# Patient Record
Sex: FEMALE | Race: WHITE | NOT HISPANIC OR LATINO | Employment: OTHER | URBAN - METROPOLITAN AREA
[De-identification: names, ages, dates, MRNs, and addresses within clinical notes are randomized per-mention and may not be internally consistent; named-entity substitution may affect disease eponyms.]

---

## 2024-06-21 ENCOUNTER — HOSPITAL ENCOUNTER (EMERGENCY)
Facility: HOSPITAL | Age: 69
Discharge: ANOTHER HOSPITAL | End: 2024-06-22
Attending: INTERNAL MEDICINE
Payer: MEDICARE

## 2024-06-21 VITALS
DIASTOLIC BLOOD PRESSURE: 104 MMHG | OXYGEN SATURATION: 100 % | RESPIRATION RATE: 20 BRPM | TEMPERATURE: 98 F | SYSTOLIC BLOOD PRESSURE: 150 MMHG | HEART RATE: 90 BPM | WEIGHT: 250 LBS

## 2024-06-21 DIAGNOSIS — D63.1 ANEMIA IN CHRONIC KIDNEY DISEASE, UNSPECIFIED CKD STAGE: ICD-10-CM

## 2024-06-21 DIAGNOSIS — N32.0 BLADDER OUTLET OBSTRUCTION: ICD-10-CM

## 2024-06-21 DIAGNOSIS — E86.0 DEHYDRATION: ICD-10-CM

## 2024-06-21 DIAGNOSIS — G93.41 METABOLIC ENCEPHALOPATHY: ICD-10-CM

## 2024-06-21 DIAGNOSIS — N18.9 ANEMIA IN CHRONIC KIDNEY DISEASE, UNSPECIFIED CKD STAGE: ICD-10-CM

## 2024-06-21 DIAGNOSIS — R53.1 WEAKNESS: ICD-10-CM

## 2024-06-21 DIAGNOSIS — E87.8 ELECTROLYTE DISTURBANCE: ICD-10-CM

## 2024-06-21 DIAGNOSIS — N17.9 ACUTE RENAL FAILURE, UNSPECIFIED ACUTE RENAL FAILURE TYPE: Primary | ICD-10-CM

## 2024-06-21 DIAGNOSIS — N30.00 ACUTE CYSTITIS WITHOUT HEMATURIA: ICD-10-CM

## 2024-06-21 PROBLEM — N39.0 UTI (URINARY TRACT INFECTION): Status: ACTIVE | Noted: 2024-06-21

## 2024-06-21 PROBLEM — E86.9 VOLUME DEPLETION: Status: ACTIVE | Noted: 2024-06-21

## 2024-06-21 PROBLEM — E87.5 HYPERKALEMIA: Status: ACTIVE | Noted: 2024-06-21

## 2024-06-21 PROBLEM — E87.20 METABOLIC ACIDEMIA: Status: ACTIVE | Noted: 2024-06-21

## 2024-06-21 PROBLEM — D64.9 ANEMIA REQUIRING TRANSFUSIONS: Status: ACTIVE | Noted: 2024-06-21

## 2024-06-21 LAB
ABORH RETYPE: NORMAL
ALBUMIN SERPL-MCNC: 2.9 G/DL (ref 3.4–4.8)
ALBUMIN/GLOB SERPL: 0.5 RATIO (ref 1.1–2)
ALP SERPL-CCNC: 62 UNIT/L (ref 40–150)
ALT SERPL-CCNC: 10 UNIT/L (ref 0–55)
ANION GAP SERPL CALC-SCNC: 22 MEQ/L
ANION GAP SERPL CALC-SCNC: ABNORMAL MMOL/L
AST SERPL-CCNC: 13 UNIT/L (ref 5–34)
BACTERIA #/AREA URNS AUTO: ABNORMAL /HPF
BASOPHILS # BLD AUTO: 0.04 X10(3)/MCL
BASOPHILS NFR BLD AUTO: 0.3 %
BILIRUB SERPL-MCNC: 0.3 MG/DL
BILIRUB UR QL STRIP.AUTO: NEGATIVE
BNP BLD-MCNC: 152.2 PG/ML
BUN SERPL-MCNC: 205 MG/DL (ref 9.8–20.1)
BUN SERPL-MCNC: 209 MG/DL (ref 9.8–20.1)
CALCIUM SERPL-MCNC: 7.5 MG/DL (ref 8.4–10.2)
CALCIUM SERPL-MCNC: 8.2 MG/DL (ref 8.4–10.2)
CHLORIDE SERPL-SCNC: 107 MMOL/L (ref 98–107)
CHLORIDE SERPL-SCNC: 108 MMOL/L (ref 98–107)
CHOLEST SERPL-MCNC: 94 MG/DL
CHOLEST/HDLC SERPL: 4 {RATIO} (ref 0–5)
CLARITY UR: ABNORMAL
CO2 SERPL-SCNC: 5 MMOL/L (ref 23–31)
CO2 SERPL-SCNC: <5 MMOL/L (ref 23–31)
COLOR UR AUTO: YELLOW
CREAT SERPL-MCNC: 14.8 MG/DL (ref 0.55–1.02)
CREAT SERPL-MCNC: 16.94 MG/DL (ref 0.55–1.02)
CREAT/UREA NIT SERPL: 12
CREAT/UREA NIT SERPL: 14
EOSINOPHIL # BLD AUTO: 0.06 X10(3)/MCL (ref 0–0.9)
EOSINOPHIL NFR BLD AUTO: 0.4 %
ERYTHROCYTE [DISTWIDTH] IN BLOOD BY AUTOMATED COUNT: 13.4 % (ref 11.5–17)
FERRITIN SERPL-MCNC: 1441.2 NG/ML (ref 4.63–204)
GFR SERPLBLD CREATININE-BSD FMLA CKD-EPI: 2 ML/MIN/1.73/M2
GFR SERPLBLD CREATININE-BSD FMLA CKD-EPI: 2 ML/MIN/1.73/M2
GLOBULIN SER-MCNC: 5.4 GM/DL (ref 2.4–3.5)
GLUCOSE SERPL-MCNC: 111 MG/DL (ref 82–115)
GLUCOSE SERPL-MCNC: 273 MG/DL (ref 82–115)
GLUCOSE UR QL STRIP: NEGATIVE
GROUP & RH: NORMAL
HCT VFR BLD AUTO: 17.7 % (ref 37–47)
HDLC SERPL-MCNC: 22 MG/DL (ref 35–60)
HGB BLD-MCNC: 5.7 G/DL (ref 12–16)
HGB UR QL STRIP: ABNORMAL
IMM GRANULOCYTES # BLD AUTO: 0.45 X10(3)/MCL (ref 0–0.04)
IMM GRANULOCYTES NFR BLD AUTO: 2.8 %
INDIRECT COOMBS: NORMAL
IRON SATN MFR SERPL: 58 % (ref 20–50)
IRON SERPL-MCNC: 109 UG/DL (ref 50–170)
KETONES UR QL STRIP: NEGATIVE
LACTATE SERPL-SCNC: 0.6 MMOL/L (ref 0.5–2.2)
LDLC SERPL CALC-MCNC: 47 MG/DL (ref 50–140)
LEUKOCYTE ESTERASE UR QL STRIP: ABNORMAL
LYMPHOCYTES # BLD AUTO: 0.48 X10(3)/MCL (ref 0.6–4.6)
LYMPHOCYTES NFR BLD AUTO: 3 %
MAGNESIUM SERPL-MCNC: 2.3 MG/DL (ref 1.6–2.6)
MCH RBC QN AUTO: 30.6 PG (ref 27–31)
MCHC RBC AUTO-ENTMCNC: 32.2 G/DL (ref 33–36)
MCV RBC AUTO: 95.2 FL (ref 80–94)
MONOCYTES # BLD AUTO: 0.83 X10(3)/MCL (ref 0.1–1.3)
MONOCYTES NFR BLD AUTO: 5.2 %
NEUTROPHILS # BLD AUTO: 13.95 X10(3)/MCL (ref 2.1–9.2)
NEUTROPHILS NFR BLD AUTO: 88.3 %
NITRITE UR QL STRIP: NEGATIVE
PH UR STRIP: 7 [PH]
PHOSPHATE SERPL-MCNC: 7.9 MG/DL (ref 2.3–4.7)
PLATELET # BLD AUTO: 268 X10(3)/MCL (ref 130–400)
PMV BLD AUTO: 11.3 FL (ref 7.4–10.4)
POCT GLUCOSE: 106 MG/DL (ref 70–110)
POCT GLUCOSE: 118 MG/DL (ref 70–110)
POCT GLUCOSE: 221 MG/DL (ref 70–110)
POCT GLUCOSE: 289 MG/DL (ref 70–110)
POTASSIUM SERPL-SCNC: 6 MMOL/L (ref 3.5–5.1)
POTASSIUM SERPL-SCNC: 6.8 MMOL/L (ref 3.5–5.1)
PROT SERPL-MCNC: 8.3 GM/DL (ref 5.8–7.6)
PROT UR QL STRIP: ABNORMAL
RBC # BLD AUTO: 1.86 X10(6)/MCL (ref 4.2–5.4)
RBC #/AREA URNS AUTO: >100 /HPF
SODIUM SERPL-SCNC: 132 MMOL/L (ref 136–145)
SODIUM SERPL-SCNC: 135 MMOL/L (ref 136–145)
SP GR UR STRIP.AUTO: 1.02 (ref 1–1.03)
SPECIMEN OUTDATE: NORMAL
SQUAMOUS #/AREA URNS AUTO: ABNORMAL /HPF
TIBC SERPL-MCNC: 187 UG/DL (ref 250–450)
TIBC SERPL-MCNC: 78 UG/DL (ref 70–310)
TRIGL SERPL-MCNC: 125 MG/DL (ref 37–140)
TROPONIN I SERPL-MCNC: 0.04 NG/ML (ref 0–0.04)
TSH SERPL-ACNC: 4.44 UIU/ML (ref 0.35–4.94)
UROBILINOGEN UR STRIP-ACNC: 0.2
VLDLC SERPL CALC-MCNC: 25 MG/DL
WBC # BLD AUTO: 15.81 X10(3)/MCL (ref 4.5–11.5)
WBC #/AREA URNS AUTO: >100 /HPF

## 2024-06-21 PROCEDURE — 83550 IRON BINDING TEST: CPT | Performed by: INTERNAL MEDICINE

## 2024-06-21 PROCEDURE — 83735 ASSAY OF MAGNESIUM: CPT

## 2024-06-21 PROCEDURE — 80061 LIPID PANEL: CPT | Performed by: INTERNAL MEDICINE

## 2024-06-21 PROCEDURE — 80053 COMPREHEN METABOLIC PANEL: CPT

## 2024-06-21 PROCEDURE — 25000003 PHARM REV CODE 250: Performed by: INTERNAL MEDICINE

## 2024-06-21 PROCEDURE — 84100 ASSAY OF PHOSPHORUS: CPT | Performed by: INTERNAL MEDICINE

## 2024-06-21 PROCEDURE — 86900 BLOOD TYPING SEROLOGIC ABO: CPT | Performed by: INTERNAL MEDICINE

## 2024-06-21 PROCEDURE — 96361 HYDRATE IV INFUSION ADD-ON: CPT

## 2024-06-21 PROCEDURE — 84443 ASSAY THYROID STIM HORMONE: CPT | Performed by: INTERNAL MEDICINE

## 2024-06-21 PROCEDURE — 99291 CRITICAL CARE FIRST HOUR: CPT | Mod: 25

## 2024-06-21 PROCEDURE — 83540 ASSAY OF IRON: CPT | Performed by: INTERNAL MEDICINE

## 2024-06-21 PROCEDURE — 83605 ASSAY OF LACTIC ACID: CPT | Performed by: INTERNAL MEDICINE

## 2024-06-21 PROCEDURE — 96365 THER/PROPH/DIAG IV INF INIT: CPT

## 2024-06-21 PROCEDURE — 82962 GLUCOSE BLOOD TEST: CPT

## 2024-06-21 PROCEDURE — 81003 URINALYSIS AUTO W/O SCOPE: CPT

## 2024-06-21 PROCEDURE — 63600175 PHARM REV CODE 636 W HCPCS: Performed by: INTERNAL MEDICINE

## 2024-06-21 PROCEDURE — 83880 ASSAY OF NATRIURETIC PEPTIDE: CPT

## 2024-06-21 PROCEDURE — 84484 ASSAY OF TROPONIN QUANT: CPT

## 2024-06-21 PROCEDURE — 86850 RBC ANTIBODY SCREEN: CPT | Performed by: INTERNAL MEDICINE

## 2024-06-21 PROCEDURE — 85025 COMPLETE CBC W/AUTO DIFF WBC: CPT

## 2024-06-21 PROCEDURE — 87086 URINE CULTURE/COLONY COUNT: CPT

## 2024-06-21 PROCEDURE — 93005 ELECTROCARDIOGRAM TRACING: CPT

## 2024-06-21 PROCEDURE — 93010 ELECTROCARDIOGRAM REPORT: CPT | Mod: ,,, | Performed by: INTERNAL MEDICINE

## 2024-06-21 PROCEDURE — 87040 BLOOD CULTURE FOR BACTERIA: CPT | Performed by: INTERNAL MEDICINE

## 2024-06-21 PROCEDURE — 87077 CULTURE AEROBIC IDENTIFY: CPT

## 2024-06-21 PROCEDURE — 82728 ASSAY OF FERRITIN: CPT | Performed by: INTERNAL MEDICINE

## 2024-06-21 PROCEDURE — 84156 ASSAY OF PROTEIN URINE: CPT

## 2024-06-21 PROCEDURE — 96375 TX/PRO/DX INJ NEW DRUG ADDON: CPT

## 2024-06-21 RX ORDER — HYDROCODONE BITARTRATE AND ACETAMINOPHEN 5; 325 MG/1; MG/1
1 TABLET ORAL EVERY 6 HOURS PRN
Status: DISCONTINUED | OUTPATIENT
Start: 2024-06-22 | End: 2024-06-22 | Stop reason: HOSPADM

## 2024-06-21 RX ORDER — DEXTROSE MONOHYDRATE 50 MG/ML
1000 INJECTION, SOLUTION INTRAVENOUS CONTINUOUS
Status: DISCONTINUED | OUTPATIENT
Start: 2024-06-21 | End: 2024-06-21

## 2024-06-21 RX ORDER — SODIUM BICARBONATE 650 MG/1
1300 TABLET ORAL ONCE
Status: COMPLETED | OUTPATIENT
Start: 2024-06-21 | End: 2024-06-21

## 2024-06-21 RX ORDER — ONDANSETRON HYDROCHLORIDE 2 MG/ML
4 INJECTION, SOLUTION INTRAVENOUS EVERY 6 HOURS PRN
Status: DISCONTINUED | OUTPATIENT
Start: 2024-06-22 | End: 2024-06-22 | Stop reason: HOSPADM

## 2024-06-21 RX ORDER — SODIUM BICARBONATE 1 MEQ/ML
150 SYRINGE (ML) INTRAVENOUS CONTINUOUS
Status: DISCONTINUED | OUTPATIENT
Start: 2024-06-21 | End: 2024-06-21

## 2024-06-21 RX ORDER — CALCIUM GLUCONATE 20 MG/ML
1 INJECTION, SOLUTION INTRAVENOUS
Status: COMPLETED | OUTPATIENT
Start: 2024-06-21 | End: 2024-06-21

## 2024-06-21 RX ADMIN — SODIUM BICARBONATE 150 MEQ: 84 INJECTION, SOLUTION INTRAVENOUS at 10:06

## 2024-06-21 RX ADMIN — SODIUM BICARBONATE 1300 MG: 650 TABLET ORAL at 09:06

## 2024-06-21 RX ADMIN — DEXTROSE MONOHYDRATE 250 ML: 100 INJECTION, SOLUTION INTRAVENOUS at 09:06

## 2024-06-21 RX ADMIN — DEXTROSE MONOHYDRATE 1000 ML: 50 INJECTION, SOLUTION INTRAVENOUS at 10:06

## 2024-06-21 RX ADMIN — HUMAN INSULIN 5 UNITS: 100 INJECTION, SOLUTION SUBCUTANEOUS at 10:06

## 2024-06-21 RX ADMIN — CALCIUM GLUCONATE 1 G: 20 INJECTION, SOLUTION INTRAVENOUS at 09:06

## 2024-06-21 RX ADMIN — SODIUM CHLORIDE 1000 ML: 9 INJECTION, SOLUTION INTRAVENOUS at 09:06

## 2024-06-21 RX ADMIN — CEFTRIAXONE SODIUM 1 G: 1 INJECTION, POWDER, FOR SOLUTION INTRAMUSCULAR; INTRAVENOUS at 09:06

## 2024-06-22 ENCOUNTER — HOSPITAL ENCOUNTER (INPATIENT)
Facility: HOSPITAL | Age: 69
LOS: 18 days | Discharge: HOME HEALTH | DRG: 660 | End: 2024-07-10
Attending: EMERGENCY MEDICINE | Admitting: INTERNAL MEDICINE
Payer: MEDICARE

## 2024-06-22 DIAGNOSIS — R60.9 EDEMA: ICD-10-CM

## 2024-06-22 DIAGNOSIS — R00.0 TACHYCARDIA: ICD-10-CM

## 2024-06-22 DIAGNOSIS — N17.9 ACUTE RENAL FAILURE, UNSPECIFIED ACUTE RENAL FAILURE TYPE: ICD-10-CM

## 2024-06-22 DIAGNOSIS — E87.20 METABOLIC ACIDOSIS: ICD-10-CM

## 2024-06-22 DIAGNOSIS — M79.89 LEG SWELLING: ICD-10-CM

## 2024-06-22 DIAGNOSIS — E87.5 HYPERKALEMIA: ICD-10-CM

## 2024-06-22 DIAGNOSIS — D64.9 SYMPTOMATIC ANEMIA: ICD-10-CM

## 2024-06-22 DIAGNOSIS — D64.9 ANEMIA REQUIRING TRANSFUSIONS: Primary | ICD-10-CM

## 2024-06-22 DIAGNOSIS — N17.9 AKI (ACUTE KIDNEY INJURY): ICD-10-CM

## 2024-06-22 DIAGNOSIS — I82.409 DVT (DEEP VENOUS THROMBOSIS): ICD-10-CM

## 2024-06-22 DIAGNOSIS — E87.5 ACUTE HYPERKALEMIA: ICD-10-CM

## 2024-06-22 DIAGNOSIS — E86.9 VOLUME DEPLETION: ICD-10-CM

## 2024-06-22 DIAGNOSIS — Z45.2 ENCOUNTER FOR CENTRAL LINE PLACEMENT: ICD-10-CM

## 2024-06-22 LAB
ABS NEUT (OLG): 11.2 X10(3)/MCL (ref 2.1–9.2)
ALBUMIN SERPL-MCNC: 2.3 G/DL (ref 3.4–4.8)
ALBUMIN SERPL-MCNC: 2.7 G/DL (ref 3.4–4.8)
ALBUMIN/GLOB SERPL: 0.6 RATIO (ref 1.1–2)
ALBUMIN/GLOB SERPL: 0.7 RATIO (ref 1.1–2)
ALLENS TEST BLOOD GAS (OHS): YES
ALP SERPL-CCNC: 53 UNIT/L (ref 40–150)
ALP SERPL-CCNC: 59 UNIT/L (ref 40–150)
ALT SERPL-CCNC: 12 UNIT/L (ref 0–55)
ALT SERPL-CCNC: 12 UNIT/L (ref 0–55)
ANION GAP SERPL CALC-SCNC: 23 MEQ/L
ANION GAP SERPL CALC-SCNC: ABNORMAL MMOL/L
ANISOCYTOSIS BLD QL SMEAR: ABNORMAL
APTT PPP: 23.5 SECONDS (ref 23.2–33.7)
AST SERPL-CCNC: 15 UNIT/L (ref 5–34)
AST SERPL-CCNC: 17 UNIT/L (ref 5–34)
BASE EXCESS BLD CALC-SCNC: -19.4 MMOL/L
BASE EXCESS BLD CALC-SCNC: -3.6 MMOL/L
BILIRUB SERPL-MCNC: 0.3 MG/DL
BILIRUB SERPL-MCNC: 0.5 MG/DL
BLOOD GAS SAMPLE TYPE (OHS): ABNORMAL
BLOOD GAS SAMPLE TYPE (OHS): ABNORMAL
BUN SERPL-MCNC: 117.8 MG/DL (ref 9.8–20.1)
BUN SERPL-MCNC: 198.4 MG/DL (ref 9.8–20.1)
BURR CELLS (OLG): ABNORMAL
CA-I BLD-SCNC: 0.89 MMOL/L (ref 1.12–1.23)
CA-I BLD-SCNC: 0.95 MMOL/L (ref 1.12–1.23)
CALCIUM SERPL-MCNC: 6.7 MG/DL (ref 8.4–10.2)
CALCIUM SERPL-MCNC: 7.3 MG/DL (ref 8.4–10.2)
CHLORIDE SERPL-SCNC: 103 MMOL/L (ref 98–107)
CHLORIDE SERPL-SCNC: 108 MMOL/L (ref 98–107)
CHLORIDE UR-SCNC: 52 MMOL/L
CK SERPL-CCNC: 245 U/L (ref 29–168)
CO2 BLDA-SCNC: 17.2 MMOL/L
CO2 BLDA-SCNC: 8.4 MMOL/L
CO2 SERPL-SCNC: 14 MMOL/L (ref 23–31)
CO2 SERPL-SCNC: <5 MMOL/L (ref 23–31)
COHGB MFR BLDA: 2.4 %
CREAT SERPL-MCNC: 15.51 MG/DL (ref 0.55–1.02)
CREAT SERPL-MCNC: 9.84 MG/DL (ref 0.55–1.02)
CREAT UR-MCNC: 29.5 MG/DL (ref 45–106)
CREAT/UREA NIT SERPL: 12
CREAT/UREA NIT SERPL: 13
D DIMER PPP IA.FEU-MCNC: 1.57 UG/ML FEU (ref 0–0.5)
DRAWN BY BLOOD GAS (OHS): ABNORMAL
DRAWN BY BLOOD GAS (OHS): ABNORMAL
EOSINOPHIL NFR BLD MANUAL: 0.14 X10(3)/MCL (ref 0–0.9)
EOSINOPHIL NFR BLD MANUAL: 1 %
ERYTHROCYTE [DISTWIDTH] IN BLOOD BY AUTOMATED COUNT: 13.1 % (ref 11.5–17)
EST. AVERAGE GLUCOSE BLD GHB EST-MCNC: 88.2 MG/DL
FOLATE SERPL-MCNC: 6.1 NG/ML (ref 7–31.4)
GFR SERPLBLD CREATININE-BSD FMLA CKD-EPI: 2 ML/MIN/1.73/M2
GFR SERPLBLD CREATININE-BSD FMLA CKD-EPI: 4 ML/MIN/1.73/M2
GLOBULIN SER-MCNC: 3.4 GM/DL (ref 2.4–3.5)
GLOBULIN SER-MCNC: 4.6 GM/DL (ref 2.4–3.5)
GLUCOSE SERPL-MCNC: 100 MG/DL (ref 82–115)
GLUCOSE SERPL-MCNC: 84 MG/DL (ref 82–115)
GROUP & RH: NORMAL
HBA1C MFR BLD: 4.7 %
HBV SURFACE AG SERPL QL IA: NONREACTIVE
HCO3 BLDA-SCNC: 16.6 MMOL/L (ref 22–26)
HCO3 BLDA-SCNC: 7.7 MMOL/L
HCT VFR BLD AUTO: 15.6 % (ref 37–47)
HCT VFR BLD AUTO: 20 % (ref 37–47)
HGB BLD-MCNC: 5.2 G/DL (ref 12–16)
HGB BLD-MCNC: 7 G/DL (ref 12–16)
INDIRECT COOMBS: NORMAL
INHALED O2 CONCENTRATION: 21 %
INR PPP: 1.5
INSTRUMENT WBC (OLG): 14.36 X10(3)/MCL
LACTATE SERPL-SCNC: 1.2 MMOL/L (ref 0.5–2.2)
LYMPHOCYTES NFR BLD MANUAL: 1.44 X10(3)/MCL
LYMPHOCYTES NFR BLD MANUAL: 10 %
MACROCYTES BLD QL SMEAR: ABNORMAL
MAGNESIUM SERPL-MCNC: 1.6 MG/DL (ref 1.6–2.6)
MAGNESIUM SERPL-MCNC: 2 MG/DL (ref 1.6–2.6)
MCH RBC QN AUTO: 31 PG (ref 27–31)
MCHC RBC AUTO-ENTMCNC: 33.3 G/DL (ref 33–36)
MCV RBC AUTO: 92.9 FL (ref 80–94)
METHGB MFR BLDA: 10.3 %
MONOCYTES NFR BLD MANUAL: 1.58 X10(3)/MCL (ref 0.1–1.3)
MONOCYTES NFR BLD MANUAL: 11 %
NEUTROPHILS NFR BLD MANUAL: 78 %
NRBC BLD AUTO-RTO: 0 %
NRBC BLD MANUAL-RTO: 1 %
O2 HB BLOOD GAS (OHS): 13.8 %
OHS QRS DURATION: 62 MS
OHS QRS DURATION: 78 MS
OHS QTC CALCULATION: 452 MS
OHS QTC CALCULATION: 497 MS
OXYHGB MFR BLDA: <6.5 G/DL
PCO2 BLDA: 19 MMHG (ref 35–45)
PCO2 BLDA: 22 MMHG (ref 20–50)
PH BLDA: 7.15 [PH] (ref 7.3–7.6)
PH BLDA: 7.55 [PH] (ref 7.35–7.45)
PHOSPHATE SERPL-MCNC: 4.4 MG/DL (ref 2.3–4.7)
PLATELET # BLD AUTO: 221 X10(3)/MCL (ref 130–400)
PLATELET # BLD EST: NORMAL 10*3/UL
PLATELETS.RETICULATED NFR BLD AUTO: 1.9 % (ref 0.9–11.2)
PMV BLD AUTO: 11.1 FL (ref 7.4–10.4)
PO2 BLDA: 112 MMHG (ref 80–100)
PO2 BLDA: <38 MMHG
POCT GLUCOSE: 131 MG/DL (ref 70–110)
POCT GLUCOSE: 85 MG/DL (ref 70–110)
POIKILOCYTOSIS BLD QL SMEAR: ABNORMAL
POTASSIUM BLOOD GAS (OHS): 3.4 MMOL/L (ref 3.5–5)
POTASSIUM BLOOD GAS (OHS): 5.5 MMOL/L (ref 3.5–5)
POTASSIUM SERPL-SCNC: 3.6 MMOL/L (ref 3.5–5.1)
POTASSIUM SERPL-SCNC: 5.7 MMOL/L (ref 3.5–5.1)
POTASSIUM UR-SCNC: 27.4 MMOL/L
PROT SERPL-MCNC: 5.7 GM/DL (ref 5.8–7.6)
PROT SERPL-MCNC: 7.3 GM/DL (ref 5.8–7.6)
PROT UR STRIP-MCNC: 241.3 MG/DL
PROTHROMBIN TIME: 18.3 SECONDS (ref 12.5–14.5)
RBC # BLD AUTO: 1.68 X10(6)/MCL (ref 4.2–5.4)
RBC MORPH BLD: ABNORMAL
SAMPLE SITE BLOOD GAS (OHS): ABNORMAL
SAO2 % BLDA: 9.6 %
SAO2 % BLDA: 99 %
SODIUM BLOOD GAS (OHS): 131 MMOL/L (ref 137–145)
SODIUM BLOOD GAS (OHS): 135 MMOL/L (ref 137–145)
SODIUM SERPL-SCNC: 136 MMOL/L (ref 136–145)
SODIUM SERPL-SCNC: 140 MMOL/L (ref 136–145)
SODIUM UR-SCNC: 51 MMOL/L
SPECIMEN OUTDATE: NORMAL
URINE PROTEIN/CREATININE RATIO (OLG): 8.2
VIT B12 SERPL-MCNC: 681 PG/ML (ref 213–816)
WBC # BLD AUTO: 14.36 X10(3)/MCL (ref 4.5–11.5)

## 2024-06-22 PROCEDURE — 82746 ASSAY OF FOLIC ACID SERUM: CPT | Performed by: INTERNAL MEDICINE

## 2024-06-22 PROCEDURE — 82550 ASSAY OF CK (CPK): CPT | Performed by: INTERNAL MEDICINE

## 2024-06-22 PROCEDURE — 93010 ELECTROCARDIOGRAM REPORT: CPT | Mod: ,,, | Performed by: INTERNAL MEDICINE

## 2024-06-22 PROCEDURE — 85379 FIBRIN DEGRADATION QUANT: CPT | Performed by: EMERGENCY MEDICINE

## 2024-06-22 PROCEDURE — 30233N0 TRANSFUSION OF AUTOLOGOUS RED BLOOD CELLS INTO PERIPHERAL VEIN, PERCUTANEOUS APPROACH: ICD-10-PCS | Performed by: INTERNAL MEDICINE

## 2024-06-22 PROCEDURE — 85014 HEMATOCRIT: CPT | Performed by: INTERNAL MEDICINE

## 2024-06-22 PROCEDURE — 82803 BLOOD GASES ANY COMBINATION: CPT

## 2024-06-22 PROCEDURE — 5A1D70Z PERFORMANCE OF URINARY FILTRATION, INTERMITTENT, LESS THAN 6 HOURS PER DAY: ICD-10-PCS | Performed by: INTERNAL MEDICINE

## 2024-06-22 PROCEDURE — 83735 ASSAY OF MAGNESIUM: CPT | Performed by: EMERGENCY MEDICINE

## 2024-06-22 PROCEDURE — P9016 RBC LEUKOCYTES REDUCED: HCPCS | Performed by: EMERGENCY MEDICINE

## 2024-06-22 PROCEDURE — 85730 THROMBOPLASTIN TIME PARTIAL: CPT | Performed by: EMERGENCY MEDICINE

## 2024-06-22 PROCEDURE — 87340 HEPATITIS B SURFACE AG IA: CPT | Performed by: INTERNAL MEDICINE

## 2024-06-22 PROCEDURE — 02HV33Z INSERTION OF INFUSION DEVICE INTO SUPERIOR VENA CAVA, PERCUTANEOUS APPROACH: ICD-10-PCS | Performed by: STUDENT IN AN ORGANIZED HEALTH CARE EDUCATION/TRAINING PROGRAM

## 2024-06-22 PROCEDURE — 25000003 PHARM REV CODE 250: Performed by: NURSE PRACTITIONER

## 2024-06-22 PROCEDURE — 85027 COMPLETE CBC AUTOMATED: CPT | Performed by: EMERGENCY MEDICINE

## 2024-06-22 PROCEDURE — 86901 BLOOD TYPING SEROLOGIC RH(D): CPT | Performed by: EMERGENCY MEDICINE

## 2024-06-22 PROCEDURE — 96374 THER/PROPH/DIAG INJ IV PUSH: CPT

## 2024-06-22 PROCEDURE — 83735 ASSAY OF MAGNESIUM: CPT

## 2024-06-22 PROCEDURE — 99900035 HC TECH TIME PER 15 MIN (STAT)

## 2024-06-22 PROCEDURE — 82436 ASSAY OF URINE CHLORIDE: CPT

## 2024-06-22 PROCEDURE — 25000003 PHARM REV CODE 250: Performed by: INTERNAL MEDICINE

## 2024-06-22 PROCEDURE — 36415 COLL VENOUS BLD VENIPUNCTURE: CPT | Performed by: INTERNAL MEDICINE

## 2024-06-22 PROCEDURE — 63600175 PHARM REV CODE 636 W HCPCS

## 2024-06-22 PROCEDURE — 85610 PROTHROMBIN TIME: CPT | Performed by: EMERGENCY MEDICINE

## 2024-06-22 PROCEDURE — 36600 WITHDRAWAL OF ARTERIAL BLOOD: CPT

## 2024-06-22 PROCEDURE — 36430 TRANSFUSION BLD/BLD COMPNT: CPT

## 2024-06-22 PROCEDURE — 83605 ASSAY OF LACTIC ACID: CPT | Performed by: EMERGENCY MEDICINE

## 2024-06-22 PROCEDURE — 51702 INSERT TEMP BLADDER CATH: CPT

## 2024-06-22 PROCEDURE — 80053 COMPREHEN METABOLIC PANEL: CPT | Performed by: INTERNAL MEDICINE

## 2024-06-22 PROCEDURE — 86923 COMPATIBILITY TEST ELECTRIC: CPT | Mod: 91 | Performed by: EMERGENCY MEDICINE

## 2024-06-22 PROCEDURE — 25000003 PHARM REV CODE 250

## 2024-06-22 PROCEDURE — 80053 COMPREHEN METABOLIC PANEL: CPT | Performed by: EMERGENCY MEDICINE

## 2024-06-22 PROCEDURE — 80100014 HC HEMODIALYSIS 1:1

## 2024-06-22 PROCEDURE — 93005 ELECTROCARDIOGRAM TRACING: CPT

## 2024-06-22 PROCEDURE — 84300 ASSAY OF URINE SODIUM: CPT | Performed by: NURSE PRACTITIONER

## 2024-06-22 PROCEDURE — 83036 HEMOGLOBIN GLYCOSYLATED A1C: CPT

## 2024-06-22 PROCEDURE — 63600175 PHARM REV CODE 636 W HCPCS: Performed by: NURSE PRACTITIONER

## 2024-06-22 PROCEDURE — 63600175 PHARM REV CODE 636 W HCPCS: Performed by: INTERNAL MEDICINE

## 2024-06-22 PROCEDURE — 82607 VITAMIN B-12: CPT | Performed by: INTERNAL MEDICINE

## 2024-06-22 PROCEDURE — 82962 GLUCOSE BLOOD TEST: CPT

## 2024-06-22 PROCEDURE — 99285 EMERGENCY DEPT VISIT HI MDM: CPT | Mod: 25

## 2024-06-22 PROCEDURE — 25000003 PHARM REV CODE 250: Performed by: EMERGENCY MEDICINE

## 2024-06-22 PROCEDURE — 84133 ASSAY OF URINE POTASSIUM: CPT

## 2024-06-22 PROCEDURE — 25000003 PHARM REV CODE 250: Mod: JZ,JG

## 2024-06-22 PROCEDURE — 20000000 HC ICU ROOM

## 2024-06-22 PROCEDURE — 84100 ASSAY OF PHOSPHORUS: CPT

## 2024-06-22 PROCEDURE — 85018 HEMOGLOBIN: CPT | Performed by: INTERNAL MEDICINE

## 2024-06-22 RX ORDER — CALCIUM GLUCONATE 20 MG/ML
4 INJECTION, SOLUTION INTRAVENOUS ONCE
Status: DISCONTINUED | OUTPATIENT
Start: 2024-06-22 | End: 2024-06-22

## 2024-06-22 RX ORDER — MUPIROCIN 20 MG/G
OINTMENT TOPICAL 2 TIMES DAILY
Status: DISPENSED | OUTPATIENT
Start: 2024-06-22 | End: 2024-06-27

## 2024-06-22 RX ORDER — DEXAMETHASONE 4 MG/1
8 TABLET ORAL DAILY
Status: DISCONTINUED | OUTPATIENT
Start: 2024-06-22 | End: 2024-06-22

## 2024-06-22 RX ORDER — TALC
6 POWDER (GRAM) TOPICAL NIGHTLY PRN
Status: DISCONTINUED | OUTPATIENT
Start: 2024-06-22 | End: 2024-07-10 | Stop reason: HOSPADM

## 2024-06-22 RX ORDER — HEPARIN SODIUM 1000 [USP'U]/ML
4000 INJECTION, SOLUTION INTRAVENOUS; SUBCUTANEOUS
Status: DISCONTINUED | OUTPATIENT
Start: 2024-06-22 | End: 2024-07-10 | Stop reason: HOSPADM

## 2024-06-22 RX ORDER — PROCHLORPERAZINE EDISYLATE 5 MG/ML
5 INJECTION INTRAMUSCULAR; INTRAVENOUS EVERY 6 HOURS PRN
Status: DISCONTINUED | OUTPATIENT
Start: 2024-06-22 | End: 2024-07-10 | Stop reason: HOSPADM

## 2024-06-22 RX ORDER — SODIUM CHLORIDE 9 MG/ML
INJECTION, SOLUTION INTRAVENOUS
Status: CANCELLED | OUTPATIENT
Start: 2024-06-22

## 2024-06-22 RX ORDER — HYDROCODONE BITARTRATE AND ACETAMINOPHEN 500; 5 MG/1; MG/1
TABLET ORAL
Status: DISCONTINUED | OUTPATIENT
Start: 2024-06-22 | End: 2024-07-10 | Stop reason: HOSPADM

## 2024-06-22 RX ORDER — DEXAMETHASONE SODIUM PHOSPHATE 4 MG/ML
8 INJECTION, SOLUTION INTRA-ARTICULAR; INTRALESIONAL; INTRAMUSCULAR; INTRAVENOUS; SOFT TISSUE EVERY 24 HOURS
Status: DISCONTINUED | OUTPATIENT
Start: 2024-06-22 | End: 2024-06-24

## 2024-06-22 RX ORDER — SODIUM CHLORIDE 9 MG/ML
INJECTION, SOLUTION INTRAVENOUS ONCE
Status: CANCELLED | OUTPATIENT
Start: 2024-06-22 | End: 2024-06-22

## 2024-06-22 RX ORDER — ONDANSETRON HYDROCHLORIDE 2 MG/ML
4 INJECTION, SOLUTION INTRAVENOUS EVERY 8 HOURS PRN
Status: DISCONTINUED | OUTPATIENT
Start: 2024-06-22 | End: 2024-07-10 | Stop reason: HOSPADM

## 2024-06-22 RX ORDER — ACETAMINOPHEN 325 MG/1
650 TABLET ORAL EVERY 4 HOURS PRN
Status: DISCONTINUED | OUTPATIENT
Start: 2024-06-22 | End: 2024-07-10 | Stop reason: HOSPADM

## 2024-06-22 RX ORDER — CALCIUM GLUCONATE 20 MG/ML
1 INJECTION, SOLUTION INTRAVENOUS
Status: DISCONTINUED | OUTPATIENT
Start: 2024-06-22 | End: 2024-06-22

## 2024-06-22 RX ORDER — SODIUM CHLORIDE 9 MG/ML
INJECTION, SOLUTION INTRAVENOUS CONTINUOUS
Status: DISCONTINUED | OUTPATIENT
Start: 2024-06-22 | End: 2024-06-24

## 2024-06-22 RX ORDER — SODIUM CHLORIDE 0.9 % (FLUSH) 0.9 %
10 SYRINGE (ML) INJECTION
Status: DISCONTINUED | OUTPATIENT
Start: 2024-06-22 | End: 2024-07-10 | Stop reason: HOSPADM

## 2024-06-22 RX ORDER — CALCIUM GLUCONATE 20 MG/ML
1 INJECTION, SOLUTION INTRAVENOUS
Status: COMPLETED | OUTPATIENT
Start: 2024-06-22 | End: 2024-06-22

## 2024-06-22 RX ADMIN — CALCIUM GLUCONATE 1 G: 20 INJECTION, SOLUTION INTRAVENOUS at 01:06

## 2024-06-22 RX ADMIN — PIPERACILLIN AND TAZOBACTAM 4.5 G: 4; .5 INJECTION, POWDER, LYOPHILIZED, FOR SOLUTION INTRAVENOUS; PARENTERAL at 01:06

## 2024-06-22 RX ADMIN — SODIUM BICARBONATE: 84 INJECTION, SOLUTION INTRAVENOUS at 02:06

## 2024-06-22 RX ADMIN — MUPIROCIN: 20 OINTMENT TOPICAL at 01:06

## 2024-06-22 RX ADMIN — MUPIROCIN: 20 OINTMENT TOPICAL at 08:06

## 2024-06-22 RX ADMIN — CALCIUM GLUCONATE 1 G: 20 INJECTION, SOLUTION INTRAVENOUS at 02:06

## 2024-06-22 RX ADMIN — VANCOMYCIN HYDROCHLORIDE 2000 MG: 500 INJECTION, POWDER, LYOPHILIZED, FOR SOLUTION INTRAVENOUS at 04:06

## 2024-06-22 RX ADMIN — SODIUM CHLORIDE: 9 INJECTION, SOLUTION INTRAVENOUS at 06:06

## 2024-06-22 RX ADMIN — CEFTRIAXONE SODIUM 2 G: 2 INJECTION, POWDER, FOR SOLUTION INTRAMUSCULAR; INTRAVENOUS at 07:06

## 2024-06-22 RX ADMIN — SODIUM BICARBONATE: 84 INJECTION, SOLUTION INTRAVENOUS at 09:06

## 2024-06-22 RX ADMIN — HEPARIN SODIUM 4000 UNITS: 1000 INJECTION INTRAVENOUS; SUBCUTANEOUS at 09:06

## 2024-06-22 RX ADMIN — DEXAMETHASONE SODIUM PHOSPHATE 8 MG: 4 INJECTION INTRA-ARTICULAR; INTRALESIONAL; INTRAMUSCULAR; INTRAVENOUS; SOFT TISSUE at 01:06

## 2024-06-22 NOTE — ED PROVIDER NOTES
Encounter Date: 6/21/2024       History     Chief Complaint   Patient presents with    Transfer     [facility] transfer from [place]     69-year-old female transferred for acute renal failure hyperkalemia.  Patient and son reports she was had generalized weakness and been mostly bed-bound for 3 weeks due to a generalized weakness and dyspnea with exertion.  They report she used to have a doctor but has not seen them in about 10 years because she just did not need to.  Denies any known past medical history.  Though she has been mostly bed-bound she has been able to get up walk to and from the bathroom felt very short of breath with that.  She has been drinking a lot of fluids at home and been eating a lot of bananas.  She has had nausea vomiting generalized weakness myalgias.  Denies any blood in his stool.  Does feel short of breath no chest pain no coughing.    Patient had labs 5 hours ago that showed sodium 135 potassium 6.8 chloride 108 CO2 5  creatinine 16.9.  Hemoglobin 5.7 hematocrit 17.7 white blood cells 15.8  troponin 0.042 within normal limits.  Blood cultures were obtained.  Urinalysis with many bacteria over 100 white blood cells 3+ leuk esterase.  Patient was started on Rocephin given sodium bicarb dextrose insulin for hyperkalemia as well as calcium.  Lokelma give him.  Patient has a CT scan that showed bilateral hydronephrosis thinning of the renal cortex but no masses.  Patient did have a Mario catheter placed.  Will repeat labs here to evaluate response to treatment thus far.  Based on labs prerenal is a consideration imaging can brings concern of post renal obstruction.  Patient presented to the outside facility reporting feeling sick and short of breath for 3 weeks with nausea and vomiting decreased appetite.  According to further review of the records patient has never seen a doctor was brought to the hospital against her will and has been bed-bound for about 3 months due to knee pain  after she heard it several years ago.        Review of patient's allergies indicates:  No Known Allergies  No past medical history on file.  No past surgical history on file.  No family history on file.  Social History     Tobacco Use    Smoking status: Never    Smokeless tobacco: Never   Substance Use Topics    Alcohol use: Never    Drug use: Never     Review of Systems   Constitutional:  Negative for chills and fever.   Respiratory:  Positive for shortness of breath. Negative for cough.    Cardiovascular:  Negative for chest pain.   Gastrointestinal:  Positive for nausea and vomiting. Negative for abdominal pain.   Musculoskeletal:  Positive for myalgias.   Neurological:  Positive for weakness.   All other systems reviewed and are negative.      Physical Exam     Initial Vitals [06/22/24 0107]   BP Pulse Resp Temp SpO2   (!) 150/72 90 (!) 22 98.5 °F (36.9 °C) 96 %      MAP       --         Physical Exam    Nursing note and vitals reviewed.  Constitutional: She appears well-developed and well-nourished. No distress.   Obese, pale   HENT:   Head: Normocephalic and atraumatic.   Eyes: Conjunctivae are normal.   Cardiovascular:  Normal rate and intact distal pulses.           Pulmonary/Chest: No respiratory distress. She has no rhonchi.   Tachypneic no wheezing no rales   Abdominal: Abdomen is soft. Bowel sounds are normal. There is no abdominal tenderness. There is no rebound and no guarding.   Musculoskeletal:         General: Edema present.      Comments: Pitting edema bilateral lower extremities     Neurological: She is alert and oriented to person, place, and time. She has normal strength.   Skin: Skin is warm and dry.   Psychiatric: She has a normal mood and affect.         ED Course   Critical Care    Date/Time: 6/22/2024 4:14 AM    Performed by: Dariusz Vee MD  Authorized by: Dariusz Vee MD  Direct patient critical care time: 20 minutes  Additional history critical care time: 8 minutes  Ordering  / reviewing critical care time: 7 minutes  Documentation critical care time: 15 minutes  Consulting other physicians critical care time: 7 minutes  Consult with family critical care time: 3 minutes  Total critical care time (exclusive of procedural time) : 60 minutes  Critical care was necessary to treat or prevent imminent or life-threatening deterioration of the following conditions: circulatory failure, respiratory failure, renal failure and metabolic crisis.        Labs Reviewed   COMPREHENSIVE METABOLIC PANEL - Abnormal; Notable for the following components:       Result Value    Potassium 5.7 (*)     Chloride 108 (*)     CO2 <5 (*)     Blood Urea Nitrogen 198.4 (*)     Creatinine 15.51 (*)     Calcium 7.3 (*)     Albumin 2.7 (*)     Globulin 4.6 (*)     Albumin/Globulin Ratio 0.6 (*)     All other components within normal limits   CBC WITH DIFFERENTIAL - Abnormal; Notable for the following components:    WBC 14.36 (*)     RBC 1.68 (*)     Hgb 5.2 (*)     Hct 15.6 (*)     MPV 11.1 (*)     All other components within normal limits   D DIMER, QUANTITATIVE - Abnormal; Notable for the following components:    D-Dimer 1.57 (*)     All other components within normal limits   MANUAL DIFFERENTIAL - Abnormal; Notable for the following components:    Neutrophils Abs 11.2008 (*)     Monocytes Abs 1.5796 (*)     RBC Morph Abnormal (*)     Poikilocytosis 1+ (*)     Anisocytosis 1+ (*)     Macrocytosis 1+ (*)     Ramos Cells 2+ (*)     All other components within normal limits   LACTIC ACID, PLASMA - Normal   MAGNESIUM - Normal   CBC W/ AUTO DIFFERENTIAL    Narrative:     The following orders were created for panel order CBC auto differential.  Procedure                               Abnormality         Status                     ---------                               -----------         ------                     CBC with Differential[[id number]]       Abnormal            Final result               Manual  Differential[[id number]]         Abnormal            Final result                 Please view results for these tests on the individual orders.   BLOOD GAS   TYPE & SCREEN   PREPARE RBC SOFT   PREPARE RBC SOFT          Imaging Results    None          Medications   sodium bicarbonate 150 mEq in D5W 1,000 mL infusion ( Intravenous New Bag 6/22/24 0200)   0.9%  NaCl infusion (for blood administration) (has no administration in time range)     Medical Decision Making  Patient with a heat renal failure hyperkalemia.  Dehydration versus bladder outlet obstruction.  They report she has been having frequent urinations I discussed based on overall picture including CT scan she may have been having some retention with overflow incontinence CT of the previous facility showed bladder outlet obstruction with bilateral hydronephrosis.  Mario catheter was placed and returned dark urine with concern of infection.  She was started on Rocephin at the sending facility.  Continue sodium bicarbonate drip.  Repeating labs here patient reportedly received fluid boluses of saline same facility.  Will re-evaluate electrolytes here determine whether she will require urgent dialysis or not.  Hopefully was catheter and IV fluids and bicarb functional start to improve.  She states she already feels much better than she did at the arrival of the other hospital.  H&H were low symptomatic anemia is also a concern will perform rectal exam with occult testing.  Pulmonary embolus is a consideration given that she has been largely bed-bound will get ultrasound and D-dimer no CT angiogram due to acute renal failure.  Will not empirically heparinize for now due to critical anemia she was hemodynamically stable thus far    Amount and/or Complexity of Data Reviewed  Labs: ordered.    Risk  Prescription drug management.  Decision regarding hospitalization.               ED Course as of 06/22/24 0415   Sat Jun 22, 2024   0251 Brown stool occult positive  for blood [LF]   0334 Nephrology paged [BG]   0336 General surgery paged [BG]   0340 Discussed with Telma on-call for Nephrology.  Will have surgery placed temporary line and then they will plan to call out the team and have her dialyzed and give blood during dialysis [LF]   0342 Icu paged [BG]   0344 General surgery was called to place temporary dialysis line.  Discussed labs platelets normal .  Critically low H&H surgery has requested patient get 1 unit of blood at this time rather than waiting for dialysis to help with line placement [LF]   0347 Discussed case with ICU [LF]   0413 Patient was tachypneic with metabolic acidosis.  Satting well lungs are clear do not believe she needs to be intubated at this time [LF]      ED Course User Index  [BG] Christian Chand  [LF] Dariusz Vee MD                           Clinical Impression:  Final diagnoses:  [N17.9] Acute renal failure, unspecified acute renal failure type (Primary)  [E87.5] Acute hyperkalemia  [D64.9] Symptomatic anemia  [E87.20] Metabolic acidosis  [R60.9] Edema          ED Disposition Condition    Admit Critical                Dariusz Vee MD  06/22/24 2807

## 2024-06-22 NOTE — Clinical Note
Addended by: LYNDA TOMAS on: 10/26/2022 10:48 AM     Modules accepted: Orders     The site was marked. The chest was prepped. The site was prepped with ChloraPrep. The site was clipped. The patient was draped.

## 2024-06-22 NOTE — ED PROVIDER NOTES
Encounter Date: 6/21/2024       History     Chief Complaint   Patient presents with    Weakness     Weakness and SOB x3 weeks.     See MDM.     The history is provided by the patient and a relative. No  was used.     Review of patient's allergies indicates:  No Known Allergies  History reviewed. No pertinent past medical history.  History reviewed. No pertinent surgical history.  No family history on file.  Social History     Tobacco Use    Smoking status: Never    Smokeless tobacco: Never   Substance Use Topics    Alcohol use: Never    Drug use: Never     Review of Systems   Constitutional:  Negative for chills and fever.   Respiratory:  Positive for shortness of breath.    Neurological:  Positive for tremors and weakness.   All other systems reviewed and are negative.      Physical Exam     Initial Vitals [06/21/24 1919]   BP Pulse Resp Temp SpO2   (!) 176/70 98 (!) 22 97.7 °F (36.5 °C) 96 %      MAP       --         Physical Exam    Constitutional: She is not diaphoretic.   Morbidly obese, pale appearing, catching breath while interviewing   Cardiovascular:  Normal rate, regular rhythm and normal heart sounds.     Exam reveals no gallop and no friction rub.       No murmur heard.  Pulmonary/Chest: Breath sounds normal. No respiratory distress. She has no wheezes. She has no rhonchi. She has no rales.   Abdominal: Abdomen is soft. Bowel sounds are normal. She exhibits no distension and no mass. There is no abdominal tenderness. There is no rebound and no guarding.   Musculoskeletal:      Comments: 2+ pitting edema bilateral lower extremities up to the knee, atrophic changes of right lower leg. No wounds     Neurological: She is alert and oriented to person, place, and time.         ED Course   Critical Care    Date/Time: 6/22/2024 12:40 AM    Performed by: Joseph Prasad DO  Authorized by: Joseph Prasad DO  Other critical care time: 55 minutes  Total critical care time (exclusive  of procedural time) : 55 minutes  Critical care time was exclusive of separately billable procedures and treating other patients and teaching time.  Critical care was necessary to treat or prevent imminent or life-threatening deterioration of the following conditions: metabolic crisis and renal failure.  Critical care was time spent personally by me on the following activities: development of treatment plan with patient or surrogate, discussions with consultants, discussions with primary provider, evaluation of patient's response to treatment, examination of patient, obtaining history from patient or surrogate, ordering and performing treatments and interventions, ordering and review of laboratory studies, ordering and review of radiographic studies, pulse oximetry and re-evaluation of patient's condition.        Labs Reviewed   COMPREHENSIVE METABOLIC PANEL - Abnormal; Notable for the following components:       Result Value    Sodium 135 (*)     Potassium 6.8 (*)     Chloride 108 (*)     CO2 5 (*)     Blood Urea Nitrogen 209.0 (*)     Creatinine 16.94 (*)     Calcium 8.2 (*)     Protein Total 8.3 (*)     Albumin 2.9 (*)     Globulin 5.4 (*)     Albumin/Globulin Ratio 0.5 (*)     All other components within normal limits   URINALYSIS - Abnormal; Notable for the following components:    Appearance, UA Turbid (*)     Protein, UA 3+ (*)     Blood, UA 3+ (*)     Leukocyte Esterase, UA 3+ (*)     All other components within normal limits   B-TYPE NATRIURETIC PEPTIDE - Abnormal; Notable for the following components:    Natriuretic Peptide 152.2 (*)     All other components within normal limits   CBC WITH DIFFERENTIAL - Abnormal; Notable for the following components:    WBC 15.81 (*)     RBC 1.86 (*)     Hgb 5.7 (*)     Hct 17.7 (*)     MCV 95.2 (*)     MCHC 32.2 (*)     MPV 11.3 (*)     Lymph # 0.48 (*)     Neut # 13.95 (*)     IG# 0.45 (*)     All other components within normal limits   URINALYSIS, MICROSCOPIC -  Abnormal; Notable for the following components:    Bacteria, UA Many (*)     RBC, UA >100 (*)     WBC, UA >100 (*)     Squamous Epithelial Cells, UA Few (*)     All other components within normal limits   BASIC METABOLIC PANEL - Abnormal; Notable for the following components:    Sodium 132 (*)     Potassium 6.0 (*)     CO2 <5 (*)     Glucose 273 (*)     Blood Urea Nitrogen 205.0 (*)     Creatinine 14.80 (*)     Calcium 7.5 (*)     All other components within normal limits   FERRITIN - Abnormal; Notable for the following components:    Ferritin Level 1,441.20 (*)     All other components within normal limits   IRON AND TIBC - Abnormal; Notable for the following components:    Iron Binding Capacity Total 187 (*)     Iron Saturation 58 (*)     All other components within normal limits   LIPID PANEL - Abnormal; Notable for the following components:    HDL Cholesterol 22 (*)     LDL Cholesterol 47.00 (*)     All other components within normal limits   PHOSPHORUS - Abnormal; Notable for the following components:    Phosphorus Level 7.9 (*)     All other components within normal limits   POCT GLUCOSE - Abnormal; Notable for the following components:    POCT Glucose 221 (*)     All other components within normal limits   POCT GLUCOSE - Abnormal; Notable for the following components:    POCT Glucose 289 (*)     All other components within normal limits   POCT GLUCOSE - Abnormal; Notable for the following components:    POCT Glucose 118 (*)     All other components within normal limits   POCT GLUCOSE - Abnormal; Notable for the following components:    POCT Glucose 131 (*)     All other components within normal limits   MAGNESIUM - Normal   TROPONIN I - Normal   LACTIC ACID, PLASMA - Normal   TSH - Normal   BLOOD CULTURE [facility]   BLOOD CULTURE [facility]   CULTURE, URINE   CBC W/ AUTO DIFFERENTIAL    Narrative:     The following orders were created for panel order CBC auto differential.  Procedure                                Abnormality         Status                     ---------                               -----------         ------                     CBC with Differential[[id number]]       Abnormal            Final result                 Please view results for these tests on the individual orders.   HEMOGLOBIN A1C   OCCULT BLOOD, STOOL 1ST SPECIMEN   TYPE & SCREEN   ABORH RETYPE   POCT GLUCOSE   POCT GLUCOSE MONITORING CONTINUOUS     EKG Readings: (Independently Interpreted)   Initial Reading: No STEMI. Rhythm: Sinus Arrhythmia. Heart Rate: 92. Ectopy: No Ectopy. Conduction: Normal. ST Segments: Normal ST Segments.     ECG Results              EKG 12-lead (Preliminary result)  Result time 06/21/24 19:49:50      Wet Read by Joseph Prasad DO (06/21/24 19:49:50, [facility] - Emergency Dept, Emergency Medicine)    Independent ECG Interpretation:    Normal sinus rhythm at rate of 92. Normal intervals. Normal QRS. No acute ST or T wave abnormalities. Overall impression: No evidence of acute ischemia or arrhythmia.                                     Imaging Results              CT Abdomen Pelvis  Without Contrast (Preliminary result)  Result time 06/21/24 23:12:59      Preliminary result by Stefano Jackson MD (06/21/24 23:12:59)                   Narrative:    START OF REPORT:  Technique: CT of the abdomen and pelvis was performed with axial images as well as sagittal and coronal reconstruction images without intravenous contrast.    Comparison: None available.    Clinical History: Abdominal pain acute nonlocalized.    Dosage Information: Automated Exposure Control was utilized 911.94 mGy.cm.    Findings:  Lines and Tubes: None.  Thorax:  Lungs: There is mild nonspecific dependent change at the lung bases. There is bronchiectasis and traction atelectatic changes in the right basal lower lobe.  Pleura: No effusions or thickening. No pneumothorax is seen.  Heart: Mild cardiomegaly is seen. Mild to moderate coronary  artery calcification is seen.  Abdomen:  Abdominal Wall: No abdominal wall pathology is seen.  Liver: The liver appears unremarkable.  Biliary System: No intrahepatic or extrahepatic biliary duct dilatation is seen.  Gallbladder: The gallbladder appears unremarkable.  Pancreas: The pancreas appears unremarkable.  Spleen: The spleen appears unremarkable.  Adrenals: The adrenal glands appear unremarkable.  Kidneys: There is bilateral hydroureteronephrosis with the bladder walls appearing thickened given state of distention suggesting possible bladder outlet obstruction or possible prior obstructive physiology. There is associated cortical thinning of both kidneys but no cysts masses or calcifications are identified in either kidney.  Aorta: There is moderate calcification of the abdominal aorta and its branches.  IVC: Unremarkable.  Bowel:  Esophagus: There is a small hiatal hernia.  Stomach: The stomach appears unremarkable.  Duodenum: Unremarkable appearing duodenum.  Small Bowel: The small bowel appears unremarkable.  Colon: A few scattered diverticula are seen throughout the colon. No associated inflammatory stranding is seen to suggest diverticulitis.  Appendix: The appendix is not identified but no inflammatory changes are seen in the right lower quadrant to suggest appendicitis.  Peritoneum: No intraperitoneal free air or ascites is seen.    Pelvis:  Bladder: The bladder is nondistended but appears otherwise unremarkable.  Female:  Uterus: The uterus appears unremarkable.  Ovaries: No adnexal masses are seen.    Bony structures:  Dorsal Spine: There is mild spondylosis of the visualized dorsal spine.  Bony Pelvis: There is mild to moderate degenerative change of the bilateral hips. There is a chronic fracture of the right sacral wing (Sonny 1) which extends to the right sacroiliac joint (series 4 image 94).      Impression:  1. There is bilateral hydroureteronephrosis with the bladder walls appearing thickened  given state of distention suggesting possible bladder outlet obstruction or possible prior obstructive physiology. There is associated cortical thinning of both kidneys but no cysts masses or calcifications are identified in either kidney. Correlate with clinical and laboratory findings as regards additional evaluation and follow-up.  2. No acute intraabdominal or pelvic solid organ or bowel pathology identified. Details and other findings as discussed above.                                         X-Ray Chest 1 View (Final result)  Result time 06/21/24 20:15:13      Final result by Tony Mar MD (06/21/24 20:15:13)                   Impression:      No acute findings in the chest      Electronically signed by: Tony Mar MD  Date:    06/21/2024  Time:    20:15               Narrative:    EXAMINATION:  XR CHEST 1 VIEW    CLINICAL HISTORY:  sob;    COMPARISON:  None    FINDINGS:  Single view of the chest shows no focal consolidation, pneumothorax or pleural effusion.  Heart is upper normal in size.  Pulmonary vasculature is normal.  Aorta is partially calcified.                                       Medications   cefTRIAXone (Rocephin) 1 g in D5W 100 mL IVPB (MB+) (has no administration in time range)   ondansetron injection 4 mg (has no administration in time range)   sodium zirconium cyclosilicate packet 5 g (has no administration in time range)   HYDROcodone-acetaminophen 5-325 mg per tablet 1 tablet (has no administration in time range)   sodium bicarbonate 150 mEq in D5W 1,000 mL infusion (has no administration in time range)   dextrose 10% bolus 250 mL 250 mL (has no administration in time range)   sodium chloride 0.9% bolus 1,000 mL 1,000 mL (0 mLs Intravenous Stopped 6/21/24 2205)   sodium bicarbonate tablet 1,300 mg (1,300 mg Oral Given 6/21/24 2133)   cefTRIAXone (Rocephin) 1 g in D5W 100 mL IVPB (MB+) (0 g Intravenous Stopped 6/21/24 2203)   calcium gluconate 1 g in NS IVPB (premixed) (0 g  "Intravenous Stopped 6/21/24 2233)   dextrose 10% bolus 250 mL 250 mL (0 mLs Intravenous Stopped 6/21/24 2230)   insulin regular injection 5 Units 0.05 mL (5 Units Intravenous Given 6/21/24 2230)     Medical Decision Making  Pt is a 68 y/o female who presents for "sick" feeling, shaky and difficulty breathing for 3 weeks. States that she was forced to come by her daughter in law. States that about one week ago she had some nausea and vomiting, but has since resolved. States that she has some burning with urination, otherwise denies blood in urine, decreased urine. Has had decreased appetite, has been drinking fluids. Last meal was peanut butter for lunch this afternoon. LBM this morning and was normal for her. Denies any other problems. Denies chest pain, abdominal pain, nausea, vomiting, diarrhea, constipation, blood in stools, headache, light-headedness, syncope. Denies PMHx, does not follow with PCP, last saw doctor over 10 years ago. Denies hx of diabetes. Denies tobacco use, alcohol use, drug use.     Amount and/or Complexity of Data Reviewed  Labs: ordered. Decision-making details documented in ED Course.  Radiology: ordered. Decision-making details documented in ED Course.  Discussion of management or test interpretation with external provider(s): Discussed patient with Dr. Prasad who had face to face with patient.     Risk  OTC drugs.  Prescription drug management.  Decision regarding hospitalization.               ED Course as of 06/22/24 0040   Fri Jun 21, 2024 2046 Troponin I: 0.042 [MP]   2048 WBC(!): 15.81 [MP]   2048 Hemoglobin(!!): 5.7 [MP]   2048 Hematocrit(!!): 17.7 [MP]   2048 BNP(!): 152.2 [MP]   2102 BUN(!): 209.0 [MP]   2102 Creatinine(!): 16.94 [MP]   2102 eGFR: 2 [MP]   2103 Nephrology on call paged [MP]   2135 Lactic Acid Level: 0.6 [MP]   2138 Dr Moss, recommends electrolyte correction and emergent HD. Gen surg for access [MP]   2138 Spoke with Dr Palomo, will present as soon as able " for HD access [MP]   2139 Leukocyte Esterase, UA(!): 3+ [MP]   2139 Blood, UA(!): 3+ [MP]   2146 CT scan to access for obstructive reasons for renal failure [MP]   2241 Dr. Prasad will admit the patient to the hospital.  We discussed the care this far and patient's condition and results of testing with a provider of the admitting team.  Temporary bridge orders placed to expedite patient's transition of care.    [MP]   2300 Admit orders pending CT read and repeat BMP [MP]   2319 Potassium(!): 6.0 [MP]   2319 CO2(!!): <5 [MP]   2319 Glucose(!): 273 [MP]   2321 CT Abdomen Pelvis  Without Contrast  CT read resulted with possible obstructive physiology, Urology not on call, will likely have to transfer [MP]   2330 PO glucose [MP]   2330 PFT Transfer request ordered [MP]   2337 ER to ER transfer for Urology services. Accepted by attending ED provider at [facility] Dr Platt. [MP]      ED Course User Index  [MP] Joseph Prasad DO                           Clinical Impression:  Final diagnoses:  [R53.1] Weakness  [E86.0] Dehydration  [N17.9] Acute renal failure, unspecified acute renal failure type (Primary)  [N18.9, D63.1] Anemia in chronic kidney disease, unspecified CKD stage  [N30.00] Acute cystitis without hematuria  [E87.8] Electrolyte disturbance  [N32.0] Bladder outlet obstruction  [G93.41] Metabolic encephalopathy          ED Disposition Condition    Transfer to Another Facility Stable                Fatemeh Siegel PA  06/21/24 [id number]           Joseph Prasad DO  06/22/24 0040

## 2024-06-22 NOTE — H&P
"[facility] - 7th Floor ICU  Pulmonary Critical Care Note    Patient Name: Fior Joya  MRN: [id number]  Admission Date: 6/22/2024  Hospital Length of Stay: 0 days  Code Status: Full Code  Attending Provider: JOANA Disla MD  Primary Care Provider: Zaheer Johnson MD     Subjective:     HPI: Fior Joya is a 69 year old  American female with no known past medical history as patient does not follow with PCP as outpatient, who presented to [facility] ED (06/22/2024) via transfer from [facility] for higher level of care (nephrology services) due to acute renal failure. Patient accompanied by son who corroborated patient report. Patient states that symptoms began approximately one month when patient noticed dyspnea on exertion and scant bilateral lower extremity edema. She states she is normally able to ambulate from the front to the back of her home (approximately 100 feet) with minimal limitation 2/2 knee pain presumed to be osteoarthritis by patient and family. Over the course of a week symptoms persisted but remained stable. Approximately two weeks ago patient reports experiencing gastroenteritis-like symptoms including abdominal discomfort, nausea, and nonbloody vomitus consisting of "dark vomitus." States vomiting occurred with 2-3 times per day for about week which she associated PO intake. Gastroenteritis symptoms resolved after about a week but MCCALL and swelling began to worsen and patient's po intake never returned to baseline. MCCALL and swelling continued to worsen over the course of two weeks. Patient son yesterday noted that patient had began to breath faster and heavier even noting that she was using abdominal muscles to breath. He then convinced patient to present to nearest ED for evaluation. CBC showed leukocytosis with left shift (WBC 15.81) and normocytic anemia (HgB 5.7; MCV 95.2). CMP showed acute renal failure (, creatinine 16.94), hyperkalemic metabolic acidosis " (AG 23, CO2 < 5), and hyperphosphatemia (Phos 7.9). Urinalysis consistent with UTI. Urine and blood cultures obtained. Nephrology consulted for emergency dialysis. Patient admitted to ICU for close observation and monitoring in the setting of severe acidosis.    Hospital Course/Significant events:      24 Hour Interval History:  N/A    No past medical history on file.    No past surgical history on file.    Social History     Socioeconomic History    Marital status:    Tobacco Use    Smoking status: Never    Smokeless tobacco: Never   Substance and Sexual Activity    Alcohol use: Never    Drug use: Never    Sexual activity: Not Currently           No current outpatient medications    Current Inpatient Medications   cefTRIAXone (Rocephin) IV (PEDS and ADULTS)  2 g Intravenous Q24H       Current Intravenous Infusions   sodium bicarbonate 150 mEq in D5W 1,000 mL infusion   Intravenous Continuous 100 mL/hr at 06/22/24 0200 New Bag at 06/22/24 0200         Review of Systems   Constitutional:  Positive for diaphoresis and malaise/fatigue. Negative for chills and fever.   HENT:  Negative for sore throat and tinnitus.    Eyes:  Negative for pain, discharge and redness.   Respiratory:  Positive for shortness of breath. Negative for cough, hemoptysis, sputum production and wheezing.    Cardiovascular:  Positive for leg swelling. Negative for chest pain, palpitations and orthopnea.   Gastrointestinal:  Negative for abdominal pain, blood in stool, constipation, diarrhea, melena, nausea and vomiting.   Genitourinary:  Negative for dysuria, flank pain, frequency and hematuria.   Musculoskeletal:  Positive for joint pain. Negative for back pain and myalgias.          Objective:       Intake/Output Summary (Last 24 hours) at 6/22/2024 0722  Last data filed at 6/22/2024 0629  Gross per 24 hour   Intake 250 ml   Output 500 ml   Net -250 ml         Vital Signs (Most Recent):  Temp: 97.5 °F (36.4 °C) (06/22/24 0410)  Pulse: 100  (06/22/24 0545)  Resp: (!) 25 (06/22/24 0545)  BP: (!) 149/88 (06/22/24 0545)  SpO2: 100 % (06/22/24 0545)  Body mass index is 50.41 kg/m².  Weight: 133.2 kg (293 lb 10.4 oz) Vital Signs (24h Range):  Temp:  [97.5 °F (36.4 °C)-98.5 °F (36.9 °C)] 97.5 °F (36.4 °C)  Pulse:  [] 100  Resp:  [16-30] 25  SpO2:  [96 %-100 %] 100 %  BP: (127-176)/() 149/88     Physical Exam  Constitutional:       General: She is in acute distress.      Appearance: She is obese. She is ill-appearing.   HENT:      Head: Normocephalic and atraumatic.   Eyes:      General: No scleral icterus.        Right eye: No discharge.         Left eye: No discharge.      Extraocular Movements: Extraocular movements intact.      Pupils: Pupils are equal, round, and reactive to light.   Cardiovascular:      Rate and Rhythm: Regular rhythm. Tachycardia present.      Pulses: Normal pulses.      Heart sounds: Normal heart sounds. No murmur heard.     No friction rub. No gallop.   Pulmonary:      Breath sounds: Normal breath sounds. No stridor. No wheezing, rhonchi or rales.      Comments: Tachypnic  Abdominal:      General: Bowel sounds are normal. There is no distension.      Palpations: Abdomen is soft.      Tenderness: There is no abdominal tenderness. There is no guarding.   Musculoskeletal:         General: Swelling present. Normal range of motion.      Right lower leg: Edema present.      Left lower leg: Edema present.      Comments: 4+ BLE pitting edema   Neurological:      Comments: AAOx4; CN II-XII grossly intact           Lines/Drains/Airways       Central Venous Catheter Line  Duration                  Hemodialysis Catheter 06/22/24 0530 right internal jugular <1 day              Drain  Duration                  Urethral Catheter 06/22/24 0615 16 Fr. <1 day              Peripheral Intravenous Line  Duration                  Peripheral IV - Single Lumen 20 G Anterior;Left Hand -- days         Peripheral IV - Single Lumen 20 G Left  Antecubital -- days                    Significant Labs:    Lab Results   Component Value Date    WBC 14.36 (H) 06/22/2024    WBC 14.36 06/22/2024    HGB 5.2 (LL) 06/22/2024    HCT 15.6 (LL) 06/22/2024    MCV 92.9 06/22/2024     06/22/2024           BMP  Lab Results   Component Value Date     06/22/2024    K 5.7 (H) 06/22/2024    CO2 <5 (LL) 06/22/2024    .4 (H) 06/22/2024    CREATININE 15.51 (H) 06/22/2024    CALCIUM 7.3 (L) 06/22/2024    AGAP  06/22/2024      Comment:      Unable to calculate         ABG  Recent Labs   Lab 06/22/24  0426   PH 7.150*   PO2 <38.0   PCO2 22.0   HCO3 7.7   POCBASEDEF -19.40       Mechanical Ventilation Support:         Significant Imaging:  I have reviewed the pertinent imaging within the past 24 hours.    Assessment/Plan:     Assessment  Acute renal failure; Hyperkalemic metabolic acidosis with respiratory compensation (Kussmaul respirations)  Sepsis 2/2 UTI  Urinlaysis positive for 3+ protein, 3+ blood, RBC > 100, 3+ leukocyte, WBC > 100, and many bacteria  Urine culture pending  Blood cultures pending  Bilateral hydroureteronephrosis  Bilateral pleural effusions  Bilateral lower extremity edema  Anemia of chronic disease    Plan  Admit to ICU  Consulted nephrology for emergency dialysis  Consulted general surgery for temporary HD line placement  Plan for emergency dialysis today  Will urine electrolytes and protein/cr ratio  Holding on intubating patient at this time as patient is compensating adquately for profound acidosis; will continue to monitor respiratory status closely  Transfuse 2 units pRBCs  Will obtain folate and b12 to further characterize cause for profound anemia  Initiated IV ceftriaxone 2 g q24h for sepsis 2/2 UTI  Obtaining US BLE and TTE to further evaluate cause of bilateral lower extremity edema    DVT Prophylaxis: SCDs  GI Prophylaxis: None     32 minutes of critical care was time spent personally by me on the following activities:  development of treatment plan with patient or surrogate and bedside caregivers, discussions with consultants, evaluation of patient's response to treatment, examination of patient, ordering and performing treatments and interventions, ordering and review of laboratory studies, ordering and review of radiographic studies, pulse oximetry, re-evaluation of patient's condition.  This critical care time did not overlap with that of any other provider or involve time for any procedures.     James Townsend MD  Pulmonary Critical Care Medicine  [facility] - 7th Floor ICU  DOS: 06/22/2024

## 2024-06-22 NOTE — ED PROVIDER NOTES
Encounter Date: 6/21/2024       History     Chief Complaint   Patient presents with    Weakness     Weakness and SOB x3 weeks.     HPI  Review of patient's allergies indicates:  No Known Allergies  History reviewed. No pertinent past medical history.  History reviewed. No pertinent surgical history.  No family history on file.  Social History     Tobacco Use    Smoking status: Never    Smokeless tobacco: Never   Substance Use Topics    Alcohol use: Never    Drug use: Never     Review of Systems    Physical Exam     Initial Vitals [06/21/24 1919]   BP Pulse Resp Temp SpO2   (!) 176/70 98 (!) 22 97.7 °F (36.5 °C) 96 %      MAP       --         Physical Exam    ED Course   Critical Care    Date/Time: 6/22/2024 12:04 AM    Performed by: Joseph Prasad DO  Authorized by: Joseph Prasad DO  Other critical care time: 55 minutes  Total critical care time (exclusive of procedural time) : 55 minutes  Critical care time was exclusive of separately billable procedures and treating other patients and teaching time.  Critical care was necessary to treat or prevent imminent or life-threatening deterioration of the following conditions: metabolic crisis, renal failure and CNS failure or compromise.  Critical care was time spent personally by me on the following activities: development of treatment plan with patient or surrogate, discussions with consultants, evaluation of patient's response to treatment, examination of patient, obtaining history from patient or surrogate, ordering and performing treatments and interventions, ordering and review of laboratory studies, ordering and review of radiographic studies, pulse oximetry, re-evaluation of patient's condition and review of old charts.        Labs Reviewed   COMPREHENSIVE METABOLIC PANEL - Abnormal; Notable for the following components:       Result Value    Sodium 135 (*)     Potassium 6.8 (*)     Chloride 108 (*)     CO2 5 (*)     Blood Urea Nitrogen 209.0 (*)      Creatinine 16.94 (*)     Calcium 8.2 (*)     Protein Total 8.3 (*)     Albumin 2.9 (*)     Globulin 5.4 (*)     Albumin/Globulin Ratio 0.5 (*)     All other components within normal limits   URINALYSIS - Abnormal; Notable for the following components:    Appearance, UA Turbid (*)     Protein, UA 3+ (*)     Blood, UA 3+ (*)     Leukocyte Esterase, UA 3+ (*)     All other components within normal limits   B-TYPE NATRIURETIC PEPTIDE - Abnormal; Notable for the following components:    Natriuretic Peptide 152.2 (*)     All other components within normal limits   CBC WITH DIFFERENTIAL - Abnormal; Notable for the following components:    WBC 15.81 (*)     RBC 1.86 (*)     Hgb 5.7 (*)     Hct 17.7 (*)     MCV 95.2 (*)     MCHC 32.2 (*)     MPV 11.3 (*)     Lymph # 0.48 (*)     Neut # 13.95 (*)     IG# 0.45 (*)     All other components within normal limits   URINALYSIS, MICROSCOPIC - Abnormal; Notable for the following components:    Bacteria, UA Many (*)     RBC, UA >100 (*)     WBC, UA >100 (*)     Squamous Epithelial Cells, UA Few (*)     All other components within normal limits   BASIC METABOLIC PANEL - Abnormal; Notable for the following components:    Sodium 132 (*)     Potassium 6.0 (*)     CO2 <5 (*)     Glucose 273 (*)     Blood Urea Nitrogen 205.0 (*)     Creatinine 14.80 (*)     Calcium 7.5 (*)     All other components within normal limits   FERRITIN - Abnormal; Notable for the following components:    Ferritin Level 1,441.20 (*)     All other components within normal limits   IRON AND TIBC - Abnormal; Notable for the following components:    Iron Binding Capacity Total 187 (*)     Iron Saturation 58 (*)     All other components within normal limits   LIPID PANEL - Abnormal; Notable for the following components:    HDL Cholesterol 22 (*)     LDL Cholesterol 47.00 (*)     All other components within normal limits   PHOSPHORUS - Abnormal; Notable for the following components:    Phosphorus Level 7.9 (*)     All  other components within normal limits   POCT GLUCOSE - Abnormal; Notable for the following components:    POCT Glucose 221 (*)     All other components within normal limits   POCT GLUCOSE - Abnormal; Notable for the following components:    POCT Glucose 289 (*)     All other components within normal limits   POCT GLUCOSE - Abnormal; Notable for the following components:    POCT Glucose 118 (*)     All other components within normal limits   MAGNESIUM - Normal   TROPONIN I - Normal   LACTIC ACID, PLASMA - Normal   TSH - Normal   BLOOD CULTURE OLG   BLOOD CULTURE OLG   CULTURE, URINE   CBC W/ AUTO DIFFERENTIAL    Narrative:     The following orders were created for panel order CBC auto differential.  Procedure                               Abnormality         Status                     ---------                               -----------         ------                     CBC with Differential[[id number]]       Abnormal            Final result                 Please view results for these tests on the individual orders.   HEMOGLOBIN A1C   OCCULT BLOOD, STOOL 1ST SPECIMEN   TYPE & SCREEN   ABORH RETYPE   POCT GLUCOSE   POCT GLUCOSE MONITORING CONTINUOUS        ECG Results              EKG 12-lead (Preliminary result)  Result time 06/21/24 19:49:50      Wet Read by Joseph Prasad DO (06/21/24 19:49:50, [facility] - Emergency Dept, Emergency Medicine)    Independent ECG Interpretation:    Normal sinus rhythm at rate of 92. Normal intervals. Normal QRS. No acute ST or T wave abnormalities. Overall impression: No evidence of acute ischemia or arrhythmia.                                     Imaging Results              CT Abdomen Pelvis  Without Contrast (Preliminary result)  Result time 06/21/24 23:12:59      Preliminary result by Stefano Jackson MD (06/21/24 23:12:59)                   Narrative:    START OF REPORT:  Technique: CT of the abdomen and pelvis was performed with axial images as well as  sagittal and coronal reconstruction images without intravenous contrast.    Comparison: None available.    Clinical History: Abdominal pain acute nonlocalized.    Dosage Information: Automated Exposure Control was utilized 911.94 mGy.cm.    Findings:  Lines and Tubes: None.  Thorax:  Lungs: There is mild nonspecific dependent change at the lung bases. There is bronchiectasis and traction atelectatic changes in the right basal lower lobe.  Pleura: No effusions or thickening. No pneumothorax is seen.  Heart: Mild cardiomegaly is seen. Mild to moderate coronary artery calcification is seen.  Abdomen:  Abdominal Wall: No abdominal wall pathology is seen.  Liver: The liver appears unremarkable.  Biliary System: No intrahepatic or extrahepatic biliary duct dilatation is seen.  Gallbladder: The gallbladder appears unremarkable.  Pancreas: The pancreas appears unremarkable.  Spleen: The spleen appears unremarkable.  Adrenals: The adrenal glands appear unremarkable.  Kidneys: There is bilateral hydroureteronephrosis with the bladder walls appearing thickened given state of distention suggesting possible bladder outlet obstruction or possible prior obstructive physiology. There is associated cortical thinning of both kidneys but no cysts masses or calcifications are identified in either kidney.  Aorta: There is moderate calcification of the abdominal aorta and its branches.  IVC: Unremarkable.  Bowel:  Esophagus: There is a small hiatal hernia.  Stomach: The stomach appears unremarkable.  Duodenum: Unremarkable appearing duodenum.  Small Bowel: The small bowel appears unremarkable.  Colon: A few scattered diverticula are seen throughout the colon. No associated inflammatory stranding is seen to suggest diverticulitis.  Appendix: The appendix is not identified but no inflammatory changes are seen in the right lower quadrant to suggest appendicitis.  Peritoneum: No intraperitoneal free air or ascites is  seen.    Pelvis:  Bladder: The bladder is nondistended but appears otherwise unremarkable.  Female:  Uterus: The uterus appears unremarkable.  Ovaries: No adnexal masses are seen.    Bony structures:  Dorsal Spine: There is mild spondylosis of the visualized dorsal spine.  Bony Pelvis: There is mild to moderate degenerative change of the bilateral hips. There is a chronic fracture of the right sacral wing (Sonny 1) which extends to the right sacroiliac joint (series 4 image 94).      Impression:  1. There is bilateral hydroureteronephrosis with the bladder walls appearing thickened given state of distention suggesting possible bladder outlet obstruction or possible prior obstructive physiology. There is associated cortical thinning of both kidneys but no cysts masses or calcifications are identified in either kidney. Correlate with clinical and laboratory findings as regards additional evaluation and follow-up.  2. No acute intraabdominal or pelvic solid organ or bowel pathology identified. Details and other findings as discussed above.                                         X-Ray Chest 1 View (Final result)  Result time 06/21/24 20:15:13      Final result by Tony Mar MD (06/21/24 20:15:13)                   Impression:      No acute findings in the chest      Electronically signed by: Tony Mar MD  Date:    06/21/2024  Time:    20:15               Narrative:    EXAMINATION:  XR CHEST 1 VIEW    CLINICAL HISTORY:  sob;    COMPARISON:  None    FINDINGS:  Single view of the chest shows no focal consolidation, pneumothorax or pleural effusion.  Heart is upper normal in size.  Pulmonary vasculature is normal.  Aorta is partially calcified.                                       Medications   cefTRIAXone (Rocephin) 1 g in D5W 100 mL IVPB (MB+) (has no administration in time range)   ondansetron injection 4 mg (has no administration in time range)   sodium zirconium cyclosilicate packet 5 g (has no  administration in time range)   HYDROcodone-acetaminophen 5-325 mg per tablet 1 tablet (has no administration in time range)   sodium bicarbonate 150 mEq in D5W 1,000 mL infusion (has no administration in time range)   dextrose 10% bolus 250 mL 250 mL (has no administration in time range)   sodium chloride 0.9% bolus 1,000 mL 1,000 mL (0 mLs Intravenous Stopped 6/21/24 2205)   sodium bicarbonate tablet 1,300 mg (1,300 mg Oral Given 6/21/24 2133)   cefTRIAXone (Rocephin) 1 g in D5W 100 mL IVPB (MB+) (0 g Intravenous Stopped 6/21/24 2203)   calcium gluconate 1 g in NS IVPB (premixed) (0 g Intravenous Stopped 6/21/24 2233)   dextrose 10% bolus 250 mL 250 mL (0 mLs Intravenous Stopped 6/21/24 2230)   insulin regular injection 5 Units 0.05 mL (5 Units Intravenous Given 6/21/24 2230)     Medical Decision Making  Amount and/or Complexity of Data Reviewed  Labs: ordered. Decision-making details documented in ED Course.  Radiology: ordered. Decision-making details documented in ED Course.    Risk  OTC drugs.  Prescription drug management.  Decision regarding hospitalization.               ED Course as of 06/22/24 0005   Fri Jun 21, 2024 2046 Troponin I: 0.042 [MP]   2048 WBC(!): 15.81 [MP]   2048 Hemoglobin(!!): 5.7 [MP]   2048 Hematocrit(!!): 17.7 [MP]   2048 BNP(!): 152.2 [MP]   2102 BUN(!): 209.0 [MP]   2102 Creatinine(!): 16.94 [MP]   2102 eGFR: 2 [MP]   2103 Nephrology on call paged [MP]   2135 Lactic Acid Level: 0.6 [MP]   2138 Dr Moss, recommends electrolyte correction and emergent HD. Gen surg for access [MP]   2138 Spoke with Dr Palomo, will present as soon as able for HD access [MP]   2139 Leukocyte Esterase, UA(!): 3+ [MP]   2139 Blood, UA(!): 3+ [MP]   2146 CT scan to access for obstructive reasons for renal failure [MP]   2241 Dr. Prasad will admit the patient to the hospital.  We discussed the care this far and patient's condition and results of testing with a provider of the admitting team.  Temporary  bridge orders placed to expedite patient's transition of care.    [MP]   2300 Admit orders pending CT read and repeat BMP [MP]   2319 Potassium(!): 6.0 [MP]   2319 CO2(!!): <5 [MP]   2319 Glucose(!): 273 [MP]   2321 CT Abdomen Pelvis  Without Contrast  CT read resulted with possible obstructive physiology, Urology not on call, will likely have to transfer [MP]   2330 PO glucose [MP]   2330 PFT Transfer request ordered [MP]   2337 ER to ER transfer for Urology services. Accepted by attending ED provider at [facility] Dr Platt. [MP]      ED Course User Index  [MP] Joseph Prasad DO                           Clinical Impression:  Final diagnoses:  [R53.1] Weakness  [E86.0] Dehydration  [N17.9] Acute renal failure, unspecified acute renal failure type (Primary)  [N18.9, D63.1] Anemia in chronic kidney disease, unspecified CKD stage  [N30.00] Acute cystitis without hematuria  [E87.8] Electrolyte disturbance  [N32.0] Bladder outlet obstruction  [G93.41] Metabolic encephalopathy          ED Disposition Condition    Transfer to Another Facility Stable                Joseph Prasad DO  06/22/24 0005

## 2024-06-22 NOTE — PROGRESS NOTES
Pharmacokinetic Initial Assessment: IV Vancomycin    Assessment/Plan:    Initiate intravenous vancomycin with loading dose of 2000 mg once with subsequent doses when random concentrations are less than 20 mcg/mL  Desired empiric serum trough concentration is 15 to 20 mcg/mL  Draw vancomycin random level on 0430 at 06/23.  Pharmacy will continue to follow and monitor vancomycin.      Please contact pharmacy at extension [phone] with any questions regarding this assessment.     Thank you for the consult,   Marcella Arroyo       Patient brief summary:  Fior Joya is a 69 y.o. female initiated on antimicrobial therapy with IV Vancomycin for treatment of suspected sepsis    Drug Allergies:   Review of patient's allergies indicates:  No Known Allergies    Actual Body Weight:   133.2kg    Renal Function:   Estimated Creatinine Clearance: 4.7 mL/min (A) (based on SCr of 15.51 mg/dL (H)).,     Dialysis Method (if applicable):  intermittent HD    CBC (last 72 hours):  Recent Labs   Lab Result Units 06/21/24 2013 06/22/24  0202   WBC x10(3)/mcL 15.81* 14.36  14.36*   Hgb g/dL 5.7* 5.2*   Hct % 17.7* 15.6*   Platelet x10(3)/mcL 268 221   Mono % % 5.2  --    Monocytes % %  --  11   Eos % % 0.4  --    Eosinophils % %  --  1   Basophil % % 0.3  --        Metabolic Panel (last 72 hours):  Recent Labs   Lab Result Units 06/21/24 2013 06/21/24  2133 06/21/24  2227 06/22/24  0202 06/22/24  0923   Sodium mmol/L 135*  --  132* 136  --    Urine Sodium mmol/L  --   --   --   --  51.0   Potassium mmol/L 6.8*  --  6.0* 5.7*  --    Urine Potassium mmol/L  --   --   --   --  27.4   Chloride mmol/L 108*  --  107 108*  --    Urine Chloride mmol/L  --   --   --   --  52.0   CO2 mmol/L 5*  --  <5* <5*  --    Glucose mg/dL 111  --  273* 100  --    Glucose, UA   --  Negative  --   --   --    Blood Urea Nitrogen mg/dL 209.0*  --  205.0* 198.4*  --    Creatinine mg/dL 16.94*  --  14.80* 15.51*  --    Urine Creatinine mg/dL  --  29.5*  --   --   --   "  Albumin g/dL 2.9*  --   --  2.7*  --    Bilirubin Total mg/dL 0.3  --   --  0.3  --    ALP unit/L 62  --   --  59  --    AST unit/L 13  --   --  15  --    ALT unit/L 10  --   --  12  --    Magnesium Level mg/dL 2.30  --   --  2.00  --    Phosphorus Level mg/dL  --   --  7.9*  --   --        Drug levels (last 3 results):  No results for input(s): "VANCOMYCINRA", "VANCORANDOM", "VANCOMYCINPE", "VANCOPEAK", "VANCOMYCINTR", "VANCOTROUGH" in the last 72 hours.    Microbiologic Results:  Microbiology Results (last 7 days)       ** No results found for the last 168 hours. **            "

## 2024-06-22 NOTE — CONSULTS
Nephrology Initial Consult Note    Patient Name: Fior Joya  Age: 69 y.o.  : 1955  MRN: [id number]  Admission Date: 2024    Reason for Consult:      ESTRELLITA, hyperkalemia, metabolic acidosis    Chief Complaint   Patient presents with    Transfer     ARF transfer from [place]         Joseph Prasad DO    HPI:     Fior Joya is a 69 y.o. female who was transferred from hospital in [place] due to acute renal failure.  She initially presented to the hospital due to generalized weakness and shortness of breath with exertion.  Per their report she had been mostly bed-bound for the past 3 weeks.  He reported that she had been drinking getting up going to the restroom.  She did have some episodes of nausea vomiting and generalized weakness along with generalized muscle pain more recently.  She was endorsing shortness of breath but no chest pain.  Denied had been running a fever.  Per their his report, she had not seen a physician in 10 years, because she was not feeling ill and did not feel like she needed to go.  She had also been having some swelling in the legs.  She has no known medical issues per their report.  On admit, WBC elevated at 15, RBC 1.8, hemoglobin 5.7, D-dimer elevated at 1.57, serum sodium 132, potassium 6, serum CO2 less than 5, , creatinine 14.8, glucose 273, calcium 7.5, phosphorus 7.9, albumin 2.9.  TSH and lactic were okay.  UA results indicative of UTI sent for cultures.  Blood cultures obtained and pending.  Received phone call from ER physician in early a.m. hours, it was decided at that time we will proceed with initiation of RRT.  Right IJ temporary cath was placed per General surgery.   She is awake and alert this morning upon exam.  She does respond with simple answers to questions posed.  Son in room in majority of history obtained from him.  Oxygen saturation is 99% on nasal cannula.  We are consulted for management ESTRELLITA.        Current Facility-Administered Medications    Medication Dose Route Frequency Provider Last Rate Last Admin    0.9%  NaCl infusion (for blood administration)   Intravenous Q24H PRN Dariusz Vee MD        0.9%  NaCl infusion (for blood administration)   Intravenous Q24H PRN Casper Hayward Jr., MD, FCCP        acetaminophen tablet 650 mg  650 mg Oral Q4H PRN James Townsend MD        cefTRIAXone (ROCEPHIN) 2 g in D5W 100 mL IVPB (MB+)  2 g Intravenous Q24H James Townsend MD   Stopped at 06/22/24 0733    melatonin tablet 6 mg  6 mg Oral Nightly PRN James Townsend MD        ondansetron injection 4 mg  4 mg Intravenous Q8H PRN James Townsend MD        prochlorperazine injection Soln 5 mg  5 mg Intravenous Q6H PRN James Townsend MD        sodium bicarbonate 150 mEq in D5W 1,000 mL infusion   Intravenous Continuous Dariusz Vee  mL/hr at 06/22/24 0200 New Bag at 06/22/24 0200    sodium chloride 0.9% flush 10 mL  10 mL Intravenous PRN James Townsend MD Dawson, Mark H., MD    No past medical history on file.   No past surgical history on file.   No family history on file.  Social History     Tobacco Use    Smoking status: Never    Smokeless tobacco: Never   Substance Use Topics    Alcohol use: Never     No medications prior to admission.     Review of patient's allergies indicates:  No Known Allergies         Review of Systems:       Unable to obtain ROS due to mental status/intubation.       Objective:       VITAL SIGNS: 24 HR MIN & MAX LAST    Temp  Min: 97.4 °F (36.3 °C)  Max: 98.5 °F (36.9 °C)  97.4 °F (36.3 °C)        BP  Min: 127/81  Max: 199/87  (!) 159/109     Pulse  Min: 85  Max: 102  97     Resp  Min: 16  Max: 30  19    SpO2  Min: 96 %  Max: 100 %  99 %      GEN: Chronically ill appearing in NAD  HEENT: Conjunctiva anicteric, pupils equal, MMM, OP benign  CV: RRR +S1,S2 without murmur  PULM: CTAB, unlabored  ABD: Soft, NT/ND abdomen with NABS  EXT: 3+ edema BLEs, +venous stasis changes to skin, L leg with thickened reddened skin  to lower leg  SKIN: Warm and dry  PSYCH: awake and alert  Vascular access: RIJ CVC          Component Value Date/Time     06/22/2024 0202     (L) 06/21/2024 2227    K 5.7 (H) 06/22/2024 0202    K 6.0 (H) 06/21/2024 2227     (H) 06/22/2024 0202     06/21/2024 2227    CO2 <5 (LL) 06/22/2024 0202    CO2 <5 (LL) 06/21/2024 2227    .4 (H) 06/22/2024 0202    .0 (H) 06/21/2024 2227    CREATININE 15.51 (H) 06/22/2024 0202    CREATININE 14.80 (H) 06/21/2024 2227    CALCIUM 7.3 (L) 06/22/2024 0202    CALCIUM 7.5 (L) 06/21/2024 2227    PHOS 7.9 (H) 06/21/2024 2227            Component Value Date/Time    WBC 14.36 (H) 06/22/2024 0202    WBC 14.36 06/22/2024 0202    WBC 15.81 (H) 06/21/2024 2013    HGB 5.2 (LL) 06/22/2024 0202    HGB 5.7 (LL) 06/21/2024 2013    HCT 15.6 (LL) 06/22/2024 0202    HCT 17.7 (LL) 06/21/2024 2013     06/22/2024 0202     06/21/2024 2013         X-Ray Chest 1 View for Line/Tube Placement    (Results Pending)       Assessment / Plan:       Active Hospital Problems    Diagnosis  POA    *Metabolic acidosis [E87.20]  Yes    Anemia requiring transfusions [D64.9]  Yes    Hyperkalemia [E87.5]  Yes    UTI (urinary tract infection) [N39.0]  Yes    Volume depletion [E86.9]  Yes    ESTRELLITA (acute kidney injury) [N17.9]  Yes      Resolved Hospital Problems   No resolved problems to display.     ESTRELLITA, required initiation of RRT  UTI--started on Rocephin  Hyperkalemia  Acute metabolic acidosis  Anemia    Risks and benefits of initiation of hemodialysis discussed with patient and son.  They consented to proceed with dialysis treatment this morning.  We will dialyze her this a.m. and try for 1L UF.  Will recheck labs in am to determine if HD again indicated.       Nata Terrazas NP   [facility]    Attending note  Patient seen and examined independently  Patient with no exactly known history of medical problems was transferred from [place] with acute renal  failure and severe metabolic acidosis after presented with weakness, shortness of breath with exertion, mostly bed-bound for 3 weeks, vomitings.  She was found to be having bicarb of <5, creatinine of 14.8 on admission  She has shakiness, tachycardia since last couple of hours  Agree with above assessment and findings  Vitals signs and nursing note reviewed.   Temp:  [97.4 °F (36.3 °C)-98.5 °F (36.9 °C)]   Pulse:  []   Resp:  [14-30]   BP: (127-199)/()   SpO2:  [96 %-100 %]      General: sick looking  HEENT: NC/AT. MM moist  Neck: No JVD, no carotid bruit, right IJ dialysis catheter in dressing  Resp: DARIAN present. No w/r/c  Cardiac: S1S2 heard, tachycardia present, no m/r/g  Abd: Soft, NT, BS present, no organomegaly appreciated, nondistended  Ext: edema 3+ , venous stasis changes, redness over left leg  Neuro: tremor present. Says yes but does not follow much commands  Skin: no rash, lesions. Dry  Mario with minimal urine, foul-smelling    Reviewed available labs and imaging  Acute kidney injury with unknown baseline   Hyperkalemia   Acute metabolic acidosis   Anemia   Bilateral hydronephrosis on CT from 06/21/2024    Underwent dialysis as ordered with severe metabolic acidosis   Family was explained in detail about benefits and risks as above   Reassess for dialysis need tomorrow  Off bicarb with developing metabolic alkalosis   Replace calcium with low ionized calcium  Got 2 units PRBC with dialysis  Check CPK level   Consult Urology with bilateral hydronephrosis and severe ESTRELLITA  Ongoing evaluation for sepsis  Discussed with nursing      Components of this note were documented using voice recognition systems; and are subject to errors not corrected at proof reading.  Please contact the author for any clarifications.

## 2024-06-22 NOTE — Clinical Note
Patient is scheduled at [facility] in June   Due to high volume of requests and limited appointment no earlier appts  Message sent to ordering provider to call the provider to provider to request earlier appt.    dry, intact, no bleeding and no hematoma.

## 2024-06-22 NOTE — H&P
[facility]  Emergency Dept    History & Physical      Patient Name: Fior Joya  MRN: [id number]  Admission Date: 6/21/2024  Attending Physician: Joseph Prasad DO   Primary Care Provider: Zaheer Johnson MD         Patient information was obtained from patient, ER records, and primary team.     Subjective:     Principal Problem:<principal problem not specified>    Chief Complaint:   Chief Complaint   Patient presents with    Weakness     Weakness and SOB x3 weeks.        HPI: Miss Joya is a 69-year-old female who is brought to the hospital but her son against her will. She takes no medications and has never seen a doctor. She has obesity. She hurt her left knee several years ago. She has had worsening knee pain and has been bedbound for the last three months. The patient presents in renal failure with a BUN of 209 and a creatinine of 16. Potassium is 6.8, bicarbonate is five. Patient also has evidence of urinary tract infection. She did get calcium, bicarbonate, glucose in the emergency department. Nephrology was consulted and plan is for hemodialysis tonight.    History reviewed. No pertinent past medical history.    History reviewed. No pertinent surgical history.    Review of patient's allergies indicates:  No Known Allergies    No current facility-administered medications on file prior to encounter.     No current outpatient medications on file prior to encounter.     Family History    None       Tobacco Use    Smoking status: Never    Smokeless tobacco: Never   Substance and Sexual Activity    Alcohol use: Never    Drug use: Never    Sexual activity: Not on file     Review of Systems 10pt ROS is performed and is otherwise negative unless stated as above.  Objective:     Vital Signs (Most Recent):  Temp: 97.7 °F (36.5 °C) (06/21/24 1919)  Pulse: 90 (06/21/24 2300)  Resp: 20 (06/21/24 2300)  BP: (!) 150/104 (06/21/24 2300)  SpO2: 100 % (06/21/24 2300) Vital Signs (24h Range):  Temp:  [97.7 °F  (36.5 °C)] 97.7 °F (36.5 °C)  Pulse:  [] 90  Resp:  [16-30] 20  SpO2:  [96 %-100 %] 100 %  BP: (127-176)/() 150/104     Weight: 113.4 kg (250 lb)  There is no height or weight on file to calculate BMI.    Physical Exam   BP (!) 150/104   Pulse 90   Temp 97.7 °F (36.5 °C) (Oral)   Resp 20   Wt 113.4 kg (250 lb)   SpO2 100%     General Appearance:  Alert, cooperative, no distress, appears stated age   Head:  Normocephalic, without obvious abnormality, atraumatic   Eyes:  PERRL, conjunctiva/corneas clear, EOM's intact, fundi benign, both eyes   Ears:  Normal TM's and external ear canals, both ears   Nose: Nares normal, septum midline,mucosa normal, no drainage or sinus tenderness   Throat: Lips, mucosa, and tongue normal; teeth and gums normal   Neck: Supple, symmetrical, trachea midline, no adenopathy;  thyroid: not enlarged, symmetric, no tenderness/mass/nodules; no carotid bruit or JVD   Back:   Symmetric, no curvature, ROM normal, no CVA tenderness   Lungs:   Clear to auscultation bilaterally, respirations unlabored   Breasts:  No masses or tenderness   Heart:  Regular rate and rhythm, S1 and S2 normal, no murmur, rub, or gallop   Abdomen:   Soft, non-tender, bowel sounds active all four quadrants,  no masses, no organomegaly   Pelvic: Deferred   Extremities: Extremities normal, atraumatic, no cyanosis or edema   Pulses: 2+ and symmetric   Skin: Skin color, texture, turgor normal, no rashes or lesions   Lymph nodes: Cervical, supraclavicular, and axillary nodes normal   Neurologic: Normal         Significant Labs: All pertinent labs within the past 24 hours have been reviewed.  Recent Lab Results  (Last 5 results in the past 24 hours)        06/21/24  2243   06/21/24  2133   06/21/24  2109   06/21/24  2100   06/21/24 2013        Albumin/Globulin Ratio         0.5       ABO and RH     A POS           Albumin         2.9       ALP         62       ALT         10       Anion Gap         22.0        Appearance, UA   Turbid             AST         13       Bacteria, UA   Many             Baso #         0.04       Basophil %         0.3       Bilirubin (UA)   Negative             BILIRUBIN TOTAL         0.3       BNP         152.2       BUN         209.0       BUN/CREAT RATIO         12       Calcium         8.2       Chloride         108       CO2         5  Comment: Critical Result called and verified by verbal readback to: George YO on 06/21/2024 at 20:47. Reported by [id number].       Color, UA   Yellow             Creatinine         16.94       eGFR         2       Eos #         0.06       Eos %         0.4       Globulin, Total         5.4       Glucose         111       Glucose, UA   Negative             Group & Rh         A POS       Hematocrit         17.7       Hemoglobin         5.7       Immature Grans (Abs)         0.45       Immature Granulocytes         2.8       Indirect Yoni GEL         NEG       Ketones, UA   Negative             Lactic Acid Level     0.6           Leukocyte Esterase, UA   3+             Lymph #         0.48       LYMPH %         3.0       Magnesium          2.30       MCH         30.6       MCHC         32.2       MCV         95.2       Mono #         0.83       Mono %         5.2       MPV         11.3       Neut #         13.95       Neut %         88.3       NITRITE UA   Negative             Blood, UA   3+             pH, UA   7.0             Platelet Count         268       POCT Glucose 221       106         Potassium         6.8  Comment: Critical Result called and verified by verbal readback to: George YO on 06/21/2024 at 20:47. Reported by [id number].       PROTEIN TOTAL         8.3       Protein, UA   3+             RBC         1.86       RBC, UA   >100             RDW         13.4       Sodium         135       Specific Gravity,UA   1.020             Specimen Outdate         06/24/2024 23:59       Squam Epithel, UA   Few             Troponin I         0.042        Urobilinogen, UA   0.2             WBC, UA   >100             WBC         15.81                              Significant Imaging: I have reviewed all pertinent imaging results/findings within the past 24 hours.  I have reviewed and interpreted all pertinent imaging results/findings within the past 24 hours.    CXR- Negative  Assessment/Plan:     Active Diagnoses:    Diagnosis Date Noted POA    ESTRELLITA (acute kidney injury) [N17.9] 06/21/2024 Unknown    Volume depletion [E86.9] 06/21/2024 Unknown    Anemia requiring transfusions [D64.9] 06/21/2024 Unknown    Hyperkalemia [E87.5] 06/21/2024 Unknown    Metabolic acidemia [E87.20] 06/21/2024 Unknown    UTI (urinary tract infection) [N39.0] 06/21/2024 Unknown     - ESTRELLITA could be secondary to volume depletion. Will bolus 2-3 L and monitor UOP and repeat labs.  - Avoid nephrotoxins. Avoid NSAIDs. Renally dose meds  - Nephrology consultation  - [facility] q8h   - Plan for transfusion 3-4 units PRBCs  - Anemia unclear etiology. Could be related to renal disease if chronic or another entity like colon CA considering pt has had no medical care.   - HD per nephrology evaluation.  - Start Rocephin. Follow urine and blood cx.      Critical care time 45 mins    This encounter was completed via telemedicine (audio/video) w/ nursing at bedside ot assist w/ clinical exam.  SOC Audio/Visual Equipment is using HIPPA Compliant Web Platform. The patient is located in the hospital and the provider is located off site. This patient is placed in inpatient status under my care.       Participants on Call: Bedside RN, Patient, Physician on Call.   Problems Resolved During this Admission:     VTE Risk Mitigation (From admission, onward)      None        Addendem: CT shows bilateral hydronephrosis. Suggest transfer for urology evaluation.       Garrett Norman MD  Department of Hospital Medicine   [facility] - Emergency Dept

## 2024-06-22 NOTE — PROGRESS NOTES
Inpatient Nutrition Assessment    Admit Date: 6/22/2024   Total duration of encounter: 1 day   Patient Age: 69 y.o.    Nutrition Recommendation/Prescription     Tube feeding if oral intake remains not feasible:  Novasource Renal goal rate 50 ml/hr and 4 packets ProSource TF20 daily to provide  2320 kcal/d  150 g protein/d  720 ml free water/d  (calculations based on estimated 20 hr/d run time)     Communication of Recommendations: reviewed with nurse    Nutrition Assessment     Malnutrition Assessment/Nutrition-Focused Physical Exam    Malnutrition Context: acute illness or injury (06/22/24 1506)  Malnutrition Level:  (does not meet criteria at this time) (06/22/24 1506)  Energy Intake (Malnutrition):  (unable to obtain) (06/22/24 1506)  Weight Loss (Malnutrition):  (unable to obtain) (06/22/24 1506)  Subcutaneous Fat (Malnutrition):  (does not meet) (06/22/24 1506)           Muscle Mass (Malnutrition):  (does not meet) (06/22/24 1506)                          Fluid Accumulation (Malnutrition): severe (06/22/24 1506)        A minimum of two characteristics is recommended for diagnosis of either severe or non-severe malnutrition.    Chart Review    Reason Seen: malnutrition screening tool (MST)    Malnutrition Screening Tool Results   Have you recently lost weight without trying?: Unsure  Have you been eating poorly because of a decreased appetite?: Yes   MST Score: 3   Diagnosis:  Acute renal failure  Sepsis/UTI  Bilateral hydroureteronephrosis  Bilateral pleural effusions  Bilateral lower extremity edema  Anemia of chronic disease    Relevant Medical History: no known past medical history     Scheduled Medications:  calcium gluconate 1 g, 1 g, Q1H  dexAMETHasone, 8 mg, Daily  mupirocin, , BID  piperacillin-tazobactam (Zosyn) IV (PEDS and ADULTS) (extended infusion is not appropriate), 4.5 g, Q12H  vancomycin (VANCOCIN) IV (PEDS and ADULTS), 2,000 mg, Once    Continuous Infusions: none       PRN Medications:   0.9%   "NaCl infusion (for blood administration), , Q24H PRN  0.9%  NaCl infusion (for blood administration), , Q24H PRN  acetaminophen, 650 mg, Q4H PRN  heparin (porcine), 4,000 Units, PRN  melatonin, 6 mg, Nightly PRN  ondansetron, 4 mg, Q8H PRN  prochlorperazine, 5 mg, Q6H PRN  sodium chloride 0.9%, 10 mL, PRN  vancomycin - pharmacy to dose, , pharmacy to manage frequency    Calorie Containing IV Medications: no significant kcals from medications at this time    Recent Labs   Lab 06/21/24 2013 06/21/24  2227 06/22/24  0202 06/22/24  1220   * 132* 136 140   K 6.8* 6.0* 5.7* 3.6   CALCIUM 8.2* 7.5* 7.3* 6.7*   PHOS  --  7.9*  --  4.4   MG 2.30  --  2.00 1.60   CO2 5* <5* <5* 14*   .0* 205.0* 198.4* 117.8*   CREATININE 16.94* 14.80* 15.51* 9.84*   EGFRNORACEVR 2 2 2 4   GLUCOSE 111 273* 100 84   BILITOT 0.3  --  0.3 0.5   ALKPHOS 62  --  59 53   ALT 10  --  12 12   AST 13  --  15 17   ALBUMIN 2.9*  --  2.7* 2.3*   TRIG  --  125  --   --    HGBA1C  --   --   --  4.7   WBC 15.81*  --  14.36  14.36*  --    HGB 5.7*  --  5.2* 7.0*   HCT 17.7*  --  15.6* 20.0*     Nutrition Orders:  Diet NPO    Appetite/Oral Intake: NPO/not applicable  Factors Affecting Nutritional Intake: NPO, acuity of illness  Social Needs Impacting Access to Food:  not appropriate  Food/[employer]/Cultural Preferences: unable to obtain  Food Allergies: no known food allergies listed  Last Bowel Movement: 06/20/24  Wound(s): no pressure injuries documented at this time     Comments    6/22/24 Patient in ICU, severe acidosis, emergent dialysis today, NPO, no plans to feed, patient unable to answer questions.    Anthropometrics    Height: 5' 4" (162.6 cm),    Last Weight: 133.2 kg (293 lb 10.4 oz) (06/22/24 0545), Weight Method: Bed Scale  BMI (Calculated): 50.4  BMI Classification: obese grade III (BMI >/=40)     Ideal Body Weight (IBW), Female: 120 lb     % Ideal Body Weight, Female (lb): 244.72 %                             Usual Weight " Provided By: unable to obtain usual weight    Wt Readings from Last 5 Encounters:   06/22/24 133.2 kg (293 lb 10.4 oz)   06/21/24 113.4 kg (250 lb)     Weight Change(s) Since Admission: severe edema noted, new admit  Wt Readings from Last 1 Encounters:   06/22/24 0545 133.2 kg (293 lb 10.4 oz)   06/22/24 0107 106.6 kg (235 lb)   Admit Weight: 106.6 kg (235 lb) (06/22/24 0107), Weight Method: Bed Scale    Estimated Needs    Weight Used For Calorie Calculations: 106.6 kg (235 lb)  Energy Calorie Requirements (kcal): 2213, 1.4 stress factor  Energy Need Method: [place]-St Jeor  Weight Used For Protein Calculations: 106.6 kg (235 lb)  Protein Requirements: 160 g, 1.5 g/kg  Fluid Requirements (mL): 1000 plus urinary output or per physician        Enteral Nutrition Patient not receiving enteral nutrition at this time.    Parenteral Nutrition Patient not receiving parenteral nutrition support at this time.    Evaluation of Received Nutrient Intake    Calories: not meeting estimated needs  Protein: not meeting estimated needs    Patient Education Not applicable.    Nutrition Diagnosis     PES: Inadequate energy intake related to inability to consume sufficient nutrients as evidenced by less than 80% needs met. (new)    Nutrition Interventions     Intervention(s): modified composition of meals/snacks, modified composition of enteral nutrition, and collaboration with other providers  Goal: Meet greater than 80% of nutritional needs by follow-up. (new)    Nutrition Goals & Monitoring     Dietitian will monitor: food and beverage intake, enteral nutrition intake, weight, weight change, electrolyte/renal panel, beliefs/attitudes, glucose/endocrine profile, and gastrointestinal profile  Discharge planning: too early to determine; pending clinical course  Nutrition Risk/Follow-Up: high (follow-up in 1-4 days)   Please consult if re-assessment needed sooner.

## 2024-06-22 NOTE — ED PROVIDER NOTES
Source of History:  Patient, no limitations    Chief complaint:  Weakness (Weakness and SOB x3 weeks.)      HPI:  Fior Joya is a 69 y.o. female presenting with Weakness (Weakness and SOB x3 weeks.)       ***        Review of Systems   Constitutional symptoms:  Negative except as documented in HPI.   Skin symptoms:  Negative except as documented in HPI.   HEENT symptoms:  Negative except as documented in HPI.   Respiratory symptoms:  Negative except as documented in HPI.   Cardiovascular symptoms:  Negative except as documented in HPI.   Gastrointestinal symptoms:  Negative except as documented in HPI.    Genitourinary symptoms:  Negative except as documented in HPI.   Musculoskeletal symptoms:  Negative except as documented in HPI.   Neurologic symptoms:  Negative except as documented in HPI.   Psychiatric symptoms:  Negative except as documented in HPI.   Allergy/immunologic symptoms:  Negative except as documented in HPI.             Additional review of systems information: All other systems reviewed and otherwise negative.  ***    Review of patient's allergies indicates:  Not on File    PMH:  As per HPI and below:    History reviewed. No pertinent past medical history.    History reviewed. No pertinent family history.    History reviewed. No pertinent surgical history.    Social History     Tobacco Use    Smoking status: Never    Smokeless tobacco: Never   Substance Use Topics    Alcohol use: Never    Drug use: Never       There is no problem list on file for this patient.       Physical Exam:    BP (!) 176/70   Pulse 98   Temp 97.7 °F (36.5 °C) (Oral)   Resp (!) 22   Wt 113.4 kg (250 lb)   SpO2 96%     Nursing note and vital signs reviewed.    General:  Alert, no acute distress.   Skin: Normal for Ethnic Origin, No cyanosis  HEENT: Normocephalic and atraumatic, Vision unchanged, Pupils symmetric, No icterus , Nasal mucosa is pink and moist  Cardiovascular:  Regular rate and rhythm, No edema  Chest  Wall: No deformity, equal chest rise  Respiratory:  Lungs are clear to auscultation, respirations are non-labored.    Musculoskeletal:  No deformity, Normal perfusion to all extremities  Gastrointestinal:  Soft, Non distended  Neurological:  Alert and oriented, normal motor observed, normal speech observed.    Psychiatric:  Cooperative, appropriate mood & affect.    ***    Labs that have been ordered have been independently reviewed and interpreted by myself.     Old Chart Reviewed.      Initial Impression/ Differential Dx:  ***    MDM:      Reviewed Nurses Note.    Reviewed Pertinent old records.                      Labs Reviewed - No data to display       No orders to display        No results found for any previous visit.       Imaging Results    None                                              Diagnostic Impression:    No diagnosis found.

## 2024-06-22 NOTE — PLAN OF CARE
Problem: Infection  Goal: Absence of Infection Signs and Symptoms  Outcome: Not Progressing     Problem: Adult Inpatient Plan of Care  Goal: Plan of Care Review  Outcome: Not Progressing  Goal: Patient-Specific Goal (Individualized)  Outcome: Not Progressing  Goal: Absence of Hospital-Acquired Illness or Injury  Outcome: Not Progressing  Goal: Optimal Comfort and Wellbeing  Outcome: Not Progressing  Goal: Readiness for Transition of Care  Outcome: Not Progressing     Problem: Bariatric Environmental Safety  Goal: Safety Maintained with Care  Outcome: Not Progressing     Problem: Hemodialysis  Goal: Safe, Effective Therapy Delivery  Outcome: Not Progressing  Goal: Effective Tissue Perfusion  Outcome: Not Progressing  Goal: Absence of Infection Signs and Symptoms  Outcome: Not Progressing

## 2024-06-22 NOTE — PROCEDURES
"Fior Joya is a 69 y.o. female patient.    Temp: 97.5 °F (36.4 °C) (06/22/24 0419)  Pulse: 97 (06/22/24 0419)  Resp: 20 (06/22/24 0419)  BP: 127/80 (06/22/24 0419)  SpO2: 100 % (06/22/24 0419)  Weight: 106.6 kg (235 lb) (06/22/24 0107)  Height: 5' 4" (162.6 cm) (06/22/24 0107)       HD Line    Date/Time: 6/22/2024 5:35 AM    Performed by: Jeni Ramirez MD  Authorized by: Sylvester Ponce MD    Location procedure was performed:  [facility]  Pre-operative diagnosis:  Kidney Failure  Consent Done ?:  Yes  Time out complete?: Verified correct patient, procedure, equipment, staff, and site/side    Indications:  Hemodialysis  Anesthesia:  Local infiltration  Local anesthetic:  Lidocaine 1% with epinephrine  Anesthetic total (ml):  5  Preparation:  Skin prepped with ChloraPrep  Skin prep agent dried: Skin prep agent completely dried prior to procedure    Sterile barriers: All five maximal sterile barriers used - gloves, gown, cap, mask and large sterile sheet    Hand hygiene: Hand hygiene performed immediately prior to central venous catheter insertion    Location:  Right internal jugular  Catheter type:  Double lumen  Catheter size:  7.5 Fr  Ultrasound guidance: Yes    Vessel Caliber:  Medium   patent  Comprressibility:  Normal  Needle advanced into vessel with real time ultrasound guidance.    Guidewire confirmed in vessel.    Steril sheath on probe.    Sterile gel used.  Manometry: No    Number of attempts:  1  Securement:  Line sutured, blood return through all ports and sterile dressing applied  Estimated blood loss (mL):  5  XRay:  Placement verified by x-ray, no pneumothorax on x-ray and successful placement  Adverse Events:  NoneTermination Site: superior vena cava    Jeni Ramirez MD   [facility] Surgery PGY2      6/22/2024    "

## 2024-06-22 NOTE — NURSING
06/22/24 0906   Post-Hemodialysis Assessment   Rinseback Volume (mL) 250 mL   Blood Volume Processed (Liters) 35 L   Dialyzer Clearance Moderately streaked   Duration of Treatment 120 minutes   Additional Fluid Intake (mL) 250 mL   Total UF (mL) 1000 mL   Net Fluid Removal 350   Patient Response to Treatment Tolerated well

## 2024-06-22 NOTE — Clinical Note
A percutaneous stick to the right internal jugular and right inominate vein was performed. Ultrasound guidance was used to obtain access.

## 2024-06-23 LAB
25(OH)D3+25(OH)D2 SERPL-MCNC: 15 NG/ML (ref 30–80)
ALBUMIN SERPL-MCNC: 2.4 G/DL (ref 3.4–4.8)
ALBUMIN/GLOB SERPL: 0.6 RATIO (ref 1.1–2)
ALP SERPL-CCNC: 57 UNIT/L (ref 40–150)
ALT SERPL-CCNC: 12 UNIT/L (ref 0–55)
ANION GAP SERPL CALC-SCNC: 21 MEQ/L
APICAL FOUR CHAMBER EJECTION FRACTION: 55 %
APICAL TWO CHAMBER EJECTION FRACTION: 58 %
AST SERPL-CCNC: 19 UNIT/L (ref 5–34)
AV INDEX (PROSTH): 0.47
AV MEAN GRADIENT: 14 MMHG
AV PEAK GRADIENT: 25 MMHG
AV VALVE AREA BY VELOCITY RATIO: 1.54 CM²
AV VALVE AREA: 1.48 CM²
AV VELOCITY RATIO: 0.49
BASOPHILS # BLD AUTO: 0.02 X10(3)/MCL
BASOPHILS NFR BLD AUTO: 0.2 %
BILIRUB SERPL-MCNC: 0.3 MG/DL
BSA FOR ECHO PROCEDURE: 2.45 M2
BUN SERPL-MCNC: 117 MG/DL (ref 9.8–20.1)
CALCIUM SERPL-MCNC: 6.8 MG/DL (ref 8.4–10.2)
CHLORIDE SERPL-SCNC: 100 MMOL/L (ref 98–107)
CO2 SERPL-SCNC: 15 MMOL/L (ref 23–31)
CREAT SERPL-MCNC: 10.1 MG/DL (ref 0.55–1.02)
CREAT/UREA NIT SERPL: 12
CV ECHO LV RWT: 0.44 CM
DOP CALC AO PEAK VEL: 2.5 M/S
DOP CALC AO VTI: 45.5 CM
DOP CALC LVOT AREA: 3.1 CM2
DOP CALC LVOT DIAMETER: 2 CM
DOP CALC LVOT PEAK VEL: 1.23 M/S
DOP CALC LVOT STROKE VOLUME: 67.2 CM3
DOP CALC MV VTI: 36.2 CM
DOP CALCLVOT PEAK VEL VTI: 21.4 CM
E WAVE DECELERATION TIME: 219 MSEC
E/A RATIO: 0.7
E/E' RATIO: 9.43 M/S
ECHO LV POSTERIOR WALL: 1.1 CM (ref 0.6–1.1)
EOSINOPHIL # BLD AUTO: 0 X10(3)/MCL (ref 0–0.9)
EOSINOPHIL NFR BLD AUTO: 0 %
ERYTHROCYTE [DISTWIDTH] IN BLOOD BY AUTOMATED COUNT: 14 % (ref 11.5–17)
FERRITIN SERPL-MCNC: 1552.32 NG/ML (ref 4.63–204)
FRACTIONAL SHORTENING: 34 % (ref 28–44)
GFR SERPLBLD CREATININE-BSD FMLA CKD-EPI: 4 ML/MIN/1.73/M2
GLOBULIN SER-MCNC: 3.7 GM/DL (ref 2.4–3.5)
GLUCOSE SERPL-MCNC: 167 MG/DL (ref 82–115)
HCT VFR BLD AUTO: 20.9 % (ref 37–47)
HGB BLD-MCNC: 7.1 G/DL (ref 12–16)
IMM GRANULOCYTES # BLD AUTO: 0.21 X10(3)/MCL (ref 0–0.04)
IMM GRANULOCYTES NFR BLD AUTO: 1.9 %
INTERVENTRICULAR SEPTUM: 1.12 CM (ref 0.6–1.1)
IRON SATN MFR SERPL: 26 % (ref 20–50)
IRON SERPL-MCNC: 39 UG/DL (ref 50–170)
LEFT ATRIUM AREA SYSTOLIC (APICAL 2 CHAMBER): 19.4 CM2
LEFT ATRIUM SIZE: 4.5 CM
LEFT INTERNAL DIMENSION IN SYSTOLE: 3.27 CM (ref 2.1–4)
LEFT VENTRICLE DIASTOLIC VOLUME INDEX: 50.87 ML/M2
LEFT VENTRICLE DIASTOLIC VOLUME: 117 ML
LEFT VENTRICLE END DIASTOLIC VOLUME APICAL 2 CHAMBER: 89.4 ML
LEFT VENTRICLE END DIASTOLIC VOLUME APICAL 4 CHAMBER: 119 ML
LEFT VENTRICLE END SYSTOLIC VOLUME APICAL 2 CHAMBER: 55.5 ML
LEFT VENTRICLE MASS INDEX: 90 G/M2
LEFT VENTRICLE SYSTOLIC VOLUME INDEX: 18.8 ML/M2
LEFT VENTRICLE SYSTOLIC VOLUME: 43.2 ML
LEFT VENTRICULAR INTERNAL DIMENSION IN DIASTOLE: 4.97 CM (ref 3.5–6)
LEFT VENTRICULAR MASS: 207.71 G
LV LATERAL E/E' RATIO: 8.25 M/S
LV SEPTAL E/E' RATIO: 11 M/S
LVED V (TEICH): 117 ML
LVES V (TEICH): 43.2 ML
LVOT MG: 3 MMHG
LVOT MV: 0.82 CM/S
LYMPHOCYTES # BLD AUTO: 0.64 X10(3)/MCL (ref 0.6–4.6)
LYMPHOCYTES NFR BLD AUTO: 5.9 %
MAGNESIUM SERPL-MCNC: 1.7 MG/DL (ref 1.6–2.6)
MCH RBC QN AUTO: 30.2 PG (ref 27–31)
MCHC RBC AUTO-ENTMCNC: 34 G/DL (ref 33–36)
MCV RBC AUTO: 88.9 FL (ref 80–94)
MONOCYTES # BLD AUTO: 0.31 X10(3)/MCL (ref 0.1–1.3)
MONOCYTES NFR BLD AUTO: 2.8 %
MV MEAN GRADIENT: 7 MMHG
MV PEAK A VEL: 1.42 M/S
MV PEAK E VEL: 0.99 M/S
MV PEAK GRADIENT: 14 MMHG
MV STENOSIS PRESSURE HALF TIME: 67 MS
MV VALVE AREA BY CONTINUITY EQUATION: 1.86 CM2
MV VALVE AREA P 1/2 METHOD: 3.28 CM2
NEUTROPHILS # BLD AUTO: 9.76 X10(3)/MCL (ref 2.1–9.2)
NEUTROPHILS NFR BLD AUTO: 89.2 %
NRBC BLD AUTO-RTO: 0.2 %
OHS LV EJECTION FRACTION SIMPSONS BIPLANE MOD: 58 %
PHOSPHATE SERPL-MCNC: 6.5 MG/DL (ref 2.3–4.7)
PISA TR MAX VEL: 2.87 M/S
PLATELET # BLD AUTO: 191 X10(3)/MCL (ref 130–400)
PMV BLD AUTO: 10.9 FL (ref 7.4–10.4)
POTASSIUM SERPL-SCNC: 3.9 MMOL/L (ref 3.5–5.1)
PROT SERPL-MCNC: 6.1 GM/DL (ref 5.8–7.6)
PTH-INTACT SERPL-MCNC: 347.7 PG/ML (ref 8.7–77)
PV PEAK GRADIENT: 11 MMHG
PV PEAK VELOCITY: 1.68 M/S
RA PRESSURE ESTIMATED: 3 MMHG
RBC # BLD AUTO: 2.35 X10(6)/MCL (ref 4.2–5.4)
RV TB RVSP: 6 MMHG
SODIUM SERPL-SCNC: 136 MMOL/L (ref 136–145)
TDI LATERAL: 0.12 M/S
TDI SEPTAL: 0.09 M/S
TDI: 0.11 M/S
TIBC SERPL-MCNC: 112 UG/DL (ref 70–310)
TIBC SERPL-MCNC: 151 UG/DL (ref 250–450)
TR MAX PG: 33 MMHG
TRANSFERRIN SERPL-MCNC: 130 MG/DL (ref 173–360)
TRICUSPID ANNULAR PLANE SYSTOLIC EXCURSION: 3.29 CM
TV REST PULMONARY ARTERY PRESSURE: 36 MMHG
VANCOMYCIN SERPL-MCNC: 24 UG/ML (ref 15–20)
WBC # BLD AUTO: 10.94 X10(3)/MCL (ref 4.5–11.5)
Z-SCORE OF LEFT VENTRICULAR DIMENSION IN END DIASTOLE: -5.84
Z-SCORE OF LEFT VENTRICULAR DIMENSION IN END SYSTOLE: -3.93

## 2024-06-23 PROCEDURE — 84100 ASSAY OF PHOSPHORUS: CPT

## 2024-06-23 PROCEDURE — 25000003 PHARM REV CODE 250: Mod: JZ,JG

## 2024-06-23 PROCEDURE — 80053 COMPREHEN METABOLIC PANEL: CPT | Performed by: NURSE PRACTITIONER

## 2024-06-23 PROCEDURE — 82728 ASSAY OF FERRITIN: CPT | Performed by: NURSE PRACTITIONER

## 2024-06-23 PROCEDURE — 63600175 PHARM REV CODE 636 W HCPCS

## 2024-06-23 PROCEDURE — 36415 COLL VENOUS BLD VENIPUNCTURE: CPT | Performed by: NURSE PRACTITIONER

## 2024-06-23 PROCEDURE — 63600175 PHARM REV CODE 636 W HCPCS: Performed by: INTERNAL MEDICINE

## 2024-06-23 PROCEDURE — 25000003 PHARM REV CODE 250: Performed by: INTERNAL MEDICINE

## 2024-06-23 PROCEDURE — 63600175 PHARM REV CODE 636 W HCPCS: Performed by: NURSE PRACTITIONER

## 2024-06-23 PROCEDURE — 20000000 HC ICU ROOM

## 2024-06-23 PROCEDURE — 85025 COMPLETE CBC W/AUTO DIFF WBC: CPT | Performed by: NURSE PRACTITIONER

## 2024-06-23 PROCEDURE — 82306 VITAMIN D 25 HYDROXY: CPT | Performed by: NURSE PRACTITIONER

## 2024-06-23 PROCEDURE — 80100014 HC HEMODIALYSIS 1:1

## 2024-06-23 PROCEDURE — 83970 ASSAY OF PARATHORMONE: CPT | Performed by: NURSE PRACTITIONER

## 2024-06-23 PROCEDURE — 83540 ASSAY OF IRON: CPT | Performed by: NURSE PRACTITIONER

## 2024-06-23 PROCEDURE — 83735 ASSAY OF MAGNESIUM: CPT

## 2024-06-23 PROCEDURE — 25000003 PHARM REV CODE 250

## 2024-06-23 PROCEDURE — 80202 ASSAY OF VANCOMYCIN: CPT | Performed by: INTERNAL MEDICINE

## 2024-06-23 PROCEDURE — 83550 IRON BINDING TEST: CPT | Performed by: NURSE PRACTITIONER

## 2024-06-23 RX ORDER — LANOLIN ALCOHOL/MO/W.PET/CERES
1 CREAM (GRAM) TOPICAL 2 TIMES DAILY
Status: DISCONTINUED | OUTPATIENT
Start: 2024-06-23 | End: 2024-07-10 | Stop reason: HOSPADM

## 2024-06-23 RX ORDER — CALCIUM GLUCONATE 20 MG/ML
1 INJECTION, SOLUTION INTRAVENOUS
Status: COMPLETED | OUTPATIENT
Start: 2024-06-23 | End: 2024-06-23

## 2024-06-23 RX ORDER — HYDRALAZINE HYDROCHLORIDE 20 MG/ML
10 INJECTION INTRAMUSCULAR; INTRAVENOUS
Status: DISCONTINUED | OUTPATIENT
Start: 2024-06-23 | End: 2024-07-10 | Stop reason: HOSPADM

## 2024-06-23 RX ORDER — LABETALOL HYDROCHLORIDE 5 MG/ML
10 INJECTION, SOLUTION INTRAVENOUS EVERY 4 HOURS PRN
Status: DISCONTINUED | OUTPATIENT
Start: 2024-06-23 | End: 2024-07-10 | Stop reason: HOSPADM

## 2024-06-23 RX ADMIN — CALCIUM GLUCONATE 1 G: 20 INJECTION, SOLUTION INTRAVENOUS at 06:06

## 2024-06-23 RX ADMIN — PIPERACILLIN AND TAZOBACTAM 4.5 G: 4; .5 INJECTION, POWDER, LYOPHILIZED, FOR SOLUTION INTRAVENOUS; PARENTERAL at 01:06

## 2024-06-23 RX ADMIN — MUPIROCIN: 20 OINTMENT TOPICAL at 08:06

## 2024-06-23 RX ADMIN — MUPIROCIN: 20 OINTMENT TOPICAL at 09:06

## 2024-06-23 RX ADMIN — CALCIUM GLUCONATE 1 G: 20 INJECTION, SOLUTION INTRAVENOUS at 08:06

## 2024-06-23 RX ADMIN — LABETALOL HYDROCHLORIDE 10 MG: 5 INJECTION, SOLUTION INTRAVENOUS at 03:06

## 2024-06-23 RX ADMIN — DEXAMETHASONE SODIUM PHOSPHATE 8 MG: 4 INJECTION INTRA-ARTICULAR; INTRALESIONAL; INTRAMUSCULAR; INTRAVENOUS; SOFT TISSUE at 08:06

## 2024-06-23 RX ADMIN — VANCOMYCIN HYDROCHLORIDE 500 MG: 500 INJECTION, POWDER, LYOPHILIZED, FOR SOLUTION INTRAVENOUS at 05:06

## 2024-06-23 RX ADMIN — FERROUS SULFATE TAB 325 MG (65 MG ELEMENTAL FE) 1 EACH: 325 (65 FE) TAB at 09:06

## 2024-06-23 RX ADMIN — SODIUM CHLORIDE: 9 INJECTION, SOLUTION INTRAVENOUS at 01:06

## 2024-06-23 RX ADMIN — HEPARIN SODIUM 4000 UNITS: 1000 INJECTION INTRAVENOUS; SUBCUTANEOUS at 12:06

## 2024-06-23 RX ADMIN — ONDANSETRON 4 MG: 2 INJECTION INTRAMUSCULAR; INTRAVENOUS at 01:06

## 2024-06-23 NOTE — NURSING
Nurses Note -- 4 Eyes      6/23/2024   2:41 AM      Skin assessed during: Q Shift Change      [] No Altered Skin Integrity Present    [x]Prevention Measures Documented      [x] Yes- Altered Skin Integrity Present or Discovered   [] LDA Added if Not in Epic (Describe Wound)   [] New Altered Skin Integrity was Present on Admit and Documented in LDA   [] Wound Image Taken    Wound Care Consulted? No    Attending Nurse:  Denisse Hill RN/Staff Member:  SANAZ Busch

## 2024-06-23 NOTE — NURSING
Nurses Note -- 4 Eyes      6/23/2024   2:41 AM      Skin assessed during: Admit      [] No Altered Skin Integrity Present    [x]Prevention Measures Documented      [x] Yes- Altered Skin Integrity Present or Discovered   [x] LDA Added if Not in Epic (Describe Wound)   [x] New Altered Skin Integrity was Present on Admit and Documented in LDA   [x] Wound Image Taken    Wound Care Consulted? No    Attending Nurse:  Denisse Hill RN/Staff Member:  SANAZ Busch

## 2024-06-23 NOTE — PLAN OF CARE
Problem: Infection  Goal: Absence of Infection Signs and Symptoms  Outcome: Progressing  Intervention: Prevent or Manage Infection  Flowsheets (Taken 6/23/2024 0230)  Fever Reduction/Comfort Measures:   lightweight bedding   lightweight clothing     Problem: Adult Inpatient Plan of Care  Goal: Plan of Care Review  Outcome: Progressing  Flowsheets (Taken 6/23/2024 0230)  Plan of Care Reviewed With:   patient   family  Goal: Patient-Specific Goal (Individualized)  Outcome: Progressing  Goal: Absence of Hospital-Acquired Illness or Injury  Outcome: Progressing  Intervention: Identify and Manage Fall Risk  Flowsheets (Taken 6/23/2024 0230)  Safety Promotion/Fall Prevention:   assistive device/personal item within reach   Fall Risk reviewed with patient/family   Fall Risk signage in place   lighting adjusted   medications reviewed   pulse ox   side rails raised x 3   room near unit station  Intervention: Prevent Skin Injury  Flowsheets (Taken 6/23/2024 0230)  Body Position: turned  Skin Protection:   transparent dressing maintained   pulse oximeter probe site changed   silicone foam dressing in place   incontinence pads utilized   protective footwear used  Device Skin Pressure Protection:   absorbent pad utilized/changed   adhesive use limited   positioning supports utilized   pressure points protected   tubing/devices free from skin contact  Intervention: Prevent and Manage VTE (Venous Thromboembolism) Risk  Flowsheets (Taken 6/23/2024 0230)  VTE Prevention/Management:   remove, assess skin, and reapply sequential compression device   ROM (active) performed   dorsiflexion/plantar flexion performed  Intervention: Prevent Infection  Flowsheets (Taken 6/23/2024 0230)  Infection Prevention:   environmental surveillance performed   equipment surfaces disinfected   hand hygiene promoted   personal protective equipment utilized   rest/sleep promoted   single patient room provided  Goal: Optimal Comfort and  Wellbeing  Outcome: Progressing  Intervention: Monitor Pain and Promote Comfort  Flowsheets (Taken 6/23/2024 0230)  Pain Management Interventions:   quiet environment facilitated   relaxation techniques promoted   pillow support provided   position adjusted  Intervention: Provide Person-Centered Care  Flowsheets (Taken 6/23/2024 0230)  Trust Relationship/Rapport:   care explained   choices provided   emotional support provided   empathic listening provided   questions answered   questions encouraged   reassurance provided   thoughts/feelings acknowledged  Goal: Readiness for Transition of Care  Outcome: Progressing     Problem: Bariatric Environmental Safety  Goal: Safety Maintained with Care  Outcome: Progressing  Intervention: Promote Safety and Comfort  Flowsheets (Taken 6/23/2024 0230)  Bariatric Safety: specialty bed utilized     Problem: Acute Kidney Injury/Impairment  Goal: Fluid and Electrolyte Balance  Outcome: Progressing  Intervention: Monitor and Manage Fluid and Electrolyte Balance  Flowsheets (Taken 6/23/2024 0230)  Fluid/Electrolyte Management: fluids provided  Goal: Improved Oral Intake  Outcome: Progressing  Goal: Effective Renal Function  Outcome: Progressing  Intervention: Monitor and Support Renal Function  Flowsheets (Taken 6/23/2024 0230)  Medication Review/Management: medications reviewed     Problem: Hemodialysis  Goal: Safe, Effective Therapy Delivery  Outcome: Progressing  Intervention: Optimize Device Care and Function  Flowsheets (Taken 6/23/2024 0230)  Medication Review/Management: medications reviewed  Goal: Effective Tissue Perfusion  Outcome: Progressing  Goal: Absence of Infection Signs and Symptoms  Outcome: Progressing  Intervention: Prevent or Manage Infection  Flowsheets (Taken 6/23/2024 0230)  Infection Prevention:   environmental surveillance performed   equipment surfaces disinfected   hand hygiene promoted   personal protective equipment utilized   rest/sleep promoted    single patient room provided  Fever Reduction/Comfort Measures:   lightweight bedding   lightweight clothing     Problem: Fall Injury Risk  Goal: Absence of Fall and Fall-Related Injury  Outcome: Progressing  Intervention: Identify and Manage Contributors  Flowsheets (Taken 6/23/2024 0230)  Self-Care Promotion: independence encouraged  Medication Review/Management: medications reviewed  Intervention: Promote Injury-Free Environment  Flowsheets (Taken 6/23/2024 0230)  Safety Promotion/Fall Prevention:   assistive device/personal item within reach   Fall Risk reviewed with patient/family   Fall Risk signage in place   lighting adjusted   medications reviewed   pulse ox   side rails raised x 3   room near unit station     Problem: Skin Injury Risk Increased  Goal: Skin Health and Integrity  Outcome: Progressing  Intervention: Optimize Skin Protection  Flowsheets (Taken 6/23/2024 0230)  Pressure Reduction Techniques:   frequent weight shift encouraged   heels elevated off bed   positioned off wounds   pressure points protected   weight shift assistance provided  Pressure Reduction Devices:   foam padding utilized   heel offloading device utilized   positioning supports utilized   pressure-redistributing mattress utilized   specialty bed utilized  Skin Protection:   transparent dressing maintained   pulse oximeter probe site changed   silicone foam dressing in place   incontinence pads utilized   protective footwear used  Activity Management:   Ankle pumps - L1   Arm raise - L1   Rolling - L1  Head of Bed (HOB) Positioning: HOB at 30 degrees

## 2024-06-23 NOTE — NURSING
Nurses Note -- 4 Eyes      6/23/2024   5:00 PM      Skin assessed during: Daily Assessment      [] No Altered Skin Integrity Present    [x]Prevention Measures Documented      [x] Yes- Altered Skin Integrity Present or Discovered   [] LDA Added if Not in Epic (Describe Wound)   [] New Altered Skin Integrity was Present on Admit and Documented in LDA   [] Wound Image Taken    Wound Care Consulted? No    Attending Nurse:  SANAZ Rangel    Second RN/Staff Member:  SANAZ Meadows

## 2024-06-23 NOTE — PROGRESS NOTES
06/23/24 1304        Hemodialysis Catheter 06/22/24 0530 right internal jugular   Placement Date/Time: 06/22/24 0530   Present Prior to Hospital Arrival?: No  Location: right internal jugular   Dressing Type CHG impregnated dressing/sponge   Dressing Status Clean;Dry;Intact   Dressing Intervention Integrity maintained   Date on Dressing 06/22/24   Dressing Due to be Changed 06/26/24   Venous Patency/Care heparin locked   Arterial Patency/Care heparin locked   Post-Hemodialysis Assessment   Blood Volume Processed (Liters) 30 L   Dialyzer Clearance Lightly streaked   Duration of Treatment 120 minutes   Total UF (mL) 500 mL   Net Fluid Removal 500   Patient Response to Treatment TOLERATED WEL   Post-Hemodialysis Comments completed 2hr HD tx. Removed 500ml. HD cath performed well with lines reversed. Pt had tremmors before during and after HD. Renal rounded and saw pt while on dialysis

## 2024-06-23 NOTE — CONSULTS
Nephrology Initial Consult Note    Patient Name: Fior Joya  Age: 69 y.o.  : 1955  MRN: [id number]  Admission Date: 2024    Reason for Consult:      ESTRELLITA, hyperkalemia, metabolic acidosis    Chief Complaint   Patient presents with    Transfer     ARF transfer from [place]         Joseph Prasad DO    HPI:     Fior Joya is a 69 y.o. female who was transferred from hospital in [place] due to acute renal failure.  She initially presented to the hospital due to generalized weakness and shortness of breath with exertion.  Per their report she had been mostly bed-bound for the past 3 weeks.  He reported that she had been drinking getting up going to the restroom.  She did have some episodes of nausea vomiting and generalized weakness along with generalized muscle pain more recently.  She was endorsing shortness of breath but no chest pain.  Denied had been running a fever.  Per their his report, she had not seen a physician in 10 years, because she was not feeling ill and did not feel like she needed to go.  She had also been having some swelling in the legs.  She has no known medical issues per their report.  On admit, WBC elevated at 15, RBC 1.8, hemoglobin 5.7, D-dimer elevated at 1.57, serum sodium 132, potassium 6, serum CO2 less than 5, , creatinine 14.8, glucose 273, calcium 7.5, phosphorus 7.9, albumin 2.9.  TSH and lactic were okay.  UA results indicative of UTI sent for cultures.  Blood cultures obtained and pending.  Received phone call from ER physician in early a.m. hours, it was decided at that time we will proceed with initiation of RRT.  Right IJ temporary cath was placed per General surgery.   She is awake and alert this morning upon exam.  She does respond with simple answers to questions posed.  Son in room in majority of history obtained from him.  Oxygen saturation is 99% on nasal cannula.  We are consulted for management ESTRELLITA.  Bilateral hydronephrosis on CT from  06/21/2024 6/23/24- had urology eval., bilateral hydronephrosis, no acute surgical intervention rec. At this time. Recommended Mario with judicious hydration.  Per his report he feels outlet obstruction could be multifactorial but unclear underlying etiology at this time.    He had approximately 700 cc urine output last 24 hours.  She is awake alert oriented in bed on dialysis machine. Brother in . She has some nausea and vomiting this am, but no SOB or CP.     Current Facility-Administered Medications   Medication Dose Route Frequency Provider Last Rate Last Admin    0.9%  NaCl infusion (for blood administration)   Intravenous Q24H PRN Dariusz Vee MD        0.9%  NaCl infusion (for blood administration)   Intravenous Q24H PRN Casper Hayward Jr., MD, FCCP        0.9%  NaCl infusion   Intravenous Continuous Robby Saab MD   Stopped at 06/23/24 0616    acetaminophen tablet 650 mg  650 mg Oral Q4H PRN James Townsend MD        dexAMETHasone injection 8 mg  8 mg Intravenous Daily Tye Hayes MD   8 mg at 06/23/24 0850    ferrous sulfate tablet 1 each  1 tablet Oral BID Casper Hayward Jr., MD, FCCP        heparin (porcine) injection 4,000 Units  4,000 Units Intravenous PRN Nata Terrazas ANP   4,000 Units at 06/22/24 0905    hydrALAZINE injection 10 mg  10 mg Intravenous Q2H PRN James Townsend MD        labetaloL injection 10 mg  10 mg Intravenous Q4H PRN James Townsend MD   10 mg at 06/23/24 0308    melatonin tablet 6 mg  6 mg Oral Nightly PRN James Townsend MD        mupirocin 2 % ointment   Nasal BID Nata Terrazas ANP   Given at 06/23/24 0851    ondansetron injection 4 mg  4 mg Intravenous Q8H PRN James Townsend MD   4 mg at 06/23/24 0147    piperacillin-tazobactam (ZOSYN) 4.5 g in D5W 100 mL IVPB (MB+)  4.5 g Intravenous Q12H Tye Hayes MD   Stopped at 06/23/24 0543    prochlorperazine injection Soln 5 mg  5 mg Intravenous Q6H PRN James Townsend MD        sodium chloride 0.9%  flush 10 mL  10 mL Intravenous PRN James Townsend MD        vancomycin - pharmacy to dose   Intravenous pharmacy to manage frequency Tye Hayes MD Dawson, Mark H., MD    No past medical history on file.   No past surgical history on file.   No family history on file.  Social History     Tobacco Use    Smoking status: Never    Smokeless tobacco: Never   Substance Use Topics    Alcohol use: Never     No medications prior to admission.     Review of patient's allergies indicates:  No Known Allergies         Review of Systems:       Unable to obtain ROS due to mental status/intubation.       Objective:       VITAL SIGNS: 24 HR MIN & MAX LAST    Temp  Min: 98 °F (36.7 °C)  Max: 100 °F (37.8 °C)  98.3 °F (36.8 °C)        BP  Min: 110/54  Max: 178/79  112/71     Pulse  Min: 66  Max: 132  96     Resp  Min: 13  Max: 28  17    SpO2  Min: 93 %  Max: 99 %  95 %      GEN: Chronically ill appearing in NAD  HEENT: Conjunctiva anicteric, pupils equal, MMM, OP benign  CV: RRR +S1,S2 without murmur  PULM: CTAB, unlabored  ABD: Soft, NT/ND abdomen with NABS  EXT: 3+ edema BLEs, +venous stasis changes to skin, L leg with thickened reddened skin to lower leg  SKIN: Warm and dry  PSYCH: awake and alert  Vascular access: RIJ CVC          Component Value Date/Time     06/23/2024 0344     06/22/2024 1220    K 3.9 06/23/2024 0344    K 3.6 06/22/2024 1220     06/23/2024 0344     06/22/2024 1220    CO2 15 (L) 06/23/2024 0344    CO2 14 (L) 06/22/2024 1220    .0 (H) 06/23/2024 0344    .8 (H) 06/22/2024 1220    CREATININE 10.10 (H) 06/23/2024 0344    CREATININE 9.84 (H) 06/22/2024 1220    CALCIUM 6.8 (LL) 06/23/2024 0344    CALCIUM 6.7 (LL) 06/22/2024 1220    PHOS 6.5 (H) 06/23/2024 0344            Component Value Date/Time    WBC 10.94 06/23/2024 0343    WBC 14.36 (H) 06/22/2024 0202    WBC 14.36 06/22/2024 0202    HGB 7.1 (L) 06/23/2024 0343    HGB 7.0 (L) 06/22/2024 1220    HCT 20.9 (L)  06/23/2024 0343    HCT 20.0 (L) 06/22/2024 1220     06/23/2024 0343     06/22/2024 0202         X-Ray Chest 1 View   Final Result      Findings seen consistent with CHF/increased volume status         Electronically signed by: Garland Raya   Date:    06/22/2024   Time:    13:06      X-Ray Chest 1 View for Line/Tube Placement   Final Result      Satisfactory right IJ catheter position         Electronically signed by: Tony Mar MD   Date:    06/22/2024   Time:    09:15          Assessment / Plan:       Active Hospital Problems    Diagnosis  POA    *Metabolic acidosis [E87.20]  Yes    Anemia requiring transfusions [D64.9]  Yes    Hyperkalemia [E87.5]  Yes    UTI (urinary tract infection) [N39.0]  Yes    Volume depletion [E86.9]  Yes    ESTRELLITA (acute kidney injury) [N17.9]  Yes      Resolved Hospital Problems   No resolved problems to display.     ESTRELLITA, required initiation of RRT  UTI--started on Rocephin  Hyperkalemia  Acute metabolic acidosis  Anemia    Patient will dialyze again today for dialysis treatment 2.  We will recheck labs in a.m..  We will continue to monitor renal function closely.      Nata Terrazas NP   [facility]    Attending note  Patient seen and examined independently  Agree with above assessment and findings  Vitals signs and nursing note reviewed.   Temp:  [98.1 °F (36.7 °C)-100 °F (37.8 °C)]   Pulse:  []   Resp:  [13-31]   BP: (110-178)/(54-95)   SpO2:  [93 %-99 %]      General: sick looking  HEENT: NC/AT. MM moist  Neck: No JVD, no carotid bruit, right IJ dialysis catheter in dressing  Resp: DARIAN present. No w/r/c  Cardiac: S1S2 heard, tachycardia present, no m/r/g  Abd: Soft, NT, BS present, no organomegaly appreciated, nondistended  Ext: edema 2+ right, 3+ left leg, venous stasis changes, redness over left leg  Neuro: tremor present. Oriented to place  Skin: no rash, lesions. Dry  Mario with minimal urine     Reviewed available labs and imaging  Acute  kidney injury with unknown baseline   Hyperkalemia   Acute metabolic acidosis   Anemia   Bilateral hydronephrosis on CT from 06/21/2024     Started on dialysis on 06/22/2023   Underwent dialysis again today as ordered and tolerated  Reassess for dialysis need tomorrow  Monitor bicarb with dialysis   Corrected calcium is 8.4   Urology evaluated and felt no intervention needed with likely chronic outlet obstruction, recommended continuing Mario  Ongoing evaluation for sepsis  Discussed with nursing      Components of this note were documented using voice recognition systems; and are subject to errors not corrected at proof reading.  Please contact the author for any clarifications.

## 2024-06-23 NOTE — PLAN OF CARE
Problem: Infection  Goal: Absence of Infection Signs and Symptoms  Outcome: Progressing     Problem: Adult Inpatient Plan of Care  Goal: Plan of Care Review  Outcome: Progressing  Goal: Patient-Specific Goal (Individualized)  Outcome: Progressing  Goal: Absence of Hospital-Acquired Illness or Injury  Outcome: Progressing  Goal: Optimal Comfort and Wellbeing  Outcome: Progressing  Goal: Readiness for Transition of Care  Outcome: Progressing     Problem: Acute Kidney Injury/Impairment  Goal: Fluid and Electrolyte Balance  Outcome: Progressing  Goal: Improved Oral Intake  Outcome: Progressing  Goal: Effective Renal Function  Outcome: Progressing     Problem: Hemodialysis  Goal: Safe, Effective Therapy Delivery  Outcome: Progressing  Goal: Effective Tissue Perfusion  Outcome: Progressing  Goal: Absence of Infection Signs and Symptoms  Outcome: Progressing     Problem: Skin Injury Risk Increased  Goal: Skin Health and Integrity  Outcome: Progressing     Problem: Fall Injury Risk  Goal: Absence of Fall and Fall-Related Injury  Outcome: Progressing     Problem: Wound  Goal: Optimal Coping  Outcome: Progressing  Goal: Optimal Functional Ability  Outcome: Progressing  Goal: Absence of Infection Signs and Symptoms  Outcome: Progressing  Goal: Improved Oral Intake  Outcome: Progressing  Goal: Optimal Pain Control and Function  Outcome: Progressing  Goal: Skin Health and Integrity  Outcome: Progressing  Goal: Optimal Wound Healing  Outcome: Progressing

## 2024-06-23 NOTE — CONSULTS
[facility]                       [address] [place] [id number]    PATIENT NAME:       GM HALE   YOB: 1955  CSN:                [id number]   MRN:                [id number]  ADMIT DATE:         06/22/2024 01:11:00  PHYSICIAN:          Yovani Diaz MD                            CONSULTATION    DATE OF CONSULT:      HISTORY OF PRESENT ILLNESS:  The patient is a 69-year-old female, transferred to   [facility] with acute renal failure.  Apparently, patient has been   bed-bound for weeks.  She does not receive consistent health care, has not seen   a doctor in more than 10 years.  She has no prior medical history.  She drinks   fluids when eating bananas.  She has had some nausea and vomiting with   generalized weakness.  She is in the ICU.  We have been consulted for bilateral   hydronephrosis.  A CT scan was performed.  She has cortical thinning bilaterally   with bilateral hydronephrosis, mild-to-moderate hydroureter.  She has no   obstructing stones or other abnormality distally.    PAST MEDICAL HISTORY:  As above.    PAST SURGICAL HISTORY:  Negative.    ALLERGIES:  NO KNOWN DRUG ALLERGIES.     PHYSICAL EXAMINATION:  VITAL SIGNS:  Patient is afebrile.  Vital signs are stable, though she is in the   ICU for markedly elevated BUN and creatinine.    DIAGNOSTIC DATA:  CT scan was performed, which I have reviewed.  The patient has   prominent periaortic retroperitoneal lymphadenopathy, possible neoplastic   process.  She has bilateral hydroureteronephrosis with thickened bladder wall,   possible bladder outlet obstruction.  She has cortical thinning of both kidneys.    There are no cysts, calcifications or other abnormalities.  She has no acute   intraabdominal or pelvic solid organ pathology.    ASSESSMENT AND PLAN:  Bilateral hydronephrosis.  No acute surgical intervention   at this time.  The patient  needs a Mario catheter.  With Mario catheter drainage   and judicious hydration and electrolyte monitoring, she should improve with   time.  It is unclear why she has outlet obstruction, could be multifactorial.    This is clearly a chronic problem.  She will need prolonged Nephrology care.  No   surgical intervention at this time.        ______________________________  MD MARLYS Mccarthy/AQS  DD:  06/23/2024  Time:  08:18AM  DT:  06/23/2024  Time:  08:38AM  Job #:  [id number]      CONSULTATION

## 2024-06-23 NOTE — PROGRESS NOTES
Pharmacokinetic Assessment Follow Up: IV Vancomycin    Vancomycin serum concentration assessment(s):    The random level was drawn correctly and can be used to guide therapy at this time. The measurement is above the desired definitive target range of 15 to 20 mcg/mL.    Vancomycin Regimen Plan:    Re-dose with vancomycin 500mg x1 post HD.  Re-dose when the random level is less than 20 mcg/mL, next level to be drawn at 0430 on 06/24.     Drug levels (last 3 results):  Recent Labs   Lab Result Units 06/23/24  0344   Vancomycin Random ug/ml 24.0*       Pharmacy will continue to follow and monitor vancomycin.    Please contact pharmacy at extension [phone] for questions regarding this assessment.    Thank you for the consult,   Marcella Arroyo       Patient brief summary:  Fior Joya is a 69 y.o. female initiated on antimicrobial therapy with IV Vancomycin for treatment of sepsis    The patient's current regimen is pulse dose.    Drug Allergies:   Review of patient's allergies indicates:  No Known Allergies    Actual Body Weight:   133.2kg    Renal Function:   Estimated Creatinine Clearance: 7.2 mL/min (A) (based on SCr of 10.1 mg/dL (H)).,     Dialysis Method (if applicable):  intermittent HD    CBC (last 72 hours):  Recent Labs   Lab Result Units 06/21/24 2013 06/22/24 0202 06/22/24  1220 06/23/24  0343   WBC x10(3)/mcL 15.81* 14.36  14.36*  --  10.94   Hgb g/dL 5.7* 5.2* 7.0* 7.1*   Hemoglobin A1c %  --   --  4.7  --    Hct % 17.7* 15.6* 20.0* 20.9*   Platelet x10(3)/mcL 268 221  --  191   Mono % % 5.2  --   --  2.8   Monocytes % %  --  11  --   --    Eos % % 0.4  --   --  0.0   Eosinophils % %  --  1  --   --    Basophil % % 0.3  --   --  0.2       Metabolic Panel (last 72 hours):  Recent Labs   Lab Result Units 06/21/24 2013 06/21/24  2133 06/21/24  2227 06/22/24  0202 06/22/24  0923 06/22/24  1220 06/23/24  0344   Sodium mmol/L 135*  --  132* 136  --  140 136   Urine Sodium mmol/L  --   --   --   --  51.0  --   --     Potassium mmol/L 6.8*  --  6.0* 5.7*  --  3.6 3.9   Urine Potassium mmol/L  --   --   --   --  27.4  --   --    Chloride mmol/L 108*  --  107 108*  --  103 100   Urine Chloride mmol/L  --   --   --   --  52.0  --   --    CO2 mmol/L 5*  --  <5* <5*  --  14* 15*   Glucose mg/dL 111  --  273* 100  --  84 167*   Glucose, UA   --  Negative  --   --   --   --   --    Blood Urea Nitrogen mg/dL 209.0*  --  205.0* 198.4*  --  117.8* 117.0*   Creatinine mg/dL 16.94*  --  14.80* 15.51*  --  9.84* 10.10*   Urine Creatinine mg/dL  --  29.5*  --   --   --   --   --    Albumin g/dL 2.9*  --   --  2.7*  --  2.3* 2.4*   Bilirubin Total mg/dL 0.3  --   --  0.3  --  0.5 0.3   ALP unit/L 62  --   --  59  --  53 57   AST unit/L 13  --   --  15  --  17 19   ALT unit/L 10  --   --  12  --  12 12   Magnesium Level mg/dL 2.30  --   --  2.00  --  1.60 1.70   Phosphorus Level mg/dL  --   --  7.9*  --   --  4.4 6.5*       Vancomycin Administrations:  vancomycin given in the last 96 hours                     vancomycin 2 g in dextrose 5 % 500 mL IVPB (mg) 2,000 mg New Bag 06/22/24 2595                    Microbiologic Results:  Microbiology Results (last 7 days)       ** No results found for the last 168 hours. **

## 2024-06-23 NOTE — PROGRESS NOTES
[facility] - 7th Floor ICU  Pulmonary/Critical Care  Progress Note  6/23/2024    Patient Name: Fior Joya  MRN: [id number]  Admission Date: 6/22/2024  Code Status: Full Code      Subjective:     HPI:  The patient was a 69-year-old female who has essentially negative past medical history.  She initially presented to [facility] ER on 06/21/2024 with complaints of approximate 1 month of dyspnea on exertion and generalized weakness.  She was noted anemic with H/H 5.7/17.7, to have an ESTRELLITA with BUN/creatinine 205/14.8, and potassium of 6.8.  She received a dose of Lokelma and IV calcium.  She was transferred to ER at [facility], and was admitted to ICU where she underwent temporary hemodialysis access placement and initiation of hemodialysis.    Hospital Course:  06/22/2024:  Initiation of hemodialysis    24hr Interval History:  She is afebrile.  Intake 3050 cc, output 1735 cc (735 cc urine output) over the previous 24 hours.  She received 2 units packed red blood cell transfusion yesterday on hemodialysis.  Renal ultrasound revealed bilateral hydronephrosis with no obvious obstructive stones or compressive abnormalities.  She was evaluated by Urology who feels that findings are consistent with a bladder outlet obstruction and recommended continued Mario catheter drainage.    Scheduled Medications:   calcium gluconate IVPB  1 g Intravenous Q1H    dexAMETHasone  8 mg Intravenous Daily    mupirocin   Nasal BID    piperacillin-tazobactam (Zosyn) IV (PEDS and ADULTS) (extended infusion is not appropriate)  4.5 g Intravenous Q12H     PRN Medications:    Current Facility-Administered Medications:     0.9%  NaCl infusion (for blood administration), , Intravenous, Q24H PRN    0.9%  NaCl infusion (for blood administration), , Intravenous, Q24H PRN    acetaminophen, 650 mg, Oral, Q4H PRN    heparin (porcine), 4,000 Units, Intravenous, PRN    hydrALAZINE, 10 mg, Intravenous, Q2H PRN    labetaloL, 10 mg,  Intravenous, Q4H PRN    melatonin, 6 mg, Oral, Nightly PRN    ondansetron, 4 mg, Intravenous, Q8H PRN    prochlorperazine, 5 mg, Intravenous, Q6H PRN    sodium chloride 0.9%, 10 mL, Intravenous, PRN    Pharmacy to dose Vancomycin consult, , , Once **AND** vancomycin - pharmacy to dose, , Intravenous, pharmacy to manage frequency  Continuous Infusions:   0.9% NaCl   Intravenous Continuous   Stopped at 06/23/24 0616       No past medical history on file.    No past surgical history on file.    Objective:     Input/output:    Intake/Output Summary (Last 24 hours) at 6/23/2024 0929  Last data filed at 6/23/2024 0800  Gross per 24 hour   Intake 2549.49 ml   Output 715 ml   Net 1834.49 ml       Vital Signs (Most Recent):  Temp: 98.3 °F (36.8 °C) (06/23/24 0800)  Pulse: 96 (06/23/24 0921)  Resp: 17 (06/23/24 0921)  BP: 112/71 (06/23/24 0922)  SpO2: 95 % (06/23/24 0921)  Body mass index is 50.41 kg/m².  Weight: 133.2 kg (293 lb 10.4 oz) Vital Signs (24h Range):  Temp:  [98 °F (36.7 °C)-100 °F (37.8 °C)] 98.3 °F (36.8 °C)  Pulse:  [] 96  Resp:  [13-28] 17  SpO2:  [93 %-99 %] 95 %  BP: (110-178)/(54-95) 112/71     Physical Exam  Constitutional:       Appearance: Normal appearance.   Eyes:      Pupils: Pupils are equal, round, and reactive to light.   Cardiovascular:      Rate and Rhythm: Normal rate and regular rhythm.      Heart sounds: No murmur heard.  Pulmonary:      Breath sounds: No wheezing, rhonchi or rales.   Abdominal:      General: Bowel sounds are normal.      Palpations: Abdomen is soft.   Musculoskeletal:      Right lower leg: Edema present.      Left lower leg: Edema present.   Skin:     General: Skin is warm and dry.   Neurological:      Mental Status: She is alert and oriented to person, place, and time.         Lines/Drains/Airways       Central Venous Catheter Line  Duration                  Hemodialysis Catheter 06/22/24 0520 right internal jugular 1 day              Drain  Duration                   Urethral Catheter 06/22/24 0615 16 Fr. 1 day              Peripheral Intravenous Line  Duration                  Peripheral IV - Single Lumen 20 G Left Antecubital -- days         Peripheral IV - Single Lumen 20 G Left;Posterior Hand -- days         Peripheral IV - Single Lumen 06/22/24 0400 20 G Right;Posterior Hand 1 day                    Significant Labs:    Lab Results   Component Value Date    WBC 10.94 06/23/2024    HGB 7.1 (L) 06/23/2024    HCT 20.9 (L) 06/23/2024    MCV 88.9 06/23/2024     06/23/2024         Recent Labs   Lab 06/23/24  0344      K 3.9      CO2 15*   .0*   CREATININE 10.10*   CALCIUM 6.8*   MG 1.70   PHOS 6.5*   AST 19   ALT 12   ALKPHOS 57   ALBUMIN 2.4*     Imaging:   None this a.m.    Assessment:     Acute kidney injury, possible component of chronic kidney disease and evidence of obstructive uropathy, post initiation of hemodialysis 06/22/2024.  She is currently oliguric.  Severe anemia, parameters appear most consistent with anemia of chronic disease with possible borderline iron-deficiency component.  Morbid obesity, BMI 50.4    Plan:     As per nephrology concerning hemodialysis/ultrafiltration, planning daily initial  Continue Mario drainage in face of probable bladder outlet obstruction as etiology of bilateral hydronephrosis  Continue ICU observation for time being       Hanna Hayward MD, [facility]  Pulmonary/Critical Care

## 2024-06-24 LAB
ALBUMIN SERPL-MCNC: 2.5 G/DL (ref 3.4–4.8)
ALBUMIN/GLOB SERPL: 0.6 RATIO (ref 1.1–2)
ALP SERPL-CCNC: 54 UNIT/L (ref 40–150)
ALT SERPL-CCNC: 12 UNIT/L (ref 0–55)
ANION GAP SERPL CALC-SCNC: 21 MEQ/L
AST SERPL-CCNC: 17 UNIT/L (ref 5–34)
BACTERIA UR CULT: ABNORMAL
BASOPHILS # BLD AUTO: 0.02 X10(3)/MCL
BASOPHILS NFR BLD AUTO: 0.1 %
BILIRUB SERPL-MCNC: 0.4 MG/DL
BUN SERPL-MCNC: 83.8 MG/DL (ref 9.8–20.1)
C3 SERPL-MCNC: 96 MG/DL (ref 80–173)
C4 SERPL-MCNC: 32 MG/DL (ref 13–46)
CALCIUM SERPL-MCNC: 7.4 MG/DL (ref 8.4–10.2)
CHLORIDE SERPL-SCNC: 98 MMOL/L (ref 98–107)
CO2 SERPL-SCNC: 19 MMOL/L (ref 23–31)
CREAT SERPL-MCNC: 7.43 MG/DL (ref 0.55–1.02)
CREAT/UREA NIT SERPL: 11
EOSINOPHIL # BLD AUTO: 0 X10(3)/MCL (ref 0–0.9)
EOSINOPHIL NFR BLD AUTO: 0 %
ERYTHROCYTE [DISTWIDTH] IN BLOOD BY AUTOMATED COUNT: 14.1 % (ref 11.5–17)
GFR SERPLBLD CREATININE-BSD FMLA CKD-EPI: 6 ML/MIN/1.73/M2
GLOBULIN SER-MCNC: 4.5 GM/DL (ref 2.4–3.5)
GLUCOSE SERPL-MCNC: 136 MG/DL (ref 82–115)
HCT VFR BLD AUTO: 22.6 % (ref 37–47)
HGB BLD-MCNC: 7.7 G/DL (ref 12–16)
IMM GRANULOCYTES # BLD AUTO: 0.4 X10(3)/MCL (ref 0–0.04)
IMM GRANULOCYTES NFR BLD AUTO: 2.3 %
LYMPHOCYTES # BLD AUTO: 1.1 X10(3)/MCL (ref 0.6–4.6)
LYMPHOCYTES NFR BLD AUTO: 6.2 %
MAGNESIUM SERPL-MCNC: 1.7 MG/DL (ref 1.6–2.6)
MCH RBC QN AUTO: 29.8 PG (ref 27–31)
MCHC RBC AUTO-ENTMCNC: 34.1 G/DL (ref 33–36)
MCV RBC AUTO: 87.6 FL (ref 80–94)
MONOCYTES # BLD AUTO: 1.67 X10(3)/MCL (ref 0.1–1.3)
MONOCYTES NFR BLD AUTO: 9.5 %
MRSA PCR SCRN (OHS): DETECTED
NEUTROPHILS # BLD AUTO: 14.42 X10(3)/MCL (ref 2.1–9.2)
NEUTROPHILS NFR BLD AUTO: 81.9 %
NRBC BLD AUTO-RTO: 0.3 %
PHOSPHATE SERPL-MCNC: 4.9 MG/DL (ref 2.3–4.7)
PLATELET # BLD AUTO: 257 X10(3)/MCL (ref 130–400)
PMV BLD AUTO: 11.3 FL (ref 7.4–10.4)
POTASSIUM SERPL-SCNC: 3.7 MMOL/L (ref 3.5–5.1)
PROT SERPL-MCNC: 7 GM/DL (ref 5.8–7.6)
RBC # BLD AUTO: 2.58 X10(6)/MCL (ref 4.2–5.4)
SODIUM SERPL-SCNC: 138 MMOL/L (ref 136–145)
VANCOMYCIN SERPL-MCNC: 28 UG/ML (ref 15–20)
WBC # BLD AUTO: 17.61 X10(3)/MCL (ref 4.5–11.5)

## 2024-06-24 PROCEDURE — 80202 ASSAY OF VANCOMYCIN: CPT | Performed by: INTERNAL MEDICINE

## 2024-06-24 PROCEDURE — 86162 COMPLEMENT TOTAL (CH50): CPT | Performed by: INTERNAL MEDICINE

## 2024-06-24 PROCEDURE — 80053 COMPREHEN METABOLIC PANEL: CPT | Performed by: INTERNAL MEDICINE

## 2024-06-24 PROCEDURE — 25000003 PHARM REV CODE 250: Performed by: INTERNAL MEDICINE

## 2024-06-24 PROCEDURE — 86039 ANTINUCLEAR ANTIBODIES (ANA): CPT | Performed by: INTERNAL MEDICINE

## 2024-06-24 PROCEDURE — 25000003 PHARM REV CODE 250: Performed by: NURSE PRACTITIONER

## 2024-06-24 PROCEDURE — 63600175 PHARM REV CODE 636 W HCPCS

## 2024-06-24 PROCEDURE — 86036 ANCA SCREEN EACH ANTIBODY: CPT | Performed by: INTERNAL MEDICINE

## 2024-06-24 PROCEDURE — 25000003 PHARM REV CODE 250

## 2024-06-24 PROCEDURE — 20000000 HC ICU ROOM

## 2024-06-24 PROCEDURE — 86160 COMPLEMENT ANTIGEN: CPT | Performed by: INTERNAL MEDICINE

## 2024-06-24 PROCEDURE — 87641 MR-STAPH DNA AMP PROBE: CPT | Performed by: INTERNAL MEDICINE

## 2024-06-24 PROCEDURE — 85025 COMPLETE CBC W/AUTO DIFF WBC: CPT | Performed by: INTERNAL MEDICINE

## 2024-06-24 PROCEDURE — 36415 COLL VENOUS BLD VENIPUNCTURE: CPT | Performed by: INTERNAL MEDICINE

## 2024-06-24 PROCEDURE — 83735 ASSAY OF MAGNESIUM: CPT | Performed by: INTERNAL MEDICINE

## 2024-06-24 PROCEDURE — 80100014 HC HEMODIALYSIS 1:1

## 2024-06-24 PROCEDURE — 84100 ASSAY OF PHOSPHORUS: CPT | Performed by: INTERNAL MEDICINE

## 2024-06-24 RX ORDER — FOLIC ACID 1 MG/1
1 TABLET ORAL DAILY
Status: DISCONTINUED | OUTPATIENT
Start: 2024-06-24 | End: 2024-07-10 | Stop reason: HOSPADM

## 2024-06-24 RX ORDER — LABETALOL 100 MG/1
200 TABLET, FILM COATED ORAL EVERY 12 HOURS
Status: DISCONTINUED | OUTPATIENT
Start: 2024-06-24 | End: 2024-07-10 | Stop reason: HOSPADM

## 2024-06-24 RX ADMIN — MUPIROCIN: 20 OINTMENT TOPICAL at 09:06

## 2024-06-24 RX ADMIN — FERROUS SULFATE TAB 325 MG (65 MG ELEMENTAL FE) 1 EACH: 325 (65 FE) TAB at 09:06

## 2024-06-24 RX ADMIN — LABETALOL HYDROCHLORIDE 200 MG: 200 TABLET, FILM COATED ORAL at 09:06

## 2024-06-24 RX ADMIN — ONDANSETRON 4 MG: 2 INJECTION INTRAMUSCULAR; INTRAVENOUS at 08:06

## 2024-06-24 RX ADMIN — PIPERACILLIN AND TAZOBACTAM 4.5 G: 4; .5 INJECTION, POWDER, LYOPHILIZED, FOR SOLUTION INTRAVENOUS; PARENTERAL at 01:06

## 2024-06-24 RX ADMIN — FOLIC ACID 1 MG: 1 TABLET ORAL at 11:06

## 2024-06-24 RX ADMIN — PIPERACILLIN AND TAZOBACTAM 4.5 G: 4; .5 INJECTION, POWDER, LYOPHILIZED, FOR SOLUTION INTRAVENOUS; PARENTERAL at 05:06

## 2024-06-24 NOTE — PROGRESS NOTES
Nephrology Initial Consult Note    Patient Name: Fior Joya  Age: 69 y.o.  : 1955  MRN: [id number]  Admission Date: 2024    Reason for Consult:      ESTRELLITA, hyperkalemia, metabolic acidosis    Chief Complaint   Patient presents with    Transfer     ARF transfer from [place]         Joseph Prasad DO    HPI:     Fior Joya is a 69 y.o. female who was transferred from hospital in [place] due to acute renal failure.  She initially presented to the hospital due to generalized weakness and shortness of breath with exertion.  Per their report she had been mostly bed-bound for the past 3 weeks.  He reported that she had been drinking getting up going to the restroom.  She did have some episodes of nausea vomiting and generalized weakness along with generalized muscle pain more recently.  She was endorsing shortness of breath but no chest pain.  Denied had been running a fever.  Per their his report, she had not seen a physician in 10 years, because she was not feeling ill and did not feel like she needed to go.  She had also been having some swelling in the legs.  She has no known medical issues per their report.  On admit, WBC elevated at 15, RBC 1.8, hemoglobin 5.7, D-dimer elevated at 1.57, serum sodium 132, potassium 6, serum CO2 less than 5, , creatinine 14.8, glucose 273, calcium 7.5, phosphorus 7.9, albumin 2.9.  TSH and lactic were okay.  UA results indicative of UTI sent for cultures.  Blood cultures obtained and pending.  Received phone call from ER physician in early a.m. hours, it was decided at that time we will proceed with initiation of RRT.  Right IJ temporary cath was placed per General surgery.   She is awake and alert this morning upon exam.  She does respond with simple answers to questions posed.  Son in room in majority of history obtained from him.  Oxygen saturation is 99% on nasal cannula.  We are consulted for management ESTRELLITA.  Bilateral hydronephrosis on CT from  06/21/2024 6/23/24- had urology eval., bilateral hydronephrosis, no acute surgical intervention rec. At this time. Recommended Mario with judicious hydration.  Per his report he feels outlet obstruction could be multifactorial but unclear underlying etiology at this time.    He had approximately 700 cc urine output last 24 hours.  She is awake alert oriented in bed on dialysis machine. Brother in . She has some nausea and vomiting this am, but no SOB or CP.     06/24/2024   Very much awake alert and oriented this morning.  Her son sitting in the chair on her bedside.  Still having shaky and jerky movements of all extremities.  Able to answer questions appropriately.  Appetite is poor.  Still good urine output noted.  No family history of anybody with kidney failure.  She knows that she is feeling better.  Her son also thinks that she is much better than before.    Current Facility-Administered Medications   Medication Dose Route Frequency Provider Last Rate Last Admin    0.9%  NaCl infusion (for blood administration)   Intravenous Q24H PRN Dariusz Vee MD        0.9%  NaCl infusion (for blood administration)   Intravenous Q24H PRN Casper Hayward Jr., MD, FCCP        0.9%  NaCl infusion   Intravenous Continuous Robby Saab MD 50 mL/hr at 06/24/24 0701 Rate Verify at 06/24/24 0701    acetaminophen tablet 650 mg  650 mg Oral Q4H PRN James Townsend MD        ferrous sulfate tablet 1 each  1 tablet Oral BID Casper Hayward Jr., MD, FCCP   1 each at 06/24/24 0943    folic acid tablet 1 mg  1 mg Oral Daily Shin Light MD        heparin (porcine) injection 4,000 Units  4,000 Units Intravenous PRN Nata Terrazas ANP   4,000 Units at 06/23/24 1250    hydrALAZINE injection 10 mg  10 mg Intravenous Q2H PRN James Townsend MD        labetaloL injection 10 mg  10 mg Intravenous Q4H PRN James Townsend MD   10 mg at 06/23/24 0308    labetaloL tablet 200 mg  200 mg Oral Q12H Casper Hayward Jr., MD,  FCCP   200 mg at 06/24/24 0944    melatonin tablet 6 mg  6 mg Oral Nightly PRN James Townsend MD        mupirocin 2 % ointment   Nasal BID Nata Terrazas ANP   Given at 06/24/24 0944    ondansetron injection 4 mg  4 mg Intravenous Q8H PRN James Townsend MD   4 mg at 06/23/24 0147    piperacillin-tazobactam (ZOSYN) 4.5 g in D5W 100 mL IVPB (MB+)  4.5 g Intravenous Q12H Tye Hayes MD   Stopped at 06/24/24 0538    prochlorperazine injection Soln 5 mg  5 mg Intravenous Q6H PRN James Townsend MD        sodium chloride 0.9% flush 10 mL  10 mL Intravenous PRN James Townsend MD        vancomycin - pharmacy to dose   Intravenous pharmacy to manage frequency Tye Hayes MD Dawson, Mark H., MD    No past medical history on file.   No past surgical history on file.   No family history on file.  Social History     Tobacco Use    Smoking status: Never    Smokeless tobacco: Never   Substance Use Topics    Alcohol use: Never     No medications prior to admission.     Review of patient's allergies indicates:  No Known Allergies         Review of Systems:       All 12 point of review of system was done and is negative except 1 in history of present illness.      Objective:       VITAL SIGNS: 24 HR MIN & MAX LAST    Temp  Min: 98.1 °F (36.7 °C)  Max: 99.5 °F (37.5 °C)  98.5 °F (36.9 °C)        BP  Min: 122/97  Max: 200/110  (!) 200/110     Pulse  Min: 98  Max: 119  102     Resp  Min: 16  Max: 31  20    SpO2  Min: 90 %  Max: 96 %  (!) 94 %      GEN:  Well developed and nourished.  Awake alert oriented to time person place.  Having jerky movements all over the body.  It has improved significantly.  HEENT:  Atraumatic normocephalic.  No oral lesion.  Neck supple.  Thick short neck.  No visible JVD noted.  CV: RRR without gallop.  PULM: CTAB, unlabored  ABD: Soft, NT/ND abdomen with NABS  EXT: 3+ edema BLEs, +venous stasis changes to skin, L leg with thickened reddened skin to lower leg  SKIN: Warm and dry  PSYCH:  awake and alert  Neurologically follows command moves all her extremities.  Vascular access: RIJ CVC          Component Value Date/Time     06/24/2024 0325     06/23/2024 0344    K 3.7 06/24/2024 0325    K 3.9 06/23/2024 0344    CL 98 06/24/2024 0325     06/23/2024 0344    CO2 19 (L) 06/24/2024 0325    CO2 15 (L) 06/23/2024 0344    BUN 83.8 (H) 06/24/2024 0325    .0 (H) 06/23/2024 0344    CREATININE 7.43 (H) 06/24/2024 0325    CREATININE 10.10 (H) 06/23/2024 0344    CALCIUM 7.4 (L) 06/24/2024 0325    CALCIUM 6.8 (LL) 06/23/2024 0344    PHOS 4.9 (H) 06/24/2024 0325            Component Value Date/Time    WBC 17.61 (H) 06/24/2024 0325    WBC 10.94 06/23/2024 0343    WBC 14.36 06/22/2024 0202    HGB 7.7 (L) 06/24/2024 0325    HGB 7.1 (L) 06/23/2024 0343    HCT 22.6 (L) 06/24/2024 0325    HCT 20.9 (L) 06/23/2024 0343     06/24/2024 0325     06/23/2024 0343         X-Ray Chest 1 View   Final Result      Findings seen consistent with CHF/increased volume status         Electronically signed by: Garland Raya   Date:    06/22/2024   Time:    13:06      X-Ray Chest 1 View for Line/Tube Placement   Final Result      Satisfactory right IJ catheter position         Electronically signed by: Tony Mar MD   Date:    06/22/2024   Time:    09:15          Assessment / Plan:       Active Hospital Problems    Diagnosis  POA    *Metabolic acidosis [E87.20]  Yes    Anemia requiring transfusions [D64.9]  Yes    Hyperkalemia [E87.5]  Yes    UTI (urinary tract infection) [N39.0]  Yes    Volume depletion [E86.9]  Yes    ESTRELLITA (acute kidney injury) [N17.9]  Yes      Resolved Hospital Problems   No resolved problems to display.     ESTRELLITA secondary to ATN secondary to obstructive uropathy.  Improving urine output.  Patient's probably has some underlying chronic kidney disease as she does have significant proteinuria more than 8 g per 24 hours.  Ate most likely suggestive of some focal segmental  glomerulosclerosis type of problem.  Shaking and jerking movements of the extremities probably secondary to uremia.  UTI--started on Rocephin  Hyperkalemia  Acute metabolic acidosis  Anemia    Recommendations  Hemodialysis today and again tomorrow.  Will not remove any fluid as she is making urine.  Blood pressure is being managed per intensivist.  Once stable she will need some aggressive physical therapy  Will order some workup just to rule out any immune complex problem which may have contributed to patient's renal failure.

## 2024-06-24 NOTE — PLAN OF CARE
Problem: Infection  Goal: Absence of Infection Signs and Symptoms  Outcome: Progressing     Problem: Adult Inpatient Plan of Care  Goal: Plan of Care Review  Outcome: Progressing  Goal: Patient-Specific Goal (Individualized)  Outcome: Progressing  Goal: Absence of Hospital-Acquired Illness or Injury  Outcome: Progressing  Goal: Optimal Comfort and Wellbeing  Outcome: Progressing  Goal: Readiness for Transition of Care  Outcome: Progressing     Problem: Bariatric Environmental Safety  Goal: Safety Maintained with Care  Outcome: Progressing     Problem: Acute Kidney Injury/Impairment  Goal: Fluid and Electrolyte Balance  Outcome: Progressing  Goal: Improved Oral Intake  Outcome: Progressing  Goal: Effective Renal Function  Outcome: Progressing     Problem: Hemodialysis  Goal: Safe, Effective Therapy Delivery  Outcome: Progressing  Goal: Effective Tissue Perfusion  Outcome: Progressing  Goal: Absence of Infection Signs and Symptoms  Outcome: Progressing     Problem: Fall Injury Risk  Goal: Absence of Fall and Fall-Related Injury  Outcome: Progressing     Problem: Skin Injury Risk Increased  Goal: Skin Health and Integrity  Outcome: Progressing     Problem: Wound  Goal: Optimal Coping  Outcome: Progressing  Goal: Optimal Functional Ability  Outcome: Progressing  Goal: Absence of Infection Signs and Symptoms  Outcome: Progressing  Goal: Improved Oral Intake  Outcome: Progressing  Goal: Optimal Pain Control and Function  Outcome: Progressing  Goal: Skin Health and Integrity  Outcome: Progressing  Goal: Optimal Wound Healing  Outcome: Progressing

## 2024-06-24 NOTE — PROGRESS NOTES
UROLOGY  PROGRESS  NOTE    Fior Joya 1955  [id number]  6/24/2024      Patient resting in bed   Family at bedside   No discomfort from Mario   She reports she was having dysuria and felt like she was not emptying her bladder completely prior to admission.    Afebrile   410 mL urine output overnight  WBC 17.61   H&H 7 7/22.6   BUN and creatinine 83.8/7.43    UC with group B strep    Intake/Output:  I/O this shift:  In: 53.3 [I.V.:53.3]  Out: -   I/O last 3 completed shifts:  In: 2263.5 [I.V.:1469.7; IV Piggyback:793.9]  Out: 1715 [Urine:1215; Other:500]     Exam:    NAD  Resp: unlabored  Abd:  Soft, NT ND  :  Cloudy urine with some sediment draining to  bag    Recent Results (from the past 24 hour(s))   Comprehensive Metabolic Panel    Collection Time: 06/24/24  3:25 AM   Result Value Ref Range    Sodium 138 136 - 145 mmol/L    Potassium 3.7 3.5 - 5.1 mmol/L    Chloride 98 98 - 107 mmol/L    CO2 19 (L) 23 - 31 mmol/L    Glucose 136 (H) 82 - 115 mg/dL    Blood Urea Nitrogen 83.8 (H) 9.8 - 20.1 mg/dL    Creatinine 7.43 (H) 0.55 - 1.02 mg/dL    Calcium 7.4 (L) 8.4 - 10.2 mg/dL    Protein Total 7.0 5.8 - 7.6 gm/dL    Albumin 2.5 (L) 3.4 - 4.8 g/dL    Globulin 4.5 (H) 2.4 - 3.5 gm/dL    Albumin/Globulin Ratio 0.6 (L) 1.1 - 2.0 ratio    Bilirubin Total 0.4 <=1.5 mg/dL    ALP 54 40 - 150 unit/L    ALT 12 0 - 55 unit/L    AST 17 5 - 34 unit/L    eGFR 6 mL/min/1.73/m2    Anion Gap 21.0 mEq/L    BUN/Creatinine Ratio 11    Magnesium    Collection Time: 06/24/24  3:25 AM   Result Value Ref Range    Magnesium Level 1.70 1.60 - 2.60 mg/dL   Phosphorus    Collection Time: 06/24/24  3:25 AM   Result Value Ref Range    Phosphorus Level 4.9 (H) 2.3 - 4.7 mg/dL   Vancomycin, Random    Collection Time: 06/24/24  3:25 AM   Result Value Ref Range    Vancomycin Random 28.0 (H) 15.0 - 20.0 ug/ml   CBC with Differential    Collection Time: 06/24/24  3:25 AM   Result Value Ref Range    WBC 17.61 (H) 4.50 - 11.50 x10(3)/mcL    RBC 2.58  (L) 4.20 - 5.40 x10(6)/mcL    Hgb 7.7 (L) 12.0 - 16.0 g/dL    Hct 22.6 (L) 37.0 - 47.0 %    MCV 87.6 80.0 - 94.0 fL    MCH 29.8 27.0 - 31.0 pg    MCHC 34.1 33.0 - 36.0 g/dL    RDW 14.1 11.5 - 17.0 %    Platelet 257 130 - 400 x10(3)/mcL    MPV 11.3 (H) 7.4 - 10.4 fL    Neut % 81.9 %    Lymph % 6.2 %    Mono % 9.5 %    Eos % 0.0 %    Basophil % 0.1 %    Lymph # 1.10 0.6 - 4.6 x10(3)/mcL    Neut # 14.42 (H) 2.1 - 9.2 x10(3)/mcL    Mono # 1.67 (H) 0.1 - 1.3 x10(3)/mcL    Eos # 0.00 0 - 0.9 x10(3)/mcL    Baso # 0.02 <=0.2 x10(3)/mcL    IG# 0.40 (H) 0 - 0.04 x10(3)/mcL    IG% 2.3 %    NRBC% 0.3 %       Assessment:  -urinary retention with bilateral hydroureteronephrosis status post Mario insertion  -acute renal failure initiated on hemodialysis  -anemia    Plan:  -continue Mario catheter for now   -no indication for Urologic intervention at this time   -can likely offer void trial prior to discharge once she is medically improved and mobilizing   -following loosely.  Please call as needed with any issues      Donan Varghese NP

## 2024-06-24 NOTE — PLAN OF CARE
06/24/24 1109   Discharge Assessment   Assessment Type Discharge Planning Assessment   Confirmed/corrected address, phone number and insurance Yes   Confirmed Demographics Correct on Facesheet   Source of Information family;patient   Communicated [place] with patient/caregiver Date not available/Unable to determine   Reason For Admission Metabolic Acidosis   People in Home child(santana), adult   Facility Arrived From: home   Do you expect to return to your current living situation? Yes   Do you have help at home or someone to help you manage your care at home? Yes   Who are your caregiver(s) and their phone number(s)? Srinivas castillo   Prior to hospitilization cognitive status: Alert/Oriented   Current cognitive status: Alert/Oriented   Walking or Climbing Stairs Difficulty yes   Mobility Management may need walker   Dressing/Bathing Difficulty yes   Dressing/Bathing Management may need chair   Equipment Currently Used at Home none   Patient currently being followed by outpatient case management? No   Do you currently have service(s) that help you manage your care at home? No   Do you take prescription medications? No   Do you have prescription coverage? Yes   Coverage [employer]   Who is going to help you get home at discharge? sons   How do you get to doctors appointments? family or friend will provide   Are you on dialysis? No   Do you take coumadin? No   Discharge Plan A Other  (to be determined)   Discharge Plan B Other  (to be determined)   DME Needed Upon Discharge  walker, rolling;shower chair   Discharge Plan discussed with: Patient;Adult children   Transition of Care Barriers None   OTHER   Name(s) of People in Home Srinivas castillo     Patient lives with adult sonSrinivas. She does not see a doctor and does not take prescription medications. She has limited mobility but has no assistive devices. She will need PCP upon d/c and DME set up. Plan for her to return home with her son.

## 2024-06-24 NOTE — PT/OT/SLP PROGRESS
Physical Therapy      Patient Name:  Fior Joya   MRN:  [id number]    Patient not seen today secondary to undergoing dialysis, PT to follow up as schedule permits.

## 2024-06-24 NOTE — PROGRESS NOTES
[facility] - 7th Floor ICU  Pulmonary/Critical Care  Progress Note  6/24/2024    Patient Name: Fior Joya  MRN: [id number]  Admission Date: 6/22/2024  Code Status: Full Code      Subjective:     HPI:  The patient was a 69-year-old female who has essentially negative past medical history.  She initially presented to [facility] ER on 06/21/2024 with complaints of approximate 1 month of dyspnea on exertion and generalized weakness.  She was noted anemic with H/H 5.7/17.7, to have an ESTRELLITA with BUN/creatinine 205/14.8, and potassium of 6.8.  She received a dose of Lokelma and IV calcium.  She was transferred to ER at [facility], and was admitted to ICU where she underwent temporary hemodialysis access placement and initiation of hemodialysis.    Hospital Course:  06/22/2024:  Initiation of hemodialysis    24hr Interval History:  She is afebrile.  Intake 1250 cc, output 1245 cc (745 cc urine output) over the previous 24 hours.  Blood cultures 06/21/2024 no growth, urine 06/21/2024 with 25-50 K strep agalactiae.  She has been undergoing daily hemodialysis, tolerating well.  She continues with generalized tremors.     Scheduled Medications:   dexAMETHasone  8 mg Intravenous Daily    ferrous sulfate  1 tablet Oral BID    mupirocin   Nasal BID    piperacillin-tazobactam (Zosyn) IV (PEDS and ADULTS) (extended infusion is not appropriate)  4.5 g Intravenous Q12H     PRN Medications:    Current Facility-Administered Medications:     0.9%  NaCl infusion (for blood administration), , Intravenous, Q24H PRN    0.9%  NaCl infusion (for blood administration), , Intravenous, Q24H PRN    acetaminophen, 650 mg, Oral, Q4H PRN    heparin (porcine), 4,000 Units, Intravenous, PRN    hydrALAZINE, 10 mg, Intravenous, Q2H PRN    labetaloL, 10 mg, Intravenous, Q4H PRN    melatonin, 6 mg, Oral, Nightly PRN    ondansetron, 4 mg, Intravenous, Q8H PRN    prochlorperazine, 5 mg, Intravenous, Q6H PRN    sodium chloride 0.9%, 10  mL, Intravenous, PRN    Pharmacy to dose Vancomycin consult, , , Once **AND** vancomycin - pharmacy to dose, , Intravenous, pharmacy to manage frequency  Continuous Infusions:   0.9% NaCl   Intravenous Continuous 50 mL/hr at 06/24/24 0701 Rate Verify at 06/24/24 0701       No past medical history on file.    No past surgical history on file.    Objective:     Input/output:    Intake/Output Summary (Last 24 hours) at 6/24/2024 0824  Last data filed at 6/24/2024 0701  Gross per 24 hour   Intake 1303.2 ml   Output 1185 ml   Net 118.2 ml       Vital Signs (Most Recent):  Temp: 98.8 °F (37.1 °C) (06/24/24 0400)  Pulse: 108 (06/24/24 0500)  Resp: 20 (06/24/24 0500)  BP: (!) 151/77 (06/24/24 0500)  SpO2: (!) 93 % (06/24/24 0500)  Body mass index is 50.41 kg/m².  Weight: 133.2 kg (293 lb 10.4 oz) Vital Signs (24h Range):  Temp:  [98.1 °F (36.7 °C)-99.5 °F (37.5 °C)] 98.8 °F (37.1 °C)  Pulse:  [] 108  Resp:  [16-31] 20  SpO2:  [90 %-96 %] 93 %  BP: (112-165)/(70-98) 151/77     Physical Exam  Constitutional:       Appearance: She is obese.      Comments: Generalized tremors upper and lower extremities bilateral   Eyes:      Pupils: Pupils are equal, round, and reactive to light.   Cardiovascular:      Rate and Rhythm: Normal rate and regular rhythm.      Heart sounds: No murmur heard.  Pulmonary:      Breath sounds: No wheezing, rhonchi or rales.   Abdominal:      General: Bowel sounds are normal.      Palpations: Abdomen is soft.   Musculoskeletal:      Right lower leg: Edema present.      Left lower leg: Edema present.   Skin:     General: Skin is warm and dry.      Comments: Left pretibial region erythema   Neurological:      Mental Status: She is alert and oriented to person, place, and time.         Lines/Drains/Airways       Central Venous Catheter Line  Duration                  Hemodialysis Catheter 06/22/24 0530 right internal jugular 2 days              Drain  Duration                  Urethral Catheter  06/22/24 0615 16 Fr. 2 days              Peripheral Intravenous Line  Duration                  Peripheral IV - Single Lumen 20 G Left Antecubital -- days         Peripheral IV - Single Lumen 20 G Left;Posterior Hand -- days         Peripheral IV - Single Lumen 06/22/24 0400 20 G Right;Posterior Hand 2 days                    Significant Labs:    Lab Results   Component Value Date    WBC 17.61 (H) 06/24/2024    HGB 7.7 (L) 06/24/2024    HCT 22.6 (L) 06/24/2024    MCV 87.6 06/24/2024     06/24/2024         Recent Labs   Lab 06/24/24  0325      K 3.7   CL 98   CO2 19*   BUN 83.8*   CREATININE 7.43*   CALCIUM 7.4*   MG 1.70   PHOS 4.9*   AST 17   ALT 12   ALKPHOS 54   ALBUMIN 2.5*     Imaging:   None this a.m.    Assessment:     Acute kidney injury, possible component of chronic kidney disease and evidence of obstructive uropathy, post initiation of hemodialysis 06/22/2024.  She is currently nonoliguric.  Severe anemia, parameters appear most consistent with anemia of chronic disease with possible borderline iron-deficiency component.  Left lower extremity cellulitis versus venous stasis  Hypertension  Morbid obesity, BMI 50.4    Plan:     As per nephrology concerning hemodialysis/ultrafiltration, currently receiving daily.  Continue Mario drainage in face of probable bladder outlet obstruction as etiology of bilateral hydronephrosis  Continue IV Zosyn (day 3) and IV vancomycin (day 3) in face of probable left lower extremity cellulitis.  Continue to closely follow cultures.  Begin p.o. labetalol  PT/OT assistance with mobilization.       Hanna Hayward MD, FCCP  Pulmonary/Critical Care

## 2024-06-24 NOTE — PT/OT/SLP PROGRESS
Occupational Therapy      Patient Name:  Fior Joya   MRN:  [id number]    Patient not seen today secondary to undergoing dialysis. Will follow-up as schedule permits.    6/24/2024

## 2024-06-24 NOTE — NURSING
Nurses Note -- 4 Eyes      6/24/2024   4:25 AM      Skin assessed during: Q Shift Change      [] No Altered Skin Integrity Present    [x]Prevention Measures Documented      [x] Yes- Altered Skin Integrity Present or Discovered   [] LDA Added if Not in Epic (Describe Wound)   [] New Altered Skin Integrity was Present on Admit and Documented in LDA   [] Wound Image Taken    Wound Care Consulted? No    Attending Nurse:  Denisse Hill RN/Staff Member:  SANAZ Busch

## 2024-06-24 NOTE — PROGRESS NOTES
Pharmacokinetic Assessment Follow Up: IV Vancomycin    Vancomycin serum concentration assessment(s):    The random level was drawn correctly and can be used to guide therapy at this time. The measurement is above the desired definitive target range of 15 to 20 mcg/mL.    Vancomycin Regimen Plan:  Dosing with intermittent HD  Will hold doses today after level of 28.0 this morning  Follow Nephrology notes for further HD plans   Re-dose when the random level is less than 20 mcg/mL, next level to be drawn at 0430 on 6/25    Scheduled Administration Times        Drug levels (last 3 results):  Recent Labs   Lab Result Units 06/23/24  0344 06/24/24  0325   Vancomycin Random ug/ml 24.0* 28.0*       Vancomycin Administrations:  vancomycin given in the last 96 hours                     vancomycin (VANCOCIN) 500 mg in D5W 100 mL IVPB (MB+) (mg) 500 mg New Bag 06/23/24 1712    vancomycin 2 g in dextrose 5 % 500 mL IVPB (mg) 2,000 mg New Bag 06/22/24 1616                    Pharmacy will continue to follow and monitor vancomycin.    Please contact pharmacy at extension [phone] for questions regarding this assessment.    Thank you for the consult,   Yayo Sweet       Patient brief summary:  Fior Joya is a 69 y.o. female initiated on antimicrobial therapy with IV Vancomycin for treatment of sepsis    The patient's current regimen is dosing with intermittent HD    Drug Allergies:   Review of patient's allergies indicates:  No Known Allergies    Actual Body Weight:  Wt Readings from Last 1 Encounters:   06/22/24 133.2 kg (293 lb 10.4 oz)       Renal Function:   Estimated Creatinine Clearance: 9.8 mL/min (A) (based on SCr of 7.43 mg/dL (H)).,     Dialysis Method (if applicable):  intermittent HD    CBC (last 72 hours):  Recent Labs   Lab Result Units 06/21/24 2013 06/22/24  0202 06/22/24  1220 06/23/24  0343 06/24/24  0325   WBC x10(3)/mcL 15.81* 14.36  14.36*  --  10.94 17.61*   Hgb g/dL 5.7* 5.2* 7.0* 7.1* 7.7*   Hemoglobin A1c  %  --   --  4.7  --   --    Hct % 17.7* 15.6* 20.0* 20.9* 22.6*   Platelet x10(3)/mcL 268 221  --  191 257   Mono % % 5.2  --   --  2.8 9.5   Monocytes % %  --  11  --   --   --    Eos % % 0.4  --   --  0.0 0.0   Eosinophils % %  --  1  --   --   --    Basophil % % 0.3  --   --  0.2 0.1       Metabolic Panel (last 72 hours):  Recent Labs   Lab Result Units 06/21/24 2013 06/21/24  2133 06/21/24  2227 06/22/24  0202 06/22/24  0923 06/22/24  1220 06/23/24  0344 06/24/24  0325   Sodium mmol/L 135*  --  132* 136  --  140 136 138   Urine Sodium mmol/L  --   --   --   --  51.0  --   --   --    Potassium mmol/L 6.8*  --  6.0* 5.7*  --  3.6 3.9 3.7   Urine Potassium mmol/L  --   --   --   --  27.4  --   --   --    Chloride mmol/L 108*  --  107 108*  --  103 100 98   Urine Chloride mmol/L  --   --   --   --  52.0  --   --   --    CO2 mmol/L 5*  --  <5* <5*  --  14* 15* 19*   Glucose mg/dL 111  --  273* 100  --  84 167* 136*   Glucose, UA   --  Negative  --   --   --   --   --   --    Blood Urea Nitrogen mg/dL 209.0*  --  205.0* 198.4*  --  117.8* 117.0* 83.8*   Creatinine mg/dL 16.94*  --  14.80* 15.51*  --  9.84* 10.10* 7.43*   Urine Creatinine mg/dL  --  29.5*  --   --   --   --   --   --    Albumin g/dL 2.9*  --   --  2.7*  --  2.3* 2.4* 2.5*   Bilirubin Total mg/dL 0.3  --   --  0.3  --  0.5 0.3 0.4   ALP unit/L 62  --   --  59  --  53 57 54   AST unit/L 13  --   --  15  --  17 19 17   ALT unit/L 10  --   --  12  --  12 12 12   Magnesium Level mg/dL 2.30  --   --  2.00  --  1.60 1.70 1.70   Phosphorus Level mg/dL  --   --  7.9*  --   --  4.4 6.5* 4.9*       Microbiologic Results:  Microbiology Results (last 7 days)       ** No results found for the last 168 hours. **

## 2024-06-24 NOTE — PROGRESS NOTES
06/24/24 1718        Hemodialysis Catheter 06/22/24 0530 right internal jugular   Placement Date/Time: 06/22/24 0530   Present Prior to Hospital Arrival?: No  Location: right internal jugular   Site Assessment No drainage;No redness;No swelling;No warmth   Line Securement Device Secured with sutures   Dressing Type CHG impregnated dressing/sponge;Transparent (Tegaderm)   Dressing Status Clean;Dry;Intact   Dressing Intervention Sterile dressing change   Date on Dressing 06/24/24   Dressing Due to be Changed 06/26/24   Post-Hemodialysis Assessment   Blood Volume Processed (Liters) 59 L   Dialyzer Clearance Lightly streaked   Duration of Treatment 210 minutes   Additional Fluid Intake (mL) 500 mL   Total UF (mL) 500 mL   Net Fluid Removal 0   Patient Response to Treatment tolerated well   Post-Hemodialysis Comments tx complete, pt reinfused, cvc deaccessd per p&p, new sterile caps applied

## 2024-06-25 LAB
ANION GAP SERPL CALC-SCNC: 13 MEQ/L
B-OH-BUTYR SERPL-MCNC: 0.6 MMOL/L
BASOPHILS # BLD AUTO: 0.04 X10(3)/MCL
BASOPHILS NFR BLD AUTO: 0.2 %
BUN SERPL-MCNC: 40.5 MG/DL (ref 9.8–20.1)
CALCIUM SERPL-MCNC: 7.1 MG/DL (ref 8.4–10.2)
CH50 SERPL-ACNC: 63 U/ML (ref 30–75)
CHLORIDE SERPL-SCNC: 100 MMOL/L (ref 98–107)
CO2 SERPL-SCNC: 21 MMOL/L (ref 23–31)
CREAT SERPL-MCNC: 4.53 MG/DL (ref 0.55–1.02)
CREAT/UREA NIT SERPL: 9
EOSINOPHIL # BLD AUTO: 0.18 X10(3)/MCL (ref 0–0.9)
EOSINOPHIL NFR BLD AUTO: 1 %
ERYTHROCYTE [DISTWIDTH] IN BLOOD BY AUTOMATED COUNT: 14.5 % (ref 11.5–17)
GFR SERPLBLD CREATININE-BSD FMLA CKD-EPI: 10 ML/MIN/1.73/M2
GLUCOSE SERPL-MCNC: 118 MG/DL (ref 82–115)
HCT VFR BLD AUTO: 22.6 % (ref 37–47)
HGB BLD-MCNC: 7.3 G/DL (ref 12–16)
IMM GRANULOCYTES # BLD AUTO: 0.55 X10(3)/MCL (ref 0–0.04)
IMM GRANULOCYTES NFR BLD AUTO: 3.2 %
LACTATE SERPL-SCNC: 1.3 MMOL/L (ref 0.5–2.2)
LYMPHOCYTES # BLD AUTO: 1.84 X10(3)/MCL (ref 0.6–4.6)
LYMPHOCYTES NFR BLD AUTO: 10.6 %
MAGNESIUM SERPL-MCNC: 1.8 MG/DL (ref 1.6–2.6)
MCH RBC QN AUTO: 30.2 PG (ref 27–31)
MCHC RBC AUTO-ENTMCNC: 32.3 G/DL (ref 33–36)
MCV RBC AUTO: 93.4 FL (ref 80–94)
MONOCYTES # BLD AUTO: 1.32 X10(3)/MCL (ref 0.1–1.3)
MONOCYTES NFR BLD AUTO: 7.6 %
NEUTROPHILS # BLD AUTO: 13.49 X10(3)/MCL (ref 2.1–9.2)
NEUTROPHILS NFR BLD AUTO: 77.4 %
NRBC BLD AUTO-RTO: 0.2 %
PHOSPHATE SERPL-MCNC: 4 MG/DL (ref 2.3–4.7)
PLATELET # BLD AUTO: 206 X10(3)/MCL (ref 130–400)
PMV BLD AUTO: 11 FL (ref 7.4–10.4)
POTASSIUM SERPL-SCNC: 3.5 MMOL/L (ref 3.5–5.1)
RBC # BLD AUTO: 2.42 X10(6)/MCL (ref 4.2–5.4)
SODIUM SERPL-SCNC: 134 MMOL/L (ref 136–145)
VANCOMYCIN SERPL-MCNC: 17.6 UG/ML (ref 15–20)
WBC # BLD AUTO: 17.42 X10(3)/MCL (ref 4.5–11.5)

## 2024-06-25 PROCEDURE — 84100 ASSAY OF PHOSPHORUS: CPT | Performed by: INTERNAL MEDICINE

## 2024-06-25 PROCEDURE — 80202 ASSAY OF VANCOMYCIN: CPT | Performed by: INTERNAL MEDICINE

## 2024-06-25 PROCEDURE — 36415 COLL VENOUS BLD VENIPUNCTURE: CPT | Performed by: STUDENT IN AN ORGANIZED HEALTH CARE EDUCATION/TRAINING PROGRAM

## 2024-06-25 PROCEDURE — C9113 INJ PANTOPRAZOLE SODIUM, VIA: HCPCS | Performed by: STUDENT IN AN ORGANIZED HEALTH CARE EDUCATION/TRAINING PROGRAM

## 2024-06-25 PROCEDURE — 83735 ASSAY OF MAGNESIUM: CPT | Performed by: INTERNAL MEDICINE

## 2024-06-25 PROCEDURE — 11000001 HC ACUTE MED/SURG PRIVATE ROOM

## 2024-06-25 PROCEDURE — 25500020 PHARM REV CODE 255: Performed by: STUDENT IN AN ORGANIZED HEALTH CARE EDUCATION/TRAINING PROGRAM

## 2024-06-25 PROCEDURE — 97166 OT EVAL MOD COMPLEX 45 MIN: CPT

## 2024-06-25 PROCEDURE — 25000003 PHARM REV CODE 250

## 2024-06-25 PROCEDURE — 25000003 PHARM REV CODE 250: Performed by: NURSE PRACTITIONER

## 2024-06-25 PROCEDURE — 97162 PT EVAL MOD COMPLEX 30 MIN: CPT

## 2024-06-25 PROCEDURE — 36415 COLL VENOUS BLD VENIPUNCTURE: CPT | Performed by: INTERNAL MEDICINE

## 2024-06-25 PROCEDURE — 63600175 PHARM REV CODE 636 W HCPCS: Performed by: STUDENT IN AN ORGANIZED HEALTH CARE EDUCATION/TRAINING PROGRAM

## 2024-06-25 PROCEDURE — 63600175 PHARM REV CODE 636 W HCPCS

## 2024-06-25 PROCEDURE — 25000003 PHARM REV CODE 250: Performed by: INTERNAL MEDICINE

## 2024-06-25 PROCEDURE — 82010 KETONE BODYS QUAN: CPT | Performed by: STUDENT IN AN ORGANIZED HEALTH CARE EDUCATION/TRAINING PROGRAM

## 2024-06-25 PROCEDURE — 85025 COMPLETE CBC W/AUTO DIFF WBC: CPT | Performed by: INTERNAL MEDICINE

## 2024-06-25 PROCEDURE — 83605 ASSAY OF LACTIC ACID: CPT | Performed by: STUDENT IN AN ORGANIZED HEALTH CARE EDUCATION/TRAINING PROGRAM

## 2024-06-25 PROCEDURE — 94760 N-INVAS EAR/PLS OXIMETRY 1: CPT

## 2024-06-25 PROCEDURE — 80100014 HC HEMODIALYSIS 1:1

## 2024-06-25 PROCEDURE — 80048 BASIC METABOLIC PNL TOTAL CA: CPT | Performed by: INTERNAL MEDICINE

## 2024-06-25 PROCEDURE — 25000003 PHARM REV CODE 250: Performed by: STUDENT IN AN ORGANIZED HEALTH CARE EDUCATION/TRAINING PROGRAM

## 2024-06-25 PROCEDURE — 97535 SELF CARE MNGMENT TRAINING: CPT

## 2024-06-25 RX ORDER — ALUMINUM HYDROXIDE, MAGNESIUM HYDROXIDE, AND SIMETHICONE 1200; 120; 1200 MG/30ML; MG/30ML; MG/30ML
30 SUSPENSION ORAL EVERY 6 HOURS PRN
Status: DISCONTINUED | OUTPATIENT
Start: 2024-06-25 | End: 2024-07-10 | Stop reason: HOSPADM

## 2024-06-25 RX ORDER — CALCIUM CARBONATE 200(500)MG
1000 TABLET,CHEWABLE ORAL EVERY 6 HOURS PRN
Status: DISCONTINUED | OUTPATIENT
Start: 2024-06-25 | End: 2024-07-10 | Stop reason: HOSPADM

## 2024-06-25 RX ORDER — PANTOPRAZOLE SODIUM 40 MG/10ML
40 INJECTION, POWDER, LYOPHILIZED, FOR SOLUTION INTRAVENOUS 2 TIMES DAILY
Status: DISCONTINUED | OUTPATIENT
Start: 2024-06-25 | End: 2024-06-29

## 2024-06-25 RX ADMIN — ALUMINUM HYDROXIDE, MAGNESIUM HYDROXIDE, AND SIMETHICONE 30 ML: 200; 200; 20 SUSPENSION ORAL at 03:06

## 2024-06-25 RX ADMIN — ACETAMINOPHEN 650 MG: 325 TABLET, FILM COATED ORAL at 09:06

## 2024-06-25 RX ADMIN — PIPERACILLIN AND TAZOBACTAM 4.5 G: 4; .5 INJECTION, POWDER, LYOPHILIZED, FOR SOLUTION INTRAVENOUS; PARENTERAL at 03:06

## 2024-06-25 RX ADMIN — MUPIROCIN: 20 OINTMENT TOPICAL at 09:06

## 2024-06-25 RX ADMIN — FERROUS SULFATE TAB 325 MG (65 MG ELEMENTAL FE) 1 EACH: 325 (65 FE) TAB at 09:06

## 2024-06-25 RX ADMIN — DIATRIZOATE MEGLUMINE 300 ML: 300 INJECTION, SOLUTION INTRAVENOUS at 03:06

## 2024-06-25 RX ADMIN — PIPERACILLIN AND TAZOBACTAM 4.5 G: 4; .5 INJECTION, POWDER, LYOPHILIZED, FOR SOLUTION INTRAVENOUS; PARENTERAL at 12:06

## 2024-06-25 RX ADMIN — LABETALOL HYDROCHLORIDE 200 MG: 200 TABLET, FILM COATED ORAL at 08:06

## 2024-06-25 RX ADMIN — PANTOPRAZOLE SODIUM 40 MG: 40 INJECTION, POWDER, FOR SOLUTION INTRAVENOUS at 08:06

## 2024-06-25 RX ADMIN — FERROUS SULFATE TAB 325 MG (65 MG ELEMENTAL FE) 1 EACH: 325 (65 FE) TAB at 08:06

## 2024-06-25 RX ADMIN — FOLIC ACID 1 MG: 1 TABLET ORAL at 09:06

## 2024-06-25 RX ADMIN — MUPIROCIN: 20 OINTMENT TOPICAL at 08:06

## 2024-06-25 RX ADMIN — LABETALOL HYDROCHLORIDE 200 MG: 200 TABLET, FILM COATED ORAL at 09:06

## 2024-06-25 NOTE — PLAN OF CARE
Problem: Occupational Therapy  Goal: Occupational Therapy Goal  Description: LTG: Pt will perform basic ADLs and ADL transfers with Modified independence using LRAD by discharge.    STG: to be met by 7/25/24    Pt will complete grooming standing at sink with LRAD with SBA.  Pt will complete UB dressing with SBA.  Pt will complete LB dressing with SBA using LRAD and AE prn.  Pt will complete toileting with SBA using LRAD.  Pt will complete functional mobility to/from toilet and toilet transfer with SBA using LRAD.   Outcome: Progressing

## 2024-06-25 NOTE — PT/OT/SLP EVAL
Physical Therapy Evaluation    Patient Name:  Fior Joya   MRN:  [id number]    Recommendations:     Discharge therapy intensity: Moderate Intensity Therapy   Discharge Equipment Recommendations: to be determined by next level of care   Barriers to discharge: Impaired mobility and Ongoing medical needs    Assessment:     Fior Joya is a 69 y.o. female admitted with a medical diagnosis of ESTRELLITA, anemia, hyperkalemia, UTI, metabolic acidemia, started on HD this admission, SOB, swelling, B hydroureteronephrosis, B pleural effusions, chronic knee pain.  She presents with the following impairments/functional limitations: weakness, impaired endurance, impaired self care skills, impaired functional mobility, gait instability, impaired balance, impaired coordination.     Pt reports she has had increasing knee pain at home and has been using a cane and amb only short distances. Normally, the pt is independent with all mobility and ADLs. Currently, the pt demonstrates constant tremors that affect her limbs and trunk. Pt noted to have loose BM upon entering, and pt was assisted in clean up. Pt then sat up EOB and required CGA-Min A for sitting balance. Pt was not safe enough to attempt transfer due to tremors today. Pt will most likely require placement at d/c.     Rehab Prognosis: Good; patient would benefit from acute skilled PT services to address these deficits and reach maximum level of function.    Recent Surgery: * No surgery found *      Plan:     During this hospitalization, patient would benefit from acute PT services 5 x/week to address the identified rehab impairments via gait training, therapeutic exercises, therapeutic activities and progress toward the following goals:    Plan of Care Expires:  07/25/24    Subjective     Chief Complaint: tremors  Patient/Family Comments/goals: to get stronger  Pain/Comfort:  Location - Side 1: Left  Location 1: leg  Pain Addressed 1: Distraction    Patients cultural, spiritual,  Buddhism conflicts given the current situation: no    Living Environment:  Son lives with patient and son does not work. 4 steps to enter with no handrail. House is too small for RW to fit in it.   Prior to admission, patients level of function was independent, used cane as needed.  Equipment used at home: cane, straight.   Upon discharge, patient will have assistance from son.    Objective:     Communicated with nurse prior to session.  Patient found supine with berkowitz catheter, peripheral IV, pressure relief boots, telemetry, pulse ox (continuous), blood pressure cuff  upon PT entry to room.    General Precautions: Standard, fall  Orthopedic Precautions:    Braces: N/A  Respiratory Status: Room air  Blood Pressure: 130/64, 91 HR      Exams:  Cognitive Exam:  Patient is oriented to Person, Place, Time, and Situation  Sensation:    -       Intact  RLE ROM: WNL  RLE Strength: Deficits: grossly 3/5  LLE ROM: WNL  LLE Strength: Deficits: grossly 3/5  Skin integrity:  redness to LLE, very tender to touch lower leg.       Functional Mobility:  Bed Mobility:     Rolling Left:  maximal assistance  Rolling Right: maximal assistance  Scooting: moderate assistance and of 2 persons  Supine to Sit: moderate assistance and of 2 persons  Sit to Supine: moderate assistance and of 2 persons  Balance: Min-CGA for sitting balance at EOB. CGA needed due to tremors/shaking.       AM-PAC 6 CLICK MOBILITY  Total Score:8       Treatment & Education:    Patient provided with verbal education education regarding PT role/goals/POC.  Understanding was verbalized.     Patient left supine with all lines intact, call button in reach, nurse notified, and will order wedge for offloading.  .    GOALS:   Multidisciplinary Problems       Physical Therapy Goals          Problem: Physical Therapy    Goal Priority Disciplines Outcome Goal Variances Interventions   Physical Therapy Goal     PT, PT/OT Progressing     Description: Goals to be met by:  2024     Patient will increase functional independence with mobility by performin. Supine to sit with Stand-by Assistance  2. Sit to stand transfer with Contact Guard Assistance  3. Gait  x 150 feet with Contact Guard Assistance using Rolling Walker.   4. Ascend/descend 4 stair with right Handrails Contact Guard Assistance using No Assistive Device.                          History:     No past medical history on file.    No past surgical history on file.    Time Tracking:     PT Received On: 24  PT Start Time: 1338     PT Stop Time: 1413  PT Total Time (min): 35 min     Billable Minutes: Evaluation 35      2024

## 2024-06-25 NOTE — NURSING
Nurses Note -- 4 Eyes      6/24/2024   8:44 PM      Skin assessed during: Q Shift Change      [] No Altered Skin Integrity Present    [x]Prevention Measures Documented      [x] Yes- Altered Skin Integrity Present or Discovered   [] LDA Added if Not in Epic (Describe Wound)   [] New Altered Skin Integrity was Present on Admit and Documented in LDA   [] Wound Image Taken    Wound Care Consulted? No    Attending Nurse:  SANAZ Varner    Second RN/Staff Member:  SANAZ Brito

## 2024-06-25 NOTE — PLAN OF CARE
Problem: Physical Therapy  Goal: Physical Therapy Goal  Description: Goals to be met by: 2024     Patient will increase functional independence with mobility by performin. Supine to sit with Stand-by Assistance  2. Sit to stand transfer with Contact Guard Assistance  3. Gait  x 150 feet with Contact Guard Assistance using Rolling Walker.   4. Ascend/descend 4 stair with right Handrails Contact Guard Assistance using No Assistive Device.     Outcome: Progressing

## 2024-06-25 NOTE — PT/OT/SLP EVAL
Occupational Therapy  Evaluation    Name: Fior Joya  MRN: [id number]  Admitting Diagnosis: weakness   Recent Surgery: * No surgery found *      Recommendations:     Discharge therapy intensity: Moderate Intensity Therapy   Discharge Equipment Recommendations:  to be determined by next level of care  Barriers to discharge:  Other (Comment) (ongoing medical needs)    Assessment:     Fior Joya is a 69 y.o. female with a medical diagnosis of ESTRELLITA, anemia, hyperkalemia, UTI, metabolic acidemia, started on HD this admission, SOB, swelling, B hydroureteronephrosis, B pleural effusions, chronic knee pain. She presents with the following performance deficits affecting function: weakness, impaired endurance, impaired self care skills, impaired functional mobility, gait instability, impaired balance, impaired coordination, decreased upper extremity function, decreased lower extremity function. She is A&Ox3 and tolerated OT evaluation fairly well. She presents with BUE/BLE and trunk tremors; pt reports difficulty with feeding recently due to tremors. Pt soiled upon OT entry and required total A for pericare. She required mod A x2 for bed mobility and tolerated sitting EOB with CGA-min A for sitting balance. Will progress mobility as able; recommend moderate intensity therapy upon d/c.     Rehab Prognosis: Good; patient would benefit from acute skilled OT services to address these deficits and reach maximum level of function.       Plan:     Patient to be seen 4 x/week to address the above listed problems via self-care/home management, therapeutic exercises, therapeutic activities  Plan of Care Expires: 07/25/24  Plan of Care Reviewed with: patient    Subjective     Chief Complaint: tremors   Patient/Family Comments/goals: to get stronger     Occupational Profile:  Living Environment: Pt reports living with her son in a single level home with x4 steps to enter and no hand rail. Pt has a tub/shower combo with no shower chair.    Previous level of function: Pt reports being independent with ADLs prior, but has experienced a decline over the past x2 weeks.   Roles and Routines: mother   Equipment Used at Home: cane, straight  Assistance upon Discharge: pt will have assist from her son upon d/c.     Pain/Comfort:  Pain Rating 1:  (verbalized pain but did not rate)  Location - Side 1: Left  Location 1: leg  Pain Addressed 1: Distraction, Nurse notified    Patients cultural, spiritual, Taoist conflicts given the current situation: no    Objective:     OT communicated with RN prior to session.      Patient was found HOB elevated with berkowitz catheter, peripheral IV, pressure relief boots, telemetry, pulse ox (continuous), blood pressure cuff upon OT entry to room.    General Precautions: Standard, fall  Orthopedic Precautions: N/A  Braces: N/A    Vital Signs: Blood Pressure: 130/64  HR: 92    Bed Mobility:    Patient completed Rolling/Turning to Left with  maximal assistance  Patient completed Rolling/Turning to Right with maximal assistance  Patient completed Scooting/Bridging with moderate assistance and x2 persons  Patient completed Supine to Sit with moderate assistance and x2 persons  Patient completed Sit to Supine with moderate assistance and x2 persons    Functional Mobility/Transfers:  Functional Mobility: will progress mobility as able.     Activities of Daily Living:  Upper Body Dressing: maximal assistance to doff soiled gown and to don clean gown while supine in bed.   Toileting: total assistance for perineal hygiene following BM.     AMPAC 6 Click ADL:  AMPAC Total Score: 12    Functional Cognition:  Orientation: oriented to Person, Place, Time, and Situation  Safety Awareness: Impaired.      Visual Perceptual Skills:  Intact    Upper Extremity Function:  Range of Motion/Manual Muscle Test    RUE  ROM Limitations  5 4+ 4 4- 3+ 3 3- 2+ 2 2- 1 0   Shoulder Flexion  []   []   []   []   [x]  []  []  []  []  []  []  []    Shoulder  Abduction  []   []   []   []   [x]  []  []  []  []  []  []  []    Shoulder Extension  []   []   []   []   [x]  []  []  []  []  []  []  []    Elbow Flexion  []   []   []   [x]   []  []  []  []  []  []  []  []    Elbow Extension  []   []   []   [x]   []  []  []  []  []  []  []  []    Wrist Flexion  []   []   []   []   [x]  []  []  []  []  []  []  []    Wrist Extension  []   []   []   []   [x]  []  []  []  []  []  []  []    Finger Flexion  []   []   []   []   [x]  []  []  []  []  []  []  []    Finger Extension  [] [] [] [] [x] [] [] [] [] [] [] []   Thumb Opposition  [] [] [] [] [x] [] [] [] [] [] [] []     LUE  ROM Limitations 5 4+ 4 4- 3+ 3 3- 2+ 2 2- 1 0   Shoulder Flexion  []   []   []   []   [x]  []  []  []  []  []  []  []    Shoulder Abduction  []   []   []   []   [x]  []  []  []  []  []  []  []    Shoulder Extension  []   []   []   []   [x]  []  []  []  []  []  []  []    Elbow Flexion  []   []   []   [x]   []  []  []  []  []  []  []  []    Elbow Extension  []   []   []   [x]   []  []  []  []  []  []  []  []    Wrist Flexion  []   []   []   []   [x]  []  []  []  []  []  []  []    Wrist Extension  []   []   []   []   [x]  []  []  []  []  []  []  []    Finger Flexion  []   []   []   []   [x]  []  []  []  []  []  []  []    Finger Extension  [] [] [] [] [x] [] [] [] [] [] [] []   Thumb Opposition  [] [] [] [] [x] [] [] [] [] [] [] []     Coordination  RUE:  Tremors noted  LUE:  Tremors noted     Balance:   Static sitting balance: CGA-min A    Therapeutic Positioning  Risk for acquired pressure injuries is increased due to relative decrease in mobility d/t hospitalization , impaired mobility, and severity of deficits resulting in prolonged immobility .    OT interventions performed during the course of today's session:   Positioning recommendations were communicated to care team     Skin assessment: all bony prominences were assessed    Findings:  redness to LLE, tender to touch lower leg.     OT recommendations for  therapeutic positioning throughout hospitalization:   Follow [facility] Pressure Injury Prevention Protocol  Specialty [employer]    Additional Treatment:  ADL Training: total A for perineal hygiene following BM.     Patient Education:  Patient provided with verbal education education regarding OT role/goals/POC, fall prevention, safety awareness, Discharge/DME recommendations, and pressure ulcer prevention.  Understanding was verbalized.     Patient left HOB elevated with all lines intact, call button in reach, and RN notified.    GOALS:   Multidisciplinary Problems       Occupational Therapy Goals          Problem: Occupational Therapy    Goal Priority Disciplines Outcome Interventions   Occupational Therapy Goal     OT, PT/OT Progressing    Description: LTG: Pt will perform basic ADLs and ADL transfers with Modified independence using LRAD by discharge.    STG: to be met by 7/25/24    Pt will complete grooming standing at sink with LRAD with SBA.  Pt will complete UB dressing with SBA.  Pt will complete LB dressing with SBA using LRAD and AE prn.  Pt will complete toileting with SBA using LRAD.  Pt will complete functional mobility to/from toilet and toilet transfer with SBA using LRAD.                        History:     No past medical history on file.    No past surgical history on file.    Time Tracking:     OT Date of Treatment: 06/25/24  OT Start Time: 1337  OT Stop Time: 1411  OT Total Time (min): 34 min    Billable Minutes:Evaluation Moderate Complexity.     6/25/2024

## 2024-06-25 NOTE — PROGRESS NOTES
UROLOGY  PROGRESS  NOTE    Fior Joya 1955  [id number]  6/25/2024    Patient being dialyzed during rounds  Family at bedside  No complaints  Mario functioning well    Afebrile   250ml UOP  WBC 17.42   H&H 7.3/22.6   BUN and creatinine 40.5/4.53    UC with group B strep    Intake/Output:  I/O this shift:  In: -   Out: 35 [Urine:35]  I/O last 3 completed shifts:  In: 1643.5 [I.V.:831.4; Other:500; IV Piggyback:312.1]  Out: 1160 [Urine:660; Other:500]     Exam:    NAD  Resp: unlabored  Abd:  Soft, NT ND  :  Cloudy urine with some sediment draining to  bag    Recent Results (from the past 24 hour(s))   C3 Complement    Collection Time: 06/24/24 10:56 AM   Result Value Ref Range    C3 Complement 96 80 - 173 mg/dL   C4 Complement    Collection Time: 06/24/24 10:56 AM   Result Value Ref Range    C4 Complement 32.0 13.0 - 46.0 mg/dL   Vancomycin, Random    Collection Time: 06/25/24  3:58 AM   Result Value Ref Range    Vancomycin Random 17.6 15.0 - 20.0 ug/ml   Basic Metabolic Panel    Collection Time: 06/25/24  3:58 AM   Result Value Ref Range    Sodium 134 (L) 136 - 145 mmol/L    Potassium 3.5 3.5 - 5.1 mmol/L    Chloride 100 98 - 107 mmol/L    CO2 21 (L) 23 - 31 mmol/L    Glucose 118 (H) 82 - 115 mg/dL    Blood Urea Nitrogen 40.5 (H) 9.8 - 20.1 mg/dL    Creatinine 4.53 (H) 0.55 - 1.02 mg/dL    BUN/Creatinine Ratio 9     Calcium 7.1 (L) 8.4 - 10.2 mg/dL    Anion Gap 13.0 mEq/L    eGFR 10 mL/min/1.73/m2   Magnesium    Collection Time: 06/25/24  3:58 AM   Result Value Ref Range    Magnesium Level 1.80 1.60 - 2.60 mg/dL   Phosphorus    Collection Time: 06/25/24  3:58 AM   Result Value Ref Range    Phosphorus Level 4.0 2.3 - 4.7 mg/dL   CBC with Differential    Collection Time: 06/25/24  3:58 AM   Result Value Ref Range    WBC 17.42 (H) 4.50 - 11.50 x10(3)/mcL    RBC 2.42 (L) 4.20 - 5.40 x10(6)/mcL    Hgb 7.3 (L) 12.0 - 16.0 g/dL    Hct 22.6 (L) 37.0 - 47.0 %    MCV 93.4 80.0 - 94.0 fL    MCH 30.2 27.0 - 31.0 pg    MCHC  32.3 (L) 33.0 - 36.0 g/dL    RDW 14.5 11.5 - 17.0 %    Platelet 206 130 - 400 x10(3)/mcL    MPV 11.0 (H) 7.4 - 10.4 fL    Neut % 77.4 %    Lymph % 10.6 %    Mono % 7.6 %    Eos % 1.0 %    Basophil % 0.2 %    Lymph # 1.84 0.6 - 4.6 x10(3)/mcL    Neut # 13.49 (H) 2.1 - 9.2 x10(3)/mcL    Mono # 1.32 (H) 0.1 - 1.3 x10(3)/mcL    Eos # 0.18 0 - 0.9 x10(3)/mcL    Baso # 0.04 <=0.2 x10(3)/mcL    IG# 0.55 (H) 0 - 0.04 x10(3)/mcL    IG% 3.2 %    NRBC% 0.2 %       Assessment:  -urinary retention with bilateral hydroureteronephrosis status post Mario insertion  -acute renal failure initiated on hemodialysis  -anemia    Plan:  -continue Mario catheter  -cystogram ordered for further evaluation  -Further recs to follow once imaging completed.      Donna Varghese NP

## 2024-06-25 NOTE — PROGRESS NOTES
[facility] - 7th Floor ICU  Pulmonary/Critical Care  Progress Note  6/25/2024    Patient Name: Fior Joya  MRN: [id number]  Admission Date: 6/22/2024  Code Status: Full Code      Subjective:     HPI:  The patient was a 69-year-old female who has essentially negative past medical history.  She initially presented to [facility] ER on 06/21/2024 with complaints of approximate 1 month of dyspnea on exertion and generalized weakness.  She was noted anemic with H/H 5.7/17.7, to have an ESTRELLITA with BUN/creatinine 205/14.8, and potassium of 6.8.  She received a dose of Lokelma and IV calcium.  She was transferred to ER at [facility], and was admitted to ICU where she underwent temporary hemodialysis access placement and initiation of hemodialysis.    Hospital Course:  06/22/2024:  Initiation of hemodialysis    24hr Interval History:  She is afebrile.  No new culture growth.  MRSA PCR positive.  She remains on IV Zosyn and IV vancomycin.  She was undergoing daily hemodialysis with no net negative ultrafiltration.  Her extremity tremors are near completely resolved this a.m..  She remains sinus rhythm with no hypotension, no vasopressor requirements.  She remains on room air.  She is tolerating p.o. intake, poor appetite.    Scheduled Medications:   ferrous sulfate  1 tablet Oral BID    folic acid  1 mg Oral Daily    labetaloL  200 mg Oral Q12H    mupirocin   Nasal BID    piperacillin-tazobactam (Zosyn) IV (PEDS and ADULTS) (extended infusion is not appropriate)  4.5 g Intravenous Q12H     PRN Medications:    Current Facility-Administered Medications:     0.9%  NaCl infusion (for blood administration), , Intravenous, Q24H PRN    0.9%  NaCl infusion (for blood administration), , Intravenous, Q24H PRN    acetaminophen, 650 mg, Oral, Q4H PRN    heparin (porcine), 4,000 Units, Intravenous, PRN    hydrALAZINE, 10 mg, Intravenous, Q2H PRN    labetaloL, 10 mg, Intravenous, Q4H PRN    melatonin, 6 mg, Oral,  Nightly PRN    ondansetron, 4 mg, Intravenous, Q8H PRN    prochlorperazine, 5 mg, Intravenous, Q6H PRN    sodium chloride 0.9%, 10 mL, Intravenous, PRN    Pharmacy to dose Vancomycin consult, , , Once **AND** vancomycin - pharmacy to dose, , Intravenous, pharmacy to manage frequency  Continuous Infusions:        No past medical history on file.    No past surgical history on file.    Objective:     Input/output:    Intake/Output Summary (Last 24 hours) at 6/25/2024 0756  Last data filed at 6/25/2024 0523  Gross per 24 hour   Intake 789.09 ml   Output 750 ml   Net 39.09 ml       Vital Signs (Most Recent):  Temp: 98.3 °F (36.8 °C) (06/25/24 0400)  Pulse: 91 (06/25/24 0515)  Resp: 20 (06/25/24 0515)  BP: (!) 153/80 (06/25/24 0515)  SpO2: 97 % (06/25/24 0515)  Body mass index is 50.41 kg/m².  Weight: 133.2 kg (293 lb 10.4 oz) Vital Signs (24h Range):  Temp:  [97.5 °F (36.4 °C)-98.5 °F (36.9 °C)] 98.3 °F (36.8 °C)  Pulse:  [] 91  Resp:  [13-26] 20  SpO2:  [89 %-97 %] 97 %  BP: (111-200)/() 153/80     Physical Exam  Constitutional:       Appearance: She is obese.      Comments: She appears comfortable, no tremors.   Eyes:      Pupils: Pupils are equal, round, and reactive to light.   Cardiovascular:      Rate and Rhythm: Normal rate and regular rhythm.      Heart sounds: No murmur heard.  Pulmonary:      Breath sounds: No wheezing, rhonchi or rales.   Abdominal:      General: Bowel sounds are normal.      Palpations: Abdomen is soft.   Musculoskeletal:      Right lower leg: Edema present.      Left lower leg: Edema present.   Skin:     General: Skin is warm and dry.      Comments: Left pretibial region erythema   Neurological:      Mental Status: She is alert and oriented to person, place, and time.         Lines/Drains/Airways       Central Venous Catheter Line  Duration                  Hemodialysis Catheter 06/22/24 0530 right internal jugular 3 days              Drain  Duration                  Urethral  Catheter 06/22/24 0615 16 Fr. 3 days              Peripheral Intravenous Line  Duration                  Peripheral IV - Single Lumen 20 G Left Antecubital -- days         Peripheral IV - Single Lumen 20 G Left;Posterior Hand -- days         Peripheral IV - Single Lumen 06/22/24 0400 20 G Right;Posterior Hand 3 days                    Significant Labs:    Lab Results   Component Value Date    WBC 17.42 (H) 06/25/2024    HGB 7.3 (L) 06/25/2024    HCT 22.6 (L) 06/25/2024    MCV 93.4 06/25/2024     06/25/2024         Recent Labs   Lab 06/25/24  0358   *   K 3.5      CO2 21*   BUN 40.5*   CREATININE 4.53*   CALCIUM 7.1*   MG 1.80   PHOS 4.0     Imaging:   None this a.m.    Assessment:     Acute kidney injury, possible component of chronic kidney disease and evidence of obstructive uropathy, post initiation of hemodialysis 06/22/2024.  She is currently nonoliguric.  Severe anemia, parameters appear most consistent with anemia of chronic disease with possible borderline iron-deficiency component.  Left lower extremity cellulitis versus venous stasis with leukocytosis.  Hypertension  Morbid obesity, BMI 50.4    Plan:     As per nephrology concerning hemodialysis/ultrafiltration, currently receiving daily.  Continue Mario drainage in face of probable bladder outlet obstruction as etiology of bilateral hydronephrosis  Continue IV Zosyn (day 4) and IV vancomycin (day 4) in face of probable left lower extremity cellulitis.  Continue to closely follow cultures.  PT/OT assistance with mobilization.  Transfer to a floor hospital bed post hemodialysis this a.m..       Hanna Hayward MD, FCCP  Pulmonary/Critical Care

## 2024-06-25 NOTE — NURSING
Nurses Note -- 4 Eyes      6/25/2024   4:53 PM      Skin assessed during: Daily Assessment      [] No Altered Skin Integrity Present    []Prevention Measures Documented      [x] Yes- Altered Skin Integrity Present or Discovered   [] LDA Added if Not in Epic (Describe Wound)   [] New Altered Skin Integrity was Present on Admit and Documented in LDA   [] Wound Image Taken    Wound Care Consulted? No    Attending Nurse:  Komal Chase RN    Second RN/Staff Member:  SANAZ Matias

## 2024-06-25 NOTE — NURSING
06/25/24 1136        Hemodialysis Catheter 06/22/24 0530 right internal jugular   Placement Date/Time: 06/22/24 0530   Present Prior to Hospital Arrival?: No  Location: right internal jugular   Line Necessity Review CRRT/HD   Site Assessment No drainage;No redness;No swelling;No warmth   Line Securement Device Secured with sutures   Dressing Type CHG impregnated dressing/sponge   Dressing Status Clean;Dry;Intact   Dressing Intervention First dressing   Date on Dressing 06/24/24   Dressing Due to be Changed 06/26/24   Venous Patency/Care heparin locked   Arterial Patency/Care heparin locked   Post-Hemodialysis Assessment   Blood Volume Processed (Liters) 53 L   Dialyzer Clearance Heavily streaked   Duration of Treatment 210 minutes   Additional Fluid Intake (mL) 460 mL   Net Fluid Removal 0   Patient Response to Treatment Pt finished tx positive 460 ml due to hypotensive episode.  Tolerated tx well after bolus and turning uf off.  Dr Light aware.  No other issues or complaints.  CVC with good flows.   Post-Hemodialysis Comments Deaccessed per p and p.  Tolerated well.

## 2024-06-25 NOTE — PROGRESS NOTES
Nephrology Initial Consult Note    Patient Name: Fior Joya  Age: 69 y.o.  : 1955  MRN: [id number]  Admission Date: 2024    Reason for Consult:      ESTRELLITA, hyperkalemia, metabolic acidosis    Chief Complaint   Patient presents with    Transfer     ARF transfer from [place]         Joseph Prasad DO    HPI:     Fior Joya is a 69 y.o. female who was transferred from hospital in [place] due to acute renal failure.  She initially presented to the hospital due to generalized weakness and shortness of breath with exertion.  Per their report she had been mostly bed-bound for the past 3 weeks.  He reported that she had been drinking getting up going to the restroom.  She did have some episodes of nausea vomiting and generalized weakness along with generalized muscle pain more recently.  She was endorsing shortness of breath but no chest pain.  Denied had been running a fever.  Per their his report, she had not seen a physician in 10 years, because she was not feeling ill and did not feel like she needed to go.  She had also been having some swelling in the legs.  She has no known medical issues per their report.  On admit, WBC elevated at 15, RBC 1.8, hemoglobin 5.7, D-dimer elevated at 1.57, serum sodium 132, potassium 6, serum CO2 less than 5, , creatinine 14.8, glucose 273, calcium 7.5, phosphorus 7.9, albumin 2.9.  TSH and lactic were okay.  UA results indicative of UTI sent for cultures.  Blood cultures obtained and pending.  Received phone call from ER physician in early a.m. hours, it was decided at that time we will proceed with initiation of RRT.  Right IJ temporary cath was placed per General surgery.   She is awake and alert this morning upon exam.  She does respond with simple answers to questions posed.  Son in room in majority of history obtained from him.  Oxygen saturation is 99% on nasal cannula.  We are consulted for management ESTRELLITA.  Bilateral hydronephrosis on CT from  06/21/2024 6/23/24- had urology eval., bilateral hydronephrosis, no acute surgical intervention rec. At this time. Recommended Mario with judicious hydration.  Per his report he feels outlet obstruction could be multifactorial but unclear underlying etiology at this time.    He had approximately 700 cc urine output last 24 hours.  She is awake alert oriented in bed on dialysis machine. Brother in . She has some nausea and vomiting this am, but no SOB or CP.     06/24/2024   Very much awake alert and oriented this morning.  Her son sitting in the chair on her bedside.  Still having shaky and jerky movements of all extremities.  Able to answer questions appropriately.  Appetite is poor.  Still good urine output noted.  No family history of anybody with kidney failure.  She knows that she is feeling better.  Her son also thinks that she is much better than before.    06/25/2024   Currently tolerating dialysis.  Most of the jerking movement has resolved.  She is still having some shaking movements but overall she knows she is much improved and her appetite seems to be improving also.  No shortness of breath.  Adequate urine output noted.  Follows command and moves all her extremities.    Current Facility-Administered Medications   Medication Dose Route Frequency Provider Last Rate Last Admin    0.9%  NaCl infusion (for blood administration)   Intravenous Q24H PRN Dariusz Vee MD        0.9%  NaCl infusion (for blood administration)   Intravenous Q24H PRN Casper Hayward Jr., MD, FCCP        acetaminophen tablet 650 mg  650 mg Oral Q4H PRN James Townsend MD   650 mg at 06/25/24 0906    ferrous sulfate tablet 1 each  1 tablet Oral BID Casper Hayward Jr., MD, FCCP   1 each at 06/25/24 0906    folic acid tablet 1 mg  1 mg Oral Daily Shin Light MD   1 mg at 06/25/24 0906    heparin (porcine) injection 4,000 Units  4,000 Units Intravenous PRN Nata Terrazas ANP   4,000 Units at 06/23/24 1250     hydrALAZINE injection 10 mg  10 mg Intravenous Q2H PRN James Townsend MD        labetaloL injection 10 mg  10 mg Intravenous Q4H PRN James Townsend MD   10 mg at 06/23/24 0308    labetaloL tablet 200 mg  200 mg Oral Q12H Casper Hayward Jr., MD, FCCP   200 mg at 06/25/24 0906    melatonin tablet 6 mg  6 mg Oral Nightly PRN James Townsend MD        mupirocin 2 % ointment   Nasal BID Nata Terrazas, ANP   Given at 06/25/24 0906    ondansetron injection 4 mg  4 mg Intravenous Q8H PRN James Townsend MD   4 mg at 06/24/24 2002    piperacillin-tazobactam (ZOSYN) 4.5 g in D5W 100 mL IVPB (MB+)  4.5 g Intravenous Q12H Tye Hayes MD   Stopped at 06/25/24 0453    prochlorperazine injection Soln 5 mg  5 mg Intravenous Q6H PRN James Townsend MD        sodium chloride 0.9% flush 10 mL  10 mL Intravenous PRN James Townsend MD        vancomycin - pharmacy to dose   Intravenous pharmacy to manage frequency Tye Hayes MD           No, Primary Doctor    No past medical history on file.   No past surgical history on file.   No family history on file.  Social History     Tobacco Use    Smoking status: Never    Smokeless tobacco: Never   Substance Use Topics    Alcohol use: Never     No medications prior to admission.     Review of patient's allergies indicates:  No Known Allergies         Review of Systems:       All 12 point of review of system was done and is negative except 1 in history of present illness.      Objective:       VITAL SIGNS: 24 HR MIN & MAX LAST    Temp  Min: 97.5 °F (36.4 °C)  Max: 98.4 °F (36.9 °C)  98.4 °F (36.9 °C)        BP  Min: 111/62  Max: 171/88  131/69     Pulse  Min: 61  Max: 104  61     Resp  Min: 13  Max: 27  19    SpO2  Min: 89 %  Max: 99 %  98 %      GEN:  Well developed and nourished.  Awake alert oriented to time person place.  Few shaking movements noted.  HEENT:  Atraumatic normocephalic.  No oral lesion.  Neck supple.  Thick short neck.  No visible JVD noted.  CV: RRR without  gallop.  PULM: CTAB, unlabored  ABD: Soft, NT/ND abdomen with NABS  EXT: 2+ edema BLEs, +venous stasis changes to skin, L leg with thickened reddened skin to lower leg  SKIN: Warm and dry  PSYCH: awake and alert  Neurologically follows command moves all her extremities.  Vascular access: RIJ CVC          Component Value Date/Time     (L) 06/25/2024 0358     06/24/2024 0325    K 3.5 06/25/2024 0358    K 3.7 06/24/2024 0325     06/25/2024 0358    CL 98 06/24/2024 0325    CO2 21 (L) 06/25/2024 0358    CO2 19 (L) 06/24/2024 0325    BUN 40.5 (H) 06/25/2024 0358    BUN 83.8 (H) 06/24/2024 0325    CREATININE 4.53 (H) 06/25/2024 0358    CREATININE 7.43 (H) 06/24/2024 0325    CALCIUM 7.1 (L) 06/25/2024 0358    CALCIUM 7.4 (L) 06/24/2024 0325    PHOS 4.0 06/25/2024 0358            Component Value Date/Time    WBC 17.42 (H) 06/25/2024 0358    WBC 17.61 (H) 06/24/2024 0325    WBC 14.36 06/22/2024 0202    HGB 7.3 (L) 06/25/2024 0358    HGB 7.7 (L) 06/24/2024 0325    HCT 22.6 (L) 06/25/2024 0358    HCT 22.6 (L) 06/24/2024 0325     06/25/2024 0358     06/24/2024 0325         X-Ray Chest 1 View   Final Result      Findings seen consistent with CHF/increased volume status         Electronically signed by: Garland Raya   Date:    06/22/2024   Time:    13:06      X-Ray Chest 1 View for Line/Tube Placement   Final Result      Satisfactory right IJ catheter position         Electronically signed by: Tony Mar MD   Date:    06/22/2024   Time:    09:15      FL Cystogram Retrograde (xpd)    (Results Pending)       Assessment / Plan:       Active Hospital Problems    Diagnosis  POA    *Metabolic acidosis [E87.20]  Yes    Anemia requiring transfusions [D64.9]  Yes    Hyperkalemia [E87.5]  Yes    UTI (urinary tract infection) [N39.0]  Yes    Volume depletion [E86.9]  Yes    ESTRELLITA (acute kidney injury) [N17.9]  Yes      Resolved Hospital Problems   No resolved problems to display.     ESTRELLITA secondary to ATN  secondary to obstructive uropathy.  Improving urine output.  Patient's probably has some underlying chronic kidney disease as she does have significant proteinuria more than 8 g per 24 hours.  Ate most likely suggestive of some focal segmental glomerulosclerosis type of problem.  Shaking and jerking movements of the extremities probably secondary to uremia.  UTI--started on Rocephin  Hyperkalemia  Acute metabolic acidosis  Anemia    Recommendations  It is time to remove Mario catheter  Physical therapy occupational therapy and getting her out of the bed  Will monitor renal functions and electrolytes on a daily basis and then decide about the need for dialysis.  Continue nutritional support and blood pressure control.

## 2024-06-25 NOTE — PROGRESS NOTES
Pharmacokinetic Assessment Follow Up: IV Vancomycin    Vancomycin serum concentration assessment(s):    The random level was drawn correctly and can be used to guide therapy at this time. The measurement is within the desired definitive target range of 15 to 20 mcg/mL.    Vancomycin Regimen Plan:    Will plan to give 500 mg after dialysis and schedule a random level with morning labs on 6/26.      Drug levels (last 3 results):  Recent Labs   Lab Result Units 06/23/24  0344 06/24/24  0325 06/25/24  0358   Vancomycin Random ug/ml 24.0* 28.0* 17.6       Vancomycin Administrations:  vancomycin given in the last 96 hours                     vancomycin (VANCOCIN) 500 mg in D5W 100 mL IVPB (MB+) (mg) 500 mg New Bag 06/23/24 1712    vancomycin 2 g in dextrose 5 % 500 mL IVPB (mg) 2,000 mg New Bag 06/22/24 1616                    Pharmacy will continue to follow and monitor vancomycin.    Please contact pharmacy at extension [phone] for questions regarding this assessment.    Thank you for the consult,   Uche Swift       Patient brief summary:  Fior Joya is a 69 y.o. female initiated on antimicrobial therapy with IV Vancomycin for treatment of sepsis    Drug Allergies:   Review of patient's allergies indicates:  No Known Allergies    Actual Body Weight:  Wt Readings from Last 1 Encounters:   06/22/24 133.2 kg (293 lb 10.4 oz)       Renal Function:   Estimated Creatinine Clearance: 16.2 mL/min (A) (based on SCr of 4.53 mg/dL (H)).,     Dialysis Method (if applicable):  intermittent HD    CBC (last 72 hours):  Recent Labs   Lab Result Units 06/23/24  0343 06/24/24 0325 06/25/24  0358   WBC x10(3)/mcL 10.94 17.61* 17.42*   Hgb g/dL 7.1* 7.7* 7.3*   Hct % 20.9* 22.6* 22.6*   Platelet x10(3)/mcL 191 257 206   Mono % % 2.8 9.5 7.6   Eos % % 0.0 0.0 1.0   Basophil % % 0.2 0.1 0.2       Metabolic Panel (last 72 hours):  Recent Labs   Lab Result Units 06/23/24  0344 06/24/24  0325 06/25/24  0358   Sodium mmol/L 136 138 134*    Potassium mmol/L 3.9 3.7 3.5   Chloride mmol/L 100 98 100   CO2 mmol/L 15* 19* 21*   Glucose mg/dL 167* 136* 118*   Blood Urea Nitrogen mg/dL 117.0* 83.8* 40.5*   Creatinine mg/dL 10.10* 7.43* 4.53*   Albumin g/dL 2.4* 2.5*  --    Bilirubin Total mg/dL 0.3 0.4  --    ALP unit/L 57 54  --    AST unit/L 19 17  --    ALT unit/L 12 12  --    Magnesium Level mg/dL 1.70 1.70 1.80   Phosphorus Level mg/dL 6.5* 4.9* 4.0       Microbiologic Results:  Microbiology Results (last 7 days)       ** No results found for the last 168 hours. **

## 2024-06-25 NOTE — H&P
[facility]  Hospital Medicine History & Physical Examination       Patient Name: Fior Joya  MRN: [id number]  Patient Class: IP- Inpatient   Admission Date: 06/25/2024   Admitting Service: Hospital Medicine   Length of Stay: 3  Attending Physician: Tyrone Klein MD   Primary Care Provider: Linda Primary Doctor  Face-to-Face encounter date: 06/25/2024  Code Status: Full code   Chief Complaint: Transfer (ARF transfer from [place])      Patient information was obtained from patient, patient's family, past medical records and ER records.      HISTORY OF PRESENT ILLNESS:   Fior Joya is a 69 y.o. female without known past medical history who presented to [facility] on 6/22/2024 transferred from [place] for higher level of care.  Patient presented to outside facility for dyspnea on exertion and lower extremity edema.  Workup revealed WBC 15.81, RBC 1.86, hemoglobin 5.7, hematocrit 17.7, sodium 15, potassium 6.8, chloride 108, CO2 5, , and creatinine 16.94.  UA revealed 3+ protein, 3+ blood, 3+ leukocytes, greater than 100 red blood cells, greater than 100 white blood cells, and many bacteria.  Blood and urine cultures were obtained. Patient was transferred to [facility] ED for higher level of care.  Nephrology was consulted.  Patient was admitted to ICU for close monitoring.  Patient was transfused 2 units packed red blood cells.  Patient was started on emergent HD.  Patient was transfused 2 units packed red blood cells with dialysis.  Patient was started on IV Rocephin secondary to UTI.  Urology was consulted secondary to bilateral hydronephrosis.  Mario catheter was placed.  MRSA PCR was positive.  Patient was started on IV Zosyn and vancomycin for concern secondary to left lower extremity cellulitis. Patient was cleared for downgrade out of ICU on 06/25/2024 and admitted to hospital medicine service.     PAST MEDICAL HISTORY:   Obesity    PAST SURGICAL HISTORY:   No past surgical history on  file.    FAMILY HISTORY:   Reviewed and negative    SOCIAL HISTORY:   Denied alcohol, tobacco or illicit drug use.     Screening for Social Drivers for health:  Patient screened for food insecurity, housing instability, transportation needs, utility difficulties, and interpersonal safety (select all that apply as identified as concern)  []Housing or Food  []Transportation Needs  []Utility Difficulties  []Interpersonal safety  [x]None    ALLERGIES:   Patient has no known allergies.    HOME MEDICATIONS:     Prior to Admission medications    Not on File     ________________________________________________________________________  INPATIENT LIST OF MEDICATIONS     Current Facility-Administered Medications:     0.9%  NaCl infusion (for blood administration), , Intravenous, Q24H PRN, Dariusz Vee MD    0.9%  NaCl infusion (for blood administration), , Intravenous, Q24H PRN, Casper Hayward Jr., MD, FCCP    acetaminophen tablet 650 mg, 650 mg, Oral, Q4H PRN, James Townsend MD, 650 mg at 06/25/24 0906    ferrous sulfate tablet 1 each, 1 tablet, Oral, BID, Casper Hayward Jr., MD, FCCP, 1 each at 06/25/24 0906    folic acid tablet 1 mg, 1 mg, Oral, Daily, Shin Light MD, 1 mg at 06/25/24 0906    heparin (porcine) injection 4,000 Units, 4,000 Units, Intravenous, PRN, Nata Terrazas ANP, 4,000 Units at 06/23/24 1250    hydrALAZINE injection 10 mg, 10 mg, Intravenous, Q2H PRN, James Townsend MD    labetaloL injection 10 mg, 10 mg, Intravenous, Q4H PRN, James Townsend MD, 10 mg at 06/23/24 0308    labetaloL tablet 200 mg, 200 mg, Oral, Q12H, Casper Hayward Jr., MD, FCCP, 200 mg at 06/25/24 0906    melatonin tablet 6 mg, 6 mg, Oral, Nightly PRN, James Townsend MD    mupirocin 2 % ointment, , Nasal, BID, Nata Terrazas, ANP, Given at 06/25/24 0906    ondansetron injection 4 mg, 4 mg, Intravenous, Q8H PRN, James Townsend MD, 4 mg at 06/24/24 2002    piperacillin-tazobactam (ZOSYN) 4.5 g in D5W 100 mL IVPB  (MB+), 4.5 g, Intravenous, Q12H, Tye Hayes MD, Stopped at 06/25/24 0453    prochlorperazine injection Soln 5 mg, 5 mg, Intravenous, Q6H PRN, James Townsend MD    sodium chloride 0.9% flush 10 mL, 10 mL, Intravenous, PRN, James Townsend MD    Scheduled Meds:   ferrous sulfate  1 tablet Oral BID    folic acid  1 mg Oral Daily    labetaloL  200 mg Oral Q12H    mupirocin   Nasal BID    piperacillin-tazobactam (Zosyn) IV (PEDS and ADULTS) (extended infusion is not appropriate)  4.5 g Intravenous Q12H     Continuous Infusions:  PRN Meds:.  Current Facility-Administered Medications:     0.9%  NaCl infusion (for blood administration), , Intravenous, Q24H PRN    0.9%  NaCl infusion (for blood administration), , Intravenous, Q24H PRN    acetaminophen, 650 mg, Oral, Q4H PRN    heparin (porcine), 4,000 Units, Intravenous, PRN    hydrALAZINE, 10 mg, Intravenous, Q2H PRN    labetaloL, 10 mg, Intravenous, Q4H PRN    melatonin, 6 mg, Oral, Nightly PRN    ondansetron, 4 mg, Intravenous, Q8H PRN    prochlorperazine, 5 mg, Intravenous, Q6H PRN    sodium chloride 0.9%, 10 mL, Intravenous, PRN      REVIEW OF SYSTEMS:   Except as documented, all other systems reviewed and negative.    PHYSICAL EXAM:     VITAL SIGNS: 24 HRS MIN & MAX LAST   Temp  Min: 97.1 °F (36.2 °C)  Max: 98.4 °F (36.9 °C) 97.1 °F (36.2 °C)   BP  Min: 107/59  Max: 171/88 127/66   Pulse  Min: 61  Max: 102  96   Resp  Min: 13  Max: 27 16   SpO2  Min: 78 %  Max: 99 % (!) 94 %       Please see MD addendum for physical exam    LABS AND IMAGING:     Recent Labs   Lab 06/23/24  0343 06/24/24  0325 06/25/24  0358   WBC 10.94 17.61* 17.42*   RBC 2.35* 2.58* 2.42*   HGB 7.1* 7.7* 7.3*   HCT 20.9* 22.6* 22.6*   MCV 88.9 87.6 93.4   MCH 30.2 29.8 30.2   MCHC 34.0 34.1 32.3*   RDW 14.0 14.1 14.5    257 206   MPV 10.9* 11.3* 11.0*       Recent Labs   Lab 06/22/24  0426 06/22/24  1220 06/22/24  1230 06/23/24  0344 06/24/24  0325 06/25/24  0358   NA  --  140  --  136 138  134*   K  --  3.6  --  3.9 3.7 3.5   CL  --  103  --  100 98 100   CO2  --  14*  --  15* 19* 21*   BUN  --  117.8*  --  117.0* 83.8* 40.5*   CREATININE  --  9.84*  --  10.10* 7.43* 4.53*   CALCIUM  --  6.7*  --  6.8* 7.4* 7.1*   PH 7.150*  --  7.550*  --   --   --    MG  --  1.60  --  1.70 1.70 1.80   ALBUMIN  --  2.3*  --  2.4* 2.5*  --    ALKPHOS  --  53  --  57 54  --    ALT  --  12  --  12 12  --    AST  --  17  --  19 17  --    BILITOT  --  0.5  --  0.3 0.4  --        Microbiology Results (last 7 days)       ** No results found for the last 168 hours. **             Echo    Left Ventricle: The left ventricle is normal in size. Normal wall   thickness. There is normal systolic function with a visually estimated   ejection fraction of 55 - 60%. Grade I diastolic dysfunction.    Right Ventricle: Normal right ventricular cavity size. Systolic   function is normal.    Left Atrium: Left atrium is dilated.    Mitral Valve: There is no stenosis. The mean pressure gradient across   the mitral valve is 7 mmHg at a heart rate of  bpm. There is mild   regurgitation.    Tricuspid Valve: There is mild regurgitation.    IVC/SVC: Normal venous pressure at 3 mmHg.  CV Ultrasound doppler venous legs bilat    The right superficial femoral vein is normal.    The left superficial femoral vein is normal.    There was no evidence of deep or superficial vein thrombosis in bilateral   lower extremities.         ASSESSMENT & PLAN:   Assessment:   Acute kidney injury, possible component of chronic kidney disease and evidence of obstructive uropathy, post initiation of hemodialysis 06/22/2024.  She is currently nonoliguric.  Normocytic anemia  Left lower extremity cellulitis versus venous stasis with leukocytosis.  Hypertension  Morbid obesity, BMI 50.4      Plan:  Nephrology following for HD, appreciate recommendations   Continue Mario   Continue IV Zosyn and Vancomycin   Follow-up blood cultures   PT/OT   Resume home medications as  deemed appropriate once medication reconciliation is updated  Labs in AM    VTE Prophylaxis:  SCDs    Discharge Planning and Disposition: TBD    Vega BLOUNT PA-C have reviewed and discussed the case with Tyrone Klein MD   Please see the attending MD's addendum for further assessment and plan.    Vega Washington PA-C  Department of Hospital Medicine   [facility]   06/25/2024    This note was created with the assistance of NICO voice recognition software. There may be transcription errors as a result of using this technology, however minimal. Effort has been made to assure accuracy of transcription, but any obvious errors or omissions should be clarified with the author of the document.    _______________________________________________________________________________  MD Addendum:  Dr. JOSE ALEJANDRO , assumed care of this patient today at --am/pm  For the patient encounter, I performed the substantive portion of the visit, I reviewed the NP/PA documentation, treatment plan, and medical decision making.  I had face to face time with this patient     A. History:    B. Physical exam:    C. Medical decision making:      All diagnosis and differential diagnosis have been reviewed; assessment and plan has been documented; I have personally reviewed the labs and test results that are presently available; I have reviewed the patients medication list; I have reviewed the consulting providers response and recommendations. I have reviewed or attempted to review medical records based upon their availability.    All of the patient and family questions have been addressed and answered. Patient's is agreeable to the above stated plan. I will continue to monitor closely and make adjustments to medical management as needed.      06/25/2024

## 2024-06-26 LAB
ABO + RH BLD: NORMAL
ALBUMIN SERPL-MCNC: 2.2 G/DL (ref 3.4–4.8)
ALBUMIN/GLOB SERPL: 0.6 RATIO (ref 1.1–2)
ALP SERPL-CCNC: 42 UNIT/L (ref 40–150)
ALT SERPL-CCNC: 18 UNIT/L (ref 0–55)
ANA SER QL HEP2 SUBST: NORMAL
ANION GAP SERPL CALC-SCNC: 10 MEQ/L
AST SERPL-CCNC: 23 UNIT/L (ref 5–34)
BASOPHILS # BLD AUTO: 0.03 X10(3)/MCL
BASOPHILS NFR BLD AUTO: 0.2 %
BILIRUB SERPL-MCNC: 0.4 MG/DL
BLD PROD TYP BPU: NORMAL
BLOOD UNIT EXPIRATION DATE: NORMAL
BLOOD UNIT TYPE CODE: 6200
BUN SERPL-MCNC: 21.2 MG/DL (ref 9.8–20.1)
C-ANCA TITR SER IF: NEGATIVE {TITER}
CALCIUM SERPL-MCNC: 7.4 MG/DL (ref 8.4–10.2)
CHLORIDE SERPL-SCNC: 105 MMOL/L (ref 98–107)
CO2 SERPL-SCNC: 18 MMOL/L (ref 23–31)
CREAT SERPL-MCNC: 3.43 MG/DL (ref 0.55–1.02)
CREAT/UREA NIT SERPL: 6
CROSSMATCH INTERPRETATION: NORMAL
DISPENSE STATUS: NORMAL
EOSINOPHIL # BLD AUTO: 0.24 X10(3)/MCL (ref 0–0.9)
EOSINOPHIL NFR BLD AUTO: 1.5 %
ERYTHROCYTE [DISTWIDTH] IN BLOOD BY AUTOMATED COUNT: 14.4 % (ref 11.5–17)
GFR SERPLBLD CREATININE-BSD FMLA CKD-EPI: 14 ML/MIN/1.73/M2
GLOBULIN SER-MCNC: 3.8 GM/DL (ref 2.4–3.5)
GLUCOSE SERPL-MCNC: 128 MG/DL (ref 82–115)
HCT VFR BLD AUTO: 22.3 % (ref 37–47)
HGB BLD-MCNC: 7.3 G/DL (ref 12–16)
IMM GRANULOCYTES # BLD AUTO: 0.62 X10(3)/MCL (ref 0–0.04)
IMM GRANULOCYTES NFR BLD AUTO: 3.8 %
LYMPHOCYTES # BLD AUTO: 1.76 X10(3)/MCL (ref 0.6–4.6)
LYMPHOCYTES NFR BLD AUTO: 10.9 %
MAGNESIUM SERPL-MCNC: 1.8 MG/DL (ref 1.6–2.6)
MCH RBC QN AUTO: 30.3 PG (ref 27–31)
MCHC RBC AUTO-ENTMCNC: 32.7 G/DL (ref 33–36)
MCV RBC AUTO: 92.5 FL (ref 80–94)
MONOCYTES # BLD AUTO: 1.54 X10(3)/MCL (ref 0.1–1.3)
MONOCYTES NFR BLD AUTO: 9.6 %
NEUTROPHILS # BLD AUTO: 11.92 X10(3)/MCL (ref 2.1–9.2)
NEUTROPHILS NFR BLD AUTO: 74 %
NRBC BLD AUTO-RTO: 0.1 %
P-ANCA SER QL IF: NEGATIVE
PHOSPHATE SERPL-MCNC: 2.5 MG/DL (ref 2.3–4.7)
PLATELET # BLD AUTO: 208 X10(3)/MCL (ref 130–400)
PMV BLD AUTO: 10.4 FL (ref 7.4–10.4)
POTASSIUM SERPL-SCNC: 3.8 MMOL/L (ref 3.5–5.1)
PROT SERPL-MCNC: 6 GM/DL (ref 5.8–7.6)
RBC # BLD AUTO: 2.41 X10(6)/MCL (ref 4.2–5.4)
RBCS: NORMAL
RBCS: NORMAL
SODIUM SERPL-SCNC: 133 MMOL/L (ref 136–145)
UNIT NUMBER: NORMAL
VANCOMYCIN SERPL-MCNC: 14.9 UG/ML (ref 15–20)
WBC # BLD AUTO: 16.11 X10(3)/MCL (ref 4.5–11.5)

## 2024-06-26 PROCEDURE — 63600175 PHARM REV CODE 636 W HCPCS: Performed by: STUDENT IN AN ORGANIZED HEALTH CARE EDUCATION/TRAINING PROGRAM

## 2024-06-26 PROCEDURE — 83735 ASSAY OF MAGNESIUM: CPT | Performed by: INTERNAL MEDICINE

## 2024-06-26 PROCEDURE — 36415 COLL VENOUS BLD VENIPUNCTURE: CPT | Performed by: INTERNAL MEDICINE

## 2024-06-26 PROCEDURE — 84100 ASSAY OF PHOSPHORUS: CPT | Performed by: INTERNAL MEDICINE

## 2024-06-26 PROCEDURE — 11000001 HC ACUTE MED/SURG PRIVATE ROOM

## 2024-06-26 PROCEDURE — 25000003 PHARM REV CODE 250

## 2024-06-26 PROCEDURE — 80053 COMPREHEN METABOLIC PANEL: CPT | Performed by: INTERNAL MEDICINE

## 2024-06-26 PROCEDURE — 25000003 PHARM REV CODE 250: Performed by: INTERNAL MEDICINE

## 2024-06-26 PROCEDURE — 80202 ASSAY OF VANCOMYCIN: CPT | Performed by: STUDENT IN AN ORGANIZED HEALTH CARE EDUCATION/TRAINING PROGRAM

## 2024-06-26 PROCEDURE — 25000003 PHARM REV CODE 250: Performed by: STUDENT IN AN ORGANIZED HEALTH CARE EDUCATION/TRAINING PROGRAM

## 2024-06-26 PROCEDURE — C9113 INJ PANTOPRAZOLE SODIUM, VIA: HCPCS | Performed by: STUDENT IN AN ORGANIZED HEALTH CARE EDUCATION/TRAINING PROGRAM

## 2024-06-26 PROCEDURE — 97530 THERAPEUTIC ACTIVITIES: CPT

## 2024-06-26 PROCEDURE — 63600175 PHARM REV CODE 636 W HCPCS

## 2024-06-26 PROCEDURE — 85025 COMPLETE CBC W/AUTO DIFF WBC: CPT | Performed by: INTERNAL MEDICINE

## 2024-06-26 PROCEDURE — 21400001 HC TELEMETRY ROOM

## 2024-06-26 RX ORDER — HEPARIN SODIUM 5000 [USP'U]/ML
5000 INJECTION, SOLUTION INTRAVENOUS; SUBCUTANEOUS EVERY 12 HOURS
Status: DISCONTINUED | OUTPATIENT
Start: 2024-06-26 | End: 2024-07-02

## 2024-06-26 RX ORDER — NYSTATIN AND TRIAMCINOLONE ACETONIDE 100000; 1 [USP'U]/G; MG/G
CREAM TOPICAL 3 TIMES DAILY
Status: DISCONTINUED | OUTPATIENT
Start: 2024-06-26 | End: 2024-07-10 | Stop reason: HOSPADM

## 2024-06-26 RX ORDER — AMMONIUM LACTATE 12 G/100G
LOTION TOPICAL 2 TIMES DAILY PRN
Status: DISCONTINUED | OUTPATIENT
Start: 2024-06-26 | End: 2024-07-10 | Stop reason: HOSPADM

## 2024-06-26 RX ADMIN — HEPARIN SODIUM 5000 UNITS: 5000 INJECTION INTRAVENOUS; SUBCUTANEOUS at 09:06

## 2024-06-26 RX ADMIN — HEPARIN SODIUM 5000 UNITS: 5000 INJECTION INTRAVENOUS; SUBCUTANEOUS at 08:06

## 2024-06-26 RX ADMIN — ACETAMINOPHEN 650 MG: 325 TABLET, FILM COATED ORAL at 02:06

## 2024-06-26 RX ADMIN — FOLIC ACID 1 MG: 1 TABLET ORAL at 09:06

## 2024-06-26 RX ADMIN — PIPERACILLIN AND TAZOBACTAM 4.5 G: 4; .5 INJECTION, POWDER, LYOPHILIZED, FOR SOLUTION INTRAVENOUS; PARENTERAL at 01:06

## 2024-06-26 RX ADMIN — PANTOPRAZOLE SODIUM 40 MG: 40 INJECTION, POWDER, FOR SOLUTION INTRAVENOUS at 09:06

## 2024-06-26 RX ADMIN — FERROUS SULFATE TAB 325 MG (65 MG ELEMENTAL FE) 1 EACH: 325 (65 FE) TAB at 09:06

## 2024-06-26 RX ADMIN — NYSTATIN AND TRIAMCINOLONE ACETONIDE: 100000; 1 CREAM TOPICAL at 05:06

## 2024-06-26 RX ADMIN — NYSTATIN AND TRIAMCINOLONE ACETONIDE: 100000; 1 CREAM TOPICAL at 09:06

## 2024-06-26 RX ADMIN — PANTOPRAZOLE SODIUM 40 MG: 40 INJECTION, POWDER, FOR SOLUTION INTRAVENOUS at 08:06

## 2024-06-26 RX ADMIN — LABETALOL HYDROCHLORIDE 200 MG: 200 TABLET, FILM COATED ORAL at 09:06

## 2024-06-26 RX ADMIN — FERROUS SULFATE TAB 325 MG (65 MG ELEMENTAL FE) 1 EACH: 325 (65 FE) TAB at 08:06

## 2024-06-26 RX ADMIN — ALUMINUM HYDROXIDE, MAGNESIUM HYDROXIDE, AND SIMETHICONE 30 ML: 200; 200; 20 SUSPENSION ORAL at 06:06

## 2024-06-26 RX ADMIN — MUPIROCIN: 20 OINTMENT TOPICAL at 08:06

## 2024-06-26 RX ADMIN — PIPERACILLIN AND TAZOBACTAM 4.5 G: 4; .5 INJECTION, POWDER, LYOPHILIZED, FOR SOLUTION INTRAVENOUS; PARENTERAL at 02:06

## 2024-06-26 NOTE — PLAN OF CARE
Problem: Infection  Goal: Absence of Infection Signs and Symptoms  Outcome: Progressing     Problem: Adult Inpatient Plan of Care  Goal: Plan of Care Review  Outcome: Progressing  Goal: Patient-Specific Goal (Individualized)  Outcome: Progressing  Goal: Absence of Hospital-Acquired Illness or Injury  Outcome: Progressing  Goal: Optimal Comfort and Wellbeing  Outcome: Progressing  Goal: Readiness for Transition of Care  Outcome: Progressing     Problem: Bariatric Environmental Safety  Goal: Safety Maintained with Care  Outcome: Progressing     Problem: Acute Kidney Injury/Impairment  Goal: Fluid and Electrolyte Balance  Outcome: Progressing  Goal: Improved Oral Intake  Outcome: Progressing  Goal: Effective Renal Function  Outcome: Progressing     Problem: Hemodialysis  Goal: Safe, Effective Therapy Delivery  Outcome: Progressing  Goal: Effective Tissue Perfusion  Outcome: Progressing  Goal: Absence of Infection Signs and Symptoms  Outcome: Progressing     Problem: Wound  Goal: Optimal Coping  Outcome: Progressing  Goal: Optimal Functional Ability  Outcome: Progressing  Goal: Absence of Infection Signs and Symptoms  Outcome: Progressing  Goal: Improved Oral Intake  Outcome: Progressing  Goal: Optimal Pain Control and Function  Outcome: Progressing  Goal: Skin Health and Integrity  Outcome: Progressing  Goal: Optimal Wound Healing  Outcome: Progressing     Problem: Skin Injury Risk Increased  Goal: Skin Health and Integrity  Outcome: Progressing

## 2024-06-26 NOTE — PROGRESS NOTES
[facility] Hospital Medicine Progress Note        Chief Complaint: Inpatient Follow-up     HPI:   Mrs Joya is a 69-year-old lady with no PMH who was transferred to [facility] from [place] with complaints of dyspnea on exertion, B/L lower extremity edema, shortness for breath. She was noted to have severe anemia with HGB/HCT of 5.7/17.7, WBCs of 15.8, hyperkalemia with potassium 6.8,, BUN/creatinine of 209/16.94. UA showed leukocytes and many bacteria as well as 3+ protein, 3+ blood. Blood and urine cultures were sent off. Nephrology was consulted on admission and patient was admitted to ICU for placement of dialysis catheter and initiation of hemodialysis. She also received transfusion with 2 units PRBCs with improvement in symptoms. Urology was consulted due to B/L hydronephrosis on ultrasound with urine retention requiring Mario catheter. Placed on IV vancomycin, IV Zosyn. Downgraded from ICU to Hospital Medicine on 06/25. At bedside, patient stated she was doing well and had no new complaints. Continued to have some tremors and some shortness of breath but overall felt improved. On IV Vancomycin, IV Zosyn. Urine cultures grew GBS, Blood cultures negative x 72 hours. Nephrology on board; follow recommendations. Urology on board; follow recommendations. Continued on hemodialysis. PT/OT consulted; recommending moderate intensity therapy. Continued on FeSO4 b.i.d., folic acid 1 mg daily, labetalol 200 mg b.i.d., Protonix 40 mg b.i.d..      Interval Hx:   Today, Mrs. Joya stated she was doing well and had no new complaints.  Will continue IV antimicrobials.  Continues to have Mario; will follow urology recommendations.    Case was discussed with patient's nurse on the floor.    Objective/physical exam:  General: alert lady lying comfortably in bed, in no acute distress.  HENT: oral and oropharyngeal mucosa moist, pink, with no erythema or exudates, no ear pain or discharge  Neck: normal neck  movement, no lymph nodes or swellings, no JVD or Carotid bruit  Respiratory: clear breathing sounds bilaterally, no crackles, rales, ronchi or wheezes  Cardiovascular: clear S1 and S2, no murmurs, rubs or gallops  Peripheral Vascular: no lesions, ulcers or erosions, normal peripheral pulses and no pedal edema  Gastrointestinal: soft, non-tender, non-distended abdomen, no guarding, rigidity or rebound tenderness, normal bowel sounds  Integumentary: B/L LE edema with erythema and tenderness  Neuro: AAO x 3; motor strength 5/5 in B/L UEs & LEs; sensation intact to gross and fine touch B/L; CN II-XII grossly intact     VITAL SIGNS: 24 HRS MIN & MAX LAST   Temp  Min: 97.1 °F (36.2 °C)  Max: 99.2 °F (37.3 °C) 99 °F (37.2 °C)   BP  Min: 80/69  Max: 152/89 122/73   Pulse  Min: 82  Max: 102  86   Resp  Min: 13  Max: 32 18   SpO2  Min: 79 %  Max: 99 % 95 %     I have reviewed the following labs:  Recent Labs   Lab 06/24/24  0325 06/25/24  0358 06/26/24  0051   WBC 17.61* 17.42* 16.11*   RBC 2.58* 2.42* 2.41*   HGB 7.7* 7.3* 7.3*   HCT 22.6* 22.6* 22.3*   MCV 87.6 93.4 92.5   MCH 29.8 30.2 30.3   MCHC 34.1 32.3* 32.7*   RDW 14.1 14.5 14.4    206 208   MPV 11.3* 11.0* 10.4     Recent Labs   Lab 06/22/24  0426 06/22/24  1220 06/22/24  1230 06/23/24  0344 06/24/24  0325 06/25/24  0358 06/26/24  0051   NA  --    < >  --  136 138 134* 133*   K  --    < >  --  3.9 3.7 3.5 3.8   CL  --    < >  --  100 98 100 105   CO2  --    < >  --  15* 19* 21* 18*   BUN  --    < >  --  117.0* 83.8* 40.5* 21.2*   CREATININE  --    < >  --  10.10* 7.43* 4.53* 3.43*   CALCIUM  --    < >  --  6.8* 7.4* 7.1* 7.4*   PH 7.150*  --  7.550*  --   --   --   --    MG  --    < >  --  1.70 1.70 1.80 1.80   ALBUMIN  --    < >  --  2.4* 2.5*  --  2.2*   ALKPHOS  --    < >  --  57 54  --  42   ALT  --    < >  --  12 12  --  18   AST  --    < >  --  19 17  --  23   BILITOT  --    < >  --  0.3 0.4  --  0.4    < > = values in this interval not displayed.      Assessment/Plan:  Acute kidney injury, possible component of chronic kidney disease and evidence of obstructive uropathy, post initiation of hemodialysis 06/22/2024  Urine retention requiring Mario  UTI  Normocytic anemia  Left lower extremity cellulitis  Hypertension  Morbid obesity, BMI 50.4     Continues to be admitted   Reporting no new complaints   Nephrology on board; follow recommendations   Urology on board; follow recommendations  On IV Vancomycin, IV Zosyn for UTI as well as lower extremity cellulitis   Blood cultures remain negative; urine culture growing GBS   PT/OT on board; recommending moderate intensity therapy   Continued on FeSO4 b.i.d., folic acid 1 mg daily, labetalol 200 mg b.i.d., Protonix 40 mg b.i.d.  Continue monitoring patient's symptoms    VTE prophylaxis:  Heparin    Patient condition:  Stable    Anticipated discharge and Disposition:     Pending    All diagnosis and differential diagnosis have been reviewed; assessment and plan has been documented; I have personally reviewed the labs and test results that are presently available; I have reviewed the patients medication list; I have reviewed the consulting providers response and recommendations. I have reviewed or attempted to review medical records based upon their availability    All of the patient's questions have been  addressed and answered. Patient's is agreeable to the above stated plan. I will continue to monitor closely and make adjustments to medical management as needed.  _____________________________________________________________________    Radiology:  I have personally reviewed the following imaging and agree with the radiologist.     FL Cystogram Retrograde (xpd)  Narrative: EXAMINATION:  XR CYSTOGRAM IN SURGERY    CLINICAL HISTORY:  bilateral hydronephrosis; check for reflux into kidneys;    TECHNIQUE:  A  radiograph was obtained. Contrast was then instilled into the bladder through the patient's catheter with  multiple fluoroscopic images obtained in various positions.  Absorbed dose is 91.87 mGy.    COMPARISON:  CT 06/21/2024    FINDINGS:  Bladder fills readily with no significant contour abnormality identified.  Due to patient discomfort, only about 300 mL of contrast instilled during the exam, but no vesicoureteral reflux identified.  Impression: Mildly limited assessment due to patient pain, but no significant bladder abnormality identified.    Electronically signed by: Pastor Gordon  Date:    06/25/2024  Time:    15:53      Tyrone Klein MD  Department of Hospital Medicine   [facility]   06/26/2024

## 2024-06-26 NOTE — PLAN OF CARE
Problem: Infection  Goal: Absence of Infection Signs and Symptoms  Outcome: Progressing     Problem: Adult Inpatient Plan of Care  Goal: Plan of Care Review  Outcome: Progressing  Goal: Optimal Comfort and Wellbeing  Outcome: Progressing  Goal: Readiness for Transition of Care  Outcome: Progressing     Problem: Acute Kidney Injury/Impairment  Goal: Fluid and Electrolyte Balance  Outcome: Progressing  Goal: Improved Oral Intake  Outcome: Progressing  Goal: Effective Renal Function  Outcome: Progressing     Problem: Hemodialysis  Goal: Safe, Effective Therapy Delivery  Outcome: Progressing  Goal: Effective Tissue Perfusion  Outcome: Progressing  Goal: Absence of Infection Signs and Symptoms  Outcome: Progressing     Problem: Wound  Goal: Optimal Coping  Outcome: Progressing  Goal: Absence of Infection Signs and Symptoms  Outcome: Progressing  Goal: Skin Health and Integrity  Outcome: Progressing  Goal: Optimal Wound Healing  Outcome: Progressing

## 2024-06-26 NOTE — PROGRESS NOTES
Pharmacokinetic Assessment Follow Up: IV Vancomycin    Vancomycin serum concentration assessment(s):  The random level was drawn correctly and can be used to guide therapy at this time. The measurement is below the desired definitive target range of 15 to 20 mcg/mL.    Vancomycin Regimen Plan:  Continue to pulse-dose  No HD planned for today per nephro  Hold vancomycin for today  Re-dose when the random level is less than 20 mcg/mL, next level to be drawn at 0430 on 06/27      Drug levels (last 3 results):  Recent Labs   Lab Result Units 06/24/24  0325 06/25/24  0358 06/26/24  0051   Vancomycin Random ug/ml 28.0* 17.6 14.9*       Vancomycin Administrations:  vancomycin given in the last 96 hours                     vancomycin (VANCOCIN) 500 mg in D5W 100 mL IVPB (MB+) (mg) 500 mg New Bag 06/23/24 1712    vancomycin 2 g in dextrose 5 % 500 mL IVPB (mg) 2,000 mg New Bag 06/22/24 1616                    Pharmacy will continue to follow and monitor vancomycin.    Please contact pharmacy at extension [phone] for questions regarding this assessment.    Thank you for the consult,   Mary Kate Whitley       Patient brief summary:  Fior Joya is a 69 y.o. female initiated on antimicrobial therapy with IV Vancomycin for treatment of skin & soft tissue infection    The patient's current regimen is pulse-dosed    Drug Allergies:   Review of patient's allergies indicates:  No Known Allergies    Actual Body Weight:  Wt Readings from Last 1 Encounters:   06/22/24 133.2 kg (293 lb 10.4 oz)       Renal Function:   Estimated Creatinine Clearance: 21.3 mL/min (A) (based on SCr of 3.43 mg/dL (H)).,     Dialysis Method (if applicable):  intermittent HD daily PRN    CBC (last 72 hours):  Recent Labs   Lab Result Units 06/24/24  0325 06/25/24  0358 06/26/24  0051   WBC x10(3)/mcL 17.61* 17.42* 16.11*   Hgb g/dL 7.7* 7.3* 7.3*   Hct % 22.6* 22.6* 22.3*   Platelet x10(3)/mcL 257 206 208   Mono % % 9.5 7.6 9.6   Eos % % 0.0 1.0 1.5   Basophil % %  0.1 0.2 0.2       Metabolic Panel (last 72 hours):  Recent Labs   Lab Result Units 06/24/24  0325 06/25/24  0358 06/26/24  0051   Sodium mmol/L 138 134* 133*   Potassium mmol/L 3.7 3.5 3.8   Chloride mmol/L 98 100 105   CO2 mmol/L 19* 21* 18*   Glucose mg/dL 136* 118* 128*   Blood Urea Nitrogen mg/dL 83.8* 40.5* 21.2*   Creatinine mg/dL 7.43* 4.53* 3.43*   Albumin g/dL 2.5*  --  2.2*   Bilirubin Total mg/dL 0.4  --  0.4   ALP unit/L 54  --  42   AST unit/L 17  --  23   ALT unit/L 12  --  18   Magnesium Level mg/dL 1.70 1.80 1.80   Phosphorus Level mg/dL 4.9* 4.0 2.5       Microbiologic Results:  Microbiology Results (last 7 days)       ** No results found for the last 168 hours. **

## 2024-06-26 NOTE — PROGRESS NOTES
[facility] - 7th Floor ICU  Wound Care    Patient Name:  Fior Joya   MRN:  [id number]  Date: 6/26/2024  Diagnosis: Metabolic acidosis    History:     No past medical history on file.    Social History     Socioeconomic History    Marital status:    Tobacco Use    Smoking status: Never    Smokeless tobacco: Never   Substance and Sexual Activity    Alcohol use: Never    Drug use: Never    Sexual activity: Not Currently     Social Determinants of Health     Financial Resource Strain: Patient Unable To Answer (6/23/2024)    Overall Financial Resource Strain (CARDIA)     Difficulty of Paying Living Expenses: Patient unable to answer   Food Insecurity: Patient Unable To Answer (6/23/2024)    Hunger Vital Sign     Worried About Running Out of Food in the Last Year: Patient unable to answer     Ran Out of Food in the Last Year: Patient unable to answer   Transportation Needs: Patient Unable To Answer (6/23/2024)    TRANSPORTATION NEEDS     Transportation : Patient unable to answer   Stress: Patient Unable To Answer (6/23/2024)    [employer] - Occupational Stress Questionnaire     Feeling of Stress : Patient unable to answer   Housing Stability: Patient Unable To Answer (6/23/2024)    Housing Stability Vital Sign     Unable to Pay for Housing in the Last Year: Patient unable to answer     Homeless in the Last Year: Patient unable to answer       Precautions:     Allergies as of 06/21/2024    (No Known Allergies)       [facility] Assessment Details/Treatment     Initial visit regarding affected areas at sacrum and groin areas, suggestive of candidiasis, and LLE suggestive of cellulitus. Patient is resting on a low air loss bed and demonstrated ability to mobilize self well in bed to off load her bony prominences. Areas cleansed and assessed and left open to air.        06/26/24 0930        Wound 06/22/24 0600 Rash Right anterior Groin   Date First Assessed/Time First Assessed:  06/22/24 0600   Present on Original Admission: Yes  Primary Wound Type: Rash  Side: Right  Orientation: anterior  Location: Groin   Distribution localized   Configuration/Shape   (satelite lesions)   Groupings satellite   Borders advancing   Color red   Rash Care cleansed with;soap and water        Wound 06/22/24 0630 Other (comment) Left anterior;lower Leg   Date First Assessed/Time First Assessed: 06/22/24 0630   Present on Original Admission: Yes  Primary Wound Type: Other (comment)  Side: Left  Orientation: anterior;lower  Location: Leg  Is this injury device related?: No   Dressing Appearance Open to air   Appearance Red   Tissue loss description Not applicable        Wound 06/22/24 0630 Pressure Injury midline Sacral spine   Date First Assessed/Time First Assessed: 06/22/24 0630   Present on Original Admission: Yes  Primary Wound Type: Pressure Injury  Orientation: midline  Location: Sacral spine   Wound Image    Dressing Appearance Open to air   Drainage Amount None   Appearance Red  (suggestive of candidiasis)   Tissue loss description Not applicable   Periwound Area Intact;Dry   Wound Edges Jagged;Undefined   Care Soap and water         Recommendations made to primary team for local wound care measures and pressure injury prevention measures . Orders placed.     06/26/2024

## 2024-06-26 NOTE — PROGRESS NOTES
Nephrology Initial Consult Note    Patient Name: Fior Joya  Age: 69 y.o.  : 1955  MRN: [id number]  Admission Date: 2024    Reason for Consult:      ESTRELLITA, hyperkalemia, metabolic acidosis    Chief Complaint   Patient presents with    Transfer     ARF transfer from [place]         Joseph Prasad DO    HPI:     Fior Joya is a 69 y.o. female who was transferred from hospital in [place] due to acute renal failure.  She initially presented to the hospital due to generalized weakness and shortness of breath with exertion.  Per their report she had been mostly bed-bound for the past 3 weeks.  He reported that she had been drinking getting up going to the restroom.  She did have some episodes of nausea vomiting and generalized weakness along with generalized muscle pain more recently.  She was endorsing shortness of breath but no chest pain.  Denied had been running a fever.  Per their his report, she had not seen a physician in 10 years, because she was not feeling ill and did not feel like she needed to go.  She had also been having some swelling in the legs.  She has no known medical issues per their report.  On admit, WBC elevated at 15, RBC 1.8, hemoglobin 5.7, D-dimer elevated at 1.57, serum sodium 132, potassium 6, serum CO2 less than 5, , creatinine 14.8, glucose 273, calcium 7.5, phosphorus 7.9, albumin 2.9.  TSH and lactic were okay.  UA results indicative of UTI sent for cultures.  Blood cultures obtained and pending.  Received phone call from ER physician in early a.m. hours, it was decided at that time we will proceed with initiation of RRT.  Right IJ temporary cath was placed per General surgery.   She is awake and alert this morning upon exam.  She does respond with simple answers to questions posed.  Son in room in majority of history obtained from him.  Oxygen saturation is 99% on nasal cannula.  We are consulted for management ESTRELLITA.  Bilateral hydronephrosis on CT from  06/21/2024 6/23/24- had urology eval., bilateral hydronephrosis, no acute surgical intervention rec. At this time. Recommended Mario with judicious hydration.  Per his report he feels outlet obstruction could be multifactorial but unclear underlying etiology at this time.    He had approximately 700 cc urine output last 24 hours.  She is awake alert oriented in bed on dialysis machine. Brother in . She has some nausea and vomiting this am, but no SOB or CP.     06/24/2024   Very much awake alert and oriented this morning.  Her son sitting in the chair on her bedside.  Still having shaky and jerky movements of all extremities.  Able to answer questions appropriately.  Appetite is poor.  Still good urine output noted.  No family history of anybody with kidney failure.  She knows that she is feeling better.  Her son also thinks that she is much better than before.    06/25/2024   Currently tolerating dialysis.  Most of the jerking movement has resolved.  She is still having some shaking movements but overall she knows she is much improved and her appetite seems to be improving also.  No shortness of breath.  Adequate urine output noted.  Follows command and moves all her extremities.      06/26/2024   Patient is sitting up in her recliner.  Very much awake alert and oriented to time person place.  Her son present in the room.  And she is feeding herself her lunch at present time without any problem and says that she is very hungry.  No shortness of breath voiced.  She still has indwelling Mario catheter which according to the nurses, the urologist would like to keep it.  Shakes has gone away.    Current Facility-Administered Medications   Medication Dose Route Frequency Provider Last Rate Last Admin    0.9%  NaCl infusion (for blood administration)   Intravenous Q24H PRN Dariusz Vee MD        0.9%  NaCl infusion (for blood administration)   Intravenous Q24H PRN Casper Hayward Jr., MD, [facility]         acetaminophen tablet 650 mg  650 mg Oral Q4H PRN James Townsend MD   650 mg at 06/26/24 0219    aluminum-magnesium hydroxide-simethicone 200-200-20 mg/5 mL suspension 30 mL  30 mL Oral Q6H PRN Tyrone Klein MD   30 mL at 06/25/24 1523    ammonium lactate 12 % lotion   Topical (Top) BID PRN Tyrone Klein MD        calcium carbonate 200 mg calcium (500 mg) chewable tablet 1,000 mg  1,000 mg Oral Q6H PRN Tyrone Klein MD        ferrous sulfate tablet 1 each  1 tablet Oral BID Casper Hayward Jr., MD, FCCP   1 each at 06/26/24 0817    folic acid tablet 1 mg  1 mg Oral Daily Shin Light MD   1 mg at 06/26/24 0900    heparin (porcine) injection 4,000 Units  4,000 Units Intravenous PRN Nata Terrazas, ANP   4,000 Units at 06/23/24 1250    heparin (porcine) injection 5,000 Units  5,000 Units Subcutaneous Q12H Tyrone Klein MD   5,000 Units at 06/26/24 0825    hydrALAZINE injection 10 mg  10 mg Intravenous Q2H PRN James Townsend MD        labetaloL injection 10 mg  10 mg Intravenous Q4H PRN James Townsend MD   10 mg at 06/23/24 0308    labetaloL tablet 200 mg  200 mg Oral Q12H Casper Hayward Jr., MD, FCCP   200 mg at 06/25/24 2037    melatonin tablet 6 mg  6 mg Oral Nightly PRN James Townsend MD        mupirocin 2 % ointment   Nasal BID Nata Terrazas, ANP   Given at 06/26/24 0818    nystatin-triamcinolone cream   Topical (Top) TID Tyrone Klein MD        ondansetron injection 4 mg  4 mg Intravenous Q8H PRN James Townsend MD   4 mg at 06/24/24 2002    pantoprazole injection 40 mg  40 mg Intravenous BID Tyrone Klein MD   40 mg at 06/26/24 0817    piperacillin-tazobactam (ZOSYN) 4.5 g in D5W 100 mL IVPB (MB+)  4.5 g Intravenous Q12H Alqara, Tye, MD   Stopped at 06/26/24 0557    prochlorperazine injection Soln 5 mg  5 mg Intravenous Q6H PRN James oTwnsend MD        sodium chloride 0.9% flush 10 mL  10 mL Intravenous PRN James Townsend MD        vancomycin - pharmacy to dose   Intravenous pharmacy  to manage frequency Tyrone Klein MD           No, Primary Doctor    No past medical history on file.   No past surgical history on file.   No family history on file.  Social History     Tobacco Use    Smoking status: Never    Smokeless tobacco: Never   Substance Use Topics    Alcohol use: Never     No medications prior to admission.     Review of patient's allergies indicates:  No Known Allergies         Review of Systems:       All 12 point of review of system was done and is negative except 1 in history of present illness.      Objective:       VITAL SIGNS: 24 HR MIN & MAX LAST    Temp  Min: 97.1 °F (36.2 °C)  Max: 99.2 °F (37.3 °C)  99.1 °F (37.3 °C)        BP  Min: 80/69  Max: 152/89  95/61     Pulse  Min: 82  Max: 102  88     Resp  Min: 13  Max: 32  20    SpO2  Min: 79 %  Max: 99 %  97 %      GEN:  Well developed and nourished.  Awake alert oriented to time person place.    Sitting up in the recliner.  Eating her lunch.  HEENT:  Atraumatic normocephalic.  No oral lesion.  Neck supple.  Thick short neck.  No visible JVD noted.  CV: RRR without gallop.  PULM: CTAB, unlabored  ABD: Soft, NT/ND abdomen with NABS  EXT:   One to 2+ edema of the lower extremities.  No cyanosis.  SKIN: Warm and dry  PSYCH: awake and alert  Neurologically follows command moves all her extremities.  Vascular access: RIJ CVC          Component Value Date/Time     (L) 06/26/2024 0051     (L) 06/25/2024 0358    K 3.8 06/26/2024 0051    K 3.5 06/25/2024 0358     06/26/2024 0051     06/25/2024 0358    CO2 18 (L) 06/26/2024 0051    CO2 21 (L) 06/25/2024 0358    BUN 21.2 (H) 06/26/2024 0051    BUN 40.5 (H) 06/25/2024 0358    CREATININE 3.43 (H) 06/26/2024 0051    CREATININE 4.53 (H) 06/25/2024 0358    CALCIUM 7.4 (L) 06/26/2024 0051    CALCIUM 7.1 (L) 06/25/2024 0358    PHOS 2.5 06/26/2024 0051            Component Value Date/Time    WBC 16.11 (H) 06/26/2024 0051    WBC 17.42 (H) 06/25/2024 0358    WBC 14.36  06/22/2024 0202    HGB 7.3 (L) 06/26/2024 0051    HGB 7.3 (L) 06/25/2024 0358    HCT 22.3 (L) 06/26/2024 0051    HCT 22.6 (L) 06/25/2024 0358     06/26/2024 0051     06/25/2024 0358         FL Cystogram Retrograde (xpd)   Final Result      Mildly limited assessment due to patient pain, but no significant bladder abnormality identified.         Electronically signed by: Pastor Gordon   Date:    06/25/2024   Time:    15:53      X-Ray Chest 1 View   Final Result      Findings seen consistent with CHF/increased volume status         Electronically signed by: Garland Raya   Date:    06/22/2024   Time:    13:06      X-Ray Chest 1 View for Line/Tube Placement   Final Result      Satisfactory right IJ catheter position         Electronically signed by: Tony Mar MD   Date:    06/22/2024   Time:    09:15          Assessment / Plan:       Active Hospital Problems    Diagnosis  POA    *Metabolic acidosis [E87.20]  Yes    Anemia requiring transfusions [D64.9]  Yes    Hyperkalemia [E87.5]  Yes    UTI (urinary tract infection) [N39.0]  Yes    Volume depletion [E86.9]  Yes    ESTRELLITA (acute kidney injury) [N17.9]  Yes      Resolved Hospital Problems   No resolved problems to display.     ESTRELLITA secondary to ATN secondary to obstructive uropathy.  Improving urine output   Still very low urine output.  Patient's probably has some underlying chronic kidney disease as she does have significant proteinuria more than 8 g per 24 hours.   Shaking and jerking movements of the extremities probably secondary to uremia,   Now resolved completely.  UTI--started on Rocephin  Hyperkalemia  Improved.  Acute metabolic acidosis  Improved.  Anemia    Recommendations    Urology to decide about the indwelling Mario catheter need.    Patient encouraged to continue with increased oral fluid intake.  No need for dialysis today.  Will  decide on dialysis needs on a daily basis evaluation.    Check labs tomorrow.  Patient does need aggressive  physical therapy.

## 2024-06-26 NOTE — NURSING
Nurses Note -- 4 Eyes      6/26/2024   9:31 AM      Skin assessed during: Daily Assessment      [] No Altered Skin Integrity Present    [x]Prevention Measures Documented      [x] Yes- Altered Skin Integrity Present or Discovered   [] LDA Added if Not in Epic (Describe Wound)   [] New Altered Skin Integrity was Present on Admit and Documented in LDA   [] Wound Image Taken    Wound Care Consulted? Yes    Attending Nurse:  Cassius Gilbert RN    Second RN/Staff Member:  Nurse keon Jarvis

## 2024-06-26 NOTE — NURSING
Nurses Note -- 4 Eyes      6/26/2024   3:02 AM      Skin assessed during: Q Shift Change      [] No Altered Skin Integrity Present    []Prevention Measures Documented      [x] Yes- Altered Skin Integrity Present or Discovered   [] LDA Added if Not in Epic (Describe Wound)   [] New Altered Skin Integrity was Present on Admit and Documented in LDA   [] Wound Image Taken    Wound Care Consulted? No    Attending Nurse:  SANAZ Varner    Second RN/Staff Member:  SANAZ Berg

## 2024-06-26 NOTE — PROGRESS NOTES
Inpatient Nutrition Assessment    Admit Date: 6/22/2024   Total duration of encounter: 4 days   Patient Age: 69 y.o.    Nutrition Recommendation/Prescription     Continue current diet (Diet Adult Regular) as tolerated; add Boost Plus (provides 360 kcal, 14 g protein per serving) BID.    Communication of Recommendations: reviewed with patient    Nutrition Assessment     Malnutrition Assessment/Nutrition-Focused Physical Exam    Malnutrition Context: acute illness or injury (06/22/24 1506)  Malnutrition Level:  (does not meet criteria at this time) (06/22/24 1506)  Energy Intake (Malnutrition):  (unable to obtain) (06/22/24 1506)  Weight Loss (Malnutrition):  (unable to obtain) (06/22/24 1506)  Subcutaneous Fat (Malnutrition):  (does not meet) (06/22/24 1506)           Muscle Mass (Malnutrition):  (does not meet) (06/22/24 1506)                          Fluid Accumulation (Malnutrition): severe (06/22/24 1506)        A minimum of two characteristics is recommended for diagnosis of either severe or non-severe malnutrition.    Chart Review    Reason Seen: follow-up    Malnutrition Screening Tool Results   Have you recently lost weight without trying?: Unsure  Have you been eating poorly because of a decreased appetite?: Yes   MST Score: 3   Diagnosis:  Acute renal failure  Sepsis/UTI  Bilateral hydroureteronephrosis  Bilateral pleural effusions  Bilateral lower extremity edema  Anemia of chronic disease    Relevant Medical History: no known past medical history     Scheduled Medications:  ferrous sulfate, 1 tablet, BID  folic acid, 1 mg, Daily  heparin (porcine), 5,000 Units, Q12H  labetaloL, 200 mg, Q12H  mupirocin, , BID  pantoprazole, 40 mg, BID  piperacillin-tazobactam (Zosyn) IV (PEDS and ADULTS) (extended infusion is not appropriate), 4.5 g, Q12H    Continuous Infusions: none       PRN Medications:   0.9%  NaCl infusion (for blood administration), , Q24H PRN  0.9%  NaCl infusion (for blood administration), , Q24H  PRN  acetaminophen, 650 mg, Q4H PRN  aluminum-magnesium hydroxide-simethicone, 30 mL, Q6H PRN  calcium carbonate, 1,000 mg, Q6H PRN  heparin (porcine), 4,000 Units, PRN  hydrALAZINE, 10 mg, Q2H PRN  labetaloL, 10 mg, Q4H PRN  melatonin, 6 mg, Nightly PRN  ondansetron, 4 mg, Q8H PRN  prochlorperazine, 5 mg, Q6H PRN  sodium chloride 0.9%, 10 mL, PRN  vancomycin - pharmacy to dose, , pharmacy to manage frequency    Calorie Containing IV Medications: no significant kcals from medications at this time    Recent Labs   Lab 06/21/24  2013 06/21/24  2227 06/22/24  0202 06/22/24  1220 06/23/24  0343 06/23/24  0344 06/24/24  0325 06/25/24  0358 06/26/24  0051   * 132* 136 140  --  136 138 134* 133*   K 6.8* 6.0* 5.7* 3.6  --  3.9 3.7 3.5 3.8   CALCIUM 8.2* 7.5* 7.3* 6.7*  --  6.8* 7.4* 7.1* 7.4*   PHOS  --  7.9*  --  4.4  --  6.5* 4.9* 4.0 2.5   MG 2.30  --  2.00 1.60  --  1.70 1.70 1.80 1.80   CO2 5* <5* <5* 14*  --  15* 19* 21* 18*   .0* 205.0* 198.4* 117.8*  --  117.0* 83.8* 40.5* 21.2*   CREATININE 16.94* 14.80* 15.51* 9.84*  --  10.10* 7.43* 4.53* 3.43*   EGFRNORACEVR 2 2 2 4  --  4 6 10 14   GLUCOSE 111 273* 100 84  --  167* 136* 118* 128*   BILITOT 0.3  --  0.3 0.5  --  0.3 0.4  --  0.4   ALKPHOS 62  --  59 53  --  57 54  --  42   ALT 10  --  12 12  --  12 12  --  18   AST 13  --  15 17  --  19 17  --  23   ALBUMIN 2.9*  --  2.7* 2.3*  --  2.4* 2.5*  --  2.2*   TRIG  --  125  --   --   --   --   --   --   --    HGBA1C  --   --   --  4.7  --   --   --   --   --    WBC 15.81*  --  14.36  14.36*  --  10.94  --  17.61* 17.42* 16.11*   HGB 5.7*  --  5.2* 7.0* 7.1*  --  7.7* 7.3* 7.3*   HCT 17.7*  --  15.6* 20.0* 20.9*  --  22.6* 22.6* 22.3*     Nutrition Orders:  Diet Adult Regular    Appetite/Oral Intake: NPO/not applicable  Factors Affecting Nutritional Intake: none identified  Social Needs Impacting Access to Food: none identified  Food/Congregation/Cultural Preferences: unable to obtain  Food Allergies: no  "known food allergies listed  Last Bowel Movement: 06/26/24  Wound(s): no pressure injuries documented at this time     Comments    6/22/24 Patient in ICU, severe acidosis, emergent dialysis today, NPO, no plans to feed, patient unable to answer questions.    6/26/24 Patient up in chair eating lunch during rounds, reports decreased appetite, agreeable to chocolate Boost. She reports good appetite prior to admission and denies weight loss, she is unsure of usual/dry weight.    Anthropometrics    Height: 5' 4" (162.6 cm),    Last Weight: 133.2 kg (293 lb 10.4 oz) (06/22/24 0545), Weight Method: Bed Scale  BMI (Calculated): 50.4  BMI Classification: obese grade III (BMI >/=40)     Ideal Body Weight (IBW), Female: 120 lb     % Ideal Body Weight, Female (lb): 244.72 %                             Usual Weight Provided By: patient denies unintentional weight loss    Wt Readings from Last 5 Encounters:   06/22/24 133.2 kg (293 lb 10.4 oz)   06/21/24 113.4 kg (250 lb)     Weight Change(s) Since Admission: severe edema noted, new admit  Wt Readings from Last 1 Encounters:   06/22/24 0545 133.2 kg (293 lb 10.4 oz)   06/22/24 0107 106.6 kg (235 lb)   Admit Weight: 106.6 kg (235 lb) (06/22/24 0107), Weight Method: Bed Scale    Estimated Needs    Weight Used For Calorie Calculations: 106.6 kg (235 lb)  Energy Calorie Requirements (kcal): 2213, 1.4 stress factor  Energy Need Method: [place]- Jeor  Weight Used For Protein Calculations: 106.6 kg (235 lb)  Protein Requirements: 160 g, 1.5 g/kg  Fluid Requirements (mL): 1000 plus urinary output or per physician        Enteral Nutrition Patient not receiving enteral nutrition at this time.    Parenteral Nutrition Patient not receiving parenteral nutrition support at this time.    Evaluation of Received Nutrient Intake    Calories: not meeting estimated needs  Protein: not meeting estimated needs    Patient Education Not applicable.    Nutrition Diagnosis     PES: Inadequate energy " intake related to inability to consume sufficient nutrients as evidenced by less than 80% needs met. (active)    Nutrition Interventions     Intervention(s): general/healthful diet, commercial beverage, and collaboration with other providers  Goal: Meet greater than 80% of nutritional needs by follow-up. (goal not met)    Nutrition Goals & Monitoring     Dietitian will monitor: food and beverage intake, weight, weight change, electrolyte/renal panel, beliefs/attitudes, glucose/endocrine profile, and gastrointestinal profile  Discharge planning: resume home regimen  Nutrition Risk/Follow-Up: moderate (follow-up in 3-5 days)   Please consult if re-assessment needed sooner.

## 2024-06-26 NOTE — PT/OT/SLP PROGRESS
Physical Therapy Treatment    Patient Name:  Fior Joya   MRN:  [id number]    Recommendations:     Discharge therapy intensity: Moderate Intensity Therapy   Discharge Equipment Recommendations: to be determined by next level of care  Barriers to discharge: Impaired mobility and Ongoing medical needs    Assessment:     Fior Joya is a 69 y.o. female admitted with a medical diagnosis of  ESTRELLITA, anemia, hyperkalemia, UTI, metabolic acidemia, started on HD this admission, SOB, swelling, B hydroureteronephrosis, B pleural effusions, chronic knee pain.  She presents with the following impairments/functional limitations: weakness, impaired endurance, impaired self care skills, impaired functional mobility, gait instability, impaired balance, impaired coordination.     Pt motivated to attempt transfers today and was able to perform bed<>chair transfer successfully with RW and Minx2 assist for safety. B knee pain persists, but pt able to perform transfer without buckling. Aleksander continue to progress pt as tolerated.     Rehab Prognosis: Good; patient would benefit from acute skilled PT services to address these deficits and reach maximum level of function.    Recent Surgery: * No surgery found *      Plan:     During this hospitalization, patient would benefit from acute PT services 5 x/week to address the identified rehab impairments via gait training, therapeutic exercises, therapeutic activities and progress toward the following goals:    Plan of Care Expires:  07/25/24    Subjective     Chief Complaint: back and knee pain  Patient/Family Comments/goals: to get better  Pain/Comfort:  Location - Side 1: Bilateral  Location 1: knee  Pain Addressed 1: Distraction      Objective:     Communicated with nurse prior to session.  Patient found supine with berkowitz catheter, peripheral IV, pressure relief boots, telemetry, pulse ox (continuous), blood pressure cuff upon PT entry to room.     General Precautions: Standard, fall  Orthopedic  Precautions:    Braces: N/A  Respiratory Status: Room air  Blood Pressure: 136/71, 84 HR in chair  Skin Integrity: Visible skin intact      Functional Mobility:  Bed Mobility:     Scooting: contact guard assistance  Supine to Sit: minimum assistance  Transfers:     Sit to Stand:  minimum assistance and of 2 persons with rolling walker  Bed to Chair: minimum assistance and of 2 persons with  rolling walker  using  Step Transfer  Balance: 3ft from bed to chair w RW and Minx 2 assist for safety. Slow pace.         Education:  Patient and son/s were provided with verbal education education regarding fall prevention and safety awareness.  Understanding was verbalized.     Patient left up in chair with all lines intact, call button in reach, and nurse notified    GOALS:   Multidisciplinary Problems       Physical Therapy Goals          Problem: Physical Therapy    Goal Priority Disciplines Outcome Goal Variances Interventions   Physical Therapy Goal     PT, PT/OT Progressing     Description: Goals to be met by: 2024     Patient will increase functional independence with mobility by performin. Supine to sit with Stand-by Assistance  2. Sit to stand transfer with Contact Guard Assistance  3. Gait  x 150 feet with Contact Guard Assistance using Rolling Walker.   4. Ascend/descend 4 stair with right Handrails Contact Guard Assistance using No Assistive Device.                          Time Tracking:     PT Received On: 24  PT Start Time: 909     PT Stop Time: 923  PT Total Time (min): 14 min     Billable Minutes: Therapeutic Activity 14    Treatment Type: Treatment  PT/PTA: PT     Number of PTA visits since last PT visit: 2024

## 2024-06-26 NOTE — PROGRESS NOTES
UROLOGY  PROGRESS  NOTE    Fior Joya 1955  [id number]  6/26/2024    Cystogram yesterday with no ureteral reflux bilaterally  Patient resting in bed  No complaints at time of rounds    Afebrile   195ml UOP  WBC 16.11   7.3/22.3  BUN and creatinine 21.2/3.43    UC with group B strep    Intake/Output:  I/O this shift:  In: 240 [P.O.:240]  Out: 101 [Urine:100; Stool:1]  I/O last 3 completed shifts:  In: 567 [Other:460; IV Piggyback:107]  Out: 195 [Urine:195]     Exam:    NAD  Resp: unlabored  Abd:  Soft, NT ND  :  Cloudy urine with some sediment draining to  bag    Recent Results (from the past 24 hour(s))   Lactic Acid, Plasma    Collection Time: 06/25/24  5:16 PM   Result Value Ref Range    Lactic Acid Level 1.3 0.5 - 2.2 mmol/L   Beta-Hydroxybutyrate, Serum    Collection Time: 06/25/24  5:16 PM   Result Value Ref Range    Beta Hydroxybutyrate 0.60 (H) <=0.30 mmol/L   Comprehensive Metabolic Panel    Collection Time: 06/26/24 12:51 AM   Result Value Ref Range    Sodium 133 (L) 136 - 145 mmol/L    Potassium 3.8 3.5 - 5.1 mmol/L    Chloride 105 98 - 107 mmol/L    CO2 18 (L) 23 - 31 mmol/L    Glucose 128 (H) 82 - 115 mg/dL    Blood Urea Nitrogen 21.2 (H) 9.8 - 20.1 mg/dL    Creatinine 3.43 (H) 0.55 - 1.02 mg/dL    Calcium 7.4 (L) 8.4 - 10.2 mg/dL    Protein Total 6.0 5.8 - 7.6 gm/dL    Albumin 2.2 (L) 3.4 - 4.8 g/dL    Globulin 3.8 (H) 2.4 - 3.5 gm/dL    Albumin/Globulin Ratio 0.6 (L) 1.1 - 2.0 ratio    Bilirubin Total 0.4 <=1.5 mg/dL    ALP 42 40 - 150 unit/L    ALT 18 0 - 55 unit/L    AST 23 5 - 34 unit/L    eGFR 14 mL/min/1.73/m2    Anion Gap 10.0 mEq/L    BUN/Creatinine Ratio 6    Phosphorus    Collection Time: 06/26/24 12:51 AM   Result Value Ref Range    Phosphorus Level 2.5 2.3 - 4.7 mg/dL   Magnesium    Collection Time: 06/26/24 12:51 AM   Result Value Ref Range    Magnesium Level 1.80 1.60 - 2.60 mg/dL   CBC with Differential    Collection Time: 06/26/24 12:51 AM   Result Value Ref Range    WBC 16.11 (H)  4.50 - 11.50 x10(3)/mcL    RBC 2.41 (L) 4.20 - 5.40 x10(6)/mcL    Hgb 7.3 (L) 12.0 - 16.0 g/dL    Hct 22.3 (L) 37.0 - 47.0 %    MCV 92.5 80.0 - 94.0 fL    MCH 30.3 27.0 - 31.0 pg    MCHC 32.7 (L) 33.0 - 36.0 g/dL    RDW 14.4 11.5 - 17.0 %    Platelet 208 130 - 400 x10(3)/mcL    MPV 10.4 7.4 - 10.4 fL    Neut % 74.0 %    Lymph % 10.9 %    Mono % 9.6 %    Eos % 1.5 %    Basophil % 0.2 %    Lymph # 1.76 0.6 - 4.6 x10(3)/mcL    Neut # 11.92 (H) 2.1 - 9.2 x10(3)/mcL    Mono # 1.54 (H) 0.1 - 1.3 x10(3)/mcL    Eos # 0.24 0 - 0.9 x10(3)/mcL    Baso # 0.03 <=0.2 x10(3)/mcL    IG# 0.62 (H) 0 - 0.04 x10(3)/mcL    IG% 3.8 %    NRBC% 0.1 %   Vancomycin, Random    Collection Time: 06/26/24 12:51 AM   Result Value Ref Range    Vancomycin Random 14.9 (L) 15.0 - 20.0 ug/ml     FL Cystogram Retrograde (xpd) [[id number]] Resulted: 06/25/24 1553   Order Status: Completed Updated: 06/25/24 1555   Narrative:     EXAMINATION:  XR CYSTOGRAM IN SURGERY    CLINICAL HISTORY:  bilateral hydronephrosis; check for reflux into kidneys;    TECHNIQUE:  A  radiograph was obtained. Contrast was then instilled into the bladder through the patient's catheter with multiple fluoroscopic images obtained in various positions.  Absorbed dose is 91.87 mGy.    COMPARISON:  CT 06/21/2024    FINDINGS:  Bladder fills readily with no significant contour abnormality identified.  Due to patient discomfort, only about 300 mL of contrast instilled during the exam, but no vesicoureteral reflux identified.   Impression:       Mildly limited assessment due to patient pain, but no significant bladder abnormality identified.      Electronically signed by: Pastor Gordon  Date: 06/25/2024  Time: 15:53       Assessment:  -urinary retention with bilateral hydroureteronephrosis status post Mario insertion  -acute renal failure initiated on hemodialysis  -anemia    Plan:  -No ureteral reflux on cystogram. Will need cystoscopy with retrograde pyelograms and possible stents.  NPO after midnight.       Donna Varghese NP

## 2024-06-26 NOTE — NURSING
Nurses Note -- 4 Eyes      6/26/2024   4:36 PM      Skin assessed during: Transfer      [] No Altered Skin Integrity Present    []Prevention Measures Documented      [x] Yes- Altered Skin Integrity Present or Discovered   [] LDA Added if Not in Epic (Describe Wound)   [x] New Altered Skin Integrity was Present on Admit and Documented in LDA   [] Wound Image Taken    Wound Care Consulted? Yes      Attending Nurse:  Jose M Hill RN/Staff Member:  SANAZ Winter

## 2024-06-26 NOTE — PT/OT/SLP PROGRESS
Attempted OT session x2 today. 1st attempt RN requesting ALFARO to return at a later time 2/2 just getting pt cleaned up from loose BM and pt SOB. Re-attempted this PM however pt  downgrading to floor. Will f/u tomorrow.

## 2024-06-27 ENCOUNTER — ANESTHESIA (OUTPATIENT)
Dept: SURGERY | Facility: HOSPITAL | Age: 69
End: 2024-06-27
Payer: MEDICARE

## 2024-06-27 ENCOUNTER — ANESTHESIA EVENT (OUTPATIENT)
Dept: SURGERY | Facility: HOSPITAL | Age: 69
End: 2024-06-27
Payer: MEDICARE

## 2024-06-27 LAB
ABO + RH BLD: NORMAL
ABO + RH BLD: NORMAL
ABS NEUT (OLG): 10.62 X10(3)/MCL (ref 2.1–9.2)
ANION GAP SERPL CALC-SCNC: 12 MEQ/L
ANISOCYTOSIS BLD QL SMEAR: ABNORMAL
BACTERIA BLD CULT: NORMAL
BACTERIA BLD CULT: NORMAL
BASOPHILS NFR BLD MANUAL: 0.14 X10(3)/MCL (ref 0–0.2)
BASOPHILS NFR BLD MANUAL: 1 %
BLD PROD TYP BPU: NORMAL
BLD PROD TYP BPU: NORMAL
BLOOD UNIT EXPIRATION DATE: NORMAL
BLOOD UNIT EXPIRATION DATE: NORMAL
BLOOD UNIT TYPE CODE: 6200
BLOOD UNIT TYPE CODE: 6200
BUN SERPL-MCNC: 39.2 MG/DL (ref 9.8–20.1)
CALCIUM SERPL-MCNC: 6.6 MG/DL (ref 8.4–10.2)
CHLORIDE SERPL-SCNC: 102 MMOL/L (ref 98–107)
CO2 SERPL-SCNC: 19 MMOL/L (ref 23–31)
CREAT SERPL-MCNC: 5.41 MG/DL (ref 0.55–1.02)
CREAT/UREA NIT SERPL: 7
CROSSMATCH INTERPRETATION: NORMAL
CROSSMATCH INTERPRETATION: NORMAL
DISPENSE STATUS: NORMAL
DISPENSE STATUS: NORMAL
EOSINOPHIL NFR BLD MANUAL: 0.14 X10(3)/MCL (ref 0–0.9)
EOSINOPHIL NFR BLD MANUAL: 1 %
ERYTHROCYTE [DISTWIDTH] IN BLOOD BY AUTOMATED COUNT: 14.5 % (ref 11.5–17)
GFR SERPLBLD CREATININE-BSD FMLA CKD-EPI: 8 ML/MIN/1.73/M2
GLUCOSE SERPL-MCNC: 102 MG/DL (ref 82–115)
GROUP & RH: NORMAL
HCT VFR BLD AUTO: 22.7 % (ref 37–47)
HGB BLD-MCNC: 7.1 G/DL (ref 12–16)
INDIRECT COOMBS: NORMAL
INSTRUMENT WBC (OLG): 13.62 X10(3)/MCL
LYMPHOCYTES NFR BLD MANUAL: 1.36 X10(3)/MCL
LYMPHOCYTES NFR BLD MANUAL: 10 %
MACROCYTES BLD QL SMEAR: ABNORMAL
MCH RBC QN AUTO: 30.3 PG (ref 27–31)
MCHC RBC AUTO-ENTMCNC: 31.3 G/DL (ref 33–36)
MCV RBC AUTO: 97 FL (ref 80–94)
MONOCYTES NFR BLD MANUAL: 1.09 X10(3)/MCL (ref 0.1–1.3)
MONOCYTES NFR BLD MANUAL: 8 %
MYELOCYTES NFR BLD MANUAL: 3 %
NEUTROPHILS NFR BLD MANUAL: 78 %
NRBC BLD AUTO-RTO: 0 %
PLATELET # BLD AUTO: 201 X10(3)/MCL (ref 130–400)
PLATELET # BLD EST: NORMAL 10*3/UL
PLATELETS.RETICULATED NFR BLD AUTO: 3.4 % (ref 0.9–11.2)
PMV BLD AUTO: 10.9 FL (ref 7.4–10.4)
POTASSIUM SERPL-SCNC: 4 MMOL/L (ref 3.5–5.1)
RBC # BLD AUTO: 2.34 X10(6)/MCL (ref 4.2–5.4)
RBC MORPH BLD: ABNORMAL
SODIUM SERPL-SCNC: 133 MMOL/L (ref 136–145)
SPECIMEN OUTDATE: NORMAL
UNIT NUMBER: NORMAL
UNIT NUMBER: NORMAL
VANCOMYCIN SERPL-MCNC: 13.9 UG/ML (ref 15–20)
WBC # BLD AUTO: 13.62 X10(3)/MCL (ref 4.5–11.5)

## 2024-06-27 PROCEDURE — 85025 COMPLETE CBC W/AUTO DIFF WBC: CPT | Performed by: INTERNAL MEDICINE

## 2024-06-27 PROCEDURE — 25000003 PHARM REV CODE 250

## 2024-06-27 PROCEDURE — 27000221 HC OXYGEN, UP TO 24 HOURS

## 2024-06-27 PROCEDURE — 25500020 PHARM REV CODE 255: Performed by: UROLOGY

## 2024-06-27 PROCEDURE — 36000706: Performed by: UROLOGY

## 2024-06-27 PROCEDURE — 36415 COLL VENOUS BLD VENIPUNCTURE: CPT | Performed by: NURSE PRACTITIONER

## 2024-06-27 PROCEDURE — 80048 BASIC METABOLIC PNL TOTAL CA: CPT | Performed by: NURSE PRACTITIONER

## 2024-06-27 PROCEDURE — 37000009 HC ANESTHESIA EA ADD 15 MINS: Performed by: UROLOGY

## 2024-06-27 PROCEDURE — 90935 HEMODIALYSIS ONE EVALUATION: CPT

## 2024-06-27 PROCEDURE — 63600175 PHARM REV CODE 636 W HCPCS: Performed by: STUDENT IN AN ORGANIZED HEALTH CARE EDUCATION/TRAINING PROGRAM

## 2024-06-27 PROCEDURE — 25000003 PHARM REV CODE 250: Performed by: NURSE PRACTITIONER

## 2024-06-27 PROCEDURE — 25000003 PHARM REV CODE 250: Performed by: INTERNAL MEDICINE

## 2024-06-27 PROCEDURE — C1758 CATHETER, URETERAL: HCPCS | Performed by: UROLOGY

## 2024-06-27 PROCEDURE — 63600175 PHARM REV CODE 636 W HCPCS: Performed by: NURSE PRACTITIONER

## 2024-06-27 PROCEDURE — 86900 BLOOD TYPING SEROLOGIC ABO: CPT | Performed by: INTERNAL MEDICINE

## 2024-06-27 PROCEDURE — 0T768DZ DILATION OF RIGHT URETER WITH INTRALUMINAL DEVICE, VIA NATURAL OR ARTIFICIAL OPENING ENDOSCOPIC: ICD-10-PCS | Performed by: UROLOGY

## 2024-06-27 PROCEDURE — C1769 GUIDE WIRE: HCPCS | Performed by: UROLOGY

## 2024-06-27 PROCEDURE — 86850 RBC ANTIBODY SCREEN: CPT | Performed by: INTERNAL MEDICINE

## 2024-06-27 PROCEDURE — 36000707: Performed by: UROLOGY

## 2024-06-27 PROCEDURE — 21400001 HC TELEMETRY ROOM

## 2024-06-27 PROCEDURE — 11000001 HC ACUTE MED/SURG PRIVATE ROOM

## 2024-06-27 PROCEDURE — C2617 STENT, NON-COR, TEM W/O DEL: HCPCS | Performed by: UROLOGY

## 2024-06-27 PROCEDURE — 63600175 PHARM REV CODE 636 W HCPCS

## 2024-06-27 PROCEDURE — 97530 THERAPEUTIC ACTIVITIES: CPT | Mod: CQ

## 2024-06-27 PROCEDURE — P9016 RBC LEUKOCYTES REDUCED: HCPCS | Performed by: INTERNAL MEDICINE

## 2024-06-27 PROCEDURE — C9113 INJ PANTOPRAZOLE SODIUM, VIA: HCPCS | Performed by: STUDENT IN AN ORGANIZED HEALTH CARE EDUCATION/TRAINING PROGRAM

## 2024-06-27 PROCEDURE — 71000033 HC RECOVERY, INTIAL HOUR: Performed by: UROLOGY

## 2024-06-27 PROCEDURE — 36415 COLL VENOUS BLD VENIPUNCTURE: CPT | Performed by: STUDENT IN AN ORGANIZED HEALTH CARE EDUCATION/TRAINING PROGRAM

## 2024-06-27 PROCEDURE — 80202 ASSAY OF VANCOMYCIN: CPT | Performed by: STUDENT IN AN ORGANIZED HEALTH CARE EDUCATION/TRAINING PROGRAM

## 2024-06-27 PROCEDURE — 36415 COLL VENOUS BLD VENIPUNCTURE: CPT | Performed by: INTERNAL MEDICINE

## 2024-06-27 PROCEDURE — BT1D1ZZ FLUOROSCOPY OF RIGHT KIDNEY, URETER AND BLADDER USING LOW OSMOLAR CONTRAST: ICD-10-PCS | Performed by: UROLOGY

## 2024-06-27 PROCEDURE — 86923 COMPATIBILITY TEST ELECTRIC: CPT | Performed by: INTERNAL MEDICINE

## 2024-06-27 PROCEDURE — 63600175 PHARM REV CODE 636 W HCPCS: Performed by: INTERNAL MEDICINE

## 2024-06-27 PROCEDURE — 86901 BLOOD TYPING SEROLOGIC RH(D): CPT | Performed by: INTERNAL MEDICINE

## 2024-06-27 PROCEDURE — 37000008 HC ANESTHESIA 1ST 15 MINUTES: Performed by: UROLOGY

## 2024-06-27 DEVICE — STENT SET URETERAL 6X28CM: Type: IMPLANTABLE DEVICE | Site: URETER | Status: FUNCTIONAL

## 2024-06-27 RX ORDER — MEPERIDINE HYDROCHLORIDE 25 MG/ML
12.5 INJECTION INTRAMUSCULAR; INTRAVENOUS; SUBCUTANEOUS ONCE
Status: CANCELLED | OUTPATIENT
Start: 2024-06-27 | End: 2024-06-27

## 2024-06-27 RX ORDER — CALCIUM CARBONATE 200(500)MG
1000 TABLET,CHEWABLE ORAL 2 TIMES DAILY
Status: DISCONTINUED | OUTPATIENT
Start: 2024-06-27 | End: 2024-07-10 | Stop reason: HOSPADM

## 2024-06-27 RX ORDER — ACETAMINOPHEN 10 MG/ML
1000 INJECTION, SOLUTION INTRAVENOUS ONCE
Status: COMPLETED | OUTPATIENT
Start: 2024-06-27 | End: 2024-06-27

## 2024-06-27 RX ORDER — HYDROMORPHONE HYDROCHLORIDE 2 MG/ML
0.5 INJECTION, SOLUTION INTRAMUSCULAR; INTRAVENOUS; SUBCUTANEOUS EVERY 5 MIN PRN
Status: CANCELLED | OUTPATIENT
Start: 2024-06-27

## 2024-06-27 RX ORDER — ONDANSETRON HYDROCHLORIDE 2 MG/ML
4 INJECTION, SOLUTION INTRAVENOUS ONCE
Status: CANCELLED | OUTPATIENT
Start: 2024-06-27 | End: 2024-06-27

## 2024-06-27 RX ORDER — ERGOCALCIFEROL 1.25 MG/1
50000 CAPSULE ORAL
Status: DISCONTINUED | OUTPATIENT
Start: 2024-06-27 | End: 2024-06-29

## 2024-06-27 RX ORDER — ROCURONIUM BROMIDE 10 MG/ML
INJECTION, SOLUTION INTRAVENOUS
Status: DISCONTINUED | OUTPATIENT
Start: 2024-06-27 | End: 2024-06-27

## 2024-06-27 RX ORDER — SODIUM CHLORIDE 9 MG/ML
INJECTION, SOLUTION INTRAVENOUS ONCE
OUTPATIENT
Start: 2024-06-27 | End: 2024-06-27

## 2024-06-27 RX ORDER — ONDANSETRON HYDROCHLORIDE 2 MG/ML
INJECTION, SOLUTION INTRAVENOUS
Status: DISCONTINUED | OUTPATIENT
Start: 2024-06-27 | End: 2024-06-27

## 2024-06-27 RX ORDER — SODIUM CHLORIDE 9 MG/ML
INJECTION, SOLUTION INTRAVENOUS
OUTPATIENT
Start: 2024-06-27

## 2024-06-27 RX ORDER — HYDROCODONE BITARTRATE AND ACETAMINOPHEN 500; 5 MG/1; MG/1
TABLET ORAL
Status: DISCONTINUED | OUTPATIENT
Start: 2024-06-27 | End: 2024-07-10 | Stop reason: HOSPADM

## 2024-06-27 RX ORDER — DIPHENHYDRAMINE HYDROCHLORIDE 50 MG/ML
25 INJECTION INTRAMUSCULAR; INTRAVENOUS EVERY 6 HOURS PRN
Status: CANCELLED | OUTPATIENT
Start: 2024-06-27

## 2024-06-27 RX ORDER — FENTANYL CITRATE 50 UG/ML
INJECTION, SOLUTION INTRAMUSCULAR; INTRAVENOUS
Status: DISCONTINUED | OUTPATIENT
Start: 2024-06-27 | End: 2024-06-27

## 2024-06-27 RX ORDER — EPHEDRINE SULFATE 50 MG/ML
INJECTION, SOLUTION INTRAVENOUS
Status: DISCONTINUED | OUTPATIENT
Start: 2024-06-27 | End: 2024-06-27

## 2024-06-27 RX ORDER — SUCCINYLCHOLINE CHLORIDE 20 MG/ML
INJECTION INTRAMUSCULAR; INTRAVENOUS
Status: DISCONTINUED | OUTPATIENT
Start: 2024-06-27 | End: 2024-06-27

## 2024-06-27 RX ORDER — PHENYLEPHRINE HYDROCHLORIDE 10 MG/ML
INJECTION INTRAVENOUS
Status: DISCONTINUED | OUTPATIENT
Start: 2024-06-27 | End: 2024-06-27

## 2024-06-27 RX ORDER — PROPOFOL 10 MG/ML
VIAL (ML) INTRAVENOUS
Status: DISCONTINUED | OUTPATIENT
Start: 2024-06-27 | End: 2024-06-27

## 2024-06-27 RX ORDER — GLYCOPYRROLATE 0.2 MG/ML
INJECTION INTRAMUSCULAR; INTRAVENOUS
Status: DISCONTINUED | OUTPATIENT
Start: 2024-06-27 | End: 2024-06-27

## 2024-06-27 RX ORDER — LIDOCAINE HYDROCHLORIDE 20 MG/ML
INJECTION, SOLUTION EPIDURAL; INFILTRATION; INTRACAUDAL; PERINEURAL
Status: DISCONTINUED | OUTPATIENT
Start: 2024-06-27 | End: 2024-06-27

## 2024-06-27 RX ORDER — HYDROMORPHONE HYDROCHLORIDE 2 MG/ML
0.2 INJECTION, SOLUTION INTRAMUSCULAR; INTRAVENOUS; SUBCUTANEOUS EVERY 5 MIN PRN
Status: CANCELLED | OUTPATIENT
Start: 2024-06-27

## 2024-06-27 RX ADMIN — PROPOFOL 100 MG: 10 INJECTION, EMULSION INTRAVENOUS at 11:06

## 2024-06-27 RX ADMIN — PHENYLEPHRINE HYDROCHLORIDE 100 MCG: 10 INJECTION INTRAVENOUS at 12:06

## 2024-06-27 RX ADMIN — PIPERACILLIN AND TAZOBACTAM 4.5 G: 4; .5 INJECTION, POWDER, LYOPHILIZED, FOR SOLUTION INTRAVENOUS; PARENTERAL at 01:06

## 2024-06-27 RX ADMIN — SODIUM CHLORIDE, SODIUM GLUCONATE, SODIUM ACETATE, POTASSIUM CHLORIDE AND MAGNESIUM CHLORIDE: 526; 502; 368; 37; 30 INJECTION, SOLUTION INTRAVENOUS at 11:06

## 2024-06-27 RX ADMIN — CALCIUM CARBONATE (ANTACID) CHEW TAB 500 MG 1000 MG: 500 CHEW TAB at 10:06

## 2024-06-27 RX ADMIN — EPHEDRINE SULFATE 10 MG: 50 INJECTION INTRAVENOUS at 11:06

## 2024-06-27 RX ADMIN — SUCCINYLCHOLINE CHLORIDE 140 MG: 20 INJECTION, SOLUTION INTRAMUSCULAR; INTRAVENOUS at 11:06

## 2024-06-27 RX ADMIN — FENTANYL CITRATE 50 MCG: 50 INJECTION, SOLUTION INTRAMUSCULAR; INTRAVENOUS at 11:06

## 2024-06-27 RX ADMIN — LEUCINE, PHENYLALANINE, LYSINE, METHIONINE, ISOLEUCINE, VALINE, HISTIDINE, THREONINE, TRYPTOPHAN, ALANINE, GLYCINE, ARGININE, PROLINE, SERINE, TYROSINE, SODIUM ACETATE, DIBASIC POTASSIUM PHOSPHATE, MAGNESIUM CHLORIDE, SODIUM CHLORIDE, CALCIUM CHLORIDE, DEXTROSE
311; 238; 247; 170; 255; 247; 204; 179; 77; 880; 438; 489; 289; 213; 17; 297; 261; 51; 77; 33; 5 INJECTION INTRAVENOUS at 01:06

## 2024-06-27 RX ADMIN — HEPARIN SODIUM 4000 UNITS: 1000 INJECTION INTRAVENOUS; SUBCUTANEOUS at 05:06

## 2024-06-27 RX ADMIN — LABETALOL HYDROCHLORIDE 200 MG: 200 TABLET, FILM COATED ORAL at 10:06

## 2024-06-27 RX ADMIN — LIDOCAINE HYDROCHLORIDE 80 MG: 20 INJECTION, SOLUTION INTRAVENOUS at 11:06

## 2024-06-27 RX ADMIN — FENTANYL CITRATE 50 MCG: 50 INJECTION, SOLUTION INTRAMUSCULAR; INTRAVENOUS at 12:06

## 2024-06-27 RX ADMIN — ONDANSETRON 4 MG: 2 INJECTION INTRAMUSCULAR; INTRAVENOUS at 12:06

## 2024-06-27 RX ADMIN — FOLIC ACID 1 MG: 1 TABLET ORAL at 08:06

## 2024-06-27 RX ADMIN — PROPOFOL 30 MG: 10 INJECTION, EMULSION INTRAVENOUS at 11:06

## 2024-06-27 RX ADMIN — PANTOPRAZOLE SODIUM 40 MG: 40 INJECTION, POWDER, FOR SOLUTION INTRAVENOUS at 08:06

## 2024-06-27 RX ADMIN — HEPARIN SODIUM 5000 UNITS: 5000 INJECTION INTRAVENOUS; SUBCUTANEOUS at 08:06

## 2024-06-27 RX ADMIN — FERROUS SULFATE TAB 325 MG (65 MG ELEMENTAL FE) 1 EACH: 325 (65 FE) TAB at 08:06

## 2024-06-27 RX ADMIN — LABETALOL HYDROCHLORIDE 200 MG: 200 TABLET, FILM COATED ORAL at 08:06

## 2024-06-27 RX ADMIN — GLYCOPYRROLATE 0.2 MG: 0.2 INJECTION INTRAMUSCULAR; INTRAVENOUS at 11:06

## 2024-06-27 RX ADMIN — ERGOCALCIFEROL 50000 UNITS: 1.25 CAPSULE ORAL at 09:06

## 2024-06-27 RX ADMIN — NYSTATIN AND TRIAMCINOLONE ACETONIDE: 100000; 1 CREAM TOPICAL at 08:06

## 2024-06-27 RX ADMIN — CALCIUM CARBONATE (ANTACID) CHEW TAB 500 MG 1000 MG: 500 CHEW TAB at 09:06

## 2024-06-27 RX ADMIN — PANTOPRAZOLE SODIUM 40 MG: 40 INJECTION, POWDER, FOR SOLUTION INTRAVENOUS at 01:06

## 2024-06-27 RX ADMIN — PROPOFOL 50 MG: 10 INJECTION, EMULSION INTRAVENOUS at 11:06

## 2024-06-27 RX ADMIN — EPHEDRINE SULFATE 15 MG: 50 INJECTION INTRAVENOUS at 12:06

## 2024-06-27 RX ADMIN — ACETAMINOPHEN 1000 MG: 10 INJECTION, SOLUTION INTRAVENOUS at 07:06

## 2024-06-27 RX ADMIN — PHENYLEPHRINE HYDROCHLORIDE 200 MCG: 10 INJECTION INTRAVENOUS at 12:06

## 2024-06-27 RX ADMIN — EPHEDRINE SULFATE 5 MG: 50 INJECTION INTRAVENOUS at 11:06

## 2024-06-27 RX ADMIN — NYSTATIN AND TRIAMCINOLONE ACETONIDE: 100000; 1 CREAM TOPICAL at 10:06

## 2024-06-27 RX ADMIN — FERROUS SULFATE TAB 325 MG (65 MG ELEMENTAL FE) 1 EACH: 325 (65 FE) TAB at 10:06

## 2024-06-27 RX ADMIN — ROCURONIUM BROMIDE 5 MG: 10 SOLUTION INTRAVENOUS at 11:06

## 2024-06-27 RX ADMIN — PANTOPRAZOLE SODIUM 40 MG: 40 INJECTION, POWDER, FOR SOLUTION INTRAVENOUS at 10:06

## 2024-06-27 RX ADMIN — CEFTRIAXONE SODIUM 2 G: 2 INJECTION, POWDER, FOR SOLUTION INTRAMUSCULAR; INTRAVENOUS at 06:06

## 2024-06-27 RX ADMIN — HEPARIN SODIUM 5000 UNITS: 5000 INJECTION INTRAVENOUS; SUBCUTANEOUS at 10:06

## 2024-06-27 NOTE — PROGRESS NOTES
[facility] Hospital Medicine Progress Note        Chief Complaint: Inpatient Follow-up      HPI:   Mrs Joya is a 69-year-old lady with no PMH who was transferred to [facility] from [place] with complaints of dyspnea on exertion, B/L lower extremity edema, shortness for breath. She was noted to have severe anemia with HGB/HCT of 5.7/17.7, WBCs of 15.8, hyperkalemia with potassium 6.8,, BUN/creatinine of 209/16.94. UA showed leukocytes and many bacteria as well as 3+ protein, 3+ blood. Blood and urine cultures were sent off. Nephrology was consulted on admission and patient was admitted to ICU for placement of dialysis catheter and initiation of hemodialysis. She also received transfusion with 2 units PRBCs with improvement in symptoms. Urology was consulted due to B/L hydronephrosis on ultrasound with urine retention requiring Mario catheter. Placed on IV vancomycin, IV Zosyn. Downgraded from ICU to Hospital Medicine on 06/25. At bedside, patient stated she was doing well and had no new complaints. Continued to have some tremors and some shortness of breath but overall felt improved. On IV Vancomycin, IV Zosyn. Urine cultures grew GBS, Blood cultures negative x 72 hours. Nephrology on board; follow recommendations. Urology on board; follow recommendations. Continued on hemodialysis. PT/OT consulted; recommending moderate intensity therapy. Continued on FeSO4 b.i.d., folic acid 1 mg daily, labetalol 200 mg b.i.d., Protonix 40 mg b.i.d..       Interval Hx:   Today, Mrs. Joya stated she was doing well and had no new complaints.  Will continue IV antimicrobials.  Continues to have Mario; will follow urology recommendations.   Patient has been taken to the OR for systolic he, retrograde pyelogram and possible JJ stents with Urology   Vitals reviewed and stable   Urine cultures grew 25-50 K GBS, blood cultures negative at 96 hours   Will deescalate antibiotics to IV ceftriaxone   Discontinue  vancomycin and Zosyn   Hemoglobin levels are low will transfuse with 2 units of packed red cells  Will trend hemoglobin levels daily   Send for anemia w/up      Case was discussed with patient's nurse on the floor.     Objective/physical exam:  At OR     Assessment/Plan:  Bilateral hydroureteronephrosis status post indwelling Mario    Acute kidney injury, possible component of chronic kidney disease and evidence of obstructive uropathy, post initiation of hemodialysis 06/22/2024  Urine retention requiring Mario  UTI  Acute on chronic Normocytic anemia  Left lower extremity cellulitis  Hypertension  Morbid obesity, BMI 50.4     Continues to be admitted    Patient has been taken to the OR for cystoscopy, retrograde pyelogram and possible JJ stents with Urology   Urine cultures grew 25-50 K GBS, blood cultures negative at 96 hours   Will deescalate antibiotics to IV ceftriaxone   Discontinue vancomycin and Zosyn   Hemoglobin levels are low will transfuse with 2 units of packed red cells  Will trend hemoglobin levels daily   Nephrology on board; follow recommendations   Blood cultures remain negative; urine culture growing GBS   PT/OT on board; recommending moderate intensity therapy   Continued on FeSO4 b.i.d., folic acid 1 mg daily, labetalol 200 mg b.i.d., Protonix 40 mg b.i.d.  Continue monitoring patient's symptoms     VTE prophylaxis:  Heparin     Patient condition:  Stable     Anticipated discharge and Disposition:     Pending     All diagnosis and differential diagnosis have been reviewed; assessment and plan has been documented; I have personally reviewed the labs and test results that are presently available; I have reviewed the patients medication list; I have reviewed the consulting providers response and recommendations. I have reviewed or attempted to review medical records based upon their availability     All of the patient's questions have been  addressed and answered. Patient's is agreeable to the  above stated plan. I will continue to monitor closely and make adjustments to medical management as needed.      Critical care time 39 minutes   Critical Care diagnoses anemia requiring transfusion     VITAL SIGNS: 24 HRS MIN & MAX LAST   Temp  Min: 97.3 °F (36.3 °C)  Max: 98.6 °F (37 °C) 97.5 °F (36.4 °C)   BP  Min: 100/56  Max: 147/55 121/67   Pulse  Min: 71  Max: 99  94   Resp  Min: 16  Max: 21 16   SpO2  Min: 90 %  Max: 98 % 96 %     I have reviewed the following labs:  Recent Labs   Lab 06/25/24  0358 06/26/24  0051 06/27/24  0935   WBC 17.42* 16.11* 13.62  13.62*   RBC 2.42* 2.41* 2.34*   HGB 7.3* 7.3* 7.1*   HCT 22.6* 22.3* 22.7*   MCV 93.4 92.5 97.0*   MCH 30.2 30.3 30.3   MCHC 32.3* 32.7* 31.3*   RDW 14.5 14.4 14.5    208 201   MPV 11.0* 10.4 10.9*     Recent Labs   Lab 06/22/24  0426 06/22/24  1220 06/22/24  1230 06/23/24  0344 06/24/24  0325 06/25/24  0358 06/26/24  0051 06/27/24  0852   NA  --    < >  --  136 138 134* 133* 133*   K  --    < >  --  3.9 3.7 3.5 3.8 4.0   CL  --    < >  --  100 98 100 105 102   CO2  --    < >  --  15* 19* 21* 18* 19*   BUN  --    < >  --  117.0* 83.8* 40.5* 21.2* 39.2*   CREATININE  --    < >  --  10.10* 7.43* 4.53* 3.43* 5.41*   CALCIUM  --    < >  --  6.8* 7.4* 7.1* 7.4* 6.6*   PH 7.150*  --  7.550*  --   --   --   --   --    MG  --    < >  --  1.70 1.70 1.80 1.80  --    ALBUMIN  --    < >  --  2.4* 2.5*  --  2.2*  --    ALKPHOS  --    < >  --  57 54  --  42  --    ALT  --    < >  --  12 12  --  18  --    AST  --    < >  --  19 17  --  23  --    BILITOT  --    < >  --  0.3 0.4  --  0.4  --     < > = values in this interval not displayed.     Microbiology Results (last 7 days)       ** No results found for the last 168 hours. **             See below for Radiology    Scheduled Med:   calcium carbonate  1,000 mg Oral BID    cefTRIAXone (Rocephin) IV (PEDS and ADULTS)  2 g Intravenous Q24H    ergocalciferol  50,000 Units Oral Q7 Days    ferrous sulfate  1 tablet Oral  BID    folic acid  1 mg Oral Daily    heparin (porcine)  5,000 Units Subcutaneous Q12H    labetaloL  200 mg Oral Q12H    nystatin-triamcinolone   Topical (Top) TID    pantoprazole  40 mg Intravenous BID      Continuous Infusions:   Amino acid 4.25% - dextrose 5% (CLINIMIX-E) solution (1L provides 42.5 gm AA, 50 gm CHO (170 kcal/L dextrose), Na 35, K 30, Mg 5, Ca 4.5, Acetate 70, Cl 39, Phos 15)   Intravenous Continuous          PRN Meds:    Current Facility-Administered Medications:     0.9%  NaCl infusion (for blood administration), , Intravenous, Q24H PRN    0.9%  NaCl infusion (for blood administration), , Intravenous, Q24H PRN    0.9%  NaCl infusion (for blood administration), , Intravenous, Q24H PRN    acetaminophen, 650 mg, Oral, Q4H PRN    aluminum-magnesium hydroxide-simethicone, 30 mL, Oral, Q6H PRN    ammonium lactate, , Topical (Top), BID PRN    calcium carbonate, 1,000 mg, Oral, Q6H PRN    heparin (porcine), 4,000 Units, Intravenous, PRN    hydrALAZINE, 10 mg, Intravenous, Q2H PRN    labetaloL, 10 mg, Intravenous, Q4H PRN    melatonin, 6 mg, Oral, Nightly PRN    ondansetron, 4 mg, Intravenous, Q8H PRN    prochlorperazine, 5 mg, Intravenous, Q6H PRN    sodium chloride 0.9%, 10 mL, Intravenous, PRN     Assessment/Plan:      VTE prophylaxis:     Patient condition:  Stable/Fair/Guarded/ Serious/ Critical    Anticipated discharge and Disposition:         All diagnosis and differential diagnosis have been reviewed; assessment and plan has been documented; I have personally reviewed the labs and test results that are presently available; I have reviewed the patients medication list; I have reviewed the consulting providers response and recommendations. I have reviewed or attempted to review medical records based upon their availability    All of the patient's questions have been  addressed and answered. Patient's is agreeable to the above stated plan. I will continue to monitor closely and make adjustments to  medical management as needed.  _____________________________________________________________________    Nutrition Status:    Radiology:  I have personally reviewed the following imaging and agree with the radiologist.     SURG FL Surgery Fluoro Usage  See OP Notes for results.     IMPRESSION: See OP Notes for results.     This procedure was auto-finalized by: Virtual Radiologist      Susan Sibley MD  Department of Hospital Medicine   [facility]   06/27/2024

## 2024-06-27 NOTE — PLAN OF CARE
Problem: Infection  Goal: Absence of Infection Signs and Symptoms  Outcome: Progressing     Problem: Bariatric Environmental Safety  Goal: Safety Maintained with Care  Outcome: Progressing     Problem: Fall Injury Risk  Goal: Absence of Fall and Fall-Related Injury  Outcome: Progressing     Problem: Skin Injury Risk Increased  Goal: Skin Health and Integrity  Outcome: Progressing

## 2024-06-27 NOTE — PT/OT/SLP PROGRESS
Occupational Therapy      Patient Name:  Fior Joya   MRN:  [id number]    Patient not seen this AM secondary to off of floor for cystoscopy. Will follow-up this PM if schedule allows.    1510: Followed up this PM, patient is now in dialysis.     6/27/2024

## 2024-06-27 NOTE — PROGRESS NOTES
[facility] - 8th Floor Med Surg  Nephrology  Progress Note    Patient Name: Fior Joya  MRN: [id number]  Admission Date: 6/22/2024  Hospital Length of Stay: 5 days  Attending Provider: Tyrone Klein MD   Primary Care Physician: Linda, Primary Doctor  Principal Problem:Metabolic acidosis      Subjective:     Fior Joya is a 69 y.o. female who was transferred from hospital in [place] due to acute renal failure.  She initially presented to the hospital due to generalized weakness and shortness of breath with exertion.  Per their report she had been mostly bed-bound for the past 3 weeks.  He reported that she had been drinking getting up going to the restroom.  She did have some episodes of nausea vomiting and generalized weakness along with generalized muscle pain more recently.  She was endorsing shortness of breath but no chest pain.  Denied had been running a fever.  Per their his report, she had not seen a physician in 10 years, because she was not feeling ill and did not feel like she needed to go.  She had also been having some swelling in the legs.  She has no known medical issues per their report.  On admit, WBC elevated at 15, RBC 1.8, hemoglobin 5.7, D-dimer elevated at 1.57, serum sodium 132, potassium 6, serum CO2 less than 5, , creatinine 14.8, glucose 273, calcium 7.5, phosphorus 7.9, albumin 2.9.  TSH and lactic were okay.  UA results indicative of UTI sent for cultures.  Blood cultures obtained and pending.  Received phone call from ER physician in early a.m. hours, it was decided at that time we will proceed with initiation of RRT.  Right IJ temporary cath was placed per General surgery.   She is awake and alert this morning upon exam.  She does respond with simple answers to questions posed.  Son in room in majority of history obtained from him.  Oxygen saturation is 99% on nasal cannula.  We are consulted for management ESTRELLITA.  Bilateral hydronephrosis on CT from 06/21/2024      6/23/24- had urology eval., bilateral hydronephrosis, no acute surgical intervention rec. At this time. Recommended Berkowitz with judicious hydration.  Per his report he feels outlet obstruction could be multifactorial but unclear underlying etiology at this time.     He had approximately 700 cc urine output last 24 hours.  She is awake alert oriented in bed on dialysis machine. Brother in . She has some nausea and vomiting this am, but no SOB or CP.      06/24/2024   Very much awake alert and oriented this morning.  Her son sitting in the chair on her bedside.  Still having shaky and jerky movements of all extremities.  Able to answer questions appropriately.  Appetite is poor.  Still good urine output noted.  No family history of anybody with kidney failure.  She knows that she is feeling better.  Her son also thinks that she is much better than before.     06/25/2024   Currently tolerating dialysis.  Most of the jerking movement has resolved.  She is still having some shaking movements but overall she knows she is much improved and her appetite seems to be improving also.  No shortness of breath.  Adequate urine output noted.  Follows command and moves all her extremities.       06/26/2024   Patient is sitting up in her recliner.  Very much awake alert and oriented to time person place.  Her son present in the room.  And she is feeding herself her lunch at present time without any problem and says that she is very hungry.  No shortness of breath voiced.  She still has indwelling Berkowitz catheter which according to the nurses, the urologist would like to keep it.  Shakes has gone away.    6/27 - Hgb decreased pending PRBC transfusion. Creatine increased nearly 2 points in the past 24 hours with 350 mL urine output via berkowitz. Pending cystoscopy today     Review of patient's allergies indicates:  No Known Allergies  Current Facility-Administered Medications   Medication Frequency    0.9%  NaCl infusion (for blood  administration) Q24H PRN    0.9%  NaCl infusion (for blood administration) Q24H PRN    0.9%  NaCl infusion (for blood administration) Q24H PRN    acetaminophen tablet 650 mg Q4H PRN    aluminum-magnesium hydroxide-simethicone 200-200-20 mg/5 mL suspension 30 mL Q6H PRN    ammonium lactate 12 % lotion BID PRN    calcium carbonate 200 mg calcium (500 mg) chewable tablet 1,000 mg Q6H PRN    calcium carbonate 200 mg calcium (500 mg) chewable tablet 1,000 mg BID    ergocalciferol capsule 50,000 Units Q7 Days    ferrous sulfate tablet 1 each BID    folic acid tablet 1 mg Daily    heparin (porcine) injection 4,000 Units PRN    heparin (porcine) injection 5,000 Units Q12H    hydrALAZINE injection 10 mg Q2H PRN    labetaloL injection 10 mg Q4H PRN    labetaloL tablet 200 mg Q12H    melatonin tablet 6 mg Nightly PRN    nystatin-triamcinolone cream TID    ondansetron injection 4 mg Q8H PRN    pantoprazole injection 40 mg BID    piperacillin-tazobactam (ZOSYN) 4.5 g in D5W 100 mL IVPB (MB+) Q12H    prochlorperazine injection Soln 5 mg Q6H PRN    sodium chloride 0.9% flush 10 mL PRN    vancomycin - pharmacy to dose pharmacy to manage frequency    vancomycin 750 mg in dextrose 5 % 250 mL IVPB (ready to mix) Once       Objective:     Vital Signs (Most Recent):  Temp: 98.4 °F (36.9 °C) (06/27/24 0732)  Pulse: 75 (06/27/24 0732)  Resp: 20 (06/27/24 0732)  BP: (!) 147/55 (06/27/24 0847)  SpO2: 97 % (06/27/24 0732) Vital Signs (24h Range):  Temp:  [97.3 °F (36.3 °C)-99 °F (37.2 °C)] 98.4 °F (36.9 °C)  Pulse:  [75-99] 75  Resp:  [18-20] 20  SpO2:  [95 %-98 %] 97 %  BP: (100-147)/(55-73) 147/55     Weight: 133.2 kg (293 lb 10.4 oz) (06/22/24 3545)  Body mass index is 50.41 kg/m².  Body surface area is 2.45 meters squared.    I/O last 3 completed shifts:  In: 480 [P.O.:480]  Out: 401 [Urine:400; Stool:1]    Physical Exam  Constitutional:       General: She is not in acute distress.     Appearance: She is obese. She is ill-appearing.    HENT:      Head: Atraumatic.      Nose: Nose normal.      Mouth/Throat:      Mouth: Mucous membranes are moist.   Neck:      Comments: R IJ temp HD catheter   Cardiovascular:      Rate and Rhythm: Normal rate and regular rhythm.      Pulses: Normal pulses.      Heart sounds: Normal heart sounds.   Pulmonary:      Effort: Pulmonary effort is normal.      Breath sounds: Normal breath sounds.   Abdominal:      General: There is no distension.      Palpations: Abdomen is soft.   Genitourinary:     Comments: Urinary catheter  Musculoskeletal:      Cervical back: Neck supple.   Skin:     General: Skin is warm.   Neurological:      Mental Status: She is alert and oriented to person, place, and time.         Significant Labs:sureBMP:   Recent Labs   Lab 06/26/24  0051 06/27/24  0852   * 133*   K 3.8 4.0    102   CO2 18* 19*   BUN 21.2* 39.2*   CREATININE 3.43* 5.41*   CALCIUM 7.4* 6.6*   MG 1.80  --      CBC:   Recent Labs   Lab 06/27/24  0935   WBC 13.62*   RBC 2.34*   HGB 7.1*   HCT 22.7*      MCV 97.0*   MCH 30.3   MCHC 31.3*       Significant Imaging:    FL Cystogram Retrograde (xpd) [[id number]] Resulted: 06/25/24 1553   Order Status: Completed Updated: 06/25/24 1555   Narrative:     EXAMINATION:  XR CYSTOGRAM IN SURGERY    CLINICAL HISTORY:  bilateral hydronephrosis; check for reflux into kidneys;    TECHNIQUE:  A  radiograph was obtained. Contrast was then instilled into the bladder through the patient's catheter with multiple fluoroscopic images obtained in various positions.  Absorbed dose is 91.87 mGy.    COMPARISON:  CT 06/21/2024    FINDINGS:  Bladder fills readily with no significant contour abnormality identified.  Due to patient discomfort, only about 300 mL of contrast instilled during the exam, but no vesicoureteral reflux identified.   Impression:       Mildly limited assessment due to patient pain, but no significant bladder abnormality identified.      Electronically signed by:  Pastor Gordon  Date: 06/25/2024  Time: 15:53       Assessment/Plan:   ESTRELLITA secondary to ATN secondary to obstructive uropathy  -Dialysis initiated 6/22 via right IJ temporary dialysis catheter   Patient's probably has some underlying chronic kidney disease as she does have significant proteinuria more than 8 g per 24 hours.   Shaking and jerking movements of the extremities probably secondary to uremia,  now resolved completely with dialysis   UTI - Now on zosyn and vanc  Hyperkalemia  Improved.  Acute metabolic acidosis  Improved.  Anemia     Recommendations  Patient will need further dialysis today   She has been ordered PRBC which will be given with HD today   Start Clinimix. She is eating very minimal food endorsed by patient and son.   Hypocalcemia worsened today. Vit D recently noted 15. Start ergocalciferol weekly and BID TIFFANY Aj  Nephrology  [facility] - 8th Floor Med Surg

## 2024-06-27 NOTE — CARE UPDATE
[id number] Therapy is recommending moderate intensity for pt. Pt is in dialysis so I spoke with her son, Michael. I explained the difference b/w SNF vs swing bed vs TCU. Provided him a list of SNF, swing bed and TCU. He will discuss with his mother tonight.

## 2024-06-27 NOTE — PROGRESS NOTES
Pharmacokinetic Assessment Follow Up: IV Vancomycin    Vancomycin serum concentration assessment(s):    The trough level was drawn correctly and can be used to guide therapy at this time. The measurement is below the desired definitive target range of 15 to 20 mcg/mL.      Give 750mg x 1 dose today. Pt on HD PRN per nephro - Getting HD today. Trough with am labs 6/28     Scheduled Administration Times        Drug levels (last 3 results):  Recent Labs   Lab Result Units 06/25/24 0358 06/26/24 0051 06/27/24  0519   Vancomycin Random ug/ml 17.6 14.9* 13.9*       Vancomycin Administrations:  vancomycin given in the last 96 hours                     vancomycin (VANCOCIN) 500 mg in D5W 100 mL IVPB (MB+) (mg) 500 mg New Bag 06/23/24 1712                    Pharmacy will continue to follow and monitor vancomycin.    Please contact pharmacy at extension [phone] for questions regarding this assessment.    Thank you for the consult,   Rashmi Porras       Patient brief summary:  Fior Joya is a 69 y.o. female initiated on antimicrobial therapy with IV Vancomycin for treatment of skin & soft tissue infection    The patient's current regimen is pulse dosing    Drug Allergies:   Review of patient's allergies indicates:  No Known Allergies    Actual Body Weight:  Wt Readings from Last 1 Encounters:   06/22/24 133.2 kg (293 lb 10.4 oz)       Renal Function:   Estimated Creatinine Clearance: 13.5 mL/min (A) (based on SCr of 5.41 mg/dL (H)).,     Dialysis Method (if applicable):  intermittent HD    CBC (last 72 hours):  Recent Labs   Lab Result Units 06/25/24 0358 06/26/24  0051   WBC x10(3)/mcL 17.42* 16.11*   Hgb g/dL 7.3* 7.3*   Hct % 22.6* 22.3*   Platelet x10(3)/mcL 206 208   Mono % % 7.6 9.6   Eos % % 1.0 1.5   Basophil % % 0.2 0.2       Metabolic Panel (last 72 hours):  Recent Labs   Lab Result Units 06/25/24 0358 06/26/24 0051 06/27/24  0852   Sodium mmol/L 134* 133* 133*   Potassium mmol/L 3.5 3.8 4.0   Chloride mmol/L 100  105 102   CO2 mmol/L 21* 18* 19*   Glucose mg/dL 118* 128* 102   Blood Urea Nitrogen mg/dL 40.5* 21.2* 39.2*   Creatinine mg/dL 4.53* 3.43* 5.41*   Albumin g/dL  --  2.2*  --    Bilirubin Total mg/dL  --  0.4  --    ALP unit/L  --  42  --    AST unit/L  --  23  --    ALT unit/L  --  18  --    Magnesium Level mg/dL 1.80 1.80  --    Phosphorus Level mg/dL 4.0 2.5  --        Microbiologic Results:  Microbiology Results (last 7 days)       ** No results found for the last 168 hours. **

## 2024-06-27 NOTE — ANESTHESIA PREPROCEDURE EVALUATION
06/27/2024  Fior Joya is a 69 y.o., female.  -urinary retention with bilateral hydroureteronephrosis status post Mario insertion  -acute renal failure initiated on hemodialysis  -anemia    Pre-op Assessment    I have reviewed the Patient Summary Reports.     I have reviewed the Nursing Notes. I have reviewed the NPO Status.   I have reviewed the Medications.     Review of Systems  Renal/:  Chronic Renal Disease        Kidney Function/Disease                 Physical Exam  General: Well nourished, Cooperative, Alert and Oriented    Airway:  Mallampati: II   Mouth Opening: Normal  TM Distance: Normal  Tongue: Normal  Neck ROM: Normal ROM    Dental:  Upper edentulous      Anesthesia Plan  Type of Anesthesia, risks & benefits discussed:    Anesthesia Type: Gen ETT  Intra-op Monitoring Plan: Standard ASA Monitors  Post Op Pain Control Plan: multimodal analgesia  Induction:  IV  Airway Plan: Direct, Post-Induction  Informed Consent: Informed consent signed with the Patient representative and all parties understand the risks and agree with anesthesia plan.  All questions answered. Patient consented to blood products? Yes  ASA Score: 4  Day of Surgery Review of History & Physical: H&P Update referred to the surgeon/provider.    Ready For Surgery From Anesthesia Perspective.     .

## 2024-06-27 NOTE — OP NOTE
PRE-PROCEDURE DIAGNOSIS:  Bilateral ureteral obstruction  UTI    POST-PROCEDURE DIAGNOSIS:  same    PROCEDURE:  Cystourethroscopy with placement of Right double-J ureteral stent    SURGEON:  Otis Ybarragsia:  General    COMPLICATIONS:  None.    SPECIMENS:  None    EBL:  miminal    DRAINS:  6-Mauritanian x 28 cm Right double-J ureteral stent.    INDICATIONS:  69F with history who presents for cystourethroscopy and ureteral stent placement due to bialteral hydronephrosis and severe UTI    Findings:  The patient's bladder especially at the trigone was necrotic and the mucosa was sloughing. I was able to find the right UO but after 30minutes of trying I was not able to identify the left UO.    SUMMARY:  After adequate general anesthetic, he was carefully positioned in  lithotomy. His arms were  tucked at his sides. All pressure points were carefully padded to prevent neuromuscular injury.  The genitalia were prepped and draped sterile fashion. A time-out was performed with two patient identifiers.    Cystoscopy was then performed. The 21 Mauritanian sterile, well lubricated, rigid cystoscope was passed from the urethral meatus to within  the bladder and the lower urinary tract was evaluated in it's entirety using 30 and 70 degree  lenses. The 6-Mauritanian open-ended ureteral catheter and Glidewire were advanced into the Right ureter and to the renal pelvis under fluoroscopic guidance. We removed the wire and  performed a retrograde pyelogram. We measured the ureter from the UPJ to the UO and it  measured 28 cm. We then replaced the wire and deployed a double-J stent in a standard  fashion with the proximal curl in the renal pelvis and the distal curl in the bladder. The bladder  was drained with the cystoscope and the cystoscope was removed. The patient tolerated the  procedure well, was extubated, and transported to the recovery room in stable condition. The  family was informed of the outcome following the  procedure.      CONDITION:  stable    Plan:  Transfer back to floor  Consult IR for placement of left PCN with antegrade stent.    06/27/2024  1:00 PM

## 2024-06-27 NOTE — PT/OT/SLP PROGRESS
Physical Therapy Treatment    Patient Name:  Fior Joya   MRN:  [id number]    Recommendations:     Discharge therapy intensity: Moderate Intensity Therapy   Discharge Equipment Recommendations: to be determined by next level of care  Barriers to discharge: Impaired mobility    Assessment:     Fior Joya is a 69 y.o. female.  She presents with the following impairments/functional limitations: weakness, impaired endurance, impaired self care skills, impaired functional mobility, gait instability, impaired balance, impaired coordination.    Rehab Prognosis: Good; patient would benefit from acute skilled PT services to address these deficits and reach maximum level of function.    Recent Surgery: Procedure(s) (LRB):  CYSTOSCOPY, WITH URETERAL STENT INSERTION (Bilateral)      Plan:     During this hospitalization, patient would benefit from acute PT services 5 x/week to address the identified rehab impairments via gait training, therapeutic exercises, therapeutic activities and progress toward the following goals:    Plan of Care Expires:  07/25/24    Subjective     Chief Complaint: I need the bathroom    Objective:     Communicated with nurse prior to session.  Patient found supine with berkowitz catheter, peripheral IV, pressure relief boots, telemetry, pulse ox (continuous), blood pressure cuff upon PT entry to room.     General Precautions: Standard, fall  Orthopedic Precautions:    Braces: N/A  Respiratory Status: Room air  Blood Pressure:   Skin Integrity: Visible skin intact      Functional Mobility:  SBS to get EOB and min assist STS  Gait 3 steps to BSC and pt had loose stool.   Hygiene performed and assisted BTB due to loose BM   Put depends on prior to mobilizing patient.     Education Provided:  Role and goals of PT, transfer training, bed mobility, gait training, balance training, safety awareness, assistive device, strengthening exercises, and importance of participating in PT to return to PLOF.      Patient left  supine with call button in reach    GOALS:   Multidisciplinary Problems       Physical Therapy Goals          Problem: Physical Therapy    Goal Priority Disciplines Outcome Goal Variances Interventions   Physical Therapy Goal     PT, PT/OT Progressing     Description: Goals to be met by: 2024     Patient will increase functional independence with mobility by performin. Supine to sit with Stand-by Assistance  2. Sit to stand transfer with Contact Guard Assistance  3. Gait  x 150 feet with Contact Guard Assistance using Rolling Walker.   4. Ascend/descend 4 stair with right Handrails Contact Guard Assistance using No Assistive Device.                          Time Tracking:       Billable Minutes: Therapeutic Activity 27    Treatment Type: Treatment  PT/PTA: PTA     Number of PTA visits since last PT visit: 2     2024

## 2024-06-27 NOTE — NURSING
06/27/24 1735   Post-Hemodialysis Assessment   Duration of Treatment 180 minutes   Total UF (mL) 1500 mL   Post-Hemodialysis Comments 3hr.  1500ml net UF.  Prbcs x2.  VSS.  Hd cvc dressing changed earlier by floor rn.

## 2024-06-27 NOTE — ANESTHESIA PROCEDURE NOTES
Intubation    Date/Time: 6/27/2024 11:22 AM    Performed by: Chely Hernandez CRNA  Authorized by: Keshia Shipley MD    Intubation:     Induction:  Intravenous    Intubated:  Postinduction    Mask Ventilation:  Easy mask    Attempts:  1    Attempted By:  CRNA    Method of Intubation:  Direct    Blade:  Ruiz 2    Laryngeal View Grade: Grade I - full view of cords      Difficult Airway Encountered?: No      Complications:  None    Airway Device:  Oral endotracheal tube    Airway Device Size:  7.5    Style/Cuff Inflation:  Cuffed (inflated to minimal occlusive pressure)    Inflation Amount (mL):  5    Tube secured:  22    Secured at:  The lips    Placement Verified By:  Capnometry    Complicating Factors:  None    Findings Post-Intubation:  BS equal bilateral and atraumatic/condition of teeth unchanged

## 2024-06-27 NOTE — TRANSFER OF CARE
"Anesthesia Transfer of Care Note    Patient: Fior Joya    Procedure(s) Performed: Procedure(s) (LRB):  CYSTOSCOPY, WITH URETERAL STENT INSERTION (Right)    Patient location: PACU    Anesthesia Type: general    Transport from OR: Transported from OR on room air with adequate spontaneous ventilation    Post pain: adequate analgesia    Post assessment: no apparent anesthetic complications    Post vital signs: stable    Level of consciousness: responds to stimulation    Nausea/Vomiting: no nausea/vomiting    Complications: none    Transfer of care protocol was followed      Last vitals: Visit Vitals  BP (!) 101/53   Pulse 94   Temp 37 °C (98.6 °F)   Resp (!) 21   Ht 5' 4" (1.626 m)   Wt 133.2 kg (293 lb 10.4 oz)   SpO2 (!) 90%   Breastfeeding No   BMI 50.41 kg/m²     "

## 2024-06-28 LAB
ANION GAP SERPL CALC-SCNC: 9 MEQ/L
BASOPHILS # BLD AUTO: 0.06 X10(3)/MCL
BASOPHILS NFR BLD AUTO: 0.5 %
BUN SERPL-MCNC: 26.2 MG/DL (ref 9.8–20.1)
CALCIUM SERPL-MCNC: 7.4 MG/DL (ref 8.4–10.2)
CHLORIDE SERPL-SCNC: 100 MMOL/L (ref 98–107)
CO2 SERPL-SCNC: 21 MMOL/L (ref 23–31)
CREAT SERPL-MCNC: 3.7 MG/DL (ref 0.55–1.02)
CREAT/UREA NIT SERPL: 7
EOSINOPHIL # BLD AUTO: 0.25 X10(3)/MCL (ref 0–0.9)
EOSINOPHIL NFR BLD AUTO: 1.9 %
ERYTHROCYTE [DISTWIDTH] IN BLOOD BY AUTOMATED COUNT: 16.4 % (ref 11.5–17)
GFR SERPLBLD CREATININE-BSD FMLA CKD-EPI: 13 ML/MIN/1.73/M2
GLUCOSE SERPL-MCNC: 114 MG/DL (ref 82–115)
HCT VFR BLD AUTO: 26.8 % (ref 37–47)
HGB BLD-MCNC: 8.6 G/DL (ref 12–16)
IMM GRANULOCYTES # BLD AUTO: 0.56 X10(3)/MCL (ref 0–0.04)
IMM GRANULOCYTES NFR BLD AUTO: 4.3 %
LYMPHOCYTES # BLD AUTO: 0.99 X10(3)/MCL (ref 0.6–4.6)
LYMPHOCYTES NFR BLD AUTO: 7.6 %
MCH RBC QN AUTO: 29.5 PG (ref 27–31)
MCHC RBC AUTO-ENTMCNC: 32.1 G/DL (ref 33–36)
MCV RBC AUTO: 91.8 FL (ref 80–94)
MONOCYTES # BLD AUTO: 1.43 X10(3)/MCL (ref 0.1–1.3)
MONOCYTES NFR BLD AUTO: 11 %
NEUTROPHILS # BLD AUTO: 9.71 X10(3)/MCL (ref 2.1–9.2)
NEUTROPHILS NFR BLD AUTO: 74.7 %
NRBC BLD AUTO-RTO: 0 %
PLATELET # BLD AUTO: 174 X10(3)/MCL (ref 130–400)
PMV BLD AUTO: 11.5 FL (ref 7.4–10.4)
POTASSIUM SERPL-SCNC: 4.2 MMOL/L (ref 3.5–5.1)
RBC # BLD AUTO: 2.92 X10(6)/MCL (ref 4.2–5.4)
SODIUM SERPL-SCNC: 130 MMOL/L (ref 136–145)
WBC # BLD AUTO: 13 X10(3)/MCL (ref 4.5–11.5)

## 2024-06-28 PROCEDURE — 63600175 PHARM REV CODE 636 W HCPCS: Performed by: STUDENT IN AN ORGANIZED HEALTH CARE EDUCATION/TRAINING PROGRAM

## 2024-06-28 PROCEDURE — BT121ZZ FLUOROSCOPY OF LEFT KIDNEY USING LOW OSMOLAR CONTRAST: ICD-10-PCS | Performed by: RADIOLOGY

## 2024-06-28 PROCEDURE — 25000003 PHARM REV CODE 250: Performed by: RADIOLOGY

## 2024-06-28 PROCEDURE — 63600175 PHARM REV CODE 636 W HCPCS: Performed by: RADIOLOGY

## 2024-06-28 PROCEDURE — 25000003 PHARM REV CODE 250: Performed by: INTERNAL MEDICINE

## 2024-06-28 PROCEDURE — 11000001 HC ACUTE MED/SURG PRIVATE ROOM

## 2024-06-28 PROCEDURE — 0T9130Z DRAINAGE OF LEFT KIDNEY WITH DRAINAGE DEVICE, PERCUTANEOUS APPROACH: ICD-10-PCS | Performed by: RADIOLOGY

## 2024-06-28 PROCEDURE — 97110 THERAPEUTIC EXERCISES: CPT | Mod: CQ

## 2024-06-28 PROCEDURE — 80048 BASIC METABOLIC PNL TOTAL CA: CPT | Performed by: NURSE PRACTITIONER

## 2024-06-28 PROCEDURE — 27000221 HC OXYGEN, UP TO 24 HOURS

## 2024-06-28 PROCEDURE — 97116 GAIT TRAINING THERAPY: CPT | Mod: CQ

## 2024-06-28 PROCEDURE — 25000003 PHARM REV CODE 250

## 2024-06-28 PROCEDURE — C9113 INJ PANTOPRAZOLE SODIUM, VIA: HCPCS | Performed by: STUDENT IN AN ORGANIZED HEALTH CARE EDUCATION/TRAINING PROGRAM

## 2024-06-28 PROCEDURE — 25000003 PHARM REV CODE 250: Performed by: NURSE PRACTITIONER

## 2024-06-28 PROCEDURE — 63600175 PHARM REV CODE 636 W HCPCS: Performed by: INTERNAL MEDICINE

## 2024-06-28 PROCEDURE — 25000003 PHARM REV CODE 250: Performed by: STUDENT IN AN ORGANIZED HEALTH CARE EDUCATION/TRAINING PROGRAM

## 2024-06-28 PROCEDURE — 36415 COLL VENOUS BLD VENIPUNCTURE: CPT | Performed by: INTERNAL MEDICINE

## 2024-06-28 PROCEDURE — 94760 N-INVAS EAR/PLS OXIMETRY 1: CPT

## 2024-06-28 PROCEDURE — 85025 COMPLETE CBC W/AUTO DIFF WBC: CPT | Performed by: INTERNAL MEDICINE

## 2024-06-28 PROCEDURE — 21400001 HC TELEMETRY ROOM

## 2024-06-28 RX ORDER — LIDOCAINE HYDROCHLORIDE 20 MG/ML
INJECTION, SOLUTION INFILTRATION; PERINEURAL
Status: COMPLETED | OUTPATIENT
Start: 2024-06-28 | End: 2024-06-28

## 2024-06-28 RX ORDER — MIDAZOLAM HYDROCHLORIDE 1 MG/ML
INJECTION, SOLUTION INTRAMUSCULAR; INTRAVENOUS
Status: COMPLETED | OUTPATIENT
Start: 2024-06-28 | End: 2024-06-28

## 2024-06-28 RX ORDER — FENTANYL CITRATE 50 UG/ML
INJECTION, SOLUTION INTRAMUSCULAR; INTRAVENOUS
Status: COMPLETED | OUTPATIENT
Start: 2024-06-28 | End: 2024-06-28

## 2024-06-28 RX ADMIN — FOLIC ACID 1 MG: 1 TABLET ORAL at 09:06

## 2024-06-28 RX ADMIN — PANTOPRAZOLE SODIUM 40 MG: 40 INJECTION, POWDER, FOR SOLUTION INTRAVENOUS at 08:06

## 2024-06-28 RX ADMIN — CALCIUM CARBONATE (ANTACID) CHEW TAB 500 MG 1000 MG: 500 CHEW TAB at 08:06

## 2024-06-28 RX ADMIN — FERROUS SULFATE TAB 325 MG (65 MG ELEMENTAL FE) 1 EACH: 325 (65 FE) TAB at 09:06

## 2024-06-28 RX ADMIN — ACETAMINOPHEN 650 MG: 325 TABLET, FILM COATED ORAL at 01:06

## 2024-06-28 RX ADMIN — FENTANYL CITRATE 25 MCG: 50 INJECTION, SOLUTION INTRAMUSCULAR; INTRAVENOUS at 10:06

## 2024-06-28 RX ADMIN — NYSTATIN AND TRIAMCINOLONE ACETONIDE: 100000; 1 CREAM TOPICAL at 02:06

## 2024-06-28 RX ADMIN — NYSTATIN AND TRIAMCINOLONE ACETONIDE: 100000; 1 CREAM TOPICAL at 08:06

## 2024-06-28 RX ADMIN — CEFTRIAXONE SODIUM 2 G: 2 INJECTION, POWDER, FOR SOLUTION INTRAMUSCULAR; INTRAVENOUS at 02:06

## 2024-06-28 RX ADMIN — LABETALOL HYDROCHLORIDE 200 MG: 200 TABLET, FILM COATED ORAL at 09:06

## 2024-06-28 RX ADMIN — NYSTATIN AND TRIAMCINOLONE ACETONIDE: 100000; 1 CREAM TOPICAL at 09:06

## 2024-06-28 RX ADMIN — FERROUS SULFATE TAB 325 MG (65 MG ELEMENTAL FE) 1 EACH: 325 (65 FE) TAB at 08:06

## 2024-06-28 RX ADMIN — CALCIUM CARBONATE (ANTACID) CHEW TAB 500 MG 1000 MG: 500 CHEW TAB at 09:06

## 2024-06-28 RX ADMIN — LABETALOL HYDROCHLORIDE 200 MG: 200 TABLET, FILM COATED ORAL at 08:06

## 2024-06-28 RX ADMIN — LIDOCAINE HYDROCHLORIDE 5 ML: 20 INJECTION, SOLUTION INFILTRATION; PERINEURAL at 10:06

## 2024-06-28 RX ADMIN — MIDAZOLAM 1 MG: 1 INJECTION INTRAMUSCULAR; INTRAVENOUS at 10:06

## 2024-06-28 RX ADMIN — ACETAMINOPHEN 650 MG: 325 TABLET, FILM COATED ORAL at 02:06

## 2024-06-28 RX ADMIN — HEPARIN SODIUM 5000 UNITS: 5000 INJECTION INTRAVENOUS; SUBCUTANEOUS at 08:06

## 2024-06-28 RX ADMIN — PANTOPRAZOLE SODIUM 40 MG: 40 INJECTION, POWDER, FOR SOLUTION INTRAVENOUS at 11:06

## 2024-06-28 NOTE — OP NOTE
Radiology Post-Procedure Note    Pre Op Diagnosis: Left Renal obstruction     Post Op Diagnosis:  Left nephroureteral in place    Procedure:  neph u placement    Procedure performed by: Augustin Grajeda M.D.    Written Informed Consent Obtained: Yes    Specimen Removed: NO    Estimated Blood Loss: Minimal    Findings:   Standard Neph u placement    Additional specimen sent to lab: No    Patient tolerated procedure well.    Augustin Grajeda MD

## 2024-06-28 NOTE — PT/OT/SLP PROGRESS
Physical Therapy Treatment    Patient Name:  Fior Joya   MRN:  [id number]    Recommendations:     Discharge therapy intensity: Moderate Intensity Therapy   Discharge Equipment Recommendations: to be determined by next level of care  Barriers to discharge: Decreased caregiver support and Impaired mobility    Assessment:     Fior Joya is a 69 y.o. female.  She presents with the following impairments/functional limitations: weakness, impaired endurance, impaired self care skills, impaired functional mobility, gait instability, impaired balance, impaired coordination.    Rehab Prognosis: Good; patient would benefit from acute skilled PT services to address these deficits and reach maximum level of function.    Recent Surgery: Procedure(s) (LRB):  CYSTOSCOPY, WITH URETERAL STENT INSERTION (Right) 1 Day Post-Op    Plan:     During this hospitalization, patient would benefit from acute PT services 5 x/week to address the identified rehab impairments via gait training, therapeutic exercises, therapeutic activities and progress toward the following goals:    Plan of Care Expires:  07/25/24    Subjective     Chief Complaint: pain    Objective:     Communicated with nurse prior to session.  Patient found right sidelying with berkowitz catheter, peripheral IV, pressure relief boots, telemetry, pulse ox (continuous), blood pressure cuff upon PT entry to room.     General Precautions: Standard, fall  Orthopedic Precautions:    Braces: N/A  Respiratory Status: Room air  Blood Pressure:   Skin Integrity: Visible skin intact      Functional Mobility:  Min assist to get EOB and same for STS  Gait 7 ft RW min assist and we switched beds in the process.   Pt performed LE PRE's to increase strenth, ROM, and endurance to improve overall independence.  Drain intact.    Education Provided:  Role and goals of PT, transfer training, bed mobility, gait training, balance training, safety awareness, assistive device, strengthening exercises, and  importance of participating in PT to return to PLOF.    Patient left right sidelying with all lines intact and call button in reach    GOALS:   Multidisciplinary Problems       Physical Therapy Goals          Problem: Physical Therapy    Goal Priority Disciplines Outcome Goal Variances Interventions   Physical Therapy Goal     PT, PT/OT Progressing     Description: Goals to be met by: 2024     Patient will increase functional independence with mobility by performin. Supine to sit with Stand-by Assistance  2. Sit to stand transfer with Contact Guard Assistance  3. Gait  x 150 feet with Contact Guard Assistance using Rolling Walker.   4. Ascend/descend 4 stair with right Handrails Contact Guard Assistance using No Assistive Device.                          Time Tracking:     Billable Minutes: Gait Training 12 and Therapeutic Exercise 12    Treatment Type: Treatment  PT/PTA: PTA     Number of PTA visits since last PT visit: 3     2024

## 2024-06-28 NOTE — NURSING
Nurses Note -- 4 Eyes      6/28/2024   11:24 AM      Skin assessed during: Admit      [x] No Altered Skin Integrity Present    []Prevention Measures Documented      [] Yes- Altered Skin Integrity Present or Discovered   [] LDA Added if Not in Epic (Describe Wound)   [] New Altered Skin Integrity was Present on Admit and Documented in LDA   [] Wound Image Taken    Wound Care Consulted? No    Attending Nurse:  King Arguelles LPN    Second RN/Staff Member:  SANAZ Caruso

## 2024-06-28 NOTE — PROGRESS NOTES
UROLOGY  PROGRESS  NOTE    Fior Joya 1955  [id number]  6/28/2024    POD 1 s/p cystoscopy with right double-J stent placement, unable to place left retrograde stent due to necrotic and sloughing bladder tissue    Patient off of unit at time of rounds - and IR for left antegrade stent/nephrostomy  Son in room  Reports some blood-tinged urine    Afebrile   400 mL urine output overnight  WBC 13.0   H&H 8.6/26.8  BUN and creatinine 26.2/3.7    UC with group B strep    Intake/Output:  No intake/output data recorded.  I/O last 3 completed shifts:  In: 1340 [P.O.:240; Blood:500; IV Piggyback:600]  Out: 2250 [Urine:750; Other:1500]     Exam:    Patient off of unit    Recent Results (from the past 24 hour(s))   Basic Metabolic Panel    Collection Time: 06/28/24  4:30 AM   Result Value Ref Range    Sodium 130 (L) 136 - 145 mmol/L    Potassium 4.2 3.5 - 5.1 mmol/L    Chloride 100 98 - 107 mmol/L    CO2 21 (L) 23 - 31 mmol/L    Glucose 114 82 - 115 mg/dL    Blood Urea Nitrogen 26.2 (H) 9.8 - 20.1 mg/dL    Creatinine 3.70 (H) 0.55 - 1.02 mg/dL    BUN/Creatinine Ratio 7     Calcium 7.4 (L) 8.4 - 10.2 mg/dL    Anion Gap 9.0 mEq/L    eGFR 13 mL/min/1.73/m2   CBC with Differential    Collection Time: 06/28/24  4:30 AM   Result Value Ref Range    WBC 13.00 (H) 4.50 - 11.50 x10(3)/mcL    RBC 2.92 (L) 4.20 - 5.40 x10(6)/mcL    Hgb 8.6 (L) 12.0 - 16.0 g/dL    Hct 26.8 (L) 37.0 - 47.0 %    MCV 91.8 80.0 - 94.0 fL    MCH 29.5 27.0 - 31.0 pg    MCHC 32.1 (L) 33.0 - 36.0 g/dL    RDW 16.4 11.5 - 17.0 %    Platelet 174 130 - 400 x10(3)/mcL    MPV 11.5 (H) 7.4 - 10.4 fL    Neut % 74.7 %    Lymph % 7.6 %    Mono % 11.0 %    Eos % 1.9 %    Basophil % 0.5 %    Lymph # 0.99 0.6 - 4.6 x10(3)/mcL    Neut # 9.71 (H) 2.1 - 9.2 x10(3)/mcL    Mono # 1.43 (H) 0.1 - 1.3 x10(3)/mcL    Eos # 0.25 0 - 0.9 x10(3)/mcL    Baso # 0.06 <=0.2 x10(3)/mcL    IG# 0.56 (H) 0 - 0.04 x10(3)/mcL    IG% 4.3 %    NRBC% 0.0 %     FL Cystogram Retrograde (xpd)  [[id number]] Resulted: 06/25/24 1553   Order Status: Completed Updated: 06/25/24 1555   Narrative:     EXAMINATION:  XR CYSTOGRAM IN SURGERY    CLINICAL HISTORY:  bilateral hydronephrosis; check for reflux into kidneys;    TECHNIQUE:  A  radiograph was obtained. Contrast was then instilled into the bladder through the patient's catheter with multiple fluoroscopic images obtained in various positions.  Absorbed dose is 91.87 mGy.    COMPARISON:  CT 06/21/2024    FINDINGS:  Bladder fills readily with no significant contour abnormality identified.  Due to patient discomfort, only about 300 mL of contrast instilled during the exam, but no vesicoureteral reflux identified.   Impression:       Mildly limited assessment due to patient pain, but no significant bladder abnormality identified.      Electronically signed by: Pastor Gordon  Date: 06/25/2024  Time: 15:53       Assessment:  -urinary retention with bilateral hydroureteronephrosis status post Mario insertion; status post cystoscopy with right double-J stent placement 06/27/2024, unable to place retrograde stent on the left   -acute renal failure initiated on hemodialysis    Plan:  -some hematuria is expected as long as ureteral stent is in place.    -patient currently an Interventional Radiology for left PCN/antegrade stent.  -no additional recommendations from Urology standpoint at this time      Donna Varghese NP

## 2024-06-28 NOTE — PROGRESS NOTES
[facility] Hospital Medicine Progress Note      Chief Complaint: Inpatient Follow-up      HPI:   Mrs Joya is a 69-year-old lady with no PMH who was transferred to [facility] from [place] with complaints of dyspnea on exertion, B/L lower extremity edema, shortness for breath. She was noted to have severe anemia with HGB/HCT of 5.7/17.7, WBCs of 15.8, hyperkalemia with potassium 6.8,, BUN/creatinine of 209/16.94. UA showed leukocytes and many bacteria as well as 3+ protein, 3+ blood. Blood and urine cultures were sent off. Nephrology was consulted on admission and patient was admitted to ICU for placement of dialysis catheter and initiation of hemodialysis. She also received transfusion with 2 units PRBCs with improvement in symptoms. Urology was consulted due to B/L hydronephrosis on ultrasound with urine retention requiring Mario catheter. Placed on IV vancomycin, IV Zosyn. Downgraded from ICU to Hospital Medicine on 06/25. At bedside, patient stated she was doing well and had no new complaints. Continued to have some tremors and some shortness of breath but overall felt improved. On IV Vancomycin, IV Zosyn. Urine cultures grew GBS, Blood cultures negative x 72 hours. Nephrology on board; follow recommendations. Urology on board; follow recommendations. Continued on hemodialysis. PT/OT consulted; recommending moderate intensity therapy. Continued on FeSO4 b.i.d., folic acid 1 mg daily, labetalol 200 mg b.i.d., Protonix 40 mg b.i.d..      Patient has been taken to the OR for systolic he, retrograde pyelogram and possible JJ stents with Urology       Interval Hx:   Today, Mrs. Joya stated she was doing well and had no new complaints.  Patient is status post cystoscopy with placement of double-J stents in the right, left was very difficult and was not able to be done   IR was consulted for placement of a left PCN, this has been completed and nephrostomy bag draining urine leaking  bloody  Patient was no new complaints she just feels tired and trying to sleep   Vitals reviewed and stable   White cell count of 13, hemoglobin 8.6, sodium 130   Blood cultures completed and negative   Urine cultures grew 25-50 K GBS,   Will deescalate antibiotics to IV ceftriaxone , day 2     Case was discussed with patient's nurse on the floor.     Objective/physical exam:  General: alert lady lying comfortably in bed, in no acute distress.  HENT: oral and oropharyngeal mucosa moist, pink, with no erythema or exudates, no ear pain or discharge  Neck: normal neck movement, no lymph nodes or swellings, no JVD or Carotid bruit  Respiratory: clear breathing sounds bilaterally, no crackles, rales, ronchi or wheezes  Cardiovascular: clear S1 and S2, no murmurs, rubs or gallops  Peripheral Vascular: no lesions, ulcers or erosions, normal peripheral pulses and no pedal edema  Gastrointestinal: soft, non-tender, non-distended abdomen, no guarding, rigidity or rebound tenderness, normal bowel sounds  Integumentary: B/L LE edema with erythema and tenderness  Neuro: AAO x 3; motor strength 5/5 in B/L UEs & LEs; sensation intact to gross and fine touch B/L; CN II-XII grossly intact          Assessment/Plan:  Bilateral hydroureteronephrosis status post indwelling Mario   Acute kidney injury, possible component of chronic kidney disease and evidence of obstructive uropathy, post initiation of hemodialysis 06/22/2024  UTI-GBS  Acute on chronic Normocytic anemia  Left lower extremity cellulitis  Hypertension  Morbid obesity, BMI 50.4     Continues to be admitted   Patient is status post cystoscopy with placement of double-J stents in the right, left was very difficult and was not able to be done   IR was consulted for placement of a left PCN, this has been completed and nephrostomy bag draining urine leaking bloody  Blood cultures completed and negative   Urine cultures grew 25-50 K GBS,   Will deescalate antibiotics to IV  ceftriaxone , day 2  Will trend hemoglobin levels daily   Nephrology on board; follow recommendations   PT/OT on board; recommending moderate intensity therapy   Continued on FeSO4 b.i.d., folic acid 1 mg daily, labetalol 200 mg b.i.d., Protonix 40 mg b.i.d.  Continue monitoring patient's symptoms     VTE prophylaxis:  Heparin     Patient condition:  Stable     Anticipated discharge and Disposition:     Pending     All diagnosis and differential diagnosis have been reviewed; assessment and plan has been documented; I have personally reviewed the labs and test results that are presently available; I have reviewed the patients medication list; I have reviewed the consulting providers response and recommendations. I have reviewed or attempted to review medical records based upon their availability     All of the patient's questions have been  addressed and answered. Patient's is agreeable to the above stated plan. I will continue to monitor closely and make adjustments to medical management as needed.             VITAL SIGNS: 24 HRS MIN & MAX LAST   Temp  Min: 97.5 °F (36.4 °C)  Max: 98.6 °F (37 °C) 98.3 °F (36.8 °C)   BP  Min: 115/69  Max: 156/72 115/69   Pulse  Min: 61  Max: 108  61   Resp  Min: 16  Max: 19 18   SpO2  Min: 94 %  Max: 97 % 95 %     I have reviewed the following labs:  Recent Labs   Lab 06/26/24  0051 06/27/24  0935 06/28/24  0430   WBC 16.11* 13.62  13.62* 13.00*   RBC 2.41* 2.34* 2.92*   HGB 7.3* 7.1* 8.6*   HCT 22.3* 22.7* 26.8*   MCV 92.5 97.0* 91.8   MCH 30.3 30.3 29.5   MCHC 32.7* 31.3* 32.1*   RDW 14.4 14.5 16.4    201 174   MPV 10.4 10.9* 11.5*     Recent Labs   Lab 06/22/24  0426 06/22/24  1220 06/22/24  1230 06/23/24  0344 06/24/24  0325 06/25/24  0358 06/26/24  0051 06/27/24  0852 06/28/24  0430   NA  --    < >  --  136 138 134* 133* 133* 130*   K  --    < >  --  3.9 3.7 3.5 3.8 4.0 4.2   CL  --    < >  --  100 98 100 105 102 100   CO2  --    < >  --  15* 19* 21* 18* 19* 21*   BUN   --    < >  --  117.0* 83.8* 40.5* 21.2* 39.2* 26.2*   CREATININE  --    < >  --  10.10* 7.43* 4.53* 3.43* 5.41* 3.70*   CALCIUM  --    < >  --  6.8* 7.4* 7.1* 7.4* 6.6* 7.4*   PH 7.150*  --  7.550*  --   --   --   --   --   --    MG  --    < >  --  1.70 1.70 1.80 1.80  --   --    ALBUMIN  --    < >  --  2.4* 2.5*  --  2.2*  --   --    ALKPHOS  --    < >  --  57 54  --  42  --   --    ALT  --    < >  --  12 12  --  18  --   --    AST  --    < >  --  19 17  --  23  --   --    BILITOT  --    < >  --  0.3 0.4  --  0.4  --   --     < > = values in this interval not displayed.     Microbiology Results (last 7 days)       ** No results found for the last 168 hours. **             See below for Radiology    Scheduled Med:   calcium carbonate  1,000 mg Oral BID    cefTRIAXone (Rocephin) IV (PEDS and ADULTS)  2 g Intravenous Q24H    ergocalciferol  50,000 Units Oral Q7 Days    ferrous sulfate  1 tablet Oral BID    folic acid  1 mg Oral Daily    heparin (porcine)  5,000 Units Subcutaneous Q12H    labetaloL  200 mg Oral Q12H    nystatin-triamcinolone   Topical (Top) TID    pantoprazole  40 mg Intravenous BID      Continuous Infusions:   Amino acid 4.25% - dextrose 5% (CLINIMIX-E) solution (1L provides 42.5 gm AA, 50 gm CHO (170 kcal/L dextrose), Na 35, K 30, Mg 5, Ca 4.5, Acetate 70, Cl 39, Phos 15)   Intravenous Continuous 50 mL/hr at 06/27/24 1345 New Bag at 06/27/24 1345      PRN Meds:    Current Facility-Administered Medications:     0.9%  NaCl infusion (for blood administration), , Intravenous, Q24H PRN    0.9%  NaCl infusion (for blood administration), , Intravenous, Q24H PRN    0.9%  NaCl infusion (for blood administration), , Intravenous, Q24H PRN    acetaminophen, 650 mg, Oral, Q4H PRN    aluminum-magnesium hydroxide-simethicone, 30 mL, Oral, Q6H PRN    ammonium lactate, , Topical (Top), BID PRN    calcium carbonate, 1,000 mg, Oral, Q6H PRN    heparin (porcine), 4,000 Units, Intravenous, PRN    hydrALAZINE, 10 mg,  Intravenous, Q2H PRN    labetaloL, 10 mg, Intravenous, Q4H PRN    melatonin, 6 mg, Oral, Nightly PRN    ondansetron, 4 mg, Intravenous, Q8H PRN    prochlorperazine, 5 mg, Intravenous, Q6H PRN    sodium chloride 0.9%, 10 mL, Intravenous, PRN     Assessment/Plan:      VTE prophylaxis:     Patient condition:  Stable/Fair/Guarded/ Serious/ Critical    Anticipated discharge and Disposition:         All diagnosis and differential diagnosis have been reviewed; assessment and plan has been documented; I have personally reviewed the labs and test results that are presently available; I have reviewed the patients medication list; I have reviewed the consulting providers response and recommendations. I have reviewed or attempted to review medical records based upon their availability    All of the patient's questions have been  addressed and answered. Patient's is agreeable to the above stated plan. I will continue to monitor closely and make adjustments to medical management as needed.  _____________________________________________________________________    Nutrition Status:    Radiology:  I have personally reviewed the following imaging and agree with the radiologist.     US Retroperitoneal Limited  Narrative: EXAMINATION:  US RETROPERITONEAL LIMITED    CLINICAL HISTORY:  hydronephrosis;    COMPARISON:  CT 21 June 2024    FINDINGS:  Grayscale and color Doppler sonographic evaluation of the kidneys and urinary bladder.    The right kidney measures 12 cm. The left kidney measures 9 cm.   There is mild bilateral hydronephrosis improved compared to the prior CT.  Grossly normal renal parenchymal echogenicity.    No significant abnormality of the urinary bladder.  Impression: Mild bilateral hydronephrosis improved compared to prior CT.    Electronically signed by: Alexx Patricia  Date:    06/27/2024  Time:    20:17  SURG FL Surgery Fluoro Usage  See OP Notes for results.     IMPRESSION: See OP Notes for results.     This procedure  was auto-finalized by: Virtual Radiologist      Susan Sibley MD  Department of Hospital Medicine   [facility]   06/28/2024

## 2024-06-28 NOTE — PLAN OF CARE
06/28/24 1443   Medicare Message   Important Message from Medicare regarding Discharge Appeal Rights Given to patient/caregiver;Explained to patient/caregiver;Signed/date by patient/caregiver

## 2024-06-28 NOTE — PROGRESS NOTES
[facility] - 8th Floor Med Surg  Nephrology  Progress Note    Patient Name: Fior Joya  MRN: [id number]  Admission Date: 6/22/2024  Hospital Length of Stay: 6 days  Attending Provider: Tyrone Klein MD   Primary Care Physician: Linda, Primary Doctor  Principal Problem:Metabolic acidosis      Subjective:     Fior Joya is a 69 y.o. female who was transferred from hospital in [place] due to acute renal failure.  She initially presented to the hospital due to generalized weakness and shortness of breath with exertion.  Per their report she had been mostly bed-bound for the past 3 weeks.  He reported that she had been drinking getting up going to the restroom.  She did have some episodes of nausea vomiting and generalized weakness along with generalized muscle pain more recently.  She was endorsing shortness of breath but no chest pain.  Denied had been running a fever.  Per their his report, she had not seen a physician in 10 years, because she was not feeling ill and did not feel like she needed to go.  She had also been having some swelling in the legs.  She has no known medical issues per their report.  On admit, WBC elevated at 15, RBC 1.8, hemoglobin 5.7, D-dimer elevated at 1.57, serum sodium 132, potassium 6, serum CO2 less than 5, , creatinine 14.8, glucose 273, calcium 7.5, phosphorus 7.9, albumin 2.9.  TSH and lactic were okay.  UA results indicative of UTI sent for cultures.  Blood cultures obtained and pending.  Received phone call from ER physician in early a.m. hours, it was decided at that time we will proceed with initiation of RRT.  Right IJ temporary cath was placed per General surgery.   She is awake and alert this morning upon exam.  She does respond with simple answers to questions posed.  Son in room in majority of history obtained from him.  Oxygen saturation is 99% on nasal cannula.  We are consulted for management ESTRELLITA.  Bilateral hydronephrosis on CT from 06/21/2024      6/23/24- had urology eval., bilateral hydronephrosis, no acute surgical intervention rec. At this time. Recommended Berkowitz with judicious hydration.  Per his report he feels outlet obstruction could be multifactorial but unclear underlying etiology at this time.     He had approximately 700 cc urine output last 24 hours.  She is awake alert oriented in bed on dialysis machine. Brother in . She has some nausea and vomiting this am, but no SOB or CP.      06/24/2024   Very much awake alert and oriented this morning.  Her son sitting in the chair on her bedside.  Still having shaky and jerky movements of all extremities.  Able to answer questions appropriately.  Appetite is poor.  Still good urine output noted.  No family history of anybody with kidney failure.  She knows that she is feeling better.  Her son also thinks that she is much better than before.     06/25/2024   Currently tolerating dialysis.  Most of the jerking movement has resolved.  She is still having some shaking movements but overall she knows she is much improved and her appetite seems to be improving also.  No shortness of breath.  Adequate urine output noted.  Follows command and moves all her extremities.       06/26/2024   Patient is sitting up in her recliner.  Very much awake alert and oriented to time person place.  Her son present in the room.  And she is feeding herself her lunch at present time without any problem and says that she is very hungry.  No shortness of breath voiced.  She still has indwelling Berkowitz catheter which according to the nurses, the urologist would like to keep it.  Shakes has gone away.    6/27 - Hgb decreased pending PRBC transfusion. Creatine increased nearly 2 points in the past 24 hours with 350 mL urine output via berkowitz. Pending cystoscopy today     06/28/2024   She had just come back from the operating room.  She had right ureter stent placement yesterday and left ureter stent placed this morning.  She now has  some light bloody urine in the Mario catheter.  She is awake and alert.  Responds appropriately.  Son present at the bedside.  No complaints.  No more shakes.    Review of patient's allergies indicates:   Allergen Reactions    Asa [aspirin] Anaphylaxis    Penicillins Anaphylaxis     Current Facility-Administered Medications   Medication Frequency    0.9%  NaCl infusion (for blood administration) Q24H PRN    0.9%  NaCl infusion (for blood administration) Q24H PRN    0.9%  NaCl infusion (for blood administration) Q24H PRN    acetaminophen tablet 650 mg Q4H PRN    aluminum-magnesium hydroxide-simethicone 200-200-20 mg/5 mL suspension 30 mL Q6H PRN    Amino acid 4.25% - dextrose 5% (CLINIMIX-E) solution (1L provides 42.5 gm AA, 50 gm CHO (170 kcal/L dextrose), Na 35, K 30, Mg 5, Ca 4.5, Acetate 70, Cl 39, Phos 15) Continuous    ammonium lactate 12 % lotion BID PRN    calcium carbonate 200 mg calcium (500 mg) chewable tablet 1,000 mg Q6H PRN    calcium carbonate 200 mg calcium (500 mg) chewable tablet 1,000 mg BID    cefTRIAXone (ROCEPHIN) 2 g in D5W 100 mL IVPB (MB+) Q24H    ergocalciferol capsule 50,000 Units Q7 Days    ferrous sulfate tablet 1 each BID    folic acid tablet 1 mg Daily    heparin (porcine) injection 4,000 Units PRN    heparin (porcine) injection 5,000 Units Q12H    hydrALAZINE injection 10 mg Q2H PRN    labetaloL injection 10 mg Q4H PRN    labetaloL tablet 200 mg Q12H    melatonin tablet 6 mg Nightly PRN    nystatin-triamcinolone cream TID    ondansetron injection 4 mg Q8H PRN    pantoprazole injection 40 mg BID    prochlorperazine injection Soln 5 mg Q6H PRN    sodium chloride 0.9% flush 10 mL PRN       Objective:     Vital Signs (Most Recent):  Temp: 98.1 °F (36.7 °C) (06/28/24 0732)  Pulse: 90 (06/28/24 0732)  Resp: 18 (06/28/24 0732)  BP: (!) 151/79 (06/28/24 0910)  SpO2: 96 % (06/28/24 0732) Vital Signs (24h Range):  Temp:  [97.5 °F (36.4 °C)-98.6 °F (37 °C)] 98.1 °F (36.7 °C)  Pulse:  []  90  Resp:  [16-21] 18  SpO2:  [90 %-97 %] 96 %  BP: (101-156)/(53-89) 151/79     Weight: 135.4 kg (298 lb 8.1 oz) (06/28/24 0610)  Body mass index is 51.24 kg/m².  Body surface area is 2.47 meters squared.    I/O last 3 completed shifts:  In: 1340 [P.O.:240; Blood:500; IV Piggyback:600]  Out: 2250 [Urine:750; Other:1500]    Physical Exam  Constitutional:       General: She is not in acute distress.     Appearance: She is obese. She is ill-appearing.   HENT:      Head: Atraumatic.      Nose: Nose normal.      Mouth/Throat:      Mouth: Mucous membranes are moist.   Eyes:      Extraocular Movements: Extraocular movements intact.   Neck:      Comments: R IJ temp HD catheter   Cardiovascular:      Rate and Rhythm: Normal rate and regular rhythm.      Pulses: Normal pulses.      Heart sounds: Normal heart sounds.   Pulmonary:      Effort: Pulmonary effort is normal.      Breath sounds: Normal breath sounds.   Abdominal:      General: Bowel sounds are normal. There is no distension.      Palpations: Abdomen is soft.   Genitourinary:     Comments: Urinary catheter  Musculoskeletal:      Cervical back: Neck supple.   Skin:     General: Skin is warm.   Neurological:      General: No focal deficit present.      Mental Status: She is alert and oriented to person, place, and time.   Psychiatric:         Mood and Affect: Mood normal.         Significant Labs:sureBMP:   Recent Labs   Lab 06/26/24  0051 06/27/24  0852 06/28/24  0430   *   < > 130*   K 3.8   < > 4.2      < > 100   CO2 18*   < > 21*   BUN 21.2*   < > 26.2*   CREATININE 3.43*   < > 3.70*   CALCIUM 7.4*   < > 7.4*   MG 1.80  --   --     < > = values in this interval not displayed.     CBC:   Recent Labs   Lab 06/28/24  0430   WBC 13.00*   RBC 2.92*   HGB 8.6*   HCT 26.8*      MCV 91.8   MCH 29.5   MCHC 32.1*       Significant Imaging:    FL Cystogram Retrograde (xpd) [[id number]] Resulted: 06/25/24 2953   Order Status: Completed Updated: 06/25/24  1555   Narrative:     EXAMINATION:  XR CYSTOGRAM IN SURGERY    CLINICAL HISTORY:  bilateral hydronephrosis; check for reflux into kidneys;    TECHNIQUE:  A  radiograph was obtained. Contrast was then instilled into the bladder through the patient's catheter with multiple fluoroscopic images obtained in various positions.  Absorbed dose is 91.87 mGy.    COMPARISON:  CT 06/21/2024    FINDINGS:  Bladder fills readily with no significant contour abnormality identified.  Due to patient discomfort, only about 300 mL of contrast instilled during the exam, but no vesicoureteral reflux identified.   Impression:       Mildly limited assessment due to patient pain, but no significant bladder abnormality identified.      Electronically signed by: Pastor Gordon  Date: 06/25/2024  Time: 15:53       Assessment/Plan:   ESTRELLITA secondary to ATN secondary to obstructive uropathy  -Dialysis initiated 6/22 via right IJ temporary dialysis catheter.  Lost dialyzed on 06/27/2024.  Patient's probably has some underlying chronic kidney disease as she does have significant proteinuria more than 8 g per 24 hours.   Shaking and jerking movements of the extremities probably secondary to uremia,  now resolved completely with dialysis   UTI - Now on zosyn and vanc  Hyperkalemia  Improved.  Acute metabolic acidosis  Improved.  Anemia     Recommendations  Will hold off further dialysis and monitor patient's renal functions electrolytes and urine output.  Check labs tomorrow.           Shin Light MD  Nephrology  [facility] - 8th Floor Med Surg

## 2024-06-28 NOTE — PLAN OF CARE
Problem: Infection  Goal: Absence of Infection Signs and Symptoms  Outcome: Progressing  Intervention: Prevent or Manage Infection  Flowsheets (Taken 6/28/2024 0300)  Fever Reduction/Comfort Measures:   fluid intake increased   lightweight bedding  Infection Management:   aseptic technique maintained   cultures obtained and sent to lab  Isolation Precautions: precautions maintained     Problem: Adult Inpatient Plan of Care  Goal: Plan of Care Review  Outcome: Progressing  Flowsheets (Taken 6/28/2024 0300)  Plan of Care Reviewed With: patient  Goal: Patient-Specific Goal (Individualized)  Outcome: Progressing  Goal: Absence of Hospital-Acquired Illness or Injury  Outcome: Progressing  Intervention: Identify and Manage Fall Risk  Flowsheets (Taken 6/28/2024 0300)  Safety Promotion/Fall Prevention:   assistive device/personal item within reach   Fall Risk reviewed with patient/family   Fall Risk signage in place   high risk medications identified   lighting adjusted   medications reviewed   muscle strengthening facilitated   nonskid shoes/socks when out of bed   side rails raised x 3  Intervention: Prevent Skin Injury  Flowsheets (Taken 6/28/2024 0300)  Body Position:   turned   weight shifting  Skin Protection: incontinence pads utilized  Device Skin Pressure Protection:   absorbent pad utilized/changed   pressure points protected   tubing/devices free from skin contact  Intervention: Prevent and Manage VTE (Venous Thromboembolism) Risk  Flowsheets (Taken 6/28/2024 0300)  VTE Prevention/Management:   ambulation promoted   bleeding precations maintained   bleeding risk assessed  Intervention: Prevent Infection  Flowsheets (Taken 6/28/2024 0300)  Infection Prevention:   environmental surveillance performed   equipment surfaces disinfected   hand hygiene promoted   visitors restricted/screened   single patient room provided   rest/sleep promoted   personal protective equipment utilized  Goal: Optimal Comfort and  Wellbeing  Outcome: Progressing  Intervention: Monitor Pain and Promote Comfort  Flowsheets (Taken 6/28/2024 0300)  Pain Management Interventions:   care clustered   position adjusted   quiet environment facilitated   relaxation techniques promoted   pillow support provided  Intervention: Provide Person-Centered Care  Flowsheets (Taken 6/28/2024 0300)  Trust Relationship/Rapport:   care explained   choices provided   emotional support provided   empathic listening provided   questions answered   thoughts/feelings acknowledged   reassurance provided   questions encouraged  Goal: Readiness for Transition of Care  Outcome: Progressing     Problem: Bariatric Environmental Safety  Goal: Safety Maintained with Care  Outcome: Progressing     Problem: Acute Kidney Injury/Impairment  Goal: Fluid and Electrolyte Balance  Outcome: Progressing  Intervention: Monitor and Manage Fluid and Electrolyte Balance  Flowsheets (Taken 6/28/2024 0300)  Fluid/Electrolyte Management: fluids provided  Goal: Improved Oral Intake  Outcome: Progressing  Intervention: Promote and Optimize Oral Intake  Flowsheets (Taken 6/28/2024 0300)  Nutrition Interventions: (ON IV clinamix)   other (see comments)   referred to nutrition assistant/tech  Goal: Effective Renal Function  Outcome: Progressing

## 2024-06-28 NOTE — CARE UPDATE
[id number] spoke with pt and pt's son re placement. They did not have time to discuss it since pt had a procedure yesterday and one today (s/p stent/nephrostomy). They will discuss over the weekend and let me know.

## 2024-06-28 NOTE — ANESTHESIA POSTPROCEDURE EVALUATION
Anesthesia Post Evaluation    Patient: Fior Joya    Procedure(s) Performed: Procedure(s) (LRB):  CYSTOSCOPY, WITH URETERAL STENT INSERTION (Right)    Final Anesthesia Type: general      Patient location during evaluation: PACU  Patient participation: Yes- Able to Participate  Level of consciousness: awake and alert  Post-procedure vital signs: reviewed and stable  Pain management: adequate  Airway patency: patent      Anesthetic complications: no      Cardiovascular status: hemodynamically stable  Respiratory status: unassisted  Hydration status: euvolemic  Follow-up not needed.              Vitals Value Taken Time   /69 06/28/24 1107   Temp 36.8 °C (98.3 °F) 06/28/24 1107   Pulse 61 06/28/24 1107   Resp 18 06/28/24 1107   SpO2 95 % 06/28/24 1107         Event Time   Out of Recovery 06/27/2024 13:20:00         Pain/Tino Score: Pain Rating Prior to Med Admin: 10 (6/28/2024  1:21 PM)  Pain Rating Post Med Admin: 0 (6/28/2024  3:16 AM)  Tino Score: 10 (6/28/2024  8:00 AM)

## 2024-06-28 NOTE — PT/OT/SLP PROGRESS
Occupational Therapy      Patient Name:  Fior Joya   MRN:  [id number]    Patient not seen today secondary to declining due to having just worked with PT. Will follow-up as schedule permits.    6/28/2024

## 2024-06-29 LAB
ALBUMIN SERPL-MCNC: 2.1 G/DL (ref 3.4–4.8)
ALBUMIN/GLOB SERPL: 0.6 RATIO (ref 1.1–2)
ALP SERPL-CCNC: 60 UNIT/L (ref 40–150)
ALT SERPL-CCNC: 34 UNIT/L (ref 0–55)
ANION GAP SERPL CALC-SCNC: 11 MEQ/L
AST SERPL-CCNC: 26 UNIT/L (ref 5–34)
BASOPHILS # BLD AUTO: 0.03 X10(3)/MCL
BASOPHILS NFR BLD AUTO: 0.3 %
BILIRUB SERPL-MCNC: 0.2 MG/DL
BUN SERPL-MCNC: 37.1 MG/DL (ref 9.8–20.1)
CALCIUM SERPL-MCNC: 6.7 MG/DL (ref 8.4–10.2)
CHLORIDE SERPL-SCNC: 101 MMOL/L (ref 98–107)
CO2 SERPL-SCNC: 20 MMOL/L (ref 23–31)
CREAT SERPL-MCNC: 5.28 MG/DL (ref 0.55–1.02)
CREAT/UREA NIT SERPL: 7
EOSINOPHIL # BLD AUTO: 0.18 X10(3)/MCL (ref 0–0.9)
EOSINOPHIL NFR BLD AUTO: 1.6 %
ERYTHROCYTE [DISTWIDTH] IN BLOOD BY AUTOMATED COUNT: 16.1 % (ref 11.5–17)
GFR SERPLBLD CREATININE-BSD FMLA CKD-EPI: 8 ML/MIN/1.73/M2
GLOBULIN SER-MCNC: 3.3 GM/DL (ref 2.4–3.5)
GLUCOSE SERPL-MCNC: 127 MG/DL (ref 82–115)
HCT VFR BLD AUTO: 26.5 % (ref 37–47)
HGB BLD-MCNC: 8.5 G/DL (ref 12–16)
IMM GRANULOCYTES # BLD AUTO: 0.51 X10(3)/MCL (ref 0–0.04)
IMM GRANULOCYTES NFR BLD AUTO: 4.6 %
LYMPHOCYTES # BLD AUTO: 0.95 X10(3)/MCL (ref 0.6–4.6)
LYMPHOCYTES NFR BLD AUTO: 8.6 %
MCH RBC QN AUTO: 30 PG (ref 27–31)
MCHC RBC AUTO-ENTMCNC: 32.1 G/DL (ref 33–36)
MCV RBC AUTO: 93.6 FL (ref 80–94)
MONOCYTES # BLD AUTO: 1.56 X10(3)/MCL (ref 0.1–1.3)
MONOCYTES NFR BLD AUTO: 14.1 %
NEUTROPHILS # BLD AUTO: 7.87 X10(3)/MCL (ref 2.1–9.2)
NEUTROPHILS NFR BLD AUTO: 70.8 %
NRBC BLD AUTO-RTO: 0 %
PLATELET # BLD AUTO: 164 X10(3)/MCL (ref 130–400)
PMV BLD AUTO: 11.1 FL (ref 7.4–10.4)
POTASSIUM SERPL-SCNC: 4.2 MMOL/L (ref 3.5–5.1)
PROT SERPL-MCNC: 5.4 GM/DL (ref 5.8–7.6)
RBC # BLD AUTO: 2.83 X10(6)/MCL (ref 4.2–5.4)
SODIUM SERPL-SCNC: 132 MMOL/L (ref 136–145)
WBC # BLD AUTO: 11.1 X10(3)/MCL (ref 4.5–11.5)

## 2024-06-29 PROCEDURE — 90935 HEMODIALYSIS ONE EVALUATION: CPT

## 2024-06-29 PROCEDURE — 80053 COMPREHEN METABOLIC PANEL: CPT | Performed by: INTERNAL MEDICINE

## 2024-06-29 PROCEDURE — 85025 COMPLETE CBC W/AUTO DIFF WBC: CPT | Performed by: INTERNAL MEDICINE

## 2024-06-29 PROCEDURE — 25000003 PHARM REV CODE 250

## 2024-06-29 PROCEDURE — 63600175 PHARM REV CODE 636 W HCPCS: Performed by: INTERNAL MEDICINE

## 2024-06-29 PROCEDURE — 21400001 HC TELEMETRY ROOM

## 2024-06-29 PROCEDURE — 36415 COLL VENOUS BLD VENIPUNCTURE: CPT | Performed by: INTERNAL MEDICINE

## 2024-06-29 PROCEDURE — 25000003 PHARM REV CODE 250: Performed by: STUDENT IN AN ORGANIZED HEALTH CARE EDUCATION/TRAINING PROGRAM

## 2024-06-29 PROCEDURE — 25000003 PHARM REV CODE 250: Performed by: NURSE PRACTITIONER

## 2024-06-29 PROCEDURE — 63600175 PHARM REV CODE 636 W HCPCS: Performed by: STUDENT IN AN ORGANIZED HEALTH CARE EDUCATION/TRAINING PROGRAM

## 2024-06-29 PROCEDURE — 25000003 PHARM REV CODE 250: Performed by: INTERNAL MEDICINE

## 2024-06-29 RX ORDER — SYRING-NEEDL,DISP,INSUL,0.3 ML 29 G X1/2"
296 SYRINGE, EMPTY DISPOSABLE MISCELLANEOUS ONCE
Status: COMPLETED | OUTPATIENT
Start: 2024-06-29 | End: 2024-06-29

## 2024-06-29 RX ORDER — ERGOCALCIFEROL 1.25 MG/1
50000 CAPSULE ORAL
Status: DISCONTINUED | OUTPATIENT
Start: 2024-06-30 | End: 2024-07-10 | Stop reason: HOSPADM

## 2024-06-29 RX ORDER — POLYETHYLENE GLYCOL 3350 17 G/17G
17 POWDER, FOR SOLUTION ORAL DAILY
Status: DISCONTINUED | OUTPATIENT
Start: 2024-06-30 | End: 2024-07-10 | Stop reason: HOSPADM

## 2024-06-29 RX ORDER — SODIUM CHLORIDE 9 MG/ML
INJECTION, SOLUTION INTRAVENOUS
Status: CANCELLED | OUTPATIENT
Start: 2024-06-29

## 2024-06-29 RX ORDER — PANTOPRAZOLE SODIUM 40 MG/1
40 TABLET, DELAYED RELEASE ORAL DAILY
Status: DISCONTINUED | OUTPATIENT
Start: 2024-06-29 | End: 2024-07-10 | Stop reason: HOSPADM

## 2024-06-29 RX ORDER — SODIUM CHLORIDE 9 MG/ML
INJECTION, SOLUTION INTRAVENOUS ONCE
Status: CANCELLED | OUTPATIENT
Start: 2024-06-29 | End: 2024-06-29

## 2024-06-29 RX ADMIN — LABETALOL HYDROCHLORIDE 200 MG: 200 TABLET, FILM COATED ORAL at 09:06

## 2024-06-29 RX ADMIN — ACETAMINOPHEN 650 MG: 325 TABLET, FILM COATED ORAL at 06:06

## 2024-06-29 RX ADMIN — FERROUS SULFATE TAB 325 MG (65 MG ELEMENTAL FE) 1 EACH: 325 (65 FE) TAB at 10:06

## 2024-06-29 RX ADMIN — CEFTRIAXONE SODIUM 2 G: 2 INJECTION, POWDER, FOR SOLUTION INTRAMUSCULAR; INTRAVENOUS at 04:06

## 2024-06-29 RX ADMIN — HEPARIN SODIUM 5000 UNITS: 5000 INJECTION INTRAVENOUS; SUBCUTANEOUS at 10:06

## 2024-06-29 RX ADMIN — PANTOPRAZOLE SODIUM 40 MG: 40 TABLET, DELAYED RELEASE ORAL at 09:06

## 2024-06-29 RX ADMIN — CALCIUM CARBONATE (ANTACID) CHEW TAB 500 MG 1000 MG: 500 CHEW TAB at 10:06

## 2024-06-29 RX ADMIN — FOLIC ACID 1 MG: 1 TABLET ORAL at 09:06

## 2024-06-29 RX ADMIN — MAGNESIUM CITRATE 296 ML: 1.75 LIQUID ORAL at 04:06

## 2024-06-29 RX ADMIN — CALCIUM CARBONATE (ANTACID) CHEW TAB 500 MG 1000 MG: 500 CHEW TAB at 09:06

## 2024-06-29 RX ADMIN — HEPARIN SODIUM 5000 UNITS: 5000 INJECTION INTRAVENOUS; SUBCUTANEOUS at 09:06

## 2024-06-29 RX ADMIN — LABETALOL HYDROCHLORIDE 200 MG: 200 TABLET, FILM COATED ORAL at 10:06

## 2024-06-29 RX ADMIN — FERROUS SULFATE TAB 325 MG (65 MG ELEMENTAL FE) 1 EACH: 325 (65 FE) TAB at 09:06

## 2024-06-29 NOTE — PROGRESS NOTES
06/29/24 1552        Hemodialysis Catheter 06/22/24 0530 right internal jugular   Placement Date/Time: 06/22/24 0530   Present Prior to Hospital Arrival?: No  Location: right internal jugular   Line Necessity Review CRRT/HD   Site Assessment No drainage;No redness;No swelling;No warmth   Line Securement Device Secured with sutureless device   Dressing Type CHG impregnated dressing/sponge;Central line dressing;Transparent (Tegaderm)   Dressing Status Clean;Dry;Intact   Dressing Intervention Integrity maintained   Date on Dressing 06/27/24   Dressing Due to be Changed 07/04/24   Venous Patency/Care flushed w/o difficulty;blood return present;intermittent infusion cap changed;normal saline locked;deaccessed   Arterial Patency/Care flushed w/o difficulty;blood return present;intermittent infusion cap changed;normal saline locked;deaccessed   Waveform Not being transduced   Post-Hemodialysis Assessment   Blood Volume Processed (Liters) 76.1 L   Dialyzer Clearance Lightly streaked   Duration of Treatment 180 minutes   Additional Fluid Intake (mL) 500 mL   Total UF (mL) 500 mL   Net Fluid Removal 0   Patient Response to Treatment Tx completed, tolerated well, lines flushed and packed with saline to filling volume with new end caps   Post-Hemodialysis Comments no distress noted

## 2024-06-29 NOTE — PROGRESS NOTES
[facility] - 8th Floor Med Surg  Nephrology  Progress Note    Patient Name: Fior Joya  MRN: [id number]  Admission Date: 6/22/2024  Hospital Length of Stay: 7 days  Attending Provider: Susan Sibley MD   Primary Care Physician: Linda, Primary Doctor  Principal Problem:Metabolic acidosis      Subjective:     Fior Joya is a 69 y.o. female who was transferred from hospital in [place] due to acute renal failure.  She initially presented to the hospital due to generalized weakness and shortness of breath with exertion.  Per their report she had been mostly bed-bound for the past 3 weeks.  He reported that she had been drinking getting up going to the restroom.  She did have some episodes of nausea vomiting and generalized weakness along with generalized muscle pain more recently.  She was endorsing shortness of breath but no chest pain.  Denied had been running a fever.  Per their his report, she had not seen a physician in 10 years, because she was not feeling ill and did not feel like she needed to go.  She had also been having some swelling in the legs.  She has no known medical issues per their report.  On admit, WBC elevated at 15, RBC 1.8, hemoglobin 5.7, D-dimer elevated at 1.57, serum sodium 132, potassium 6, serum CO2 less than 5, , creatinine 14.8, glucose 273, calcium 7.5, phosphorus 7.9, albumin 2.9.  TSH and lactic were okay.  UA results indicative of UTI sent for cultures.  Blood cultures obtained and pending.  Received phone call from ER physician in early a.m. hours, it was decided at that time we will proceed with initiation of RRT.  Right IJ temporary cath was placed per General surgery.   She is awake and alert this morning upon exam.  She does respond with simple answers to questions posed.  Son in room in majority of history obtained from him.  Oxygen saturation is 99% on nasal cannula.  We are consulted for management ESTRELLITA.  Bilateral hydronephrosis on CT from 06/21/2024      6/23/24- had urology eval., bilateral hydronephrosis, no acute surgical intervention rec. At this time. Recommended Berkowitz with judicious hydration.  Per his report he feels outlet obstruction could be multifactorial but unclear underlying etiology at this time.     He had approximately 700 cc urine output last 24 hours.  She is awake alert oriented in bed on dialysis machine. Brother in . She has some nausea and vomiting this am, but no SOB or CP.      06/24/2024   Very much awake alert and oriented this morning.  Her son sitting in the chair on her bedside.  Still having shaky and jerky movements of all extremities.  Able to answer questions appropriately.  Appetite is poor.  Still good urine output noted.  No family history of anybody with kidney failure.  She knows that she is feeling better.  Her son also thinks that she is much better than before.     06/25/2024   Currently tolerating dialysis.  Most of the jerking movement has resolved.  She is still having some shaking movements but overall she knows she is much improved and her appetite seems to be improving also.  No shortness of breath.  Adequate urine output noted.  Follows command and moves all her extremities.       06/26/2024   Patient is sitting up in her recliner.  Very much awake alert and oriented to time person place.  Her son present in the room.  And she is feeding herself her lunch at present time without any problem and says that she is very hungry.  No shortness of breath voiced.  She still has indwelling Berkowitz catheter which according to the nurses, the urologist would like to keep it.  Shakes has gone away.    6/27 - Hgb decreased pending PRBC transfusion. Creatine increased nearly 2 points in the past 24 hours with 350 mL urine output via berkowitz. Pending cystoscopy today     06/28/2024   She had just come back from the operating room.  She had right ureter stent placement yesterday and left ureter stent placed this morning.  She now has  some light bloody urine in the Mario catheter.  She is awake and alert.  Responds appropriately.  Son present at the bedside.  No complaints.  No more shakes.    06/29/2024   Currently tolerating dialysis.  No complaints of shortness of breath.  No nausea vomiting or shakes.  Urine output noted.    Review of patient's allergies indicates:   Allergen Reactions    Asa [aspirin] Anaphylaxis    Penicillins Anaphylaxis     Current Facility-Administered Medications   Medication Frequency    0.9%  NaCl infusion (for blood administration) Q24H PRN    0.9%  NaCl infusion (for blood administration) Q24H PRN    0.9%  NaCl infusion (for blood administration) Q24H PRN    acetaminophen tablet 650 mg Q4H PRN    aluminum-magnesium hydroxide-simethicone 200-200-20 mg/5 mL suspension 30 mL Q6H PRN    ammonium lactate 12 % lotion BID PRN    calcium carbonate 200 mg calcium (500 mg) chewable tablet 1,000 mg Q6H PRN    calcium carbonate 200 mg calcium (500 mg) chewable tablet 1,000 mg BID    cefTRIAXone (ROCEPHIN) 2 g in D5W 100 mL IVPB (MB+) Q24H    [START ON 6/30/2024] ergocalciferol capsule 50,000 Units Q72H    ferrous sulfate tablet 1 each BID    folic acid tablet 1 mg Daily    heparin (porcine) injection 4,000 Units PRN    heparin (porcine) injection 5,000 Units Q12H    hydrALAZINE injection 10 mg Q2H PRN    labetaloL injection 10 mg Q4H PRN    labetaloL tablet 200 mg Q12H    magnesium citrate solution 296 mL Once    melatonin tablet 6 mg Nightly PRN    nystatin-triamcinolone cream TID    ondansetron injection 4 mg Q8H PRN    pantoprazole EC tablet 40 mg Daily    [START ON 6/30/2024] polyethylene glycol packet 17 g Daily    prochlorperazine injection Soln 5 mg Q6H PRN    sodium chloride 0.9% flush 10 mL PRN       Objective:     Vital Signs (Most Recent):  Temp: 98.1 °F (36.7 °C) (06/29/24 1104)  Pulse: 97 (06/29/24 1104)  Resp: 18 (06/29/24 0339)  BP: 123/75 (06/29/24 1104)  SpO2: 96 % (06/29/24 1104) Vital Signs (24h  Range):  Temp:  [98.1 °F (36.7 °C)-98.5 °F (36.9 °C)] 98.1 °F (36.7 °C)  Pulse:  [] 97  Resp:  [18-19] 18  SpO2:  [96 %-97 %] 96 %  BP: (112-144)/(65-80) 123/75     Weight: 135.4 kg (298 lb 8.1 oz) (06/28/24 0610)  Body mass index is 51.24 kg/m².  Body surface area is 2.47 meters squared.    I/O last 3 completed shifts:  In: 1079 [P.O.:1079]  Out: 1600 [Urine:1600]    Physical Exam  Constitutional:       General: She is not in acute distress.     Appearance: She is obese.   HENT:      Head: Atraumatic.      Nose: Nose normal.      Mouth/Throat:      Mouth: Mucous membranes are moist.   Eyes:      Extraocular Movements: Extraocular movements intact.   Neck:      Comments: R IJ temp HD catheter   Cardiovascular:      Rate and Rhythm: Normal rate and regular rhythm.      Pulses: Normal pulses.      Heart sounds: Normal heart sounds.   Pulmonary:      Effort: Pulmonary effort is normal.      Breath sounds: Normal breath sounds.   Abdominal:      General: Bowel sounds are normal. There is no distension.      Palpations: Abdomen is soft.   Genitourinary:     Comments: Urinary catheter  Musculoskeletal:      Cervical back: Neck supple.   Skin:     General: Skin is warm.   Neurological:      General: No focal deficit present.      Mental Status: She is alert and oriented to person, place, and time.   Psychiatric:         Mood and Affect: Mood normal.         Behavior: Behavior normal.         Thought Content: Thought content normal.         Significant Labs:sureBMP:   Recent Labs   Lab 06/26/24  0051 06/27/24  0852 06/29/24  0643   *   < > 132*   K 3.8   < > 4.2      < > 101   CO2 18*   < > 20*   BUN 21.2*   < > 37.1*   CREATININE 3.43*   < > 5.28*   CALCIUM 7.4*   < > 6.7*   MG 1.80  --   --     < > = values in this interval not displayed.     CBC:   Recent Labs   Lab 06/29/24  0643   WBC 11.10   RBC 2.83*   HGB 8.5*   HCT 26.5*      MCV 93.6   MCH 30.0   MCHC 32.1*       Significant Imaging:    FL  Cystogram Retrograde (xpd) [[id number]] Resulted: 06/25/24 1553   Order Status: Completed Updated: 06/25/24 8625   Narrative:     EXAMINATION:  XR CYSTOGRAM IN SURGERY    CLINICAL HISTORY:  bilateral hydronephrosis; check for reflux into kidneys;    TECHNIQUE:  A  radiograph was obtained. Contrast was then instilled into the bladder through the patient's catheter with multiple fluoroscopic images obtained in various positions.  Absorbed dose is 91.87 mGy.    COMPARISON:  CT 06/21/2024    FINDINGS:  Bladder fills readily with no significant contour abnormality identified.  Due to patient discomfort, only about 300 mL of contrast instilled during the exam, but no vesicoureteral reflux identified.   Impression:       Mildly limited assessment due to patient pain, but no significant bladder abnormality identified.      Electronically signed by: Pastor Gordon  Date: 06/25/2024  Time: 15:53       Assessment/Plan:   ESTRELLITA secondary to ATN secondary to obstructive uropathy  -Dialysis initiated 6/22 via right IJ temporary dialysis catheter.    Patient's probably has some underlying chronic kidney disease as she does have significant proteinuria more than 8 g per 24 hours.   Shaking and jerking movements of the extremities probably secondary to uremia,  now resolved completely with dialysis   UTI - Now on zosyn and vanc  Hyperkalemia  Improved.  Acute metabolic acidosis  Improved.  Anemia     Recommendations  Will dialyze this patient on as-needed basis  Monitor electrolytes renal function urine output and overall patient's condition.           Shin Light MD  Nephrology  [facility] - 8th Floor Med Surg

## 2024-06-29 NOTE — PROGRESS NOTES
[facility] Medicine Progress Note        Chief Complaint: Inpatient Follow-up      HPI:   Mrs Joya is a 69-year-old lady with no PMH who was transferred to [facility] from [place] with complaints of dyspnea on exertion, B/L lower extremity edema, shortness for breath. She was noted to have severe anemia with HGB/HCT of 5.7/17.7, WBCs of 15.8, hyperkalemia with potassium 6.8,, BUN/creatinine of 209/16.94. UA showed leukocytes and many bacteria as well as 3+ protein, 3+ blood. Blood and urine cultures were sent off. Nephrology was consulted on admission and patient was admitted to ICU for placement of dialysis catheter and initiation of hemodialysis. She also received transfusion with 2 units PRBCs with improvement in symptoms. Urology was consulted due to B/L hydronephrosis on ultrasound with urine retention requiring Mario catheter. Placed on IV vancomycin, IV Zosyn. Downgraded from ICU to Hospital Medicine on 06/25. At bedside, patient stated she was doing well and had no new complaints. Continued to have some tremors and some shortness of breath but overall felt improved. On IV Vancomycin, IV Zosyn. Urine cultures grew GBS, Blood cultures negative x 72 hours. Nephrology on board; follow recommendations. Urology on board; follow recommendations. Continued on hemodialysis. PT/OT consulted; recommending moderate intensity therapy. Continued on FeSO4 b.i.d., folic acid 1 mg daily, labetalol 200 mg b.i.d., Protonix 40 mg b.i.d..      Patient has been taken to the OR for systolic he, retrograde pyelogram and possible JJ stents with Urology    Patient is status post cystoscopy with placement of double-J stents in the right, left was very difficult and was not able to be done   IR was consulted for placement of a left PCN, this has been completed and nephrostomy bag draining urine leaking bloody     Interval Hx:   Today, Mrs. Joya stated she was doing well and had no new  complaints.  Patient complaining of no bowel movement in 4 days  Vitals reviewed and stable   Leukocytosis has resolved   Kidney function has not recovered planning for dialysis today   Corrected calcium is 8.3 can continue on Tums b.i.d.     Case was discussed with patient's nurse on the floor.     Objective/physical exam:  General: alert lady lying comfortably in bed, in no acute distress.  HENT: oral and oropharyngeal mucosa moist, pink, with no erythema or exudates, no ear pain or discharge  Neck: normal neck movement, no lymph nodes or swellings, no JVD or Carotid bruit  Respiratory: clear breathing sounds bilaterally, no crackles, rales, ronchi or wheezes  Cardiovascular: clear S1 and S2, no murmurs, rubs or gallops  Peripheral Vascular: no lesions, ulcers or erosions, normal peripheral pulses and no pedal edema  Gastrointestinal: soft, non-tender, non-distended abdomen, no guarding, rigidity or rebound tenderness, normal bowel sounds  Integumentary: B/L LE edema with erythema and tenderness  Neuro: AAO x 3; motor strength 5/5 in B/L UEs & LEs; sensation intact to gross and fine touch B/L; CN II-XII grossly intact            Assessment/Plan:  Bilateral hydroureteronephrosis status post indwelling Mario   Acute kidney injury, possible component of chronic kidney disease and evidence of obstructive uropathy, post initiation of hemodialysis 06/22/2024  UTI-GBS  Acute on chronic Normocytic anemia  Left lower extremity cellulitis  Hypertension  Morbid obesity, BMI 50.4  constipation     Continues to be admitted   Leukocytosis has resolved   Kidney function has not recovered planning for dialysis today   Corrected calcium is 8.3 can continue on Tums b.i.d.   Intensify laxatives   Patient is status post cystoscopy with placement of double-J stents in the right, left was very difficult and was not able to be done   IR was consulted for placement of a left PCN, this has been completed and nephrostomy bag draining urine  leaking bloody  Blood cultures completed and negative   Urine cultures grew 25-50 K GBS,   Will deescalate antibiotics to IV ceftriaxone , day 3  Will trend hemoglobin levels daily   Nephrology on board; follow recommendations   PT/OT on board; recommending moderate intensity therapy   Continued on FeSO4 b.i.d., folic acid 1 mg daily, labetalol 200 mg b.i.d., Protonix 40 mg b.i.d.  Continue monitoring patient's symptoms     VTE prophylaxis:  Heparin     Patient condition:  Stable     Anticipated discharge and Disposition:     Pending     All diagnosis and differential diagnosis have been reviewed; assessment and plan has been documented; I have personally reviewed the labs and test results that are presently available; I have reviewed the patients medication list; I have reviewed the consulting providers response and recommendations. I have reviewed or attempted to review medical records based upon their availability     All of the patient's questions have been  addressed and answered. Patient's is agreeable to the above stated plan. I will continue to monitor closely and make adjustments to medical management as needed.         VITAL SIGNS: 24 HRS MIN & MAX LAST   Temp  Min: 98.1 °F (36.7 °C)  Max: 98.5 °F (36.9 °C) 98.1 °F (36.7 °C)   BP  Min: 112/66  Max: 144/80 123/75   Pulse  Min: 77  Max: 102  97   Resp  Min: 18  Max: 19 18   SpO2  Min: 96 %  Max: 97 % 96 %     I have reviewed the following labs:  Recent Labs   Lab 06/27/24  0935 06/28/24  0430 06/29/24  0643   WBC 13.62  13.62* 13.00* 11.10   RBC 2.34* 2.92* 2.83*   HGB 7.1* 8.6* 8.5*   HCT 22.7* 26.8* 26.5*   MCV 97.0* 91.8 93.6   MCH 30.3 29.5 30.0   MCHC 31.3* 32.1* 32.1*   RDW 14.5 16.4 16.1    174 164   MPV 10.9* 11.5* 11.1*     Recent Labs   Lab 06/22/24  1230 06/23/24  0344 06/24/24  0325 06/25/24  0358 06/26/24  0051 06/27/24  0852 06/28/24  0430 06/29/24  0643   NA  --    < > 138 134* 133* 133* 130* 132*   K  --    < > 3.7 3.5 3.8 4.0 4.2 4.2    CL  --    < > 98 100 105 102 100 101   CO2  --    < > 19* 21* 18* 19* 21* 20*   BUN  --    < > 83.8* 40.5* 21.2* 39.2* 26.2* 37.1*   CREATININE  --    < > 7.43* 4.53* 3.43* 5.41* 3.70* 5.28*   CALCIUM  --    < > 7.4* 7.1* 7.4* 6.6* 7.4* 6.7*   PH 7.550*  --   --   --   --   --   --   --    MG  --    < > 1.70 1.80 1.80  --   --   --    ALBUMIN  --    < > 2.5*  --  2.2*  --   --  2.1*   ALKPHOS  --    < > 54  --  42  --   --  60   ALT  --    < > 12  --  18  --   --  34   AST  --    < > 17  --  23  --   --  26   BILITOT  --    < > 0.4  --  0.4  --   --  0.2    < > = values in this interval not displayed.     Microbiology Results (last 7 days)       ** No results found for the last 168 hours. **             See below for Radiology    Scheduled Med:   calcium carbonate  1,000 mg Oral BID    cefTRIAXone (Rocephin) IV (PEDS and ADULTS)  2 g Intravenous Q24H    [START ON 6/30/2024] ergocalciferol  50,000 Units Oral Q72H    ferrous sulfate  1 tablet Oral BID    folic acid  1 mg Oral Daily    heparin (porcine)  5,000 Units Subcutaneous Q12H    labetaloL  200 mg Oral Q12H    nystatin-triamcinolone   Topical (Top) TID    pantoprazole  40 mg Oral Daily      Continuous Infusions:     PRN Meds:    Current Facility-Administered Medications:     0.9%  NaCl infusion (for blood administration), , Intravenous, Q24H PRN    0.9%  NaCl infusion (for blood administration), , Intravenous, Q24H PRN    0.9%  NaCl infusion (for blood administration), , Intravenous, Q24H PRN    acetaminophen, 650 mg, Oral, Q4H PRN    aluminum-magnesium hydroxide-simethicone, 30 mL, Oral, Q6H PRN    ammonium lactate, , Topical (Top), BID PRN    calcium carbonate, 1,000 mg, Oral, Q6H PRN    heparin (porcine), 4,000 Units, Intravenous, PRN    hydrALAZINE, 10 mg, Intravenous, Q2H PRN    labetaloL, 10 mg, Intravenous, Q4H PRN    melatonin, 6 mg, Oral, Nightly PRN    ondansetron, 4 mg, Intravenous, Q8H PRN    prochlorperazine, 5 mg, Intravenous, Q6H PRN    sodium  chloride 0.9%, 10 mL, Intravenous, PRN     Assessment/Plan:      VTE prophylaxis:     Patient condition:  Stable/Fair/Guarded/ Serious/ Critical    Anticipated discharge and Disposition:         All diagnosis and differential diagnosis have been reviewed; assessment and plan has been documented; I have personally reviewed the labs and test results that are presently available; I have reviewed the patients medication list; I have reviewed the consulting providers response and recommendations. I have reviewed or attempted to review medical records based upon their availability    All of the patient's questions have been  addressed and answered. Patient's is agreeable to the above stated plan. I will continue to monitor closely and make adjustments to medical management as needed.  _____________________________________________________________________    Nutrition Status:    Radiology:  I have personally reviewed the following imaging and agree with the radiologist.     CV Ultrasound doppler venous legs bilat  Negative for deep and superficial vein thrombosis in  the right lower   extremity  Negative for deep and superficial vein thrombosis in  the left  lower   extremity      US Guided Needle Placement  Narrative: EXAMINATION:  Ultrasound guidance    Fluoroscopic guidance    Left-sided nephrostogram    Left-sided nephrostomy catheter placement    Left-sided post placement nephrostogram    CLINICAL HISTORY:  Requiring urinary diversion.    Attending: Augustin Grajeda    Anesthesia: Local and Moderate Sedation - Sedation was provided by physician: An independent trained observer, sedation nurse was present to assist in the monitoring of the patients level of consciousness and physiologic status.    Moderate Sedation was performed for 30 minutes.    TECHNIQUE:  After the risks, benefits and alternatives were discussed, consent was obtained. A time out was performed to verify the patient's identity and procedure.    The  "patient was placed prone on the angiographic table. The left dorsum was cleaned and prepped in normal sterile fashion. Subcutaneous tissues were anesthetized with a lidocaine solution. Dermatotomy was performed using an #11 blade scalpel. Under direct ultrasound guidance, a 21 gauge Chiba needle was advanced into a posterior lower pole calyx. A 0.018" Nitrex wire was inserted through this needle, into the calyces and collecting system. The needle was removed and exchanged for an AccuStick system.  Utilizing a hockey-stick catheter and Glidewire the bladder was accessed.  Catheter was utilized for insertion of Amplatz wire.  The tract was dilated and a percutaneous nephroureteral catheter was placed and sutured to the skin using 0 monofilament.    Fluoroscopic guidance was used for all catheter and wire manipulations. The patient tolerated the procedure well. There were no immediate complications.    EBL: < 20mL    Fluoro Time (min): 2.6    Fluoro Dose (mGy): 36.3  Impression: Successful placement of a/an 8 [place] Fr nephroureteral catheter.    I, Augustin Grajeda, was present for the entire procedure.    Electronically signed by: Augustin Grajeda  Date:    06/28/2024  Time:    13:27  IR Nephrostomy Tube Placement  Narrative: EXAMINATION:  Ultrasound guidance    Fluoroscopic guidance    Left-sided nephrostogram    Left-sided nephrostomy catheter placement    Left-sided post placement nephrostogram    CLINICAL HISTORY:  Requiring urinary diversion.    Attending: Augustin Grajeda    Anesthesia: Local and Moderate Sedation - Sedation was provided by physician: An independent trained observer, sedation nurse was present to assist in the monitoring of the patients level of consciousness and physiologic status.    Moderate Sedation was performed for 30 minutes.    TECHNIQUE:  After the risks, benefits and alternatives were discussed, consent was obtained. A time out was performed to verify the patient's identity and " "procedure.    The patient was placed prone on the angiographic table. The left dorsum was cleaned and prepped in normal sterile fashion. Subcutaneous tissues were anesthetized with a lidocaine solution. Dermatotomy was performed using an #11 blade scalpel. Under direct ultrasound guidance, a 21 gauge Chiba needle was advanced into a posterior lower pole calyx. A 0.018" Nitrex wire was inserted through this needle, into the calyces and collecting system. The needle was removed and exchanged for an AccuStick system.  Utilizing a hockey-stick catheter and Glidewire the bladder was accessed.  Catheter was utilized for insertion of Amplatz wire.  The tract was dilated and a percutaneous nephroureteral catheter was placed and sutured to the skin using 0 monofilament.    Fluoroscopic guidance was used for all catheter and wire manipulations. The patient tolerated the procedure well. There were no immediate complications.    EBL: < 20mL    Fluoro Time (min): 2.6    Fluoro Dose (mGy): 36.3  Impression: Successful placement of a/an 8 [place] Fr nephroureteral catheter.    I, Augustin Grajeda, was present for the entire procedure.    Electronically signed by: Augustin Grajeda  Date:    06/28/2024  Time:    13:27      Susan Sibley MD  Department of Hospital Medicine   [facility]   06/29/2024                 "

## 2024-06-30 LAB
ANION GAP SERPL CALC-SCNC: 11 MEQ/L
BUN SERPL-MCNC: 20.6 MG/DL (ref 9.8–20.1)
CALCIUM SERPL-MCNC: 7.1 MG/DL (ref 8.4–10.2)
CHLORIDE SERPL-SCNC: 101 MMOL/L (ref 98–107)
CO2 SERPL-SCNC: 24 MMOL/L (ref 23–31)
CREAT SERPL-MCNC: 3.68 MG/DL (ref 0.55–1.02)
CREAT/UREA NIT SERPL: 6
GFR SERPLBLD CREATININE-BSD FMLA CKD-EPI: 13 ML/MIN/1.73/M2
GLUCOSE SERPL-MCNC: 126 MG/DL (ref 82–115)
POTASSIUM SERPL-SCNC: 3.8 MMOL/L (ref 3.5–5.1)
SODIUM SERPL-SCNC: 136 MMOL/L (ref 136–145)

## 2024-06-30 PROCEDURE — 25000003 PHARM REV CODE 250: Performed by: INTERNAL MEDICINE

## 2024-06-30 PROCEDURE — 63600175 PHARM REV CODE 636 W HCPCS: Performed by: INTERNAL MEDICINE

## 2024-06-30 PROCEDURE — 21400001 HC TELEMETRY ROOM

## 2024-06-30 PROCEDURE — 63600175 PHARM REV CODE 636 W HCPCS: Performed by: STUDENT IN AN ORGANIZED HEALTH CARE EDUCATION/TRAINING PROGRAM

## 2024-06-30 PROCEDURE — 36415 COLL VENOUS BLD VENIPUNCTURE: CPT | Performed by: INTERNAL MEDICINE

## 2024-06-30 PROCEDURE — 80048 BASIC METABOLIC PNL TOTAL CA: CPT | Performed by: INTERNAL MEDICINE

## 2024-06-30 PROCEDURE — 25000003 PHARM REV CODE 250

## 2024-06-30 PROCEDURE — 25000003 PHARM REV CODE 250: Performed by: NURSE PRACTITIONER

## 2024-06-30 RX ADMIN — FERROUS SULFATE TAB 325 MG (65 MG ELEMENTAL FE) 1 EACH: 325 (65 FE) TAB at 10:06

## 2024-06-30 RX ADMIN — HEPARIN SODIUM 5000 UNITS: 5000 INJECTION INTRAVENOUS; SUBCUTANEOUS at 09:06

## 2024-06-30 RX ADMIN — FOLIC ACID 1 MG: 1 TABLET ORAL at 09:06

## 2024-06-30 RX ADMIN — ACETAMINOPHEN 650 MG: 325 TABLET, FILM COATED ORAL at 01:06

## 2024-06-30 RX ADMIN — CALCIUM CARBONATE (ANTACID) CHEW TAB 500 MG 1000 MG: 500 CHEW TAB at 09:06

## 2024-06-30 RX ADMIN — FERROUS SULFATE TAB 325 MG (65 MG ELEMENTAL FE) 1 EACH: 325 (65 FE) TAB at 09:06

## 2024-06-30 RX ADMIN — LABETALOL HYDROCHLORIDE 200 MG: 200 TABLET, FILM COATED ORAL at 09:06

## 2024-06-30 RX ADMIN — NYSTATIN AND TRIAMCINOLONE ACETONIDE: 100000; 1 CREAM TOPICAL at 09:06

## 2024-06-30 RX ADMIN — CEFTRIAXONE SODIUM 2 G: 2 INJECTION, POWDER, FOR SOLUTION INTRAMUSCULAR; INTRAVENOUS at 02:06

## 2024-06-30 RX ADMIN — PANTOPRAZOLE SODIUM 40 MG: 40 TABLET, DELAYED RELEASE ORAL at 09:06

## 2024-06-30 RX ADMIN — ACETAMINOPHEN 650 MG: 325 TABLET, FILM COATED ORAL at 05:06

## 2024-06-30 RX ADMIN — CALCIUM CARBONATE (ANTACID) CHEW TAB 500 MG 1000 MG: 500 CHEW TAB at 10:06

## 2024-06-30 RX ADMIN — NYSTATIN AND TRIAMCINOLONE ACETONIDE: 100000; 1 CREAM TOPICAL at 10:06

## 2024-06-30 RX ADMIN — ERGOCALCIFEROL 50000 UNITS: 1.25 CAPSULE ORAL at 09:06

## 2024-06-30 RX ADMIN — HEPARIN SODIUM 5000 UNITS: 5000 INJECTION INTRAVENOUS; SUBCUTANEOUS at 10:06

## 2024-06-30 RX ADMIN — ACETAMINOPHEN 650 MG: 325 TABLET, FILM COATED ORAL at 07:06

## 2024-06-30 RX ADMIN — LABETALOL HYDROCHLORIDE 200 MG: 200 TABLET, FILM COATED ORAL at 10:06

## 2024-06-30 NOTE — PROGRESS NOTES
[facility] Medicine Progress Note      Chief Complaint: Inpatient Follow-up      HPI:   Mrs Joya is a 69-year-old lady with no PMH who was transferred to [facility] from [place] with complaints of dyspnea on exertion, B/L lower extremity edema, shortness for breath. She was noted to have severe anemia with HGB/HCT of 5.7/17.7, WBCs of 15.8, hyperkalemia with potassium 6.8,, BUN/creatinine of 209/16.94. UA showed leukocytes and many bacteria as well as 3+ protein, 3+ blood. Blood and urine cultures were sent off. Nephrology was consulted on admission and patient was admitted to ICU for placement of dialysis catheter and initiation of hemodialysis. She also received transfusion with 2 units PRBCs with improvement in symptoms. Urology was consulted due to B/L hydronephrosis on ultrasound with urine retention requiring Mario catheter. Placed on IV vancomycin, IV Zosyn. Downgraded from ICU to Hospital Medicine on 06/25. At bedside, patient stated she was doing well and had no new complaints. Continued to have some tremors and some shortness of breath but overall felt improved. On IV Vancomycin, IV Zosyn. Urine cultures grew GBS, Blood cultures negative x 72 hours. Nephrology on board; follow recommendations. Urology on board; follow recommendations. Continued on hemodialysis. PT/OT consulted; recommending moderate intensity therapy. Continued on FeSO4 b.i.d., folic acid 1 mg daily, labetalol 200 mg b.i.d., Protonix 40 mg b.i.d..      Patient has been taken to the OR for systolic he, retrograde pyelogram and possible JJ stents with Urology    Patient is status post cystoscopy with placement of double-J stents in the right, left was very difficult and was not able to be done   IR was consulted for placement of a left PCN, this has been completed and nephrostomy bag draining urine leaking bloody     Interval Hx:   Today, Mrs. Joya stated she was doing well and had no new  complaints.  Vitals reviewed and stable   No overnight events reported   Labs reviewed and stable  Bmp- renal disease on HD      Case was discussed with patient's nurse on the floor.     Objective/physical exam:  General: alert lady lying comfortably in bed, in no acute distress.  HENT: oral and oropharyngeal mucosa moist, pink, with no erythema or exudates, no ear pain or discharge  Neck: normal neck movement, no lymph nodes or swellings, no JVD or Carotid bruit  Respiratory: clear breathing sounds bilaterally, no crackles, rales, ronchi or wheezes  Cardiovascular: clear S1 and S2, no murmurs, rubs or gallops  Peripheral Vascular: no lesions, ulcers or erosions, normal peripheral pulses and no pedal edema  Gastrointestinal: soft, non-tender, non-distended abdomen, no guarding, rigidity or rebound tenderness, normal bowel sounds  Integumentary: B/L LE edema with erythema and tenderness  Neuro: AAO x 3; motor strength 5/5 in B/L UEs & LEs; sensation intact to gross and fine touch B/L; CN II-XII grossly intact            Assessment/Plan:  Bilateral hydroureteronephrosis status post indwelling Mario   Acute kidney injury, possible component of chronic kidney disease and evidence of obstructive uropathy, post initiation of hemodialysis 06/22/2024  UTI-GBS  Acute on chronic Normocytic anemia  Left lower extremity cellulitis  Hypertension  Morbid obesity, BMI 50.4  constipation     Continues to be admitted   Leukocytosis has resolved   Kidney function has not recovered planning for dialysis today   Corrected calcium is 8.3 can continue on Tums b.i.d.   Intensify laxatives   Patient is status post cystoscopy with placement of double-J stents in the right, left was very difficult and was not able to be done   IR was consulted for placement of a left PCN, this has been completed and nephrostomy bag draining urine leaking bloody  Blood cultures completed and negative   Urine cultures grew 25-50 K GBS,   Will deescalate  antibiotics to IV ceftriaxone , day 4/7  Will trend hemoglobin levels daily   Nephrology on board; follow recommendations   PT/OT on board; recommending moderate intensity therapy   Continued on FeSO4 b.i.d., folic acid 1 mg daily, labetalol 200 mg b.i.d., Protonix 40 mg b.i.d.  Continue monitoring patient's symptoms     VTE prophylaxis:  Heparin     Patient condition:  Stable     Anticipated discharge and Disposition:     Pending     All diagnosis and differential diagnosis have been reviewed; assessment and plan has been documented; I have personally reviewed the labs and test results that are presently available; I have reviewed the patients medication list; I have reviewed the consulting providers response and recommendations. I have reviewed or attempted to review medical records based upon their availability     All of the patient's questions have been  addressed and answered. Patient's is agreeable to the above stated plan. I will continue to monitor closely and make adjustments to medical management as needed.          VITAL SIGNS: 24 HRS MIN & MAX LAST   Temp  Min: 97.9 °F (36.6 °C)  Max: 98.3 °F (36.8 °C) 98 °F (36.7 °C)   BP  Min: 106/62  Max: 145/64 106/62   Pulse  Min: 76  Max: 97  95   Resp  Min: 15  Max: 15 15   SpO2  Min: 94 %  Max: 96 % (!) 94 %     I have reviewed the following labs:  Recent Labs   Lab 06/27/24  0935 06/28/24  0430 06/29/24  0643   WBC 13.62  13.62* 13.00* 11.10   RBC 2.34* 2.92* 2.83*   HGB 7.1* 8.6* 8.5*   HCT 22.7* 26.8* 26.5*   MCV 97.0* 91.8 93.6   MCH 30.3 29.5 30.0   MCHC 31.3* 32.1* 32.1*   RDW 14.5 16.4 16.1    174 164   MPV 10.9* 11.5* 11.1*     Recent Labs   Lab 06/24/24  0325 06/25/24  0358 06/26/24  0051 06/27/24  0852 06/28/24  0430 06/29/24  0643 06/30/24  0620    134* 133*   < > 130* 132* 136   K 3.7 3.5 3.8   < > 4.2 4.2 3.8   CL 98 100 105   < > 100 101 101   CO2 19* 21* 18*   < > 21* 20* 24   BUN 83.8* 40.5* 21.2*   < > 26.2* 37.1* 20.6*    CREATININE 7.43* 4.53* 3.43*   < > 3.70* 5.28* 3.68*   CALCIUM 7.4* 7.1* 7.4*   < > 7.4* 6.7* 7.1*   MG 1.70 1.80 1.80  --   --   --   --    ALBUMIN 2.5*  --  2.2*  --   --  2.1*  --    ALKPHOS 54  --  42  --   --  60  --    ALT 12  --  18  --   --  34  --    AST 17  --  23  --   --  26  --    BILITOT 0.4  --  0.4  --   --  0.2  --     < > = values in this interval not displayed.     Microbiology Results (last 7 days)       ** No results found for the last 168 hours. **             See below for Radiology    Scheduled Med:   calcium carbonate  1,000 mg Oral BID    cefTRIAXone (Rocephin) IV (PEDS and ADULTS)  2 g Intravenous Q24H    ergocalciferol  50,000 Units Oral Q72H    ferrous sulfate  1 tablet Oral BID    folic acid  1 mg Oral Daily    heparin (porcine)  5,000 Units Subcutaneous Q12H    labetaloL  200 mg Oral Q12H    nystatin-triamcinolone   Topical (Top) TID    pantoprazole  40 mg Oral Daily    polyethylene glycol  17 g Oral Daily      Continuous Infusions:     PRN Meds:    Current Facility-Administered Medications:     0.9%  NaCl infusion (for blood administration), , Intravenous, Q24H PRN    0.9%  NaCl infusion (for blood administration), , Intravenous, Q24H PRN    0.9%  NaCl infusion (for blood administration), , Intravenous, Q24H PRN    acetaminophen, 650 mg, Oral, Q4H PRN    aluminum-magnesium hydroxide-simethicone, 30 mL, Oral, Q6H PRN    ammonium lactate, , Topical (Top), BID PRN    calcium carbonate, 1,000 mg, Oral, Q6H PRN    heparin (porcine), 4,000 Units, Intravenous, PRN    hydrALAZINE, 10 mg, Intravenous, Q2H PRN    labetaloL, 10 mg, Intravenous, Q4H PRN    melatonin, 6 mg, Oral, Nightly PRN    ondansetron, 4 mg, Intravenous, Q8H PRN    prochlorperazine, 5 mg, Intravenous, Q6H PRN    sodium chloride 0.9%, 10 mL, Intravenous, PRN     Assessment/Plan:      VTE prophylaxis:     Patient condition:  Stable/Fair/Guarded/ Serious/ Critical    Anticipated discharge and Disposition:         All diagnosis  and differential diagnosis have been reviewed; assessment and plan has been documented; I have personally reviewed the labs and test results that are presently available; I have reviewed the patients medication list; I have reviewed the consulting providers response and recommendations. I have reviewed or attempted to review medical records based upon their availability    All of the patient's questions have been  addressed and answered. Patient's is agreeable to the above stated plan. I will continue to monitor closely and make adjustments to medical management as needed.  _____________________________________________________________________    Nutrition Status:    Radiology:  I have personally reviewed the following imaging and agree with the radiologist.     CV Ultrasound doppler venous legs bilat  Negative for deep and superficial vein thrombosis in  the right lower   extremity  Negative for deep and superficial vein thrombosis in  the left  lower   extremity      US Guided Needle Placement  Narrative: EXAMINATION:  Ultrasound guidance    Fluoroscopic guidance    Left-sided nephrostogram    Left-sided nephrostomy catheter placement    Left-sided post placement nephrostogram    CLINICAL HISTORY:  Requiring urinary diversion.    Attending: Augustin Grajeda    Anesthesia: Local and Moderate Sedation - Sedation was provided by physician: An independent trained observer, sedation nurse was present to assist in the monitoring of the patients level of consciousness and physiologic status.    Moderate Sedation was performed for 30 minutes.    TECHNIQUE:  After the risks, benefits and alternatives were discussed, consent was obtained. A time out was performed to verify the patient's identity and procedure.    The patient was placed prone on the angiographic table. The left dorsum was cleaned and prepped in normal sterile fashion. Subcutaneous tissues were anesthetized with a lidocaine solution. Dermatotomy was performed  "using an #11 blade scalpel. Under direct ultrasound guidance, a 21 gauge Chiba needle was advanced into a posterior lower pole calyx. A 0.018" Nitrex wire was inserted through this needle, into the calyces and collecting system. The needle was removed and exchanged for an AccuStick system.  Utilizing a hockey-stick catheter and Glidewire the bladder was accessed.  Catheter was utilized for insertion of Amplatz wire.  The tract was dilated and a percutaneous nephroureteral catheter was placed and sutured to the skin using 0 monofilament.    Fluoroscopic guidance was used for all catheter and wire manipulations. The patient tolerated the procedure well. There were no immediate complications.    EBL: < 20mL    Fluoro Time (min): 2.6    Fluoro Dose (mGy): 36.3  Impression: Successful placement of a/an 8 Portuguese Fr nephroureteral catheter.    I, Augustin Grajeda, was present for the entire procedure.    Electronically signed by: Augustin Grajeda  Date:    06/28/2024  Time:    13:27  IR Nephrostomy Tube Placement  Narrative: EXAMINATION:  Ultrasound guidance    Fluoroscopic guidance    Left-sided nephrostogram    Left-sided nephrostomy catheter placement    Left-sided post placement nephrostogram    CLINICAL HISTORY:  Requiring urinary diversion.    Attending: Augustin Grajeda    Anesthesia: Local and Moderate Sedation - Sedation was provided by physician: An independent trained observer, sedation nurse was present to assist in the monitoring of the patients level of consciousness and physiologic status.    Moderate Sedation was performed for 30 minutes.    TECHNIQUE:  After the risks, benefits and alternatives were discussed, consent was obtained. A time out was performed to verify the patient's identity and procedure.    The patient was placed prone on the angiographic table. The left dorsum was cleaned and prepped in normal sterile fashion. Subcutaneous tissues were anesthetized with a lidocaine solution. Dermatotomy was " "performed using an #11 blade scalpel. Under direct ultrasound guidance, a 21 gauge Chiba needle was advanced into a posterior lower pole calyx. A 0.018" Nitrex wire was inserted through this needle, into the calyces and collecting system. The needle was removed and exchanged for an AccuStick system.  Utilizing a hockey-stick catheter and Glidewire the bladder was accessed.  Catheter was utilized for insertion of Amplatz wire.  The tract was dilated and a percutaneous nephroureteral catheter was placed and sutured to the skin using 0 monofilament.    Fluoroscopic guidance was used for all catheter and wire manipulations. The patient tolerated the procedure well. There were no immediate complications.    EBL: < 20mL    Fluoro Time (min): 2.6    Fluoro Dose (mGy): 36.3  Impression: Successful placement of a/an 8 Tanzanian Fr nephroureteral catheter.    I, Augustin Grajead, was present for the entire procedure.    Electronically signed by: Augustin Grajeda  Date:    06/28/2024  Time:    13:27      Susan Sibley MD  Department of Hospital Medicine   [facility]   06/30/2024                 "

## 2024-07-01 LAB
ANION GAP SERPL CALC-SCNC: 9 MEQ/L
BUN SERPL-MCNC: 27.4 MG/DL (ref 9.8–20.1)
CALCIUM SERPL-MCNC: 7.2 MG/DL (ref 8.4–10.2)
CHLORIDE SERPL-SCNC: 100 MMOL/L (ref 98–107)
CO2 SERPL-SCNC: 23 MMOL/L (ref 23–31)
CREAT SERPL-MCNC: 5.07 MG/DL (ref 0.55–1.02)
CREAT/UREA NIT SERPL: 5
GFR SERPLBLD CREATININE-BSD FMLA CKD-EPI: 9 ML/MIN/1.73/M2
GLUCOSE SERPL-MCNC: 110 MG/DL (ref 82–115)
POTASSIUM SERPL-SCNC: 4 MMOL/L (ref 3.5–5.1)
SODIUM SERPL-SCNC: 132 MMOL/L (ref 136–145)

## 2024-07-01 PROCEDURE — 36415 COLL VENOUS BLD VENIPUNCTURE: CPT | Performed by: NURSE PRACTITIONER

## 2024-07-01 PROCEDURE — 97116 GAIT TRAINING THERAPY: CPT | Mod: CQ

## 2024-07-01 PROCEDURE — 80048 BASIC METABOLIC PNL TOTAL CA: CPT | Performed by: NURSE PRACTITIONER

## 2024-07-01 PROCEDURE — 25000003 PHARM REV CODE 250: Performed by: INTERNAL MEDICINE

## 2024-07-01 PROCEDURE — 99222 1ST HOSP IP/OBS MODERATE 55: CPT | Mod: ,,, | Performed by: STUDENT IN AN ORGANIZED HEALTH CARE EDUCATION/TRAINING PROGRAM

## 2024-07-01 PROCEDURE — 63600175 PHARM REV CODE 636 W HCPCS: Performed by: INTERNAL MEDICINE

## 2024-07-01 PROCEDURE — 63600175 PHARM REV CODE 636 W HCPCS: Performed by: STUDENT IN AN ORGANIZED HEALTH CARE EDUCATION/TRAINING PROGRAM

## 2024-07-01 PROCEDURE — 25000003 PHARM REV CODE 250: Performed by: STUDENT IN AN ORGANIZED HEALTH CARE EDUCATION/TRAINING PROGRAM

## 2024-07-01 PROCEDURE — 25000003 PHARM REV CODE 250

## 2024-07-01 PROCEDURE — 97110 THERAPEUTIC EXERCISES: CPT | Mod: CQ

## 2024-07-01 PROCEDURE — 21400001 HC TELEMETRY ROOM

## 2024-07-01 PROCEDURE — 97535 SELF CARE MNGMENT TRAINING: CPT

## 2024-07-01 PROCEDURE — 51798 US URINE CAPACITY MEASURE: CPT

## 2024-07-01 PROCEDURE — 25000003 PHARM REV CODE 250: Performed by: NURSE PRACTITIONER

## 2024-07-01 RX ADMIN — POLYETHYLENE GLYCOL 3350 17 G: 17 POWDER, FOR SOLUTION ORAL at 09:07

## 2024-07-01 RX ADMIN — LABETALOL HYDROCHLORIDE 200 MG: 200 TABLET, FILM COATED ORAL at 09:07

## 2024-07-01 RX ADMIN — NYSTATIN AND TRIAMCINOLONE ACETONIDE: 100000; 1 CREAM TOPICAL at 09:07

## 2024-07-01 RX ADMIN — FERROUS SULFATE TAB 325 MG (65 MG ELEMENTAL FE) 1 EACH: 325 (65 FE) TAB at 08:07

## 2024-07-01 RX ADMIN — FOLIC ACID 1 MG: 1 TABLET ORAL at 09:07

## 2024-07-01 RX ADMIN — CEFTRIAXONE SODIUM 2 G: 2 INJECTION, POWDER, FOR SOLUTION INTRAMUSCULAR; INTRAVENOUS at 03:07

## 2024-07-01 RX ADMIN — NYSTATIN AND TRIAMCINOLONE ACETONIDE: 100000; 1 CREAM TOPICAL at 03:07

## 2024-07-01 RX ADMIN — FERROUS SULFATE TAB 325 MG (65 MG ELEMENTAL FE) 1 EACH: 325 (65 FE) TAB at 09:07

## 2024-07-01 RX ADMIN — PANTOPRAZOLE SODIUM 40 MG: 40 TABLET, DELAYED RELEASE ORAL at 09:07

## 2024-07-01 RX ADMIN — CALCIUM CARBONATE (ANTACID) CHEW TAB 500 MG 1000 MG: 500 CHEW TAB at 08:07

## 2024-07-01 RX ADMIN — CALCIUM CARBONATE (ANTACID) CHEW TAB 500 MG 1000 MG: 500 CHEW TAB at 09:07

## 2024-07-01 RX ADMIN — NYSTATIN AND TRIAMCINOLONE ACETONIDE: 100000; 1 CREAM TOPICAL at 08:07

## 2024-07-01 RX ADMIN — ACETAMINOPHEN 650 MG: 325 TABLET, FILM COATED ORAL at 02:07

## 2024-07-01 RX ADMIN — LABETALOL HYDROCHLORIDE 200 MG: 200 TABLET, FILM COATED ORAL at 08:07

## 2024-07-01 RX ADMIN — HEPARIN SODIUM 5000 UNITS: 5000 INJECTION INTRAVENOUS; SUBCUTANEOUS at 08:07

## 2024-07-01 RX ADMIN — HEPARIN SODIUM 5000 UNITS: 5000 INJECTION INTRAVENOUS; SUBCUTANEOUS at 09:07

## 2024-07-01 NOTE — H&P (VIEW-ONLY)
INTERVENTIONAL NEPHROLOGY INITIAL CONSULTATION NOTE       Patient Name: Fior Joya  HARINDER 1955    Patient Seen Date: 2024  Patient Seen Time: 3:18 PM     Consult requested by: Susan Sibley MD     Reason for consult: Need for tunneled dialysis catheter       HPI: 69-year-old female without known medical history had presented to an OSH and was transferred to [facility] on 24 with dyspnea on exertion and generalized weakness. Pt was found to be severely anemic and have severe ESTRELLITA (BUN was elevated to 205 and Scr was 14.8). The pt underwent urgent placement of a non-tunneled HD catheter and tolerated hemodialysis initiation. Hospital course found the pt to have obstructive uropathy. Despite resolution of obstruction, the pt has continued to require the support of hemodialysis. Nephrology service feel that the pt will require hemodialysis for > 2 weeks. Hence, interventional nephrology service was consulted for insertion of a tunneled dialysis catheter.    Pt seen and examined at bedside this PM. Family present. Pt denies complaints. Risks and benefits of tunneled dialysis catheter insertion and intravenous conscious sedation was reviewed with the patient. The patient agrees to proceed with the intended procedure. Consents for both intravenous conscious sedation and procedure were signed and placed within the chart.     Review of Systems:  General:  No fatigue  Skin: No rashes  HEENT: No vision changes  CVS: No CP  RS: No SOB  GIT: No abdominal pain  Extremities: No swelling  Neurological:  No focal weakness  Psych: No depression    No past medical history on file.   Past Surgical History:   Procedure Laterality Date    CYSTOSCOPY W/ URETERAL STENT PLACEMENT Right 2024    Procedure: CYSTOSCOPY, WITH URETERAL STENT INSERTION;  Surgeon: Otis Person MD;  Location: [facility] OR;  Service: Urology;  Laterality: Right;  CYSTOSCOPY, BILATERAL RETROGRADE PYELOGRAMS, POSSIBLE STENT PLACEMENT.       Review of patient's allergies indicates:   Allergen Reactions    Asa [aspirin] Anaphylaxis    Penicillins      Received ceftriaxone from 6/27, no reactions       Social History     Tobacco Use    Smoking status: Never    Smokeless tobacco: Never   Substance Use Topics    Alcohol use: Never    Drug use: Never      No family history on file.      Current Facility-Administered Medications:     0.9%  NaCl infusion (for blood administration), , Intravenous, Q24H PRN, Dariusz Vee MD    0.9%  NaCl infusion (for blood administration), , Intravenous, Q24H PRN, Casper Hayward Jr., MD, FCCP    0.9%  NaCl infusion (for blood administration), , Intravenous, Q24H PRN, Susan Sibley MD    acetaminophen tablet 650 mg, 650 mg, Oral, Q4H PRN, James Townsend MD, 650 mg at 07/01/24 0209    aluminum-magnesium hydroxide-simethicone 200-200-20 mg/5 mL suspension 30 mL, 30 mL, Oral, Q6H PRN, Tyrone Klein MD, 30 mL at 06/26/24 1834    ammonium lactate 12 % lotion, , Topical (Top), BID PRN, Tyrone Klein MD    calcium carbonate 200 mg calcium (500 mg) chewable tablet 1,000 mg, 1,000 mg, Oral, Q6H PRN, Tyrone Klein MD, 1,000 mg at 06/29/24 0902    calcium carbonate 200 mg calcium (500 mg) chewable tablet 1,000 mg, 1,000 mg, Oral, BID, Gardenia Hooper, AGNP, 1,000 mg at 07/01/24 0936    cefTRIAXone (ROCEPHIN) 2 g in D5W 100 mL IVPB (MB+), 2 g, Intravenous, Q24H, Susan Sibley MD, Stopped at 06/30/24 1445    ergocalciferol capsule 50,000 Units, 50,000 Units, Oral, Q72H, Gardenia Hooper, AGNP, 50,000 Units at 06/30/24 0941    ferrous sulfate tablet 1 each, 1 tablet, Oral, BID, Casper Hayward Jr., MD, FCCP, 1 each at 07/01/24 0936    folic acid tablet 1 mg, 1 mg, Oral, Daily, Shin Light MD, 1 mg at 07/01/24 0936    heparin (porcine) injection 4,000 Units, 4,000 Units, Intravenous, PRN, Nata Terrazas ANP, 4,000 Units at 06/27/24 1721    heparin (porcine) injection 5,000 Units, 5,000 Units, Subcutaneous,  Q12H, Tyrone Klein MD, 5,000 Units at 07/01/24 0936    hydrALAZINE injection 10 mg, 10 mg, Intravenous, Q2H PRN, James Townsend MD    labetaloL injection 10 mg, 10 mg, Intravenous, Q4H PRN, James Townsend MD, 10 mg at 06/23/24 0308    labetaloL tablet 200 mg, 200 mg, Oral, Q12H, Casper Hayward Jr., MD, FCCP, 200 mg at 07/01/24 0936    melatonin tablet 6 mg, 6 mg, Oral, Nightly PRN, James Townsend MD    nystatin-triamcinolone cream, , Topical (Top), TID, Tyrone Klein MD, Given at 07/01/24 1512    ondansetron injection 4 mg, 4 mg, Intravenous, Q8H PRN, James Townsend MD, 4 mg at 06/24/24 2002    pantoprazole EC tablet 40 mg, 40 mg, Oral, Daily, Susan Sibley MD, 40 mg at 07/01/24 0936    polyethylene glycol packet 17 g, 17 g, Oral, Daily, Susan Sibley MD, 17 g at 07/01/24 0936    prochlorperazine injection Soln 5 mg, 5 mg, Intravenous, Q6H PRN, James Townsend MD    sodium chloride 0.9% flush 10 mL, 10 mL, Intravenous, PRN, James Townsend MD    Vital Signs (24 h):  Temp:  [98.2 °F (36.8 °C)-98.7 °F (37.1 °C)] 98.6 °F (37 °C)  Pulse:  [77-90] 83  Resp:  [19-20] 20  SpO2:  [95 %-98 %] 98 %  BP: (101-130)/(52-74) 130/68   I/O last 3 completed shifts:  In: 900 [P.O.:900]  Out: 2122 [Urine:2120; Stool:2]  No intake/output data recorded.        Physical Exam:  General: NAD  HEENT: NC/AT, EOMI  CVS: S1/S2 present and normal. No murmur/rubs/gallop.      RS: Lung CTAB, No rales/rhonchi/wheeze.     Abdominal: Soft, NT/ND.  Extremities: No edema b/l LE  Skin: No rash, no lesions.  Neurological: No focal deficits.  Psych: Normal affect  Dialysis Access: right internal jugular (RIJ) temporary, non-tunneled HD catheter that appears to have a high cannulation in the neck.    Results:    Lab Results   Component Value Date     (L) 07/01/2024    K 4.0 07/01/2024     07/01/2024    CO2 23 07/01/2024    BUN 27.4 (H) 07/01/2024    CREATININE 5.07 (H) 07/01/2024     Lab Results   Component Value Date    WBC 11.10  06/29/2024    WBC 13.62 06/27/2024    HGB 8.5 (L) 06/29/2024     06/29/2024    MCV 93.6 06/29/2024       Assessment and Plan:      ESTRELLITA on ?CKD in need of HD.  Need for tunneled dialysis catheter.  Pt with ESTRELLITA on ?CKD in the setting of obstructive uropathy, who has required the support of hemodialysis since admission to the hospital. Pt is expected to require HD for > 2 weeks. Hence, interventional nephrology service was consulted for insertion of a tunneled dialysis catheter.  - Will plan to place tunneled dialysis catheter this coming Wednesday (7/3/24) with Dr. Barcenas in cath lab setting.  - Consent obtained and retained with dialysis access team.  - NPO after midnight between Tuesday and Wednesday.    Thank you for your consult. Please feel free to reach me with any questions.  Plan for follow-up on Wednesday for procedure.    Nikolai Manzo DO  Interventional Nephrology  Cell: [phone]

## 2024-07-01 NOTE — PROGRESS NOTES
[facility] Medicine Progress Note        hief Complaint: Inpatient Follow-up      HPI:   Mrs Joya is a 69-year-old lady with no PMH who was transferred to [facility] from [place] with complaints of dyspnea on exertion, B/L lower extremity edema, shortness for breath. She was noted to have severe anemia with HGB/HCT of 5.7/17.7, WBCs of 15.8, hyperkalemia with potassium 6.8,, BUN/creatinine of 209/16.94. UA showed leukocytes and many bacteria as well as 3+ protein, 3+ blood. Blood and urine cultures were sent off. Nephrology was consulted on admission and patient was admitted to ICU for placement of dialysis catheter and initiation of hemodialysis. She also received transfusion with 2 units PRBCs with improvement in symptoms. Urology was consulted due to B/L hydronephrosis on ultrasound with urine retention requiring Mario catheter. Placed on IV vancomycin, IV Zosyn. Downgraded from ICU to Hospital Medicine on 06/25. At bedside, patient stated she was doing well and had no new complaints. Continued to have some tremors and some shortness of breath but overall felt improved. On IV Vancomycin, IV Zosyn. Urine cultures grew GBS, Blood cultures negative x 72 hours. Nephrology on board; follow recommendations. Urology on board; follow recommendations. Continued on hemodialysis. PT/OT consulted; recommending moderate intensity therapy. Continued on FeSO4 b.i.d., folic acid 1 mg daily, labetalol 200 mg b.i.d., Protonix 40 mg b.i.d..      Patient has been taken to the OR for systolic he, retrograde pyelogram and possible JJ stents with Urology    Patient is status post cystoscopy with placement of double-J stents in the right, left was very difficult and was not able to be done   IR was consulted for placement of a left PCN, this has been completed and nephrostomy bag draining urine leaking bloody     Interval Hx:   Today, Mrs. Joya stated she was doing well and had no new  complaints.  Vitals reviewed and stable   No overnight events reported   Patient continues to require dialysis  Nephrology planning for tunneled line catheter placement on Tuesday/Wednesday     Case was discussed with patient's nurse on the floor.     Objective/physical exam:  General: alert lady lying comfortably in bed, in no acute distress.  HENT: oral and oropharyngeal mucosa moist, pink, with no erythema or exudates, no ear pain or discharge  Neck: normal neck movement, no lymph nodes or swellings, no JVD or Carotid bruit  Respiratory: clear breathing sounds bilaterally, no crackles, rales, ronchi or wheezes  Cardiovascular: clear S1 and S2, no murmurs, rubs or gallops  Peripheral Vascular: no lesions, ulcers or erosions, normal peripheral pulses and no pedal edema  Gastrointestinal: soft, non-tender, non-distended abdomen, no guarding, rigidity or rebound tenderness, normal bowel sounds  Integumentary: B/L LE edema with erythema and tenderness  Neuro: AAO x 3; motor strength 5/5 in B/L UEs & LEs; sensation intact to gross and fine touch B/L; CN II-XII grossly intact            Assessment/Plan:  Bilateral hydroureteronephrosis status post indwelling Mario   Acute kidney injury, possible component of chronic kidney disease and evidence of obstructive uropathy, post initiation of hemodialysis 06/22/2024  UTI-GBS  Acute on chronic Normocytic anemia  Left lower extremity cellulitis  Hypertension  Morbid obesity, BMI 50.4  constipation     Continues to be admitted   Leukocytosis has resolved   Nephrology planning for tunneled line catheter placement on Tuesday/Wednesday  Corrected calcium is 8.3 can continue on Tums b.i.d.   Intensify laxatives   Patient is status post cystoscopy with placement of double-J stents in the right, left was very difficult and was not able to be done   IR was consulted for placement of a left PCN, this has been completed and nephrostomy bag draining urine leaking bloody  Blood cultures  completed and negative   Urine cultures grew 25-50 K GBS,   Will deescalate antibiotics to IV ceftriaxone , day 5/7  Will trend hemoglobin levels daily   Nephrology on board; follow recommendations   PT/OT on board; recommending moderate intensity therapy   Continued on FeSO4 b.i.d., folic acid 1 mg daily, labetalol 200 mg b.i.d., Protonix 40 mg b.i.d.  Continue monitoring patient's symptoms     VTE prophylaxis:  Heparin     Patient condition:  Stable     Anticipated discharge and Disposition:     Pending HD line placement , OP HD seat      All diagnosis and differential diagnosis have been reviewed; assessment and plan has been documented; I have personally reviewed the labs and test results that are presently available; I have reviewed the patients medication list; I have reviewed the consulting providers response and recommendations. I have reviewed or attempted to review medical records based upon their availability     All of the patient's questions have been  addressed and answered. Patient's is agreeable to the above stated plan. I will continue to monitor closely and make adjustments to medical management as needed.         VITAL SIGNS: 24 HRS MIN & MAX LAST   Temp  Min: 98.2 °F (36.8 °C)  Max: 98.7 °F (37.1 °C) 98.6 °F (37 °C)   BP  Min: 101/52  Max: 130/68 130/68   Pulse  Min: 77  Max: 90  83   Resp  Min: 19  Max: 20 20   SpO2  Min: 95 %  Max: 98 % 98 %     I have reviewed the following labs:  Recent Labs   Lab 06/27/24  0935 06/28/24  0430 06/29/24  0643   WBC 13.62  13.62* 13.00* 11.10   RBC 2.34* 2.92* 2.83*   HGB 7.1* 8.6* 8.5*   HCT 22.7* 26.8* 26.5*   MCV 97.0* 91.8 93.6   MCH 30.3 29.5 30.0   MCHC 31.3* 32.1* 32.1*   RDW 14.5 16.4 16.1    174 164   MPV 10.9* 11.5* 11.1*     Recent Labs   Lab 06/25/24  0358 06/26/24  0051 06/27/24  0852 06/29/24  0643 06/30/24  0620 07/01/24  0447   * 133*   < > 132* 136 132*   K 3.5 3.8   < > 4.2 3.8 4.0    105   < > 101 101 100   CO2 21* 18*   <  > 20* 24 23   BUN 40.5* 21.2*   < > 37.1* 20.6* 27.4*   CREATININE 4.53* 3.43*   < > 5.28* 3.68* 5.07*   CALCIUM 7.1* 7.4*   < > 6.7* 7.1* 7.2*   MG 1.80 1.80  --   --   --   --    ALBUMIN  --  2.2*  --  2.1*  --   --    ALKPHOS  --  42  --  60  --   --    ALT  --  18  --  34  --   --    AST  --  23  --  26  --   --    BILITOT  --  0.4  --  0.2  --   --     < > = values in this interval not displayed.     Microbiology Results (last 7 days)       ** No results found for the last 168 hours. **             See below for Radiology    Scheduled Med:   calcium carbonate  1,000 mg Oral BID    cefTRIAXone (Rocephin) IV (PEDS and ADULTS)  2 g Intravenous Q24H    ergocalciferol  50,000 Units Oral Q72H    ferrous sulfate  1 tablet Oral BID    folic acid  1 mg Oral Daily    heparin (porcine)  5,000 Units Subcutaneous Q12H    labetaloL  200 mg Oral Q12H    nystatin-triamcinolone   Topical (Top) TID    pantoprazole  40 mg Oral Daily    polyethylene glycol  17 g Oral Daily      Continuous Infusions:     PRN Meds:    Current Facility-Administered Medications:     0.9%  NaCl infusion (for blood administration), , Intravenous, Q24H PRN    0.9%  NaCl infusion (for blood administration), , Intravenous, Q24H PRN    0.9%  NaCl infusion (for blood administration), , Intravenous, Q24H PRN    acetaminophen, 650 mg, Oral, Q4H PRN    aluminum-magnesium hydroxide-simethicone, 30 mL, Oral, Q6H PRN    ammonium lactate, , Topical (Top), BID PRN    calcium carbonate, 1,000 mg, Oral, Q6H PRN    heparin (porcine), 4,000 Units, Intravenous, PRN    hydrALAZINE, 10 mg, Intravenous, Q2H PRN    labetaloL, 10 mg, Intravenous, Q4H PRN    melatonin, 6 mg, Oral, Nightly PRN    ondansetron, 4 mg, Intravenous, Q8H PRN    prochlorperazine, 5 mg, Intravenous, Q6H PRN    sodium chloride 0.9%, 10 mL, Intravenous, PRN     Assessment/Plan:      VTE prophylaxis:     Patient condition:  Stable/Fair/Guarded/ Serious/ Critical    Anticipated discharge and Disposition:          All diagnosis and differential diagnosis have been reviewed; assessment and plan has been documented; I have personally reviewed the labs and test results that are presently available; I have reviewed the patients medication list; I have reviewed the consulting providers response and recommendations. I have reviewed or attempted to review medical records based upon their availability    All of the patient's questions have been  addressed and answered. Patient's is agreeable to the above stated plan. I will continue to monitor closely and make adjustments to medical management as needed.  _____________________________________________________________________    Nutrition Status:    Radiology:  I have personally reviewed the following imaging and agree with the radiologist.     CV Ultrasound doppler venous legs bilat  Negative for deep and superficial vein thrombosis in  the right lower   extremity  Negative for deep and superficial vein thrombosis in  the left  lower   extremity      US Guided Needle Placement  Narrative: EXAMINATION:  Ultrasound guidance    Fluoroscopic guidance    Left-sided nephrostogram    Left-sided nephrostomy catheter placement    Left-sided post placement nephrostogram    CLINICAL HISTORY:  Requiring urinary diversion.    Attending: Augustin Grajeda    Anesthesia: Local and Moderate Sedation - Sedation was provided by physician: An independent trained observer, sedation nurse was present to assist in the monitoring of the patients level of consciousness and physiologic status.    Moderate Sedation was performed for 30 minutes.    TECHNIQUE:  After the risks, benefits and alternatives were discussed, consent was obtained. A time out was performed to verify the patient's identity and procedure.    The patient was placed prone on the angiographic table. The left dorsum was cleaned and prepped in normal sterile fashion. Subcutaneous tissues were anesthetized with a lidocaine solution.  "Dermatotomy was performed using an #11 blade scalpel. Under direct ultrasound guidance, a 21 gauge Chiba needle was advanced into a posterior lower pole calyx. A 0.018" Nitrex wire was inserted through this needle, into the calyces and collecting system. The needle was removed and exchanged for an AccuStick system.  Utilizing a hockey-stick catheter and Glidewire the bladder was accessed.  Catheter was utilized for insertion of Amplatz wire.  The tract was dilated and a percutaneous nephroureteral catheter was placed and sutured to the skin using 0 monofilament.    Fluoroscopic guidance was used for all catheter and wire manipulations. The patient tolerated the procedure well. There were no immediate complications.    EBL: < 20mL    Fluoro Time (min): 2.6    Fluoro Dose (mGy): 36.3  Impression: Successful placement of a/an 8 Barbadian Fr nephroureteral catheter.    I, Augustin Grajeda, was present for the entire procedure.    Electronically signed by: Augustin Grajeda  Date:    06/28/2024  Time:    13:27  IR Nephrostomy Tube Placement  Narrative: EXAMINATION:  Ultrasound guidance    Fluoroscopic guidance    Left-sided nephrostogram    Left-sided nephrostomy catheter placement    Left-sided post placement nephrostogram    CLINICAL HISTORY:  Requiring urinary diversion.    Attending: Augustin Grajeda    Anesthesia: Local and Moderate Sedation - Sedation was provided by physician: An independent trained observer, sedation nurse was present to assist in the monitoring of the patients level of consciousness and physiologic status.    Moderate Sedation was performed for 30 minutes.    TECHNIQUE:  After the risks, benefits and alternatives were discussed, consent was obtained. A time out was performed to verify the patient's identity and procedure.    The patient was placed prone on the angiographic table. The left dorsum was cleaned and prepped in normal sterile fashion. Subcutaneous tissues were anesthetized with a lidocaine " "solution. Dermatotomy was performed using an #11 blade scalpel. Under direct ultrasound guidance, a 21 gauge Chiba needle was advanced into a posterior lower pole calyx. A 0.018" Nitrex wire was inserted through this needle, into the calyces and collecting system. The needle was removed and exchanged for an AccuStick system.  Utilizing a hockey-stick catheter and Glidewire the bladder was accessed.  Catheter was utilized for insertion of Amplatz wire.  The tract was dilated and a percutaneous nephroureteral catheter was placed and sutured to the skin using 0 monofilament.    Fluoroscopic guidance was used for all catheter and wire manipulations. The patient tolerated the procedure well. There were no immediate complications.    EBL: < 20mL    Fluoro Time (min): 2.6    Fluoro Dose (mGy): 36.3  Impression: Successful placement of a/an 8 Slovenian Fr nephroureteral catheter.    I, Augustin Grajeda, was present for the entire procedure.    Electronically signed by: Augustin Grajeda  Date:    06/28/2024  Time:    13:27      Susan Sibley MD  Department of Hospital Medicine   [facility]   07/01/2024                 "

## 2024-07-01 NOTE — PLAN OF CARE
Pt will require continued HD upon DC for ESTRELLITA. DC plan unknown at this time; awaiting decisions re placement from pt and family. Will follow.

## 2024-07-01 NOTE — PROGRESS NOTES
Inpatient Nutrition Assessment    Admit Date: 6/22/2024   Total duration of encounter: 9 days   Patient Age: 69 y.o.    Nutrition Recommendation/Prescription     Continue current diet (Diet Renal On Dialysis) as tolerated; Continue Boost Plus (provides 360 kcal, 14 g protein per serving) BID.    Communication of Recommendations: reviewed with patient    Nutrition Assessment     Malnutrition Assessment/Nutrition-Focused Physical Exam    Malnutrition Context: acute illness or injury (06/22/24 1506)  Malnutrition Level:  (does not meet criteria at this time) (06/22/24 1506)  Energy Intake (Malnutrition):  (unable to obtain) (06/22/24 1506)  Weight Loss (Malnutrition):  (unable to obtain) (06/22/24 1506)  Subcutaneous Fat (Malnutrition):  (does not meet) (06/22/24 1506)           Muscle Mass (Malnutrition):  (does not meet) (06/22/24 1506)                          Fluid Accumulation (Malnutrition): severe (06/22/24 1506)        A minimum of two characteristics is recommended for diagnosis of either severe or non-severe malnutrition.    Chart Review    Reason Seen: follow-up    Malnutrition Screening Tool Results   Have you recently lost weight without trying?: Unsure  Have you been eating poorly because of a decreased appetite?: Yes   MST Score: 3   Diagnosis:  Acute renal failure  Sepsis/UTI  Bilateral hydroureteronephrosis  Bilateral pleural effusions  Bilateral lower extremity edema  Anemia of chronic disease    Relevant Medical History: no known past medical history     Scheduled Medications:  calcium carbonate, 1,000 mg, BID  cefTRIAXone (Rocephin) IV (PEDS and ADULTS), 2 g, Q24H  ergocalciferol, 50,000 Units, Q72H  ferrous sulfate, 1 tablet, BID  folic acid, 1 mg, Daily  heparin (porcine), 5,000 Units, Q12H  labetaloL, 200 mg, Q12H  nystatin-triamcinolone, , TID  pantoprazole, 40 mg, Daily  polyethylene glycol, 17 g, Daily    Continuous Infusions: none       PRN Medications:   0.9%  NaCl infusion (for blood  administration), , Q24H PRN  0.9%  NaCl infusion (for blood administration), , Q24H PRN  0.9%  NaCl infusion (for blood administration), , Q24H PRN  acetaminophen, 650 mg, Q4H PRN  aluminum-magnesium hydroxide-simethicone, 30 mL, Q6H PRN  ammonium lactate, , BID PRN  calcium carbonate, 1,000 mg, Q6H PRN  heparin (porcine), 4,000 Units, PRN  hydrALAZINE, 10 mg, Q2H PRN  labetaloL, 10 mg, Q4H PRN  melatonin, 6 mg, Nightly PRN  ondansetron, 4 mg, Q8H PRN  prochlorperazine, 5 mg, Q6H PRN  sodium chloride 0.9%, 10 mL, PRN    Calorie Containing IV Medications: no significant kcals from medications at this time    Recent Labs   Lab 06/25/24  0358 06/26/24  0051 06/27/24  0852 06/27/24  0935 06/28/24  0430 06/29/24  0643 06/30/24  0620 07/01/24  0447   * 133* 133*  --  130* 132* 136 132*   K 3.5 3.8 4.0  --  4.2 4.2 3.8 4.0   CALCIUM 7.1* 7.4* 6.6*  --  7.4* 6.7* 7.1* 7.2*   PHOS 4.0 2.5  --   --   --   --   --   --    MG 1.80 1.80  --   --   --   --   --   --    CO2 21* 18* 19*  --  21* 20* 24 23   BUN 40.5* 21.2* 39.2*  --  26.2* 37.1* 20.6* 27.4*   CREATININE 4.53* 3.43* 5.41*  --  3.70* 5.28* 3.68* 5.07*   EGFRNORACEVR 10 14 8  --  13 8 13 9   GLUCOSE 118* 128* 102  --  114 127* 126* 110   BILITOT  --  0.4  --   --   --  0.2  --   --    ALKPHOS  --  42  --   --   --  60  --   --    ALT  --  18  --   --   --  34  --   --    AST  --  23  --   --   --  26  --   --    ALBUMIN  --  2.2*  --   --   --  2.1*  --   --    WBC 17.42* 16.11*  --  13.62  13.62* 13.00* 11.10  --   --    HGB 7.3* 7.3*  --  7.1* 8.6* 8.5*  --   --    HCT 22.6* 22.3*  --  22.7* 26.8* 26.5*  --   --      Nutrition Orders:  Diet Renal On Dialysis  Dietary nutrition supplements Boost Plus Nutritional Drink - Rich Chocolate; BID  Appetite/Oral Intake: good/% of meals  Factors Affecting Nutritional Intake: none identified  Social Needs Impacting Access to Food: none identified  Food/Taoist/Cultural Preferences: unable to obtain  Food  "Allergies: no known food allergies listed  Last Bowel Movement: 06/29/24  Wound(s):     Wound 06/22/24 0630 Other (comment) Left anterior;lower Leg-Tissue loss description: Not applicable       Wound 06/22/24 0630 Pressure Injury midline Sacral spine-Tissue loss description: Not applicableno pressure injuries documented at this time     Comments    6/22/24 Patient in ICU, severe acidosis, emergent dialysis today, NPO, no plans to feed, patient unable to answer questions.    6/26/24 Patient up in chair eating lunch during rounds, reports decreased appetite, agreeable to chocolate Boost. She reports good appetite prior to admission and denies weight loss, she is unsure of usual/dry weight.    7/1/24: Patient reports good oral intake, drinking Boost Plus supplements twice daily. Denies nausea, vomiting, diarrhea. Constipation noted.     Anthropometrics    Height: 5' 4" (162.6 cm),    Last Weight: (!) 139 kg (306 lb 7 oz) (07/01/24 0519), Weight Method: Bed Scale  BMI (Calculated): 52.6  BMI Classification: obese grade III (BMI >/=40)     Ideal Body Weight (IBW), Female: 120 lb     % Ideal Body Weight, Female (lb): 244.72 %                             Usual Weight Provided By: patient denies unintentional weight loss    Wt Readings from Last 5 Encounters:   07/01/24 (!) 139 kg (306 lb 7 oz)   06/21/24 113.4 kg (250 lb)     Weight Change(s) Since Admission: severe edema noted, new admit  Wt Readings from Last 1 Encounters:   07/01/24 0519 (!) 139 kg (306 lb 7 oz)   06/28/24 0610 135.4 kg (298 lb 8.1 oz)   06/22/24 0545 133.2 kg (293 lb 10.4 oz)   06/22/24 0107 106.6 kg (235 lb)   Admit Weight: 106.6 kg (235 lb) (06/22/24 0107), Weight Method: Bed Scale    Estimated Needs    Weight Used For Calorie Calculations: 106.6 kg (235 lb)  Energy Calorie Requirements (kcal): 2213, 1.4 stress factor  Energy Need Method: Ida- Jeor  Weight Used For Protein Calculations: 106.6 kg (235 lb)  Protein Requirements: 160 g, 1.5 " g/kg  Fluid Requirements (mL): 1000 plus urinary output or per physician        Enteral Nutrition Patient not receiving enteral nutrition at this time.    Parenteral Nutrition Patient not receiving parenteral nutrition support at this time.    Evaluation of Received Nutrient Intake    Calories: meeting estimated needs  Protein: meeting estimated needs    Patient Education Not applicable.    Nutrition Diagnosis     PES: Inadequate energy intake related to inability to consume sufficient nutrients as evidenced by less than 80% needs met. (active)    Nutrition Interventions     Intervention(s): general/healthful diet, commercial beverage, and collaboration with other providers  Goal: Meet greater than 80% of nutritional needs by follow-up. (goal progressing)    Nutrition Goals & Monitoring     Dietitian will monitor: food and beverage intake, weight, weight change, electrolyte/renal panel, beliefs/attitudes, glucose/endocrine profile, and gastrointestinal profile  Discharge planning: resume home regimen  Nutrition Risk/Follow-Up: moderate (follow-up in 3-5 days)   Please consult if re-assessment needed sooner.

## 2024-07-01 NOTE — PLAN OF CARE
Problem: Infection  Goal: Absence of Infection Signs and Symptoms  Outcome: Progressing     Problem: Adult Inpatient Plan of Care  Goal: Plan of Care Review  Outcome: Progressing  Goal: Patient-Specific Goal (Individualized)  Outcome: Progressing  Goal: Absence of Hospital-Acquired Illness or Injury  Outcome: Progressing  Goal: Optimal Comfort and Wellbeing  Outcome: Progressing  Goal: Readiness for Transition of Care  Outcome: Progressing     Problem: Hemodialysis  Goal: Safe, Effective Therapy Delivery  Outcome: Progressing  Goal: Effective Tissue Perfusion  Outcome: Progressing  Goal: Absence of Infection Signs and Symptoms  Outcome: Progressing     Problem: Fall Injury Risk  Goal: Absence of Fall and Fall-Related Injury  Outcome: Progressing     Problem: Wound  Goal: Optimal Coping  Outcome: Progressing  Goal: Optimal Functional Ability  Outcome: Progressing  Goal: Absence of Infection Signs and Symptoms  Outcome: Progressing  Goal: Improved Oral Intake  Outcome: Progressing  Goal: Optimal Pain Control and Function  Outcome: Progressing  Goal: Skin Health and Integrity  Outcome: Progressing  Goal: Optimal Wound Healing  Outcome: Progressing

## 2024-07-01 NOTE — PT/OT/SLP PROGRESS
Occupational Therapy   Treatment    Name: Fior Joya  MRN: [id number]  Admitting Diagnosis:  Metabolic acidosis  4 Days Post-Op    Recommendations:     Recommended therapy intensity at discharge: Moderate Intensity Therapy   Discharge Equipment Recommendations:  to be determined by next level of care  Barriers to discharge:   (ongoing medical needs)    Assessment:     Fior Joya is a 69 y.o. female with a medical diagnosis of ESTRELLITA, anemia, hyperkalemia, UTI, metabolic acidemia, started on HD this admission, SOB, swelling, B hydroureteronephrosis, B pleural effusions, chronic knee pain. S/p cystoscopy 6/27. She presents with fatigue. Performance deficits affecting function are weakness, impaired endurance, impaired self care skills, impaired functional mobility, gait instability, impaired balance, impaired coordination, decreased upper extremity function, decreased lower extremity function. Patient had just transferred back to bed at OT arrival. Only agreeable to participate in bed. Patient is not safe to return home at this time. Moderate intensity therapy recommended upon discharge.    Rehab Prognosis:  Good; patient would benefit from acute skilled OT services to address these deficits and reach maximum level of function.       Plan:     Patient to be seen 4 x/week to address the above listed problems via self-care/home management, therapeutic exercises, therapeutic activities  Plan of Care Expires: 07/25/24  Plan of Care Reviewed with: patient, son    Subjective     Pain/Comfort:  Pain Rating 1: 0/10    Objective:     Communicated with: nurse prior to session.  Patient found supine with telemetry, peripheral IV upon OT entry to room.    General Precautions: Standard, fall    Orthopedic Precautions:N/A  Braces: N/A  Respiratory Status: Room air     Occupational Performance:     Bed Mobility:    Patient completed Rolling/Turning to Left with  moderate assistance  Patient completed Rolling/Turning to Right with  moderate assistance   Additional time spent positioning patient with wedges and higher up in bed for decrease chances of breakdown    Activities of Daily Living:  Grooming: maximal assistance combing hair; patient with matted hair    Therapeutic Positioning    Patient was not wedged at OT arrival.   OT interventions performed during the course of today's session in an effort to prevent and/or reduce acquired pressure injuries:   Education was provided on benefits of and recommendations for therapeutic positioning  Therapeutic positioning was provided at the conclusion of session to offload all bony prominences for the prevention and/or reduction of pressure injuries  Positioning recommendations were communicated to care team     Skin assessment:  heels and elbows were assessed   Findings: no redness or breakdown noted  Patient refused floating heels; two-wedge system placed along with pillows under BUE    Patient Education:  Patient provided with verbal education education regarding OT role/goals/POC, fall prevention, safety awareness, Discharge/DME recommendations, and pressure ulcer prevention.  Understanding was verbalized.      Patient left left sidelying with all lines intact, call button in reach, wedge under R side, bed alarm on, and nurse notified.    GOALS:   Multidisciplinary Problems       Occupational Therapy Goals          Problem: Occupational Therapy    Goal Priority Disciplines Outcome Interventions   Occupational Therapy Goal     OT, PT/OT Progressing    Description: LTG: Pt will perform basic ADLs and ADL transfers with Modified independence using LRAD by discharge.    STG: to be met by 7/25/24    Pt will complete grooming standing at sink with LRAD with SBA.  Pt will complete UB dressing with SBA.  Pt will complete LB dressing with SBA using LRAD and AE prn.  Pt will complete toileting with SBA using LRAD.  Pt will complete functional mobility to/from toilet and toilet transfer with SBA using  OMAR.                        Time Tracking:     OT Date of Treatment: 07/01/24  OT Start Time: 1410  OT Stop Time: 1429  OT Total Time (min): 19 min    Billable Minutes:Self Care/Home Management 19    OT/LEANN: OT     Number of LEANN visits since last OT visit: 1 7/1/2024

## 2024-07-01 NOTE — PROGRESS NOTES
[facility] - 8th Floor Med Surg  Nephrology  Progress Note    Patient Name: Fior Joya  MRN: [id number]  Admission Date: 6/22/2024  Hospital Length of Stay: 9 days  Attending Provider: Susan Sibley MD   Primary Care Physician: Linda, Primary Doctor  Principal Problem:Metabolic acidosis      Subjective:     Fior Joya is a 69 y.o. female who was transferred from hospital in [place] due to acute renal failure.  She initially presented to the hospital due to generalized weakness and shortness of breath with exertion.  Per their report she had been mostly bed-bound for the past 3 weeks.  He reported that she had been drinking getting up going to the restroom.  She did have some episodes of nausea vomiting and generalized weakness along with generalized muscle pain more recently.  She was endorsing shortness of breath but no chest pain.  Denied had been running a fever.  Per their his report, she had not seen a physician in 10 years, because she was not feeling ill and did not feel like she needed to go.  She had also been having some swelling in the legs.  She has no known medical issues per their report.  On admit, WBC elevated at 15, RBC 1.8, hemoglobin 5.7, D-dimer elevated at 1.57, serum sodium 132, potassium 6, serum CO2 less than 5, , creatinine 14.8, glucose 273, calcium 7.5, phosphorus 7.9, albumin 2.9.  TSH and lactic were okay.  UA results indicative of UTI sent for cultures.  Blood cultures obtained and pending.  Received phone call from ER physician in early a.m. hours, it was decided at that time we will proceed with initiation of RRT.  Right IJ temporary cath was placed per General surgery.   She is awake and alert this morning upon exam.  She does respond with simple answers to questions posed.  Son in room in majority of history obtained from him.  Oxygen saturation is 99% on nasal cannula.  We are consulted for management ESTRELLITA.  Bilateral hydronephrosis on CT from 06/21/2024      On  6/27 she underwent cystoscopy and right double J stent placement. The following day she had left nephroureteral stent placement with IR. She has been maintained on TThSa schedule for HD via right IJ temporary dialysis catheter. She is sitting in chair with some lunch consumed. Endorses feeling dizzy.     Review of patient's allergies indicates:   Allergen Reactions    Asa [aspirin] Anaphylaxis    Penicillins      Received ceftriaxone from 6/27, no reactions      Current Facility-Administered Medications   Medication Frequency    0.9%  NaCl infusion (for blood administration) Q24H PRN    0.9%  NaCl infusion (for blood administration) Q24H PRN    0.9%  NaCl infusion (for blood administration) Q24H PRN    acetaminophen tablet 650 mg Q4H PRN    aluminum-magnesium hydroxide-simethicone 200-200-20 mg/5 mL suspension 30 mL Q6H PRN    ammonium lactate 12 % lotion BID PRN    calcium carbonate 200 mg calcium (500 mg) chewable tablet 1,000 mg Q6H PRN    calcium carbonate 200 mg calcium (500 mg) chewable tablet 1,000 mg BID    cefTRIAXone (ROCEPHIN) 2 g in D5W 100 mL IVPB (MB+) Q24H    ergocalciferol capsule 50,000 Units Q72H    ferrous sulfate tablet 1 each BID    folic acid tablet 1 mg Daily    heparin (porcine) injection 4,000 Units PRN    heparin (porcine) injection 5,000 Units Q12H    hydrALAZINE injection 10 mg Q2H PRN    labetaloL injection 10 mg Q4H PRN    labetaloL tablet 200 mg Q12H    melatonin tablet 6 mg Nightly PRN    nystatin-triamcinolone cream TID    ondansetron injection 4 mg Q8H PRN    pantoprazole EC tablet 40 mg Daily    polyethylene glycol packet 17 g Daily    prochlorperazine injection Soln 5 mg Q6H PRN    sodium chloride 0.9% flush 10 mL PRN       Objective:     Vital Signs (Most Recent):  Temp: 98.6 °F (37 °C) (07/01/24 1136)  Pulse: 83 (07/01/24 1136)  Resp: 20 (07/01/24 1136)  BP: 130/68 (07/01/24 1136)  SpO2: 98 % (07/01/24 1136) Vital Signs (24h Range):  Temp:  [98.2 °F (36.8 °C)-98.7 °F (37.1 °C)]  98.6 °F (37 °C)  Pulse:  [77-92] 83  Resp:  [19-20] 20  SpO2:  [95 %-98 %] 98 %  BP: (101-130)/(52-74) 130/68     Weight: (!) 139 kg (306 lb 7 oz) (07/01/24 0519)  Body mass index is 52.6 kg/m².  Body surface area is 2.51 meters squared.    I/O last 3 completed shifts:  In: 900 [P.O.:900]  Out: 2122 [Urine:2120; Stool:2]    Physical Exam  Constitutional:       General: She is not in acute distress.     Appearance: She is obese.   HENT:      Head: Atraumatic.      Nose: Nose normal.      Mouth/Throat:      Mouth: Mucous membranes are moist.   Eyes:      Extraocular Movements: Extraocular movements intact.   Neck:      Comments: R IJ temp HD catheter   Cardiovascular:      Rate and Rhythm: Normal rate and regular rhythm.      Pulses: Normal pulses.      Heart sounds: Normal heart sounds.   Pulmonary:      Effort: Pulmonary effort is normal.      Breath sounds: Normal breath sounds.   Abdominal:      General: Bowel sounds are normal. There is no distension.      Palpations: Abdomen is soft.   Genitourinary:     Comments: Urinary catheter  Musculoskeletal:      Cervical back: Neck supple.   Skin:     General: Skin is warm.   Neurological:      General: No focal deficit present.      Mental Status: She is alert and oriented to person, place, and time.   Psychiatric:         Mood and Affect: Mood normal.         Behavior: Behavior normal.         Thought Content: Thought content normal.         Significant Labs:sureBMP:   Recent Labs   Lab 06/26/24  0051 06/27/24  0852 07/01/24  0447   *   < > 132*   K 3.8   < > 4.0      < > 100   CO2 18*   < > 23   BUN 21.2*   < > 27.4*   CREATININE 3.43*   < > 5.07*   CALCIUM 7.4*   < > 7.2*   MG 1.80  --   --     < > = values in this interval not displayed.     CBC:   Recent Labs   Lab 06/29/24  0643   WBC 11.10   RBC 2.83*   HGB 8.5*   HCT 26.5*      MCV 93.6   MCH 30.0   MCHC 32.1*       Significant Imaging:    Imaging reviewed       Assessment/Plan:   ESTRELLITA  secondary to ATN secondary to obstructive uropathy  -Dialysis initiated 6/22 via right IJ temporary dialysis catheter.    Patient's probably has some underlying chronic kidney disease as she does have significant proteinuria more than 8 g per 24 hours.   Shaking and jerking movements of the extremities probably secondary to uremia,  now resolved completely with dialysis   UTI - Now on zosyn and vanc  Hyperkalemia  Improved.  Acute metabolic acidosis  Improved.  Anemia     Recommendations  Patient to continue on TThSa schedule while being monitored for renal recovery   Dr Manzo consulted for tunneled dialysis catheter placement   Dialysis coordinator consulted for outpatient placement as ESTRELLITA   CM working on rehab placement.            TIFFANY Langston  Nephrology  [facility] - 8th Floor Med Surg

## 2024-07-01 NOTE — CONSULTS
INTERVENTIONAL NEPHROLOGY INITIAL CONSULTATION NOTE       Patient Name: Fior Joya  HARINDER 1955    Patient Seen Date: 2024  Patient Seen Time: 3:18 PM     Consult requested by: Susan Sibley MD     Reason for consult: Need for tunneled dialysis catheter       HPI: 69-year-old female without known medical history had presented to an OSH and was transferred to [facility] on 24 with dyspnea on exertion and generalized weakness. Pt was found to be severely anemic and have severe ESTRELLITA (BUN was elevated to 205 and Scr was 14.8). The pt underwent urgent placement of a non-tunneled HD catheter and tolerated hemodialysis initiation. Hospital course found the pt to have obstructive uropathy. Despite resolution of obstruction, the pt has continued to require the support of hemodialysis. Nephrology service feel that the pt will require hemodialysis for > 2 weeks. Hence, interventional nephrology service was consulted for insertion of a tunneled dialysis catheter.    Pt seen and examined at bedside this PM. Family present. Pt denies complaints. Risks and benefits of tunneled dialysis catheter insertion and intravenous conscious sedation was reviewed with the patient. The patient agrees to proceed with the intended procedure. Consents for both intravenous conscious sedation and procedure were signed and placed within the chart.     Review of Systems:  General:  No fatigue  Skin: No rashes  HEENT: No vision changes  CVS: No CP  RS: No SOB  GIT: No abdominal pain  Extremities: No swelling  Neurological:  No focal weakness  Psych: No depression    No past medical history on file.   Past Surgical History:   Procedure Laterality Date    CYSTOSCOPY W/ URETERAL STENT PLACEMENT Right 2024    Procedure: CYSTOSCOPY, WITH URETERAL STENT INSERTION;  Surgeon: Otis Person MD;  Location: [facility] OR;  Service: Urology;  Laterality: Right;  CYSTOSCOPY, BILATERAL RETROGRADE PYELOGRAMS, POSSIBLE STENT PLACEMENT.       Review of patient's allergies indicates:   Allergen Reactions    Asa [aspirin] Anaphylaxis    Penicillins      Received ceftriaxone from 6/27, no reactions       Social History     Tobacco Use    Smoking status: Never    Smokeless tobacco: Never   Substance Use Topics    Alcohol use: Never    Drug use: Never      No family history on file.      Current Facility-Administered Medications:     0.9%  NaCl infusion (for blood administration), , Intravenous, Q24H PRN, Dariusz Vee MD    0.9%  NaCl infusion (for blood administration), , Intravenous, Q24H PRN, Casper Hayward Jr., MD, FCCP    0.9%  NaCl infusion (for blood administration), , Intravenous, Q24H PRN, Susan Sibley MD    acetaminophen tablet 650 mg, 650 mg, Oral, Q4H PRN, James Townsend MD, 650 mg at 07/01/24 0209    aluminum-magnesium hydroxide-simethicone 200-200-20 mg/5 mL suspension 30 mL, 30 mL, Oral, Q6H PRN, Tyrone Klein MD, 30 mL at 06/26/24 1834    ammonium lactate 12 % lotion, , Topical (Top), BID PRN, Tyrone Klein MD    calcium carbonate 200 mg calcium (500 mg) chewable tablet 1,000 mg, 1,000 mg, Oral, Q6H PRN, Tyrone Klein MD, 1,000 mg at 06/29/24 0902    calcium carbonate 200 mg calcium (500 mg) chewable tablet 1,000 mg, 1,000 mg, Oral, BID, Gardenia Hooper, AGNP, 1,000 mg at 07/01/24 0936    cefTRIAXone (ROCEPHIN) 2 g in D5W 100 mL IVPB (MB+), 2 g, Intravenous, Q24H, Susan Sibley MD, Stopped at 06/30/24 1445    ergocalciferol capsule 50,000 Units, 50,000 Units, Oral, Q72H, Gardenia Hooper, AGNP, 50,000 Units at 06/30/24 0941    ferrous sulfate tablet 1 each, 1 tablet, Oral, BID, Casper Hayward Jr., MD, FCCP, 1 each at 07/01/24 0936    folic acid tablet 1 mg, 1 mg, Oral, Daily, Shin Light MD, 1 mg at 07/01/24 0936    heparin (porcine) injection 4,000 Units, 4,000 Units, Intravenous, PRN, Nata Terrazas ANP, 4,000 Units at 06/27/24 1721    heparin (porcine) injection 5,000 Units, 5,000 Units, Subcutaneous,  Q12H, Tyrone Klein MD, 5,000 Units at 07/01/24 0936    hydrALAZINE injection 10 mg, 10 mg, Intravenous, Q2H PRN, James Townsend MD    labetaloL injection 10 mg, 10 mg, Intravenous, Q4H PRN, James Townsend MD, 10 mg at 06/23/24 0308    labetaloL tablet 200 mg, 200 mg, Oral, Q12H, Casper Hayward Jr., MD, FCCP, 200 mg at 07/01/24 0936    melatonin tablet 6 mg, 6 mg, Oral, Nightly PRN, James Townsend MD    nystatin-triamcinolone cream, , Topical (Top), TID, Tyrone Klein MD, Given at 07/01/24 1512    ondansetron injection 4 mg, 4 mg, Intravenous, Q8H PRN, James Townsend MD, 4 mg at 06/24/24 2002    pantoprazole EC tablet 40 mg, 40 mg, Oral, Daily, Susan Sibley MD, 40 mg at 07/01/24 0936    polyethylene glycol packet 17 g, 17 g, Oral, Daily, Susan Sibley MD, 17 g at 07/01/24 0936    prochlorperazine injection Soln 5 mg, 5 mg, Intravenous, Q6H PRN, James Townsend MD    sodium chloride 0.9% flush 10 mL, 10 mL, Intravenous, PRN, James Townsend MD    Vital Signs (24 h):  Temp:  [98.2 °F (36.8 °C)-98.7 °F (37.1 °C)] 98.6 °F (37 °C)  Pulse:  [77-90] 83  Resp:  [19-20] 20  SpO2:  [95 %-98 %] 98 %  BP: (101-130)/(52-74) 130/68   I/O last 3 completed shifts:  In: 900 [P.O.:900]  Out: 2122 [Urine:2120; Stool:2]  No intake/output data recorded.        Physical Exam:  General: NAD  HEENT: NC/AT, EOMI  CVS: S1/S2 present and normal. No murmur/rubs/gallop.      RS: Lung CTAB, No rales/rhonchi/wheeze.     Abdominal: Soft, NT/ND.  Extremities: No edema b/l LE  Skin: No rash, no lesions.  Neurological: No focal deficits.  Psych: Normal affect  Dialysis Access: right internal jugular (RIJ) temporary, non-tunneled HD catheter that appears to have a high cannulation in the neck.    Results:    Lab Results   Component Value Date     (L) 07/01/2024    K 4.0 07/01/2024     07/01/2024    CO2 23 07/01/2024    BUN 27.4 (H) 07/01/2024    CREATININE 5.07 (H) 07/01/2024     Lab Results   Component Value Date    WBC 11.10  06/29/2024    WBC 13.62 06/27/2024    HGB 8.5 (L) 06/29/2024     06/29/2024    MCV 93.6 06/29/2024       Assessment and Plan:      ESTRELLITA on ?CKD in need of HD.  Need for tunneled dialysis catheter.  Pt with ESTRELLITA on ?CKD in the setting of obstructive uropathy, who has required the support of hemodialysis since admission to the hospital. Pt is expected to require HD for > 2 weeks. Hence, interventional nephrology service was consulted for insertion of a tunneled dialysis catheter.  - Will plan to place tunneled dialysis catheter this coming Wednesday (7/3/24) with Dr. Barcenas in cath lab setting.  - Consent obtained and retained with dialysis access team.  - NPO after midnight between Tuesday and Wednesday.    Thank you for your consult. Please feel free to reach me with any questions.  Plan for follow-up on Wednesday for procedure.    Nikolai Manzo DO  Interventional Nephrology  Cell: [phone]

## 2024-07-01 NOTE — CARE UPDATE
[id number] Spoke with pt re placement. FOC obtained. Referral sent to [facility] thru care port. Notified April SSC.    Pt will need hemodialysis. Eli, dialysis coordinator aware

## 2024-07-01 NOTE — PHYSICIAN QUERY
Please further specify the stage of chronic kidney disease (CKD):    Other: Chronic kidney Disease, Unspecified

## 2024-07-01 NOTE — PROGRESS NOTES
UROLOGY  PROGRESS  NOTE    Fior Joya 1955  [id number]  7/1/2024    POD 4 s/p cystoscopy with right double-J stent placement, unable to place left retrograde stent due to necrotic and sloughing bladder tissue    S/p left nephroureteral stent placement with IR 6/28    Patient resting in bed  Son at bedside  No complaints during rounds  Denies any discomfort from PCN    Afebrile   300ml Mario  300ml left PCN  BUN/Cr 27.4/5.07    Intake/Output:  No intake/output data recorded.  I/O last 3 completed shifts:  In: 900 [P.O.:900]  Out: 2122 [Urine:2120; Stool:2]     Exam:    Constitutional:       General: No acute distress.      Appearance: well-developed, well-nourished  Eyes:      Extraocular Movements: Extraocular movements intact.      Pupils: Pupils are equal, round, and reactive to light bilaterally  Cardiovascular:      Rate and Rhythm: Normal rate and regular rhythm.      No edema  Pulmonary:      Effort: Respirations non-labored  :     Light pink urine with some sediment in left PCN; yellow urine draining to  bag  Musculoskeletal:         General: Normal range of motion. No swelling.  Skin:     General: Skin is warm and dry. Intact.  Neurological:      General: No focal deficit present.      Mental Status: Awake, alert and oriented x4. Follows commands briskly in all extremities     Psychiatric:         Mood and Affect: Mood normal.         Behavior: Behavior normal.      Recent Results (from the past 24 hour(s))   Basic Metabolic Panel    Collection Time: 07/01/24  4:47 AM   Result Value Ref Range    Sodium 132 (L) 136 - 145 mmol/L    Potassium 4.0 3.5 - 5.1 mmol/L    Chloride 100 98 - 107 mmol/L    CO2 23 23 - 31 mmol/L    Glucose 110 82 - 115 mg/dL    Blood Urea Nitrogen 27.4 (H) 9.8 - 20.1 mg/dL    Creatinine 5.07 (H) 0.55 - 1.02 mg/dL    BUN/Creatinine Ratio 5     Calcium 7.2 (L) 8.4 - 10.2 mg/dL    Anion Gap 9.0 mEq/L    eGFR 9 mL/min/1.73/m2     FL Cystogram Retrograde (xpd) [[id number]] Resulted:  06/25/24 1553   Order Status: Completed Updated: 06/25/24 1555   Narrative:     EXAMINATION:  XR CYSTOGRAM IN SURGERY    CLINICAL HISTORY:  bilateral hydronephrosis; check for reflux into kidneys;    TECHNIQUE:  A  radiograph was obtained. Contrast was then instilled into the bladder through the patient's catheter with multiple fluoroscopic images obtained in various positions.  Absorbed dose is 91.87 mGy.    COMPARISON:  CT 06/21/2024    FINDINGS:  Bladder fills readily with no significant contour abnormality identified.  Due to patient discomfort, only about 300 mL of contrast instilled during the exam, but no vesicoureteral reflux identified.   Impression:       Mildly limited assessment due to patient pain, but no significant bladder abnormality identified.      Electronically signed by: Pastor Gordon  Date: 06/25/2024  Time: 15:53       Assessment:  -urinary retention with bilateral hydroureteronephrosis status post Berkowitz insertion; status post cystoscopy with right double-J stent placement 06/27/2024, unable to place retrograde stent on the left   -acute renal failure initiated on hemodialysis    Plan:  -some hematuria is expected as long as ureteral stents in place.    -D/C urethral berkowitz for void trial  -no additional recommendations from Urology standpoint at this time. Please call as needed with any issues  -She will need to f/u with Dr. Otis Person for stent management      Donna Varghese NP

## 2024-07-01 NOTE — PT/OT/SLP PROGRESS
Physical Therapy Treatment    Patient Name:  Fior Joya   MRN:  [id number]    Recommendations:     Discharge therapy intensity: Moderate Intensity Therapy   Discharge Equipment Recommendations: to be determined by next level of care  Barriers to discharge: Decreased caregiver support and Impaired mobility    Assessment:     Fior Joya is a 69 y.o. female.  She presents with the following impairments/functional limitations: weakness, impaired endurance, impaired self care skills, impaired functional mobility, gait instability, impaired balance, impaired coordination.    Rehab Prognosis: Good; patient would benefit from acute skilled PT services to address these deficits and reach maximum level of function.    Recent Surgery: Procedure(s) (LRB):  CYSTOSCOPY, WITH URETERAL STENT INSERTION (Right) 4 Days Post-Op    Plan:     During this hospitalization, patient would benefit from acute PT services 5 x/week to address the identified rehab impairments via gait training, therapeutic exercises, therapeutic activities and progress toward the following goals:    Plan of Care Expires:  07/25/24    Subjective     Chief Complaint: decreased endurance    Objective:     Communicated with nurse prior to session.  Patient found supine with berkowitz catheter, peripheral IV, pressure relief boots, telemetry, pulse ox (continuous), blood pressure cuff upon PT entry to room.     General Precautions: Standard, fall  Orthopedic Precautions:    Braces: N/A  Respiratory Status: Room air  Blood Pressure:   Skin Integrity: Visible skin intact      Functional Mobility:  Mod assist rolling to yves diaper and min assist to get EOB as well as STS  Gait 20 ft x 3 RW CGA and Pt performed LE PRE's to increase strenth, ROM, and endurance to improve overall independence.    Education Provided:  Role and goals of PT, transfer training, bed mobility, gait training, balance training, safety awareness, assistive device, strengthening exercises, and importance  of participating in PT to return to PLOF.    Patient left up in chair with all lines intact and call button in reach    GOALS:   Multidisciplinary Problems       Physical Therapy Goals          Problem: Physical Therapy    Goal Priority Disciplines Outcome Goal Variances Interventions   Physical Therapy Goal     PT, PT/OT Progressing     Description: Goals to be met by: 2024     Patient will increase functional independence with mobility by performin. Supine to sit with Stand-by Assistance  2. Sit to stand transfer with Contact Guard Assistance  3. Gait  x 150 feet with Contact Guard Assistance using Rolling Walker.   4. Ascend/descend 4 stair with right Handrails Contact Guard Assistance using No Assistive Device.                          Time Tracking:       Billable Minutes: Gait Training 12 and Therapeutic Exercise 13    Treatment Type: Treatment  PT/PTA: PTA     Number of PTA visits since last PT visit: 4     2024

## 2024-07-02 LAB
ANION GAP SERPL CALC-SCNC: 12 MEQ/L
BASOPHILS # BLD AUTO: 0.03 X10(3)/MCL
BASOPHILS NFR BLD AUTO: 0.7 %
BUN SERPL-MCNC: 37.6 MG/DL (ref 9.8–20.1)
CALCIUM SERPL-MCNC: 7.2 MG/DL (ref 8.4–10.2)
CHLORIDE SERPL-SCNC: 100 MMOL/L (ref 98–107)
CO2 SERPL-SCNC: 21 MMOL/L (ref 23–31)
CREAT SERPL-MCNC: 6.04 MG/DL (ref 0.55–1.02)
CREAT/UREA NIT SERPL: 6
EOSINOPHIL # BLD AUTO: 0.14 X10(3)/MCL (ref 0–0.9)
EOSINOPHIL NFR BLD AUTO: 3.1 %
ERYTHROCYTE [DISTWIDTH] IN BLOOD BY AUTOMATED COUNT: 15.1 % (ref 11.5–17)
GFR SERPLBLD CREATININE-BSD FMLA CKD-EPI: 7 ML/MIN/1.73/M2
GLUCOSE SERPL-MCNC: 143 MG/DL (ref 82–115)
HCT VFR BLD AUTO: 26.3 % (ref 37–47)
HGB BLD-MCNC: 8.2 G/DL (ref 12–16)
IMM GRANULOCYTES # BLD AUTO: 0.09 X10(3)/MCL (ref 0–0.04)
IMM GRANULOCYTES NFR BLD AUTO: 2 %
LYMPHOCYTES # BLD AUTO: 0.71 X10(3)/MCL (ref 0.6–4.6)
LYMPHOCYTES NFR BLD AUTO: 15.6 %
MCH RBC QN AUTO: 29.9 PG (ref 27–31)
MCHC RBC AUTO-ENTMCNC: 31.2 G/DL (ref 33–36)
MCV RBC AUTO: 96 FL (ref 80–94)
MONOCYTES # BLD AUTO: 0.58 X10(3)/MCL (ref 0.1–1.3)
MONOCYTES NFR BLD AUTO: 12.8 %
NEUTROPHILS # BLD AUTO: 2.99 X10(3)/MCL (ref 2.1–9.2)
NEUTROPHILS NFR BLD AUTO: 65.8 %
NRBC BLD AUTO-RTO: 0 %
PLATELET # BLD AUTO: 180 X10(3)/MCL (ref 130–400)
PMV BLD AUTO: 10.8 FL (ref 7.4–10.4)
POTASSIUM SERPL-SCNC: 4 MMOL/L (ref 3.5–5.1)
RBC # BLD AUTO: 2.74 X10(6)/MCL (ref 4.2–5.4)
SODIUM SERPL-SCNC: 133 MMOL/L (ref 136–145)
WBC # BLD AUTO: 4.54 X10(3)/MCL (ref 4.5–11.5)

## 2024-07-02 PROCEDURE — 94760 N-INVAS EAR/PLS OXIMETRY 1: CPT

## 2024-07-02 PROCEDURE — 85025 COMPLETE CBC W/AUTO DIFF WBC: CPT | Performed by: NURSE PRACTITIONER

## 2024-07-02 PROCEDURE — 80048 BASIC METABOLIC PNL TOTAL CA: CPT | Performed by: NURSE PRACTITIONER

## 2024-07-02 PROCEDURE — 25000003 PHARM REV CODE 250: Performed by: INTERNAL MEDICINE

## 2024-07-02 PROCEDURE — 21400001 HC TELEMETRY ROOM

## 2024-07-02 PROCEDURE — 97535 SELF CARE MNGMENT TRAINING: CPT | Mod: CO

## 2024-07-02 PROCEDURE — 99900031 HC PATIENT EDUCATION (STAT)

## 2024-07-02 PROCEDURE — 63600175 PHARM REV CODE 636 W HCPCS: Performed by: INTERNAL MEDICINE

## 2024-07-02 PROCEDURE — 25000003 PHARM REV CODE 250

## 2024-07-02 PROCEDURE — 36415 COLL VENOUS BLD VENIPUNCTURE: CPT | Performed by: NURSE PRACTITIONER

## 2024-07-02 PROCEDURE — 99900035 HC TECH TIME PER 15 MIN (STAT)

## 2024-07-02 PROCEDURE — 25000003 PHARM REV CODE 250: Performed by: STUDENT IN AN ORGANIZED HEALTH CARE EDUCATION/TRAINING PROGRAM

## 2024-07-02 PROCEDURE — 63600175 PHARM REV CODE 636 W HCPCS: Performed by: STUDENT IN AN ORGANIZED HEALTH CARE EDUCATION/TRAINING PROGRAM

## 2024-07-02 PROCEDURE — 90935 HEMODIALYSIS ONE EVALUATION: CPT

## 2024-07-02 PROCEDURE — 25000003 PHARM REV CODE 250: Performed by: NURSE PRACTITIONER

## 2024-07-02 RX ADMIN — NYSTATIN AND TRIAMCINOLONE ACETONIDE: 100000; 1 CREAM TOPICAL at 11:07

## 2024-07-02 RX ADMIN — LABETALOL HYDROCHLORIDE 200 MG: 200 TABLET, FILM COATED ORAL at 09:07

## 2024-07-02 RX ADMIN — CALCIUM CARBONATE (ANTACID) CHEW TAB 500 MG 1000 MG: 500 CHEW TAB at 09:07

## 2024-07-02 RX ADMIN — FOLIC ACID 1 MG: 1 TABLET ORAL at 11:07

## 2024-07-02 RX ADMIN — CALCIUM CARBONATE (ANTACID) CHEW TAB 500 MG 1000 MG: 500 CHEW TAB at 11:07

## 2024-07-02 RX ADMIN — CEFTRIAXONE SODIUM 2 G: 2 INJECTION, POWDER, FOR SOLUTION INTRAMUSCULAR; INTRAVENOUS at 03:07

## 2024-07-02 RX ADMIN — HEPARIN SODIUM 5000 UNITS: 5000 INJECTION INTRAVENOUS; SUBCUTANEOUS at 11:07

## 2024-07-02 RX ADMIN — NYSTATIN AND TRIAMCINOLONE ACETONIDE: 100000; 1 CREAM TOPICAL at 09:07

## 2024-07-02 RX ADMIN — ACETAMINOPHEN 650 MG: 325 TABLET, FILM COATED ORAL at 04:07

## 2024-07-02 RX ADMIN — PANTOPRAZOLE SODIUM 40 MG: 40 TABLET, DELAYED RELEASE ORAL at 11:07

## 2024-07-02 RX ADMIN — NYSTATIN AND TRIAMCINOLONE ACETONIDE: 100000; 1 CREAM TOPICAL at 03:07

## 2024-07-02 RX ADMIN — FERROUS SULFATE TAB 325 MG (65 MG ELEMENTAL FE) 1 EACH: 325 (65 FE) TAB at 11:07

## 2024-07-02 RX ADMIN — FERROUS SULFATE TAB 325 MG (65 MG ELEMENTAL FE) 1 EACH: 325 (65 FE) TAB at 09:07

## 2024-07-02 NOTE — PT/OT/SLP PROGRESS
Occupational Therapy   Treatment    Name: Fior Joya  MRN: [id number]  Admitting Diagnosis:  Metabolic acidosis  5 Days Post-Op    Recommendations:     Recommended therapy intensity at discharge: Moderate Intensity Therapy   Discharge Equipment Recommendations:  to be determined by next level of care  Barriers to discharge:       Assessment:     Fior Joya is a 69 y.o. female with a medical diagnosis of ESTRELLITA, anemia, hyperkalemia, UTI, metabolic acidemia, started on HD this admission, SOB, swelling, B hydroureteronephrosis, B pleural effusions, chronic knee pain. S/p cystoscopy 6/27. Performance deficits affecting function are weakness, impaired endurance, impaired self care skills, impaired functional mobility, gait instability, impaired balance, impaired coordination, decreased upper extremity function, decreased lower extremity function. Pt agreeable to OT session after encouragement.    Rehab Prognosis:  Good; patient would benefit from acute skilled OT services to address these deficits and reach maximum level of function.       Plan:     Patient to be seen 4 x/week to address the above listed problems via self-care/home management, therapeutic exercises, therapeutic activities  Plan of Care Expires: 07/25/24  Plan of Care Reviewed with: patient, son    Subjective     Pain/Comfort:  Pain Rating 1: 0/10    Objective:     Communicated with: RN prior to session.  Patient found supine with peripheral IV (dialysis catheter, nephrostomy) upon OT entry to room.    General Precautions: Standard, fall    Orthopedic Precautions:N/A  Braces: N/A  Respiratory Status: Room air     Occupational Performance:     Bed Mobility:    Patient completed Scooting/Bridging with minimum assistance  Patient completed Supine to Sit with minimum assistance  Patient completed Sit to Supine with minimum assistance     Functional Mobility/Transfers:  Patient completed Sit <> Stand Transfer with minimum assistance  with  rolling walker  from  EOB  Functional Mobility: Pt able to take 2 steps along EOB with Min A and RW.     Activities of Daily Living:  Lower Body Dressing: total assistance to don/doff socks seated EOB    OT interventions performed during the course of today's session in an effort to prevent and/or reduce acquired pressure injuries:   Education was provided on benefits of and recommendations for therapeutic positioning    [facility] 6 Click ADL:      Patient Education:  Patient provided with verbal education education regarding OT role/goals/POC and importance of mobility .  Understanding was verbalized.      Patient left supine with all lines intact, call button in reach, pressure relief boots, and son present.    GOALS:   Multidisciplinary Problems       Occupational Therapy Goals          Problem: Occupational Therapy    Goal Priority Disciplines Outcome Interventions   Occupational Therapy Goal     OT, PT/OT Progressing    Description: LTG: Pt will perform basic ADLs and ADL transfers with Modified independence using LRAD by discharge.    STG: to be met by 7/25/24    Pt will complete grooming standing at sink with LRAD with SBA.  Pt will complete UB dressing with SBA.  Pt will complete LB dressing with SBA using LRAD and AE prn.  Pt will complete toileting with SBA using LRAD.  Pt will complete functional mobility to/from toilet and toilet transfer with SBA using LRAD.                        Time Tracking:     OT Date of Treatment: 07/02/24  OT Start Time: 1341  OT Stop Time: 1351  OT Total Time (min): 10 min    Billable Minutes:Self Care/Home Management 10    OT/LEANN: LEANN     Number of LEANN visits since last OT visit: 2    7/2/2024

## 2024-07-02 NOTE — PLAN OF CARE
Informed that [facility] does not have a bed available, this has been explained to patient and her son at the bedside. Asked for more options, patient misplaced SNF choice list. New list given. Choices: 1.[facility], 2. [facility], 3. [facility] notified.

## 2024-07-02 NOTE — PLAN OF CARE
Problem: Infection  Goal: Absence of Infection Signs and Symptoms  Outcome: Progressing     Problem: Adult Inpatient Plan of Care  Goal: Plan of Care Review  Outcome: Progressing  Goal: Patient-Specific Goal (Individualized)  Outcome: Progressing  Goal: Absence of Hospital-Acquired Illness or Injury  Outcome: Progressing  Goal: Optimal Comfort and Wellbeing  Outcome: Progressing  Goal: Readiness for Transition of Care  Outcome: Progressing     Problem: Bariatric Environmental Safety  Goal: Safety Maintained with Care  Outcome: Progressing     Problem: Acute Kidney Injury/Impairment  Goal: Fluid and Electrolyte Balance  Outcome: Progressing  Goal: Improved Oral Intake  Outcome: Progressing  Goal: Effective Renal Function  Outcome: Progressing     Problem: Fall Injury Risk  Goal: Absence of Fall and Fall-Related Injury  Outcome: Progressing     Problem: Skin Injury Risk Increased  Goal: Skin Health and Integrity  Outcome: Progressing     Problem: Wound  Goal: Optimal Coping  Outcome: Progressing  Goal: Optimal Functional Ability  Outcome: Progressing  Goal: Absence of Infection Signs and Symptoms  Outcome: Progressing  Goal: Improved Oral Intake  Outcome: Progressing  Goal: Optimal Pain Control and Function  Outcome: Progressing  Goal: Skin Health and Integrity  Outcome: Progressing  Goal: Optimal Wound Healing  Outcome: Progressing

## 2024-07-02 NOTE — PT/OT/SLP PROGRESS
Physical Therapy      Patient Name:  Fior Joya   MRN:  [id number]    Patient not seen today secondary to Dialysis. Will follow-up later if time permits .

## 2024-07-02 NOTE — PLAN OF CARE
[facility] noted [facility] not able to accept patient-no available beds via [facility].More choices needed. CM notified.

## 2024-07-02 NOTE — PROGRESS NOTES
07/02/24 1034        Hemodialysis Catheter 06/22/24 0530 right internal jugular   Placement Date/Time: 06/22/24 0530   Present Prior to Hospital Arrival?: No  Location: right internal jugular   Line Necessity Review CRRT/HD   Site Assessment No drainage;No redness;No swelling;No warmth   Line Securement Device Secured with sutureless device   Dressing Type CHG impregnated dressing/sponge;Central line dressing;Transparent (Tegaderm)   Dressing Status Clean;Dry;Intact   Dressing Intervention Integrity maintained   Date on Dressing 06/27/24   Dressing Due to be Changed 07/04/24   Venous Patency/Care flushed w/o difficulty;blood return present;intermittent infusion cap changed;normal saline locked;deaccessed   Arterial Patency/Care flushed w/o difficulty;blood return present;intermittent infusion cap changed;normal saline locked;deaccessed   Waveform Not being transduced   Post-Hemodialysis Assessment   Blood Volume Processed (Liters) 77.7 L   Dialyzer Clearance Lightly streaked   Duration of Treatment 180 minutes   Additional Fluid Intake (mL) 500 mL   Total UF (mL) 500 mL   Net Fluid Removal 0   Patient Response to Treatment Tx completed, tolerated well, lines flushed and then packed with saline to filling volume with new end caps   Post-Hemodialysis Comments no distress noted

## 2024-07-02 NOTE — PLAN OF CARE
Problem: Infection  Goal: Absence of Infection Signs and Symptoms  7/2/2024 0745 by Radha Hernandez RN  Outcome: Progressing  7/2/2024 0744 by Radha Hernandez RN  Outcome: Progressing     Problem: Adult Inpatient Plan of Care  Goal: Plan of Care Review  7/2/2024 0745 by Radha Hernandez RN  Outcome: Progressing  7/2/2024 0744 by Radha Hernandez RN  Outcome: Progressing  Goal: Patient-Specific Goal (Individualized)  7/2/2024 0745 by Radha Hernandez RN  Outcome: Progressing  7/2/2024 0744 by Radha Hernandez RN  Outcome: Progressing  Goal: Absence of Hospital-Acquired Illness or Injury  7/2/2024 0745 by Radha Hernandez RN  Outcome: Progressing  7/2/2024 0744 by Radha Hernandez RN  Outcome: Progressing  Goal: Optimal Comfort and Wellbeing  7/2/2024 0745 by Radha Hernandez RN  Outcome: Progressing  7/2/2024 0744 by Radha Hernandez RN  Outcome: Progressing  Goal: Readiness for Transition of Care  7/2/2024 0745 by Radha Hernandez RN  Outcome: Progressing  7/2/2024 0744 by Radha Hernandez RN  Outcome: Progressing     Problem: Hemodialysis  Goal: Safe, Effective Therapy Delivery  7/2/2024 0745 by Radha Hernandez RN  Outcome: Progressing  7/2/2024 0744 by Radha Hernandez RN  Outcome: Progressing  Goal: Effective Tissue Perfusion  7/2/2024 0745 by Radha Hernandez RN  Outcome: Progressing  7/2/2024 0744 by Radha Hernandez RN  Outcome: Progressing  Goal: Absence of Infection Signs and Symptoms  7/2/2024 0745 by Radha Hernandez RN  Outcome: Progressing  7/2/2024 0744 by Radha Hernandez RN  Outcome: Progressing

## 2024-07-02 NOTE — PROGRESS NOTES
[facility] - 8th Floor Med Surg  Nephrology  Progress Note    Patient Name: Fior Joya  MRN: [id number]  Admission Date: 6/22/2024  Hospital Length of Stay: 10 days  Attending Provider: Susan Sibley MD   Primary Care Physician: Linda, Primary Doctor  Principal Problem:Metabolic acidosis      Subjective:     Fior Joya is a 69 y.o. female who was transferred from hospital in [place] due to acute renal failure.  She initially presented to the hospital due to generalized weakness and shortness of breath with exertion.  Per their report she had been mostly bed-bound for the past 3 weeks.  He reported that she had been drinking getting up going to the restroom.  She did have some episodes of nausea vomiting and generalized weakness along with generalized muscle pain more recently.  She was endorsing shortness of breath but no chest pain.  Denied had been running a fever.  Per their his report, she had not seen a physician in 10 years, because she was not feeling ill and did not feel like she needed to go.  She had also been having some swelling in the legs.  She has no known medical issues per their report.  On admit, WBC elevated at 15, RBC 1.8, hemoglobin 5.7, D-dimer elevated at 1.57, serum sodium 132, potassium 6, serum CO2 less than 5, , creatinine 14.8, glucose 273, calcium 7.5, phosphorus 7.9, albumin 2.9.  TSH and lactic were okay.  UA results indicative of UTI sent for cultures.  Blood cultures obtained and pending.  Received phone call from ER physician in early a.m. hours, it was decided at that time we will proceed with initiation of RRT.  Right IJ temporary cath was placed per General surgery.   She is awake and alert this morning upon exam.  She does respond with simple answers to questions posed.  Son in room in majority of history obtained from him.  Oxygen saturation is 99% on nasal cannula.  We are consulted for management ESTRELLITA.  Bilateral hydronephrosis on CT from 06/21/2024      On  6/27 she underwent cystoscopy and right double J stent placement. The following day she had left nephroureteral stent placement with IR. She has been maintained on TThSa schedule for HD via right IJ temporary dialysis catheter. She is on dialysis now without complaints. Urine output increasing though Cr 6 this morning pre dialysis.     Review of patient's allergies indicates:   Allergen Reactions    Asa [aspirin] Anaphylaxis    Penicillins      Received ceftriaxone from 6/27, no reactions      Current Facility-Administered Medications   Medication Frequency    0.9%  NaCl infusion (for blood administration) Q24H PRN    0.9%  NaCl infusion (for blood administration) Q24H PRN    0.9%  NaCl infusion (for blood administration) Q24H PRN    acetaminophen tablet 650 mg Q4H PRN    aluminum-magnesium hydroxide-simethicone 200-200-20 mg/5 mL suspension 30 mL Q6H PRN    ammonium lactate 12 % lotion BID PRN    calcium carbonate 200 mg calcium (500 mg) chewable tablet 1,000 mg Q6H PRN    calcium carbonate 200 mg calcium (500 mg) chewable tablet 1,000 mg BID    cefTRIAXone (ROCEPHIN) 2 g in D5W 100 mL IVPB (MB+) Q24H    ergocalciferol capsule 50,000 Units Q72H    ferrous sulfate tablet 1 each BID    folic acid tablet 1 mg Daily    heparin (porcine) injection 4,000 Units PRN    heparin (porcine) injection 5,000 Units Q12H    hydrALAZINE injection 10 mg Q2H PRN    labetaloL injection 10 mg Q4H PRN    labetaloL tablet 200 mg Q12H    melatonin tablet 6 mg Nightly PRN    nystatin-triamcinolone cream TID    ondansetron injection 4 mg Q8H PRN    pantoprazole EC tablet 40 mg Daily    polyethylene glycol packet 17 g Daily    prochlorperazine injection Soln 5 mg Q6H PRN    sodium chloride 0.9% flush 10 mL PRN       Objective:     Vital Signs (Most Recent):  Temp: 98.4 °F (36.9 °C) (07/02/24 0306)  Pulse: 77 (07/02/24 0306)  Resp: 19 (07/02/24 0306)  BP: (!) 109/50 (07/02/24 0306)  SpO2: 96 % (07/02/24 0500) Vital Signs (24h Range):  Temp:   [98.1 °F (36.7 °C)-98.6 °F (37 °C)] 98.4 °F (36.9 °C)  Pulse:  [77-90] 77  Resp:  [19-20] 19  SpO2:  [96 %-98 %] 96 %  BP: (102-130)/(50-75) 109/50     Weight: (!) 139 kg (306 lb 7 oz) (07/01/24 0519)  Body mass index is 52.6 kg/m².  Body surface area is 2.51 meters squared.    I/O last 3 completed shifts:  In: 1116.3 [P.O.:1020; IV Piggyback:96.3]  Out: 2175 [Urine:2175]    Physical Exam  Constitutional:       General: She is not in acute distress.     Appearance: She is obese.   HENT:      Head: Atraumatic.      Nose: Nose normal.      Mouth/Throat:      Mouth: Mucous membranes are moist.   Eyes:      Extraocular Movements: Extraocular movements intact.   Neck:      Comments: R IJ temp HD catheter   Cardiovascular:      Rate and Rhythm: Normal rate and regular rhythm.      Pulses: Normal pulses.      Heart sounds: Normal heart sounds.   Pulmonary:      Effort: Pulmonary effort is normal.      Breath sounds: Normal breath sounds.   Abdominal:      General: Bowel sounds are normal. There is no distension.      Palpations: Abdomen is soft.   Genitourinary:     Comments: Left nephrostomy tube  Musculoskeletal:      Cervical back: Neck supple.   Skin:     General: Skin is warm.   Neurological:      General: No focal deficit present.      Mental Status: She is alert and oriented to person, place, and time.   Psychiatric:         Mood and Affect: Mood normal.         Behavior: Behavior normal.         Thought Content: Thought content normal.         Significant Labs:sureBMP:   Recent Labs   Lab 06/26/24  0051 06/27/24  0852 07/02/24  0441   *   < > 133*   K 3.8   < > 4.0      < > 100   CO2 18*   < > 21*   BUN 21.2*   < > 37.6*   CREATININE 3.43*   < > 6.04*   CALCIUM 7.4*   < > 7.2*   MG 1.80  --   --     < > = values in this interval not displayed.     CBC:   Recent Labs   Lab 06/29/24  0643   WBC 11.10   RBC 2.83*   HGB 8.5*   HCT 26.5*      MCV 93.6   MCH 30.0   MCHC 32.1*       Significant  Imaging:    Imaging reviewed       Assessment/Plan:   ESTRELLITA secondary to ATN secondary to obstructive uropathy  -Dialysis initiated 6/22 via right IJ temporary dialysis catheter.    Patient's probably has some underlying chronic kidney disease as she does have significant proteinuria more than 8 g per 24 hours.   Shaking and jerking movements of the extremities probably secondary to uremia,  now resolved completely with dialysis   UTI - Now on zosyn and vanc  Hyperkalemia  Improved.  Acute metabolic acidosis  Improved.  Anemia     Recommendations  Patient to continue on TThSa schedule while being monitored for renal recovery   Dr Mnazo to place tunneled dialysis catheter within the next 24 hours  Dialysis coordinator consulted for outpatient placement as ESTRELLITA   CM working on rehab placement at [facility]           TIFFANY Langston  Nephrology  [facility] - 8th Floor Med Surg

## 2024-07-02 NOTE — PROGRESS NOTES
[facility] Medicine Progress Note        Chief Complaint: Inpatient Follow-up      HPI:   Mrs Joya is a 69-year-old lady with no PMH who was transferred to [facility] from [place] with complaints of dyspnea on exertion, B/L lower extremity edema, shortness for breath. She was noted to have severe anemia with HGB/HCT of 5.7/17.7, WBCs of 15.8, hyperkalemia with potassium 6.8,, BUN/creatinine of 209/16.94. UA showed leukocytes and many bacteria as well as 3+ protein, 3+ blood. Blood and urine cultures were sent off. Nephrology was consulted on admission and patient was admitted to ICU for placement of dialysis catheter and initiation of hemodialysis. She also received transfusion with 2 units PRBCs with improvement in symptoms. Urology was consulted due to B/L hydronephrosis on ultrasound with urine retention requiring Mario catheter. Placed on IV vancomycin, IV Zosyn. Downgraded from ICU to Hospital Medicine on 06/25. At bedside, patient stated she was doing well and had no new complaints. Continued to have some tremors and some shortness of breath but overall felt improved. On IV Vancomycin, IV Zosyn. Urine cultures grew GBS, Blood cultures negative x 72 hours. Nephrology on board; follow recommendations. Urology on board; follow recommendations. Continued on hemodialysis. PT/OT consulted; recommending moderate intensity therapy. Continued on FeSO4 b.i.d., folic acid 1 mg daily, labetalol 200 mg b.i.d., Protonix 40 mg b.i.d..      Patient has been taken to the OR for systolic he, retrograde pyelogram and possible JJ stents with Urology    Patient is status post cystoscopy with placement of double-J stents in the right, left was very difficult and was not able to be done   IR was consulted for placement of a left PCN, this has been completed and nephrostomy bag draining urine leaking bloody     Interval Hx:   At hd   Vitals reviewed and stable on room air   Labs reviewed and in  keeping with end-stage renal disease   Patient continues with dialysis per Nephrology schedule   Plans for tunneled line for hemodialysis placement on Wednesday or Thursday    working on rehab     Objective/physical exam:  At HD         Assessment/Plan:  Bilateral hydroureteronephrosis status post indwelling Mario   Acute kidney injury, possible component of chronic kidney disease and evidence of obstructive uropathy, post initiation of hemodialysis 06/22/2024  UTI-GBS  Acute on chronic Normocytic anemia  Left lower extremity cellulitis  Hypertension  Morbid obesity, BMI 50.4  constipation    Plan  Labs reviewed and in keeping with end-stage renal disease   Patient continues with dialysis per Nephrology schedule   Plans for tunneled line for hemodialysis placement ongoing    working on rehab  Leukocytosis has resolved   Intensify laxatives   Patient is status post cystoscopy with placement of double-J stents in the right, left was very difficult and was not able to be done   IR was consulted for placement of a left PCN, this has been completed and nephrostomy bag draining urine leaking bloody  Blood cultures completed and negative   Urine cultures grew 25-50 K GBS,   Will deescalate antibiotics to IV ceftriaxone , day 6/7  PT/OT on board; recommending moderate intensity therapy   Continued on FeSO4 b.i.d., folic acid 1 mg daily, labetalol 200 mg b.i.d., Protonix 40 mg b.i.d.  Continue monitoring patient's symptoms     VTE prophylaxis:  Heparin     Patient condition:  Stable     Anticipated discharge and Disposition:     Pending HD line placement , OP HD seat      All diagnosis and differential diagnosis have been reviewed; assessment and plan has been documented; I have personally reviewed the labs and test results that are presently available; I have reviewed the patients medication list; I have reviewed the consulting providers response and recommendations. I have reviewed or attempted to  review medical records based upon their availability     All of the patient's questions have been  addressed and answered. Patient's is agreeable to the above stated plan. I will continue to monitor closely and make adjustments to medical management as needed.       VITAL SIGNS: 24 HRS MIN & MAX LAST   Temp  Min: 98.1 °F (36.7 °C)  Max: 98.4 °F (36.9 °C) 98.2 °F (36.8 °C)   BP  Min: 102/50  Max: 141/67 (!) 141/67   Pulse  Min: 77  Max: 99  99   Resp  Min: 19  Max: 20 20   SpO2  Min: 96 %  Max: 98 % 97 %     I have reviewed the following labs:  Recent Labs   Lab 06/28/24  0430 06/29/24  0643 07/02/24  0755   WBC 13.00* 11.10 4.54   RBC 2.92* 2.83* 2.74*   HGB 8.6* 8.5* 8.2*   HCT 26.8* 26.5* 26.3*   MCV 91.8 93.6 96.0*   MCH 29.5 30.0 29.9   MCHC 32.1* 32.1* 31.2*   RDW 16.4 16.1 15.1    164 180   MPV 11.5* 11.1* 10.8*     Recent Labs   Lab 06/26/24  0051 06/27/24  0852 06/29/24  0643 06/30/24  0620 07/01/24  0447 07/02/24  0441   *   < > 132* 136 132* 133*   K 3.8   < > 4.2 3.8 4.0 4.0      < > 101 101 100 100   CO2 18*   < > 20* 24 23 21*   BUN 21.2*   < > 37.1* 20.6* 27.4* 37.6*   CREATININE 3.43*   < > 5.28* 3.68* 5.07* 6.04*   CALCIUM 7.4*   < > 6.7* 7.1* 7.2* 7.2*   MG 1.80  --   --   --   --   --    ALBUMIN 2.2*  --  2.1*  --   --   --    ALKPHOS 42  --  60  --   --   --    ALT 18  --  34  --   --   --    AST 23  --  26  --   --   --    BILITOT 0.4  --  0.2  --   --   --     < > = values in this interval not displayed.     Microbiology Results (last 7 days)       ** No results found for the last 168 hours. **             See below for Radiology    Scheduled Med:   calcium carbonate  1,000 mg Oral BID    cefTRIAXone (Rocephin) IV (PEDS and ADULTS)  2 g Intravenous Q24H    ergocalciferol  50,000 Units Oral Q72H    ferrous sulfate  1 tablet Oral BID    folic acid  1 mg Oral Daily    heparin (porcine)  5,000 Units Subcutaneous Q12H    labetaloL  200 mg Oral Q12H    nystatin-triamcinolone    Topical (Top) TID    pantoprazole  40 mg Oral Daily    polyethylene glycol  17 g Oral Daily      Continuous Infusions:     PRN Meds:    Current Facility-Administered Medications:     0.9%  NaCl infusion (for blood administration), , Intravenous, Q24H PRN    0.9%  NaCl infusion (for blood administration), , Intravenous, Q24H PRN    0.9%  NaCl infusion (for blood administration), , Intravenous, Q24H PRN    acetaminophen, 650 mg, Oral, Q4H PRN    aluminum-magnesium hydroxide-simethicone, 30 mL, Oral, Q6H PRN    ammonium lactate, , Topical (Top), BID PRN    calcium carbonate, 1,000 mg, Oral, Q6H PRN    heparin (porcine), 4,000 Units, Intravenous, PRN    hydrALAZINE, 10 mg, Intravenous, Q2H PRN    labetaloL, 10 mg, Intravenous, Q4H PRN    melatonin, 6 mg, Oral, Nightly PRN    ondansetron, 4 mg, Intravenous, Q8H PRN    prochlorperazine, 5 mg, Intravenous, Q6H PRN    sodium chloride 0.9%, 10 mL, Intravenous, PRN     Assessment/Plan:      VTE prophylaxis:     Patient condition:  Stable/Fair/Guarded/ Serious/ Critical    Anticipated discharge and Disposition:         All diagnosis and differential diagnosis have been reviewed; assessment and plan has been documented; I have personally reviewed the labs and test results that are presently available; I have reviewed the patients medication list; I have reviewed the consulting providers response and recommendations. I have reviewed or attempted to review medical records based upon their availability    All of the patient's questions have been  addressed and answered. Patient's is agreeable to the above stated plan. I will continue to monitor closely and make adjustments to medical management as needed.  _____________________________________________________________________    Nutrition Status:    Radiology:  I have personally reviewed the following imaging and agree with the radiologist.     CV Ultrasound doppler venous legs bilat  Negative for deep and superficial vein  "thrombosis in  the right lower   extremity  Negative for deep and superficial vein thrombosis in  the left  lower   extremity      US Guided Needle Placement  Narrative: EXAMINATION:  Ultrasound guidance    Fluoroscopic guidance    Left-sided nephrostogram    Left-sided nephrostomy catheter placement    Left-sided post placement nephrostogram    CLINICAL HISTORY:  Requiring urinary diversion.    Attending: Augustin Grajeda    Anesthesia: Local and Moderate Sedation - Sedation was provided by physician: An independent trained observer, sedation nurse was present to assist in the monitoring of the patients level of consciousness and physiologic status.    Moderate Sedation was performed for 30 minutes.    TECHNIQUE:  After the risks, benefits and alternatives were discussed, consent was obtained. A time out was performed to verify the patient's identity and procedure.    The patient was placed prone on the angiographic table. The left dorsum was cleaned and prepped in normal sterile fashion. Subcutaneous tissues were anesthetized with a lidocaine solution. Dermatotomy was performed using an #11 blade scalpel. Under direct ultrasound guidance, a 21 gauge Chiba needle was advanced into a posterior lower pole calyx. A 0.018" Nitrex wire was inserted through this needle, into the calyces and collecting system. The needle was removed and exchanged for an AccuStick system.  Utilizing a hockey-stick catheter and Glidewire the bladder was accessed.  Catheter was utilized for insertion of Amplatz wire.  The tract was dilated and a percutaneous nephroureteral catheter was placed and sutured to the skin using 0 monofilament.    Fluoroscopic guidance was used for all catheter and wire manipulations. The patient tolerated the procedure well. There were no immediate complications.    EBL: < 20mL    Fluoro Time (min): 2.6    Fluoro Dose (mGy): 36.3  Impression: Successful placement of a/an 8 Cam[place]onian Fr nephroureteral catheter.    I, " "Augustin Grajeda, was present for the entire procedure.    Electronically signed by: Augustin Grajeda  Date:    06/28/2024  Time:    13:27  IR Nephrostomy Tube Placement  Narrative: EXAMINATION:  Ultrasound guidance    Fluoroscopic guidance    Left-sided nephrostogram    Left-sided nephrostomy catheter placement    Left-sided post placement nephrostogram    CLINICAL HISTORY:  Requiring urinary diversion.    Attending: Augustin Grajeda    Anesthesia: Local and Moderate Sedation - Sedation was provided by physician: An independent trained observer, sedation nurse was present to assist in the monitoring of the patients level of consciousness and physiologic status.    Moderate Sedation was performed for 30 minutes.    TECHNIQUE:  After the risks, benefits and alternatives were discussed, consent was obtained. A time out was performed to verify the patient's identity and procedure.    The patient was placed prone on the angiographic table. The left dorsum was cleaned and prepped in normal sterile fashion. Subcutaneous tissues were anesthetized with a lidocaine solution. Dermatotomy was performed using an #11 blade scalpel. Under direct ultrasound guidance, a 21 gauge Chiba needle was advanced into a posterior lower pole calyx. A 0.018" Nitrex wire was inserted through this needle, into the calyces and collecting system. The needle was removed and exchanged for an AccuStick system.  Utilizing a hockey-stick catheter and Glidewire the bladder was accessed.  Catheter was utilized for insertion of Amplatz wire.  The tract was dilated and a percutaneous nephroureteral catheter was placed and sutured to the skin using 0 monofilament.    Fluoroscopic guidance was used for all catheter and wire manipulations. The patient tolerated the procedure well. There were no immediate complications.    EBL: < 20mL    Fluoro Time (min): 2.6    Fluoro Dose (mGy): 36.3  Impression: Successful placement of a/an 8 Yoruba Fr nephroureteral " catheter.    I, Augustin Grajeda, was present for the entire procedure.    Electronically signed by: Augustin Grajeda  Date:    06/28/2024  Time:    13:27      Susan Sibley MD  Department of Hospital Medicine   [facility]   07/02/2024

## 2024-07-03 LAB
ANION GAP SERPL CALC-SCNC: 9 MEQ/L
BUN SERPL-MCNC: 22.5 MG/DL (ref 9.8–20.1)
CALCIUM SERPL-MCNC: 7.6 MG/DL (ref 8.4–10.2)
CHLORIDE SERPL-SCNC: 99 MMOL/L (ref 98–107)
CO2 SERPL-SCNC: 27 MMOL/L (ref 23–31)
CREAT SERPL-MCNC: 4.18 MG/DL (ref 0.55–1.02)
CREAT/UREA NIT SERPL: 5
GFR SERPLBLD CREATININE-BSD FMLA CKD-EPI: 11 ML/MIN/1.73/M2
GLUCOSE SERPL-MCNC: 99 MG/DL (ref 82–115)
POTASSIUM SERPL-SCNC: 4.2 MMOL/L (ref 3.5–5.1)
SODIUM SERPL-SCNC: 135 MMOL/L (ref 136–145)

## 2024-07-03 PROCEDURE — 99152 MOD SED SAME PHYS/QHP 5/>YRS: CPT | Performed by: SURGERY

## 2024-07-03 PROCEDURE — 36558 INSERT TUNNELED CV CATH: CPT | Mod: RT | Performed by: SURGERY

## 2024-07-03 PROCEDURE — 63600175 PHARM REV CODE 636 W HCPCS: Performed by: SURGERY

## 2024-07-03 PROCEDURE — 25000003 PHARM REV CODE 250

## 2024-07-03 PROCEDURE — 99152 MOD SED SAME PHYS/QHP 5/>YRS: CPT | Mod: ,,, | Performed by: SURGERY

## 2024-07-03 PROCEDURE — 77001 FLUOROGUIDE FOR VEIN DEVICE: CPT | Performed by: SURGERY

## 2024-07-03 PROCEDURE — 0JH63XZ INSERTION OF TUNNELED VASCULAR ACCESS DEVICE INTO CHEST SUBCUTANEOUS TISSUE AND FASCIA, PERCUTANEOUS APPROACH: ICD-10-PCS | Performed by: SURGERY

## 2024-07-03 PROCEDURE — 36558 INSERT TUNNELED CV CATH: CPT | Mod: RT,,, | Performed by: SURGERY

## 2024-07-03 PROCEDURE — 25000003 PHARM REV CODE 250: Performed by: SURGERY

## 2024-07-03 PROCEDURE — 05HM33Z INSERTION OF INFUSION DEVICE INTO RIGHT INTERNAL JUGULAR VEIN, PERCUTANEOUS APPROACH: ICD-10-PCS | Performed by: SURGERY

## 2024-07-03 PROCEDURE — 25000003 PHARM REV CODE 250: Performed by: INTERNAL MEDICINE

## 2024-07-03 PROCEDURE — 80048 BASIC METABOLIC PNL TOTAL CA: CPT | Performed by: NURSE PRACTITIONER

## 2024-07-03 PROCEDURE — 21400001 HC TELEMETRY ROOM

## 2024-07-03 PROCEDURE — C1750 CATH, HEMODIALYSIS,LONG-TERM: HCPCS | Performed by: SURGERY

## 2024-07-03 PROCEDURE — 25000003 PHARM REV CODE 250: Performed by: NURSE PRACTITIONER

## 2024-07-03 PROCEDURE — 77001 FLUOROGUIDE FOR VEIN DEVICE: CPT | Mod: 26,,, | Performed by: SURGERY

## 2024-07-03 PROCEDURE — 36415 COLL VENOUS BLD VENIPUNCTURE: CPT | Performed by: NURSE PRACTITIONER

## 2024-07-03 DEVICE — CATH GLIDEPATH 14.5F 19CM 24CM: Type: IMPLANTABLE DEVICE | Site: NECK | Status: FUNCTIONAL

## 2024-07-03 RX ORDER — MIDAZOLAM HYDROCHLORIDE 1 MG/ML
INJECTION INTRAMUSCULAR; INTRAVENOUS
Status: DISCONTINUED | OUTPATIENT
Start: 2024-07-03 | End: 2024-07-03 | Stop reason: HOSPADM

## 2024-07-03 RX ORDER — LIDOCAINE HYDROCHLORIDE 10 MG/ML
INJECTION INFILTRATION; PERINEURAL
Status: DISCONTINUED | OUTPATIENT
Start: 2024-07-03 | End: 2024-07-03 | Stop reason: HOSPADM

## 2024-07-03 RX ORDER — FENTANYL CITRATE 50 UG/ML
INJECTION, SOLUTION INTRAMUSCULAR; INTRAVENOUS
Status: DISCONTINUED | OUTPATIENT
Start: 2024-07-03 | End: 2024-07-03 | Stop reason: HOSPADM

## 2024-07-03 RX ORDER — HYDROCODONE BITARTRATE AND ACETAMINOPHEN 5; 325 MG/1; MG/1
1 TABLET ORAL EVERY 6 HOURS PRN
Status: DISCONTINUED | OUTPATIENT
Start: 2024-07-03 | End: 2024-07-10 | Stop reason: HOSPADM

## 2024-07-03 RX ADMIN — PANTOPRAZOLE SODIUM 40 MG: 40 TABLET, DELAYED RELEASE ORAL at 09:07

## 2024-07-03 RX ADMIN — ACETAMINOPHEN 650 MG: 325 TABLET, FILM COATED ORAL at 10:07

## 2024-07-03 RX ADMIN — NYSTATIN AND TRIAMCINOLONE ACETONIDE: 100000; 1 CREAM TOPICAL at 09:07

## 2024-07-03 RX ADMIN — ERGOCALCIFEROL 50000 UNITS: 1.25 CAPSULE ORAL at 09:07

## 2024-07-03 RX ADMIN — HYDROCODONE BITARTRATE AND ACETAMINOPHEN 1 TABLET: 5; 325 TABLET ORAL at 08:07

## 2024-07-03 RX ADMIN — LABETALOL HYDROCHLORIDE 200 MG: 200 TABLET, FILM COATED ORAL at 09:07

## 2024-07-03 RX ADMIN — NYSTATIN AND TRIAMCINOLONE ACETONIDE: 100000; 1 CREAM TOPICAL at 03:07

## 2024-07-03 RX ADMIN — FOLIC ACID 1 MG: 1 TABLET ORAL at 09:07

## 2024-07-03 RX ADMIN — CALCIUM CARBONATE (ANTACID) CHEW TAB 500 MG 1000 MG: 500 CHEW TAB at 09:07

## 2024-07-03 RX ADMIN — CALCIUM CARBONATE (ANTACID) CHEW TAB 500 MG 1000 MG: 500 CHEW TAB at 08:07

## 2024-07-03 RX ADMIN — POLYETHYLENE GLYCOL 3350 17 G: 17 POWDER, FOR SOLUTION ORAL at 09:07

## 2024-07-03 RX ADMIN — LABETALOL HYDROCHLORIDE 200 MG: 200 TABLET, FILM COATED ORAL at 08:07

## 2024-07-03 RX ADMIN — FERROUS SULFATE TAB 325 MG (65 MG ELEMENTAL FE) 1 EACH: 325 (65 FE) TAB at 08:07

## 2024-07-03 RX ADMIN — FERROUS SULFATE TAB 325 MG (65 MG ELEMENTAL FE) 1 EACH: 325 (65 FE) TAB at 09:07

## 2024-07-03 RX ADMIN — ACETAMINOPHEN 650 MG: 325 TABLET, FILM COATED ORAL at 03:07

## 2024-07-03 NOTE — CARE UPDATE
[id number] Provided pt with a list of SNF that accept pt's insurance. The choices she has chosen either no beds or out of network with her insurance.

## 2024-07-03 NOTE — PROGRESS NOTES
[facility] - 8th Floor Med Surg  Wound Care    Patient Name:  Fior Joya   MRN:  [id number]  Date: 7/3/2024  Diagnosis: Metabolic acidosis    History:     No past medical history on file.    Social History     Socioeconomic History    Marital status:    Tobacco Use    Smoking status: Never    Smokeless tobacco: Never   Substance and Sexual Activity    Alcohol use: Never    Drug use: Never    Sexual activity: Not Currently     Social Determinants of Health     Financial Resource Strain: Patient Unable To Answer (6/23/2024)    Overall Financial Resource Strain (CARDIA)     Difficulty of Paying Living Expenses: Patient unable to answer   Food Insecurity: Patient Unable To Answer (6/23/2024)    Hunger Vital Sign     Worried About Running Out of Food in the Last Year: Patient unable to answer     Ran Out of Food in the Last Year: Patient unable to answer   Transportation Needs: Patient Unable To Answer (6/23/2024)    TRANSPORTATION NEEDS     Transportation : Patient unable to answer   Stress: Patient Unable To Answer (6/23/2024)    [employer] - Occupational Stress Questionnaire     Feeling of Stress : Patient unable to answer   Housing Stability: Patient Unable To Answer (6/23/2024)    Housing Stability Vital Sign     Unable to Pay for Housing in the Last Year: Patient unable to answer     Homeless in the Last Year: Patient unable to answer       Precautions:     Allergies as of 06/21/2024    (No Known Allergies)       [facility] Assessment Details/Treatment     Follow up visit regarding affected areas at groins, legs and sacrum. Patient remains resting on a low air loss bed with pressure injury prevention measures in place. She reports groin and sacral buttocks areas has responded to care and is resolved and declined to have me reassess area. Her LLE remains with resolving redness. She reports ability to mobilize self well in bed to off load her bony prominences.       07/03/24  1100        Wound 06/22/24 0630 Other (comment) Left anterior;lower Leg   Date First Assessed/Time First Assessed: 06/22/24 0630   Present on Original Admission: Yes  Primary Wound Type: Other (comment)  Side: Left  Orientation: anterior;lower  Location: Leg  Is this injury device related?: No   Wound Image    Dressing Appearance Open to air   Drainage Amount None   Appearance Pink;Red  (resolving area of redness)   Tissue loss description Not applicable   Periwound Area Intact   Wound Edges Undefined   Safety Management   Patient Rounds bed in low position;bed wheels locked;call light in patient/parent reach;ID band on;clutter free environment maintained;placement of personal items at bedside;toileting offered;visualized patient   Positioning   Body Position 30 degrees         Recommendations made to primary team for continued local wound and skin care measures . Orders placed.     07/03/2024

## 2024-07-03 NOTE — PROGRESS NOTES
[facility] Hospital Medicine Progress Note        Chief Complaint: Inpatient Follow-up      HPI:   Mrs Joya is a 69-year-old lady with no PMH who was transferred to [facility] from [place] with complaints of dyspnea on exertion, B/L lower extremity edema, shortness for breath. She was noted to have severe anemia with HGB/HCT of 5.7/17.7, WBCs of 15.8, hyperkalemia with potassium 6.8,, BUN/creatinine of 209/16.94. UA showed leukocytes and many bacteria as well as 3+ protein, 3+ blood. Blood and urine cultures were sent off. Nephrology was consulted on admission and patient was admitted to ICU for placement of dialysis catheter and initiation of hemodialysis. She also received transfusion with 2 units PRBCs with improvement in symptoms. Urology was consulted due to B/L hydronephrosis on ultrasound with urine retention requiring Mario catheter. Placed on IV vancomycin, IV Zosyn. Downgraded from ICU to Hospital Medicine on 06/25. At bedside, patient stated she was doing well and had no new complaints. Continued to have some tremors and some shortness of breath but overall felt improved. On IV Vancomycin, IV Zosyn. Urine cultures grew GBS, Blood cultures negative x 72 hours. Nephrology on board; follow recommendations. Urology on board; follow recommendations. Continued on hemodialysis. PT/OT consulted; recommending moderate intensity therapy. Continued on FeSO4 b.i.d., folic acid 1 mg daily, labetalol 200 mg b.i.d., Protonix 40 mg b.i.d..      Patient has been taken to the OR for systolic he, retrograde pyelogram and possible JJ stents with Urology    Patient is status post cystoscopy with placement of double-J stents in the right, left was very difficult and was not able to be done   IR was consulted for placement of a left PCN, this has been completed and nephrostomy bag draining urine leaking bloody   right Tunneled catheter placed 7/3 per Dr Barcenas.        Interval Hx:   Right  Tunneled  catheter placed 7/3 per Dr Barcenas.   Vitals reviewed and stable on room air   Labs reviewed and in keeping with end-stage renal disease   Patient continues with dialysis per Nephrology schedule TthSat   working on snf     Objective/physical exam:  Objective/physical exam:  General: alert lady lying comfortably in bed, in no acute distress.  HENT: oral and oropharyngeal mucosa moist, pink, with no erythema or exudates, no ear pain or discharge  Neck: normal neck movement, no lymph nodes or swellings, no JVD or Carotid bruit  Respiratory: clear breathing sounds bilaterally, no crackles, rales, ronchi or wheezes  Cardiovascular: clear S1 and S2, no murmurs, rubs or gallops  Peripheral Vascular: no lesions, ulcers or erosions, normal peripheral pulses and no pedal edema  Gastrointestinal: soft, non-tender, non-distended abdomen, no guarding, rigidity or rebound tenderness, normal bowel sounds  Integumentary: B/L LE edema with erythema and tenderness  Neuro: AAO x 3; motor strength 5/5 in B/L UEs & LEs; sensation intact to gross and fine touch B/L; CN II-XII grossly intact         Assessment/Plan:  Bilateral hydroureteronephrosis status post indwelling Mario   Acute kidney injury, possible component of chronic kidney disease and evidence of obstructive uropathy, post initiation of hemodialysis 06/22/2024- Right  Tunneled catheter placed 7/3 per Dr Barcenas.   UTI-GBS  Acute on chronic Normocytic anemia  Left lower extremity cellulitis  Hypertension  Morbid obesity, BMI 50.4  constipation     Plan  Right  Tunneled catheter placed 7/3 per Dr Barcenas.   Labs reviewed and in keeping with end-stage renal disease   Patient continues with dialysis per Nephrology schedule    working on rehab  Leukocytosis has resolved   Intensify laxatives   Patient is status post cystoscopy with placement of double-J stents in the right, left was very difficult and was not able to be done   IR was consulted for placement  of a left PCN, this has been completed and nephrostomy bag draining urine leaking bloody  Blood cultures completed and negative   Urine cultures grew 25-50 K GBS,   Will deescalate antibiotics to IV ceftriaxone , day 7/7, COMPLETED   PT/OT on board; recommending moderate intensity therapy   Continued on FeSO4 b.i.d., folic acid 1 mg daily, labetalol 200 mg b.i.d., Protonix 40 mg b.i.d.  Continue monitoring patient's symptoms     VTE prophylaxis:  Heparin     Patient condition:  Stable     Anticipated discharge and Disposition:     Awaits [facility]      All diagnosis and differential diagnosis have been reviewed; assessment and plan has been documented; I have personally reviewed the labs and test results that are presently available; I have reviewed the patients medication list; I have reviewed the consulting providers response and recommendations. I have reviewed or attempted to review medical records based upon their availability     All of the patient's questions have been  addressed and answered. Patient's is agreeable to the above stated plan. I will continue to monitor closely and make adjustments to medical management as needed.       VITAL SIGNS: 24 HRS MIN & MAX LAST   Temp  Min: 97.2 °F (36.2 °C)  Max: 98.8 °F (37.1 °C) 97.2 °F (36.2 °C)   BP  Min: 90/50  Max: 135/76 123/69   Pulse  Min: 77  Max: 97  91   Resp  Min: 18  Max: 20 20   SpO2  Min: 91 %  Max: 99 % 98 %     I have reviewed the following labs:  Recent Labs   Lab 06/28/24  0430 06/29/24  0643 07/02/24  0755   WBC 13.00* 11.10 4.54   RBC 2.92* 2.83* 2.74*   HGB 8.6* 8.5* 8.2*   HCT 26.8* 26.5* 26.3*   MCV 91.8 93.6 96.0*   MCH 29.5 30.0 29.9   MCHC 32.1* 32.1* 31.2*   RDW 16.4 16.1 15.1    164 180   MPV 11.5* 11.1* 10.8*     Recent Labs   Lab 06/29/24  0643 06/30/24  0620 07/01/24  0447 07/02/24  0441 07/03/24  0451   *   < > 132* 133* 135*   K 4.2   < > 4.0 4.0 4.2      < > 100 100 99   CO2 20*   < > 23 21* 27   BUN 37.1*   < > 27.4*  37.6* 22.5*   CREATININE 5.28*   < > 5.07* 6.04* 4.18*   CALCIUM 6.7*   < > 7.2* 7.2* 7.6*   ALBUMIN 2.1*  --   --   --   --    ALKPHOS 60  --   --   --   --    ALT 34  --   --   --   --    AST 26  --   --   --   --    BILITOT 0.2  --   --   --   --     < > = values in this interval not displayed.     Microbiology Results (last 7 days)       ** No results found for the last 168 hours. **             See below for Radiology    Scheduled Med:   calcium carbonate  1,000 mg Oral BID    ergocalciferol  50,000 Units Oral Q72H    ferrous sulfate  1 tablet Oral BID    folic acid  1 mg Oral Daily    labetaloL  200 mg Oral Q12H    nystatin-triamcinolone   Topical (Top) TID    pantoprazole  40 mg Oral Daily    polyethylene glycol  17 g Oral Daily      Continuous Infusions:     PRN Meds:    Current Facility-Administered Medications:     0.9%  NaCl infusion (for blood administration), , Intravenous, Q24H PRN    0.9%  NaCl infusion (for blood administration), , Intravenous, Q24H PRN    0.9%  NaCl infusion (for blood administration), , Intravenous, Q24H PRN    acetaminophen, 650 mg, Oral, Q4H PRN    aluminum-magnesium hydroxide-simethicone, 30 mL, Oral, Q6H PRN    ammonium lactate, , Topical (Top), BID PRN    calcium carbonate, 1,000 mg, Oral, Q6H PRN    heparin (porcine), 4,000 Units, Intravenous, PRN    hydrALAZINE, 10 mg, Intravenous, Q2H PRN    labetaloL, 10 mg, Intravenous, Q4H PRN    melatonin, 6 mg, Oral, Nightly PRN    ondansetron, 4 mg, Intravenous, Q8H PRN    prochlorperazine, 5 mg, Intravenous, Q6H PRN    sodium chloride 0.9%, 10 mL, Intravenous, PRN     Assessment/Plan:      VTE prophylaxis:     Patient condition:  Stable/Fair/Guarded/ Serious/ Critical    Anticipated discharge and Disposition:         All diagnosis and differential diagnosis have been reviewed; assessment and plan has been documented; I have personally reviewed the labs and test results that are presently available; I have reviewed the patients  medication list; I have reviewed the consulting providers response and recommendations. I have reviewed or attempted to review medical records based upon their availability    All of the patient's questions have been  addressed and answered. Patient's is agreeable to the above stated plan. I will continue to monitor closely and make adjustments to medical management as needed.  _____________________________________________________________________    Nutrition Status:    Radiology:  I have personally reviewed the following imaging and agree with the radiologist.     Cardiac catheterization  Procedure performed in the Invasive Lab    - See Procedure Log link below for nursing documentation    - See OpNote on Surgeries Tab for physician findings    - See Imaging Tab for radiologist dictation      Susan Sibley MD  Department of Hospital Medicine   [facility]   07/03/2024

## 2024-07-03 NOTE — PROGRESS NOTES
[facility] - 8th Floor Med Surg  Nephrology  Progress Note    Patient Name: Fior Joya  MRN: [id number]  Admission Date: 6/22/2024  Hospital Length of Stay: 11 days  Attending Provider: Susan Sibley MD   Primary Care Physician: Linda, Primary Doctor  Principal Problem:Metabolic acidosis      Subjective:     Fior Joya is a 69 y.o. female who was transferred from hospital in [place] due to acute renal failure.  She initially presented to the hospital due to generalized weakness and shortness of breath with exertion.  Per their report she had been mostly bed-bound for the past 3 weeks.  He reported that she had been drinking getting up going to the restroom.  She did have some episodes of nausea vomiting and generalized weakness along with generalized muscle pain more recently.  She was endorsing shortness of breath but no chest pain.  Denied had been running a fever.  Per their his report, she had not seen a physician in 10 years, because she was not feeling ill and did not feel like she needed to go.  She had also been having some swelling in the legs.  She has no known medical issues per their report.  On admit, WBC elevated at 15, RBC 1.8, hemoglobin 5.7, D-dimer elevated at 1.57, serum sodium 132, potassium 6, serum CO2 less than 5, , creatinine 14.8, glucose 273, calcium 7.5, phosphorus 7.9, albumin 2.9.  TSH and lactic were okay.  UA results indicative of UTI sent for cultures.  Blood cultures obtained and pending.  Received phone call from ER physician in early a.m. hours, it was decided at that time we will proceed with initiation of RRT.  Right IJ temporary cath was placed per General surgery.   She is awake and alert this morning upon exam.  She does respond with simple answers to questions posed.  Son in room in majority of history obtained from him.  Oxygen saturation is 99% on nasal cannula.  We are consulted for management ESTRELLITA.  Bilateral hydronephrosis on CT from 06/21/2024      On  6/27 she underwent cystoscopy and right double J stent placement. The following day she had left nephroureteral stent placement with IR. She has been maintained on TThSa schedule for HD via right IJ temporary dialysis catheter. Tunneled catheter placed 7/3 per Dr Barcenas. She has returned to her room and has no complaints.     Review of patient's allergies indicates:   Allergen Reactions    Asa [aspirin] Anaphylaxis    Penicillins      Received ceftriaxone from 6/27, no reactions      Current Facility-Administered Medications   Medication Frequency    0.9%  NaCl infusion (for blood administration) Q24H PRN    0.9%  NaCl infusion (for blood administration) Q24H PRN    0.9%  NaCl infusion (for blood administration) Q24H PRN    acetaminophen tablet 650 mg Q4H PRN    aluminum-magnesium hydroxide-simethicone 200-200-20 mg/5 mL suspension 30 mL Q6H PRN    ammonium lactate 12 % lotion BID PRN    calcium carbonate 200 mg calcium (500 mg) chewable tablet 1,000 mg Q6H PRN    calcium carbonate 200 mg calcium (500 mg) chewable tablet 1,000 mg BID    ergocalciferol capsule 50,000 Units Q72H    ferrous sulfate tablet 1 each BID    folic acid tablet 1 mg Daily    heparin (porcine) injection 4,000 Units PRN    hydrALAZINE injection 10 mg Q2H PRN    labetaloL injection 10 mg Q4H PRN    labetaloL tablet 200 mg Q12H    melatonin tablet 6 mg Nightly PRN    nystatin-triamcinolone cream TID    ondansetron injection 4 mg Q8H PRN    pantoprazole EC tablet 40 mg Daily    polyethylene glycol packet 17 g Daily    prochlorperazine injection Soln 5 mg Q6H PRN    sodium chloride 0.9% flush 10 mL PRN       Objective:     Vital Signs (Most Recent):  Temp: 97.2 °F (36.2 °C) (07/03/24 1100)  Pulse: 91 (07/03/24 1100)  Resp: 20 (07/03/24 1100)  BP: 123/69 (07/03/24 1100)  SpO2: 99 % (07/03/24 1100) Vital Signs (24h Range):  Temp:  [97.2 °F (36.2 °C)-98.8 °F (37.1 °C)] 97.2 °F (36.2 °C)  Pulse:  [77-97] 91  Resp:  [18-20] 20  SpO2:  [91 %-99 %] 99  %  BP: ()/(50-76) 123/69     Weight: (!) 138.8 kg (306 lb) (07/03/24 0612)  Body mass index is 52.52 kg/m².  Body surface area is 2.5 meters squared.    I/O last 3 completed shifts:  In: 2042 [P.O.:1542; Other:500]  Out: 2900 [Urine:2400; Other:500]    Physical Exam  Constitutional:       General: She is not in acute distress.     Appearance: She is obese.   HENT:      Head: Atraumatic.      Nose: Nose normal.      Mouth/Throat:      Mouth: Mucous membranes are moist.   Eyes:      Extraocular Movements: Extraocular movements intact.   Neck:      Comments: R chest tunneled dialysis catheter   Cardiovascular:      Rate and Rhythm: Normal rate and regular rhythm.      Pulses: Normal pulses.      Heart sounds: Normal heart sounds.   Pulmonary:      Effort: Pulmonary effort is normal.      Breath sounds: Normal breath sounds.   Abdominal:      General: Bowel sounds are normal. There is no distension.      Palpations: Abdomen is soft.   Genitourinary:     Comments: Left nephrostomy tube  Musculoskeletal:      Cervical back: Neck supple.   Skin:     General: Skin is warm.   Neurological:      General: No focal deficit present.      Mental Status: She is alert and oriented to person, place, and time.   Psychiatric:         Mood and Affect: Mood normal.         Behavior: Behavior normal.         Thought Content: Thought content normal.         Significant Labs:sureBMP:   Recent Labs   Lab 07/03/24  0451   *   K 4.2   CL 99   CO2 27   BUN 22.5*   CREATININE 4.18*   CALCIUM 7.6*     CBC:   Recent Labs   Lab 07/02/24  0755   WBC 4.54   RBC 2.74*   HGB 8.2*   HCT 26.3*      MCV 96.0*   MCH 29.9   MCHC 31.2*       Significant Imaging:    Imaging reviewed       Assessment/Plan:   ESTRELLITA secondary to ATN secondary to obstructive uropathy  -Dialysis initiated 6/22 via right IJ temporary dialysis catheter.    Patient's probably has some underlying chronic kidney disease as she does have significant proteinuria more  than 8 g per 24 hours.   Shaking and jerking movements of the extremities probably secondary to uremia,  now resolved completely with dialysis   UTI - Now on zosyn and vanc  Hyperkalemia  Improved.  Acute metabolic acidosis  Improved.  Anemia     Recommendations  Patient to continue on TThSa schedule while being monitored for renal recovery   Dialysis coordinator consulted for outpatient placement as ESTRELLITA   CM working on rehab placement           TIFFANY Langston  Nephrology  [facility] - 8th Floor Med Surg

## 2024-07-03 NOTE — OP NOTE
[facility] Vascular Surgery Procedure Note    Patient: Fior Joya    Date of Procedure: 07/03/2024    Preop Diagnosis: Acute kidney injury    Postop Diagnosis: Acute kidney injury    Procedure:  1.   right IJ tunneled catheter placement using ultrasound guidance and fluoroscopy   2. Moderate sedation directly supervised by me for 18 minutes    3. Removal of non tunneled dialysis catheter    Surgeon: Uche Barcenas    Indication for Procedure: Fior Joya is a 69 y.o. female  who presented with ESTRELLITA and questionable CKD.  She had a temporary catheter placed, however he was long-term access placement..    Anesthesia:  Moderate sedation directly supervised by me and 1% lidocaine.  An independent observer was used to monitor cardiorespiratory function throughout the procedure.  Please see the procedure logs for timing and dosing of sedation medications as well as the name of the there was providing sedation.    Blood Loss: 5 mL    Findings: Ultrasound evaluation of the right IJ noted it to patent.  Fluoroscopy noted the catheter tip to be the atriocaval junction and no kinking of the catheter.  The initially placed non tunneled catheter was too high in the neck to be converted over to a tunneled catheter    Implants:  19 cm tunneled dialysis catheter, glide path    Procedure in Detail: After informed consent was obtained, and risks and benefits were discussed with the patient, she was brought to the cath lab and placed supine.  After adequate support lines and monitors were placed, moderate sedation was given directly supervised by me.  Ultrasound evaluation of the right IJ was performed.  Local anesthetic was infused in the skin overlying the right neck.  A micropuncture needle was used access the right IJ under ultrasound visualization.  The wire was advanced down and verified to be in the inferior vena cava with fluoroscopy.  At this point the skin was anesthetized for a tunnel to the catheter exit site on the chest wall.   An incision was made at this exit site as well as near the vein access site.  The catheter was tunneled between the 2 sites.  I then exchanged out for an 035 wire which I placed into the IVC.  I then serially dilated over the wires ending with the dilator and peel-away sheath.  Wire and dilator were then removed.  The catheter was advanced down the peel-away sheath.  The sheath was cracked and removed.  The catheter was then withdrawn under fluoroscopy to be at the atriocaval junction.  Both ports flushed and aspirated well.  The catheter was then sutured to the chest wall with a 2-0 nylon stitch.  and the vein access site closed with a subcuticular Monocryl stitch.  Heparin was placed into the catheter lumens and caps were placed.  Dressings were placed and the patient was transferred back to her room in stable condition.    Complications: None    Specimens: None    Flouro Time: 0.5 min

## 2024-07-03 NOTE — INTERVAL H&P NOTE
The patient has been examined and the H&P has been reviewed:    I concur with the findings and no changes have occurred since H&P was written.    Anesthesia/Surgery risks, benefits and alternative options discussed and understood by patient/family.          Active Hospital Problems    Diagnosis  POA    *Metabolic acidosis [E87.20]  Yes    Anemia requiring transfusions [D64.9]  Yes    Hyperkalemia [E87.5]  Yes    UTI (urinary tract infection) [N39.0]  Yes    Volume depletion [E86.9]  Yes    ESTRELLITA (acute kidney injury) [N17.9]  Yes      Resolved Hospital Problems   No resolved problems to display.

## 2024-07-03 NOTE — NURSING
Returned from surgery per pt's bed in stable condition. See post op vitals. Rt chest wall HD tunneled cath noted with drsg D/I. Rt IJ Drsg D/I with dermabond and tagaderm. No complaints voiced. Son at BS. Safety reinforced.

## 2024-07-03 NOTE — PLAN OF CARE
Problem: Infection  Goal: Absence of Infection Signs and Symptoms  Outcome: Progressing     Problem: Adult Inpatient Plan of Care  Goal: Plan of Care Review  Outcome: Progressing  Goal: Patient-Specific Goal (Individualized)  Outcome: Progressing  Goal: Absence of Hospital-Acquired Illness or Injury  Outcome: Progressing  Goal: Optimal Comfort and Wellbeing  Outcome: Progressing  Goal: Readiness for Transition of Care  Outcome: Progressing     Problem: Bariatric Environmental Safety  Goal: Safety Maintained with Care  Outcome: Progressing     Problem: Fall Injury Risk  Goal: Absence of Fall and Fall-Related Injury  Outcome: Progressing     Problem: Skin Injury Risk Increased  Goal: Skin Health and Integrity  Outcome: Progressing     Problem: Wound  Goal: Optimal Coping  Outcome: Progressing  Goal: Optimal Functional Ability  Outcome: Progressing  Goal: Absence of Infection Signs and Symptoms  Outcome: Progressing  Goal: Improved Oral Intake  Outcome: Progressing  Goal: Optimal Pain Control and Function  Outcome: Progressing  Goal: Skin Health and Integrity  Outcome: Progressing  Goal: Optimal Wound Healing  Outcome: Progressing

## 2024-07-03 NOTE — PT/OT/SLP PROGRESS
Physical Therapy Treatment    Patient Name:  Fior Joya   MRN:  [id number]    Pt stated that she needed to pay bills and would not be able to get up now. I attempted to persuade her by stating she can pay bills on line at any time throughout the day but she will only have certain times to work with therapy. She still refused.

## 2024-07-03 NOTE — PLAN OF CARE
Problem: Adult Inpatient Plan of Care  Goal: Plan of Care Review  7/3/2024 0755 by Radha Hernandez RN  Outcome: Progressing  7/3/2024 0752 by Radha Hernandez RN  Outcome: Progressing  Goal: Patient-Specific Goal (Individualized)  7/3/2024 0755 by Radha Hernandez RN  Outcome: Progressing  7/3/2024 0752 by Radha Hernandez RN  Outcome: Progressing  Goal: Absence of Hospital-Acquired Illness or Injury  7/3/2024 0755 by Radha Hernandez RN  Outcome: Progressing  7/3/2024 0752 by Radha Hernandez RN  Outcome: Progressing  Goal: Optimal Comfort and Wellbeing  7/3/2024 0755 by Radha Hernandez RN  Outcome: Progressing  7/3/2024 0752 by Radha Hernandez RN  Outcome: Progressing  Goal: Readiness for Transition of Care  7/3/2024 0755 by Radha Hernandez RN  Outcome: Progressing  7/3/2024 0752 by Radha Hernandez RN  Outcome: Progressing     Problem: Hemodialysis  Goal: Safe, Effective Therapy Delivery  7/3/2024 0755 by Radha Hernandez RN  Outcome: Progressing  7/3/2024 0752 by Radha Hernandez RN  Outcome: Progressing  Goal: Effective Tissue Perfusion  7/3/2024 0755 by Radha Hernandez RN  Outcome: Progressing  7/3/2024 0752 by Radha Hernandez RN  Outcome: Progressing  Goal: Absence of Infection Signs and Symptoms  7/3/2024 0755 by Radha Hernandez RN  Outcome: Progressing  7/3/2024 0752 by Radha Hernandez RN  Outcome: Progressing

## 2024-07-04 LAB
ALBUMIN SERPL-MCNC: 2.3 G/DL (ref 3.4–4.8)
BASOPHILS # BLD AUTO: 0.02 X10(3)/MCL
BASOPHILS NFR BLD AUTO: 0.3 %
BUN SERPL-MCNC: 30 MG/DL (ref 9.8–20.1)
CALCIUM SERPL-MCNC: 7.8 MG/DL (ref 8.4–10.2)
CHLORIDE SERPL-SCNC: 103 MMOL/L (ref 98–107)
CO2 SERPL-SCNC: 24 MMOL/L (ref 23–31)
CREAT SERPL-MCNC: 5.2 MG/DL (ref 0.55–1.02)
EOSINOPHIL # BLD AUTO: 0.21 X10(3)/MCL (ref 0–0.9)
EOSINOPHIL NFR BLD AUTO: 3.3 %
ERYTHROCYTE [DISTWIDTH] IN BLOOD BY AUTOMATED COUNT: 14.6 % (ref 11.5–17)
GFR SERPLBLD CREATININE-BSD FMLA CKD-EPI: 9 ML/MIN/1.73/M2
GLUCOSE SERPL-MCNC: 114 MG/DL (ref 82–115)
HCT VFR BLD AUTO: 29.6 % (ref 37–47)
HGB BLD-MCNC: 8.9 G/DL (ref 12–16)
IMM GRANULOCYTES # BLD AUTO: 0.06 X10(3)/MCL (ref 0–0.04)
IMM GRANULOCYTES NFR BLD AUTO: 1 %
LYMPHOCYTES # BLD AUTO: 0.94 X10(3)/MCL (ref 0.6–4.6)
LYMPHOCYTES NFR BLD AUTO: 15 %
MCH RBC QN AUTO: 29.4 PG (ref 27–31)
MCHC RBC AUTO-ENTMCNC: 30.1 G/DL (ref 33–36)
MCV RBC AUTO: 97.7 FL (ref 80–94)
MONOCYTES # BLD AUTO: 1.17 X10(3)/MCL (ref 0.1–1.3)
MONOCYTES NFR BLD AUTO: 18.7 %
NEUTROPHILS # BLD AUTO: 3.87 X10(3)/MCL (ref 2.1–9.2)
NEUTROPHILS NFR BLD AUTO: 61.7 %
NRBC BLD AUTO-RTO: 0 %
PHOSPHATE SERPL-MCNC: 2.2 MG/DL (ref 2.3–4.7)
PLATELET # BLD AUTO: 183 X10(3)/MCL (ref 130–400)
PMV BLD AUTO: 10.9 FL (ref 7.4–10.4)
POTASSIUM SERPL-SCNC: 4.6 MMOL/L (ref 3.5–5.1)
RBC # BLD AUTO: 3.03 X10(6)/MCL (ref 4.2–5.4)
SODIUM SERPL-SCNC: 137 MMOL/L (ref 136–145)
WBC # BLD AUTO: 6.27 X10(3)/MCL (ref 4.5–11.5)

## 2024-07-04 PROCEDURE — 25000003 PHARM REV CODE 250

## 2024-07-04 PROCEDURE — 36415 COLL VENOUS BLD VENIPUNCTURE: CPT | Performed by: STUDENT IN AN ORGANIZED HEALTH CARE EDUCATION/TRAINING PROGRAM

## 2024-07-04 PROCEDURE — 25000003 PHARM REV CODE 250: Performed by: INTERNAL MEDICINE

## 2024-07-04 PROCEDURE — 80100016 HC MAINTENANCE HEMODIALYSIS

## 2024-07-04 PROCEDURE — 25000003 PHARM REV CODE 250: Performed by: NURSE PRACTITIONER

## 2024-07-04 PROCEDURE — 80069 RENAL FUNCTION PANEL: CPT | Performed by: STUDENT IN AN ORGANIZED HEALTH CARE EDUCATION/TRAINING PROGRAM

## 2024-07-04 PROCEDURE — 25000003 PHARM REV CODE 250: Performed by: STUDENT IN AN ORGANIZED HEALTH CARE EDUCATION/TRAINING PROGRAM

## 2024-07-04 PROCEDURE — 85025 COMPLETE CBC W/AUTO DIFF WBC: CPT | Performed by: STUDENT IN AN ORGANIZED HEALTH CARE EDUCATION/TRAINING PROGRAM

## 2024-07-04 PROCEDURE — A4216 STERILE WATER/SALINE, 10 ML: HCPCS

## 2024-07-04 PROCEDURE — 21400001 HC TELEMETRY ROOM

## 2024-07-04 RX ADMIN — LABETALOL HYDROCHLORIDE 200 MG: 200 TABLET, FILM COATED ORAL at 12:07

## 2024-07-04 RX ADMIN — ACETAMINOPHEN 650 MG: 325 TABLET, FILM COATED ORAL at 09:07

## 2024-07-04 RX ADMIN — CALCIUM CARBONATE (ANTACID) CHEW TAB 500 MG 1000 MG: 500 CHEW TAB at 12:07

## 2024-07-04 RX ADMIN — FERROUS SULFATE TAB 325 MG (65 MG ELEMENTAL FE) 1 EACH: 325 (65 FE) TAB at 08:07

## 2024-07-04 RX ADMIN — HYDROCODONE BITARTRATE AND ACETAMINOPHEN 1 TABLET: 5; 325 TABLET ORAL at 11:07

## 2024-07-04 RX ADMIN — CALCIUM CARBONATE (ANTACID) CHEW TAB 500 MG 1000 MG: 500 CHEW TAB at 08:07

## 2024-07-04 RX ADMIN — FERROUS SULFATE TAB 325 MG (65 MG ELEMENTAL FE) 1 EACH: 325 (65 FE) TAB at 12:07

## 2024-07-04 RX ADMIN — HYDROCODONE BITARTRATE AND ACETAMINOPHEN 1 TABLET: 5; 325 TABLET ORAL at 02:07

## 2024-07-04 RX ADMIN — Medication 10 ML: at 12:07

## 2024-07-04 RX ADMIN — NYSTATIN AND TRIAMCINOLONE ACETONIDE: 100000; 1 CREAM TOPICAL at 12:07

## 2024-07-04 RX ADMIN — NYSTATIN AND TRIAMCINOLONE ACETONIDE: 100000; 1 CREAM TOPICAL at 08:07

## 2024-07-04 RX ADMIN — NYSTATIN AND TRIAMCINOLONE ACETONIDE: 100000; 1 CREAM TOPICAL at 02:07

## 2024-07-04 RX ADMIN — PANTOPRAZOLE SODIUM 40 MG: 40 TABLET, DELAYED RELEASE ORAL at 12:07

## 2024-07-04 RX ADMIN — FOLIC ACID 1 MG: 1 TABLET ORAL at 12:07

## 2024-07-04 RX ADMIN — NYSTATIN AND TRIAMCINOLONE ACETONIDE: 100000; 1 CREAM TOPICAL at 04:07

## 2024-07-04 RX ADMIN — LABETALOL HYDROCHLORIDE 200 MG: 200 TABLET, FILM COATED ORAL at 08:07

## 2024-07-04 RX ADMIN — HYDROCODONE BITARTRATE AND ACETAMINOPHEN 1 TABLET: 5; 325 TABLET ORAL at 04:07

## 2024-07-04 NOTE — PLAN OF CARE
Problem: Infection  Goal: Absence of Infection Signs and Symptoms  Outcome: Progressing  Intervention: Prevent or Manage Infection  Flowsheets (Taken 7/4/2024 0157)  Fever Reduction/Comfort Measures: lightweight bedding  Isolation Precautions: precautions maintained     Problem: Adult Inpatient Plan of Care  Goal: Plan of Care Review  Outcome: Progressing  Flowsheets (Taken 7/4/2024 0157)  Plan of Care Reviewed With: patient  Goal: Patient-Specific Goal (Individualized)  Outcome: Progressing  Goal: Absence of Hospital-Acquired Illness or Injury  Outcome: Progressing  Intervention: Identify and Manage Fall Risk  Flowsheets (Taken 7/4/2024 0157)  Safety Promotion/Fall Prevention:   assistive device/personal item within reach   side rails raised x 3   nonskid shoes/socks when out of bed   Fall Risk signage in place   Fall Risk reviewed with patient/family  Intervention: Prevent Skin Injury  Flowsheets (Taken 7/4/2024 0157)  Body Position: weight shifting  Device Skin Pressure Protection:   absorbent pad utilized/changed   tubing/devices free from skin contact  Intervention: Prevent and Manage VTE (Venous Thromboembolism) Risk  Flowsheets (Taken 7/4/2024 0157)  VTE Prevention/Management: remove, assess skin, and reapply sequential compression device  Intervention: Prevent Infection  Flowsheets (Taken 7/4/2024 0157)  Infection Prevention:   personal protective equipment utilized   environmental surveillance performed   rest/sleep promoted   equipment surfaces disinfected   single patient room provided   hand hygiene promoted   visitors restricted/screened  Goal: Optimal Comfort and Wellbeing  Outcome: Progressing  Intervention: Monitor Pain and Promote Comfort  Flowsheets (Taken 7/4/2024 0157)  Pain Management Interventions:   care clustered   pillow support provided   position adjusted   quiet environment facilitated   relaxation techniques promoted  Intervention: Provide Person-Centered Care  Flowsheets (Taken  7/4/2024 0157)  Trust Relationship/Rapport:   care explained   questions encouraged   choices provided   reassurance provided   emotional support provided   thoughts/feelings acknowledged   empathic listening provided   questions answered  Goal: Readiness for Transition of Care  Outcome: Progressing     Problem: Bariatric Environmental Safety  Goal: Safety Maintained with Care  Outcome: Progressing     Problem: Acute Kidney Injury/Impairment  Goal: Fluid and Electrolyte Balance  Outcome: Progressing  Intervention: Monitor and Manage Fluid and Electrolyte Balance  Flowsheets (Taken 7/4/2024 0157)  Fluid/Electrolyte Management: fluids provided

## 2024-07-04 NOTE — NURSING
07/04/24 1148   Post-Hemodialysis Assessment   Rinseback Volume (mL) 500 mL   Blood Volume Processed (Liters) 63 L   Dialyzer Clearance Clear   Duration of Treatment 180 minutes   Additional Fluid Intake (mL) 0 mL   Total UF (mL) 321 mL   Net Fluid Removal 0   Patient Response to Treatment pt tolerated ok. Had en episode of hypotension. Uf turned off per renal. Bp recovered. Pt ended up +179mL. New caps applied and dressing changed.

## 2024-07-04 NOTE — PROGRESS NOTES
Fior Joya is a 69 y.o. female who was transferred from hospital in [place] due to acute renal failure.  She initially presented to the hospital due to generalized weakness and shortness of breath with exertion.  Per their report she had been mostly bed-bound for the past 3 weeks.  He reported that she had been drinking getting up going to the restroom.  She did have some episodes of nausea vomiting and generalized weakness along with generalized muscle pain more recently.  She was endorsing shortness of breath but no chest pain.  Denied had been running a fever.  Per their his report, she had not seen a physician in 10 years, because she was not feeling ill and did not feel like she needed to go.  She had also been having some swelling in the legs.  She has no known medical issues per their report.  On admit, WBC elevated at 15, RBC 1.8, hemoglobin 5.7, D-dimer elevated at 1.57, serum sodium 132, potassium 6, serum CO2 less than 5, , creatinine 14.8, glucose 273, calcium 7.5, phosphorus 7.9, albumin 2.9.  TSH and lactic were okay.  UA results indicative of UTI sent for cultures.  Blood cultures obtained and pending.  Received phone call from ER physician in early a.m. hours, it was decided at that time we will proceed with initiation of RRT.  Right IJ temporary cath was placed per General surgery.   She is awake and alert this morning upon exam.  She does respond with simple answers to questions posed.  Son in room in majority of history obtained from him.  Oxygen saturation is 99% on nasal cannula.  We are consulted for management ESTRELLITA.  Bilateral hydronephrosis on CT from 06/21/2024        On 6/27 she underwent cystoscopy and right double J stent placement. The following day she had left nephroureteral stent placement with IR. She has been maintained on TTa schedule for HD via right IJ temporary dialysis catheter. Tunneled catheter placed 7/3 per Dr Barcenas.    Exam:  Gen: Well appearing WF  Ext: No LE  edema  HD access: RIJ permcat      Plan:  Making fair UOP, but no having metabolic clearance yet. Will need to continue HD for now. Seen on HD at 9:45am. Tolerating well. No UF with HD.     Neno Mercado DO  Nephrology  [facility]  Clinic number: [phone]

## 2024-07-04 NOTE — PROGRESS NOTES
[facility] Medicine Progress Note        Chief Complaint: Inpatient Follow-up     HPI:   69-year-old lady with no PMH who was transferred to [facility] from [place] with complaints of dyspnea on exertion, B/L lower extremity edema, shortness for breath. She was noted to have severe anemia with HGB/HCT of 5.7/17.7, WBCs of 15.8, hyperkalemia with potassium 6.8,, BUN/creatinine of 209/16.94. UA showed leukocytes and many bacteria as well as 3+ protein, 3+ blood. Blood and urine cultures were sent off. Nephrology was consulted on admission and patient was admitted to ICU for placement of dialysis catheter and initiation of hemodialysis. She also received transfusion with 2 units PRBCs with improvement in symptoms. Urology was consulted due to B/L hydronephrosis on ultrasound with urine retention requiring Mario catheter. Placed on IV vancomycin, IV Zosyn. Downgraded from ICU to Hospital Medicine on 06/25. At bedside, patient stated she was doing well and had no new complaints. Continued to have some tremors and some shortness of breath but overall felt improved. On IV Vancomycin, IV Zosyn. Urine cultures grew GBS, Blood cultures negative x 72 hours. Nephrology on board; follow recommendations. Urology on board; follow recommendations. Continued on hemodialysis. PT/OT consulted; recommending moderate intensity therapy. Continued on FeSO4 b.i.d., folic acid 1 mg daily, labetalol 200 mg b.i.d., Protonix 40 mg b.i.d..      Patient has been taken to the OR for systolic he, retrograde pyelogram and possible JJ stents with Urology    Patient is status post cystoscopy with placement of double-J stents in the right, left was very difficult and was not able to be done   IR was consulted for placement of a left PCN, this has been completed and nephrostomy bag draining urine leaking bloody   right Tunneled catheter placed 7/3 per Dr Barcenas.     Interval Hx:   Patient was doing well this morning.   No significant complaints.  Reports she did not run on dialysis yesterday.  She would be due today.  Will follow up Neurology recommendations.  We discussed that we are awaiting placement and suspect this will not happen until after the weekend.  She was states that she was comfortable but does have some mild pain at tunneled cath placement site.     Objective/physical exam:  General: alert lady lying comfortably in bed, in no acute distress.  HENT: oral and oropharyngeal mucosa moist, pink, with no erythema or exudates, no ear pain or discharge+tunnel cath R chest wall  Neck: normal neck movement, no lymph nodes or swellings, no JVD or Carotid bruit  Respiratory: clear breathing sounds bilaterally, no crackles, rales, ronchi or wheezes  Cardiovascular: clear S1 and S2, no murmurs, rubs or gallops  Peripheral Vascular: no lesions, ulcers or erosions, normal peripheral pulses and no pedal edema  Gastrointestinal: soft, non-tender, non-distended abdomen, no guarding, rigidity or rebound tenderness, normal bowel sounds  Integumentary: B/L LE edema with erythema and tenderness  Neuro: AAO x 3; motor strength 5/5 in B/L UEs & LEs; sensation intact to gross and fine touch B/L; CN II-XII grossly intact     VITAL SIGNS: 24 HRS MIN & MAX LAST   Temp  Min: 97.2 °F (36.2 °C)  Max: 99.2 °F (37.3 °C) 98.1 °F (36.7 °C)   BP  Min: 96/52  Max: 132/64 (!) 117/56   Pulse  Min: 79  Max: 101  95   Resp  Min: 17  Max: 24 (!) 24   SpO2  Min: 95 %  Max: 99 % 97 %       Recent Labs   Lab 06/29/24  0643 07/02/24  0755 07/04/24  0421   WBC 11.10 4.54 6.27   RBC 2.83* 2.74* 3.03*   HGB 8.5* 8.2* 8.9*   HCT 26.5* 26.3* 29.6*   MCV 93.6 96.0* 97.7*   MCH 30.0 29.9 29.4   MCHC 32.1* 31.2* 30.1*   RDW 16.1 15.1 14.6    180 183   MPV 11.1* 10.8* 10.9*       Recent Labs   Lab 06/29/24  0643 06/30/24  0620 07/02/24  0441 07/03/24  0451 07/04/24  0421   *   < > 133* 135* 137   K 4.2   < > 4.0 4.2 4.6      < > 100 99 103   CO2 20*    < > 21* 27 24   BUN 37.1*   < > 37.6* 22.5* 30.0*   CREATININE 5.28*   < > 6.04* 4.18* 5.20*   CALCIUM 6.7*   < > 7.2* 7.6* 7.8*   ALBUMIN 2.1*  --   --   --  2.3*   ALKPHOS 60  --   --   --   --    ALT 34  --   --   --   --    AST 26  --   --   --   --    BILITOT 0.2  --   --   --   --     < > = values in this interval not displayed.          Microbiology Results (last 7 days)       ** No results found for the last 168 hours. **             Radiology:  Cardiac catheterization  Procedure performed in the Invasive Lab    - See Procedure Log link below for nursing documentation    - See OpNote on Surgeries Tab for physician findings    - See Imaging Tab for radiologist dictation        Medications:  Scheduled Meds:   calcium carbonate  1,000 mg Oral BID    ergocalciferol  50,000 Units Oral Q72H    ferrous sulfate  1 tablet Oral BID    folic acid  1 mg Oral Daily    labetaloL  200 mg Oral Q12H    nystatin-triamcinolone   Topical (Top) TID    pantoprazole  40 mg Oral Daily    polyethylene glycol  17 g Oral Daily     Continuous Infusions:  PRN Meds:.  Current Facility-Administered Medications:     0.9%  NaCl infusion (for blood administration), , Intravenous, Q24H PRN    0.9%  NaCl infusion (for blood administration), , Intravenous, Q24H PRN    0.9%  NaCl infusion (for blood administration), , Intravenous, Q24H PRN    acetaminophen, 650 mg, Oral, Q4H PRN    aluminum-magnesium hydroxide-simethicone, 30 mL, Oral, Q6H PRN    ammonium lactate, , Topical (Top), BID PRN    calcium carbonate, 1,000 mg, Oral, Q6H PRN    heparin (porcine), 4,000 Units, Intravenous, PRN    hydrALAZINE, 10 mg, Intravenous, Q2H PRN    HYDROcodone-acetaminophen, 1 tablet, Oral, Q6H PRN    labetaloL, 10 mg, Intravenous, Q4H PRN    melatonin, 6 mg, Oral, Nightly PRN    ondansetron, 4 mg, Intravenous, Q8H PRN    prochlorperazine, 5 mg, Intravenous, Q6H PRN    sodium chloride 0.9%, 10 mL, Intravenous, PRN        Assessment/Plan:   Bilateral  hydroureteronephrosis status post indwelling Mario   Acute kidney injury, possible component of chronic kidney disease and evidence of obstructive uropathy, post initiation of hemodialysis 06/22/2024- Right  Tunneled catheter placed 7/3 per Dr Barcenas.   UTI-GBS  Acute on chronic Normocytic anemia  Left lower extremity cellulitis  Hypertension  Morbid obesity, BMI 50.4  Constipation    No significant changes overnight.    Status post tunneled cath placement yesterday with Dr. Barcenas.    She would be due for dialysis today.  Nephrology is following.  Lab stable this morning.    Status post double-J stent in the right ureter.  Can follow up with Urology as an outpatient for stent management.  Urology has signed off.  Otherwise vital signs and labs are stable.  Case management is working on SNF placement with new HD chair time      Kevin Sanchez MD   07/04/2024     All diagnosis and differential diagnosis have been reviewed; assessment and plan has been documented; I have personally reviewed the labs and test results that are presently available; I have reviewed the patients medication list; I have reviewed the consulting providers response and recommendations. I have reviewed or attempted to review medical records based upon their availability    All of the patient's questions have been  addressed and answered. Patient's is agreeable to the above stated plan. I will continue to monitor closely and make adjustments to medical management as needed.  _____________________________________________________________________

## 2024-07-05 LAB
ANION GAP SERPL CALC-SCNC: 10 MEQ/L
BASOPHILS # BLD AUTO: 0.03 X10(3)/MCL
BASOPHILS NFR BLD AUTO: 0.5 %
BUN SERPL-MCNC: 19.1 MG/DL (ref 9.8–20.1)
CALCIUM SERPL-MCNC: 8.2 MG/DL (ref 8.4–10.2)
CHLORIDE SERPL-SCNC: 101 MMOL/L (ref 98–107)
CO2 SERPL-SCNC: 26 MMOL/L (ref 23–31)
CREAT SERPL-MCNC: 3.94 MG/DL (ref 0.55–1.02)
CREAT/UREA NIT SERPL: 5
EOSINOPHIL # BLD AUTO: 0.22 X10(3)/MCL (ref 0–0.9)
EOSINOPHIL NFR BLD AUTO: 3.4 %
ERYTHROCYTE [DISTWIDTH] IN BLOOD BY AUTOMATED COUNT: 14.6 % (ref 11.5–17)
GFR SERPLBLD CREATININE-BSD FMLA CKD-EPI: 12 ML/MIN/1.73/M2
GLUCOSE SERPL-MCNC: 104 MG/DL (ref 82–115)
HCT VFR BLD AUTO: 27.8 % (ref 37–47)
HGB BLD-MCNC: 8.8 G/DL (ref 12–16)
IMM GRANULOCYTES # BLD AUTO: 0.08 X10(3)/MCL (ref 0–0.04)
IMM GRANULOCYTES NFR BLD AUTO: 1.2 %
LYMPHOCYTES # BLD AUTO: 1.21 X10(3)/MCL (ref 0.6–4.6)
LYMPHOCYTES NFR BLD AUTO: 18.6 %
MCH RBC QN AUTO: 30.6 PG (ref 27–31)
MCHC RBC AUTO-ENTMCNC: 31.7 G/DL (ref 33–36)
MCV RBC AUTO: 96.5 FL (ref 80–94)
MONOCYTES # BLD AUTO: 1.35 X10(3)/MCL (ref 0.1–1.3)
MONOCYTES NFR BLD AUTO: 20.7 %
NEUTROPHILS # BLD AUTO: 3.62 X10(3)/MCL (ref 2.1–9.2)
NEUTROPHILS NFR BLD AUTO: 55.6 %
NRBC BLD AUTO-RTO: 0 %
PLATELET # BLD AUTO: 200 X10(3)/MCL (ref 130–400)
PMV BLD AUTO: 11.2 FL (ref 7.4–10.4)
POTASSIUM SERPL-SCNC: 4.1 MMOL/L (ref 3.5–5.1)
RBC # BLD AUTO: 2.88 X10(6)/MCL (ref 4.2–5.4)
SODIUM SERPL-SCNC: 137 MMOL/L (ref 136–145)
WBC # BLD AUTO: 6.51 X10(3)/MCL (ref 4.5–11.5)

## 2024-07-05 PROCEDURE — 25000003 PHARM REV CODE 250: Performed by: INTERNAL MEDICINE

## 2024-07-05 PROCEDURE — 80048 BASIC METABOLIC PNL TOTAL CA: CPT | Performed by: HOSPITALIST

## 2024-07-05 PROCEDURE — 97535 SELF CARE MNGMENT TRAINING: CPT

## 2024-07-05 PROCEDURE — 97110 THERAPEUTIC EXERCISES: CPT

## 2024-07-05 PROCEDURE — 21400001 HC TELEMETRY ROOM

## 2024-07-05 PROCEDURE — 85025 COMPLETE CBC W/AUTO DIFF WBC: CPT | Performed by: HOSPITALIST

## 2024-07-05 PROCEDURE — 25000003 PHARM REV CODE 250: Performed by: NURSE PRACTITIONER

## 2024-07-05 PROCEDURE — 36415 COLL VENOUS BLD VENIPUNCTURE: CPT | Performed by: HOSPITALIST

## 2024-07-05 PROCEDURE — 97530 THERAPEUTIC ACTIVITIES: CPT

## 2024-07-05 RX ADMIN — FERROUS SULFATE TAB 325 MG (65 MG ELEMENTAL FE) 1 EACH: 325 (65 FE) TAB at 08:07

## 2024-07-05 RX ADMIN — LABETALOL HYDROCHLORIDE 200 MG: 200 TABLET, FILM COATED ORAL at 08:07

## 2024-07-05 RX ADMIN — NYSTATIN AND TRIAMCINOLONE ACETONIDE: 100000; 1 CREAM TOPICAL at 08:07

## 2024-07-05 RX ADMIN — HYDROCODONE BITARTRATE AND ACETAMINOPHEN 1 TABLET: 5; 325 TABLET ORAL at 06:07

## 2024-07-05 RX ADMIN — NYSTATIN AND TRIAMCINOLONE ACETONIDE: 100000; 1 CREAM TOPICAL at 03:07

## 2024-07-05 RX ADMIN — FOLIC ACID 1 MG: 1 TABLET ORAL at 09:07

## 2024-07-05 RX ADMIN — LABETALOL HYDROCHLORIDE 200 MG: 200 TABLET, FILM COATED ORAL at 09:07

## 2024-07-05 RX ADMIN — HYDROCODONE BITARTRATE AND ACETAMINOPHEN 1 TABLET: 5; 325 TABLET ORAL at 03:07

## 2024-07-05 RX ADMIN — PANTOPRAZOLE SODIUM 40 MG: 40 TABLET, DELAYED RELEASE ORAL at 09:07

## 2024-07-05 RX ADMIN — CALCIUM CARBONATE (ANTACID) CHEW TAB 500 MG 1000 MG: 500 CHEW TAB at 09:07

## 2024-07-05 RX ADMIN — NYSTATIN AND TRIAMCINOLONE ACETONIDE: 100000; 1 CREAM TOPICAL at 09:07

## 2024-07-05 RX ADMIN — HYDROCODONE BITARTRATE AND ACETAMINOPHEN 1 TABLET: 5; 325 TABLET ORAL at 09:07

## 2024-07-05 RX ADMIN — CALCIUM CARBONATE (ANTACID) CHEW TAB 500 MG 1000 MG: 500 CHEW TAB at 08:07

## 2024-07-05 RX ADMIN — FERROUS SULFATE TAB 325 MG (65 MG ELEMENTAL FE) 1 EACH: 325 (65 FE) TAB at 09:07

## 2024-07-05 NOTE — PLAN OF CARE
Problem: Infection  Goal: Absence of Infection Signs and Symptoms  Outcome: Progressing  Intervention: Prevent or Manage Infection  Flowsheets (Taken 7/5/2024 0501)  Fever Reduction/Comfort Measures: lightweight bedding  Isolation Precautions: precautions maintained     Problem: Adult Inpatient Plan of Care  Goal: Plan of Care Review  Outcome: Progressing  Flowsheets (Taken 7/5/2024 0501)  Plan of Care Reviewed With: patient  Goal: Patient-Specific Goal (Individualized)  Outcome: Progressing  Goal: Absence of Hospital-Acquired Illness or Injury  Outcome: Progressing  Intervention: Identify and Manage Fall Risk  Flowsheets (Taken 7/5/2024 0501)  Safety Promotion/Fall Prevention:   assistive device/personal item within reach   nonskid shoes/socks when out of bed   instructed to call staff for mobility  Intervention: Prevent Skin Injury  Flowsheets (Taken 7/5/2024 0501)  Body Position:   position changed independently   weight shifting  Skin Protection: incontinence pads utilized  Device Skin Pressure Protection:   absorbent pad utilized/changed   tubing/devices free from skin contact  Intervention: Prevent and Manage VTE (Venous Thromboembolism) Risk  Flowsheets (Taken 7/5/2024 0501)  VTE Prevention/Management: remove, assess skin, and reapply sequential compression device  Intervention: Prevent Infection  Flowsheets (Taken 7/5/2024 0501)  Infection Prevention:   equipment surfaces disinfected   hand hygiene promoted   visitors restricted/screened   single patient room provided   rest/sleep promoted   environmental surveillance performed   personal protective equipment utilized  Goal: Optimal Comfort and Wellbeing  Outcome: Progressing  Intervention: Monitor Pain and Promote Comfort  Flowsheets (Taken 7/5/2024 0501)  Pain Management Interventions:   care clustered   position adjusted   quiet environment facilitated   relaxation techniques promoted  Intervention: Provide Person-Centered Care  Flowsheets (Taken  7/5/2024 0501)  Trust Relationship/Rapport:   care explained   questions encouraged   choices provided   reassurance provided   emotional support provided   thoughts/feelings acknowledged   empathic listening provided   questions answered  Goal: Readiness for Transition of Care  Outcome: Progressing     Problem: Bariatric Environmental Safety  Goal: Safety Maintained with Care  Outcome: Progressing     Problem: Acute Kidney Injury/Impairment  Goal: Fluid and Electrolyte Balance  Outcome: Progressing  Intervention: Monitor and Manage Fluid and Electrolyte Balance  Flowsheets (Taken 7/5/2024 0501)  Fluid/Electrolyte Management: fluids provided  Goal: Improved Oral Intake  Outcome: Progressing  Goal: Effective Renal Function  Outcome: Progressing

## 2024-07-05 NOTE — PROGRESS NOTES
[facility] - 8th Floor Med Surg  Nephrology  Progress Note    Patient Name: Fior Joya  MRN: [id number]  Admission Date: 6/22/2024  Hospital Length of Stay: 13 days  Attending Provider: Kevin Sanchez MD   Primary Care Physician: Linda, Primary Doctor  Principal Problem:Metabolic acidosis      Subjective:     Fior Joya is a 69 y.o. female who was transferred from hospital in [place] due to acute renal failure.  She initially presented to the hospital due to generalized weakness and shortness of breath with exertion.  Per their report she had been mostly bed-bound for the past 3 weeks.  He reported that she had been drinking getting up going to the restroom.  She did have some episodes of nausea vomiting and generalized weakness along with generalized muscle pain more recently.  She was endorsing shortness of breath but no chest pain.  Denied had been running a fever.  Per their his report, she had not seen a physician in 10 years, because she was not feeling ill and did not feel like she needed to go.  She had also been having some swelling in the legs.  She has no known medical issues per their report.  On admit, WBC elevated at 15, RBC 1.8, hemoglobin 5.7, D-dimer elevated at 1.57, serum sodium 132, potassium 6, serum CO2 less than 5, , creatinine 14.8, glucose 273, calcium 7.5, phosphorus 7.9, albumin 2.9.  TSH and lactic were okay.  UA results indicative of UTI sent for cultures.  Blood cultures obtained and pending.  Received phone call from ER physician in early a.m. hours, it was decided at that time we will proceed with initiation of RRT.  Right IJ temporary cath was placed per General surgery.   She is awake and alert this morning upon exam.  She does respond with simple answers to questions posed.  Son in room in majority of history obtained from him.  Oxygen saturation is 99% on nasal cannula.  We are consulted for management ESTRELLITA.  Bilateral hydronephrosis on CT from 06/21/2024      On  6/27 she underwent cystoscopy and right double J stent placement. The following day she had left nephroureteral stent placement with IR. She has been maintained on TThSa schedule for HD via right IJ temporary dialysis catheter. Tunneled catheter placed 7/3 per Dr Barcenas. No acute issues overnight. Resting comfortably on room air.     Review of patient's allergies indicates:   Allergen Reactions    Asa [aspirin] Anaphylaxis    Penicillins      Received ceftriaxone from 6/27, no reactions      Current Facility-Administered Medications   Medication Frequency    0.9%  NaCl infusion (for blood administration) Q24H PRN    0.9%  NaCl infusion (for blood administration) Q24H PRN    0.9%  NaCl infusion (for blood administration) Q24H PRN    acetaminophen tablet 650 mg Q4H PRN    aluminum-magnesium hydroxide-simethicone 200-200-20 mg/5 mL suspension 30 mL Q6H PRN    ammonium lactate 12 % lotion BID PRN    calcium carbonate 200 mg calcium (500 mg) chewable tablet 1,000 mg Q6H PRN    calcium carbonate 200 mg calcium (500 mg) chewable tablet 1,000 mg BID    ergocalciferol capsule 50,000 Units Q72H    ferrous sulfate tablet 1 each BID    folic acid tablet 1 mg Daily    heparin (porcine) injection 4,000 Units PRN    hydrALAZINE injection 10 mg Q2H PRN    HYDROcodone-acetaminophen 5-325 mg per tablet 1 tablet Q6H PRN    labetaloL injection 10 mg Q4H PRN    labetaloL tablet 200 mg Q12H    melatonin tablet 6 mg Nightly PRN    nystatin-triamcinolone cream TID    ondansetron injection 4 mg Q8H PRN    pantoprazole EC tablet 40 mg Daily    polyethylene glycol packet 17 g Daily    prochlorperazine injection Soln 5 mg Q6H PRN    sodium chloride 0.9% flush 10 mL PRN       Objective:     Vital Signs (Most Recent):  Temp: 98.6 °F (37 °C) (07/05/24 0749)  Pulse: 96 (07/05/24 0749)  Resp: 18 (07/05/24 0749)  BP: 129/70 (07/05/24 0749)  SpO2: 97 % (07/05/24 0749) Vital Signs (24h Range):  Temp:  [98.2 °F (36.8 °C)-100.1 °F (37.8 °C)] 98.6 °F  (37 °C)  Pulse:  [70-98] 96  Resp:  [16-18] 18  SpO2:  [94 %-97 %] 97 %  BP: (104-164)/(59-83) 129/70     Weight: (!) 138.8 kg (306 lb) (07/03/24 0612)  Body mass index is 52.52 kg/m².  Body surface area is 2.5 meters squared.    I/O last 3 completed shifts:  In: 1540 [P.O.:1540]  Out: 2071 [Urine:1750; Other:321]    Physical Exam  Constitutional:       General: She is not in acute distress.     Appearance: She is obese.   HENT:      Head: Atraumatic.      Nose: Nose normal.      Mouth/Throat:      Mouth: Mucous membranes are moist.   Eyes:      Extraocular Movements: Extraocular movements intact.   Neck:      Comments: R chest tunneled dialysis catheter   Cardiovascular:      Rate and Rhythm: Normal rate and regular rhythm.      Pulses: Normal pulses.      Heart sounds: Normal heart sounds.   Pulmonary:      Effort: Pulmonary effort is normal.      Breath sounds: Normal breath sounds.   Abdominal:      General: Bowel sounds are normal. There is no distension.      Palpations: Abdomen is soft.   Genitourinary:     Comments: Left nephrostomy tube  Musculoskeletal:      Cervical back: Neck supple.   Skin:     General: Skin is warm.   Neurological:      General: No focal deficit present.      Mental Status: She is alert and oriented to person, place, and time.   Psychiatric:         Mood and Affect: Mood normal.         Behavior: Behavior normal.         Thought Content: Thought content normal.         Significant Labs:sureBMP:   Recent Labs   Lab 07/05/24  0343      K 4.1      CO2 26   BUN 19.1   CREATININE 3.94*   CALCIUM 8.2*     CBC:   Recent Labs   Lab 07/05/24  0343   WBC 6.51   RBC 2.88*   HGB 8.8*   HCT 27.8*      MCV 96.5*   MCH 30.6   MCHC 31.7*       Significant Imaging:    Imaging reviewed       Assessment/Plan:   ESTRELLITA secondary to ATN secondary to obstructive uropathy  -Dialysis initiated 6/22 via right IJ temporary dialysis catheter.    Patient's probably has some underlying chronic  kidney disease as she does have significant proteinuria more than 8 g per 24 hours.   Shaking and jerking movements of the extremities probably secondary to uremia,  now resolved completely with dialysis   UTI - Now on zosyn and vanc  Hyperkalemia  Improved.  Acute metabolic acidosis  Improved.  Anemia     Recommendations  Patient to continue on TThSa schedule while being monitored for renal recovery   CM attempting to place at rehab facility but having difficulty. Dialysis coordinator following for placement as well            TIFFANY Langston  Nephrology  [facility] - 8th Floor Med Surg

## 2024-07-05 NOTE — PT/OT/SLP PROGRESS
Occupational Therapy   Treatment    Name: Fior Joya  MRN: [id number]  Admitting Diagnosis:  Metabolic acidosis  2 Days Post-Op    Recommendations:     Recommended therapy intensity at discharge: Moderate Intensity Therapy   Discharge Equipment Recommendations:  to be determined by next level of care  Barriers to discharge:       Assessment:     Fior Joya is a 69 y.o. female with a medical diagnosis of Metabolic acidosis.  She presents with good effort, still with some generalized decreased activity tolerance but able to ambulate and transfer with min/CGA. Performance deficits affecting function are weakness, impaired endurance, impaired self care skills, impaired functional mobility, gait instability, impaired balance, impaired coordination, decreased upper extremity function, decreased lower extremity function.     Rehab Prognosis:  good; patient would benefit from acute skilled OT services to address these deficits and reach maximum level of function.       Plan:     Patient to be seen 4 x/week to address the above listed problems via self-care/home management, therapeutic exercises, therapeutic activities  Plan of Care Expires: 07/25/24  Plan of Care Reviewed with: patient, son    Subjective     Pain/Comfort:  Pain Rating 1:  (c/o some pain at R shoulder with PICC line)  Pain Addressed 1: Distraction    Objective:     Communicated with: nsg and PT prior to session.  Patient found supine with   upon OT entry to room.    General Precautions: Standard, fall    Orthopedic Precautions:N/A  Braces: N/A  Respiratory Status:   Vital Signs:      Occupational Performance:     Bed Mobility:   Rolling with mod I multiple times for toileting and brief management   Sup to sit with min assist     Functional Mobility/Transfers:  Sit to stand with min assist  Functional Mobility: ambulated in room and back to chair with RW with min/CGA    Activities of Daily Living:  Toilet hygiene total assist in supine   Socks with total  assist      Therapeutic Activities:  As above     Therapeutic Exercise:      Patient Education:  Patient provided with verbal education education regarding OT role/goals/POC, safety awareness, Discharge/DME recommendations, and home exercise program .  Understanding was verbalized.      Patient left up in chair with all lines intact and call button in reach.    GOALS:   Multidisciplinary Problems       Occupational Therapy Goals          Problem: Occupational Therapy    Goal Priority Disciplines Outcome Interventions   Occupational Therapy Goal     OT, PT/OT Progressing    Description: LTG: Pt will perform basic ADLs and ADL transfers with Modified independence using LRAD by discharge.    STG: to be met by 7/25/24    Pt will complete grooming standing at sink with LRAD with SBA.  Pt will complete UB dressing with SBA.  Pt will complete LB dressing with SBA using LRAD and AE prn.  Pt will complete toileting with SBA using LRAD.  Pt will complete functional mobility to/from toilet and toilet transfer with SBA using LRAD.                        Time Tracking:     OT Date of Treatment: 07/05/24  OT Start Time: 1055  OT Stop Time: 1118  OT Total Time (min): 23 min    Billable Minutes:Self Care/Home Management 23 min    OT/LEANN: OT     Number of LEANN visits since last OT visit: 3    7/5/2024

## 2024-07-05 NOTE — PROGRESS NOTES
Inpatient Nutrition Assessment    Admit Date: 6/22/2024   Total duration of encounter: 13 days   Patient Age: 69 y.o.    Nutrition Recommendation/Prescription     Continue current diet (Diet Renal On Dialysis) as tolerated;   Continue Boost Plus (provides 360 kcal, 14 g protein per serving) BID.    Communication of Recommendations: reviewed with nurse    Nutrition Assessment     Malnutrition Assessment/Nutrition-Focused Physical Exam    Malnutrition Context: acute illness or injury (06/22/24 1506)  Malnutrition Level:  (does not meet criteria at this time) (06/22/24 1506)  Energy Intake (Malnutrition):  (unable to obtain) (06/22/24 1506)  Weight Loss (Malnutrition):  (unable to obtain) (06/22/24 1506)  Subcutaneous Fat (Malnutrition):  (does not meet) (06/22/24 1506)           Muscle Mass (Malnutrition):  (does not meet) (06/22/24 1506)                          Fluid Accumulation (Malnutrition): severe (06/22/24 1506)        A minimum of two characteristics is recommended for diagnosis of either severe or non-severe malnutrition.    Chart Review    Reason Seen: follow-up    Malnutrition Screening Tool Results   Have you recently lost weight without trying?: Unsure  Have you been eating poorly because of a decreased appetite?: Yes   MST Score: 3   Diagnosis:  Acute renal failure  Sepsis/UTI  Bilateral hydroureteronephrosis  Bilateral pleural effusions  Bilateral lower extremity edema  Anemia of chronic disease    Relevant Medical History: no known past medical history     Scheduled Medications:  calcium carbonate, 1,000 mg, BID  ergocalciferol, 50,000 Units, Q72H  ferrous sulfate, 1 tablet, BID  folic acid, 1 mg, Daily  labetaloL, 200 mg, Q12H  nystatin-triamcinolone, , TID  pantoprazole, 40 mg, Daily  polyethylene glycol, 17 g, Daily    Continuous Infusions: none       PRN Medications:   0.9%  NaCl infusion (for blood administration), , Q24H PRN  0.9%  NaCl infusion (for blood administration), , Q24H PRN  0.9%  NaCl  infusion (for blood administration), , Q24H PRN  acetaminophen, 650 mg, Q4H PRN  aluminum-magnesium hydroxide-simethicone, 30 mL, Q6H PRN  ammonium lactate, , BID PRN  calcium carbonate, 1,000 mg, Q6H PRN  heparin (porcine), 4,000 Units, PRN  hydrALAZINE, 10 mg, Q2H PRN  HYDROcodone-acetaminophen, 1 tablet, Q6H PRN  labetaloL, 10 mg, Q4H PRN  melatonin, 6 mg, Nightly PRN  ondansetron, 4 mg, Q8H PRN  prochlorperazine, 5 mg, Q6H PRN  sodium chloride 0.9%, 10 mL, PRN    Calorie Containing IV Medications: no significant kcals from medications at this time    Recent Labs   Lab 06/29/24  0643 06/30/24  0620 07/01/24  0447 07/02/24  0441 07/02/24  0755 07/03/24  0451 07/04/24  0421 07/05/24  0343   * 136 132* 133*  --  135* 137 137   K 4.2 3.8 4.0 4.0  --  4.2 4.6 4.1   CALCIUM 6.7* 7.1* 7.2* 7.2*  --  7.6* 7.8* 8.2*   PHOS  --   --   --   --   --   --  2.2*  --    CO2 20* 24 23 21*  --  27 24 26   BUN 37.1* 20.6* 27.4* 37.6*  --  22.5* 30.0* 19.1   CREATININE 5.28* 3.68* 5.07* 6.04*  --  4.18* 5.20* 3.94*   EGFRNORACEVR 8 13 9 7  --  11 9 12   GLUCOSE 127* 126* 110 143*  --  99 114 104   BILITOT 0.2  --   --   --   --   --   --   --    ALKPHOS 60  --   --   --   --   --   --   --    ALT 34  --   --   --   --   --   --   --    AST 26  --   --   --   --   --   --   --    ALBUMIN 2.1*  --   --   --   --   --  2.3*  --    WBC 11.10  --   --   --  4.54  --  6.27 6.51   HGB 8.5*  --   --   --  8.2*  --  8.9* 8.8*   HCT 26.5*  --   --   --  26.3*  --  29.6* 27.8*     Nutrition Orders:  Diet Renal On Dialysis  Dietary nutrition supplements Boost Plus Nutritional Drink - Rich Chocolate; BID  Appetite/Oral Intake: fair/50-75% of meals  Factors Affecting Nutritional Intake: none identified  Social Needs Impacting Access to Food: none identified  Food/Orthodoxy/Cultural Preferences: unable to obtain  Food Allergies: no known food allergies listed  Last Bowel Movement: 07/04/24  Wound(s):     Wound 06/22/24 0630 Other (comment)  "Left anterior;lower Leg-Tissue loss description: Not applicable       Wound 06/22/24 0630 Pressure Injury midline Sacral spine-Tissue loss description: Not applicableno pressure injuries documented at this time     Comments    6/22/24 Patient in ICU, severe acidosis, emergent dialysis today, NPO, no plans to feed, patient unable to answer questions.    6/26/24 Patient up in chair eating lunch during rounds, reports decreased appetite, agreeable to chocolate Boost. She reports good appetite prior to admission and denies weight loss, she is unsure of usual/dry weight.    7/1/24: Patient reports good oral intake, drinking Boost Plus supplements twice daily. Denies nausea, vomiting, diarrhea. Constipation noted.     7/5/24: Pt asleep during rounds; noted ~ half of breakfast meal consumed.     Anthropometrics    Height: 5' 4" (162.6 cm),    Last Weight: (!) 138.8 kg (306 lb) (07/03/24 0612), Weight Method: Bed Scale  BMI (Calculated): 52.5  BMI Classification: obese grade III (BMI >/=40)     Ideal Body Weight (IBW), Female: 120 lb     % Ideal Body Weight, Female (lb): 244.72 %                             Usual Weight Provided By: patient denies unintentional weight loss    Wt Readings from Last 5 Encounters:   07/03/24 (!) 138.8 kg (306 lb)   06/21/24 113.4 kg (250 lb)     Weight Change(s) Since Admission: severe edema noted, new admit  Wt Readings from Last 1 Encounters:   07/03/24 0612 (!) 138.8 kg (306 lb)   07/01/24 0519 (!) 139 kg (306 lb 7 oz)   06/28/24 0610 135.4 kg (298 lb 8.1 oz)   06/22/24 0545 133.2 kg (293 lb 10.4 oz)   06/22/24 0107 106.6 kg (235 lb)   Admit Weight: 106.6 kg (235 lb) (06/22/24 0107), Weight Method: Bed Scale    Estimated Needs    Weight Used For Calorie Calculations: 106.6 kg (235 lb)  Energy Calorie Requirements (kcal): 2213, 1.4 stress factor  Energy Need Method: [place]-St Jeor  Weight Used For Protein Calculations: 106.6 kg (235 lb)  Protein Requirements: 160 g, 1.5 g/kg  Fluid " Requirements (mL): 1000 plus urinary output or per physician        Enteral Nutrition Patient not receiving enteral nutrition at this time.    Parenteral Nutrition Patient not receiving parenteral nutrition support at this time.    Evaluation of Received Nutrient Intake    Calories: meeting estimated needs  Protein: meeting estimated needs    Patient Education Not applicable.    Nutrition Diagnosis     PES: Inadequate energy intake related to inability to consume sufficient nutrients as evidenced by less than 80% needs met. (active)    Nutrition Interventions     Intervention(s): general/healthful diet, commercial beverage, and collaboration with other providers  Goal: Meet greater than 80% of nutritional needs by follow-up. (goal progressing)    Nutrition Goals & Monitoring     Dietitian will monitor: food and beverage intake, weight, weight change, electrolyte/renal panel, beliefs/attitudes, glucose/endocrine profile, and gastrointestinal profile  Discharge planning: resume home regimen  Nutrition Risk/Follow-Up: moderate (follow-up in 3-5 days)   Please consult if re-assessment needed sooner.

## 2024-07-05 NOTE — PROGRESS NOTES
[facility] Medicine Progress Note        Chief Complaint: Inpatient Follow-up     HPI:   69-year-old lady with no PMH who was transferred to [facility] from [place] with complaints of dyspnea on exertion, B/L lower extremity edema, shortness for breath. She was noted to have severe anemia with HGB/HCT of 5.7/17.7, WBCs of 15.8, hyperkalemia with potassium 6.8,, BUN/creatinine of 209/16.94. UA showed leukocytes and many bacteria as well as 3+ protein, 3+ blood. Blood and urine cultures were sent off. Nephrology was consulted on admission and patient was admitted to ICU for placement of dialysis catheter and initiation of hemodialysis. She also received transfusion with 2 units PRBCs with improvement in symptoms. Urology was consulted due to B/L hydronephrosis on ultrasound with urine retention requiring Mario catheter. Placed on IV vancomycin, IV Zosyn. Downgraded from ICU to Hospital Medicine on 06/25. At bedside, patient stated she was doing well and had no new complaints. Continued to have some tremors and some shortness of breath but overall felt improved. On IV Vancomycin, IV Zosyn. Urine cultures grew GBS, Blood cultures negative x 72 hours. Nephrology on board; follow recommendations. Urology on board; follow recommendations. Continued on hemodialysis. PT/OT consulted; recommending moderate intensity therapy. Continued on FeSO4 b.i.d., folic acid 1 mg daily, labetalol 200 mg b.i.d., Protonix 40 mg b.i.d..      Patient has been taken to the OR for systolic he, retrograde pyelogram and possible JJ stents with Urology    Patient is status post cystoscopy with placement of double-J stents in the right, left was very difficult and was not able to be done   IR was consulted for placement of a left PCN, this has been completed and nephrostomy bag draining urine leaking bloody   right Tunneled catheter placed 7/3 per Dr Barcenas.     Interval Hx:   No significant changes overnight.   Resting comfortably in bed.  Dialysis yesterday.  Awaiting placement     Objective/physical exam:  General: alert lady lying comfortably in bed, in no acute distress.  HENT: oral and oropharyngeal mucosa moist, pink, with no erythema or exudates, no ear pain or discharge+tunnel cath R chest wall  Neck: normal neck movement, no lymph nodes or swellings, no JVD or Carotid bruit  Respiratory: clear breathing sounds bilaterally, no crackles, rales, ronchi or wheezes  Cardiovascular: clear S1 and S2, no murmurs, rubs or gallops  Peripheral Vascular: no lesions, ulcers or erosions, normal peripheral pulses and no pedal edema  Gastrointestinal: soft, non-tender, non-distended abdomen, no guarding, rigidity or rebound tenderness, normal bowel sounds  Integumentary: B/L LE edema with erythema and tenderness  Neuro: AAO x 3; motor strength 5/5 in B/L UEs & LEs; sensation intact to gross and fine touch B/L; CN II-XII grossly intact     VITAL SIGNS: 24 HRS MIN & MAX LAST   Temp  Min: 98.1 °F (36.7 °C)  Max: 100.1 °F (37.8 °C) 98.2 °F (36.8 °C)   BP  Min: 104/59  Max: 164/83 125/71   Pulse  Min: 70  Max: 98  70   Resp  Min: 16  Max: 24 18   SpO2  Min: 94 %  Max: 97 % 97 %       Recent Labs   Lab 07/02/24  0755 07/04/24  0421 07/05/24  0343   WBC 4.54 6.27 6.51   RBC 2.74* 3.03* 2.88*   HGB 8.2* 8.9* 8.8*   HCT 26.3* 29.6* 27.8*   MCV 96.0* 97.7* 96.5*   MCH 29.9 29.4 30.6   MCHC 31.2* 30.1* 31.7*   RDW 15.1 14.6 14.6    183 200   MPV 10.8* 10.9* 11.2*       Recent Labs   Lab 06/29/24  0643 06/30/24  0620 07/03/24  0451 07/04/24  0421 07/05/24  0343   *   < > 135* 137 137   K 4.2   < > 4.2 4.6 4.1      < > 99 103 101   CO2 20*   < > 27 24 26   BUN 37.1*   < > 22.5* 30.0* 19.1   CREATININE 5.28*   < > 4.18* 5.20* 3.94*   CALCIUM 6.7*   < > 7.6* 7.8* 8.2*   ALBUMIN 2.1*  --   --  2.3*  --    ALKPHOS 60  --   --   --   --    ALT 34  --   --   --   --    AST 26  --   --   --   --    BILITOT 0.2  --   --   --   --      < > = values in this interval not displayed.          Microbiology Results (last 7 days)       ** No results found for the last 168 hours. **             Radiology:  Cardiac catheterization  Procedure performed in the Invasive Lab    - See Procedure Log link below for nursing documentation    - See OpNote on Surgeries Tab for physician findings    - See Imaging Tab for radiologist dictation        Medications:  Scheduled Meds:   calcium carbonate  1,000 mg Oral BID    ergocalciferol  50,000 Units Oral Q72H    ferrous sulfate  1 tablet Oral BID    folic acid  1 mg Oral Daily    labetaloL  200 mg Oral Q12H    nystatin-triamcinolone   Topical (Top) TID    pantoprazole  40 mg Oral Daily    polyethylene glycol  17 g Oral Daily     Continuous Infusions:  PRN Meds:.  Current Facility-Administered Medications:     0.9%  NaCl infusion (for blood administration), , Intravenous, Q24H PRN    0.9%  NaCl infusion (for blood administration), , Intravenous, Q24H PRN    0.9%  NaCl infusion (for blood administration), , Intravenous, Q24H PRN    acetaminophen, 650 mg, Oral, Q4H PRN    aluminum-magnesium hydroxide-simethicone, 30 mL, Oral, Q6H PRN    ammonium lactate, , Topical (Top), BID PRN    calcium carbonate, 1,000 mg, Oral, Q6H PRN    heparin (porcine), 4,000 Units, Intravenous, PRN    hydrALAZINE, 10 mg, Intravenous, Q2H PRN    HYDROcodone-acetaminophen, 1 tablet, Oral, Q6H PRN    labetaloL, 10 mg, Intravenous, Q4H PRN    melatonin, 6 mg, Oral, Nightly PRN    ondansetron, 4 mg, Intravenous, Q8H PRN    prochlorperazine, 5 mg, Intravenous, Q6H PRN    sodium chloride 0.9%, 10 mL, Intravenous, PRN        Assessment/Plan:   Bilateral hydroureteronephrosis status post indwelling Mario   Acute kidney injury, possible component of chronic kidney disease and evidence of obstructive uropathy, post initiation of hemodialysis 06/22/2024- Right  Tunneled catheter placed 7/3 per Dr Barcenas.   UTI-GBS  Acute on chronic Normocytic  anemia  Left lower extremity cellulitis  Hypertension  Morbid obesity, BMI 50.4  Constipation    HD T, Th, Sat per renal  S/p tunnel cath 7/3  Follow up with Urology as an outpatient for stent management in the right ureter  Completed all antibiotics   Awaiting SNF placement with new LEONIDAS Sanchez MD   07/05/2024     All diagnosis and differential diagnosis have been reviewed; assessment and plan has been documented; I have personally reviewed the labs and test results that are presently available; I have reviewed the patients medication list; I have reviewed the consulting providers response and recommendations. I have reviewed or attempted to review medical records based upon their availability    All of the patient's questions have been  addressed and answered. Patient's is agreeable to the above stated plan. I will continue to monitor closely and make adjustments to medical management as needed.  _____________________________________________________________________

## 2024-07-05 NOTE — PT/OT/SLP PROGRESS
"Physical Therapy Treatment    Patient Name:  Fior Joya   MRN:  [id number]    Recommendations:     Discharge therapy intensity: Moderate Intensity Therapy   Discharge Equipment Recommendations: to be determined by next level of care  Barriers to discharge: Ongoing medical needs    Assessment:     Fior Joya is a 69 y.o. female admitted with a medical diagnosis of ESTRELLITA, anemia, hyperkalemia, UTI, metabolic acidemia, started on HD this admission, SOB, swelling, B hydroureteronephrosis, B pleural effusions, chronic knee pain. S/p cystoscopy 6/27.  She presents with the following impairments/functional limitations: weakness, impaired endurance, impaired self care skills, impaired functional mobility, gait instability, impaired balance, impaired coordination. Pt agreeable to treatment session after encouragement. She required CGA to min A with functional mobility, ambulating 10 ft with RW today.     Rehab Prognosis: Good; patient would benefit from acute skilled PT services to address these deficits and reach maximum level of function.    Recent Surgery: Procedure(s) (LRB):  Insertion, Catheter, Central Venous, Hemodialysis (N/A) 2 Days Post-Op    Plan:     During this hospitalization, patient would benefit from acute PT services 5 x/week to address the identified rehab impairments via gait training, therapeutic exercises, therapeutic activities and progress toward the following goals:    Plan of Care Expires:  07/25/24    Subjective     Chief Complaint: L leg pain  Patient/Family Comments/goals: agreeable to treatment session  Pain/Comfort:  Pain Rating 1: other (see comments) (pt reports that her left leg is very sensitive and "10/10" when you touch it)  Location - Side 1: Left  Location 1: leg  Pain Addressed 1: Distraction      Objective:     Communicated with RN prior to session.  Patient found HOB elevated with PureWick, telemetry (dialysis catheter, nephrostomy) upon PT entry to room.     General Precautions: " Standard, fall  Orthopedic Precautions: N/A  Braces: N/A  Respiratory Status: Room air      Functional Mobility:  Bed Mobility:     Rolling Left:  contact guard assistance  Rolling Right: contact guard assistance  Supine to Sit: minimum assistance  Transfers:     Sit to Stand:  minimum assistance with rolling walker  Bed to Chair: contact guard assistance with  rolling walker  using  Step Transfer  Gait: 10 ft with RW and CGA x 2 for safety. Slowed pace, decreased step length. No LOB noted.     Therapeutic Activities/Exercises:  The pt performed x10 reps of SLR, ankle pumps, and LAQ on BLE's.     Education:  Patient provided with verbal education education regarding PT role/goals/POC, fall prevention, safety awareness, and home exercise program.  Understanding was verbalized.     Patient left up in chair with all lines intact, call button in reach, geomat cushion, and CNA present    GOALS:   Multidisciplinary Problems       Physical Therapy Goals          Problem: Physical Therapy    Goal Priority Disciplines Outcome Goal Variances Interventions   Physical Therapy Goal     PT, PT/OT Progressing     Description: Goals to be met by: 2024     Patient will increase functional independence with mobility by performin. Supine to sit with Stand-by Assistance  2. Sit to stand transfer with Contact Guard Assistance  3. Gait  x 150 feet with Contact Guard Assistance using Rolling Walker.   4. Ascend/descend 4 stair with right Handrails Contact Guard Assistance using No Assistive Device.                          Time Tracking:     PT Received On:    PT Start Time: 1055     PT Stop Time: 1125  PT Total Time (min): 30 min     Billable Minutes: Therapeutic Activity 20 and Therapeutic Exercise 10    Treatment Type: Treatment  PT/PTA: PT     Number of PTA visits since last PT visit: 5     2024

## 2024-07-05 NOTE — CARE UPDATE
[id number] [facility] denied pt since they are not in network with pt's insurance. Spoke with pt re placement and [facility] will send the referral to all SNF that are in network with [employer] medicare.

## 2024-07-06 PROCEDURE — 25000003 PHARM REV CODE 250: Performed by: INTERNAL MEDICINE

## 2024-07-06 PROCEDURE — 25000003 PHARM REV CODE 250: Performed by: NURSE PRACTITIONER

## 2024-07-06 PROCEDURE — 21400001 HC TELEMETRY ROOM

## 2024-07-06 PROCEDURE — 25000003 PHARM REV CODE 250

## 2024-07-06 PROCEDURE — 80100016 HC MAINTENANCE HEMODIALYSIS

## 2024-07-06 RX ADMIN — CALCIUM CARBONATE (ANTACID) CHEW TAB 500 MG 1000 MG: 500 CHEW TAB at 09:07

## 2024-07-06 RX ADMIN — HYDROCODONE BITARTRATE AND ACETAMINOPHEN 1 TABLET: 5; 325 TABLET ORAL at 02:07

## 2024-07-06 RX ADMIN — ERGOCALCIFEROL 50000 UNITS: 1.25 CAPSULE ORAL at 08:07

## 2024-07-06 RX ADMIN — CALCIUM CARBONATE (ANTACID) CHEW TAB 500 MG 1000 MG: 500 CHEW TAB at 08:07

## 2024-07-06 RX ADMIN — HYDROCODONE BITARTRATE AND ACETAMINOPHEN 1 TABLET: 5; 325 TABLET ORAL at 10:07

## 2024-07-06 RX ADMIN — ACETAMINOPHEN 650 MG: 325 TABLET, FILM COATED ORAL at 06:07

## 2024-07-06 RX ADMIN — HYDROCODONE BITARTRATE AND ACETAMINOPHEN 1 TABLET: 5; 325 TABLET ORAL at 09:07

## 2024-07-06 RX ADMIN — FOLIC ACID 1 MG: 1 TABLET ORAL at 08:07

## 2024-07-06 RX ADMIN — LABETALOL HYDROCHLORIDE 200 MG: 200 TABLET, FILM COATED ORAL at 09:07

## 2024-07-06 RX ADMIN — NYSTATIN AND TRIAMCINOLONE ACETONIDE: 100000; 1 CREAM TOPICAL at 08:07

## 2024-07-06 RX ADMIN — FERROUS SULFATE TAB 325 MG (65 MG ELEMENTAL FE) 1 EACH: 325 (65 FE) TAB at 09:07

## 2024-07-06 RX ADMIN — FERROUS SULFATE TAB 325 MG (65 MG ELEMENTAL FE) 1 EACH: 325 (65 FE) TAB at 08:07

## 2024-07-06 RX ADMIN — PANTOPRAZOLE SODIUM 40 MG: 40 TABLET, DELAYED RELEASE ORAL at 08:07

## 2024-07-06 NOTE — PROGRESS NOTES
[facility] Medicine Progress Note        Chief Complaint: Inpatient Follow-up     HPI:   69-year-old lady with no PMH who was transferred to [facility] from [place] with complaints of dyspnea on exertion, B/L lower extremity edema, shortness for breath. She was noted to have severe anemia with HGB/HCT of 5.7/17.7, WBCs of 15.8, hyperkalemia with potassium 6.8,, BUN/creatinine of 209/16.94. UA showed leukocytes and many bacteria as well as 3+ protein, 3+ blood. Blood and urine cultures were sent off. Nephrology was consulted on admission and patient was admitted to ICU for placement of dialysis catheter and initiation of hemodialysis. She also received transfusion with 2 units PRBCs with improvement in symptoms. Urology was consulted due to B/L hydronephrosis on ultrasound with urine retention requiring Mario catheter. Placed on IV vancomycin, IV Zosyn. Downgraded from ICU to Hospital Medicine on 06/25. At bedside, patient stated she was doing well and had no new complaints. Continued to have some tremors and some shortness of breath but overall felt improved. On IV Vancomycin, IV Zosyn. Urine cultures grew GBS, Blood cultures negative x 72 hours. Nephrology on board; follow recommendations. Urology on board; follow recommendations. Continued on hemodialysis. PT/OT consulted; recommending moderate intensity therapy. Continued on FeSO4 b.i.d., folic acid 1 mg daily, labetalol 200 mg b.i.d., Protonix 40 mg b.i.d..      Patient has been taken to the OR for systolic he, retrograde pyelogram and possible JJ stents with Urology    Patient is status post cystoscopy with placement of double-J stents in the right, left was very difficult and was not able to be done   IR was consulted for placement of a left PCN, this has been completed and nephrostomy bag draining urine leaking bloody   right Tunneled catheter placed 7/3 per Dr Barcenas.     Interval Hx:   No significant changes overnight.   Reports some tenderness at the catheter insertion site but otherwise doing well.  Also with some chronic left leg pain.  Due for HD today     Objective/physical exam:  General: alert lady lying comfortably in bed, in no acute distress.  HENT: oral and oropharyngeal mucosa moist, pink, with no erythema or exudates, no ear pain or discharge+tunnel cath R chest wall  Neck: normal neck movement, no lymph nodes or swellings, no JVD or Carotid bruit  Respiratory: clear breathing sounds bilaterally, no crackles, rales, ronchi or wheezes  Cardiovascular: clear S1 and S2, no murmurs, rubs or gallops  Peripheral Vascular: no lesions, ulcers or erosions, normal peripheral pulses and no pedal edema  Gastrointestinal: soft, non-tender, non-distended abdomen, no guarding, rigidity or rebound tenderness, normal bowel sounds  Integumentary: B/L LE edema with erythema and tenderness  Neuro: AAO x 3; motor strength 5/5 in B/L UEs & LEs; sensation intact to gross and fine touch B/L; CN II-XII grossly intact     VITAL SIGNS: 24 HRS MIN & MAX LAST   Temp  Min: 98.1 °F (36.7 °C)  Max: 98.8 °F (37.1 °C) 98.2 °F (36.8 °C)   BP  Min: 119/63  Max: 154/83 125/71   Pulse  Min: 77  Max: 99  94   Resp  Min: 16  Max: 20 20   SpO2  Min: 95 %  Max: 98 % 98 %       Recent Labs   Lab 07/02/24  0755 07/04/24  0421 07/05/24  0343   WBC 4.54 6.27 6.51   RBC 2.74* 3.03* 2.88*   HGB 8.2* 8.9* 8.8*   HCT 26.3* 29.6* 27.8*   MCV 96.0* 97.7* 96.5*   MCH 29.9 29.4 30.6   MCHC 31.2* 30.1* 31.7*   RDW 15.1 14.6 14.6    183 200   MPV 10.8* 10.9* 11.2*       Recent Labs   Lab 07/03/24  0451 07/04/24  0421 07/05/24  0343   * 137 137   K 4.2 4.6 4.1   CL 99 103 101   CO2 27 24 26   BUN 22.5* 30.0* 19.1   CREATININE 4.18* 5.20* 3.94*   CALCIUM 7.6* 7.8* 8.2*   ALBUMIN  --  2.3*  --           Microbiology Results (last 7 days)       ** No results found for the last 168 hours. **             Radiology:  Cardiac catheterization  Procedure performed in the  Invasive Lab    - See Procedure Log link below for nursing documentation    - See OpNote on Surgeries Tab for physician findings    - See Imaging Tab for radiologist dictation        Medications:  Scheduled Meds:   calcium carbonate  1,000 mg Oral BID    ergocalciferol  50,000 Units Oral Q72H    ferrous sulfate  1 tablet Oral BID    folic acid  1 mg Oral Daily    labetaloL  200 mg Oral Q12H    nystatin-triamcinolone   Topical (Top) TID    pantoprazole  40 mg Oral Daily    polyethylene glycol  17 g Oral Daily     Continuous Infusions:  PRN Meds:.  Current Facility-Administered Medications:     0.9%  NaCl infusion (for blood administration), , Intravenous, Q24H PRN    0.9%  NaCl infusion (for blood administration), , Intravenous, Q24H PRN    0.9%  NaCl infusion (for blood administration), , Intravenous, Q24H PRN    acetaminophen, 650 mg, Oral, Q4H PRN    aluminum-magnesium hydroxide-simethicone, 30 mL, Oral, Q6H PRN    ammonium lactate, , Topical (Top), BID PRN    calcium carbonate, 1,000 mg, Oral, Q6H PRN    heparin (porcine), 4,000 Units, Intravenous, PRN    hydrALAZINE, 10 mg, Intravenous, Q2H PRN    HYDROcodone-acetaminophen, 1 tablet, Oral, Q6H PRN    labetaloL, 10 mg, Intravenous, Q4H PRN    melatonin, 6 mg, Oral, Nightly PRN    ondansetron, 4 mg, Intravenous, Q8H PRN    prochlorperazine, 5 mg, Intravenous, Q6H PRN    sodium chloride 0.9%, 10 mL, Intravenous, PRN        Assessment/Plan:   Bilateral hydroureteronephrosis status post indwelling Mario   Acute kidney injury, possible component of chronic kidney disease and evidence of obstructive uropathy, post initiation of hemodialysis 06/22/2024- Right  Tunneled catheter placed 7/3 per Dr Barcenas.   UTI-GBS  Acute on chronic Normocytic anemia  Left lower extremity cellulitis  Hypertension  Morbid obesity, BMI 50.4  Constipation    HD today per Nephrology.    prn Analgesia  SNF placement pending   Follow up with Urology as an outpatient for definitive stent  management.  Medically stable      Kevin Sanchez MD   07/06/2024     All diagnosis and differential diagnosis have been reviewed; assessment and plan has been documented; I have personally reviewed the labs and test results that are presently available; I have reviewed the patients medication list; I have reviewed the consulting providers response and recommendations. I have reviewed or attempted to review medical records based upon their availability    All of the patient's questions have been  addressed and answered. Patient's is agreeable to the above stated plan. I will continue to monitor closely and make adjustments to medical management as needed.  _____________________________________________________________________

## 2024-07-06 NOTE — PROGRESS NOTES
"[facility]    Nephrology Progress Note    Patient Name: Fior Joya  Age: 69 y.o.  : 1955  MRN: [id number]  Admission Date: 2024    Chief complaint: Transfer (ARF transfer from [place])    Hospital course  Fior Joya is a 69 y.o. White female  who was transferred from a hospital in [place] for treatment of anemia, electrolyte abnormalities, and acute kidney injury.  CT of abdomen on 2024 revealed bilateral hydronephrosis, patient underwent cystoscopy and right double-J stent placement on 2024.  She is status post left nephroureteral stent placement on 2024.  She was initiated on hemodialysis for treatment of severe azotemia and hyperkalemia, currently dialyzes per Tuesday, Thursday, Saturday schedule by way of tunneled dialysis catheter (inserted on 2024).  Nephrology continues to follow for assistance with management of acute kidney injury.    Subjective  Patient is alert, denies complaints.    Review of Systems  Cardiovascular:  Negative for chest pain   Respiratory: Negative for shortness of breath     Objective  /71   Pulse 94   Temp 98.2 °F (36.8 °C)   Resp 20   Ht 5' 4" (1.626 m)   Wt (!) 140 kg (308 lb 10.3 oz)   SpO2 98%   Breastfeeding No   BMI 52.98 kg/m²     Intake/Output Summary (Last 24 hours) at 2024 0917  Last data filed at 2024 2300  Gross per 24 hour   Intake 730 ml   Output 401 ml   Net 329 ml       Physical Exam  General appearance:  Morbidly obese female in no acute distress  Skin:  Bilateral lower extremity erythema   HEENT: PERRLA, EOMI, no scleral icterus, no JVD. Neck is supple.  Left IJ tunneled dialysis catheter  Chest: Respirations are unlabored. Lungs sounds are clear.   Heart: S1, S2.   Abdomen: Benign.  Obese  : Deferred.  Extremities: No edema, peripheral pulses are palpable.   Neuro: No focal deficits.     Medications  Scheduled Meds:   calcium carbonate  1,000 mg Oral BID    ergocalciferol  50,000 Units " Oral Q72H    ferrous sulfate  1 tablet Oral BID    folic acid  1 mg Oral Daily    labetaloL  200 mg Oral Q12H    nystatin-triamcinolone   Topical (Top) TID    pantoprazole  40 mg Oral Daily    polyethylene glycol  17 g Oral Daily     Continuous Infusions:  PRN Meds:.  Current Facility-Administered Medications:     0.9%  NaCl infusion (for blood administration), , Intravenous, Q24H PRN    0.9%  NaCl infusion (for blood administration), , Intravenous, Q24H PRN    0.9%  NaCl infusion (for blood administration), , Intravenous, Q24H PRN    acetaminophen, 650 mg, Oral, Q4H PRN    aluminum-magnesium hydroxide-simethicone, 30 mL, Oral, Q6H PRN    ammonium lactate, , Topical (Top), BID PRN    calcium carbonate, 1,000 mg, Oral, Q6H PRN    heparin (porcine), 4,000 Units, Intravenous, PRN    hydrALAZINE, 10 mg, Intravenous, Q2H PRN    HYDROcodone-acetaminophen, 1 tablet, Oral, Q6H PRN    labetaloL, 10 mg, Intravenous, Q4H PRN    melatonin, 6 mg, Oral, Nightly PRN    ondansetron, 4 mg, Intravenous, Q8H PRN    prochlorperazine, 5 mg, Intravenous, Q6H PRN    sodium chloride 0.9%, 10 mL, Intravenous, PRN     Imaging:    Reviewed    Laboratory Data:    Chemistry  Recent Labs     07/04/24 0421 07/05/24 0343    137   K 4.6 4.1   CO2 24 26   BUN 30.0* 19.1   CREATININE 5.20* 3.94*   EGFRNORACEVR 9 12   GLUCOSE 114 104   CALCIUM 7.8* 8.2*   PHOS 2.2*  --       LFT  Lab Results   Component Value Date    ALKPHOS 60 06/29/2024    AST 26 06/29/2024    ALT 34 06/29/2024    BILITOT 0.2 06/29/2024    ALBUMIN 2.3 (L) 07/04/2024      CKD-MBD  Lab Results   Component Value Date    .7 (H) 06/23/2024    [id number] 15 (L) 06/23/2024      Hematology  Recent Labs     07/04/24 0421 07/05/24  0343   WBC 6.27 6.51   HGB 8.9* 8.8*   HCT 29.6* 27.8*    200      Lab Results   Component Value Date    IRON 39 (L) 06/23/2024    TIBC 151 (L) 06/23/2024    FERRITIN 1,552.32 (H) 06/23/2024    FOLATE 6.1 (L) 06/22/2024    [id number] 681  06/22/2024      Additional serologies  Lab Results   Component Value Date    ANAHS <1:80 (Negative) 06/24/2024    CANCA Negative 06/24/2024    PANCA Negative 06/24/2024    HGBA1C 4.7 06/22/2024       Urine studies  Lab Results   Component Value Date    APPEARANCEUA Turbid (A) 06/21/2024    SGUA 1.020 06/21/2024    PROTEINUA 3+ (A) 06/21/2024    KETONESUA Negative 06/21/2024    LEUKOCYTESUR 3+ (A) 06/21/2024    RBCUA >100 (A) 06/21/2024    WBCUA >100 (A) 06/21/2024    BACTERIA Many (A) 06/21/2024    CREATRANDUR 29.5 (L) 06/21/2024    PROTEINURINE 241.3 06/21/2024        Impression  Acute kidney injury, hemodialysis dependent  Possible underlying chronic kidney disease  Obstructive uropathy, status post right double-J stent placement and left nephroureteral stent placement  Urinary tract infection  Anemia of chronic disease   Morbid obesity     Plan  Patient is nonoliguric.  Will continue hemodialysis per Tuesday, Thursday, Saturday schedule with minimal ultrafiltration, continue to monitor for functional renal recovery.  Proteinuria evaluation revealed negative DENISE, negative ANCA, and normal complement levels.  Will complete evaluation by checking hepatitis C antibody, HIV, and SPEP.    Julisa Reese NP  [facility]  7/6/2024

## 2024-07-06 NOTE — NURSING
07/06/24 1644   Post-Hemodialysis Assessment   Rinseback Volume (mL) 500 mL   Blood Volume Processed (Liters) 63 L   Dialyzer Clearance Clear   Duration of Treatment 180 minutes   Additional Fluid Intake (mL) 0 mL   Total UF (mL) 500 mL   Net Fluid Removal 0   Patient Response to Treatment pt tolerated well. pt clotted off once. Re-setup and completed tx. New caps applied.

## 2024-07-07 LAB
ALBUMIN SERPL-MCNC: 2.5 G/DL (ref 3.4–4.8)
ALBUMIN/GLOB SERPL: 0.7 RATIO (ref 1.1–2)
ALP SERPL-CCNC: 74 UNIT/L (ref 40–150)
ALT SERPL-CCNC: 12 UNIT/L (ref 0–55)
ANION GAP SERPL CALC-SCNC: 9 MEQ/L
AST SERPL-CCNC: 14 UNIT/L (ref 5–34)
BASOPHILS # BLD AUTO: 0.03 X10(3)/MCL
BASOPHILS NFR BLD AUTO: 0.5 %
BILIRUB SERPL-MCNC: 0.3 MG/DL
BUN SERPL-MCNC: 17.1 MG/DL (ref 9.8–20.1)
CALCIUM SERPL-MCNC: 8.4 MG/DL (ref 8.4–10.2)
CHLORIDE SERPL-SCNC: 102 MMOL/L (ref 98–107)
CO2 SERPL-SCNC: 26 MMOL/L (ref 23–31)
CREAT SERPL-MCNC: 3.7 MG/DL (ref 0.55–1.02)
CREAT/UREA NIT SERPL: 5
EOSINOPHIL # BLD AUTO: 0.25 X10(3)/MCL (ref 0–0.9)
EOSINOPHIL NFR BLD AUTO: 4.1 %
ERYTHROCYTE [DISTWIDTH] IN BLOOD BY AUTOMATED COUNT: 14.3 % (ref 11.5–17)
GFR SERPLBLD CREATININE-BSD FMLA CKD-EPI: 13 ML/MIN/1.73/M2
GLOBULIN SER-MCNC: 3.6 GM/DL (ref 2.4–3.5)
GLUCOSE SERPL-MCNC: 101 MG/DL (ref 82–115)
HCT VFR BLD AUTO: 27.2 % (ref 37–47)
HGB BLD-MCNC: 8.3 G/DL (ref 12–16)
IGA SERPL-MCNC: 245 MG/DL (ref 69–517)
IGG SERPL-MCNC: 1217 MG/DL (ref 522–1631)
IGM SERPL-MCNC: 32 MG/DL (ref 33–293)
IMM GRANULOCYTES # BLD AUTO: 0.05 X10(3)/MCL (ref 0–0.04)
IMM GRANULOCYTES NFR BLD AUTO: 0.8 %
LYMPHOCYTES # BLD AUTO: 1.3 X10(3)/MCL (ref 0.6–4.6)
LYMPHOCYTES NFR BLD AUTO: 21.3 %
MCH RBC QN AUTO: 29.6 PG (ref 27–31)
MCHC RBC AUTO-ENTMCNC: 30.5 G/DL (ref 33–36)
MCV RBC AUTO: 97.1 FL (ref 80–94)
MONOCYTES # BLD AUTO: 0.95 X10(3)/MCL (ref 0.1–1.3)
MONOCYTES NFR BLD AUTO: 15.6 %
NEUTROPHILS # BLD AUTO: 3.52 X10(3)/MCL (ref 2.1–9.2)
NEUTROPHILS NFR BLD AUTO: 57.7 %
NRBC BLD AUTO-RTO: 0 %
PHOSPHATE SERPL-MCNC: 2.5 MG/DL (ref 2.3–4.7)
PLATELET # BLD AUTO: 184 X10(3)/MCL (ref 130–400)
PMV BLD AUTO: 11.8 FL (ref 7.4–10.4)
POTASSIUM SERPL-SCNC: 4.6 MMOL/L (ref 3.5–5.1)
PROT SERPL-MCNC: 6.1 GM/DL (ref 5.8–7.6)
RBC # BLD AUTO: 2.8 X10(6)/MCL (ref 4.2–5.4)
SODIUM SERPL-SCNC: 137 MMOL/L (ref 136–145)
WBC # BLD AUTO: 6.1 X10(3)/MCL (ref 4.5–11.5)

## 2024-07-07 PROCEDURE — 25000003 PHARM REV CODE 250: Performed by: INTERNAL MEDICINE

## 2024-07-07 PROCEDURE — 25000003 PHARM REV CODE 250: Performed by: NURSE PRACTITIONER

## 2024-07-07 PROCEDURE — 25000003 PHARM REV CODE 250: Performed by: STUDENT IN AN ORGANIZED HEALTH CARE EDUCATION/TRAINING PROGRAM

## 2024-07-07 PROCEDURE — 82784 ASSAY IGA/IGD/IGG/IGM EACH: CPT | Performed by: NURSE PRACTITIONER

## 2024-07-07 PROCEDURE — 86334 IMMUNOFIX E-PHORESIS SERUM: CPT | Performed by: NURSE PRACTITIONER

## 2024-07-07 PROCEDURE — 84100 ASSAY OF PHOSPHORUS: CPT | Performed by: NURSE PRACTITIONER

## 2024-07-07 PROCEDURE — 85025 COMPLETE CBC W/AUTO DIFF WBC: CPT | Performed by: NURSE PRACTITIONER

## 2024-07-07 PROCEDURE — 80053 COMPREHEN METABOLIC PANEL: CPT | Performed by: NURSE PRACTITIONER

## 2024-07-07 PROCEDURE — 21400001 HC TELEMETRY ROOM

## 2024-07-07 PROCEDURE — 84165 PROTEIN E-PHORESIS SERUM: CPT | Performed by: NURSE PRACTITIONER

## 2024-07-07 PROCEDURE — 86803 HEPATITIS C AB TEST: CPT | Performed by: NURSE PRACTITIONER

## 2024-07-07 PROCEDURE — 36415 COLL VENOUS BLD VENIPUNCTURE: CPT | Performed by: NURSE PRACTITIONER

## 2024-07-07 PROCEDURE — 83521 IG LIGHT CHAINS FREE EACH: CPT | Performed by: NURSE PRACTITIONER

## 2024-07-07 RX ADMIN — NYSTATIN AND TRIAMCINOLONE ACETONIDE: 100000; 1 CREAM TOPICAL at 08:07

## 2024-07-07 RX ADMIN — FERROUS SULFATE TAB 325 MG (65 MG ELEMENTAL FE) 1 EACH: 325 (65 FE) TAB at 08:07

## 2024-07-07 RX ADMIN — PANTOPRAZOLE SODIUM 40 MG: 40 TABLET, DELAYED RELEASE ORAL at 08:07

## 2024-07-07 RX ADMIN — FOLIC ACID 1 MG: 1 TABLET ORAL at 08:07

## 2024-07-07 RX ADMIN — LABETALOL HYDROCHLORIDE 200 MG: 200 TABLET, FILM COATED ORAL at 08:07

## 2024-07-07 RX ADMIN — HYDROCODONE BITARTRATE AND ACETAMINOPHEN 1 TABLET: 5; 325 TABLET ORAL at 04:07

## 2024-07-07 RX ADMIN — CALCIUM CARBONATE (ANTACID) CHEW TAB 500 MG 1000 MG: 500 CHEW TAB at 08:07

## 2024-07-07 RX ADMIN — HYDROCODONE BITARTRATE AND ACETAMINOPHEN 1 TABLET: 5; 325 TABLET ORAL at 07:07

## 2024-07-07 RX ADMIN — POLYETHYLENE GLYCOL 3350 17 G: 17 POWDER, FOR SOLUTION ORAL at 08:07

## 2024-07-07 NOTE — PROGRESS NOTES
[facility] Medicine Progress Note        Chief Complaint: Inpatient Follow-up     HPI:   69-year-old lady with no PMH who was transferred to [facility] from [place] with complaints of dyspnea on exertion, B/L lower extremity edema, shortness for breath. She was noted to have severe anemia with HGB/HCT of 5.7/17.7, WBCs of 15.8, hyperkalemia with potassium 6.8,, BUN/creatinine of 209/16.94. UA showed leukocytes and many bacteria as well as 3+ protein, 3+ blood. Blood and urine cultures were sent off. Nephrology was consulted on admission and patient was admitted to ICU for placement of dialysis catheter and initiation of hemodialysis. She also received transfusion with 2 units PRBCs with improvement in symptoms. Urology was consulted due to B/L hydronephrosis on ultrasound with urine retention requiring Mario catheter. Placed on IV vancomycin, IV Zosyn. Downgraded from ICU to Hospital Medicine on 06/25. At bedside, patient stated she was doing well and had no new complaints. Continued to have some tremors and some shortness of breath but overall felt improved. On IV Vancomycin, IV Zosyn. Urine cultures grew GBS, Blood cultures negative x 72 hours. Nephrology on board; follow recommendations. Urology on board; follow recommendations. Continued on hemodialysis. PT/OT consulted; recommending moderate intensity therapy. Continued on FeSO4 b.i.d., folic acid 1 mg daily, labetalol 200 mg b.i.d., Protonix 40 mg b.i.d..      Patient has been taken to the OR for systolic he, retrograde pyelogram and possible JJ stents with Urology    Patient is status post cystoscopy with placement of double-J stents in the right, left was very difficult and was not able to be done   IR was consulted for placement of a left PCN, this has been completed and nephrostomy bag draining urine leaking bloody   right Tunneled catheter placed 7/3 per Dr Barcenas.     Interval Hx:   No changes overnight.  Pain is  controlled.   is at the bedside.  No new complaints.  Pain is better controlled today     Objective/physical exam:  General: alert lady lying comfortably in bed, in no acute distress.  HENT: oral and oropharyngeal mucosa moist, pink, with no erythema or exudates, no ear pain or discharge+tunnel cath R chest wall  Neck: normal neck movement, no lymph nodes or swellings, no JVD or Carotid bruit  Respiratory: clear breathing sounds bilaterally, no crackles, rales, ronchi or wheezes  Cardiovascular: clear S1 and S2, no murmurs, rubs or gallops  Peripheral Vascular: no lesions, ulcers or erosions, normal peripheral pulses and no pedal edema  Gastrointestinal: soft, non-tender, non-distended abdomen, no guarding, rigidity or rebound tenderness, normal bowel sounds  Integumentary: B/L LE edema with erythema and tenderness  Neuro: AAO x 3; motor strength 5/5 in B/L UEs & LEs; sensation intact to gross and fine touch B/L; CN II-XII grossly intact        VITAL SIGNS: 24 HRS MIN & MAX LAST   Temp  Min: 98.1 °F (36.7 °C)  Max: 98.3 °F (36.8 °C) 98.2 °F (36.8 °C)   BP  Min: 125/71  Max: 151/73 (!) 151/73   Pulse  Min: 75  Max: 97  80   Resp  Min: 16  Max: 20 18   SpO2  Min: 94 %  Max: 98 % 95 %       Recent Labs   Lab 07/02/24  0755 07/04/24  0421 07/05/24  0343   WBC 4.54 6.27 6.51   RBC 2.74* 3.03* 2.88*   HGB 8.2* 8.9* 8.8*   HCT 26.3* 29.6* 27.8*   MCV 96.0* 97.7* 96.5*   MCH 29.9 29.4 30.6   MCHC 31.2* 30.1* 31.7*   RDW 15.1 14.6 14.6    183 200   MPV 10.8* 10.9* 11.2*       Recent Labs   Lab 07/03/24  0451 07/04/24  0421 07/05/24  0343   * 137 137   K 4.2 4.6 4.1   CL 99 103 101   CO2 27 24 26   BUN 22.5* 30.0* 19.1   CREATININE 4.18* 5.20* 3.94*   CALCIUM 7.6* 7.8* 8.2*   ALBUMIN  --  2.3*  --           Microbiology Results (last 7 days)       ** No results found for the last 168 hours. **             Radiology:  Cardiac catheterization  Procedure performed in the Invasive Lab    - See Procedure Log  link below for nursing documentation    - See OpNote on Surgeries Tab for physician findings    - See Imaging Tab for radiologist dictation        Medications:  Scheduled Meds:   calcium carbonate  1,000 mg Oral BID    ergocalciferol  50,000 Units Oral Q72H    ferrous sulfate  1 tablet Oral BID    folic acid  1 mg Oral Daily    labetaloL  200 mg Oral Q12H    nystatin-triamcinolone   Topical (Top) TID    pantoprazole  40 mg Oral Daily    polyethylene glycol  17 g Oral Daily     Continuous Infusions:  PRN Meds:.  Current Facility-Administered Medications:     0.9%  NaCl infusion (for blood administration), , Intravenous, Q24H PRN    0.9%  NaCl infusion (for blood administration), , Intravenous, Q24H PRN    0.9%  NaCl infusion (for blood administration), , Intravenous, Q24H PRN    acetaminophen, 650 mg, Oral, Q4H PRN    aluminum-magnesium hydroxide-simethicone, 30 mL, Oral, Q6H PRN    ammonium lactate, , Topical (Top), BID PRN    calcium carbonate, 1,000 mg, Oral, Q6H PRN    heparin (porcine), 4,000 Units, Intravenous, PRN    hydrALAZINE, 10 mg, Intravenous, Q2H PRN    HYDROcodone-acetaminophen, 1 tablet, Oral, Q6H PRN    labetaloL, 10 mg, Intravenous, Q4H PRN    melatonin, 6 mg, Oral, Nightly PRN    ondansetron, 4 mg, Intravenous, Q8H PRN    prochlorperazine, 5 mg, Intravenous, Q6H PRN    sodium chloride 0.9%, 10 mL, Intravenous, PRN        Assessment/Plan:   Bilateral hydroureteronephrosis status post indwelling Mario   Acute kidney injury, possible component of chronic kidney disease and evidence of obstructive uropathy, post initiation of hemodialysis 06/22/2024- Right  Tunneled catheter placed 7/3 per Dr Barcenas.   UTI-GBS  Acute on chronic Normocytic anemia  Left lower extremity cellulitis  Hypertension  Morbid obesity, BMI 50.4  Constipation    Blood pressure mildly elevated.  Had dialysis yesterday.  Will see where she was settles out today.    Will need follow up with Urology as an outpatient for definitive  stent management.    Continue p.r.n. analgesia.    Case Management back tomorrow to continue working on SNF placement.  HD management per Nephrology.  On Tuesday, Thursday, Saturday schedule      Kevin Sanchez MD   07/07/2024     All diagnosis and differential diagnosis have been reviewed; assessment and plan has been documented; I have personally reviewed the labs and test results that are presently available; I have reviewed the patients medication list; I have reviewed the consulting providers response and recommendations. I have reviewed or attempted to review medical records based upon their availability    All of the patient's questions have been  addressed and answered. Patient's is agreeable to the above stated plan. I will continue to monitor closely and make adjustments to medical management as needed.  _____________________________________________________________________

## 2024-07-07 NOTE — PLAN OF CARE
Problem: Infection  Goal: Absence of Infection Signs and Symptoms  Outcome: Progressing     Problem: Adult Inpatient Plan of Care  Goal: Plan of Care Review  Outcome: Progressing  Goal: Patient-Specific Goal (Individualized)  Outcome: Progressing  Goal: Absence of Hospital-Acquired Illness or Injury  Outcome: Progressing  Goal: Optimal Comfort and Wellbeing  Outcome: Progressing  Goal: Readiness for Transition of Care  Outcome: Progressing     Problem: Bariatric Environmental Safety  Goal: Safety Maintained with Care  Outcome: Progressing     Problem: Acute Kidney Injury/Impairment  Goal: Fluid and Electrolyte Balance  Outcome: Progressing  Goal: Improved Oral Intake  Outcome: Progressing  Goal: Effective Renal Function  Outcome: Progressing     Problem: Hemodialysis  Goal: Safe, Effective Therapy Delivery  Outcome: Progressing  Goal: Effective Tissue Perfusion  Outcome: Progressing  Goal: Absence of Infection Signs and Symptoms  Outcome: Progressing     Problem: Acute Kidney Injury/Impairment  Goal: Fluid and Electrolyte Balance  Outcome: Progressing  Goal: Improved Oral Intake  Outcome: Progressing  Goal: Effective Renal Function  Outcome: Progressing     Problem: Hemodialysis  Goal: Safe, Effective Therapy Delivery  Outcome: Progressing  Goal: Effective Tissue Perfusion  Outcome: Progressing  Goal: Absence of Infection Signs and Symptoms  Outcome: Progressing     Problem: Fall Injury Risk  Goal: Absence of Fall and Fall-Related Injury  Outcome: Progressing     Problem: Skin Injury Risk Increased  Goal: Skin Health and Integrity  Outcome: Progressing     Problem: Wound  Goal: Optimal Coping  Outcome: Progressing  Goal: Optimal Functional Ability  Outcome: Progressing  Goal: Absence of Infection Signs and Symptoms  Outcome: Progressing  Goal: Improved Oral Intake  Outcome: Progressing  Goal: Optimal Pain Control and Function  Outcome: Progressing  Goal: Skin Health and Integrity  Outcome: Progressing  Goal:  Optimal Wound Healing  Outcome: Progressing

## 2024-07-07 NOTE — PROGRESS NOTES
"[facility]    Nephrology Progress Note    Patient Name: Fior Joya  Age: 69 y.o.  : 1955  MRN: [id number]  Admission Date: 2024    Chief complaint: Transfer (ARF transfer from [place])    Hospital course  Fior Joya is a 69 y.o. White female  who was transferred from a hospital in [place] for treatment of anemia, electrolyte abnormalities, and acute kidney injury.  CT of abdomen on 2024 revealed bilateral hydronephrosis, patient underwent cystoscopy and right double-J stent placement on 2024.  She is status post left nephroureteral stent placement on 2024.  She was initiated on hemodialysis for treatment of severe azotemia and hyperkalemia, currently dialyzes per Tuesday, Thursday, Saturday schedule by way of tunneled dialysis catheter (inserted on 2024).  Nephrology continues to follow for assistance with management of acute kidney injury.    Subjective  Patient is alert, denies complaints.    Review of Systems  Cardiovascular:  Negative for chest pain   Respiratory: Negative for shortness of breath     Objective  BP (!) 145/81   Pulse 92   Temp 98.2 °F (36.8 °C) (Oral)   Resp 20   Ht 5' 4" (1.626 m)   Wt (!) 140 kg (308 lb 10.3 oz)   SpO2 96%   Breastfeeding No   BMI 52.98 kg/m²     Intake/Output Summary (Last 24 hours) at 2024 0823  Last data filed at 2024 0400  Gross per 24 hour   Intake 0 ml   Output 1500 ml   Net -1500 ml       Physical Exam  General appearance:  Morbidly obese female in no acute distress  Skin:  Bilateral lower extremity erythema   HEENT: PERRLA, EOMI, no scleral icterus, no JVD. Neck is supple.  Left IJ tunneled dialysis catheter  Chest: Respirations are unlabored. Lungs sounds are clear.   Heart: S1, S2.   Abdomen: Benign.  Obese  : Deferred.  Extremities: No edema, peripheral pulses are palpable.   Neuro: No focal deficits.     Medications  Scheduled Meds:   calcium carbonate  1,000 mg Oral BID    ergocalciferol  " 50,000 Units Oral Q72H    ferrous sulfate  1 tablet Oral BID    folic acid  1 mg Oral Daily    labetaloL  200 mg Oral Q12H    nystatin-triamcinolone   Topical (Top) TID    pantoprazole  40 mg Oral Daily    polyethylene glycol  17 g Oral Daily     Continuous Infusions:  PRN Meds:.  Current Facility-Administered Medications:     0.9%  NaCl infusion (for blood administration), , Intravenous, Q24H PRN    0.9%  NaCl infusion (for blood administration), , Intravenous, Q24H PRN    0.9%  NaCl infusion (for blood administration), , Intravenous, Q24H PRN    acetaminophen, 650 mg, Oral, Q4H PRN    aluminum-magnesium hydroxide-simethicone, 30 mL, Oral, Q6H PRN    ammonium lactate, , Topical (Top), BID PRN    calcium carbonate, 1,000 mg, Oral, Q6H PRN    heparin (porcine), 4,000 Units, Intravenous, PRN    hydrALAZINE, 10 mg, Intravenous, Q2H PRN    HYDROcodone-acetaminophen, 1 tablet, Oral, Q6H PRN    labetaloL, 10 mg, Intravenous, Q4H PRN    melatonin, 6 mg, Oral, Nightly PRN    ondansetron, 4 mg, Intravenous, Q8H PRN    prochlorperazine, 5 mg, Intravenous, Q6H PRN    sodium chloride 0.9%, 10 mL, Intravenous, PRN     Imaging:    Reviewed    Laboratory Data:    Chemistry  Recent Labs     07/05/24  0343 07/07/24  0556    137   K 4.1 4.6   CO2 26 26   BUN 19.1 17.1   CREATININE 3.94* 3.70*   EGFRNORACEVR 12 13   GLUCOSE 104 101   CALCIUM 8.2* 8.4   PHOS  --  2.5      LFT  Lab Results   Component Value Date    ALKPHOS 74 07/07/2024    AST 14 07/07/2024    ALT 12 07/07/2024    BILITOT 0.3 07/07/2024    ALBUMIN 2.5 (L) 07/07/2024      CKD-MBD  Lab Results   Component Value Date    .7 (H) 06/23/2024    [id number] 15 (L) 06/23/2024      Hematology  Recent Labs     07/05/24  0343 07/07/24  0556   WBC 6.51 6.10   HGB 8.8* 8.3*   HCT 27.8* 27.2*    184      Lab Results   Component Value Date    IRON 39 (L) 06/23/2024    TIBC 151 (L) 06/23/2024    FERRITIN 1,552.32 (H) 06/23/2024    FOLATE 6.1 (L) 06/22/2024     [id number] 681 06/22/2024      Additional serologies  Lab Results   Component Value Date    ANAHS <1:80 (Negative) 06/24/2024    CANCA Negative 06/24/2024    PANCA Negative 06/24/2024    HGBA1C 4.7 06/22/2024       Urine studies  Lab Results   Component Value Date    APPEARANCEUA Turbid (A) 06/21/2024    SGUA 1.020 06/21/2024    PROTEINUA 3+ (A) 06/21/2024    KETONESUA Negative 06/21/2024    LEUKOCYTESUR 3+ (A) 06/21/2024    RBCUA >100 (A) 06/21/2024    WBCUA >100 (A) 06/21/2024    BACTERIA Many (A) 06/21/2024    CREATRANDUR 29.5 (L) 06/21/2024    PROTEINURINE 241.3 06/21/2024        Impression  Acute kidney injury, hemodialysis dependent  Possible underlying chronic kidney disease  Obstructive uropathy, status post right double-J stent placement and left nephroureteral stent placement  Urinary tract infection  Anemia of chronic disease   Morbid obesity     Plan  Patient is nonoliguric.  Will continue hemodialysis per Tuesday, Thursday, Saturday schedule with minimal ultrafiltration, continue to monitor for functional renal recovery.  Proteinuria evaluation revealed negative DENISE, negative ANCA, and normal complement levels.  Will complete evaluation by checking hepatitis C antibody, HIV, and SPEP.    Julisa Reese NP  [facility] Nephrology  7/7/2024

## 2024-07-08 LAB
ALBUMIN % SPEP (OHS): 42.05 (ref 48.1–59.5)
ALBUMIN SERPL-MCNC: 2.4 G/DL (ref 3.4–4.8)
ALBUMIN SERPL-MCNC: 2.6 G/DL (ref 3.4–4.8)
ALBUMIN/GLOB SERPL: 0.7 RATIO (ref 1.1–2)
ALBUMIN/GLOB SERPL: 0.8 RATIO (ref 1.1–2)
ALP SERPL-CCNC: 79 UNIT/L (ref 40–150)
ALPHA 1 GLOB (OHS): 0.26 GM/DL (ref 0–0.4)
ALPHA 1 GLOB% (OHS): 4.53 (ref 2.3–4.9)
ALPHA 2 GLOB % (OHS): 13.55 (ref 6.9–13)
ALPHA 2 GLOB (OHS): 0.77 GM/DL (ref 0.4–1)
ALT SERPL-CCNC: 12 UNIT/L (ref 0–55)
ANION GAP SERPL CALC-SCNC: 12 MEQ/L
AST SERPL-CCNC: 13 UNIT/L (ref 5–34)
BETA GLOB (OHS): 1.09 GM/DL (ref 0.7–1.3)
BETA GLOB% (OHS): 19.19 (ref 13.8–19.7)
BILIRUB SERPL-MCNC: 0.3 MG/DL
BUN SERPL-MCNC: 26.1 MG/DL (ref 9.8–20.1)
CALCIUM SERPL-MCNC: 8.4 MG/DL (ref 8.4–10.2)
CHLORIDE SERPL-SCNC: 103 MMOL/L (ref 98–107)
CO2 SERPL-SCNC: 24 MMOL/L (ref 23–31)
CREAT SERPL-MCNC: 4.9 MG/DL (ref 0.55–1.02)
CREAT/UREA NIT SERPL: 5
GAMMA GLOBULIN % (OHS): 20.68 (ref 10.1–21.9)
GAMMA GLOBULIN (OHS): 1.18 GM/DL (ref 0.4–1.8)
GFR SERPLBLD CREATININE-BSD FMLA CKD-EPI: 9 ML/MIN/1.73/M2
GLOBULIN SER-MCNC: 3.3 GM/DL (ref 2.4–3.5)
GLOBULIN SER-MCNC: 3.3 GM/DL (ref 2.4–3.5)
GLUCOSE SERPL-MCNC: 108 MG/DL (ref 82–115)
HCV AB SERPL QL IA: NONREACTIVE
HIV 1+2 AB+HIV1 P24 AG SERPL QL IA: NONREACTIVE
M SPIKE % (OHS): ABNORMAL
M SPIKE (OHS): ABNORMAL
MAGNESIUM SERPL-MCNC: 1.6 MG/DL (ref 1.6–2.6)
PATH REV: NORMAL
PHOSPHATE SERPL-MCNC: 2.8 MG/DL (ref 2.3–4.7)
POTASSIUM SERPL-SCNC: 4.6 MMOL/L (ref 3.5–5.1)
PROT SERPL-MCNC: 5.7 GM/DL (ref 5.8–7.6)
PROT SERPL-MCNC: 5.9 GM/DL (ref 5.8–7.6)
SODIUM SERPL-SCNC: 139 MMOL/L (ref 136–145)

## 2024-07-08 PROCEDURE — 36415 COLL VENOUS BLD VENIPUNCTURE: CPT | Performed by: NURSE PRACTITIONER

## 2024-07-08 PROCEDURE — 83735 ASSAY OF MAGNESIUM: CPT | Performed by: NURSE PRACTITIONER

## 2024-07-08 PROCEDURE — 25000003 PHARM REV CODE 250: Performed by: NURSE PRACTITIONER

## 2024-07-08 PROCEDURE — 97164 PT RE-EVAL EST PLAN CARE: CPT

## 2024-07-08 PROCEDURE — 86706 HEP B SURFACE ANTIBODY: CPT | Performed by: NURSE PRACTITIONER

## 2024-07-08 PROCEDURE — 25000003 PHARM REV CODE 250: Performed by: INTERNAL MEDICINE

## 2024-07-08 PROCEDURE — 80053 COMPREHEN METABOLIC PANEL: CPT | Performed by: NURSE PRACTITIONER

## 2024-07-08 PROCEDURE — 21400001 HC TELEMETRY ROOM

## 2024-07-08 PROCEDURE — 97535 SELF CARE MNGMENT TRAINING: CPT | Mod: CO

## 2024-07-08 PROCEDURE — 87389 HIV-1 AG W/HIV-1&-2 AB AG IA: CPT | Performed by: NURSE PRACTITIONER

## 2024-07-08 PROCEDURE — 84100 ASSAY OF PHOSPHORUS: CPT | Performed by: NURSE PRACTITIONER

## 2024-07-08 RX ADMIN — NYSTATIN AND TRIAMCINOLONE ACETONIDE: 100000; 1 CREAM TOPICAL at 08:07

## 2024-07-08 RX ADMIN — FERROUS SULFATE TAB 325 MG (65 MG ELEMENTAL FE) 1 EACH: 325 (65 FE) TAB at 08:07

## 2024-07-08 RX ADMIN — CALCIUM CARBONATE (ANTACID) CHEW TAB 500 MG 1000 MG: 500 CHEW TAB at 08:07

## 2024-07-08 RX ADMIN — LABETALOL HYDROCHLORIDE 200 MG: 200 TABLET, FILM COATED ORAL at 09:07

## 2024-07-08 RX ADMIN — NYSTATIN AND TRIAMCINOLONE ACETONIDE: 100000; 1 CREAM TOPICAL at 03:07

## 2024-07-08 RX ADMIN — PANTOPRAZOLE SODIUM 40 MG: 40 TABLET, DELAYED RELEASE ORAL at 08:07

## 2024-07-08 RX ADMIN — HYDROCODONE BITARTRATE AND ACETAMINOPHEN 1 TABLET: 5; 325 TABLET ORAL at 09:07

## 2024-07-08 RX ADMIN — FOLIC ACID 1 MG: 1 TABLET ORAL at 08:07

## 2024-07-08 RX ADMIN — LABETALOL HYDROCHLORIDE 200 MG: 200 TABLET, FILM COATED ORAL at 08:07

## 2024-07-08 RX ADMIN — HYDROCODONE BITARTRATE AND ACETAMINOPHEN 1 TABLET: 5; 325 TABLET ORAL at 06:07

## 2024-07-08 RX ADMIN — FERROUS SULFATE TAB 325 MG (65 MG ELEMENTAL FE) 1 EACH: 325 (65 FE) TAB at 09:07

## 2024-07-08 RX ADMIN — CALCIUM CARBONATE (ANTACID) CHEW TAB 500 MG 1000 MG: 500 CHEW TAB at 09:07

## 2024-07-08 NOTE — PLAN OF CARE
[facility] sent updated clinicals to:    [facility]-Talked to Saida she stated they can accept patient with dialysis if we could get the time schedule MWF before 12. CM notified.    Rhiannon Estrella-talked to Tomy with [facility] they are still looking over patient's paperwork.    [facility] to Fab[facility], she stated patient would need a secondary insurance for them to be able to accept.    [facility] to Luisa, she stated they will not able to skill dialysis.

## 2024-07-08 NOTE — PROGRESS NOTES
[facility] Hospital Medicine Progress Note        Chief Complaint: Inpatient Follow-up for ARF/HD    HPI:   69-year-old lady with no PMH who was transferred to [facility] from [place] with complaints of dyspnea on exertion, B/L lower extremity edema, shortness for breath. She was noted to have severe anemia with HGB/HCT of 5.7/17.7, WBCs of 15.8, hyperkalemia with potassium 6.8,, BUN/creatinine of 209/16.94. UA showed leukocytes and many bacteria as well as 3+ protein, 3+ blood. Blood and urine cultures were sent off. Nephrology was consulted on admission and patient was admitted to ICU for placement of dialysis catheter and initiation of hemodialysis. She also received transfusion with 2 units PRBCs with improvement in symptoms. Urology was consulted due to B/L hydronephrosis on ultrasound with urine retention requiring Mario catheter. Placed on IV vancomycin, IV Zosyn. Downgraded from ICU to Hospital Medicine on 06/25. At bedside, patient stated she was doing well and had no new complaints. Continued to have some tremors and some shortness of breath but overall felt improved. On IV Vancomycin, IV Zosyn. Urine cultures grew GBS, Blood cultures negative x 72 hours. Nephrology on board; follow recommendations. Urology on board; follow recommendations. Continued on hemodialysis. PT/OT consulted; recommending moderate intensity therapy. Continued on FeSO4 b.i.d., folic acid 1 mg daily, labetalol 200 mg b.i.d., Protonix 40 mg b.i.d..      Patient has been taken to the OR for systolic he, retrograde pyelogram and possible JJ stents with Urology    Patient is status post cystoscopy with placement of double-J stents in the right, left was very difficult and was not able to be done   IR was consulted for placement of a left PCN, this has been completed and nephrostomy bag draining urine leaking bloody   right Tunneled catheter placed 7/3 per Dr Barcenas. She is now on HD TTF. CM working on SNF.      Interval Hx:   Patient today awake and comfortable. Denies any SOB, chest pain, fever or chills. She is ambulating to the rest room by herself. Informed her about the placement problem with her HD.     Family at bedside, explained in detail about the patients condition, diagnosis, vitals, labs and treatment plan. They understand and agree with the plan. All their questions were answered.      Case was discussed with patient's nurse and  on the floor.    Objective/physical exam:  General: In no acute distress, afebrile  Chest: Clear to auscultation bilaterally  Heart: RRR, +S1, S2, no appreciable murmur  Abdomen: Soft, nontender, BS +  MSK: Warm, no lower extremity edema, no clubbing or cyanosis  Neurologic: Alert and oriented x4, Cranial nerve II-XII intact, Strength 5/5 in all 4 extremities    VITAL SIGNS: 24 HRS MIN & MAX LAST   Temp  Min: 97.8 °F (36.6 °C)  Max: 99 °F (37.2 °C) 98.6 °F (37 °C)   BP  Min: 134/72  Max: 159/73 (!) 159/73   Pulse  Min: 78  Max: 91  80   Resp  Min: 18  Max: 19 18   SpO2  Min: 95 %  Max: 99 % 96 %     I have reviewed the following labs:  Recent Labs   Lab 07/04/24 0421 07/05/24 0343 07/07/24  0556   WBC 6.27 6.51 6.10   RBC 3.03* 2.88* 2.80*   HGB 8.9* 8.8* 8.3*   HCT 29.6* 27.8* 27.2*   MCV 97.7* 96.5* 97.1*   MCH 29.4 30.6 29.6   MCHC 30.1* 31.7* 30.5*   RDW 14.6 14.6 14.3    200 184   MPV 10.9* 11.2* 11.8*     Recent Labs   Lab 07/04/24 0421 07/05/24 0343 07/07/24  0556 07/08/24  0551    137 137 139   K 4.6 4.1 4.6 4.6    101 102 103   CO2 24 26 26 24   BUN 30.0* 19.1 17.1 26.1*   CREATININE 5.20* 3.94* 3.70* 4.90*   CALCIUM 7.8* 8.2* 8.4 8.4   MG  --   --   --  1.60   ALBUMIN 2.3*  --  2.4*  2.5* 2.6*   ALKPHOS  --   --  74 79   ALT  --   --  12 12   AST  --   --  14 13   BILITOT  --   --  0.3 0.3     Microbiology Results (last 7 days)       ** No results found for the last 168 hours. **             See below for  Radiology    Assessment/Plan:  Bilateral hydroureteronephrosis   Acute kidney injury, possible component of chronic kidney disease and evidence of obstructive uropathy, post initiation of hemodialysis 06/22/2024- Right  Tunneled catheter placed 7/3 per Dr Barcenas.   On HD   UTI-GBS  Acute on chronic Normocytic anemia  Left lower extremity cellulitis  Hypertension  Morbid obesity, BMI 50.4  Constipation    Plan:  Patient now looking comfortable  She is ambulating with walker   Tolerating HD  BP better   Labs stable     Continue supportive care     CM working on SNF but may end up going home with HH        VTE prophylaxis: SCD    Patient condition:  Fair    Anticipated discharge and Disposition:   SNF vs home with HH       All diagnosis and differential diagnosis have been reviewed; assessment and plan has been documented; I have personally reviewed the labs and test results that are presently available; I have reviewed the patients medication list; I have reviewed the consulting providers response and recommendations. I have reviewed or attempted to review medical records based upon their availability    All of the patient's questions have been  addressed and answered. Patient's is agreeable to the above stated plan. I will continue to monitor closely and make adjustments to medical management as needed.    Portions of this note dictated using EMR integrated voice recognition software, and may be subject to voice recognition errors not corrected at proofreading. Please contact writer for clarification if needed.   _____________________________________________________________________    Malnutrition Status:    Scheduled Med:   calcium carbonate  1,000 mg Oral BID    ergocalciferol  50,000 Units Oral Q72H    ferrous sulfate  1 tablet Oral BID    folic acid  1 mg Oral Daily    labetaloL  200 mg Oral Q12H    nystatin-triamcinolone   Topical (Top) TID    pantoprazole  40 mg Oral Daily    polyethylene glycol  17 g Oral  Daily      Continuous Infusions:     PRN Meds:    Current Facility-Administered Medications:     0.9%  NaCl infusion (for blood administration), , Intravenous, Q24H PRN    0.9%  NaCl infusion (for blood administration), , Intravenous, Q24H PRN    0.9%  NaCl infusion (for blood administration), , Intravenous, Q24H PRN    acetaminophen, 650 mg, Oral, Q4H PRN    aluminum-magnesium hydroxide-simethicone, 30 mL, Oral, Q6H PRN    ammonium lactate, , Topical (Top), BID PRN    calcium carbonate, 1,000 mg, Oral, Q6H PRN    heparin (porcine), 4,000 Units, Intravenous, PRN    hydrALAZINE, 10 mg, Intravenous, Q2H PRN    HYDROcodone-acetaminophen, 1 tablet, Oral, Q6H PRN    labetaloL, 10 mg, Intravenous, Q4H PRN    melatonin, 6 mg, Oral, Nightly PRN    ondansetron, 4 mg, Intravenous, Q8H PRN    prochlorperazine, 5 mg, Intravenous, Q6H PRN    sodium chloride 0.9%, 10 mL, Intravenous, PRN     Radiology:  I have personally reviewed the following imaging and agree with the radiologist.     Cardiac catheterization  Procedure performed in the Invasive Lab    - See Procedure Log link below for nursing documentation    - See OpNote on Surgeries Tab for physician findings    - See Imaging Tab for radiologist dictation      Moise Villafana MD  Department of Hospital Medicine   [facility]   07/08/2024

## 2024-07-08 NOTE — PROGRESS NOTES
[facility] - 8th Floor Med Surg  Nephrology  Progress Note    Patient Name: Fior Joya  MRN: [id number]  Admission Date: 6/22/2024  Hospital Length of Stay: 16 days  Attending Provider: Moise Villafana MD   Primary Care Physician: Linda, Primary Doctor  Principal Problem:Metabolic acidosis      Subjective:   Fior Joya is a 69 y.o. female who was transferred from hospital in [place] due to acute renal failure.  She initially presented to the hospital due to generalized weakness and shortness of breath with exertion.  Per their report she had been mostly bed-bound for the past 3 weeks.  He reported that she had been drinking getting up going to the restroom.  She did have some episodes of nausea vomiting and generalized weakness along with generalized muscle pain more recently.  She was endorsing shortness of breath but no chest pain.  Denied had been running a fever.  Per their his report, she had not seen a physician in 10 years, because she was not feeling ill and did not feel like she needed to go.  She had also been having some swelling in the legs.  She has no known medical issues per their report.  On admit, WBC elevated at 15, RBC 1.8, hemoglobin 5.7, D-dimer elevated at 1.57, serum sodium 132, potassium 6, serum CO2 less than 5, , creatinine 14.8, glucose 273, calcium 7.5, phosphorus 7.9, albumin 2.9.  TSH and lactic were okay.  UA results indicative of UTI sent for cultures.  Blood cultures obtained and pending.  Received phone call from ER physician in early a.m. hours, it was decided at that time we will proceed with initiation of RRT.  Right IJ temporary cath was placed per General surgery.   She is awake and alert this morning upon exam.  She does respond with simple answers to questions posed.  Son in room in majority of history obtained from him.  Oxygen saturation is 99% on nasal cannula.  We are consulted for management ESTRELLITA.  Bilateral hydronephrosis on CT from 06/21/2024         On 6/27 she underwent cystoscopy and right double J stent placement. The following day she had left nephroureteral stent placement with IR. She has been maintained on TThSa schedule for HD via right IJ temporary dialysis catheter. Tunneled catheter placed 7/3 per Dr Barcenas. No acute issues overnight. Resting comfortably on room air.     Interval History: No acute events overnight. She is making progress with physical therapy. Excellent UOP 1450 in the past 24 hours. Still dependent on dialysis with Cr 3.7 to 4.9 overnight.     Review of patient's allergies indicates:   Allergen Reactions    Asa [aspirin] Anaphylaxis    Penicillins      Received ceftriaxone from 6/27, no reactions      Current Facility-Administered Medications   Medication Frequency    0.9%  NaCl infusion (for blood administration) Q24H PRN    0.9%  NaCl infusion (for blood administration) Q24H PRN    0.9%  NaCl infusion (for blood administration) Q24H PRN    acetaminophen tablet 650 mg Q4H PRN    aluminum-magnesium hydroxide-simethicone 200-200-20 mg/5 mL suspension 30 mL Q6H PRN    ammonium lactate 12 % lotion BID PRN    calcium carbonate 200 mg calcium (500 mg) chewable tablet 1,000 mg Q6H PRN    calcium carbonate 200 mg calcium (500 mg) chewable tablet 1,000 mg BID    ergocalciferol capsule 50,000 Units Q72H    ferrous sulfate tablet 1 each BID    folic acid tablet 1 mg Daily    heparin (porcine) injection 4,000 Units PRN    hydrALAZINE injection 10 mg Q2H PRN    HYDROcodone-acetaminophen 5-325 mg per tablet 1 tablet Q6H PRN    labetaloL injection 10 mg Q4H PRN    labetaloL tablet 200 mg Q12H    melatonin tablet 6 mg Nightly PRN    nystatin-triamcinolone cream TID    ondansetron injection 4 mg Q8H PRN    pantoprazole EC tablet 40 mg Daily    polyethylene glycol packet 17 g Daily    prochlorperazine injection Soln 5 mg Q6H PRN    sodium chloride 0.9% flush 10 mL PRN       Objective:     Vital Signs (Most Recent):  Temp: 98.3 °F (36.8 °C)  "(07/08/24 1126)  Pulse: 79 (07/08/24 1126)  Resp: 18 (07/08/24 1126)  BP: (!) 158/75 (07/08/24 1126)  SpO2: 99 % (07/08/24 1126) Vital Signs (24h Range):  Temp:  [97.8 °F (36.6 °C)-99 °F (37.2 °C)] 98.3 °F (36.8 °C)  Pulse:  [78-91] 79  Resp:  [18-19] 18  SpO2:  [95 %-99 %] 99 %  BP: (134-169)/(70-89) 158/75     Weight: (!) 139.7 kg (307 lb 15.7 oz) (07/08/24 0210)  Body mass index is 52.87 kg/m².  Body surface area is 2.51 meters squared.    I/O last 3 completed shifts:  In: 540 [P.O.:540]  Out: 2000 [Urine:2000]    Physical Exam  Constitutional:       General: She is not in acute distress.  HENT:      Head: Atraumatic.      Nose: Nose normal.      Mouth/Throat:      Mouth: Mucous membranes are moist.   Eyes:      Extraocular Movements: Extraocular movements intact.      Conjunctiva/sclera: Conjunctivae normal.   Cardiovascular:      Rate and Rhythm: Normal rate and regular rhythm.      Pulses: Normal pulses.      Heart sounds: Normal heart sounds.   Pulmonary:      Effort: Pulmonary effort is normal.      Breath sounds: Normal breath sounds.   Abdominal:      Palpations: Abdomen is soft.   Genitourinary:     Comments: Left nephrostomy tube draining clear yellow urine   Musculoskeletal:         General: No swelling.      Cervical back: Neck supple.   Skin:     General: Skin is warm.   Neurological:      General: No focal deficit present.      Mental Status: She is alert and oriented to person, place, and time.         Significant Labs:sureBMP:   Recent Labs   Lab 07/08/24  0551      K 4.6      CO2 24   BUN 26.1*   CREATININE 4.90*   CALCIUM 8.4   MG 1.60     CBC:   Recent Labs   Lab 07/07/24  0556   WBC 6.10   RBC 2.80*   HGB 8.3*   HCT 27.2*      MCV 97.1*   MCH 29.6   MCHC 30.5*     Microbiology Results (last 7 days)       ** No results found for the last 168 hours. **          Specimen (24h ago, onward)      None          No results for input(s): "COLORU", "CLARITYU", "SPECGRAV", "PHUR", " ""PROTEINUA", "GLUCOSEU", "BILIRUBINCON", "BLOODU", "WBCU", "RBCU", "BACTERIA", "MUCUS", "NITRITE", "LEUKOCYTESUR", "UROBILINOGEN", "HYALINECASTS" in the last 168 hours.    Significant Imaging:    Imaging reviewed     Assessment/Plan:     ESTRELLITA secondary to ATN secondary to obstructive uropathy  -Dialysis initiated 6/22 via right IJ temporary dialysis catheter.    Patient's probably has some underlying chronic kidney disease as she does have significant proteinuria more than 8 g per 24 hours.   Shaking and jerking movements of the extremities probably secondary to uremia,  now resolved completely with dialysis   UTI - Now on zosyn and vanc  Hyperkalemia  Improved.  Acute metabolic acidosis  Improved.  Anemia    Patient is still dialysis dependent. Dialysis setup on hold until she is placed at rehab. CM and dialysis coordinator aware of continued dialysis needs.   She will continue on TTS schedule for dialysis, next treatment tomorrow.          TIFFANY Langston  Nephrology  [facility] - 8th Floor Med Surg  "

## 2024-07-08 NOTE — PT/OT/SLP RE-EVAL
Physical Therapy Re-Evaluation    Patient Name:  Fior Joya   MRN:  [id number]    Recommendations:     Discharge therapy intensity: Moderate Intensity Therapy   Discharge Equipment Recommendations: to be determined by next level of care   Barriers to discharge: Impaired mobility    Assessment:     Fior Joya is a 69 y.o. female admitted with a medical diagnosis of ESTRELLITA, anemia, hyperkalemia, UTI, metabolic acidemia, started on HD this admission, SOB, swelling, B hydroureteronephrosis, B pleural effusions, chronic knee pain. S/p cystoscopy 6/27. .  She presents with the following impairments/functional limitations: weakness, impaired endurance, impaired self care skills, impaired functional mobility, gait instability, impaired balance, impaired coordination. The tolerated session well. She is able to mobilize OOB and to bathroom with CGA/min A. The pt was able to ambulate to bathroom with RW and min A, continue to progress as able.     Rehab Prognosis: Good; patient would benefit from acute skilled PT services to address these deficits and reach maximum level of function.    Recent Surgery: Procedure(s) (LRB):  Insertion, Catheter, Central Venous, Hemodialysis (N/A) 5 Days Post-Op    Plan:     During this hospitalization, patient would benefit from acute PT services 5 x/week to address the identified rehab impairments via gait training, therapeutic exercises, therapeutic activities and progress toward the following goals:    Plan of Care Expires:  07/25/24    PT/PTA conference to discuss PT POC and patient's progression towards goals held with Derrell Barbosa PTA.     Subjective     Chief Complaint: none  Patient/Family Comments/goals: return to PLOF  Pain/Comfort:       Patients cultural, spiritual, Holiness conflicts given the current situation: no    Objective:     Communicated with NSG prior to session.  Patient found HOB elevated with Yesenia, telemetry (dialysis catheter, nephrostomy)  upon PT entry to  room.    General Precautions: Standard, fall  Orthopedic Precautions:N/A   Braces: N/A  Respiratory Status: Room air  Blood Pressure: NA      Exams:  RLE ROM: WFL  RLE Strength: WFL  LLE ROM: WFL  LLE Strength: WFL  Skin integrity: Visible skin intact      Functional Mobility:  Bed Mobility:     Supine to Sit: minimum assistance  Sit to Supine: minimum assistance  Transfers:     Sit to Stand:  minimum assistance with rolling walker  Gait: pt ambulates x 10 feet, 5 feet to bathroom then chair with RW and min A.      Patient provided with verbal education education regarding PT role/goals/POC, safety awareness, and discharge/DME recommendations.  Understanding was verbalized.     Patient left HOB elevated with all lines intact, call button in reach, and NSG notified.    GOALS:   Multidisciplinary Problems       Physical Therapy Goals          Problem: Physical Therapy    Goal Priority Disciplines Outcome Goal Variances Interventions   Physical Therapy Goal     PT, PT/OT Progressing     Description: Goals to be met by: 2024     Patient will increase functional independence with mobility by performin. Supine to sit with Stand-by Assistance  2. Sit to stand transfer with Contact Guard Assistance  3. Gait  x 150 feet with Contact Guard Assistance using Rolling Walker.   4. Ascend/descend 4 stair with right Handrails Contact Guard Assistance using No Assistive Device.                          History:     No past medical history on file.    Past Surgical History:   Procedure Laterality Date    CYSTOSCOPY W/ URETERAL STENT PLACEMENT Right 2024    Procedure: CYSTOSCOPY, WITH URETERAL STENT INSERTION;  Surgeon: Otis Person MD;  Location: [facility];  Service: Urology;  Laterality: Right;  CYSTOSCOPY, BILATERAL RETROGRADE PYELOGRAMS, POSSIBLE STENT PLACEMENT.       Time Tracking:     PT Received On: 24  PT Start Time: 1352     PT Stop Time: 1405  PT Total Time (min): 13 min     Billable Minutes:  Re-eval 13      07/08/2024

## 2024-07-08 NOTE — PT/OT/SLP PROGRESS
Occupational Therapy   Treatment    Name: Fior Joya  MRN: [id number]  Admitting Diagnosis:  Metabolic acidosis  5 Days Post-Op    Recommendations:     Recommended therapy intensity at discharge: Moderate Intensity Therapy   Discharge Equipment Recommendations:  to be determined by next level of care  Barriers to discharge:       Assessment:     Fior Joya is a 69 y.o. female with a medical diagnosis of ESTRELLITA, anemia, hyperkalemia, UTI, metabolic acidemia, started on HD this admission, SOB, swelling, B hydroureteronephrosis, B pleural effusions, chronic knee pain. S/p cystoscopy 6/27. Performance deficits affecting function are weakness, impaired endurance, impaired self care skills, impaired functional mobility, gait instability, impaired balance, impaired coordination, decreased upper extremity function, decreased lower extremity function.     Rehab Prognosis:  Good; patient would benefit from acute skilled OT services to address these deficits and reach maximum level of function.       Plan:     Patient to be seen 4 x/week to address the above listed problems via self-care/home management, therapeutic exercises, therapeutic activities  Plan of Care Expires: 07/25/24  Plan of Care Reviewed with: patient    Subjective     Pain/Comfort:  Pain Rating 1: 0/10    Objective:     Communicated with: RN prior to session.  Patient found supine with PureWick, telemetry upon OT entry to room.    General Precautions: Standard, fall    Orthopedic Precautions:N/A  Braces: N/A  Respiratory Status: Room air     Occupational Performance:     Bed Mobility:    Patient completed Supine to Sit with minimum assistance     Functional Mobility/Transfers:  Patient completed Sit <> Stand Transfer with minimum assistance  with  rolling walker   Patient completed Toilet Transfer Step Transfer technique with minimum assistance with  rolling walker  Functional Mobility: Pt ambulated to bathroom with CGA and RW. No LOB noted.    Activities of Daily  Living:  Lower Body Dressing: stand by assistance required to don socks from long sitting.  Toileting: total assistance to complete pericare+void.     Therapeutic Positioning    OT interventions performed during the course of today's session in an effort to prevent and/or reduce acquired pressure injuries:   Education was provided on benefits of and recommendations for therapeutic positioning    [facility] 6 Click ADL:      Patient Education:  Patient provided with verbal education education regarding OT role/goals/POC.  Understanding was verbalized.      Patient left up in chair with all lines intact and call button in reach.    GOALS:   Multidisciplinary Problems       Occupational Therapy Goals          Problem: Occupational Therapy    Goal Priority Disciplines Outcome Interventions   Occupational Therapy Goal     OT, PT/OT Progressing    Description: LTG: Pt will perform basic ADLs and ADL transfers with Modified independence using LRAD by discharge.    STG: to be met by 7/25/24    Pt will complete grooming standing at sink with LRAD with SBA.  Pt will complete UB dressing with SBA.  Pt will complete LB dressing with SBA using LRAD and AE prn.  Pt will complete toileting with SBA using LRAD.  Pt will complete functional mobility to/from toilet and toilet transfer with SBA using LRAD.                        Time Tracking:     OT Date of Treatment: 07/08/24  OT Start Time: 1352  OT Stop Time: 1405  OT Total Time (min): 13 min    Billable Minutes:Self Care/Home Management 13    OT/LEANN: LEANN     Number of LEANN visits since last OT visit: 4    7/8/2024

## 2024-07-09 LAB
ANION GAP SERPL CALC-SCNC: 12 MEQ/L
BASOPHILS # BLD AUTO: 0.07 X10(3)/MCL
BASOPHILS NFR BLD AUTO: 1.1 %
BUN SERPL-MCNC: 33.1 MG/DL (ref 9.8–20.1)
CALCIUM SERPL-MCNC: 8.6 MG/DL (ref 8.4–10.2)
CHLORIDE SERPL-SCNC: 104 MMOL/L (ref 98–107)
CO2 SERPL-SCNC: 22 MMOL/L (ref 23–31)
CREAT SERPL-MCNC: 5.75 MG/DL (ref 0.55–1.02)
CREAT/UREA NIT SERPL: 6
EOSINOPHIL # BLD AUTO: 0.32 X10(3)/MCL (ref 0–0.9)
EOSINOPHIL NFR BLD AUTO: 5 %
ERYTHROCYTE [DISTWIDTH] IN BLOOD BY AUTOMATED COUNT: 14.4 % (ref 11.5–17)
GFR SERPLBLD CREATININE-BSD FMLA CKD-EPI: 8 ML/MIN/1.73/M2
GLUCOSE SERPL-MCNC: 93 MG/DL (ref 82–115)
HCT VFR BLD AUTO: 26.6 % (ref 37–47)
HGB BLD-MCNC: 8.2 G/DL (ref 12–16)
IMM GRANULOCYTES # BLD AUTO: 0.09 X10(3)/MCL (ref 0–0.04)
IMM GRANULOCYTES NFR BLD AUTO: 1.4 %
KAPPA LC FREE SER NEPH-MCNC: 15.8 MG/DL (ref 0.33–1.94)
KAPPA LC FREE/LAMBDA FREE SER NEPH: 1.06 {RATIO} (ref 0.26–1.65)
LAMBDA LC FREE SER NEPH-MCNC: 14.9 MG/DL (ref 0.57–2.63)
LYMPHOCYTES # BLD AUTO: 1.82 X10(3)/MCL (ref 0.6–4.6)
LYMPHOCYTES NFR BLD AUTO: 28.6 %
MCH RBC QN AUTO: 30.3 PG (ref 27–31)
MCHC RBC AUTO-ENTMCNC: 30.8 G/DL (ref 33–36)
MCV RBC AUTO: 98.2 FL (ref 80–94)
MONOCYTES # BLD AUTO: 0.92 X10(3)/MCL (ref 0.1–1.3)
MONOCYTES NFR BLD AUTO: 14.4 %
NEUTROPHILS # BLD AUTO: 3.15 X10(3)/MCL (ref 2.1–9.2)
NEUTROPHILS NFR BLD AUTO: 49.5 %
NRBC BLD AUTO-RTO: 0 %
PLATELET # BLD AUTO: 236 X10(3)/MCL (ref 130–400)
PMV BLD AUTO: 10.8 FL (ref 7.4–10.4)
POTASSIUM SERPL-SCNC: 4.8 MMOL/L (ref 3.5–5.1)
RBC # BLD AUTO: 2.71 X10(6)/MCL (ref 4.2–5.4)
SODIUM SERPL-SCNC: 138 MMOL/L (ref 136–145)
WBC # BLD AUTO: 6.37 X10(3)/MCL (ref 4.5–11.5)

## 2024-07-09 PROCEDURE — 25000003 PHARM REV CODE 250: Performed by: INTERNAL MEDICINE

## 2024-07-09 PROCEDURE — 36415 COLL VENOUS BLD VENIPUNCTURE: CPT | Performed by: INTERNAL MEDICINE

## 2024-07-09 PROCEDURE — 80100016 HC MAINTENANCE HEMODIALYSIS

## 2024-07-09 PROCEDURE — 85025 COMPLETE CBC W/AUTO DIFF WBC: CPT | Performed by: INTERNAL MEDICINE

## 2024-07-09 PROCEDURE — 63600175 PHARM REV CODE 636 W HCPCS: Mod: JZ,EC,JG | Performed by: INTERNAL MEDICINE

## 2024-07-09 PROCEDURE — 80048 BASIC METABOLIC PNL TOTAL CA: CPT | Performed by: INTERNAL MEDICINE

## 2024-07-09 PROCEDURE — 25000003 PHARM REV CODE 250: Performed by: NURSE PRACTITIONER

## 2024-07-09 PROCEDURE — 21400001 HC TELEMETRY ROOM

## 2024-07-09 PROCEDURE — 25000003 PHARM REV CODE 250: Performed by: STUDENT IN AN ORGANIZED HEALTH CARE EDUCATION/TRAINING PROGRAM

## 2024-07-09 RX ADMIN — PANTOPRAZOLE SODIUM 40 MG: 40 TABLET, DELAYED RELEASE ORAL at 08:07

## 2024-07-09 RX ADMIN — LABETALOL HYDROCHLORIDE 200 MG: 200 TABLET, FILM COATED ORAL at 09:07

## 2024-07-09 RX ADMIN — CALCIUM CARBONATE (ANTACID) CHEW TAB 500 MG 1000 MG: 500 CHEW TAB at 08:07

## 2024-07-09 RX ADMIN — NYSTATIN AND TRIAMCINOLONE ACETONIDE: 100000; 1 CREAM TOPICAL at 08:07

## 2024-07-09 RX ADMIN — HYDROCODONE BITARTRATE AND ACETAMINOPHEN 1 TABLET: 5; 325 TABLET ORAL at 02:07

## 2024-07-09 RX ADMIN — NYSTATIN AND TRIAMCINOLONE ACETONIDE: 100000; 1 CREAM TOPICAL at 02:07

## 2024-07-09 RX ADMIN — HYDROCODONE BITARTRATE AND ACETAMINOPHEN 1 TABLET: 5; 325 TABLET ORAL at 08:07

## 2024-07-09 RX ADMIN — CALCIUM CARBONATE (ANTACID) CHEW TAB 500 MG 1000 MG: 500 CHEW TAB at 09:07

## 2024-07-09 RX ADMIN — LABETALOL HYDROCHLORIDE 200 MG: 200 TABLET, FILM COATED ORAL at 08:07

## 2024-07-09 RX ADMIN — HYDROCODONE BITARTRATE AND ACETAMINOPHEN 1 TABLET: 5; 325 TABLET ORAL at 09:07

## 2024-07-09 RX ADMIN — FERROUS SULFATE TAB 325 MG (65 MG ELEMENTAL FE) 1 EACH: 325 (65 FE) TAB at 09:07

## 2024-07-09 RX ADMIN — ERYTHROPOIETIN 10000 UNITS: 10000 INJECTION, SOLUTION INTRAVENOUS; SUBCUTANEOUS at 02:07

## 2024-07-09 RX ADMIN — FOLIC ACID 1 MG: 1 TABLET ORAL at 08:07

## 2024-07-09 RX ADMIN — NYSTATIN AND TRIAMCINOLONE ACETONIDE: 100000; 1 CREAM TOPICAL at 09:07

## 2024-07-09 RX ADMIN — ERGOCALCIFEROL 50000 UNITS: 1.25 CAPSULE ORAL at 01:07

## 2024-07-09 RX ADMIN — FERROUS SULFATE TAB 325 MG (65 MG ELEMENTAL FE) 1 EACH: 325 (65 FE) TAB at 08:07

## 2024-07-09 NOTE — PT/OT/SLP PROGRESS
Physical Therapy      Patient Name:  Fior Joya   MRN:  [id number]    Patient not seen this AM secondary to off of floor in Dialysis. Will follow-up as schedule permits.

## 2024-07-09 NOTE — CARE UPDATE
[id number] Spoke with Dr mehta who reports pt will go home with home health tomorrow. Pt would like a WC for home use. Referral faxed to [facility] at [phone]

## 2024-07-09 NOTE — PROGRESS NOTES
Inpatient Nutrition Assessment    Admit Date: 6/22/2024   Total duration of encounter: 17 days   Patient Age: 69 y.o.    Nutrition Recommendation/Prescription     Continue current diet (Diet Renal On Dialysis) as tolerated;   Continue Boost Plus (provides 360 kcal, 14 g protein per serving) BID.    Communication of Recommendations: reviewed with nurse    Nutrition Assessment     Malnutrition Assessment/Nutrition-Focused Physical Exam    Malnutrition Context: acute illness or injury (06/22/24 1506)  Malnutrition Level:  (does not meet criteria at this time) (06/22/24 1506)  Energy Intake (Malnutrition):  (unable to obtain) (06/22/24 1506)  Weight Loss (Malnutrition):  (unable to obtain) (06/22/24 1506)  Subcutaneous Fat (Malnutrition):  (does not meet) (06/22/24 1506)           Muscle Mass (Malnutrition):  (does not meet) (06/22/24 1506)                          Fluid Accumulation (Malnutrition): severe (06/22/24 1506)        A minimum of two characteristics is recommended for diagnosis of either severe or non-severe malnutrition.    Chart Review    Reason Seen: follow-up    Malnutrition Screening Tool Results   Have you recently lost weight without trying?: Unsure  Have you been eating poorly because of a decreased appetite?: Yes   MST Score: 3   Diagnosis:  Acute renal failure  Sepsis/UTI  Bilateral hydroureteronephrosis  Bilateral pleural effusions  Bilateral lower extremity edema  Anemia of chronic disease    Relevant Medical History: no known past medical history     Scheduled Medications:  calcium carbonate, 1,000 mg, BID  epoetin amaya, 10,000 Units, Every Tues, Thurs, Sat  ergocalciferol, 50,000 Units, Q72H  ferrous sulfate, 1 tablet, BID  folic acid, 1 mg, Daily  labetaloL, 200 mg, Q12H  nystatin-triamcinolone, , TID  pantoprazole, 40 mg, Daily  polyethylene glycol, 17 g, Daily    Continuous Infusions: none       PRN Medications:   0.9%  NaCl infusion (for blood administration), , Q24H PRN  0.9%  NaCl  infusion (for blood administration), , Q24H PRN  0.9%  NaCl infusion (for blood administration), , Q24H PRN  acetaminophen, 650 mg, Q4H PRN  aluminum-magnesium hydroxide-simethicone, 30 mL, Q6H PRN  ammonium lactate, , BID PRN  calcium carbonate, 1,000 mg, Q6H PRN  heparin (porcine), 4,000 Units, PRN  hydrALAZINE, 10 mg, Q2H PRN  HYDROcodone-acetaminophen, 1 tablet, Q6H PRN  labetaloL, 10 mg, Q4H PRN  melatonin, 6 mg, Nightly PRN  ondansetron, 4 mg, Q8H PRN  prochlorperazine, 5 mg, Q6H PRN  sodium chloride 0.9%, 10 mL, PRN    Calorie Containing IV Medications: no significant kcals from medications at this time    Recent Labs   Lab 07/03/24  0451 07/04/24  0421 07/05/24  0343 07/07/24  0556 07/08/24  0551 07/09/24  0532   * 137 137 137 139 138   K 4.2 4.6 4.1 4.6 4.6 4.8   CALCIUM 7.6* 7.8* 8.2* 8.4 8.4 8.6   PHOS  --  2.2*  --  2.5 2.8  --    MG  --   --   --   --  1.60  --    CO2 27 24 26 26 24 22*   BUN 22.5* 30.0* 19.1 17.1 26.1* 33.1*   CREATININE 4.18* 5.20* 3.94* 3.70* 4.90* 5.75*   EGFRNORACEVR 11 9 12 13 9 8   GLUCOSE 99 114 104 101 108 93   BILITOT  --   --   --  0.3 0.3  --    ALKPHOS  --   --   --  74 79  --    ALT  --   --   --  12 12  --    AST  --   --   --  14 13  --    ALBUMIN  --  2.3*  --  2.4*  2.5* 2.6*  --    WBC  --  6.27 6.51 6.10  --  6.37   HGB  --  8.9* 8.8* 8.3*  --  8.2*   HCT  --  29.6* 27.8* 27.2*  --  26.6*     Nutrition Orders:  Diet Renal On Dialysis  Dietary nutrition supplements Boost Plus Nutritional Drink - Rich Chocolate; BID  Appetite/Oral Intake: good/% of meals  Factors Affecting Nutritional Intake: none identified  Social Needs Impacting Access to Food: none identified  Food/Yazidi/Cultural Preferences: unable to obtain  Food Allergies: no known food allergies listed  Last Bowel Movement: 07/08/24  Wound(s):     Wound 06/22/24 0630 Other (comment) Left anterior;lower Leg-Tissue loss description: Not applicable  [REMOVED]      Wound 06/22/24 0630 Pressure  "Injury midline Sacral spine-Tissue loss description: Not applicableno pressure injuries documented at this time     Comments    6/22/24 Patient in ICU, severe acidosis, emergent dialysis today, NPO, no plans to feed, patient unable to answer questions.    6/26/24 Patient up in chair eating lunch during rounds, reports decreased appetite, agreeable to chocolate Boost. She reports good appetite prior to admission and denies weight loss, she is unsure of usual/dry weight.    7/1/24: Patient reports good oral intake, drinking Boost Plus supplements twice daily. Denies nausea, vomiting, diarrhea. Constipation noted.     7/5/24: Pt asleep during rounds; noted ~ half of breakfast meal consumed.     7/9/24: Per RN, pt is eating very well.     Anthropometrics    Height: 5' 4" (162.6 cm),    Last Weight: (!) 137.4 kg (303 lb) (07/09/24 0524), Weight Method: Bed Scale  BMI (Calculated): 52  BMI Classification: obese grade III (BMI >/=40)     Ideal Body Weight (IBW), Female: 120 lb     % Ideal Body Weight, Female (lb): 244.72 %                             Usual Weight Provided By: patient denies unintentional weight loss    Wt Readings from Last 5 Encounters:   07/09/24 (!) 137.4 kg (303 lb)   06/21/24 113.4 kg (250 lb)     Weight Change(s) Since Admission: severe edema noted, new admit  Wt Readings from Last 1 Encounters:   07/09/24 0524 (!) 137.4 kg (303 lb)   07/08/24 0210 (!) 139.7 kg (307 lb 15.7 oz)   07/06/24 0600 (!) 140 kg (308 lb 10.3 oz)   07/03/24 0612 (!) 138.8 kg (306 lb)   07/01/24 0519 (!) 139 kg (306 lb 7 oz)   06/28/24 0610 135.4 kg (298 lb 8.1 oz)   06/22/24 0545 133.2 kg (293 lb 10.4 oz)   06/22/24 0107 106.6 kg (235 lb)   Admit Weight: 106.6 kg (235 lb) (06/22/24 0107), Weight Method: Bed Scale    Estimated Needs    Weight Used For Calorie Calculations: 106.6 kg (235 lb)  Energy Calorie Requirements (kcal): 2213, 1.4 stress factor  Energy Need Method: [place]  Weight Used For Protein Calculations: " 106.6 kg (235 lb)  Protein Requirements: 160 g, 1.5 g/kg  Fluid Requirements (mL): 1000 plus urinary output or per physician        Enteral Nutrition Patient not receiving enteral nutrition at this time.    Parenteral Nutrition Patient not receiving parenteral nutrition support at this time.    Evaluation of Received Nutrient Intake    Calories: meeting estimated needs  Protein: meeting estimated needs    Patient Education Not applicable.    Nutrition Diagnosis     PES: Inadequate energy intake related to inability to consume sufficient nutrients as evidenced by less than 80% needs met. (resolved)    Nutrition Interventions     Intervention(s): general/healthful diet, commercial beverage, and collaboration with other providers  Goal: Meet greater than 80% of nutritional needs by follow-up. (goal progressing)    Nutrition Goals & Monitoring     Dietitian will monitor: food and beverage intake, weight, weight change, electrolyte/renal panel, beliefs/attitudes, glucose/endocrine profile, and gastrointestinal profile  Discharge planning: resume home regimen  Nutrition Risk/Follow-Up: low (follow-up in 5-7 days)   Please consult if re-assessment needed sooner.

## 2024-07-09 NOTE — CARE UPDATE
[id number] Rec call from Nini with [employer] who reports pt does not qualify for a WC however she does qualify for a RW. Will speak with pt and let her know.

## 2024-07-09 NOTE — PLAN OF CARE
Pt will DC home w/HH. Met with pt during HD. Pt would like to receive HD services @ [facility] where she can be followed for renal recovery by [facility]. She resides with her family and her son will drive her to/from HD. Referral sent to [facility]. Anticipate 24-48 hours for setup - I did inform clinic that DC is planned for tomorrow. Dr. Cardozo, DERICK Varner, and LELE Abreu aware. Will follow up.

## 2024-07-09 NOTE — PROGRESS NOTES
[facility]        Chief Complaint: Inpatient Follow-up for ARF/HD    HPI:   69-year-old lady with no PMH who was transferred to [facility] from [place] with complaints of dyspnea on exertion, B/L lower extremity edema, shortness for breath. She was noted to have severe anemia with HGB/HCT of 5.7/17.7, WBCs of 15.8, hyperkalemia with potassium 6.8,, BUN/creatinine of 209/16.94. UA showed leukocytes and many bacteria as well as 3+ protein, 3+ blood. Blood and urine cultures were sent off. Nephrology was consulted on admission and patient was admitted to ICU for placement of dialysis catheter and initiation of hemodialysis. She also received transfusion with 2 units PRBCs with improvement in symptoms. Urology was consulted due to B/L hydronephrosis on ultrasound with urine retention requiring Mario catheter. Placed on IV vancomycin, IV Zosyn. Downgraded from ICU to Hospital Medicine on 06/25. At bedside, patient stated she was doing well and had no new complaints. Continued to have some tremors and some shortness of breath but overall felt improved. On IV Vancomycin, IV Zosyn. Urine cultures grew GBS, Blood cultures negative x 72 hours. Nephrology on board; follow recommendations. Urology on board; follow recommendations. Continued on hemodialysis. PT/OT consulted; recommending moderate intensity therapy. Continued on FeSO4 b.i.d., folic acid 1 mg daily, labetalol 200 mg b.i.d., Protonix 40 mg b.i.d..      Patient has been taken to the OR for systolic he, retrograde pyelogram and possible JJ stents with Urology    Patient is status post cystoscopy with placement of double-J stents in the right, left was very difficult and was not able to be done   IR was consulted for placement of a left PCN, this has been completed and nephrostomy bag draining urine leaking bloody   right Tunneled catheter placed 7/3 per Dr Barcenas. She is now on HD TTF.      Patient will go  home with HH when we have a HD chair time available.     Interval Hx:   Patient today awake and comfortable. Tolerating HD. Denies any fever, chills or chest pain. Eating well. Ambulating with a walker.     Case was discussed with patient's nurse and  on the floor.    Objective/physical exam:  General: In no acute distress, Obese   Chest: Clear to auscultation bilaterally  Heart: RRR, +S1, S2, no appreciable murmur  Abdomen: Soft, nontender, BS +  MSK: Warm, no lower extremity edema, no clubbing or cyanosis  Neurologic: Alert and oriented x4, Cranial nerve II-XII intact, Strength 5/5 in all 4 extremities    VITAL SIGNS: 24 HRS MIN & MAX LAST   Temp  Min: 97.7 °F (36.5 °C)  Max: 99.7 °F (37.6 °C) 98.7 °F (37.1 °C)   BP  Min: 129/63  Max: 159/73 (!) 141/72   Pulse  Min: 79  Max: 98  98   Resp  Min: 18  Max: 20 20   SpO2  Min: 94 %  Max: 99 % 95 %     I have reviewed the following labs:  Recent Labs   Lab 07/05/24  0343 07/07/24  0556 07/09/24  0532   WBC 6.51 6.10 6.37   RBC 2.88* 2.80* 2.71*   HGB 8.8* 8.3* 8.2*   HCT 27.8* 27.2* 26.6*   MCV 96.5* 97.1* 98.2*   MCH 30.6 29.6 30.3   MCHC 31.7* 30.5* 30.8*   RDW 14.6 14.3 14.4    184 236   MPV 11.2* 11.8* 10.8*     Recent Labs   Lab 07/04/24  0421 07/05/24  0343 07/07/24  0556 07/08/24  0551 07/09/24  0532      < > 137 139 138   K 4.6   < > 4.6 4.6 4.8      < > 102 103 104   CO2 24   < > 26 24 22*   BUN 30.0*   < > 17.1 26.1* 33.1*   CREATININE 5.20*   < > 3.70* 4.90* 5.75*   CALCIUM 7.8*   < > 8.4 8.4 8.6   MG  --   --   --  1.60  --    ALBUMIN 2.3*  --  2.4*  2.5* 2.6*  --    ALKPHOS  --   --  74 79  --    ALT  --   --  12 12  --    AST  --   --  14 13  --    BILITOT  --   --  0.3 0.3  --     < > = values in this interval not displayed.     Microbiology Results (last 7 days)       ** No results found for the last 168 hours. **             See below for Radiology    Assessment/Plan:  Bilateral hydroureteronephrosis   Acute kidney  injury, possible component of chronic kidney disease and evidence of obstructive uropathy, post initiation of hemodialysis 06/22/2024- Right  Tunneled catheter placed 7/3 per Dr Barcenas.   On HD   UTI-GBS  Anemia of chronic disease secondary to CKD   Left lower extremity cellulitis: Resolved   Hypertension  Morbid obesity, BMI 50.4  Constipation    Plan:  Patient has no new issues. Tolerating HD   Requesting for a walker and wheelchair. CM notified   She is ambulating with walker     Hb today 8.2, added epogen TTS with HD   Continue supportive care          VTE prophylaxis: SCD    Patient condition:  Fair    Anticipated discharge and Disposition:   home with HH possible in am       All diagnosis and differential diagnosis have been reviewed; assessment and plan has been documented; I have personally reviewed the labs and test results that are presently available; I have reviewed the patients medication list; I have reviewed the consulting providers response and recommendations. I have reviewed or attempted to review medical records based upon their availability    All of the patient's questions have been  addressed and answered. Patient's is agreeable to the above stated plan. I will continue to monitor closely and make adjustments to medical management as needed.    Portions of this note dictated using EMR integrated voice recognition software, and may be subject to voice recognition errors not corrected at proofreading. Please contact writer for clarification if needed.   _____________________________________________________________________    Malnutrition Status:    Scheduled Med:   calcium carbonate  1,000 mg Oral BID    epoetin amaya  10,000 Units Intravenous Every Tues, Thurs, Sat    ergocalciferol  50,000 Units Oral Q72H    ferrous sulfate  1 tablet Oral BID    folic acid  1 mg Oral Daily    labetaloL  200 mg Oral Q12H    nystatin-triamcinolone   Topical (Top) TID    pantoprazole  40 mg Oral Daily     polyethylene glycol  17 g Oral Daily      Continuous Infusions:     PRN Meds:    Current Facility-Administered Medications:     0.9%  NaCl infusion (for blood administration), , Intravenous, Q24H PRN    0.9%  NaCl infusion (for blood administration), , Intravenous, Q24H PRN    0.9%  NaCl infusion (for blood administration), , Intravenous, Q24H PRN    acetaminophen, 650 mg, Oral, Q4H PRN    aluminum-magnesium hydroxide-simethicone, 30 mL, Oral, Q6H PRN    ammonium lactate, , Topical (Top), BID PRN    calcium carbonate, 1,000 mg, Oral, Q6H PRN    heparin (porcine), 4,000 Units, Intravenous, PRN    hydrALAZINE, 10 mg, Intravenous, Q2H PRN    HYDROcodone-acetaminophen, 1 tablet, Oral, Q6H PRN    labetaloL, 10 mg, Intravenous, Q4H PRN    melatonin, 6 mg, Oral, Nightly PRN    ondansetron, 4 mg, Intravenous, Q8H PRN    prochlorperazine, 5 mg, Intravenous, Q6H PRN    sodium chloride 0.9%, 10 mL, Intravenous, PRN     Radiology:  I have personally reviewed the following imaging and agree with the radiologist.     Cardiac catheterization  Procedure performed in the Invasive Lab    - See Procedure Log link below for nursing documentation    - See OpNote on Surgeries Tab for physician findings    - See Imaging Tab for radiologist dictation      Moise Villafana MD  Department of Hospital Medicine   [facility]   07/09/2024

## 2024-07-09 NOTE — CARE UPDATE
[id number] Spoke with pt and informed her she did not qualify for a WC however she qualifies for a RW. Faxed the order for a RW to [employer] at [phone]  Provided pt a list of  agencies that accept pt's insurance

## 2024-07-09 NOTE — NURSING
07/09/24 1146   Post-Hemodialysis Assessment   Rinseback Volume (mL) 500 mL   Blood Volume Processed (Liters) 63 L   Dialyzer Clearance Lightly streaked   Duration of Treatment 180 minutes   Additional Fluid Intake (mL) 0 mL   Total UF (mL) 1500 mL   Net Fluid Removal 1000   Patient Response to Treatment pt tolerated well. Pt did clot off. Re-setup and completed tx. New caps applied.

## 2024-07-09 NOTE — PROGRESS NOTES
[facility] - 8th Floor Med Surg  Nephrology  Progress Note    Patient Name: Fior Joya  MRN: [id number]  Admission Date: 6/22/2024  Hospital Length of Stay: 17 days  Attending Provider: Moise Villafana MD   Primary Care Physician: Linda, Primary Doctor  Principal Problem:Metabolic acidosis      Subjective:   Fior Joya is a 69 y.o. female who was transferred from hospital in [place] due to acute renal failure.  She initially presented to the hospital due to generalized weakness and shortness of breath with exertion.  Per their report she had been mostly bed-bound for the past 3 weeks.  He reported that she had been drinking getting up going to the restroom.  She did have some episodes of nausea vomiting and generalized weakness along with generalized muscle pain more recently.  She was endorsing shortness of breath but no chest pain.  Denied had been running a fever.  Per their his report, she had not seen a physician in 10 years, because she was not feeling ill and did not feel like she needed to go.  She had also been having some swelling in the legs.  She has no known medical issues per their report.  On admit, WBC elevated at 15, RBC 1.8, hemoglobin 5.7, D-dimer elevated at 1.57, serum sodium 132, potassium 6, serum CO2 less than 5, , creatinine 14.8, glucose 273, calcium 7.5, phosphorus 7.9, albumin 2.9.  TSH and lactic were okay.  UA results indicative of UTI sent for cultures.  Blood cultures obtained and pending.  Received phone call from ER physician in early a.m. hours, it was decided at that time we will proceed with initiation of RRT.  Right IJ temporary cath was placed per General surgery.   She is awake and alert this morning upon exam.  She does respond with simple answers to questions posed.  Son in room in majority of history obtained from him.  Oxygen saturation is 99% on nasal cannula.  We are consulted for management ESTRELLITA.  Bilateral hydronephrosis on CT from 06/21/2024         On 6/27 she underwent cystoscopy and right double J stent placement. The following day she had left nephroureteral stent placement with IR. She has been maintained on TThSa schedule for HD via right IJ temporary dialysis catheter. Tunneled catheter placed 7/3 per Dr Barcenas. No acute issues overnight. Resting comfortably on room air.     Interval History: Patient on dialysis now. Remains inpatient while CM attempting to place in rehab and dialysis setup can be finalized.     Review of patient's allergies indicates:   Allergen Reactions    Asa [aspirin] Anaphylaxis    Penicillins      Received ceftriaxone from 6/27, no reactions      Current Facility-Administered Medications   Medication Frequency    0.9%  NaCl infusion (for blood administration) Q24H PRN    0.9%  NaCl infusion (for blood administration) Q24H PRN    0.9%  NaCl infusion (for blood administration) Q24H PRN    acetaminophen tablet 650 mg Q4H PRN    aluminum-magnesium hydroxide-simethicone 200-200-20 mg/5 mL suspension 30 mL Q6H PRN    ammonium lactate 12 % lotion BID PRN    calcium carbonate 200 mg calcium (500 mg) chewable tablet 1,000 mg Q6H PRN    calcium carbonate 200 mg calcium (500 mg) chewable tablet 1,000 mg BID    ergocalciferol capsule 50,000 Units Q72H    ferrous sulfate tablet 1 each BID    folic acid tablet 1 mg Daily    heparin (porcine) injection 4,000 Units PRN    hydrALAZINE injection 10 mg Q2H PRN    HYDROcodone-acetaminophen 5-325 mg per tablet 1 tablet Q6H PRN    labetaloL injection 10 mg Q4H PRN    labetaloL tablet 200 mg Q12H    melatonin tablet 6 mg Nightly PRN    nystatin-triamcinolone cream TID    ondansetron injection 4 mg Q8H PRN    pantoprazole EC tablet 40 mg Daily    polyethylene glycol packet 17 g Daily    prochlorperazine injection Soln 5 mg Q6H PRN    sodium chloride 0.9% flush 10 mL PRN       Objective:     Vital Signs (Most Recent):  Temp: 98.7 °F (37.1 °C) (07/09/24 0726)  Pulse: 98 (07/09/24 0726)  Resp: 20  (07/09/24 0814)  BP: (!) 141/72 (07/09/24 0726)  SpO2: 95 % (07/09/24 0726) Vital Signs (24h Range):  Temp:  [97.7 °F (36.5 °C)-99.7 °F (37.6 °C)] 98.7 °F (37.1 °C)  Pulse:  [79-98] 98  Resp:  [18-20] 20  SpO2:  [94 %-99 %] 95 %  BP: (129-159)/(63-77) 141/72     Weight: (!) 137.4 kg (303 lb) (07/09/24 0524)  Body mass index is 52.01 kg/m².  Body surface area is 2.49 meters squared.    I/O last 3 completed shifts:  In: 1240 [P.O.:1240]  Out: 1650 [Urine:1650]    Physical Exam  Constitutional:       General: She is not in acute distress.  HENT:      Head: Atraumatic.      Nose: Nose normal.      Mouth/Throat:      Mouth: Mucous membranes are moist.   Eyes:      Extraocular Movements: Extraocular movements intact.      Conjunctiva/sclera: Conjunctivae normal.   Neck:      Comments: right chest wall tunneled   Cardiovascular:      Rate and Rhythm: Normal rate and regular rhythm.      Pulses: Normal pulses.      Heart sounds: Normal heart sounds.   Pulmonary:      Effort: Pulmonary effort is normal.      Breath sounds: Normal breath sounds.   Abdominal:      Palpations: Abdomen is soft.   Genitourinary:     Comments: Left nephrostomy tube draining clear yellow urine   Musculoskeletal:         General: No swelling.      Cervical back: Neck supple.   Skin:     General: Skin is warm.   Neurological:      General: No focal deficit present.      Mental Status: She is alert and oriented to person, place, and time.         Significant Labs:sureBMP:   Recent Labs   Lab 07/08/24  0551 07/09/24  0532    138   K 4.6 4.8    104   CO2 24 22*   BUN 26.1* 33.1*   CREATININE 4.90* 5.75*   CALCIUM 8.4 8.6   MG 1.60  --      CBC:   Recent Labs   Lab 07/09/24  0532   WBC 6.37   RBC 2.71*   HGB 8.2*   HCT 26.6*      MCV 98.2*   MCH 30.3   MCHC 30.8*     Microbiology Results (last 7 days)       ** No results found for the last 168 hours. **          Specimen (24h ago, onward)      None          No results for input(s):  ""COLORU", "CLARITYU", "SPECGRAV", "PHUR", "PROTEINUA", "GLUCOSEU", "BILIRUBINCON", "BLOODU", "WBCU", "RBCU", "BACTERIA", "MUCUS", "NITRITE", "LEUKOCYTESUR", "UROBILINOGEN", "HYALINECASTS" in the last 168 hours.    Significant Imaging:    Imaging reviewed     Assessment/Plan:     ESTRELLITA secondary to ATN secondary to obstructive uropathy  -Dialysis initiated 6/22 via right IJ temporary dialysis catheter.    -Tunneled catheter placed 7/3 per Dr Barcenas   Patient's probably has some underlying chronic kidney disease as she does have significant proteinuria more than 8 g per 24 hours.   Shaking and jerking movements of the extremities probably secondary to uremia,  now resolved completely with dialysis   UTI - Now on zosyn and vanc  Hyperkalemia  Improved.  Acute metabolic acidosis  Improved.  Anemia    -She will continue to dialyze on TTS schedule while inpatient.   -Plan now is to dc home with MARCELLO ROYAL working on set up at [facility]. This will likely take 24-48 hours before she can be approved for discharge from renal standpoint   -Start Epogen for anemia of CKD         TIFFANY Langston  Nephrology  [facility] - 8th Floor Med Surg  "

## 2024-07-10 VITALS
HEART RATE: 82 BPM | TEMPERATURE: 99 F | DIASTOLIC BLOOD PRESSURE: 72 MMHG | OXYGEN SATURATION: 97 % | RESPIRATION RATE: 20 BRPM | WEIGHT: 293 LBS | BODY MASS INDEX: 50.02 KG/M2 | SYSTOLIC BLOOD PRESSURE: 147 MMHG | HEIGHT: 64 IN

## 2024-07-10 LAB
HBV SURFACE AB SER QL IA: NEGATIVE
HBV SURFACE AB SERPL IA-ACNC: <3.5 MIU/ML

## 2024-07-10 PROCEDURE — 25000003 PHARM REV CODE 250: Performed by: INTERNAL MEDICINE

## 2024-07-10 PROCEDURE — 97116 GAIT TRAINING THERAPY: CPT | Mod: CQ

## 2024-07-10 PROCEDURE — 97535 SELF CARE MNGMENT TRAINING: CPT | Mod: CO

## 2024-07-10 PROCEDURE — 25000003 PHARM REV CODE 250: Performed by: NURSE PRACTITIONER

## 2024-07-10 RX ORDER — FOLIC ACID 1 MG/1
1 TABLET ORAL DAILY
Qty: 30 TABLET | Refills: 0 | Status: SHIPPED | OUTPATIENT
Start: 2024-07-11 | End: 2024-08-10

## 2024-07-10 RX ORDER — LABETALOL 200 MG/1
200 TABLET, FILM COATED ORAL EVERY 12 HOURS
Qty: 60 TABLET | Refills: 11 | Status: SHIPPED | OUTPATIENT
Start: 2024-07-10 | End: 2025-07-10

## 2024-07-10 RX ORDER — BENZONATATE 100 MG/1
100 CAPSULE ORAL 3 TIMES DAILY PRN
Status: DISCONTINUED | OUTPATIENT
Start: 2024-07-10 | End: 2024-07-10 | Stop reason: HOSPADM

## 2024-07-10 RX ORDER — FERROUS SULFATE 325(65) MG
325 TABLET, DELAYED RELEASE (ENTERIC COATED) ORAL DAILY
Qty: 30 TABLET | Refills: 0 | Status: SHIPPED | OUTPATIENT
Start: 2024-07-10 | End: 2024-08-09

## 2024-07-10 RX ORDER — ERGOCALCIFEROL 1.25 MG/1
50000 CAPSULE ORAL
Qty: 10 CAPSULE | Refills: 2 | Status: SHIPPED | OUTPATIENT
Start: 2024-07-12 | End: 2024-10-10

## 2024-07-10 RX ORDER — PANTOPRAZOLE SODIUM 40 MG/1
40 TABLET, DELAYED RELEASE ORAL DAILY
Qty: 90 TABLET | Refills: 3 | Status: SHIPPED | OUTPATIENT
Start: 2024-07-11 | End: 2025-07-11

## 2024-07-10 RX ORDER — CALCIUM CARBONATE 200(500)MG
1000 TABLET,CHEWABLE ORAL 2 TIMES DAILY
Qty: 120 TABLET | Refills: 11 | Status: SHIPPED | OUTPATIENT
Start: 2024-07-10 | End: 2025-07-10

## 2024-07-10 RX ORDER — POLYETHYLENE GLYCOL 3350 17 G/17G
17 POWDER, FOR SOLUTION ORAL DAILY
Start: 2024-07-11

## 2024-07-10 RX ADMIN — POLYETHYLENE GLYCOL 3350 17 G: 17 POWDER, FOR SOLUTION ORAL at 10:07

## 2024-07-10 RX ADMIN — BENZONATATE 100 MG: 100 CAPSULE ORAL at 12:07

## 2024-07-10 RX ADMIN — NYSTATIN AND TRIAMCINOLONE ACETONIDE: 100000; 1 CREAM TOPICAL at 10:07

## 2024-07-10 RX ADMIN — PANTOPRAZOLE SODIUM 40 MG: 40 TABLET, DELAYED RELEASE ORAL at 10:07

## 2024-07-10 RX ADMIN — LABETALOL HYDROCHLORIDE 200 MG: 200 TABLET, FILM COATED ORAL at 10:07

## 2024-07-10 RX ADMIN — FOLIC ACID 1 MG: 1 TABLET ORAL at 10:07

## 2024-07-10 RX ADMIN — CALCIUM CARBONATE (ANTACID) CHEW TAB 500 MG 1000 MG: 500 CHEW TAB at 10:07

## 2024-07-10 RX ADMIN — FERROUS SULFATE TAB 325 MG (65 MG ELEMENTAL FE) 1 EACH: 325 (65 FE) TAB at 10:07

## 2024-07-10 RX ADMIN — HYDROCODONE BITARTRATE AND ACETAMINOPHEN 1 TABLET: 5; 325 TABLET ORAL at 05:07

## 2024-07-10 NOTE — PLAN OF CARE
[facility] She can start Thursday, July 11th @ 11:00 AM. Dr. Cardozo, Dr. Mercado, and LELE day.

## 2024-07-10 NOTE — PROGRESS NOTES
[facility] Progress Note        Chief Complaint: Inpatient Follow-up for ARF/HD    HPI:   69-year-old lady with no PMH who was transferred to [facility] from [place] with complaints of dyspnea on exertion, B/L lower extremity edema, shortness for breath. She was noted to have severe anemia with HGB/HCT of 5.7/17.7, WBCs of 15.8, hyperkalemia with potassium 6.8,, BUN/creatinine of 209/16.94. UA showed leukocytes and many bacteria as well as 3+ protein, 3+ blood. Blood and urine cultures were sent off. Nephrology was consulted on admission and patient was admitted to ICU for placement of dialysis catheter and initiation of hemodialysis. She also received transfusion with 2 units PRBCs with improvement in symptoms. Urology was consulted due to B/L hydronephrosis on ultrasound with urine retention requiring Mario catheter. Placed on IV vancomycin, IV Zosyn. Downgraded from ICU to Hospital Medicine on 06/25. At bedside, patient stated she was doing well and had no new complaints. Continued to have some tremors and some shortness of breath but overall felt improved. On IV Vancomycin, IV Zosyn. Urine cultures grew GBS, Blood cultures negative x 72 hours. Nephrology on board; follow recommendations. Urology on board; follow recommendations. Continued on hemodialysis. PT/OT consulted; recommending moderate intensity therapy. Continued on FeSO4 b.i.d., folic acid 1 mg daily, labetalol 200 mg b.i.d., Protonix 40 mg b.i.d..      Patient has been taken to the OR for systolic he, retrograde pyelogram and possible JJ stents with Urology    Patient is status post cystoscopy with placement of double-J stents in the right, left was very difficult and was not able to be done   IR was consulted for placement of a left PCN, this has been completed and nephrostomy bag draining urine leaking bloody   right Tunneled catheter placed 7/3 per Dr Barcenas. She is now on HD TTF.      Patient will go  home with HH when we have a HD chair time available.     Interval Hx:   Patient today awake and comfortable. Doing well. Ambulating well with a walker. She has been afebrile.     Case was discussed with patient's nurse and  on the floor.    Objective/physical exam:  General: In no acute distress, Obese   Chest: Clear to auscultation bilaterally  Heart: RRR, +S1, S2, no appreciable murmur  Abdomen: Soft, nontender, BS +  MSK: Warm, no lower extremity edema, no clubbing or cyanosis  Neurologic: Alert and oriented x4, Cranial nerve II-XII intact, Strength 5/5 in all 4 extremities    VITAL SIGNS: 24 HRS MIN & MAX LAST   Temp  Min: 98.1 °F (36.7 °C)  Max: 98.9 °F (37.2 °C) 98.3 °F (36.8 °C)   BP  Min: 130/63  Max: 156/81 138/65   Pulse  Min: 73  Max: 100  93   Resp  Min: 18  Max: 20 20   SpO2  Min: 95 %  Max: 97 % 97 %     I have reviewed the following labs:  Recent Labs   Lab 07/05/24 0343 07/07/24  0556 07/09/24  0532   WBC 6.51 6.10 6.37   RBC 2.88* 2.80* 2.71*   HGB 8.8* 8.3* 8.2*   HCT 27.8* 27.2* 26.6*   MCV 96.5* 97.1* 98.2*   MCH 30.6 29.6 30.3   MCHC 31.7* 30.5* 30.8*   RDW 14.6 14.3 14.4    184 236   MPV 11.2* 11.8* 10.8*     Recent Labs   Lab 07/04/24  0421 07/05/24  0343 07/07/24  0556 07/08/24  0551 07/09/24  0532      < > 137 139 138   K 4.6   < > 4.6 4.6 4.8      < > 102 103 104   CO2 24   < > 26 24 22*   BUN 30.0*   < > 17.1 26.1* 33.1*   CREATININE 5.20*   < > 3.70* 4.90* 5.75*   CALCIUM 7.8*   < > 8.4 8.4 8.6   MG  --   --   --  1.60  --    ALBUMIN 2.3*  --  2.4*  2.5* 2.6*  --    ALKPHOS  --   --  74 79  --    ALT  --   --  12 12  --    AST  --   --  14 13  --    BILITOT  --   --  0.3 0.3  --     < > = values in this interval not displayed.     Microbiology Results (last 7 days)       ** No results found for the last 168 hours. **             See below for Radiology    Assessment/Plan:  Bilateral hydroureteronephrosis   Acute kidney injury, possible component of chronic  kidney disease and evidence of obstructive uropathy, post initiation of hemodialysis 06/22/2024- Right  Tunneled catheter placed 7/3 per Dr Barcenas.   On HD   UTI-GBS  Anemia of chronic disease secondary to CKD   Left lower extremity cellulitis: Resolved   Hypertension  Morbid obesity, BMI 50.4  Constipation    Plan:  Patient looks comfortable. No new issues.   She is ambulating with walker   HD per renal team     Continue  epogen TTS with HD   Continue supportive care          VTE prophylaxis: SCD    Patient condition:  Fair    Anticipated discharge and Disposition:   home with HH possible in am       All diagnosis and differential diagnosis have been reviewed; assessment and plan has been documented; I have personally reviewed the labs and test results that are presently available; I have reviewed the patients medication list; I have reviewed the consulting providers response and recommendations. I have reviewed or attempted to review medical records based upon their availability    All of the patient's questions have been  addressed and answered. Patient's is agreeable to the above stated plan. I will continue to monitor closely and make adjustments to medical management as needed.    Portions of this note dictated using EMR integrated voice recognition software, and may be subject to voice recognition errors not corrected at proofreading. Please contact writer for clarification if needed.   _____________________________________________________________________    Malnutrition Status:    Scheduled Med:   calcium carbonate  1,000 mg Oral BID    epoetin amaya  10,000 Units Subcutaneous Every Tues, Thurs, Sat    ergocalciferol  50,000 Units Oral Q72H    ferrous sulfate  1 tablet Oral BID    folic acid  1 mg Oral Daily    labetaloL  200 mg Oral Q12H    nystatin-triamcinolone   Topical (Top) TID    pantoprazole  40 mg Oral Daily    polyethylene glycol  17 g Oral Daily      Continuous Infusions:     PRN Meds:    Current  Facility-Administered Medications:     0.9%  NaCl infusion (for blood administration), , Intravenous, Q24H PRN    0.9%  NaCl infusion (for blood administration), , Intravenous, Q24H PRN    0.9%  NaCl infusion (for blood administration), , Intravenous, Q24H PRN    acetaminophen, 650 mg, Oral, Q4H PRN    aluminum-magnesium hydroxide-simethicone, 30 mL, Oral, Q6H PRN    ammonium lactate, , Topical (Top), BID PRN    benzonatate, 100 mg, Oral, TID PRN    calcium carbonate, 1,000 mg, Oral, Q6H PRN    heparin (porcine), 4,000 Units, Intravenous, PRN    hydrALAZINE, 10 mg, Intravenous, Q2H PRN    HYDROcodone-acetaminophen, 1 tablet, Oral, Q6H PRN    labetaloL, 10 mg, Intravenous, Q4H PRN    melatonin, 6 mg, Oral, Nightly PRN    ondansetron, 4 mg, Intravenous, Q8H PRN    prochlorperazine, 5 mg, Intravenous, Q6H PRN    sodium chloride 0.9%, 10 mL, Intravenous, PRN     Radiology:  I have personally reviewed the following imaging and agree with the radiologist.     Cardiac catheterization  Procedure performed in the Invasive Lab    - See Procedure Log link below for nursing documentation    - See OpNote on Surgeries Tab for physician findings    - See Imaging Tab for radiologist dictation      Moise Villafana MD  Department of Hospital Medicine   [facility]   07/10/2024

## 2024-07-10 NOTE — PT/OT/SLP PROGRESS
Physical Therapy Treatment    Patient Name:  Fior Joya   MRN:  [id number]    Recommendations:     Discharge therapy intensity: Moderate Intensity Therapy   Discharge Equipment Recommendations: to be determined by next level of care  Barriers to discharge: Impaired mobility    Assessment:     Fior Joya is a 69 y.o. female admitted with a medical diagnosis of ESTRELLITA, anemia, hyperkalemia, UTI, metabolic acidemia, started on HD this admission, SOB, swelling, B hydroureteronephrosis, B pleural effusions, chronic knee pain. S/p cystoscopy 6/27 .  She presents with the following impairments/functional limitations: weakness, impaired endurance, impaired self care skills, impaired functional mobility, gait instability, impaired balance, impaired coordination.    Patient stated she has ramp, rolling walker, and wheelchair at home. Patient able to ambulate and safe for d/c with ramp, rolling walker, and wheelchair    Rehab Prognosis: Good; patient would benefit from acute skilled PT services to address these deficits and reach maximum level of function.    Recent Surgery: Procedure(s) (LRB):  Insertion, Catheter, Central Venous, Hemodialysis (N/A) 7 Days Post-Op    Plan:     During this hospitalization, patient would benefit from acute PT services 5 x/week to address the identified rehab impairments via gait training, therapeutic exercises, therapeutic activities and progress toward the following goals:    Plan of Care Expires:  07/25/24    Subjective     Chief Complaint: none stated  Patient/Family Comments/goals: none stated  Pain/Comfort:  Pain Rating 1: 0/10      Objective:     Communicated with nurse prior to session.  Patient found up in chair with nephrostomy upon PT entry to room.     General Precautions: Standard, fall  Orthopedic Precautions: N/A  Braces: N/A  Respiratory Status: Room air  Blood Pressure: NT  Skin Integrity: Visible skin intact      Functional Mobility:  Bed Mobility:     Scooting: stand by  assistance  Supine to Sit: stand by assistance  Sit to Supine: stand by assistance  Transfers:     Sit to Stand:  stand by assistance with rolling walker  Gait: patient amb 30ft with rolling walker with SBA with step through gait pattern.    Education:  Patient provided with verbal education education regarding PT role/goals/POC, fall prevention, and safety awareness.  Understanding was verbalized.     Patient left up in chair with all lines intact, call button in reach, and nurse notified    GOALS:   Multidisciplinary Problems       Physical Therapy Goals          Problem: Physical Therapy    Goal Priority Disciplines Outcome Goal Variances Interventions   Physical Therapy Goal     PT, PT/OT Progressing     Description: Goals to be met by: 2024     Patient will increase functional independence with mobility by performin. Supine to sit with Stand-by Assistance  2. Sit to stand transfer with Contact Guard Assistance  3. Gait  x 150 feet with Contact Guard Assistance using Rolling Walker.   4. Ascend/descend 4 stair with right Handrails Contact Guard Assistance using No Assistive Device.                          Time Tracking:     PT Received On: 07/10/24  PT Start Time: 1325     PT Stop Time: 1337  PT Total Time (min): 12 min     Billable Minutes: Gait Training 12    Treatment Type: Treatment  PT/PTA: PTA     Number of PTA visits since last PT visit: 1     07/10/2024

## 2024-07-10 NOTE — PROGRESS NOTES
"[facility] - 8th Floor Med Surg  Wound Care    Patient Name:  Fior Joya   MRN:  [id number]  Date: 7/10/2024  Diagnosis: Metabolic acidosis    History:     No past medical history on file.    Social History     Socioeconomic History    Marital status:    Tobacco Use    Smoking status: Never    Smokeless tobacco: Never   Substance and Sexual Activity    Alcohol use: Never    Drug use: Never    Sexual activity: Not Currently     Social Determinants of Health     Financial Resource Strain: Patient Unable To Answer (6/23/2024)    Overall Financial Resource Strain (CARDIA)     Difficulty of Paying Living Expenses: Patient unable to answer   Food Insecurity: Patient Unable To Answer (6/23/2024)    Hunger Vital Sign     Worried About Running Out of Food in the Last Year: Patient unable to answer     Ran Out of Food in the Last Year: Patient unable to answer   Transportation Needs: Patient Unable To Answer (6/23/2024)    TRANSPORTATION NEEDS     Transportation : Patient unable to answer   Stress: Patient Unable To Answer (6/23/2024)    [employer] [place] [employer] - Occupational Stress Questionnaire     Feeling of Stress : Patient unable to answer   Housing Stability: Patient Unable To Answer (6/23/2024)    Housing Stability Vital Sign     Unable to Pay for Housing in the Last Year: Patient unable to answer     Homeless in the Last Year: Patient unable to answer       Precautions:     Allergies as of 06/21/2024    (No Known Allergies)       [employer] Assessment Details/Treatment     Follow up visit, patient remains resting and verbalizes has been able to be up out of bed and ambulating and has sat up in chair, she declines assessment of perineal and perianal areas and reports "rash " has improved well, noting LLE area of previous cellulitus is also resovling with intact skin and dull redness.     07/10/24 0900        Wound 06/22/24 0600 Rash Right anterior Groin   Date First Assessed/Time First " Assessed: 06/22/24 0600   Present on Original Admission: Yes  Primary Wound Type: Rash  Side: Right  Orientation: anterior  Location: Groin   Rash Care   (resolving declines assessment.)        Wound 06/22/24 0630 Other (comment) Left anterior;lower Leg   Date First Assessed/Time First Assessed: 06/22/24 0630   Present on Original Admission: Yes  Primary Wound Type: Other (comment)  Side: Left  Orientation: anterior;lower  Location: Leg  Is this injury device related?: No   Wound Image    Dressing Appearance Open to air   Drainage Amount None   Appearance Intact;Dry  (suggestive of dull resolving redness)   Tissue loss description Not applicable   Wound Edges Undefined   Safety Management   Patient Rounds bed in low position;bed wheels locked;call light in patient/parent reach;clutter free environment maintained;ID band on;visualized patient;toileting offered;placement of personal items at bedside   Positioning   Body Position supine   Head of Bed (HOB) Positioning HOB at 30-45 degrees       Recommendations made to primary team Roby YO, for continued skin care measures and pressure injury prevention measures.     07/10/2024

## 2024-07-10 NOTE — PLAN OF CARE
[facility] sent discharge orders/AVS and clinicals to [facility] via [facility].

## 2024-07-10 NOTE — PLAN OF CARE
07/10/24 1251   Final Note   Assessment Type Discharge Planning Assessment   Anticipated Discharge Disposition Home-Health   Hospital Resources/Appts/Education Provided Post-Acute resouces added to AVS   Post-Acute Status   Post-Acute Authorization Home Health   Home Health Status Set-up Complete/Auth obtained   Discharge Delays None known at this time     Pt will dc home with [facility] home health services today. Romario delivered the RW to pt's room. Eli set up outpt dialysis for pt.

## 2024-07-10 NOTE — DISCHARGE SUMMARY
HPI:   69-year-old lady with no PMH who was transferred to [facility] from [place] with complaints of dyspnea on exertion, B/L lower extremity edema, shortness for breath. She was noted to have severe anemia with HGB/HCT of 5.7/17.7, WBCs of 15.8, hyperkalemia with potassium 6.8,, BUN/creatinine of 209/16.94. UA showed leukocytes and many bacteria as well as 3+ protein, 3+ blood. Blood and urine cultures were sent off. Nephrology was consulted on admission and patient was admitted to ICU for placement of dialysis catheter and initiation of hemodialysis. She also received transfusion with 2 units PRBCs with improvement in symptoms. Urology was consulted due to B/L hydronephrosis on ultrasound with urine retention requiring Mario catheter. Placed on IV vancomycin, IV Zosyn. Downgraded from ICU to Hospital Medicine on 06/25. At bedside, patient stated she was doing well and had no new complaints. Continued to have some tremors and some shortness of breath but overall felt improved. On IV Vancomycin, IV Zosyn. Urine cultures grew GBS, Blood cultures negative x 72 hours. Nephrology on board; follow recommendations. Urology on board; follow recommendations. Continued on hemodialysis. PT/OT consulted; recommending moderate intensity therapy. Continued on FeSO4 b.i.d., folic acid 1 mg daily, labetalol 200 mg b.i.d., Protonix 40 mg b.i.d..      Patient has been taken to the OR for systolic he, retrograde pyelogram and possible JJ stents with Urology    Patient is status post cystoscopy with placement of double-J stents in the right, left was very difficult and was not able to be done   IR was consulted for placement of a left PCN, this has been completed and nephrostomy bag draining urine leaking bloody   right Tunneled catheter placed 7/3 per Dr Barcenas. She is now on HD TTF.       Patient will go home with HH when we have a HD chair time available.      Interval Hx:   Patient today awake and comfortable. Doing well. Ambulating  well with a walker. She has been afebrile.      Case was discussed with patient's nurse and  on the floor.     Objective/physical exam:  General: In no acute distress, Obese   Chest: Clear to auscultation bilaterally  Heart: RRR, +S1, S2, no appreciable murmur  Abdomen: Soft, nontender, BS +  MSK: Warm, no lower extremity edema, no clubbing or cyanosis  Neurologic: Alert and oriented x4, Cranial nerve II-XII intact, Strength 5/5 in all 4 extremities     VITAL SIGNS: 24 HRS MIN & MAX LAST   Temp  Min: 98.1 °F (36.7 °C)  Max: 98.9 °F (37.2 °C) 98.3 °F (36.8 °C)   BP  Min: 130/63  Max: 156/81 138/65   Pulse  Min: 73  Max: 100  93   Resp  Min: 18  Max: 20 20   SpO2  Min: 95 %  Max: 97 % 97 %      I have reviewed the following labs:        Recent Labs   Lab 07/05/24  0343 07/07/24  0556 07/09/24  0532   WBC 6.51 6.10 6.37   RBC 2.88* 2.80* 2.71*   HGB 8.8* 8.3* 8.2*   HCT 27.8* 27.2* 26.6*   MCV 96.5* 97.1* 98.2*   MCH 30.6 29.6 30.3   MCHC 31.7* 30.5* 30.8*   RDW 14.6 14.3 14.4    184 236   MPV 11.2* 11.8* 10.8*              Recent Labs   Lab 07/04/24  0421 07/05/24  0343 07/07/24  0556 07/08/24  0551 07/09/24  0532      < > 137 139 138   K 4.6   < > 4.6 4.6 4.8      < > 102 103 104   CO2 24   < > 26 24 22*   BUN 30.0*   < > 17.1 26.1* 33.1*   CREATININE 5.20*   < > 3.70* 4.90* 5.75*   CALCIUM 7.8*   < > 8.4 8.4 8.6   MG  --   --   --  1.60  --    ALBUMIN 2.3*  --  2.4*  2.5* 2.6*  --    ALKPHOS  --   --  74 79  --    ALT  --   --  12 12  --    AST  --   --  14 13  --    BILITOT  --   --  0.3 0.3  --     < > = values in this interval not displayed.      Microbiology Results (last 7 days)         ** No results found for the last 168 hours. **                See below for Radiology     Assessment/Plan:  Bilateral hydroureteronephrosis   Acute kidney injury, possible component of chronic kidney disease and evidence of obstructive uropathy, post initiation of hemodialysis 06/22/2024- Right   Tunneled catheter placed 7/3 per Dr Barcenas.   On HD   UTI-GBS  Anemia of chronic disease secondary to CKD   Left lower extremity cellulitis: Resolved   Hypertension  Morbid obesity, BMI 50.4  Constipation     Plan:  Patient looks comfortable. No new issues.   She is ambulating with walker   HD per renal team      Continue  epogen TTS with HD   Continue supportive care            VTE prophylaxis: SCD     Patient condition:  Fair     Anticipated discharge and Disposition:   home with HH possible in am         All diagnosis and differential diagnosis have been reviewed; assessment and plan has been documented; I have personally reviewed the labs and test results that are presently available; I have reviewed the patients medication list; I have reviewed the consulting providers response and recommendations. I have reviewed or attempted to review medical records based upon their availability     All of the patient's questions have been  addressed and answered. Patient's is agreeable to the above stated plan. I will continue to monitor closely and make adjustments to medical management as needed.     Portions of this note dictated using EMR integrated voice recognition software, and may be subject to voice recognition errors not corrected at proofreading. Please contact writer for clarification if needed.   _____________________________________________________________________     Malnutrition Status:     Scheduled Med:   calcium carbonate  1,000 mg Oral BID    epoetin amaya  10,000 Units Subcutaneous Every Tues, Thurs, Sat    ergocalciferol  50,000 Units Oral Q72H    ferrous sulfate  1 tablet Oral BID    folic acid  1 mg Oral Daily    labetaloL  200 mg Oral Q12H    nystatin-triamcinolone   Topical (Top) TID    pantoprazole  40 mg Oral Daily    polyethylene glycol  17 g Oral Daily      Continuous Infusions:     PRN Meds:     Current Facility-Administered Medications:     0.9%  NaCl infusion (for blood administration), ,  Intravenous, Q24H PRN    0.9%  NaCl infusion (for blood administration), , Intravenous, Q24H PRN    0.9%  NaCl infusion (for blood administration), , Intravenous, Q24H PRN    acetaminophen, 650 mg, Oral, Q4H PRN    aluminum-magnesium hydroxide-simethicone, 30 mL, Oral, Q6H PRN    ammonium lactate, , Topical (Top), BID PRN    benzonatate, 100 mg, Oral, TID PRN    calcium carbonate, 1,000 mg, Oral, Q6H PRN    heparin (porcine), 4,000 Units, Intravenous, PRN    hydrALAZINE, 10 mg, Intravenous, Q2H PRN    HYDROcodone-acetaminophen, 1 tablet, Oral, Q6H PRN    labetaloL, 10 mg, Intravenous, Q4H PRN    melatonin, 6 mg, Oral, Nightly PRN    ondansetron, 4 mg, Intravenous, Q8H PRN    prochlorperazine, 5 mg, Intravenous, Q6H PRN    sodium chloride 0.9%, 10 mL, Intravenous, PRN      Radiology:  I have personally reviewed the following imaging and agree with the radiologist.      Cardiac catheterization  Procedure performed in the Invasive Lab    - See Procedure Log link below for nursing documentation    - See OpNote on Surgeries Tab for physician findings    - See Imaging Tab for radiologist dictation        Moise Villafana MD  Department of Hospital Medicine   [facility]   07/10/2024              Dc to home today     Out patient HD chair time confirmed     Dc 31 minutes

## 2024-07-10 NOTE — PROGRESS NOTES
[facility] - 8th Floor Med Surg  Nephrology  Progress Note    Patient Name: Fior Joya  MRN: [id number]  Admission Date: 6/22/2024  Hospital Length of Stay: 18 days  Attending Provider: Moise Villafana MD   Primary Care Physician: Linda, Primary Doctor  Principal Problem:Metabolic acidosis      Subjective:   Fior Joya is a 69 y.o. female who was transferred from hospital in [place] due to acute renal failure.  She initially presented to the hospital due to generalized weakness and shortness of breath with exertion.  Per their report she had been mostly bed-bound for the past 3 weeks.  He reported that she had been drinking getting up going to the restroom.  She did have some episodes of nausea vomiting and generalized weakness along with generalized muscle pain more recently.  She was endorsing shortness of breath but no chest pain.  Denied had been running a fever.  Per their his report, she had not seen a physician in 10 years, because she was not feeling ill and did not feel like she needed to go.  She had also been having some swelling in the legs.  She has no known medical issues per their report.  On admit, WBC elevated at 15, RBC 1.8, hemoglobin 5.7, D-dimer elevated at 1.57, serum sodium 132, potassium 6, serum CO2 less than 5, , creatinine 14.8, glucose 273, calcium 7.5, phosphorus 7.9, albumin 2.9.  TSH and lactic were okay.  UA results indicative of UTI sent for cultures.  Blood cultures obtained and pending.  Received phone call from ER physician in early a.m. hours, it was decided at that time we will proceed with initiation of RRT.  Right IJ temporary cath was placed per General surgery.   She is awake and alert this morning upon exam.  She does respond with simple answers to questions posed.  Son in room in majority of history obtained from him.  Oxygen saturation is 99% on nasal cannula.  We are consulted for management ESTRELLITA.  Bilateral hydronephrosis on CT from 06/21/2024         On 6/27 she underwent cystoscopy and right double J stent placement. The following day she had left nephroureteral stent placement with IR. She has been maintained on TThSa schedule for HD via right IJ temporary dialysis catheter. Tunneled catheter placed 7/3 per Dr Barcenas. No acute issues overnight. Resting comfortably on room air.     Interval History: No acute events overnight. Pending discharge.     Review of patient's allergies indicates:   Allergen Reactions    Asa [aspirin] Anaphylaxis    Penicillins      Received ceftriaxone from 6/27, no reactions      Current Facility-Administered Medications   Medication Frequency    0.9%  NaCl infusion (for blood administration) Q24H PRN    0.9%  NaCl infusion (for blood administration) Q24H PRN    0.9%  NaCl infusion (for blood administration) Q24H PRN    acetaminophen tablet 650 mg Q4H PRN    aluminum-magnesium hydroxide-simethicone 200-200-20 mg/5 mL suspension 30 mL Q6H PRN    ammonium lactate 12 % lotion BID PRN    benzonatate capsule 100 mg TID PRN    calcium carbonate 200 mg calcium (500 mg) chewable tablet 1,000 mg Q6H PRN    calcium carbonate 200 mg calcium (500 mg) chewable tablet 1,000 mg BID    epoetin amaya injection 10,000 Units Every Tues, Thurs, Sat    ergocalciferol capsule 50,000 Units Q72H    ferrous sulfate tablet 1 each BID    folic acid tablet 1 mg Daily    heparin (porcine) injection 4,000 Units PRN    hydrALAZINE injection 10 mg Q2H PRN    HYDROcodone-acetaminophen 5-325 mg per tablet 1 tablet Q6H PRN    labetaloL injection 10 mg Q4H PRN    labetaloL tablet 200 mg Q12H    melatonin tablet 6 mg Nightly PRN    nystatin-triamcinolone cream TID    ondansetron injection 4 mg Q8H PRN    pantoprazole EC tablet 40 mg Daily    polyethylene glycol packet 17 g Daily    prochlorperazine injection Soln 5 mg Q6H PRN    sodium chloride 0.9% flush 10 mL PRN       Objective:     Vital Signs (Most Recent):  Temp: 98.6 °F (37 °C) (07/10/24 1159)  Pulse: 82  (07/10/24 1159)  Resp: 20 (07/10/24 1159)  BP: (!) 147/72 (07/10/24 1159)  SpO2: 97 % (07/10/24 1216) Vital Signs (24h Range):  Temp:  [98.1 °F (36.7 °C)-98.9 °F (37.2 °C)] 98.6 °F (37 °C)  Pulse:  [] 82  Resp:  [18-20] 20  SpO2:  [95 %-97 %] 97 %  BP: (130-156)/(63-81) 147/72     Weight: (!) 140.2 kg (309 lb) (07/10/24 0600)  Body mass index is 53.04 kg/m².  Body surface area is 2.52 meters squared.    I/O last 3 completed shifts:  In: 1700 [P.O.:1700]  Out: 3950 [Urine:2450; Other:1500]    Physical Exam  Constitutional:       General: She is not in acute distress.  HENT:      Head: Atraumatic.      Nose: Nose normal.      Mouth/Throat:      Mouth: Mucous membranes are moist.   Eyes:      Extraocular Movements: Extraocular movements intact.      Conjunctiva/sclera: Conjunctivae normal.   Neck:      Comments: right chest wall tunneled   Cardiovascular:      Rate and Rhythm: Normal rate and regular rhythm.      Pulses: Normal pulses.      Heart sounds: Normal heart sounds.   Pulmonary:      Effort: Pulmonary effort is normal.      Breath sounds: Normal breath sounds.   Abdominal:      Palpations: Abdomen is soft.   Genitourinary:     Comments: Left nephrostomy tube draining clear yellow urine   Musculoskeletal:         General: No swelling.      Cervical back: Neck supple.   Skin:     General: Skin is warm.   Neurological:      General: No focal deficit present.      Mental Status: She is alert and oriented to person, place, and time.         Significant Labs:sureBMP:   Recent Labs   Lab 07/08/24  0551 07/09/24  0532    138   K 4.6 4.8    104   CO2 24 22*   BUN 26.1* 33.1*   CREATININE 4.90* 5.75*   CALCIUM 8.4 8.6   MG 1.60  --      CBC:   Recent Labs   Lab 07/09/24  0532   WBC 6.37   RBC 2.71*   HGB 8.2*   HCT 26.6*      MCV 98.2*   MCH 30.3   MCHC 30.8*     Microbiology Results (last 7 days)       ** No results found for the last 168 hours. **          Specimen (24h ago, onward)      None  "         No results for input(s): "COLORU", "CLARITYU", "SPECGRAV", "PHUR", "PROTEINUA", "GLUCOSEU", "BILIRUBINCON", "BLOODU", "WBCU", "RBCU", "BACTERIA", "MUCUS", "NITRITE", "LEUKOCYTESUR", "UROBILINOGEN", "HYALINECASTS" in the last 168 hours.    Significant Imaging:    Imaging reviewed     Assessment/Plan:     ESTRELLITA secondary to ATN secondary to obstructive uropathy  -Dialysis initiated 6/22 via right IJ temporary dialysis catheter.    -Tunneled catheter placed 7/3 per Dr Barcenas   Patient's probably has some underlying chronic kidney disease as she does have significant proteinuria more than 8 g per 24 hours.   Shaking and jerking movements of the extremities probably secondary to uremia,  now resolved completely with dialysis   UTI - Now on zosyn and vanc  Hyperkalemia  Improved.  Acute metabolic acidosis  Improved.  Anemia    Patient has been accepted to [facility] on TTS schedule and has been cleared to start tomorrow   Ok to CA from renal standpoint          TIFFANY Langston  Nephrology  [facility] - 8th Floor Med Surg  "

## 2024-07-10 NOTE — PT/OT/SLP PROGRESS
Occupational Therapy   Treatment    Name: Fior Joya  MRN: [id number]  Admitting Diagnosis:  Metabolic acidosis  7 Days Post-Op    Recommendations:     Recommended therapy intensity at discharge: Moderate Intensity Therapy   Discharge Equipment Recommendations:  to be determined by next level of care  Barriers to discharge:       Assessment:     Fior Joya is a 69 y.o. female with a medical diagnosis of ESTRELLITA, anemia, hyperkalemia, UTI, metabolic acidemia, started on HD this admission, SOB, swelling, B hydroureteronephrosis, B pleural effusions, chronic knee pain. S/p cystoscopy 6/27.  Performance deficits affecting function are weakness, impaired endurance, impaired self care skills, impaired functional mobility, gait instability, impaired balance, impaired coordination, decreased upper extremity function, decreased lower extremity function. Pt motivated to return to PLOF. Pt states that she has a ramp, W/C, BSC and bath bench at home.    Rehab Prognosis:  Good; patient would benefit from acute skilled OT services to address these deficits and reach maximum level of function.       Plan:     Patient to be seen 4 x/week to address the above listed problems via self-care/home management, therapeutic exercises, therapeutic activities  Plan of Care Expires: 07/25/24  Plan of Care Reviewed with: patient    Subjective     Pain/Comfort:  Pain Rating 1: 0/10    Objective:     Communicated with: RN prior to session.  Patient found supine with nephrostomy upon OT entry to room.    General Precautions: Standard, fall    Orthopedic Precautions:N/A  Braces: N/A  Respiratory Status: Room air     Occupational Performance:     Bed Mobility:    Patient completed Supine to Sit with stand by assistance     Functional Mobility/Transfers:  Patient completed Sit <> Stand Transfer with stand by assistance  with  rolling walker   Patient completed Bed <> Chair Transfer using Step Transfer technique with stand by assistance with rolling  walker  Functional Mobility: Pt ambulated in room with CGA and RW. No LOB noted.    Activities of Daily Living:  Grooming: stand by assistance Pt washed face seated in chair.  Lower Body Dressing: stand by assistance pt donned socks in long sitting position in bed.       Therapeutic Positioning    OT interventions performed during the course of today's session in an effort to prevent and/or reduce acquired pressure injuries:   Education was provided on benefits of and recommendations for therapeutic positioning    [facility] 6 Click ADL:      Patient Education:  Patient provided with verbal education education regarding OT role/goals/POC and Discharge/DME recommendations.  Understanding was verbalized.      Patient left up in chair with call button in reach.    GOALS:   Multidisciplinary Problems       Occupational Therapy Goals          Problem: Occupational Therapy    Goal Priority Disciplines Outcome Interventions   Occupational Therapy Goal     OT, PT/OT Progressing    Description: LTG: Pt will perform basic ADLs and ADL transfers with Modified independence using LRAD by discharge.    STG: to be met by 7/25/24    Pt will complete grooming standing at sink with LRAD with SBA.  Pt will complete UB dressing with SBA.  Pt will complete LB dressing with SBA using LRAD and AE prn.  Pt will complete toileting with SBA using LRAD.  Pt will complete functional mobility to/from toilet and toilet transfer with SBA using LRAD.                        Time Tracking:     OT Date of Treatment: 07/10/24  OT Start Time: 1325  OT Stop Time: 1336  OT Total Time (min): 11 min    Billable Minutes:Self Care/Home Management 11    OT/LEANN: LEANN     Number of LEANN visits since last OT visit: 5    7/10/2024

## 2024-09-23 PROBLEM — N17.9 AKI (ACUTE KIDNEY INJURY): Status: RESOLVED | Noted: 2024-06-21 | Resolved: 2024-09-23

## 2024-09-23 PROBLEM — N39.0 UTI (URINARY TRACT INFECTION): Status: RESOLVED | Noted: 2024-06-21 | Resolved: 2024-09-23

## 2024-10-03 ENCOUNTER — HOSPITAL ENCOUNTER (INPATIENT)
Facility: HOSPITAL | Age: 69
LOS: 24 days | Discharge: HOME HEALTH | DRG: 853 | End: 2024-10-28
Attending: EMERGENCY MEDICINE | Admitting: INTERNAL MEDICINE
Payer: MEDICARE

## 2024-10-03 DIAGNOSIS — Z96.0 RETAINED URETERAL STENT: ICD-10-CM

## 2024-10-03 DIAGNOSIS — N10 ACUTE PYELONEPHRITIS: ICD-10-CM

## 2024-10-03 DIAGNOSIS — N18.6 ESRD ON HEMODIALYSIS: ICD-10-CM

## 2024-10-03 DIAGNOSIS — E87.20 METABOLIC ACIDOSIS: ICD-10-CM

## 2024-10-03 DIAGNOSIS — N18.6 ESRD (END STAGE RENAL DISEASE): ICD-10-CM

## 2024-10-03 DIAGNOSIS — B95.62 MRSA BACTEREMIA: ICD-10-CM

## 2024-10-03 DIAGNOSIS — A41.9 SEPSIS: Primary | ICD-10-CM

## 2024-10-03 DIAGNOSIS — N13.30 HYDRONEPHROSIS, UNSPECIFIED HYDRONEPHROSIS TYPE: ICD-10-CM

## 2024-10-03 DIAGNOSIS — N13.1 HYDRONEPHROSIS WITH URETERAL STRICTURE, NOT ELSEWHERE CLASSIFIED: ICD-10-CM

## 2024-10-03 DIAGNOSIS — Z99.2 ESRD ON HEMODIALYSIS: ICD-10-CM

## 2024-10-03 DIAGNOSIS — N13.39 OTHER HYDRONEPHROSIS: ICD-10-CM

## 2024-10-03 DIAGNOSIS — N39.0 URINARY TRACT INFECTION WITHOUT HEMATURIA, SITE UNSPECIFIED: ICD-10-CM

## 2024-10-03 DIAGNOSIS — R07.9 CHEST PAIN: ICD-10-CM

## 2024-10-03 DIAGNOSIS — R78.81 MRSA BACTEREMIA: ICD-10-CM

## 2024-10-03 LAB
ABS NEUT (OLG): 21.38 X10(3)/MCL (ref 2.1–9.2)
ALBUMIN SERPL-MCNC: 2.4 G/DL (ref 3.4–4.8)
ALBUMIN/GLOB SERPL: 0.5 RATIO (ref 1.1–2)
ALP SERPL-CCNC: 179 UNIT/L (ref 40–150)
ALT SERPL-CCNC: 117 UNIT/L (ref 0–55)
ANION GAP SERPL CALC-SCNC: 19 MEQ/L
AST SERPL-CCNC: 127 UNIT/L (ref 5–34)
BACTERIA #/AREA URNS AUTO: ABNORMAL /HPF
BASOPHILS NFR BLD MANUAL: 0.23 X10(3)/MCL (ref 0–0.2)
BASOPHILS NFR BLD MANUAL: 1 %
BILIRUB SERPL-MCNC: 0.5 MG/DL
BILIRUB UR QL STRIP.AUTO: NEGATIVE
BUN SERPL-MCNC: 65.3 MG/DL (ref 9.8–20.1)
BURR CELLS (OLG): ABNORMAL
CALCIUM SERPL-MCNC: 8.7 MG/DL (ref 8.4–10.2)
CHLORIDE SERPL-SCNC: 96 MMOL/L (ref 98–107)
CLARITY UR: ABNORMAL
CO2 SERPL-SCNC: 18 MMOL/L (ref 23–31)
COLOR UR AUTO: ABNORMAL
CREAT SERPL-MCNC: 6.7 MG/DL (ref 0.55–1.02)
CREAT/UREA NIT SERPL: 10
EOSINOPHIL NFR BLD MANUAL: 0.23 X10(3)/MCL (ref 0–0.9)
EOSINOPHIL NFR BLD MANUAL: 1 %
EPI CELLS #/AREA URNS HPF: ABNORMAL /HPF
ERYTHROCYTE [DISTWIDTH] IN BLOOD BY AUTOMATED COUNT: 15.2 % (ref 11.5–17)
GFR SERPLBLD CREATININE-BSD FMLA CKD-EPI: 6 ML/MIN/1.73/M2
GLOBULIN SER-MCNC: 4.8 GM/DL (ref 2.4–3.5)
GLUCOSE SERPL-MCNC: 124 MG/DL (ref 82–115)
GLUCOSE UR QL STRIP: NORMAL
HCT VFR BLD AUTO: 38.6 % (ref 37–47)
HGB BLD-MCNC: 12.6 G/DL (ref 12–16)
HGB UR QL STRIP: ABNORMAL
INSTRUMENT WBC (OLG): 22.51 X10(3)/MCL
KETONES UR QL STRIP: NEGATIVE
LACTATE SERPL-SCNC: 1.8 MMOL/L (ref 0.5–2.2)
LEUKOCYTE ESTERASE UR QL STRIP: 500
LYMPHOCYTES NFR BLD MANUAL: 0.68 X10(3)/MCL
LYMPHOCYTES NFR BLD MANUAL: 3 %
MCH RBC QN AUTO: 30.1 PG (ref 27–31)
MCHC RBC AUTO-ENTMCNC: 32.6 G/DL (ref 33–36)
MCV RBC AUTO: 92.3 FL (ref 80–94)
MONOCYTES NFR BLD MANUAL: 0.23 X10(3)/MCL (ref 0.1–1.3)
MONOCYTES NFR BLD MANUAL: 1 %
NEUTROPHILS NFR BLD MANUAL: 95 %
NITRITE UR QL STRIP: NEGATIVE
NRBC BLD AUTO-RTO: 0 %
PH UR STRIP: 6.5 [PH]
PLATELET # BLD AUTO: 263 X10(3)/MCL (ref 130–400)
PLATELET # BLD EST: NORMAL 10*3/UL
PMV BLD AUTO: 10.5 FL (ref 7.4–10.4)
POTASSIUM SERPL-SCNC: 3.5 MMOL/L (ref 3.5–5.1)
PROT SERPL-MCNC: 7.2 GM/DL (ref 5.8–7.6)
PROT UR QL STRIP: ABNORMAL
RBC # BLD AUTO: 4.18 X10(6)/MCL (ref 4.2–5.4)
RBC #/AREA URNS AUTO: ABNORMAL /HPF
RBC MORPH BLD: ABNORMAL
SODIUM SERPL-SCNC: 133 MMOL/L (ref 136–145)
SP GR UR STRIP.AUTO: 1.02 (ref 1–1.03)
SQUAMOUS #/AREA URNS LPF: ABNORMAL /HPF
UROBILINOGEN UR STRIP-ACNC: NORMAL
WBC # BLD AUTO: 22.51 X10(3)/MCL (ref 4.5–11.5)
WBC #/AREA URNS AUTO: >100 /HPF
WBC CLUMPS UR QL AUTO: ABNORMAL

## 2024-10-03 PROCEDURE — 81001 URINALYSIS AUTO W/SCOPE: CPT

## 2024-10-03 PROCEDURE — 80053 COMPREHEN METABOLIC PANEL: CPT

## 2024-10-03 PROCEDURE — 25000003 PHARM REV CODE 250: Performed by: EMERGENCY MEDICINE

## 2024-10-03 PROCEDURE — 87086 URINE CULTURE/COLONY COUNT: CPT

## 2024-10-03 PROCEDURE — 85027 COMPLETE CBC AUTOMATED: CPT

## 2024-10-03 PROCEDURE — 25500020 PHARM REV CODE 255: Performed by: EMERGENCY MEDICINE

## 2024-10-03 PROCEDURE — 87077 CULTURE AEROBIC IDENTIFY: CPT

## 2024-10-03 PROCEDURE — 96365 THER/PROPH/DIAG IV INF INIT: CPT

## 2024-10-03 PROCEDURE — 87154 CUL TYP ID BLD PTHGN 6+ TRGT: CPT

## 2024-10-03 PROCEDURE — 99285 EMERGENCY DEPT VISIT HI MDM: CPT | Mod: 25

## 2024-10-03 PROCEDURE — 83605 ASSAY OF LACTIC ACID: CPT

## 2024-10-03 PROCEDURE — 96367 TX/PROPH/DG ADDL SEQ IV INF: CPT

## 2024-10-03 PROCEDURE — 96375 TX/PRO/DX INJ NEW DRUG ADDON: CPT

## 2024-10-03 PROCEDURE — 63600175 PHARM REV CODE 636 W HCPCS: Performed by: EMERGENCY MEDICINE

## 2024-10-03 RX ORDER — ONDANSETRON HYDROCHLORIDE 2 MG/ML
4 INJECTION, SOLUTION INTRAVENOUS
Status: COMPLETED | OUTPATIENT
Start: 2024-10-03 | End: 2024-10-03

## 2024-10-03 RX ORDER — MORPHINE SULFATE 4 MG/ML
4 INJECTION, SOLUTION INTRAMUSCULAR; INTRAVENOUS
Status: COMPLETED | OUTPATIENT
Start: 2024-10-03 | End: 2024-10-03

## 2024-10-03 RX ORDER — METRONIDAZOLE 500 MG/100ML
500 INJECTION, SOLUTION INTRAVENOUS
Status: COMPLETED | OUTPATIENT
Start: 2024-10-03 | End: 2024-10-03

## 2024-10-03 RX ADMIN — IOHEXOL 100 ML: 350 INJECTION, SOLUTION INTRAVENOUS at 09:10

## 2024-10-03 RX ADMIN — METRONIDAZOLE 500 MG: 5 INJECTION, SOLUTION INTRAVENOUS at 10:10

## 2024-10-03 RX ADMIN — ONDANSETRON 4 MG: 2 INJECTION INTRAMUSCULAR; INTRAVENOUS at 11:10

## 2024-10-03 RX ADMIN — CEFEPIME 2 G: 2 INJECTION, POWDER, FOR SOLUTION INTRAVENOUS at 08:10

## 2024-10-03 RX ADMIN — MORPHINE SULFATE 4 MG: 4 INJECTION INTRAVENOUS at 11:10

## 2024-10-03 NOTE — Clinical Note
Diagnosis: Sepsis [225330]   Future Attending Provider: NOÉ WILLETT [[id number]]   Admit to which facility:: [facility] [[id number]]   Reason for IP Medical Treatment  (Clinical interventions that can only be accomplished in the IP setting? ) :: IV abx, urology evaluation   Plans for Post-Acute care--if anticipated (pick the single best option):: A. No post acute care anticipated at this time

## 2024-10-03 NOTE — ED NOTES
Pt sitting in wheelchair alert and oriented at this time. Pt denies any changes from initial assessment. Pt does not appear to be in any immediate distress at this time.

## 2024-10-03 NOTE — Clinical Note
The site was marked. The left chest and left neck was prepped. The site was prepped with Betadine. The site was clipped. The patient was draped.

## 2024-10-03 NOTE — FIRST PROVIDER EVALUATION
"Medical screening examination initiated.  I have conducted a focused provider triage encounter, findings are as follows:    Brief history of present illness:  69 year old female presents to ER with c/o lower back pain and right hip pain x 5 days ago. Patient also reports cloudy drainage in nephrostomy bag x 1 month. Dialysis TTS, last run Tuesday     Vitals:    10/03/24 1604   BP: 110/75   Pulse: 79   Resp: 20   Temp: 97.2 °F (36.2 °C)   TempSrc: Oral   SpO2: 97%   Weight: (!) 140.2 kg (309 lb)   Height: 5' 3" (1.6 m)       Pertinent physical exam:  Awake and alert, NAD    Brief workup plan:  Imaging     Preliminary workup initiated; this workup will be continued and followed by the physician or advanced practice provider that is assigned to the patient when roomed.  "

## 2024-10-04 ENCOUNTER — ANESTHESIA (OUTPATIENT)
Dept: SURGERY | Facility: HOSPITAL | Age: 69
End: 2024-10-04
Payer: MEDICARE

## 2024-10-04 ENCOUNTER — ANESTHESIA EVENT (OUTPATIENT)
Dept: SURGERY | Facility: HOSPITAL | Age: 69
End: 2024-10-04
Payer: MEDICARE

## 2024-10-04 LAB
ABS NEUT (OLG): 22.97 X10(3)/MCL (ref 2.1–9.2)
ACB COMPLEX DNA BLD POS QL NAA+NON-PROBE: NOT DETECTED
ALBUMIN SERPL-MCNC: 2.3 G/DL (ref 3.4–4.8)
ALBUMIN/GLOB SERPL: 0.5 RATIO (ref 1.1–2)
ALP SERPL-CCNC: 182 UNIT/L (ref 40–150)
ALT SERPL-CCNC: 104 UNIT/L (ref 0–55)
ANION GAP SERPL CALC-SCNC: 22 MEQ/L
AST SERPL-CCNC: 91 UNIT/L (ref 5–34)
B FRAGILIS DNA BLD POS QL NAA+PROBE: NOT DETECTED
BILIRUB SERPL-MCNC: 0.4 MG/DL
BUN SERPL-MCNC: 70 MG/DL (ref 9.8–20.1)
BURR CELLS (OLG): ABNORMAL
C ALBICANS DNA BLD POS QL NAA+PROBE: NOT DETECTED
C AURIS DNA BLD POS QL NAA+NON-PROBE: NOT DETECTED
C GATTII+NEOFOR DNA CSF QL NAA+NON-PROBE: NOT DETECTED
C GLABRATA DNA BLD POS QL NAA+PROBE: NOT DETECTED
C KRUSEI DNA BLD POS QL NAA+PROBE: NOT DETECTED
C PARAP DNA BLD POS QL NAA+PROBE: NOT DETECTED
C TROPICLS DNA BLD POS QL NAA+PROBE: NOT DETECTED
CALCIUM SERPL-MCNC: 8.7 MG/DL (ref 8.4–10.2)
CHLORIDE SERPL-SCNC: 96 MMOL/L (ref 98–107)
CO2 SERPL-SCNC: 13 MMOL/L (ref 23–31)
COLISTIN RES MCR-1 ISLT/SPM QL: ABNORMAL
CREAT SERPL-MCNC: 6.98 MG/DL (ref 0.55–1.02)
CREAT/UREA NIT SERPL: 10
E CLOAC COMP DNA BLD POS QL NAA+PROBE: NOT DETECTED
E COLI DNA BLD POS QL NAA+PROBE: NOT DETECTED
E FAECALIS+OTHR E SP RRNA BLD POS FISH: NOT DETECTED
E FAECIUM HSP60 BLD POS QL PROBE: NOT DETECTED
ENTEROBACTERALES DNA BLD POS NAA+N-PRB: NOT DETECTED
EOSINOPHIL NFR BLD MANUAL: 0.48 X10(3)/MCL (ref 0–0.9)
EOSINOPHIL NFR BLD MANUAL: 2 %
ERYTHROCYTE [DISTWIDTH] IN BLOOD BY AUTOMATED COUNT: 15.4 % (ref 11.5–17)
ESBL CFT TO CFT-CLAV IC RTO BD POS IMP: ABNORMAL
GFR SERPLBLD CREATININE-BSD FMLA CKD-EPI: 6 ML/MIN/1.73/M2
GLOBULIN SER-MCNC: 5 GM/DL (ref 2.4–3.5)
GLUCOSE SERPL-MCNC: 84 MG/DL (ref 82–115)
GP B STREP DNA CSF QL NAA+NON-PROBE: NOT DETECTED
HAEM INFLU DNA CSF QL NAA+NON-PROBE: NOT DETECTED
HCT VFR BLD AUTO: 39.5 % (ref 37–47)
HGB BLD-MCNC: 13.2 G/DL (ref 12–16)
IMP CARBAPENEMASE ISLT QL IA.RAPID: ABNORMAL
INR PPP: 1.6
INSTRUMENT WBC (OLG): 23.93 X10(3)/MCL
K OXYTOCA OMPA BLD POS QL PROBE: NOT DETECTED
KLEBSIELLA SP DNA BLD POS QL NAA+NON-PRB: NOT DETECTED
KLEBSIELLA SP DNA BLD POS QL NAA+NON-PRB: NOT DETECTED
KPC CARBAPENEMASE ISLT QL IA.RAPID: ABNORMAL
L MONOCYTOG DNA CSF QL NAA+NON-PROBE: NOT DETECTED
LYMPHOCYTES NFR BLD MANUAL: 0.48 X10(3)/MCL
LYMPHOCYTES NFR BLD MANUAL: 2 %
MAGNESIUM SERPL-MCNC: 2.2 MG/DL (ref 1.6–2.6)
MCH RBC QN AUTO: 30.2 PG (ref 27–31)
MCHC RBC AUTO-ENTMCNC: 33.4 G/DL (ref 33–36)
MCV RBC AUTO: 90.4 FL (ref 80–94)
MECA+MECC NOSE QL NAA+PROBE: ABNORMAL
MECA+MECC+MREJ ISLT/SPM QL: DETECTED
MONOCYTES NFR BLD MANUAL: 0.24 X10(3)/MCL (ref 0.1–1.3)
MONOCYTES NFR BLD MANUAL: 1 %
N MEN DNA CSF QL NAA+NON-PROBE: NOT DETECTED
NDM CARBAPENEMASE ISLT QL IA.RAPID: ABNORMAL
NEUTROPHILS NFR BLD MANUAL: 96 %
NEUTS HYPERSEG BLD QL SMEAR: ABNORMAL
NRBC BLD AUTO-RTO: 0 %
OXA-48-LIKE CRBPNASE ISLT QL IA.RAPID: ABNORMAL
P AERUGINOSA DNA BLD POS QL NAA+PROBE: NOT DETECTED
PHOSPHATE SERPL-MCNC: 4.2 MG/DL (ref 2.3–4.7)
PLATELET # BLD AUTO: 254 X10(3)/MCL (ref 130–400)
PLATELET # BLD EST: NORMAL 10*3/UL
PMV BLD AUTO: 10.8 FL (ref 7.4–10.4)
POIKILOCYTOSIS BLD QL SMEAR: ABNORMAL
POTASSIUM SERPL-SCNC: 4.4 MMOL/L (ref 3.5–5.1)
PROT SERPL-MCNC: 7.3 GM/DL (ref 5.8–7.6)
PROTEUS SP DNA BLD POS QL NAA+PROBE: NOT DETECTED
PROTHROMBIN TIME: 19.6 SECONDS (ref 12.5–14.5)
RBC # BLD AUTO: 4.37 X10(6)/MCL (ref 4.2–5.4)
RBC MORPH BLD: ABNORMAL
S AUREUS DNA BLD POS QL NAA+PROBE: DETECTED
S ENT+BONG DNA STL QL NAA+NON-PROBE: NOT DETECTED
S EPIDERMIDIS HSP60 BLD POS QL PROBE: NOT DETECTED
S LUGDUNENSIS SODA BLD POS QL PROBE: NOT DETECTED
S MALTOPH DNA BLD POS QL NAA+PROBE: NOT DETECTED
S MARCESCENS DNA BLD POS QL NAA+PROBE: NOT DETECTED
S PNEUM DNA CSF QL NAA+NON-PROBE: NOT DETECTED
S PYOGENES HSP60 BLD POS QL PROBE: NOT DETECTED
SODIUM SERPL-SCNC: 131 MMOL/L (ref 136–145)
STAPH SP TUF BLD POS QL PROBE: DETECTED
STREPTOCOCCUS SP TUF BLD POS QL PROBE: NOT DETECTED
VAN(A+B+C1+C2) GENES ISLT/SPM: ABNORMAL
VIM CARBAPENEMASE ISLT QL IA.RAPID: ABNORMAL
WBC # BLD AUTO: 23.93 X10(3)/MCL (ref 4.5–11.5)

## 2024-10-04 PROCEDURE — 36415 COLL VENOUS BLD VENIPUNCTURE: CPT | Performed by: RADIOLOGY

## 2024-10-04 PROCEDURE — 0TP98DZ REMOVAL OF INTRALUMINAL DEVICE FROM URETER, VIA NATURAL OR ARTIFICIAL OPENING ENDOSCOPIC: ICD-10-PCS | Performed by: UROLOGY

## 2024-10-04 PROCEDURE — 63600175 PHARM REV CODE 636 W HCPCS: Performed by: EMERGENCY MEDICINE

## 2024-10-04 PROCEDURE — 71000033 HC RECOVERY, INTIAL HOUR: Performed by: UROLOGY

## 2024-10-04 PROCEDURE — 36000706: Performed by: UROLOGY

## 2024-10-04 PROCEDURE — 84100 ASSAY OF PHOSPHORUS: CPT | Performed by: INTERNAL MEDICINE

## 2024-10-04 PROCEDURE — 63600175 PHARM REV CODE 636 W HCPCS

## 2024-10-04 PROCEDURE — 36000707: Performed by: UROLOGY

## 2024-10-04 PROCEDURE — C1758 CATHETER, URETERAL: HCPCS | Performed by: UROLOGY

## 2024-10-04 PROCEDURE — 37000009 HC ANESTHESIA EA ADD 15 MINS: Performed by: UROLOGY

## 2024-10-04 PROCEDURE — 25500020 PHARM REV CODE 255: Performed by: RADIOLOGY

## 2024-10-04 PROCEDURE — 25000003 PHARM REV CODE 250: Performed by: INTERNAL MEDICINE

## 2024-10-04 PROCEDURE — 25000003 PHARM REV CODE 250: Performed by: STUDENT IN AN ORGANIZED HEALTH CARE EDUCATION/TRAINING PROGRAM

## 2024-10-04 PROCEDURE — 25000003 PHARM REV CODE 250: Performed by: UROLOGY

## 2024-10-04 PROCEDURE — 83735 ASSAY OF MAGNESIUM: CPT | Performed by: INTERNAL MEDICINE

## 2024-10-04 PROCEDURE — 37000008 HC ANESTHESIA 1ST 15 MINUTES: Performed by: UROLOGY

## 2024-10-04 PROCEDURE — 25000003 PHARM REV CODE 250

## 2024-10-04 PROCEDURE — 88305 TISSUE EXAM BY PATHOLOGIST: CPT | Performed by: UROLOGY

## 2024-10-04 PROCEDURE — 63600175 PHARM REV CODE 636 W HCPCS: Performed by: RADIOLOGY

## 2024-10-04 PROCEDURE — 63600175 PHARM REV CODE 636 W HCPCS: Performed by: UROLOGY

## 2024-10-04 PROCEDURE — 0TBB8ZX EXCISION OF BLADDER, VIA NATURAL OR ARTIFICIAL OPENING ENDOSCOPIC, DIAGNOSTIC: ICD-10-PCS | Performed by: UROLOGY

## 2024-10-04 PROCEDURE — 25500020 PHARM REV CODE 255: Performed by: UROLOGY

## 2024-10-04 PROCEDURE — 63600175 PHARM REV CODE 636 W HCPCS: Performed by: INTERNAL MEDICINE

## 2024-10-04 PROCEDURE — C2617 STENT, NON-COR, TEM W/O DEL: HCPCS | Performed by: UROLOGY

## 2024-10-04 PROCEDURE — 21400001 HC TELEMETRY ROOM

## 2024-10-04 PROCEDURE — 85025 COMPLETE CBC W/AUTO DIFF WBC: CPT | Performed by: INTERNAL MEDICINE

## 2024-10-04 PROCEDURE — 50432 PLMT NEPHROSTOMY CATHETER: CPT | Mod: 59,RT,, | Performed by: RADIOLOGY

## 2024-10-04 PROCEDURE — 63600175 PHARM REV CODE 636 W HCPCS: Performed by: ANESTHESIOLOGY

## 2024-10-04 PROCEDURE — 80053 COMPREHEN METABOLIC PANEL: CPT | Performed by: INTERNAL MEDICINE

## 2024-10-04 PROCEDURE — 85610 PROTHROMBIN TIME: CPT | Performed by: RADIOLOGY

## 2024-10-04 PROCEDURE — 0T9030Z DRAINAGE OF RIGHT KIDNEY WITH DRAINAGE DEVICE, PERCUTANEOUS APPROACH: ICD-10-PCS | Performed by: RADIOLOGY

## 2024-10-04 PROCEDURE — 0T768DZ DILATION OF RIGHT URETER WITH INTRALUMINAL DEVICE, VIA NATURAL OR ARTIFICIAL OPENING ENDOSCOPIC: ICD-10-PCS | Performed by: UROLOGY

## 2024-10-04 PROCEDURE — 96376 TX/PRO/DX INJ SAME DRUG ADON: CPT

## 2024-10-04 PROCEDURE — C1769 GUIDE WIRE: HCPCS | Performed by: UROLOGY

## 2024-10-04 DEVICE — STENT SET URETERAL 6X28CM: Type: IMPLANTABLE DEVICE | Site: URETHRA | Status: FUNCTIONAL

## 2024-10-04 RX ORDER — MIDAZOLAM HYDROCHLORIDE 2 MG/2ML
2 INJECTION, SOLUTION INTRAMUSCULAR; INTRAVENOUS ONCE AS NEEDED
Status: CANCELLED | OUTPATIENT
Start: 2024-10-04 | End: 2036-03-02

## 2024-10-04 RX ORDER — HYDROCODONE BITARTRATE AND ACETAMINOPHEN 5; 325 MG/1; MG/1
1 TABLET ORAL
Status: DISCONTINUED | OUTPATIENT
Start: 2024-10-04 | End: 2024-10-04

## 2024-10-04 RX ORDER — AMOXICILLIN 250 MG
2 CAPSULE ORAL 2 TIMES DAILY PRN
Status: DISCONTINUED | OUTPATIENT
Start: 2024-10-04 | End: 2024-10-28 | Stop reason: HOSPADM

## 2024-10-04 RX ORDER — SEVELAMER CARBONATE 800 MG/1
800 TABLET, FILM COATED ORAL
COMMUNITY
Start: 2024-08-30

## 2024-10-04 RX ORDER — LIDOCAINE HYDROCHLORIDE 20 MG/ML
INJECTION INTRAVENOUS
Status: DISCONTINUED | OUTPATIENT
Start: 2024-10-04 | End: 2024-10-04

## 2024-10-04 RX ORDER — ACETAMINOPHEN 500 MG
1000 TABLET ORAL ONCE
Status: CANCELLED | OUTPATIENT
Start: 2024-10-04 | End: 2024-10-04

## 2024-10-04 RX ORDER — IOPAMIDOL 612 MG/ML
INJECTION, SOLUTION INTRAVASCULAR
Status: DISCONTINUED | OUTPATIENT
Start: 2024-10-04 | End: 2024-10-04 | Stop reason: HOSPADM

## 2024-10-04 RX ORDER — PROPOFOL 10 MG/ML
VIAL (ML) INTRAVENOUS
Status: DISCONTINUED | OUTPATIENT
Start: 2024-10-04 | End: 2024-10-04

## 2024-10-04 RX ORDER — ACETAMINOPHEN 325 MG/1
650 TABLET ORAL EVERY 4 HOURS PRN
Status: DISCONTINUED | OUTPATIENT
Start: 2024-10-04 | End: 2024-10-28 | Stop reason: HOSPADM

## 2024-10-04 RX ORDER — MEPERIDINE HYDROCHLORIDE 25 MG/ML
12.5 INJECTION INTRAMUSCULAR; INTRAVENOUS; SUBCUTANEOUS ONCE
Status: DISCONTINUED | OUTPATIENT
Start: 2024-10-04 | End: 2024-10-05 | Stop reason: HOSPADM

## 2024-10-04 RX ORDER — DEXAMETHASONE SODIUM PHOSPHATE 4 MG/ML
INJECTION, SOLUTION INTRA-ARTICULAR; INTRALESIONAL; INTRAMUSCULAR; INTRAVENOUS; SOFT TISSUE
Status: DISCONTINUED | OUTPATIENT
Start: 2024-10-04 | End: 2024-10-04

## 2024-10-04 RX ORDER — ATORVASTATIN CALCIUM 20 MG/1
20 TABLET, FILM COATED ORAL NIGHTLY
COMMUNITY
Start: 2024-08-13

## 2024-10-04 RX ORDER — ROCURONIUM BROMIDE 10 MG/ML
INJECTION, SOLUTION INTRAVENOUS
Status: DISCONTINUED | OUTPATIENT
Start: 2024-10-04 | End: 2024-10-04

## 2024-10-04 RX ORDER — ONDANSETRON HYDROCHLORIDE 2 MG/ML
4 INJECTION, SOLUTION INTRAVENOUS EVERY 4 HOURS PRN
Status: DISCONTINUED | OUTPATIENT
Start: 2024-10-04 | End: 2024-10-28 | Stop reason: HOSPADM

## 2024-10-04 RX ORDER — LABETALOL 200 MG/1
200 TABLET, FILM COATED ORAL EVERY 12 HOURS
Status: DISCONTINUED | OUTPATIENT
Start: 2024-10-04 | End: 2024-10-11

## 2024-10-04 RX ORDER — LIDOCAINE HYDROCHLORIDE 20 MG/ML
INJECTION, SOLUTION INFILTRATION; PERINEURAL
Status: COMPLETED | OUTPATIENT
Start: 2024-10-04 | End: 2024-10-04

## 2024-10-04 RX ORDER — CALCIUM CHLORIDE INJECTION 100 MG/ML
INJECTION, SOLUTION INTRAVENOUS
Status: DISCONTINUED | OUTPATIENT
Start: 2024-10-04 | End: 2024-10-04

## 2024-10-04 RX ORDER — MUPIROCIN 20 MG/G
OINTMENT TOPICAL 2 TIMES DAILY
Status: DISPENSED | OUTPATIENT
Start: 2024-10-04 | End: 2024-10-09

## 2024-10-04 RX ORDER — SODIUM CHLORIDE 0.9 % (FLUSH) 0.9 %
10 SYRINGE (ML) INJECTION
Status: DISCONTINUED | OUTPATIENT
Start: 2024-10-04 | End: 2024-10-28 | Stop reason: HOSPADM

## 2024-10-04 RX ORDER — HYDROCODONE BITARTRATE AND ACETAMINOPHEN 5; 325 MG/1; MG/1
1 TABLET ORAL EVERY 6 HOURS PRN
Status: DISCONTINUED | OUTPATIENT
Start: 2024-10-04 | End: 2024-10-16

## 2024-10-04 RX ORDER — ONDANSETRON HYDROCHLORIDE 2 MG/ML
INJECTION, SOLUTION INTRAVENOUS
Status: DISCONTINUED | OUTPATIENT
Start: 2024-10-04 | End: 2024-10-04

## 2024-10-04 RX ORDER — SUCCINYLCHOLINE CHLORIDE 20 MG/ML
INJECTION INTRAMUSCULAR; INTRAVENOUS
Status: DISCONTINUED | OUTPATIENT
Start: 2024-10-04 | End: 2024-10-04

## 2024-10-04 RX ORDER — LIDOCAINE HYDROCHLORIDE 10 MG/ML
1 INJECTION, SOLUTION EPIDURAL; INFILTRATION; INTRACAUDAL; PERINEURAL ONCE
Status: CANCELLED | OUTPATIENT
Start: 2024-10-04 | End: 2024-10-04

## 2024-10-04 RX ORDER — IPRATROPIUM BROMIDE AND ALBUTEROL SULFATE 2.5; .5 MG/3ML; MG/3ML
3 SOLUTION RESPIRATORY (INHALATION) ONCE AS NEEDED
Status: DISCONTINUED | OUTPATIENT
Start: 2024-10-04 | End: 2024-10-24 | Stop reason: HOSPADM

## 2024-10-04 RX ORDER — HYDROMORPHONE HYDROCHLORIDE 2 MG/ML
0.2 INJECTION, SOLUTION INTRAMUSCULAR; INTRAVENOUS; SUBCUTANEOUS EVERY 5 MIN PRN
Status: DISCONTINUED | OUTPATIENT
Start: 2024-10-04 | End: 2024-10-05

## 2024-10-04 RX ORDER — MORPHINE SULFATE 4 MG/ML
4 INJECTION, SOLUTION INTRAMUSCULAR; INTRAVENOUS
Status: COMPLETED | OUTPATIENT
Start: 2024-10-04 | End: 2024-10-04

## 2024-10-04 RX ORDER — PANTOPRAZOLE SODIUM 40 MG/1
40 TABLET, DELAYED RELEASE ORAL DAILY
Status: DISCONTINUED | OUTPATIENT
Start: 2024-10-04 | End: 2024-10-28 | Stop reason: HOSPADM

## 2024-10-04 RX ORDER — FENTANYL CITRATE 50 UG/ML
INJECTION, SOLUTION INTRAMUSCULAR; INTRAVENOUS
Status: DISCONTINUED | OUTPATIENT
Start: 2024-10-04 | End: 2024-10-04

## 2024-10-04 RX ORDER — IOPAMIDOL 755 MG/ML
5 INJECTION, SOLUTION INTRAVASCULAR
Status: COMPLETED | OUTPATIENT
Start: 2024-10-04 | End: 2024-10-04

## 2024-10-04 RX ORDER — FAMOTIDINE 10 MG/ML
20 INJECTION INTRAVENOUS ONCE
Status: CANCELLED | OUTPATIENT
Start: 2024-10-04 | End: 2024-10-04

## 2024-10-04 RX ORDER — METOCLOPRAMIDE HYDROCHLORIDE 5 MG/ML
10 INJECTION INTRAMUSCULAR; INTRAVENOUS ONCE
Status: CANCELLED | OUTPATIENT
Start: 2024-10-04 | End: 2024-10-04

## 2024-10-04 RX ORDER — SODIUM CHLORIDE 9 MG/ML
INJECTION, SOLUTION INTRAVENOUS CONTINUOUS
Status: CANCELLED | OUTPATIENT
Start: 2024-10-04

## 2024-10-04 RX ORDER — AMOXICILLIN 250 MG
1 CAPSULE ORAL DAILY PRN
COMMUNITY

## 2024-10-04 RX ORDER — FERROUS SULFATE TAB 325 MG (65 MG ELEMENTAL FE) 325 (65 FE) MG
325 TAB ORAL DAILY
COMMUNITY
Start: 2024-07-10

## 2024-10-04 RX ORDER — DIPHENHYDRAMINE HYDROCHLORIDE 50 MG/ML
25 INJECTION INTRAMUSCULAR; INTRAVENOUS EVERY 6 HOURS PRN
Status: DISCONTINUED | OUTPATIENT
Start: 2024-10-04 | End: 2024-10-24 | Stop reason: HOSPADM

## 2024-10-04 RX ORDER — HYDROMORPHONE HYDROCHLORIDE 2 MG/ML
0.5 INJECTION, SOLUTION INTRAMUSCULAR; INTRAVENOUS; SUBCUTANEOUS EVERY 5 MIN PRN
Status: DISCONTINUED | OUTPATIENT
Start: 2024-10-04 | End: 2024-10-05

## 2024-10-04 RX ORDER — HYDROMORPHONE HYDROCHLORIDE 2 MG/ML
0.5 INJECTION, SOLUTION INTRAMUSCULAR; INTRAVENOUS; SUBCUTANEOUS EVERY 4 HOURS PRN
Status: DISCONTINUED | OUTPATIENT
Start: 2024-10-04 | End: 2024-10-05

## 2024-10-04 RX ORDER — FOLIC ACID 1 MG/1
1 TABLET ORAL DAILY
Status: DISCONTINUED | OUTPATIENT
Start: 2024-10-04 | End: 2024-10-28 | Stop reason: HOSPADM

## 2024-10-04 RX ORDER — PROCHLORPERAZINE EDISYLATE 5 MG/ML
5 INJECTION INTRAMUSCULAR; INTRAVENOUS EVERY 6 HOURS PRN
Status: DISCONTINUED | OUTPATIENT
Start: 2024-10-04 | End: 2024-10-28 | Stop reason: HOSPADM

## 2024-10-04 RX ORDER — LORATADINE 10 MG/1
10 TABLET ORAL DAILY
Status: ON HOLD | COMMUNITY
End: 2024-10-28 | Stop reason: HOSPADM

## 2024-10-04 RX ORDER — ALUMINUM HYDROXIDE, MAGNESIUM HYDROXIDE, AND SIMETHICONE 1200; 120; 1200 MG/30ML; MG/30ML; MG/30ML
30 SUSPENSION ORAL 4 TIMES DAILY PRN
Status: DISCONTINUED | OUTPATIENT
Start: 2024-10-04 | End: 2024-10-28 | Stop reason: HOSPADM

## 2024-10-04 RX ORDER — GLYCOPYRROLATE 0.2 MG/ML
INJECTION INTRAMUSCULAR; INTRAVENOUS
Status: DISCONTINUED | OUTPATIENT
Start: 2024-10-04 | End: 2024-10-04

## 2024-10-04 RX ORDER — PHENYLEPHRINE HCL IN 0.9% NACL 1 MG/10 ML
SYRINGE (ML) INTRAVENOUS
Status: DISCONTINUED | OUTPATIENT
Start: 2024-10-04 | End: 2024-10-04

## 2024-10-04 RX ORDER — ONDANSETRON HYDROCHLORIDE 2 MG/ML
4 INJECTION, SOLUTION INTRAVENOUS ONCE
Status: DISCONTINUED | OUTPATIENT
Start: 2024-10-04 | End: 2024-10-20

## 2024-10-04 RX ORDER — TALC
6 POWDER (GRAM) TOPICAL NIGHTLY PRN
Status: DISCONTINUED | OUTPATIENT
Start: 2024-10-04 | End: 2024-10-28 | Stop reason: HOSPADM

## 2024-10-04 RX ORDER — SEVELAMER CARBONATE 800 MG/1
800 TABLET, FILM COATED ORAL
Status: DISCONTINUED | OUTPATIENT
Start: 2024-10-04 | End: 2024-10-28 | Stop reason: HOSPADM

## 2024-10-04 RX ORDER — METOCLOPRAMIDE HYDROCHLORIDE 5 MG/ML
10 INJECTION INTRAMUSCULAR; INTRAVENOUS EVERY 10 MIN PRN
Status: DISCONTINUED | OUTPATIENT
Start: 2024-10-04 | End: 2024-10-24 | Stop reason: HOSPADM

## 2024-10-04 RX ORDER — ATORVASTATIN CALCIUM 10 MG/1
20 TABLET, FILM COATED ORAL NIGHTLY
Status: DISCONTINUED | OUTPATIENT
Start: 2024-10-04 | End: 2024-10-28 | Stop reason: HOSPADM

## 2024-10-04 RX ORDER — POLYETHYLENE GLYCOL 3350 17 G/17G
17 POWDER, FOR SOLUTION ORAL 2 TIMES DAILY PRN
Status: DISCONTINUED | OUTPATIENT
Start: 2024-10-04 | End: 2024-10-28 | Stop reason: HOSPADM

## 2024-10-04 RX ADMIN — HYDROCODONE BITARTRATE AND ACETAMINOPHEN 1 TABLET: 5; 325 TABLET ORAL at 03:10

## 2024-10-04 RX ADMIN — GLYCOPYRROLATE 0.1 MG: 0.2 INJECTION INTRAMUSCULAR; INTRAVENOUS at 11:10

## 2024-10-04 RX ADMIN — LIDOCAINE HYDROCHLORIDE 80 MG: 20 INJECTION INTRAVENOUS at 11:10

## 2024-10-04 RX ADMIN — CALCIUM CHLORIDE INJECTION 0.5 G: 100 INJECTION, SOLUTION INTRAVENOUS at 11:10

## 2024-10-04 RX ADMIN — MORPHINE SULFATE 4 MG: 4 INJECTION INTRAVENOUS at 01:10

## 2024-10-04 RX ADMIN — HYDROMORPHONE HYDROCHLORIDE 0.5 MG: 2 INJECTION INTRAMUSCULAR; INTRAVENOUS; SUBCUTANEOUS at 07:10

## 2024-10-04 RX ADMIN — HYDROMORPHONE HYDROCHLORIDE 0.2 MG: 2 INJECTION, SOLUTION INTRAMUSCULAR; INTRAVENOUS; SUBCUTANEOUS at 12:10

## 2024-10-04 RX ADMIN — Medication 100 MCG: at 11:10

## 2024-10-04 RX ADMIN — SUCCINYLCHOLINE CHLORIDE 100 MG: 20 INJECTION, SOLUTION INTRAMUSCULAR; INTRAVENOUS at 11:10

## 2024-10-04 RX ADMIN — LIDOCAINE HYDROCHLORIDE 10 ML: 20 INJECTION, SOLUTION INFILTRATION; PERINEURAL at 03:10

## 2024-10-04 RX ADMIN — SODIUM CHLORIDE, SODIUM GLUCONATE, SODIUM ACETATE, POTASSIUM CHLORIDE AND MAGNESIUM CHLORIDE: 526; 502; 368; 37; 30 INJECTION, SOLUTION INTRAVENOUS at 11:10

## 2024-10-04 RX ADMIN — FENTANYL CITRATE 50 MCG: 50 INJECTION, SOLUTION INTRAMUSCULAR; INTRAVENOUS at 11:10

## 2024-10-04 RX ADMIN — SODIUM CHLORIDE 200 ML: 9 INJECTION, SOLUTION INTRAVENOUS at 08:10

## 2024-10-04 RX ADMIN — MUPIROCIN: 20 OINTMENT TOPICAL at 09:10

## 2024-10-04 RX ADMIN — MUPIROCIN: 20 OINTMENT TOPICAL at 08:10

## 2024-10-04 RX ADMIN — FENTANYL CITRATE 100 MCG: 50 INJECTION, SOLUTION INTRAMUSCULAR; INTRAVENOUS at 11:10

## 2024-10-04 RX ADMIN — DEXAMETHASONE SODIUM PHOSPHATE 4 MG: 4 INJECTION, SOLUTION INTRA-ARTICULAR; INTRALESIONAL; INTRAMUSCULAR; INTRAVENOUS; SOFT TISSUE at 11:10

## 2024-10-04 RX ADMIN — ONDANSETRON 4 MG: 2 INJECTION INTRAMUSCULAR; INTRAVENOUS at 11:10

## 2024-10-04 RX ADMIN — ATORVASTATIN CALCIUM 20 MG: 10 TABLET, FILM COATED ORAL at 03:10

## 2024-10-04 RX ADMIN — CEFEPIME 2 G: 2 INJECTION, POWDER, FOR SOLUTION INTRAVENOUS at 09:10

## 2024-10-04 RX ADMIN — PROPOFOL 120 MG: 10 INJECTION, EMULSION INTRAVENOUS at 11:10

## 2024-10-04 RX ADMIN — HYDROCODONE BITARTRATE AND ACETAMINOPHEN 1 TABLET: 5; 325 TABLET ORAL at 08:10

## 2024-10-04 RX ADMIN — SODIUM BICARBONATE: 84 INJECTION, SOLUTION INTRAVENOUS at 02:10

## 2024-10-04 RX ADMIN — IOPAMIDOL 5 ML: 755 INJECTION, SOLUTION INTRAVENOUS at 04:10

## 2024-10-04 RX ADMIN — ROCURONIUM BROMIDE 5 MG: 10 SOLUTION INTRAVENOUS at 11:10

## 2024-10-04 RX ADMIN — PANTOPRAZOLE SODIUM 40 MG: 40 TABLET, DELAYED RELEASE ORAL at 08:10

## 2024-10-04 RX ADMIN — SEVELAMER CARBONATE 800 MG: 800 TABLET, FILM COATED ORAL at 08:10

## 2024-10-04 RX ADMIN — FOLIC ACID 1 MG: 1 TABLET ORAL at 08:10

## 2024-10-04 NOTE — ED NOTES
Patient is saying that the heel boots make her feet too high off bed and doesn't want to wear them, educated on reasons MD ordered for wound prevention in bed.

## 2024-10-04 NOTE — INTERVAL H&P NOTE
The patient has been examined and the H&P has been reviewed:    I concur with the findings and no changes have occurred since H&P was written. 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        There are no hospital problems to display for this patient.

## 2024-10-04 NOTE — PROGRESS NOTES
Pharmacokinetic Initial Assessment: IV Vancomycin    Assessment/Plan:    Initiate intravenous vancomycin with loading dose of 2000 mg once with subsequent doses when random concentrations are less than 20 mcg/mL.  Desired empiric serum trough concentration is 15 to 20 mcg/mL.  Draw vancomycin random level on 10/5 at 0430 (with morning labs).  Pharmacy will continue to follow and monitor vancomycin.      Please contact pharmacy at extension [phone] with any questions regarding this assessment.     Thank you for the consult,   Uchedwayne Swift       Patient brief summary:  Fior Joya is a 69 y.o. female initiated on antimicrobial therapy with IV Vancomycin for treatment of suspected bacteremia    Drug Allergies:   Review of patient's allergies indicates:   Allergen Reactions    Asa [aspirin] Anaphylaxis    Penicillins      Received ceftriaxone from 6/27, no reactions        Actual Body Weight:   Wt Readings from Last 1 Encounters:   10/03/24 (!) 140.2 kg (309 lb)       Renal Function:   Estimated Creatinine Clearance: 10.7 mL/min (A) (based on SCr of 6.98 mg/dL (H)).     Dialysis Method (if applicable):  intermittent HD TTS    CBC (last 72 hours):  Recent Labs   Lab Result Units 10/03/24  1620 10/04/24  0311   WBC x10(3)/mcL 22.51  22.51* 23.93  23.93*   Hgb g/dL 12.6 13.2   Hct % 38.6 39.5   Platelet x10(3)/mcL 263 254   Monocytes % % 1 1   Eosinophils % % 1 2   Basophils % % 1  --        Metabolic Panel (last 72 hours):  Recent Labs   Lab Result Units 10/03/24  1620 10/03/24  2008 10/04/24  0311   Sodium mmol/L 133*  --  131*   Potassium mmol/L 3.5  --  4.4   Chloride mmol/L 96*  --  96*   CO2 mmol/L 18*  --  13*   Glucose mg/dL 124*  --  84   Glucose, UA   --  Normal  --    Blood Urea Nitrogen mg/dL 65.3*  --  70.0*   Creatinine mg/dL 6.70*  --  6.98*   Albumin g/dL 2.4*  --  2.3*   Bilirubin Total mg/dL 0.5  --  0.4   ALP unit/L 179*  --  182*   AST unit/L 127*  --  91*   ALT unit/L 117*  --  104*   Magnesium Level  mg/dL  --   --  2.20   Phosphorus Level mg/dL  --   --  4.2       Microbiologic Results:  Microbiology Results (last 7 days)       Procedure Component Value Units Date/Time    Blood culture #2 **CANNOT BE ORDERED STAT** [[id number]]  (Abnormal) Collected: 10/03/24 2020    Order Status: Completed Specimen: Blood Updated: 10/04/24 0957     GRAM STAIN Gram Positive Cocci, probable Staphylococcus      Seen in gram stain of broth only      1 of 1 Pediatric bottle positive    Blood culture #1 **CANNOT BE ORDERED STAT** [[id number]]  (Abnormal) Collected: 10/03/24 2020    Order Status: Completed Specimen: Blood Updated: 10/04/24 0956     GRAM STAIN Gram Positive Cocci, probable Staphylococcus      Seen in gram stain of broth only      2 of 2 bottles positive    BCID2 Panel [[id number]] Collected: 10/03/24 2020    Order Status: Sent Specimen: Blood Updated: 10/04/24 0953    Urine culture [[id number]] Collected: 10/03/24 2008    Order Status: Completed Specimen: Urine Updated: 10/04/24 0643     Urine Culture No Growth At 24 Hours

## 2024-10-04 NOTE — OP NOTE
[facility] - Emergency Dept  Surgery Department  Operative Note    SUMMARY     Date of Procedure: 10/4/2024     Procedure:  Cystoscopy with right stent exchange upper pole moiety.  Retrograde pyelogram.  Bladder biopsy.    Surgeon: Tawana Loaiza        Pre-Operative Diagnosis:   Hydronephrosis, UTI.  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  Anesthesia: General    Operative Findings:  Patient was established with Dr. Otis Person who has bilateral hydronephrosis.  She was on hemodialysis and is anuric.  She has previously undergone left percutaneous nephrostomy tube nephroureteral stent placement, right ureteral stent placement in the right upper pole moiety.  This was a few months ago.  Patient presents to the emergency department with persistent UTI symptoms flank pain abdominal pain and hypotension.  CT scan was obtained demonstrating persistent hydronephrosis in the right lower pole kidney.  She was consented for right ureteral stent exchange with the attempted placement of a 2 ureteral stents on the right for her upper and lower poles.    After signed written consent was obtained she was taken to the operating suite.  General anesthesia was administered.  She was placed in the dorsal lithotomy position the area of the genitalia was prepped and draped sterilely.  A 22 Bangladeshi cystourethroscope was inserted into the bladder.  There was significant amount of friable tissue located at the bladder base and a very small contracted bladder making distention of the bladder difficult.  The tip of the ureteral stent on the right was grasped and extracted from the urethra and a guidewire was placed in the upper pole of the kidney.  Extensive search for a 2nd ureteral orifice on the right side was unsuccessful.  I used a dual lumen catheter over the wire to the collecting system on the right to obtain a retrograde pyelogram.   Collecting system was filled with a right upper pole and as the catheter was withdrawn in the wire held in place persistent infusion of contrast was performed.  There was no evidence of a partial duplication and the lower pole collecting system did not fill during this maneuver.  Further attempts at inspection for 2nd ureteral orifice were unsuccessful.  The guidewire was then backloaded into the cystoscope and over this was inserted a 6 Spanish by 28 cm double-J ureteral stent with a good proximal and distal coil into the upper pole collecting system on the right.    Multiple biopsies were taken with the flexible biopsy forceps of the abnormal tissue located at the bladder base to further diagnose this process.  The bladder was then drained the endoscope was removed.  The patient was awakened and transferred to recovery stable.      The plan will be to obtain a Interventional Radiology consult for percutaneous nephrostomy tube placement of the lower pole of the right kidney.    Estimated Blood Loss (EBL): * No values recorded between 10/4/2024 11:21 AM and 10/4/2024 12:04 PM *           Implants:   Implant Name Type Inv. Item Serial No.  Lot No. LRB No. Used Action   STENT SET URETERAL [id number] - [id number]  STENT SET URETERAL [id number]  [employer] [id number]  1 Implanted       Specimens:   Specimen (24h ago, onward)       Start     Ordered    10/04/24 1153  Specimen to Pathology  RELEASE UPON ORDERING        References:    Click here for ordering Quick Tip   Question:  Release to patient  Answer:  Immediate    10/04/24 1665                            Condition: Stable    Disposition: PACU - hemodynamically stable.    Attestation: I was present and scrubbed for the entire procedure.

## 2024-10-04 NOTE — OP NOTE
Radiology Post-Procedure Note    Pre Op Diagnosis: Right Hydronephrosis    Post Op Diagnosis: Same    Secondary Diagnoses:   Problem List Items Addressed This Visit    None  Visit Diagnoses       Sepsis        Chest pain        Relevant Orders    EKG 12-lead    Acute pyelonephritis        Relevant Orders    Specimen to Pathology    Hydronephrosis with ureteral stricture, not elsewhere classified        Relevant Orders    Specimen to Pathology             Procedure: Fluoro & US Guided R Nephrostomy Placement    Procedure performed by: Andrew Buenrostro MD    Assistant: Michael    Written Informed Consent Obtained: Yes    Specimen Removed: None    Estimated Blood Loss: 1 cc    Condition: Stable    Outcome: The patient tolerated the procedure well and was without complications.    For further details please see the imaging report associated.    Disposition: Transfer Back to Inpatient Unit

## 2024-10-04 NOTE — ANESTHESIA POSTPROCEDURE EVALUATION
Anesthesia Post Evaluation    Patient: Fior Joya    Procedure(s) Performed: Procedure(s) (LRB):  CYSTOSCOPY, WITH URETERAL STENT INSERTION (Bilateral)    Final Anesthesia Type: general      Patient location during evaluation: PACU  Patient participation: Yes- Able to Participate  Level of consciousness: awake and alert  Post-procedure vital signs: reviewed and stable  Pain management: adequate  Airway patency: patent  HE mitigation strategies: Multimodal analgesia  PONV status at discharge: No PONV  Anesthetic complications: no      Cardiovascular status: hemodynamically stable  Respiratory status: unassisted  Hydration status: euvolemic  Follow-up not needed.              Vitals Value Taken Time   /58 10/04/24 1400   Temp 36.3 °C (97.3 °F) 10/04/24 1256   Pulse 91 10/04/24 1400   Resp 14 10/04/24 1249   SpO2 98 % 10/04/24 1400   Vitals shown include unfiled device data.      Event Time   Out of Recovery 10/04/2024 12:50:00         Pain/Tino Score: Pain Rating Prior to Med Admin: 6 (10/4/2024 12:44 PM)  Pain Rating Post Med Admin: 4 (10/4/2024  4:15 AM)  Tino Score: 10 (10/4/2024 12:50 PM)

## 2024-10-04 NOTE — ED NOTES
Patient blood pressure 96/54 with map of 66, notified Dr. Leggett. Patient is asymptomatic, RA 98%

## 2024-10-04 NOTE — CONSULTS
Nephrology Initial Consult Note    Patient Name: Fior Joya  Age: 69 y.o.  : 1955  MRN: [id number]  Admission Date: 10/3/2024    Reason for Consult:      ESRD  Self, Aaareferral    HPI:     Fior Joya is a 69 y.o. female who presents with right flank pain.  Past medical history significant for ESRD on hemodialysis TTS at [facility] via right IJ PermCath, chronic hydronephrosis with left nephrostomy tube in place, and stents to right ureter, hypertension, anemia, hyperphosphatemia, and hyperlipidemia.  Patient presents with worsening right flank pain.  She states the pain started in her right neck, in his now in her right flank/hip area.  Patient was started on hemodialysis in 2024 after being found to have significant acute kidney injury and hydronephrosis.  She has been maintained on hemodialysis at [facility] on a TTS schedule.  Prior to hospitalization her last dialysis was on 10/01/2024.  Nephrology consulted for ESRD management.  In the emergency department she was noted to have low blood pressure and has been given IV fluids.  Urology has also been consulted for concern hydronephrosis needing stent exchange.  She has also been started on antibiotics for possible UTI.  She has had nausea, and vomiting.  No chest pain, shortness of breath, or lower extremity edema.        Current Facility-Administered Medications   Medication Dose Route Frequency Provider Last Rate Last Admin    acetaminophen tablet 650 mg  650 mg Oral Q4H PRN Louie Zimmerman MD        aluminum-magnesium hydroxide-simethicone 200-200-20 mg/5 mL suspension 30 mL  30 mL Oral QID PRN Louie Zimmerman MD        atorvastatin tablet 20 mg  20 mg Oral QHS Louie Zimmerman MD   20 mg at 10/04/24 0315    ceFEPIme (MAXIPIME) 2 g in D5W 100 mL IVPB (MB+)  2 g Intravenous Q24H Louie Zimmerman MD        folic acid tablet 1 mg  1 mg Oral Daily Louie Zimmerman MD   1 mg at 10/04/24 0813    HYDROcodone-acetaminophen 5-325 mg per tablet 1 tablet  1 tablet Oral  Q6H PRN Louie Zimmerman MD   1 tablet at 10/04/24 0851    HYDROmorphone (PF) injection 0.5 mg  0.5 mg Intravenous Q4H PRN Louie Zimmerman MD   0.5 mg at 10/04/24 0704    labetaloL tablet 200 mg  200 mg Oral Q12H Louie Zimmerman MD        melatonin tablet 6 mg  6 mg Oral Nightly PRN Louie Zimmerman MD        mupirocin 2 % ointment   Nasal BID Louie Zimmerman MD   Given at 10/04/24 0813    ondansetron injection 4 mg  4 mg Intravenous Q4H PRN Louie Zimmerman MD        pantoprazole EC tablet 40 mg  40 mg Oral Daily Louie Zimmerman MD   40 mg at 10/04/24 0813    polyethylene glycol packet 17 g  17 g Oral BID PRN Louie Zimmerman MD        prochlorperazine injection Soln 5 mg  5 mg Intravenous Q6H PRN Louie Zimmerman MD        senna-docusate 8.6-50 mg per tablet 2 tablet  2 tablet Oral BID PRN Louie Zimmerman MD        sevelamer carbonate tablet 800 mg  800 mg Oral TID WM Louie Zimmerman MD   800 mg at 10/04/24 0813    sodium bicarbonate 150 mEq in D5W 1,000 mL infusion   Intravenous Continuous Neno Mercado DO        sodium chloride 0.9% flush 10 mL  10 mL Intravenous PRN Louie Zimmerman MD         Current Outpatient Medications   Medication Sig Dispense Refill    atorvastatin (LIPITOR) 20 MG tablet Take 20 mg by mouth every evening.      calcium carbonate (TUMS) 200 mg calcium (500 mg) chewable tablet Take 2 tablets (1,000 mg total) by mouth 2 (two) times daily. 120 tablet 11    ergocalciferol (ERGOCALCIFEROL) 50,000 unit Cap Take 1 capsule (50,000 Units total) by mouth every 72 hours. 10 capsule 2    FEROSUL 325 mg (65 mg iron) Tab tablet Take 325 mg by mouth once daily.      folic acid (FOLVITE) 1 MG tablet Take 1 tablet (1 mg total) by mouth once daily. 30 tablet 0    labetaloL (NORMODYNE) 200 MG tablet Take 1 tablet (200 mg total) by mouth every 12 (twelve) hours. 60 tablet 11    loratadine (CLARITIN) 10 mg tablet Take 10 mg by mouth once daily.      pantoprazole (PROTONIX) 40 MG tablet Take 1 tablet (40 mg total) by mouth once daily.  90 tablet 3    senna-docusate 8.6-50 mg (SENNA WITH DOCUSATE SODIUM) 8.6-50 mg per tablet Take 1 tablet by mouth daily as needed for Constipation.      sevelamer carbonate (RENVELA) 800 mg Tab Take 800 mg by mouth 3 (three) times daily with meals.      polyethylene glycol (GLYCOLAX) 17 gram PwPk Take 17 g by mouth once daily. (Patient not taking: Reported on 10/4/2024)         No, Primary Doctor    No past medical history on file.   Past Surgical History:   Procedure Laterality Date    CYSTOSCOPY W/ URETERAL STENT PLACEMENT Right 6/27/2024    Procedure: CYSTOSCOPY, WITH URETERAL STENT INSERTION;  Surgeon: Otis Person MD;  Location: [facility] OR;  Service: Urology;  Laterality: Right;  CYSTOSCOPY, BILATERAL RETROGRADE PYELOGRAMS, POSSIBLE STENT PLACEMENT.    INSERTION OF TUNNELED CENTRAL VENOUS HEMODIALYSIS CATHETER N/A 7/3/2024    Procedure: Insertion, Catheter, Central Venous, Hemodialysis;  Surgeon: Uche Barcenas MD;  Location: [facility] CATH LAB;  Service: Peripheral Vascular;  Laterality: N/A;      No family history on file.  Social History     Tobacco Use    Smoking status: Never    Smokeless tobacco: Never   Substance Use Topics    Alcohol use: Never     (Not in a hospital admission)    Review of patient's allergies indicates:   Allergen Reactions    Asa [aspirin] Anaphylaxis    Penicillins      Received ceftriaxone from 6/27, no reactions             Review of Systems:     Comprehensive 10pt ROS negative except as noted per history.      Objective:       VITAL SIGNS: 24 HR MIN & MAX LAST    Temp  Min: 97.2 °F (36.2 °C)  Max: 97.6 °F (36.4 °C)  97.6 °F (36.4 °C)        BP  Min: 93/73  Max: 143/89  103/62     Pulse  Min: 61  Max: 90  68     Resp  Min: 14  Max: 23  (!) 23    SpO2  Min: 91 %  Max: 100 %  100 %      GEN: Chronically ill appearing WF in NAD  HEENT: Conjunctiva anicteric, pupils equal   CV: RRR +S1,S2 without murmur  PULM: CTAB, unlabored  ABD: Soft, NT/ND abdomen with NABS  :  Nephrostomy tube  in left ureter.  EXT: No cyanosis or edema  SKIN: Warm and dry  PSYCH: Awake, alert and appropriately conversant.   Dialysis access:  Right IJ PermCath            Component Value Date/Time     (L) 10/04/2024 0311     (L) 10/03/2024 1620    K 4.4 10/04/2024 0311    K 3.5 10/03/2024 1620    CO2 13 (L) 10/04/2024 0311    CO2 18 (L) 10/03/2024 1620    BUN 70.0 (H) 10/04/2024 0311    BUN 65.3 (H) 10/03/2024 1620    CREATININE 6.98 (H) 10/04/2024 0311    CREATININE 6.70 (H) 10/03/2024 1620    CALCIUM 8.7 10/04/2024 0311    CALCIUM 8.7 10/03/2024 1620    PHOS 4.2 10/04/2024 0311            Component Value Date/Time    WBC 23.93 (H) 10/04/2024 0311    WBC 23.93 10/04/2024 0311    WBC 22.51 (H) 10/03/2024 1620    WBC 22.51 10/03/2024 1620    HGB 13.2 10/04/2024 0311    HGB 12.6 10/03/2024 1620    HCT 39.5 10/04/2024 0311    HCT 38.6 10/03/2024 1620     10/04/2024 0311     10/03/2024 1620             CT Abdomen Pelvis With IV Contrast NO Oral Contrast   Final Result      Right kidney demonstrates a duplicated collecting system with duplicated ureters possibly with separate UVJ insertions.  The upper pole moiety is decompressed with a ureteral stent with the lower pole more demonstrating moderate severe hydronephrosis which is persistent from the prior exam.      Left nephrostomy ureteral stent drain with resolution of hydronephrosis.      Enlarged right pelvic lymph node increased in size from the prior, malignancy suspected.      Mildly enlarged upper normal sized lymph nodes in the periaortic region and left pelvis, nonspecific, similar to the prior exam.         Electronically signed by: Tete Sun MD   Date:    10/04/2024   Time:    07:37      X-Ray Lumbar Spine Ap And Lateral   Final Result      Limited examination due to patient's body habitus but degenerative changes seen throughout the lower lumbar spine         Electronically signed by: Garland Raya   Date:    10/03/2024    Time:    18:11          Assessment / Plan:       There are no hospital problems to display for this patient.      ESRD - normally TTS at [facility]  Metabolic acidosis  Sepsis/UTI - on cefepime  Bilateral hydronephrosis - urology consulted.  Has left nephrostomy tube, and right ureteral stent    Plan:  No sign of volume overload.  Patient does have significant hypotension at time of my examination.  Only significant electrolyte abnormality is metabolic acidosis.  Repeat lactate.  We will start bicarb infusion at 75 mL/hr.  Hold off on hemodialysis today due to hypotension, and plan for urologic procedure.  Plan to dialyze tomorrow on regular TTS schedule.      Nneo Mercado DO  Nephrology  [facility]  Clinic number: [phone]      Note was created with the assistance of electronic Dictation Services.  Note was reviewed to decrease errors, however, these may still be present.  Please contact me about questions or concerns with the dictation.

## 2024-10-04 NOTE — ANESTHESIA PREPROCEDURE EVALUATION
10/04/2024  Fior Joya is a 69 y.o., female presents to ER with c/o lower back pain and right hip pain x 5 days ago. Patient also reports cloudy drainage in nephrostomy bag x 1 month. Dialysis TTS, last run Tuesday.     She presents for cystoscopy, ureteral stenting.    WBC:23K  H/H: 13/32  PLT: 254K  K+ 4.4    EKG (6/22/24): Sinus tachycardia with occasional Premature ventricular complexes   ST and T wave abnormality, consider lateral ischemia   Abnormal ECG   When compared with ECG of 21-JUN-2024 19:36,   Premature ventricular complexes are now Present   ST now depressed in Anterior leads   T wave inversion now evident in Lateral leads     ECHO(6/22/24):    Left Ventricle: The left ventricle is normal in size. Normal wall thickness. There is normal systolic function with a visually estimated ejection fraction of 55 - 60%. Grade I diastolic dysfunction.    Right Ventricle: Normal right ventricular cavity size. Systolic function is normal.    Left Atrium: Left atrium is dilated.    Mitral Valve: There is no stenosis. The mean pressure gradient across the mitral valve is 7 mmHg at a heart rate of  bpm. There is mild regurgitation.    Tricuspid Valve: There is mild regurgitation.    IVC/SVC: Normal venous pressure at 3 mmHg.    ABD CT: Right kidney demonstrates a duplicated collecting system with duplicated ureters possibly with separate UVJ insertions.  The upper pole moiety is decompressed with a ureteral stent with the lower pole more demonstrating moderate severe hydronephrosis which is persistent from the prior exam.     Left nephrostomy ureteral stent drain with resolution of hydronephrosis.     Surgical History    CYSTOSCOPY W/ URETERAL STENT PLACEMENT INSERTION OF TUNNELED CENTRAL VENOUS HEMODIALYSIS CATHETER     Problem List  Current as of 10/04/24 0934  Anemia requiring transfusions Hyperkalemia    Metabolic acidosis Volume depletion     Pre-op Assessment    I have reviewed the Patient Summary Reports.     I have reviewed the Nursing Notes. I have reviewed the NPO Status.   I have reviewed the Medications.     Review of Systems  Anesthesia Hx:  No problems with previous Anesthesia                Social:  Non-Smoker       Renal/:  Chronic Renal Disease, ESRD, Dialysis                Endocrine:        Morbid Obesity / BMI > 40      Physical Exam  General: Well nourished, Cooperative, Alert and Oriented    Airway:  Mallampati: III   Mouth Opening: Normal  TM Distance: Normal  Neck ROM: Normal ROM    Dental:  Intact, Dentures    Chest/Lungs:  Clear to auscultation, Normal Respiratory Rate    Heart:  Rate: Normal  Rhythm: Regular Rhythm  Sounds: Normal    Abdomen:  Normal, Soft, Nontender        Anesthesia Plan  Type of Anesthesia, risks & benefits discussed:    Anesthesia Type: Gen ETT  Intra-op Monitoring Plan: Standard ASA Monitors  Post Op Pain Control Plan: multimodal analgesia  Induction:  IV  Airway Plan: Direct  Informed Consent: Informed consent signed with the Patient and all parties understand the risks and agree with anesthesia plan.  All questions answered.   ASA Score: 4  Day of Surgery Review of History & Physical: H&P Update referred to the surgeon/provider.    Ready For Surgery From Anesthesia Perspective.     .

## 2024-10-04 NOTE — CONSULTS
Fior Joya 1955  [id number]  10/4/2024    CONSULTING PHYSICIAN: Josefina    Reason for consult: UTI, residual hydronephrosis     HPI: Ms. Joya an established with [facility] for recurrent UTIs and bilateral hydronephrosis.  She was previously undergone left percutaneous nephrostomy tube placement in addition to right ureteral stent placement and a partially or completely duplicated system.  This was performed approximately 2 months ago.  Patient represents with persistent pain and leukocytosis and concerns for UTI.  Imaging demonstrates residual hydronephrosis of the upper pole moiety.  There was good drainage of her left side with no hydronephrosis.  She was complaining of global pain but particularly pain on the right.    Past medical history is extensive please see the H&P.    Past Surgical History:   Procedure Laterality Date    CYSTOSCOPY W/ URETERAL STENT PLACEMENT Right 6/27/2024    Procedure: CYSTOSCOPY, WITH URETERAL STENT INSERTION;  Surgeon: Otis Person MD;  Location: [facility] OR;  Service: Urology;  Laterality: Right;  CYSTOSCOPY, BILATERAL RETROGRADE PYELOGRAMS, POSSIBLE STENT PLACEMENT.    INSERTION OF TUNNELED CENTRAL VENOUS HEMODIALYSIS CATHETER N/A 7/3/2024    Procedure: Insertion, Catheter, Central Venous, Hemodialysis;  Surgeon: Uche Barcenas MD;  Location: [facility] CATH LAB;  Service: Peripheral Vascular;  Laterality: N/A;       No family history on file.    Social History     Tobacco Use    Smoking status: Never    Smokeless tobacco: Never   Substance Use Topics    Alcohol use: Never    Drug use: Never       Current Facility-Administered Medications   Medication Dose Route Frequency Provider Last Rate Last Admin    acetaminophen tablet 650 mg  650 mg Oral Q4H PRN Louie Zimmerman MD        aluminum-magnesium hydroxide-simethicone 200-200-20 mg/5 mL suspension 30 mL  30 mL Oral QID PRN Louie Zimmerman MD        atorvastatin tablet 20 mg  20 mg Oral QHS Louie Zimmerman MD   20 mg at  10/04/24 0315    ceFEPIme (MAXIPIME) 2 g in D5W 100 mL IVPB (MB+)  2 g Intravenous Q24H Louie Zimmerman MD        folic acid tablet 1 mg  1 mg Oral Daily Louie Zimmerman MD   1 mg at 10/04/24 0813    HYDROcodone-acetaminophen 5-325 mg per tablet 1 tablet  1 tablet Oral Q6H PRN Louie Zimmerman MD   1 tablet at 10/04/24 0315    HYDROmorphone (PF) injection 0.5 mg  0.5 mg Intravenous Q4H PRN Louie Zimmerman MD   0.5 mg at 10/04/24 0704    labetaloL tablet 200 mg  200 mg Oral Q12H Louie Zimmerman MD        melatonin tablet 6 mg  6 mg Oral Nightly PRN Louie Zimmerman MD        mupirocin 2 % ointment   Nasal BID Louie Zimmerman MD   Given at 10/04/24 0813    ondansetron injection 4 mg  4 mg Intravenous Q4H PRN Louie Zimmerman MD        pantoprazole EC tablet 40 mg  40 mg Oral Daily Louie Zimmerman MD   40 mg at 10/04/24 0813    polyethylene glycol packet 17 g  17 g Oral BID PRN Louie Zimmerman MD        prochlorperazine injection Soln 5 mg  5 mg Intravenous Q6H PRN Louie Zimmerman MD        senna-docusate 8.6-50 mg per tablet 2 tablet  2 tablet Oral BID PRN Louie Zimmerman MD        sevelamer carbonate tablet 800 mg  800 mg Oral TID WM Louie Zimmerman MD   800 mg at 10/04/24 0813    sodium chloride 0.9% flush 10 mL  10 mL Intravenous PRN Louie Zimmerman MD         Current Outpatient Medications   Medication Sig Dispense Refill    atorvastatin (LIPITOR) 20 MG tablet Take 20 mg by mouth every evening.      calcium carbonate (TUMS) 200 mg calcium (500 mg) chewable tablet Take 2 tablets (1,000 mg total) by mouth 2 (two) times daily. 120 tablet 11    ergocalciferol (ERGOCALCIFEROL) 50,000 unit Cap Take 1 capsule (50,000 Units total) by mouth every 72 hours. 10 capsule 2    FEROSUL 325 mg (65 mg iron) Tab tablet Take 325 mg by mouth once daily.      folic acid (FOLVITE) 1 MG tablet Take 1 tablet (1 mg total) by mouth once daily. 30 tablet 0    labetaloL (NORMODYNE) 200 MG tablet Take 1 tablet (200 mg total) by mouth every 12 (twelve) hours. 60 tablet 11     loratadine (CLARITIN) 10 mg tablet Take 10 mg by mouth once daily.      pantoprazole (PROTONIX) 40 MG tablet Take 1 tablet (40 mg total) by mouth once daily. 90 tablet 3    senna-docusate 8.6-50 mg (SENNA WITH DOCUSATE SODIUM) 8.6-50 mg per tablet Take 1 tablet by mouth daily as needed for Constipation.      sevelamer carbonate (RENVELA) 800 mg Tab Take 800 mg by mouth 3 (three) times daily with meals.      polyethylene glycol (GLYCOLAX) 17 gram PwPk Take 17 g by mouth once daily. (Patient not taking: Reported on 10/4/2024)         Review of patient's allergies indicates:   Allergen Reactions    Asa [aspirin] Anaphylaxis    Penicillins      Received ceftriaxone from 6/27, no reactions        ROS: 12 point review of systems negative other than the HPI      PHYSICAL EXAM:  Vitals:    10/04/24 0702 10/04/24 0704 10/04/24 0707 10/04/24 0814   BP:   113/60 (!) 96/54   Pulse: 74  74 67   Resp:  20 14 (!) 22   Temp:       TempSrc:       SpO2:   96% 98%   Weight:       Height:               Intake/Output Summary (Last 24 hours) at 10/4/2024 0831  Last data filed at 10/3/2024 2130  Gross per 24 hour   Intake 100 ml   Output --   Net 100 ml       GEN: WN/WD NAD  HEENT: NCAT, PERRLA, EOMI, OP clear, nares patent  CV: RRR  RESP: Even and unlabored  ABD:  Obese.  Mild right-sided CVA tenderness.  :  Left nephrostomy tube in place draining a clear valdez urine.  Pelvic exam not done.  EXT: no C/C/E  NEURO: no focal deficits, MAEW, AAOx4      LABS:        Recent Results (from the past 24 hours)   Comprehensive metabolic panel    Collection Time: 10/03/24  4:20 PM   Result Value Ref Range    Sodium 133 (L) 136 - 145 mmol/L    Potassium 3.5 3.5 - 5.1 mmol/L    Chloride 96 (L) 98 - 107 mmol/L    CO2 18 (L) 23 - 31 mmol/L    Glucose 124 (H) 82 - 115 mg/dL    Blood Urea Nitrogen 65.3 (H) 9.8 - 20.1 mg/dL    Creatinine 6.70 (H) 0.55 - 1.02 mg/dL    Calcium 8.7 8.4 - 10.2 mg/dL    Protein Total 7.2 5.8 - 7.6 gm/dL    Albumin 2.4 (L)  3.4 - 4.8 g/dL    Globulin 4.8 (H) 2.4 - 3.5 gm/dL    Albumin/Globulin Ratio 0.5 (L) 1.1 - 2.0 ratio    Bilirubin Total 0.5 <=1.5 mg/dL     (H) 40 - 150 unit/L     (H) 0 - 55 unit/L     (H) 5 - 34 unit/L    eGFR 6 mL/min/1.73/m2    Anion Gap 19.0 mEq/L    BUN/Creatinine Ratio 10    CBC with Differential    Collection Time: 10/03/24  4:20 PM   Result Value Ref Range    WBC 22.51 (H) 4.50 - 11.50 x10(3)/mcL    RBC 4.18 (L) 4.20 - 5.40 x10(6)/mcL    Hgb 12.6 12.0 - 16.0 g/dL    Hct 38.6 37.0 - 47.0 %    MCV 92.3 80.0 - 94.0 fL    MCH 30.1 27.0 - 31.0 pg    MCHC 32.6 (L) 33.0 - 36.0 g/dL    RDW 15.2 11.5 - 17.0 %    Platelet 263 130 - 400 x10(3)/mcL    MPV 10.5 (H) 7.4 - 10.4 fL    NRBC% 0.0 %   Manual Differential    Collection Time: 10/03/24  4:20 PM   Result Value Ref Range    WBC 22.51 x10(3)/mcL    Neutrophils % 95 %    Lymphs % 3 %    Monocytes % 1 %    Eosinophils % 1 %    Basophils % 1 %    Neutrophils Abs 21.3845 (H) 2.1 - 9.2 x10(3)/mcL    Lymphs Abs 0.6753 0.6 - 4.6 x10(3)/mcL    Monocytes Abs 0.2251 0.1 - 1.3 x10(3)/mcL    Eosinophils Abs 0.2251 0 - 0.9 x10(3)/mcL    Basophils Abs 0.2251 (H) 0 - 0.2 x10(3)/mcL    Platelets Normal Normal, Adequate    RBC Morph Abnormal (A) Normal    [place] Cells 1+ (A) (none)   Urinalysis, Reflex to Urine Culture    Collection Time: 10/03/24  8:08 PM    Specimen: Urine   Result Value Ref Range    Color, UA Light-Orange (A) Yellow, Light-Yellow, Colorless, Straw, Dark-Yellow    Appearance, UA Turbid (A) Clear    Specific Gravity, UA 1.017 1.005 - 1.030    pH, UA 6.5 5.0 - 8.5    Protein, UA 2+ (A) Negative    Glucose, UA Normal Negative, Normal    Ketones, UA Negative Negative    Blood, UA 3+ (A) Negative    Bilirubin, UA Negative Negative    Urobilinogen, UA Normal 0.2, 1.0, Normal    Nitrites, UA Negative Negative    Leukocyte Esterase,  (A) Negative    RBC, UA 50-99 (A) None Seen, 0-2, 3-5, 0-5 /HPF    WBC, UA >100 (A) None Seen, 0-2, 3-5, 0-5 /HPF     WBC Clumps, UA Many (A) None Seen, 0-5    Bacteria, UA Many (A) None Seen, Trace /HPF    Squamous Epithelial Cells, UA None Seen None Seen, Trace, Rare /HPF    Transitional Epithelial Cells, UA Trace (A) None Seen /HPF   Urine culture    Collection Time: 10/03/24  8:08 PM    Specimen: Urine   Result Value Ref Range    Urine Culture No Growth At 24 Hours    Lactic acid, plasma    Collection Time: 10/03/24  8:20 PM   Result Value Ref Range    Lactic Acid Level 1.8 0.5 - 2.2 mmol/L   Comprehensive Metabolic Panel    Collection Time: 10/04/24  3:11 AM   Result Value Ref Range    Sodium 131 (L) 136 - 145 mmol/L    Potassium 4.4 3.5 - 5.1 mmol/L    Chloride 96 (L) 98 - 107 mmol/L    CO2 13 (L) 23 - 31 mmol/L    Glucose 84 82 - 115 mg/dL    Blood Urea Nitrogen 70.0 (H) 9.8 - 20.1 mg/dL    Creatinine 6.98 (H) 0.55 - 1.02 mg/dL    Calcium 8.7 8.4 - 10.2 mg/dL    Protein Total 7.3 5.8 - 7.6 gm/dL    Albumin 2.3 (L) 3.4 - 4.8 g/dL    Globulin 5.0 (H) 2.4 - 3.5 gm/dL    Albumin/Globulin Ratio 0.5 (L) 1.1 - 2.0 ratio    Bilirubin Total 0.4 <=1.5 mg/dL     (H) 40 - 150 unit/L     (H) 0 - 55 unit/L    AST 91 (H) 5 - 34 unit/L    eGFR 6 mL/min/1.73/m2    Anion Gap 22.0 mEq/L    BUN/Creatinine Ratio 10    Phosphorus    Collection Time: 10/04/24  3:11 AM   Result Value Ref Range    Phosphorus Level 4.2 2.3 - 4.7 mg/dL   CBC with Differential    Collection Time: 10/04/24  3:11 AM   Result Value Ref Range    WBC 23.93 (H) 4.50 - 11.50 x10(3)/mcL    RBC 4.37 4.20 - 5.40 x10(6)/mcL    Hgb 13.2 12.0 - 16.0 g/dL    Hct 39.5 37.0 - 47.0 %    MCV 90.4 80.0 - 94.0 fL    MCH 30.2 27.0 - 31.0 pg    MCHC 33.4 33.0 - 36.0 g/dL    RDW 15.4 11.5 - 17.0 %    Platelet 254 130 - 400 x10(3)/mcL    MPV 10.8 (H) 7.4 - 10.4 fL    NRBC% 0.0 %   Magnesium    Collection Time: 10/04/24  3:11 AM   Result Value Ref Range    Magnesium Level 2.20 1.60 - 2.60 mg/dL   Manual Differential    Collection Time: 10/04/24  3:11 AM   Result Value Ref  Range    WBC 23.93 x10(3)/mcL    Neutrophils % 96 %    Lymphs % 2 %    Monocytes % 1 %    Eosinophils % 2 %    Neutrophils Abs 22.9728 (H) 2.1 - 9.2 x10(3)/mcL    Lymphs Abs 0.4786 (L) 0.6 - 4.6 x10(3)/mcL    Monocytes Abs 0.2393 0.1 - 1.3 x10(3)/mcL    Eosinophils Abs 0.4786 0 - 0.9 x10(3)/mcL    Platelets Normal Normal, Adequate    RBC Morph Abnormal (A) Normal    Hyperseg Neutrophils 1+ (A) (none)    Poikilocytosis 2+ (A) (none)    [place] Cells 2+ (A) (none)         IMAGING:  EXAMINATION:  CT ABDOMEN PELVIS WITH IV CONTRAST    CLINICAL HISTORY:  Abdominal abscess/infection suspected;Abdominal pain, acute, nonlocalized;abd pain/flank pain, leukocytosis (WBC 22K), R hip pain;    TECHNIQUE:  Low dose axial images, sagittal and coronal reformations were obtained from the lung bases to the pubic symphysis following the IV administration of 100 mL of Omnipaque 350 .  Oral contrast not given.  DLP 1193.  Automated exposure control used.    COMPARISON:  06/21/2024    FINDINGS:  Liver normal enhancement with no focal lesion.    Gallbladder and biliary ductal system are normal.    Pancreas normal.  Stomach decompressed.  Spleen and adrenal glands normal.    Moderate renal cortical thinning, scarring bilaterally.  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  There is a ureteral stent within the upper pole moiety with resolution or reduction and upper pole moiety hydronephrosis and hydroureter, with persistent moderate severe hydronephrosis hydroureter involving the lower pole moiety.  On the left there is a percutaneous nephrostomy/ureteral stent drainage tube with no hydronephrosis.    Aorta tapers normally.  Severe atherosclerotic calcification.    Bowel loops normal with no thickening or dilation.    Mild lymph node proliferation in the periaortic region some lymph nodes upper normal size,  stable.  Right pelvis there is a suspected enlarged lymph node measuring 2.6 cm short axis on image 112 series 2 compared to 2.1 cm on the prior there is a borderline enlarged lymph node on the left, also larger compared to the prior.    Uterus, adnexa, bladder, rectum normal    Bilateral sacroiliitis.  Bones otherwise intact.    Lung bases clear.   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  The upper pole moiety is decompressed with a ureteral stent with the lower pole more demonstrating moderate severe hydronephrosis which is persistent from the prior exam.    Left nephrostomy ureteral stent drain with resolution of hydronephrosis.    Enlarged right pelvic lymph node increased in size from the prior, malignancy suspected.    Mildly enlarged upper normal sized lymph nodes in the periaortic region and left pelvis, nonspecific, similar to the prior exam.      Electronically signed by: Tete Sun MD  Date: 10/04/2024  Time: 07:37         ASSESSMENT:  6/27 underwent cystoscopy with right ureteral stent placement, the left ureteral orifice was not able to be identified therefore had left percutaneous nephrostomy  Patient has a worsening clinical picture with leukocytosis and residual hydronephrosis in the lower pole moiety of the right kidney.  Left side appears adequately drained.  PLAN:  Plan to perform cystoscopy with exchange of a right ureteral stent and attempt at placement of a 2nd ureteral stent on the right in the lower pole moiety to optimize drainage of this collecting system.  If this is not successful, right-sided percutaneous nephrostomy tube will be necessary.    REAL Traore

## 2024-10-04 NOTE — H&P (VIEW-ONLY)
Fior Joya 1955  [id number]  10/4/2024    CONSULTING PHYSICIAN: Josefina    Reason for consult: UTI, residual hydronephrosis     HPI: Ms. Joya an established with [facility] for recurrent UTIs and bilateral hydronephrosis.  She was previously undergone left percutaneous nephrostomy tube placement in addition to right ureteral stent placement and a partially or completely duplicated system.  This was performed approximately 2 months ago.  Patient represents with persistent pain and leukocytosis and concerns for UTI.  Imaging demonstrates residual hydronephrosis of the upper pole moiety.  There was good drainage of her left side with no hydronephrosis.  She was complaining of global pain but particularly pain on the right.    Past medical history is extensive please see the H&P.    Past Surgical History:   Procedure Laterality Date    CYSTOSCOPY W/ URETERAL STENT PLACEMENT Right 6/27/2024    Procedure: CYSTOSCOPY, WITH URETERAL STENT INSERTION;  Surgeon: Otis Person MD;  Location: [facility] OR;  Service: Urology;  Laterality: Right;  CYSTOSCOPY, BILATERAL RETROGRADE PYELOGRAMS, POSSIBLE STENT PLACEMENT.    INSERTION OF TUNNELED CENTRAL VENOUS HEMODIALYSIS CATHETER N/A 7/3/2024    Procedure: Insertion, Catheter, Central Venous, Hemodialysis;  Surgeon: Uche Barcenas MD;  Location: [facility] CATH LAB;  Service: Peripheral Vascular;  Laterality: N/A;       No family history on file.    Social History     Tobacco Use    Smoking status: Never    Smokeless tobacco: Never   Substance Use Topics    Alcohol use: Never    Drug use: Never       Current Facility-Administered Medications   Medication Dose Route Frequency Provider Last Rate Last Admin    acetaminophen tablet 650 mg  650 mg Oral Q4H PRN Louie Zimmerman MD        aluminum-magnesium hydroxide-simethicone 200-200-20 mg/5 mL suspension 30 mL  30 mL Oral QID PRN Louie Zimmerman MD        atorvastatin tablet 20 mg  20 mg Oral QHS Louie Zimmerman MD   20 mg at  10/04/24 0315    ceFEPIme (MAXIPIME) 2 g in D5W 100 mL IVPB (MB+)  2 g Intravenous Q24H Louie Zimmerman MD        folic acid tablet 1 mg  1 mg Oral Daily Louie Zimmerman MD   1 mg at 10/04/24 0813    HYDROcodone-acetaminophen 5-325 mg per tablet 1 tablet  1 tablet Oral Q6H PRN Louie Zimmerman MD   1 tablet at 10/04/24 0315    HYDROmorphone (PF) injection 0.5 mg  0.5 mg Intravenous Q4H PRN Louie Zimmerman MD   0.5 mg at 10/04/24 0704    labetaloL tablet 200 mg  200 mg Oral Q12H Louie Zimmerman MD        melatonin tablet 6 mg  6 mg Oral Nightly PRN Louie Zimmerman MD        mupirocin 2 % ointment   Nasal BID Louie Zimmerman MD   Given at 10/04/24 0813    ondansetron injection 4 mg  4 mg Intravenous Q4H PRN Louie Zimmerman MD        pantoprazole EC tablet 40 mg  40 mg Oral Daily Louie Zimmerman MD   40 mg at 10/04/24 0813    polyethylene glycol packet 17 g  17 g Oral BID PRN Louie iZmmerman MD        prochlorperazine injection Soln 5 mg  5 mg Intravenous Q6H PRN Louie Zimmerman MD        senna-docusate 8.6-50 mg per tablet 2 tablet  2 tablet Oral BID PRN Louie Zimmerman MD        sevelamer carbonate tablet 800 mg  800 mg Oral TID WM Louie Zimmerman MD   800 mg at 10/04/24 0813    sodium chloride 0.9% flush 10 mL  10 mL Intravenous PRN Louie Zimmerman MD         Current Outpatient Medications   Medication Sig Dispense Refill    atorvastatin (LIPITOR) 20 MG tablet Take 20 mg by mouth every evening.      calcium carbonate (TUMS) 200 mg calcium (500 mg) chewable tablet Take 2 tablets (1,000 mg total) by mouth 2 (two) times daily. 120 tablet 11    ergocalciferol (ERGOCALCIFEROL) 50,000 unit Cap Take 1 capsule (50,000 Units total) by mouth every 72 hours. 10 capsule 2    FEROSUL 325 mg (65 mg iron) Tab tablet Take 325 mg by mouth once daily.      folic acid (FOLVITE) 1 MG tablet Take 1 tablet (1 mg total) by mouth once daily. 30 tablet 0    labetaloL (NORMODYNE) 200 MG tablet Take 1 tablet (200 mg total) by mouth every 12 (twelve) hours. 60 tablet 11     loratadine (CLARITIN) 10 mg tablet Take 10 mg by mouth once daily.      pantoprazole (PROTONIX) 40 MG tablet Take 1 tablet (40 mg total) by mouth once daily. 90 tablet 3    senna-docusate 8.6-50 mg (SENNA WITH DOCUSATE SODIUM) 8.6-50 mg per tablet Take 1 tablet by mouth daily as needed for Constipation.      sevelamer carbonate (RENVELA) 800 mg Tab Take 800 mg by mouth 3 (three) times daily with meals.      polyethylene glycol (GLYCOLAX) 17 gram PwPk Take 17 g by mouth once daily. (Patient not taking: Reported on 10/4/2024)         Review of patient's allergies indicates:   Allergen Reactions    Asa [aspirin] Anaphylaxis    Penicillins      Received ceftriaxone from 6/27, no reactions        ROS: 12 point review of systems negative other than the HPI      PHYSICAL EXAM:  Vitals:    10/04/24 0702 10/04/24 0704 10/04/24 0707 10/04/24 0814   BP:   113/60 (!) 96/54   Pulse: 74  74 67   Resp:  20 14 (!) 22   Temp:       TempSrc:       SpO2:   96% 98%   Weight:       Height:               Intake/Output Summary (Last 24 hours) at 10/4/2024 0831  Last data filed at 10/3/2024 2130  Gross per 24 hour   Intake 100 ml   Output --   Net 100 ml       GEN: WN/WD NAD  HEENT: NCAT, PERRLA, EOMI, OP clear, nares patent  CV: RRR  RESP: Even and unlabored  ABD:  Obese.  Mild right-sided CVA tenderness.  :  Left nephrostomy tube in place draining a clear valdez urine.  Pelvic exam not done.  EXT: no C/C/E  NEURO: no focal deficits, MAEW, AAOx4      LABS:        Recent Results (from the past 24 hours)   Comprehensive metabolic panel    Collection Time: 10/03/24  4:20 PM   Result Value Ref Range    Sodium 133 (L) 136 - 145 mmol/L    Potassium 3.5 3.5 - 5.1 mmol/L    Chloride 96 (L) 98 - 107 mmol/L    CO2 18 (L) 23 - 31 mmol/L    Glucose 124 (H) 82 - 115 mg/dL    Blood Urea Nitrogen 65.3 (H) 9.8 - 20.1 mg/dL    Creatinine 6.70 (H) 0.55 - 1.02 mg/dL    Calcium 8.7 8.4 - 10.2 mg/dL    Protein Total 7.2 5.8 - 7.6 gm/dL    Albumin 2.4 (L)  3.4 - 4.8 g/dL    Globulin 4.8 (H) 2.4 - 3.5 gm/dL    Albumin/Globulin Ratio 0.5 (L) 1.1 - 2.0 ratio    Bilirubin Total 0.5 <=1.5 mg/dL     (H) 40 - 150 unit/L     (H) 0 - 55 unit/L     (H) 5 - 34 unit/L    eGFR 6 mL/min/1.73/m2    Anion Gap 19.0 mEq/L    BUN/Creatinine Ratio 10    CBC with Differential    Collection Time: 10/03/24  4:20 PM   Result Value Ref Range    WBC 22.51 (H) 4.50 - 11.50 x10(3)/mcL    RBC 4.18 (L) 4.20 - 5.40 x10(6)/mcL    Hgb 12.6 12.0 - 16.0 g/dL    Hct 38.6 37.0 - 47.0 %    MCV 92.3 80.0 - 94.0 fL    MCH 30.1 27.0 - 31.0 pg    MCHC 32.6 (L) 33.0 - 36.0 g/dL    RDW 15.2 11.5 - 17.0 %    Platelet 263 130 - 400 x10(3)/mcL    MPV 10.5 (H) 7.4 - 10.4 fL    NRBC% 0.0 %   Manual Differential    Collection Time: 10/03/24  4:20 PM   Result Value Ref Range    WBC 22.51 x10(3)/mcL    Neutrophils % 95 %    Lymphs % 3 %    Monocytes % 1 %    Eosinophils % 1 %    Basophils % 1 %    Neutrophils Abs 21.3845 (H) 2.1 - 9.2 x10(3)/mcL    Lymphs Abs 0.6753 0.6 - 4.6 x10(3)/mcL    Monocytes Abs 0.2251 0.1 - 1.3 x10(3)/mcL    Eosinophils Abs 0.2251 0 - 0.9 x10(3)/mcL    Basophils Abs 0.2251 (H) 0 - 0.2 x10(3)/mcL    Platelets Normal Normal, Adequate    RBC Morph Abnormal (A) Normal    [place] Cells 1+ (A) (none)   Urinalysis, Reflex to Urine Culture    Collection Time: 10/03/24  8:08 PM    Specimen: Urine   Result Value Ref Range    Color, UA Light-Orange (A) Yellow, Light-Yellow, Colorless, Straw, Dark-Yellow    Appearance, UA Turbid (A) Clear    Specific Gravity, UA 1.017 1.005 - 1.030    pH, UA 6.5 5.0 - 8.5    Protein, UA 2+ (A) Negative    Glucose, UA Normal Negative, Normal    Ketones, UA Negative Negative    Blood, UA 3+ (A) Negative    Bilirubin, UA Negative Negative    Urobilinogen, UA Normal 0.2, 1.0, Normal    Nitrites, UA Negative Negative    Leukocyte Esterase,  (A) Negative    RBC, UA 50-99 (A) None Seen, 0-2, 3-5, 0-5 /HPF    WBC, UA >100 (A) None Seen, 0-2, 3-5, 0-5 /HPF     WBC Clumps, UA Many (A) None Seen, 0-5    Bacteria, UA Many (A) None Seen, Trace /HPF    Squamous Epithelial Cells, UA None Seen None Seen, Trace, Rare /HPF    Transitional Epithelial Cells, UA Trace (A) None Seen /HPF   Urine culture    Collection Time: 10/03/24  8:08 PM    Specimen: Urine   Result Value Ref Range    Urine Culture No Growth At 24 Hours    Lactic acid, plasma    Collection Time: 10/03/24  8:20 PM   Result Value Ref Range    Lactic Acid Level 1.8 0.5 - 2.2 mmol/L   Comprehensive Metabolic Panel    Collection Time: 10/04/24  3:11 AM   Result Value Ref Range    Sodium 131 (L) 136 - 145 mmol/L    Potassium 4.4 3.5 - 5.1 mmol/L    Chloride 96 (L) 98 - 107 mmol/L    CO2 13 (L) 23 - 31 mmol/L    Glucose 84 82 - 115 mg/dL    Blood Urea Nitrogen 70.0 (H) 9.8 - 20.1 mg/dL    Creatinine 6.98 (H) 0.55 - 1.02 mg/dL    Calcium 8.7 8.4 - 10.2 mg/dL    Protein Total 7.3 5.8 - 7.6 gm/dL    Albumin 2.3 (L) 3.4 - 4.8 g/dL    Globulin 5.0 (H) 2.4 - 3.5 gm/dL    Albumin/Globulin Ratio 0.5 (L) 1.1 - 2.0 ratio    Bilirubin Total 0.4 <=1.5 mg/dL     (H) 40 - 150 unit/L     (H) 0 - 55 unit/L    AST 91 (H) 5 - 34 unit/L    eGFR 6 mL/min/1.73/m2    Anion Gap 22.0 mEq/L    BUN/Creatinine Ratio 10    Phosphorus    Collection Time: 10/04/24  3:11 AM   Result Value Ref Range    Phosphorus Level 4.2 2.3 - 4.7 mg/dL   CBC with Differential    Collection Time: 10/04/24  3:11 AM   Result Value Ref Range    WBC 23.93 (H) 4.50 - 11.50 x10(3)/mcL    RBC 4.37 4.20 - 5.40 x10(6)/mcL    Hgb 13.2 12.0 - 16.0 g/dL    Hct 39.5 37.0 - 47.0 %    MCV 90.4 80.0 - 94.0 fL    MCH 30.2 27.0 - 31.0 pg    MCHC 33.4 33.0 - 36.0 g/dL    RDW 15.4 11.5 - 17.0 %    Platelet 254 130 - 400 x10(3)/mcL    MPV 10.8 (H) 7.4 - 10.4 fL    NRBC% 0.0 %   Magnesium    Collection Time: 10/04/24  3:11 AM   Result Value Ref Range    Magnesium Level 2.20 1.60 - 2.60 mg/dL   Manual Differential    Collection Time: 10/04/24  3:11 AM   Result Value Ref  Range    WBC 23.93 x10(3)/mcL    Neutrophils % 96 %    Lymphs % 2 %    Monocytes % 1 %    Eosinophils % 2 %    Neutrophils Abs 22.9728 (H) 2.1 - 9.2 x10(3)/mcL    Lymphs Abs 0.4786 (L) 0.6 - 4.6 x10(3)/mcL    Monocytes Abs 0.2393 0.1 - 1.3 x10(3)/mcL    Eosinophils Abs 0.4786 0 - 0.9 x10(3)/mcL    Platelets Normal Normal, Adequate    RBC Morph Abnormal (A) Normal    Hyperseg Neutrophils 1+ (A) (none)    Poikilocytosis 2+ (A) (none)    Ramos Cells 2+ (A) (none)         IMAGING:  EXAMINATION:  CT ABDOMEN PELVIS WITH IV CONTRAST    CLINICAL HISTORY:  Abdominal abscess/infection suspected;Abdominal pain, acute, nonlocalized;abd pain/flank pain, leukocytosis (WBC 22K), R hip pain;    TECHNIQUE:  Low dose axial images, sagittal and coronal reformations were obtained from the lung bases to the pubic symphysis following the IV administration of 100 mL of Omnipaque 350 .  Oral contrast not given.  DLP 1193.  Automated exposure control used.    COMPARISON:  06/21/2024    FINDINGS:  Liver normal enhancement with no focal lesion.    Gallbladder and biliary ductal system are normal.    Pancreas normal.  Stomach decompressed.  Spleen and adrenal glands normal.    Moderate renal cortical thinning, scarring bilaterally.  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  There is a ureteral stent within the upper pole moiety with resolution or reduction and upper pole moiety hydronephrosis and hydroureter, with persistent moderate severe hydronephrosis hydroureter involving the lower pole moiety.  On the left there is a percutaneous nephrostomy/ureteral stent drainage tube with no hydronephrosis.    Aorta tapers normally.  Severe atherosclerotic calcification.    Bowel loops normal with no thickening or dilation.    Mild lymph node proliferation in the periaortic region some lymph nodes upper normal size,  stable.  Right pelvis there is a suspected enlarged lymph node measuring 2.6 cm short axis on image 112 series 2 compared to 2.1 cm on the prior there is a borderline enlarged lymph node on the left, also larger compared to the prior.    Uterus, adnexa, bladder, rectum normal    Bilateral sacroiliitis.  Bones otherwise intact.    Lung bases clear.   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  The upper pole moiety is decompressed with a ureteral stent with the lower pole more demonstrating moderate severe hydronephrosis which is persistent from the prior exam.    Left nephrostomy ureteral stent drain with resolution of hydronephrosis.    Enlarged right pelvic lymph node increased in size from the prior, malignancy suspected.    Mildly enlarged upper normal sized lymph nodes in the periaortic region and left pelvis, nonspecific, similar to the prior exam.      Electronically signed by: Tete Sun MD  Date: 10/04/2024  Time: 07:37         ASSESSMENT:  6/27 underwent cystoscopy with right ureteral stent placement, the left ureteral orifice was not able to be identified therefore had left percutaneous nephrostomy  Patient has a worsening clinical picture with leukocytosis and residual hydronephrosis in the lower pole moiety of the right kidney.  Left side appears adequately drained.  PLAN:  Plan to perform cystoscopy with exchange of a right ureteral stent and attempt at placement of a 2nd ureteral stent on the right in the lower pole moiety to optimize drainage of this collecting system.  If this is not successful, right-sided percutaneous nephrostomy tube will be necessary.    REAL Traore

## 2024-10-04 NOTE — ANESTHESIA PROCEDURE NOTES
Intubation    Date/Time: 10/4/2024 11:10 AM    Performed by: Tea Cardoza CRNA  Authorized by: Mark Ying DO    Intubation:     Induction:  Rapid sequence induction    Intubated:  Postinduction    Mask Ventilation:  Not attempted    Attempts:  1    Attempted By:  CRNA    Method of Intubation:  Video laryngoscopy    Blade:  Tessy 3    Laryngeal View Grade: Grade I - full view of cords      Difficult Airway Encountered?: No      Complications:  None    Airway Device:  Oral endotracheal tube    Airway Device Size:  7.0    Style/Cuff Inflation:  Cuffed    Inflation Amount (mL):  8    Tube secured:  22    Secured at:  The lips    Placement Verified By:  Capnometry    Complicating Factors:  None    Findings Post-Intubation:  BS equal bilateral and atraumatic/condition of teeth unchanged

## 2024-10-04 NOTE — H&P
[facility] Hospital Medicine History & Physical Examination       Patient Name: Fior Joya  MRN: [id number]  Patient Class: IP- Inpatient   Admission Date: 10/04/2024   Admitting Service: Hospital Medicine   Length of Stay: 0  Attending Physician: Macarena Leggett MD   Primary Care Provider: Linda Primary Doctor  Face-to-Face encounter date: 10/04/2024  Code Status: Full code   Chief Complaint: Back Pain (Pt wheeled into triage and presents via POV. Pt reports lower back pain and R hip pain that began 5 days PTA. Urostomy placed approx 2 months ago with minimal cloudy output that pt reports for approx 1 month. ESRD tues/thurs/Sat dialysis but did not present today d/t increasing pain. Denies SOB and CP at this time)      Patient information was obtained from patient, patient's family, past medical records and ER records.      HISTORY OF PRESENT ILLNESS:   Fior Joya is a 69 y.o. female with a past medical history of hypertension, hyperlipidemia, bilateral chronic hydronephrosis with left nephrostomy tube in place and right ureter stent, ESRD on HD TTS, and chronic anemia who presented to [facility] on 10/3/2024 with c/o back pain.  Patient reported worsening right hip and flank pain for the past 5 days.  Patient also endorsed cloudy output from urostomy for about the past month.  Patient also endorsed missing dialysis yesterday secondary to increased pain.  Initial vital signs in ED were /75, pulse 79, respirations 20, temperature 36.2° C, and SpO2 97% on room air.  Labs revealed WBC 22.51, sodium 133, chloride 96, CO2 18, BUN 65.3, creatinine 6.70, glucose 124, , , , and lactic acid 1.8.  Blood cultures x2 were obtained.  UA revealed 2+ protein, 3+ blood, 500 leukocytes, 50-99 red blood cells, greater than 100 white blood cells, and many bacteria.  Urine culture was obtained.  X-ray lumbar spine revealed limited examination due to patient's body habitus with  degenerative changes seen throughout the lower lumbar spine.  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  Patient was given Cefepime, Flagyl, and Morphine in ED. Nephrology and urology were consulted. Patient was admitted to hospital medicine service for further medical management.     PAST MEDICAL HISTORY:   Hypertension   Hyperlipidemia   Chronic hydronephrosis with left nephrostomy to   ESRD on HD TTS   Chronic anemia    PAST SURGICAL HISTORY:     Past Surgical History:   Procedure Laterality Date    CYSTOSCOPY W/ URETERAL STENT PLACEMENT Right 6/27/2024    Procedure: CYSTOSCOPY, WITH URETERAL STENT INSERTION;  Surgeon: Otis Person MD;  Location: [facility] OR;  Service: Urology;  Laterality: Right;  CYSTOSCOPY, BILATERAL RETROGRADE PYELOGRAMS, POSSIBLE STENT PLACEMENT.    INSERTION OF TUNNELED CENTRAL VENOUS HEMODIALYSIS CATHETER N/A 7/3/2024    Procedure: Insertion, Catheter, Central Venous, Hemodialysis;  Surgeon: Uche Barcenas MD;  Location: [facility] CATH LAB;  Service: Peripheral Vascular;  Laterality: N/A;       FAMILY HISTORY:   Reviewed and negative    SOCIAL HISTORY:   Denied alcohol, tobacco or illicit drug use.     Screening for Social Drivers for health:  Patient screened for food insecurity, housing instability, transportation needs, utility difficulties, and interpersonal safety (select all that apply as identified as concern)  []Housing or  Food  []Transportation Needs  []Utility Difficulties  []Interpersonal safety  [x]None    ALLERGIES:   Asa [aspirin] and Penicillins    HOME MEDICATIONS:     Prior to Admission medications    Medication Sig Start Date End Date Taking? Authorizing Provider   atorvastatin (LIPITOR) 20 MG tablet Take 20 mg by mouth every evening. 8/13/24  Yes Provider, Historical   calcium carbonate (TUMS) 200 mg calcium (500 mg) chewable tablet Take 2 tablets (1,000 mg total) by mouth 2 (two) times daily. 7/10/24 7/10/25 Yes Moise Villafana MD   ergocalciferol (ERGOCALCIFEROL) 50,000 unit Cap Take 1 capsule (50,000 Units total) by mouth every 72 hours. 7/12/24 10/10/24 Yes Moise Villafana MD   FEROSUL 325 mg (65 mg iron) Tab tablet Take 325 mg by mouth once daily. 7/10/24  Yes Provider, Historical   folic acid (FOLVITE) 1 MG tablet Take 1 tablet (1 mg total) by mouth once daily. 7/11/24 10/4/24 Yes Moise Villafana MD   labetaloL (NORMODYNE) 200 MG tablet Take 1 tablet (200 mg total) by mouth every 12 (twelve) hours. 7/10/24 7/10/25 Yes Moise Villafana MD   loratadine (CLARITIN) 10 mg tablet Take 10 mg by mouth once daily.   Yes Provider, Historical   pantoprazole (PROTONIX) 40 MG tablet Take 1 tablet (40 mg total) by mouth once daily. 7/11/24 7/11/25 Yes Moise Villafana MD   senna-docusate 8.6-50 mg (SENNA WITH DOCUSATE SODIUM) 8.6-50 mg per tablet Take 1 tablet by mouth daily as needed for Constipation.   Yes Provider, Historical   sevelamer carbonate (RENVELA) 800 mg Tab Take 800 mg by mouth 3 (three) times daily with meals. 8/30/24  Yes Provider, Historical   polyethylene glycol (GLYCOLAX) 17 gram PwPk Take 17 g by mouth once daily.  Patient not taking: Reported on 10/4/2024 7/11/24   Moise Villafana MD     ________________________________________________________________________  INPATIENT LIST OF MEDICATIONS     Current Facility-Administered Medications:     acetaminophen tablet 650 mg, 650  mg, Oral, Q4H PRN, Louie Zimmerman MD    albuterol-ipratropium 2.5 mg-0.5 mg/3 mL nebulizer solution 3 mL, 3 mL, Nebulization, Once PRN, Mark Ying DO    aluminum-magnesium hydroxide-simethicone 200-200-20 mg/5 mL suspension 30 mL, 30 mL, Oral, QID PRN, Louie Zimmerman MD    atorvastatin tablet 20 mg, 20 mg, Oral, QHS, Louie Zimmerman MD, 20 mg at 10/04/24 0315    ceFEPIme (MAXIPIME) 2 g in D5W 100 mL IVPB (MB+), 2 g, Intravenous, Q24H, Louie Zimmerman MD    diphenhydrAMINE injection 25 mg, 25 mg, Intravenous, Q6H PRN, Mark Ying DO    folic acid tablet 1 mg, 1 mg, Oral, Daily, Louie Zimmerman MD, 1 mg at 10/04/24 0813    HYDROcodone-acetaminophen 5-325 mg per tablet 1 tablet, 1 tablet, Oral, Q6H PRN, Louie Zimmerman MD, 1 tablet at 10/04/24 0851    HYDROmorphone (PF) injection 0.2 mg, 0.2 mg, Intravenous, Q5 Min PRN, Mark Ying DO, 0.2 mg at 10/04/24 1244    HYDROmorphone (PF) injection 0.5 mg, 0.5 mg, Intravenous, Q4H PRN, Louie Zimmerman MD, 0.5 mg at 10/04/24 0704    HYDROmorphone (PF) injection 0.5 mg, 0.5 mg, Intravenous, Q5 Min PRN, Mark Ying DO    labetaloL tablet 200 mg, 200 mg, Oral, Q12H, Louie Zimmerman MD    melatonin tablet 6 mg, 6 mg, Oral, Nightly PRN, Louie Zimmerman MD    meperidine (PF) injection 12.5 mg, 12.5 mg, Intravenous, Once, Mark Ying DO    metoclopramide injection 10 mg, 10 mg, Intravenous, Q10 Min PRN, Stepan, Mark C, DO    mupirocin 2 % ointment, , Nasal, BID, ErasmoLouie gallegos MD, Given at 10/04/24 0813    ondansetron injection 4 mg, 4 mg, Intravenous, Q4H PRN, Louie Zimmerman MD    ondansetron injection 4 mg, 4 mg, Intravenous, Once, Mark Ying, DO    pantoprazole EC tablet 40 mg, 40 mg, Oral, Daily, ErasmoLouie gallegos MD, 40 mg at 10/04/24 0813    polyethylene glycol packet 17 g, 17 g, Oral, BID PRN, ErasmoLouie gallegos MD    prochlorperazine injection Soln 5 mg, 5 mg, Intravenous, Q6H PRN, ErasmoLouie gallegos MD    senna-docusate 8.6-50 mg per tablet 2 tablet, 2 tablet, Oral, BID PRN, Erasmo,  Louie OKEEFE MD    sevelamer carbonate tablet 800 mg, 800 mg, Oral, TID WM, Louie Zimmerman MD, 800 mg at 10/04/24 0813    sodium bicarbonate 150 mEq in D5W 1,000 mL infusion, , Intravenous, Continuous, Neno Mercado DO, Last Rate: 75 mL/hr at 10/04/24 1405, New Bag at 10/04/24 1405    sodium chloride 0.9% flush 10 mL, 10 mL, Intravenous, PRN, Louie Zimmerman MD    vancomycin - pharmacy to dose, , Intravenous, pharmacy to manage frequency, Macarena Leggett MD    vancomycin 2 g in dextrose 5 % 500 mL IVPB, 2,000 mg, Intravenous, Once, Macarena Leggett MD    Scheduled Meds:   atorvastatin  20 mg Oral QHS    ceFEPime IV (PEDS and ADULTS)  2 g Intravenous Q24H    folic acid  1 mg Oral Daily    labetaloL  200 mg Oral Q12H    meperidine  12.5 mg Intravenous Once    mupirocin   Nasal BID    ondansetron  4 mg Intravenous Once    pantoprazole  40 mg Oral Daily    sevelamer carbonate  800 mg Oral TID WM    vancomycin (VANCOCIN) IV (PEDS and ADULTS)  2,000 mg Intravenous Once     Continuous Infusions:   sodium bicarbonate 150 mEq in D5W 1,000 mL infusion   Intravenous Continuous 75 mL/hr at 10/04/24 1405 New Bag at 10/04/24 1405     PRN Meds:.  Current Facility-Administered Medications:     acetaminophen, 650 mg, Oral, Q4H PRN    albuterol-ipratropium, 3 mL, Nebulization, Once PRN    aluminum-magnesium hydroxide-simethicone, 30 mL, Oral, QID PRN    diphenhydrAMINE, 25 mg, Intravenous, Q6H PRN    HYDROcodone-acetaminophen, 1 tablet, Oral, Q6H PRN    HYDROmorphone, 0.2 mg, Intravenous, Q5 Min PRN    HYDROmorphone, 0.5 mg, Intravenous, Q4H PRN    HYDROmorphone, 0.5 mg, Intravenous, Q5 Min PRN    melatonin, 6 mg, Oral, Nightly PRN    metoclopramide, 10 mg, Intravenous, Q10 Min PRN    ondansetron, 4 mg, Intravenous, Q4H PRN    polyethylene glycol, 17 g, Oral, BID PRN    prochlorperazine, 5 mg, Intravenous, Q6H PRN    senna-docusate 8.6-50 mg, 2 tablet, Oral, BID PRN    sodium chloride 0.9%, 10 mL, Intravenous, PRN    vancomycin -  pharmacy to dose, , Intravenous, pharmacy to manage frequency      REVIEW OF SYSTEMS:   Except as documented, all other systems reviewed and negative.    PHYSICAL EXAM:     VITAL SIGNS: 24 HRS MIN & MAX LAST   Temp  Min: 97.2 °F (36.2 °C)  Max: 98.1 °F (36.7 °C) 97.3 °F (36.3 °C)   BP  Min: 88/54  Max: 143/89 (!) 109/58   Pulse  Min: 61  Max: 117  91   Resp  Min: 14  Max: 23 20   SpO2  Min: 70 %  Max: 100 % 98 %       Patient in procedure. Please see MD addendum for physical exam.     LABS AND IMAGING:     Recent Labs   Lab 10/03/24  1620 10/04/24  0311   WBC 22.51  22.51* 23.93  23.93*   RBC 4.18* 4.37   HGB 12.6 13.2   HCT 38.6 39.5   MCV 92.3 90.4   MCH 30.1 30.2   MCHC 32.6* 33.4   RDW 15.2 15.4    254   MPV 10.5* 10.8*       Recent Labs   Lab 10/03/24  1620 10/04/24  0311   * 131*   K 3.5 4.4   CL 96* 96*   CO2 18* 13*   BUN 65.3* 70.0*   CREATININE 6.70* 6.98*   CALCIUM 8.7 8.7   MG  --  2.20   ALBUMIN 2.4* 2.3*   ALKPHOS 179* 182*   * 104*   * 91*   BILITOT 0.5 0.4       Microbiology Results (last 7 days)       Procedure Component Value Units Date/Time    BCID2 Panel [[id number]]  (Abnormal) Collected: 10/03/24 2020    Order Status: Completed Specimen: Blood Updated: 10/04/24 1107     CTX-M (ESBL ) N/A     IMP (Cabapenemase ) N/A     KPC resistance gene (Carbapenemase ) N/A     mcr-1 N/A     mecA ID N/A     Comment: Note: Antimicrobial resistance can occur via multiple mechanisms. A Not Detected result for antimicrobial resistance gene(s) does not indicate antimicrobial susceptibility. Subculturing is required for species identification and susceptibility testing of   isolates.        mecA/C and MREJ (MRSA) gene Detected     NDM (Carbapenemase ) N/A     OXA-48-like (Carbapenemase ) N/A     Royal/B (VRE gene) N/A     VIM (Carbapenemase ) N/A     Enterococcus faecalis Not Detected     Enterococcus faecium Not Detected     Listeria  monocytogenes Not Detected     Staphylococcus spp. Detected     Staphylococcus aureus Detected     Staphylococcus epidermidis Not Detected     Staphylococcus lugdunensis Not Detected     Streptococcus spp. Not Detected     Streptococcus agalactiae (Group B) Not Detected     Streptococcus pneumoniae Not Detected     Streptococcus pyogenes (Group A) Not Detected     Acinetobacter calcoaceticus/baumannii complex Not Detected     Bacteroides fragilis Not Detected     Enterobacterales Not Detected     Enterobacter cloacae complex Not Detected     Escherichia coli Not Detected     Klebsiella aerogenes Not Detected     Klebsiella oxytoca Not Detected     Klebsiella pneumoniae group Not Detected     Proteus spp. Not Detected     Salmonella spp. Not Detected     Serratia marcescens Not Detected     Haemophilus influenzae Not Detected     Neisseria meningitidis Not Detected     Pseudomonas aeruginosa Not Detected     Stenotrophomonas maltophilia Not Detected     Candida albicans Not Detected     Candida auris Not Detected     Candida glabrata Not Detected     Candida krusei Not Detected     Candida parapsilosis Not Detected     Candida tropicalis Not Detected     Cryptococcus neoformans/gattii Not Detected    Narrative:      The [employer] BCID2 Panel is a multiplexed nucleic acid test intended for the use with APProtect® 2.0 or APProtect® [employer] for the simultaneous qualitative detection and identification of multiple bacterial and yeast nucleic acids and select genetic determinants associated with antimicrobial resistance.  The [employer] BCID2 Panel test is performed directly on blood culture samples identified as positive by a continuous monitoring blood culture system.  Results are intended to be interpreted in conjunction with Gram stain results.    Blood culture #2 **CANNOT BE ORDERED STAT** [[id number]]  (Abnormal) Collected: 10/03/24 2020    Order Status: Completed Specimen: Blood Updated: 10/04/24  0957     GRAM STAIN Gram Positive Cocci, probable Staphylococcus      Seen in gram stain of broth only      1 of 1 Pediatric bottle positive    Blood culture #1 **CANNOT BE ORDERED STAT** [[id number]]  (Abnormal) Collected: 10/03/24 2020    Order Status: Completed Specimen: Blood Updated: 10/04/24 0956     GRAM STAIN Gram Positive Cocci, probable Staphylococcus      Seen in gram stain of broth only      2 of 2 bottles positive    Urine culture [[id number]] Collected: 10/03/24 2008    Order Status: Completed Specimen: Urine Updated: 10/04/24 0643     Urine Culture No Growth At 24 Hours             SURG FL Surgery Fluoro Usage  See OP Notes for results.     IMPRESSION: See OP Notes for results.     This procedure was auto-finalized by: Virtual Radiologist  CT Abdomen Pelvis With IV Contrast NO Oral Contrast  Narrative: EXAMINATION:  CT ABDOMEN PELVIS WITH IV CONTRAST    CLINICAL HISTORY:  Abdominal abscess/infection suspected;Abdominal pain, acute, nonlocalized;abd pain/flank pain, leukocytosis (WBC 22K), R hip pain;    TECHNIQUE:  Low dose axial images, sagittal and coronal reformations were obtained from the lung bases to the pubic symphysis following the IV administration of 100 mL of Omnipaque 350 .  Oral contrast not given.  DLP 1193.  Automated exposure control used.    COMPARISON:  06/21/2024    FINDINGS:  Liver normal enhancement with no focal lesion.    Gallbladder and biliary ductal system are normal.    Pancreas normal.  Stomach decompressed.  Spleen and adrenal glands normal.    Moderate renal cortical thinning, scarring bilaterally.  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  There is a ureteral stent within the upper pole moiety with resolution or reduction and upper pole moiety hydronephrosis and hydroureter, with persistent moderate severe hydronephrosis hydroureter  involving the lower pole moiety.  On the left there is a percutaneous nephrostomy/ureteral stent drainage tube with no hydronephrosis.    Aorta tapers normally.  Severe atherosclerotic calcification.    Bowel loops normal with no thickening or dilation.    Mild lymph node proliferation in the periaortic region some lymph nodes upper normal size, stable.  Right pelvis there is a suspected enlarged lymph node measuring 2.6 cm short axis on image 112 series 2 compared to 2.1 cm on the prior there is a borderline enlarged lymph node on the left, also larger compared to the prior.    Uterus, adnexa, bladder, rectum normal    Bilateral sacroiliitis.  Bones otherwise intact.    Lung bases clear.  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  The upper pole moiety is decompressed with a ureteral stent with the lower pole more demonstrating moderate severe hydronephrosis which is persistent from the prior exam.    Left nephrostomy ureteral stent drain with resolution of hydronephrosis.    Enlarged right pelvic lymph node increased in size from the prior, malignancy suspected.    Mildly enlarged upper normal sized lymph nodes in the periaortic region and left pelvis, nonspecific, similar to the prior exam.    Electronically signed by: Tete Sun MD  Date:    10/04/2024  Time:    07:37        ASSESSMENT & PLAN:   Assessment:   Sepsis secondary to UTI   Bilateral hydronephrosis  Metabolic acidosis   ESRD on HD TTS  Transaminitis, improving  History of hypertension, hyperlipidemia, bilateral chronic hydronephrosis with left nephrostomy tube in place and right ureter stent, ESRD on HD TTS, and chronic anemia     Plan:  IV Cefepime and Vancomycin  Blood and urine cultures pending, follow up results  Urology consulted-planning for cystoscopy and exchange of right ureteral stent and attempt at placement of the 2nd ureteral stent on the right  in the lower pole moiety to optimize drainage of this collecting system; if not successful right-sided percutaneous nephrostomy tube we will be necessary next time  Bicarb gtt  Nephrology consulted, appreciate recommendations  Resume home medications as deemed appropriate once medication reconciliation is updated  Labs in AM    VTE Prophylaxis: SCDs    Discharge Planning and Disposition: TBD    I, Vega Washington PA-C have reviewed and discussed the case with Macarena Bhardwaj MD   Please see the attending MD's addendum for further assessment and plan.    Vega Washington PA-C  Department of Hospital Medicine   [facility]   10/04/2024    This note was created with the assistance of [employer] voice recognition software. There may be transcription errors as a result of using this technology, however minimal. Effort has been made to assure accuracy of transcription, but any obvious errors or omissions should be clarified with the author of the document.    _______________________________________________________________________________  MD Addendum:  I, Dr. Macarena bhardwaj MD   assumed care of this patient today   For the patient encounter, I performed the substantive portion of the visit, I reviewed the NP/PA documentation, treatment plan, and medical decision making.  I had face to face time with this patient   69-year-old female with past medical history of hypertension, hyperlipidemia, chronic hydronephrosis with left nephrostomy tube, right ureteral stent, end-stage renal disease on hemodialysis, chronic anemia presented with right flank pain for the past 5 days and also reported cloudy urine output from the urostomy CT with IV contrast showed moderate to severe hydronephrosis with enlarged right pelvic lymph node increase in size from the prior with suspected malignancy also showed duplicated collecting system with dilation of the right lower pole urology was consulted patient is status  post cystoscopy with right stent exchange and bladder biopsy and they were not able to identify the left ureteral orifice  therefore had left percutaneous nephrostomy.  Blood culture is growing Gram-positive cocci 2/2 bottles initially patient was started on cefepime   General alert awake oriented was seen in PACU   Chest clear to auscultate  Cardio S1-S2 heard     Continue with cefepime urine cultures ordered blood culture is growing Gram-positive cocci so we have added vancomycin   Nephrology consulted for dialysis needs  Status post cystoscopy and also nephrostomy tube placement by IR  Infectious Diseases consulted for Gram-positive bacteremia we will also get 2D echo  Patient was hypotensive this morning so we had to bolused 2 50 cc.  Nephrology's has started the patient on bicarb drip at 75 cc an hour and they are holding off on dialysis today due to hypotension and they are planning to dialyze tomorrow      Critical care note:  Critical care diagnosis:  Septic shock requiring IV fluids and IV antibiotics  Critical care interventions: Hands-on evaluation, review of labs/radiographs/records and discussion with patient and family if present  Critical care time spent: 35 minutes     All diagnosis and differential diagnosis have been reviewed; assessment and plan has been documented; I have personally reviewed the labs and test results that are presently available; I have reviewed the patients medication list; I have reviewed the consulting providers response and recommendations. I have reviewed or attempted to review medical records based upon their availability.    All of the patient and family questions have been addressed and answered. Patient's is agreeable to the above stated plan. I will continue to monitor closely and make adjustments to medical management as needed.      10/04/2024

## 2024-10-04 NOTE — NURSING
Admission documentation mostly completed by the admit nurse. Home med rec complete. Pt did elect for meds to bed with [facility]. Pressure injury prevention ordered. PUP, 4 Eyes, and standing weight to be completed by the admitting floor nurse.

## 2024-10-04 NOTE — TRANSFER OF CARE
"Anesthesia Transfer of Care Note    Patient: Fior Joya    Procedure(s) Performed: Procedure(s) (LRB):  CYSTOSCOPY, WITH URETERAL STENT INSERTION (Bilateral)    Patient location: PACU    Anesthesia Type: general    Transport from OR: Transported from OR on room air with adequate spontaneous ventilation    Post pain: adequate analgesia    Post assessment: no apparent anesthetic complications    Post vital signs: stable    Level of consciousness: responds to stimulation    Nausea/Vomiting: no nausea/vomiting    Complications: none    Transfer of care protocol was followed      Last vitals: Visit Vitals  BP (!) 108/56 (BP Location: Left forearm, Patient Position: Lying)   Pulse 78   Temp 36.4 °C (97.6 °F) (Oral)   Resp 18   Ht 5' 3" (1.6 m)   Wt (!) 140.2 kg (309 lb)   SpO2 97%   BMI 54.74 kg/m²     "

## 2024-10-04 NOTE — PLAN OF CARE
CM met with pt at bedside for initial discharge assessment. Pt is  with three adult children. Pt has no POA or Living Will. Pt had no current identified needs.   10/04/24 1016   Discharge Assessment   Assessment Type Discharge Planning Assessment   Confirmed/corrected address, phone number and insurance Yes   Confirmed Demographics Correct on Facesheet   Source of Information patient   When was your last doctors appointment?   (Ryley Quarles and Dr. Otis Person)   Communicated [place] with patient/caregiver Date not available/Unable to determine   People in Home child(santana), adult   Do you expect to return to your current living situation? Yes   Do you have help at home or someone to help you manage your care at home? Yes   Who are your caregiver(s) and their phone number(s)? Serg Francisco [phone]   Prior to hospitilization cognitive status: Unable to Assess   Current cognitive status: Alert/Oriented   Walking or Climbing Stairs Difficulty yes   Walking or Climbing Stairs ambulation difficulty, assistance 1 person   Dressing/Bathing Difficulty yes   Dressing/Bathing bathing difficulty, assistance 1 person   Home Layout Able to live on 1st floor   Equipment Currently Used at Home wheelchair;walker, rolling;blood pressure machine   Readmission within 30 days? No   Patient currently being followed by outpatient case management? No   Do you currently have service(s) that help you manage your care at home? Yes   How Many hours does patient receive services 1   Name and Contact number of agency [employer] at [phone]   Is the pt/caregiver preference to resume services with current agency Yes   Do you take prescription medications? Yes   Do you have prescription coverage? Yes   Coverage Medicare   Is the patient taking medications as prescribed? yes   Who is going to help you get home at discharge? Pending   How do you get to doctors appointments? family or friend will provide;car, drives self   Are  you on dialysis? No   Do you take coumadin? No   Discharge Plan A   (TBD)   DME Needed Upon Discharge    (TBD)   Transition of Care Barriers   (no barriers at this time)   Physical Activity   On average, how many days per week do you engage in moderate to strenuous exercise (like a brisk walk)? 0 days   Financial Resource Strain   How hard is it for you to pay for the very basics like food, housing, medical care, and heating? Not hard   Housing Stability   In the last 12 months, was there a time when you were not able to pay the mortgage or rent on time? N   At any time in the past 12 months, were you homeless or living in a shelter (including now)? N   Transportation Needs   Has the lack of transportation kept you from medical appointments, meetings, work or from getting things needed for daily living? No   Food Insecurity   Within the past 12 months, you worried that your food would run out before you got the money to buy more. Never true   Within the past 12 months, the food you bought just didn't last and you didn't have money to get more. Never true   Stress   Do you feel stress - tense, restless, nervous, or anxious, or unable to sleep at night because your mind is troubled all the time - these days? Not at all   Social Isolation   How often do you feel lonely or isolated from those around you?  Never   Alcohol Use   Q1: How often do you have a drink containing alcohol? Never   Q2: How many drinks containing alcohol do you have on a typical day when you are drinking? None   Q3: How often do you have six or more drinks on one occasion? Never   [employer]   In the past 12 months has the electric, gas, oil, or water company threatened to shut off services in your home? No   Health Literacy   How often do you need to have someone help you when you read instructions, pamphlets, or other written material from your doctor or pharmacy? Never   OTHER   Name(s) of People in Home Micah Cole

## 2024-10-04 NOTE — ED PROVIDER NOTES
Encounter Date: 10/3/2024       History     Chief Complaint   Patient presents with    Back Pain     Pt wheeled into triage and presents via POV. Pt reports lower back pain and R hip pain that began 5 days PTA. Urostomy placed approx 2 months ago with minimal cloudy output that pt reports for approx 1 month. ESRD tues/thurs/Sat dialysis but did not present today d/t increasing pain. Denies SOB and CP at this time     68 y/o F presents w/ progressively worsening right flank/lower back pain radiating to the right hip. Recent nephrostomy to left kidney, hx retained ureteral stent to the left. Report urine looking more cloudy recently. States no urine passing from urethra. No fever.  ESRD patient on dialysis, last treatment Tuesday (missed today due to pain).     The history is provided by the patient and medical records.     Review of patient's allergies indicates:   Allergen Reactions    Asa [aspirin] Anaphylaxis    Penicillins      Received ceftriaxone from 6/27, no reactions      No past medical history on file.  Past Surgical History:   Procedure Laterality Date    CYSTOSCOPY W/ URETERAL STENT PLACEMENT Right 6/27/2024    Procedure: CYSTOSCOPY, WITH URETERAL STENT INSERTION;  Surgeon: Otis Person MD;  Location: [facility] OR;  Service: Urology;  Laterality: Right;  CYSTOSCOPY, BILATERAL RETROGRADE PYELOGRAMS, POSSIBLE STENT PLACEMENT.    INSERTION OF TUNNELED CENTRAL VENOUS HEMODIALYSIS CATHETER N/A 7/3/2024    Procedure: Insertion, Catheter, Central Venous, Hemodialysis;  Surgeon: Uche Barcenas MD;  Location: [facility] CATH LAB;  Service: Peripheral Vascular;  Laterality: N/A;     No family history on file.  Social History     Tobacco Use    Smoking status: Never    Smokeless tobacco: Never   Substance Use Topics    Alcohol use: Never    Drug use: Never     Review of Systems   Constitutional:  Negative for fever.   Respiratory:  Negative for shortness of breath.    Gastrointestinal:  Negative for diarrhea and  vomiting.   Genitourinary:  Positive for flank pain.        Cloudy urine output       Physical Exam     Initial Vitals [10/03/24 1604]   BP Pulse Resp Temp SpO2   110/75 79 20 97.2 °F (36.2 °C) 97 %      MAP       --         Physical Exam    Nursing note and vitals reviewed.  Constitutional: She appears well-developed and well-nourished. No distress.   HENT:   Head: Normocephalic and atraumatic. Mouth/Throat: Oropharynx is clear and moist.   Eyes: Conjunctivae are normal. No scleral icterus.   Neck: Neck supple.   Normal range of motion.  Cardiovascular:  Normal rate and regular rhythm.           Pulmonary/Chest: No respiratory distress.   Chest wall HD catheter   Abdominal: Abdomen is soft. She exhibits no distension. There is no abdominal tenderness.   Right flank tenderness to percussion, L nephrostomy, cloudy yellow urine   Musculoskeletal:      Cervical back: Normal range of motion and neck supple.     Neurological: She is alert and oriented to person, place, and time.   Skin: Skin is warm and dry.         ED Course   Critical Care    Date/Time: 10/3/2024 7:55 PM    Performed by: Natasha Rocha MD  Authorized by: Natasha Rocha MD  Direct patient critical care time: 21 minutes  Additional history critical care time: 6 minutes  Ordering / reviewing critical care time: 7 minutes  Documentation critical care time: 4 minutes  Consulting other physicians critical care time: 4 minutes  Total critical care time (exclusive of procedural time) : 42 minutes  Critical care time was exclusive of separately billable procedures and treating other patients.  Critical care was necessary to treat or prevent imminent or life-threatening deterioration of the following conditions: sepsis.  Critical care was time spent personally by me on the following activities: development of treatment plan with patient or surrogate, discussions with consultants, interpretation of cardiac output measurements, evaluation of patient's  response to treatment, examination of patient, obtaining history from patient or surrogate, ordering and performing treatments and interventions, ordering and review of laboratory studies, ordering and review of radiographic studies, re-evaluation of patient's condition and review of old charts.        Labs Reviewed   COMPREHENSIVE METABOLIC PANEL - Abnormal       Result Value    Sodium 133 (*)     Potassium 3.5      Chloride 96 (*)     CO2 18 (*)     Glucose 124 (*)     Blood Urea Nitrogen 65.3 (*)     Creatinine 6.70 (*)     Calcium 8.7      Protein Total 7.2      Albumin 2.4 (*)     Globulin 4.8 (*)     Albumin/Globulin Ratio 0.5 (*)     Bilirubin Total 0.5       (*)      (*)      (*)     eGFR 6      Anion Gap 19.0      BUN/Creatinine Ratio 10     URINALYSIS, REFLEX TO URINE CULTURE - Abnormal    Color, UA Light-Orange (*)     Appearance, UA Turbid (*)     Specific Gravity, UA 1.017      pH, UA 6.5      Protein, UA 2+ (*)     Glucose, UA Normal      Ketones, UA Negative      Blood, UA 3+ (*)     Bilirubin, UA Negative      Urobilinogen, UA Normal      Nitrites, UA Negative      Leukocyte Esterase,  (*)     RBC, UA 50-99 (*)     WBC, UA >100 (*)     WBC Clumps, UA Many (*)     Bacteria, UA Many (*)     Squamous Epithelial Cells, UA None Seen      Transitional Epithelial Cells, UA Trace (*)    CBC WITH DIFFERENTIAL - Abnormal    WBC 22.51 (*)     RBC 4.18 (*)     Hgb 12.6      Hct 38.6      MCV 92.3      MCH 30.1      MCHC 32.6 (*)     RDW 15.2      Platelet 263      MPV 10.5 (*)     NRBC% 0.0     MANUAL DIFFERENTIAL - Abnormal    WBC 22.51      Neutrophils % 95      Lymphs % 3      Monocytes % 1      Eosinophils % 1      Basophils % 1      Neutrophils Abs 21.3845 (*)     Lymphs Abs 0.6753      Monocytes Abs 0.2251      Eosinophils Abs 0.2251      Basophils Abs 0.2251 (*)     Platelets Normal      RBC Morph Abnormal (*)     [place] Cells 1+ (*)    LACTIC ACID, PLASMA - Normal    Lactic Acid  Level 1.8     BLOOD CULTURE [facility]   BLOOD CULTURE [facility]   CULTURE, URINE   CBC W/ AUTO DIFFERENTIAL    Narrative:     The following orders were created for panel order CBC auto differential.  Procedure                               Abnormality         Status                     ---------                               -----------         ------                     CBC with Differential[[id number]]       Abnormal            Final result               Manual Differential[[id number]]         Abnormal            Final result                 Please view results for these tests on the individual orders.          Imaging Results              CT Abdomen Pelvis With IV Contrast NO Oral Contrast (Preliminary result)  Result time 10/03/24 22:17:12      Preliminary result by Arturo Mariano Jr., MD (10/03/24 22:17:12)                   Narrative:    START OF REPORT:  Technique: CT of the abdomen and pelvis was performed with axial images as well as sagittal and coronal reconstruction images with intravenous contrast.    Comparison: Comparison is with study dated2024-06-21 22:42:55.    Clinical History: Abdominal abscess/infection suspected, Abdominal pain, acute, nonlocalized, abd pain/flank pain, leukocytosis (WBC 22K), R hip pain.    Dosage Information: Automated Exposure Control was utilized 1192.50 mGy.cm.    Findings:  Lines and Tubes: None.  Thorax:  Lungs: There is minimal nonspecific dependent change at the lung bases. No focal infiltrate or consolidation is seen.  Pleura: No effusions or thickening.  Heart: Stable mild cardiomegaly is seen.  Abdomen:  Abdominal Wall: There is extensive stranding of the subcutaneous fat as well as the intraabdominal suggesting an element of anasarca edema of uncertain etiology.  Liver: Mild fatty infiltration of the liver is present. The liver otherwise appears unremarkable.  Biliary System: No intrahepatic or extrahepatic biliary duct dilatation is seen.  Gallbladder: The gallbladder  appears unremarkable.  Pancreas: The pancreas appears unremarkable.  Spleen: The spleen appears unremarkable.  Adrenals: The adrenal glands appear unremarkable.  Kidneys: No stones, cysts or masses are seen in both kidneys. 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 A right ureteral stent is seen inserted in the upper pole moiety with slight decrease in hydronephrosis of the upper moiety. A left ureteral stent is now seen in place with interval resolution of the left hydronephrosis.  Aorta: There is mild widely scattered calcification of the.  IVC: Unremarkable.  Bowel: No rim enhancing organized fluid collection is seen in the abdomen to suggest an abscess.  Esophagus: There is a small hiatal hernia.  Stomach: The stomach appears unremarkable.  Duodenum: Unremarkable appearing duodenum.  Small Bowel: The small bowel appears unremarkable.  Colon: Nondistended.  Appendix: The appendix is not identified but no inflammatory changes are seen in the right lower quadrant to suggest appendicitis.  Peritoneum: No intraperitoneal free air or ascites is seen.    Pelvis:  Bladder: The bladder is nondistended and cannot be definitively evaluated.  Female:  Uterus: The uterus appears unremarkable.  Ovaries: No adnexal masses are seen.    Bony structures:  Dorsal Spine: There is subtle spondylosis of the visualized dorsal spine.  Bony Pelvis: There is subtle degenerative change of the hip.      Impression:  1. 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 A right ureteral stent is seen inserted in the upper pole moiety with slight decrease in hydronephrosis of the upper moiety. A left ureteral stent is now seen in place with interval resolution of  the left hydronephrosis. Correlate clinically as regards further evaluation and follow up.  2. No rim enhancing organized fluid collection is seen in the abdomen to suggest an abscess.  3. Details and other findings as discussed above.                                         X-Ray Lumbar Spine Ap And Lateral (Final result)  Result time 10/03/24 18:11:51      Final result by Zhao Raya MD (10/03/24 18:11:51)                   Impression:      Limited examination due to patient's body habitus but degenerative changes seen throughout the lower lumbar spine      Electronically signed by: Garland Raya  Date:    10/03/2024  Time:    18:11               Narrative:    EXAMINATION:  XR LUMBAR SPINE AP AND LATERAL    CLINICAL HISTORY:  low back pain;    TECHNIQUE:  AP, lateral and spot images were performed of the lumbar spine.    COMPARISON:  None    FINDINGS:  None a shin is limited due to patient's body habitus.  The vertebral body heights and alignment are well maintained.  No fracture is seen.  No dislocation is seen.  There are degenerative changes seen throughout the lumbar spine.  There is multilevel facet arthropathy seen.  Multilevel foraminal stenosis is seen.  There are bilateral nephroureteral stents seen                                       Medications   ceFEPIme (MAXIPIME) 2 g in D5W 100 mL IVPB (MB+) (0 g Intravenous Stopped 10/3/24 2130)   metronidazole IVPB 500 mg (0 mg Intravenous Stopped 10/3/24 2320)   iohexoL (OMNIPAQUE 350) injection 100 mL (100 mLs Intravenous Given 10/3/24 2119)   morphine injection 4 mg (4 mg Intravenous Given 10/3/24 2303)   ondansetron injection 4 mg (4 mg Intravenous Given 10/3/24 2300)   morphine injection 4 mg (4 mg Intravenous Given 10/4/24 0117)     Medical Decision Making  Problems Addressed:  Hydronephrosis, unspecified hydronephrosis type: chronic illness or injury with exacerbation, progression, or side effects of treatment  Retained ureteral stent: chronic  illness or injury  Sepsis: acute illness or injury that poses a threat to life or bodily functions  Urinary tract infection without hematuria, site unspecified: acute illness or injury that poses a threat to life or bodily functions    Amount and/or Complexity of Data Reviewed  Radiology: ordered.    Risk  Prescription drug management.  Decision regarding hospitalization.      ED assessment:    Ms. Joya presented w/ worsening flank pain, hx obstructive uropathy w/ rigtht renal stent, left nephrostomy in place. Also ESRD w/ missed HD today.     Differential diagnosis (including but not limited to):   UTI, pyelonephritis, obstructive uropathy, hydronephrosis, renal abscess, intraabdominal abscess, retroperitoneal abscess, myositis, flank pain, renal colic, ESRD needing HD, electrolyte dernagements    ED management:   See ED course below.  CT scan demonstrated worsening right-sided hydronephrosis with duplicate renal collecting system, ureteral stent in place.  Given evidence of infection/sepsis in the setting of obstructive uropathy, started on broad-spectrum antibiotics and will be admitted to hospital medicine service with a.m. urology consultation.    My independent radiology interpretation:   CT abdomen and pelvis: Right ureteral stent in place however persistent hydronephrosis noted.  Left nephrostomy in place, no left-sided hydronephrosis.        Amount and/or Complexity of Data Reviewed  Independent historian: none   Summary of history:   External data reviewed: notes from previous admissions and prior labs  Summary of data reviewed:  Admission in June with a anemia requiring transfusion, acute renal failure, underwent cystoscopy with ureteral stent insertion, hemodialysis access placement at that time.  Risk and benefits of testing: discussed   Labs: ordered and reviewed  Radiology: ordered and independent interpretation performed (see above or ED course)    Discussion of management or test interpretation  "with external provider(s): discussed with hospitalist physician   Summary of discussion:  Discussed with hospitalist who accepts for admit    Risk  Prescription drug management   Parenteral controlled substances   Decision regarding hospitalization  Shared decision making     Critical Care  30-74 minutes     Natasha BLOUTN MD personally performed the history, PE, MDM, and procedures as documented above and agree with the scribe's documentation.              ED Course as of 10/04/24 0125   u Oct 03, 2024   1927 I assigned myself as the patient was reportedly in a room; however, on my presentation to the room, there is no patient present at this time.  Nursing not aware of where the patient may be either.  Will continue to try to find the patient [KS]   1959 Patient with significant leukocytosis, transaminitis.  Given flank pain, hip pain, concern for possible urologic etiology versus intra-abdominal etiology.  Reportedly significant penicillin allergy.  Will cover for possible intra-abdominal pathology with cefepime, Flagyl.  CT scan ordered to further evaluate for possible intra-abdominal etiology of sepsis. Will plan for inpatient admission.  [KS]   2142 UA consistent with urinary tract infection.  Given significant leukocytosis, concern for possible pyelonephritis.  Still awaiting results of CT abdomen and pelvis [KS]      ED Course User Index  [KS] Natasha Rocha MD               Medical Decision Making:   Clinical Tests:   Sepsis Perfusion Assessment: "I attest a sepsis perfusion exam was performed within 6 hours of sepsis, severe sepsis, or septic shock presentation, following fluid resuscitation."             Clinical Impression:  Final diagnoses:  [A41.9] Sepsis (Primary)  [N39.0] Urinary tract infection without hematuria, site unspecified  [N13.30] Hydronephrosis, unspecified hydronephrosis type  [Z96.0] Retained ureteral stent          ED Disposition Condition    Admit                 Natasha Rocha " MD ARABELLA  10/05/24 4464

## 2024-10-05 LAB
ABS NEUT (OLG): 34.71 X10(3)/MCL (ref 2.1–9.2)
ALBUMIN SERPL-MCNC: 2.2 G/DL (ref 3.4–4.8)
BACTERIA UR CULT: NORMAL
BASOPHILS NFR BLD MANUAL: 0.36 X10(3)/MCL (ref 0–0.2)
BASOPHILS NFR BLD MANUAL: 1 %
BUN SERPL-MCNC: 85.5 MG/DL (ref 9.8–20.1)
BURR CELLS (OLG): ABNORMAL
CALCIUM SERPL-MCNC: 8 MG/DL (ref 8.4–10.2)
CHLORIDE SERPL-SCNC: 94 MMOL/L (ref 98–107)
CO2 SERPL-SCNC: 14 MMOL/L (ref 23–31)
CREAT SERPL-MCNC: 8.03 MG/DL (ref 0.55–1.02)
ERYTHROCYTE [DISTWIDTH] IN BLOOD BY AUTOMATED COUNT: 15.7 % (ref 11.5–17)
FERRITIN SERPL-MCNC: 4645.6 NG/ML (ref 4.63–204)
GFR SERPLBLD CREATININE-BSD FMLA CKD-EPI: 5 ML/MIN/1.73/M2
GLUCOSE SERPL-MCNC: 84 MG/DL (ref 82–115)
HBV SURFACE AG SERPL QL IA: NONREACTIVE
HCT VFR BLD AUTO: 34.7 % (ref 37–47)
HGB BLD-MCNC: 11.8 G/DL (ref 12–16)
INSTRUMENT WBC (OLG): 36.16 X10(3)/MCL
IRON SATN MFR SERPL: 22 % (ref 20–50)
IRON SERPL-MCNC: 20 UG/DL (ref 50–170)
LYMPHOCYTES NFR BLD MANUAL: 0.72 X10(3)/MCL
LYMPHOCYTES NFR BLD MANUAL: 2 %
MCH RBC QN AUTO: 30.2 PG (ref 27–31)
MCHC RBC AUTO-ENTMCNC: 34 G/DL (ref 33–36)
MCV RBC AUTO: 88.7 FL (ref 80–94)
MONOCYTES NFR BLD MANUAL: 0.72 X10(3)/MCL (ref 0.1–1.3)
MONOCYTES NFR BLD MANUAL: 2 %
NEUTROPHILS NFR BLD MANUAL: 96 %
NRBC BLD AUTO-RTO: 0.2 %
PHOSPHATE SERPL-MCNC: 7.2 MG/DL (ref 2.3–4.7)
PLATELET # BLD AUTO: 278 X10(3)/MCL (ref 130–400)
PLATELET # BLD EST: NORMAL 10*3/UL
PMV BLD AUTO: 10.2 FL (ref 7.4–10.4)
POIKILOCYTOSIS BLD QL SMEAR: ABNORMAL
POTASSIUM SERPL-SCNC: 5 MMOL/L (ref 3.5–5.1)
RBC # BLD AUTO: 3.91 X10(6)/MCL (ref 4.2–5.4)
RBC MORPH BLD: ABNORMAL
SODIUM SERPL-SCNC: 131 MMOL/L (ref 136–145)
TIBC SERPL-MCNC: 71 UG/DL (ref 70–310)
TIBC SERPL-MCNC: 91 UG/DL (ref 250–450)
TRANSFERRIN SERPL-MCNC: 76 MG/DL (ref 173–360)
VANCOMYCIN SERPL-MCNC: <1.4 UG/ML (ref 15–20)
WBC # BLD AUTO: 36.16 X10(3)/MCL (ref 4.5–11.5)

## 2024-10-05 PROCEDURE — 80100016 HC MAINTENANCE HEMODIALYSIS

## 2024-10-05 PROCEDURE — A9577 INJ MULTIHANCE: HCPCS | Performed by: INTERNAL MEDICINE

## 2024-10-05 PROCEDURE — 82728 ASSAY OF FERRITIN: CPT | Performed by: NURSE PRACTITIONER

## 2024-10-05 PROCEDURE — 80202 ASSAY OF VANCOMYCIN: CPT | Performed by: UROLOGY

## 2024-10-05 PROCEDURE — 21400001 HC TELEMETRY ROOM

## 2024-10-05 PROCEDURE — 63600175 PHARM REV CODE 636 W HCPCS: Performed by: UROLOGY

## 2024-10-05 PROCEDURE — 27000221 HC OXYGEN, UP TO 24 HOURS

## 2024-10-05 PROCEDURE — 80069 RENAL FUNCTION PANEL: CPT | Performed by: UROLOGY

## 2024-10-05 PROCEDURE — 25000003 PHARM REV CODE 250: Performed by: UROLOGY

## 2024-10-05 PROCEDURE — 25500020 PHARM REV CODE 255: Performed by: INTERNAL MEDICINE

## 2024-10-05 PROCEDURE — 85025 COMPLETE CBC W/AUTO DIFF WBC: CPT | Performed by: UROLOGY

## 2024-10-05 PROCEDURE — 5A1D70Z PERFORMANCE OF URINARY FILTRATION, INTERMITTENT, LESS THAN 6 HOURS PER DAY: ICD-10-PCS | Performed by: UROLOGY

## 2024-10-05 PROCEDURE — 36415 COLL VENOUS BLD VENIPUNCTURE: CPT | Performed by: UROLOGY

## 2024-10-05 PROCEDURE — 11000001 HC ACUTE MED/SURG PRIVATE ROOM

## 2024-10-05 PROCEDURE — 87184 SC STD DISK METHOD PER PLATE: CPT | Performed by: INTERNAL MEDICINE

## 2024-10-05 PROCEDURE — 36415 COLL VENOUS BLD VENIPUNCTURE: CPT | Performed by: INTERNAL MEDICINE

## 2024-10-05 PROCEDURE — 90935 HEMODIALYSIS ONE EVALUATION: CPT

## 2024-10-05 PROCEDURE — 83550 IRON BINDING TEST: CPT | Performed by: NURSE PRACTITIONER

## 2024-10-05 PROCEDURE — 87340 HEPATITIS B SURFACE AG IA: CPT | Performed by: STUDENT IN AN ORGANIZED HEALTH CARE EDUCATION/TRAINING PROGRAM

## 2024-10-05 RX ADMIN — MUPIROCIN: 20 OINTMENT TOPICAL at 08:10

## 2024-10-05 RX ADMIN — PANTOPRAZOLE SODIUM 40 MG: 40 TABLET, DELAYED RELEASE ORAL at 08:10

## 2024-10-05 RX ADMIN — SEVELAMER CARBONATE 800 MG: 800 TABLET, FILM COATED ORAL at 11:10

## 2024-10-05 RX ADMIN — ATORVASTATIN CALCIUM 20 MG: 10 TABLET, FILM COATED ORAL at 09:10

## 2024-10-05 RX ADMIN — LABETALOL HYDROCHLORIDE 200 MG: 200 TABLET, FILM COATED ORAL at 08:10

## 2024-10-05 RX ADMIN — SODIUM BICARBONATE: 84 INJECTION, SOLUTION INTRAVENOUS at 07:10

## 2024-10-05 RX ADMIN — SEVELAMER CARBONATE 800 MG: 800 TABLET, FILM COATED ORAL at 07:10

## 2024-10-05 RX ADMIN — VANCOMYCIN HYDROCHLORIDE 2000 MG: 500 INJECTION, POWDER, LYOPHILIZED, FOR SOLUTION INTRAVENOUS at 08:10

## 2024-10-05 RX ADMIN — LABETALOL HYDROCHLORIDE 200 MG: 200 TABLET, FILM COATED ORAL at 09:10

## 2024-10-05 RX ADMIN — MUPIROCIN: 20 OINTMENT TOPICAL at 09:10

## 2024-10-05 RX ADMIN — HYDROCODONE BITARTRATE AND ACETAMINOPHEN 1 TABLET: 5; 325 TABLET ORAL at 02:10

## 2024-10-05 RX ADMIN — HYDROCODONE BITARTRATE AND ACETAMINOPHEN 1 TABLET: 5; 325 TABLET ORAL at 07:10

## 2024-10-05 RX ADMIN — GADOBENATE DIMEGLUMINE 20 ML: 529 INJECTION, SOLUTION INTRAVENOUS at 04:10

## 2024-10-05 RX ADMIN — FOLIC ACID 1 MG: 1 TABLET ORAL at 08:10

## 2024-10-05 NOTE — PROGRESS NOTES
[facility] Medicine Progress Note        Chief Complaint: Inpatient Follow-up for     HPI: 69-year-old female with past medical history of hypertension, hyperlipidemia, chronic hydronephrosis with left nephrostomy tube, right ureteral stent, end-stage renal disease on hemodialysis, chronic anemia presented with right flank pain for the past 5 days and also reported cloudy urine output from the urostomy CT with IV contrast showed moderate to severe hydronephrosis with enlarged right pelvic lymph node increase in size from the prior with suspected malignancy also showed duplicated collecting system with dilation of the right lower pole urology was consulted patient is status post cystoscopy with right stent exchange and bladder biopsy and they were not able to identify the left ureteral orifice therefore had left percutaneous nephrostomy. Blood culture is growing Gram-positive cocci 2/2 bottles initially patient was started on cefepime but now we added vancomycin repeat blood cultures ordered    Interval Hx:   Patient seen and examined this morning complains of whole body pain  Case was discussed with patient's nurse and  on the floor.    Objective/physical exam:  General: In no acute distress, afebrile  Chest: Clear to auscultation bilaterally  Heart: RRR, +S1, S2, no appreciable murmur  Abdomen: Soft, nontender, BS +  MSK: Warm, no lower extremity edema, no clubbing or cyanosis  Neurologic: Alert and oriented x4, Cranial nerve II-XII intact, Strength 5/5 in all 4 extremities    VITAL SIGNS: 24 HRS MIN & MAX LAST   Temp  Min: 97.2 °F (36.2 °C)  Max: 98.1 °F (36.7 °C) 98 °F (36.7 °C)   BP  Min: 83/56  Max: 155/73 127/61   Pulse  Min: 85  Max: 117  96   Resp  Min: 15  Max: 21 18   SpO2  Min: 70 %  Max: 100 % (!) 91 %     I have reviewed the following labs:  Recent Labs   Lab 10/03/24  1620 10/04/24  0311 10/05/24  0315   WBC 22.51  22.51* 23.93  23.93* 36.16   36.16*   RBC 4.18* 4.37 3.91*   HGB 12.6 13.2 11.8*   HCT 38.6 39.5 34.7*   MCV 92.3 90.4 88.7   MCH 30.1 30.2 30.2   MCHC 32.6* 33.4 34.0   RDW 15.2 15.4 15.7    254 278   MPV 10.5* 10.8* 10.2     Recent Labs   Lab 10/03/24  1620 10/04/24  0311 10/05/24  0315   * 131* 131*   K 3.5 4.4 5.0   CL 96* 96* 94*   CO2 18* 13* 14*   BUN 65.3* 70.0* 85.5*   CREATININE 6.70* 6.98* 8.03*   CALCIUM 8.7 8.7 8.0*   MG  --  2.20  --    ALBUMIN 2.4* 2.3* 2.2*   ALKPHOS 179* 182*  --    * 104*  --    * 91*  --    BILITOT 0.5 0.4  --      Microbiology Results (last 7 days)       Procedure Component Value Units Date/Time    Urine culture [[id number]] Collected: 10/03/24 2008    Order Status: Completed Specimen: Urine Updated: 10/05/24 1003     Urine Culture No Significant Growth    Blood culture #2 **CANNOT BE ORDERED STAT** [[id number]]  (Abnormal) Collected: 10/03/24 2020    Order Status: Completed Specimen: Blood Updated: 10/05/24 0653     Blood Culture Identification and Susceptibility To Follow      Gram-positive coccus probable staph     GRAM STAIN Gram Positive Cocci, probable Staphylococcus      Seen in gram stain of broth only      1 of 1 Pediatric bottle positive    Blood culture #1 **CANNOT BE ORDERED STAT** [[id number]]  (Abnormal) Collected: 10/03/24 2020    Order Status: Completed Specimen: Blood Updated: 10/05/24 0653     Blood Culture Identification and Susceptibility To Follow      Gram-positive coccus probable staph     GRAM STAIN Gram Positive Cocci, probable Staphylococcus      Seen in gram stain of broth only      2 of 2 bottles positive    Blood Culture [[id number]] Collected: 10/05/24 0620    Order Status: Resulted Specimen: Blood Updated: 10/05/24 0629    Blood Culture [[id number]] Collected: 10/05/24 0620    Order Status: Resulted Specimen: Blood Updated: 10/05/24 0629    Blood Culture [[id number]]     Order Status: Canceled Specimen: Blood     BCID2 Panel [[id number]]  (Abnormal)  Collected: 10/03/24 2020    Order Status: Completed Specimen: Blood Updated: 10/04/24 1107     CTX-M (ESBL ) N/A     IMP (Cabapenemase ) N/A     KPC resistance gene (Carbapenemase ) N/A     mcr-1 N/A     mecA ID N/A     Comment: Note: Antimicrobial resistance can occur via multiple mechanisms. A Not Detected result for antimicrobial resistance gene(s) does not indicate antimicrobial susceptibility. Subculturing is required for species identification and susceptibility testing of   isolates.        mecA/C and MREJ (MRSA) gene Detected     NDM (Carbapenemase ) N/A     OXA-48-like (Carbapenemase ) N/A     Royal/B (VRE gene) N/A     VIM (Carbapenemase ) N/A     Enterococcus faecalis Not Detected     Enterococcus faecium Not Detected     Listeria monocytogenes Not Detected     Staphylococcus spp. Detected     Staphylococcus aureus Detected     Staphylococcus epidermidis Not Detected     Staphylococcus lugdunensis Not Detected     Streptococcus spp. Not Detected     Streptococcus agalactiae (Group B) Not Detected     Streptococcus pneumoniae Not Detected     Streptococcus pyogenes (Group A) Not Detected     Acinetobacter calcoaceticus/baumannii complex Not Detected     Bacteroides fragilis Not Detected     Enterobacterales Not Detected     Enterobacter cloacae complex Not Detected     Escherichia coli Not Detected     Klebsiella aerogenes Not Detected     Klebsiella oxytoca Not Detected     Klebsiella pneumoniae group Not Detected     Proteus spp. Not Detected     Salmonella spp. Not Detected     Serratia marcescens Not Detected     Haemophilus influenzae Not Detected     Neisseria meningitidis Not Detected     Pseudomonas aeruginosa Not Detected     Stenotrophomonas maltophilia Not Detected     Candida albicans Not Detected     Candida auris Not Detected     Candida glabrata Not Detected     Candida krusei Not Detected     Candida parapsilosis Not Detected     Candida  tropicalis Not Detected     Cryptococcus neoformans/gattii Not Detected    Narrative:      The Med.[employer] BCID2 Panel is a multiplexed nucleic acid test intended for the use with Access Northeast® 2.0 or Access Northeast® G[employer]ight Systems for the simultaneous qualitative detection and identification of multiple bacterial and yeast nucleic acids and select genetic determinants associated with antimicrobial resistance.  The [employer] BCID2 Panel test is performed directly on blood culture samples identified as positive by a continuous monitoring blood culture system.  Results are intended to be interpreted in conjunction with Gram stain results.             See below for Radiology    Assessment/Plan:  Bilateral hydronephrosis Status post right upper lobe ureteral stent exchange and right lower pole nephrostomy tube placement  Abnormal tissue at the bladder base status post biopsy  Left percutaneous nephrostomy  End-stage renal disease on hemodialysis  Metabolic acidosis  Transaminitis, improving  History of hypertension, hyperlipidemia, bilateral chronic hydronephrosis with left nephrostomy tube in place and right ureter stent, ESRD on HD TTS, and chronic anemia     Continue with vancomycin cefepime was discontinued by Infectious Diseases  Will check with them if they want to add Rocephin for urinary tract coverage since patient had a lot done yesterday  TTE ordered repeat blood cultures ordered  White cell count is 36,000 today possibly secondary to all the urinary tract manipulation  Id is recommending MRI of CT and L-spine with contrast so we have ordered that we have already discussed with Nephrology they are okay and they want to dialyze her after the MRIs   We will also reach out to Nephrology's to see if they can remove the dialysis catheter but patient for showed needs dialysis today  Still has metabolic acidosis with bicarb of 14 patient was started on bicarb drip yesterday by Nephrology this morning bicarb is  just 14      Critical care note:  Critical care diagnosis:  Metabolic acidosis, severe sepsis on IV antibiotics and bicarb drip  Critical care interventions: Hands-on evaluation, review of labs/radiographs/records and discussion with patient and family if present  Critical care time spent: 35 minutes     VTE prophylaxis:     Patient condition:  Stable/Fair/Guarded/ Serious/ Critical    Anticipated discharge and Disposition:         All diagnosis and differential diagnosis have been reviewed; assessment and plan has been documented; I have personally reviewed the labs and test results that are presently available; I have reviewed the patients medication list; I have reviewed the consulting providers response and recommendations. I have reviewed or attempted to review medical records based upon their availability    All of the patient's questions have been  addressed and answered. Patient's is agreeable to the above stated plan. I will continue to monitor closely and make adjustments to medical management as needed.    Portions of this note dictated using EMR integrated voice recognition software, and may be subject to voice recognition errors not corrected at proofreading. Please contact writer for clarification if needed.   _____________________________________________________________________    Malnutrition Status:    Scheduled Med:   atorvastatin  20 mg Oral QHS    folic acid  1 mg Oral Daily    labetaloL  200 mg Oral Q12H    mupirocin   Nasal BID    ondansetron  4 mg Intravenous Once    pantoprazole  40 mg Oral Daily    sevelamer carbonate  800 mg Oral TID WM      Continuous Infusions:   sodium bicarbonate 150 mEq in D5W 1,000 mL infusion   Intravenous Continuous 75 mL/hr at 10/05/24 0736 New Bag at 10/05/24 0736      PRN Meds:    Current Facility-Administered Medications:     acetaminophen, 650 mg, Oral, Q4H PRN    albuterol-ipratropium, 3 mL, Nebulization, Once PRN    aluminum-magnesium hydroxide-simethicone,  30 mL, Oral, QID PRN    diphenhydrAMINE, 25 mg, Intravenous, Q6H PRN    HYDROcodone-acetaminophen, 1 tablet, Oral, Q6H PRN    melatonin, 6 mg, Oral, Nightly PRN    metoclopramide, 10 mg, Intravenous, Q10 Min PRN    ondansetron, 4 mg, Intravenous, Q4H PRN    polyethylene glycol, 17 g, Oral, BID PRN    prochlorperazine, 5 mg, Intravenous, Q6H PRN    senna-docusate 8.6-50 mg, 2 tablet, Oral, BID PRN    sodium chloride 0.9%, 10 mL, Intravenous, PRN    vancomycin - pharmacy to dose, , Intravenous, pharmacy to manage frequency     Radiology:  I have personally reviewed the following imaging and agree with the radiologist.     US Guided Needle Placement  Narrative: PROCEDURE:  US and Fluoroscopy Guided Nephrostomy Tube and Ureteral Stent Placement    CLINICAL HISTORY:  69-year-old female with deflated right collecting system and inferior pole hydronephrosis status post superior pole ureteral stent placement    ANESTHESIA:  Level of sedation: Local anesthesia    Medications: 0 mg Versed IV; 0 mg Fentanyl IV; 0 mg Ativan IV; other: None    Administration: Moderate sedation was administered during this procedure. Continuous monitoring of the patient's level of consciousness and physiological status was provided throughout the procedure by an independent trained observer under the direct supervision of the operating physician.    Duration of sedation: 0 minutes    Antibiotic prophylaxis: None.  Patient already treated with meropenem    PHYSICIANS:  Andrew Buenrostro MD    RADIATION DOSE:  Fluoroscopy time: 3.5 minutes    Radiation exposure dose: 145 mGy    Exposure images: 0    CONTRAST:  5 cc of Isovue 370    PROCEDURAL SUMMARY:  After informed consent was obtained, the patient was placed prone on the angiography table. The skin overlying the right kidney was then prepped and draped in the usual sterile fashion.    The skin and soft tissues were anesthetized using 1% lidocaine.  Real-time ultrasound imaging was performed, the  poor imaging was demonstrated.  Fluoroscopic imaging demonstrated contrast within the inferior pole of the right renal collecting system.  Under real-time fluoroscopic imaging, a 25g needle was advanced into an inferior pole calyx of the selected kidney.  The stylet was removed and return of urine was confirmed.  Contrast and air was injected to opacify the renal collecting system.  A Mandril wire was then advanced through the needle into the collecting system.  The needle was removed and the coaxial dilator was advanced over the wire into the kidney.  The inner dilators and wire were removed and a J-wire was advanced through the outer sheath of the dilator into kidney.  The sheath was then removed and an 8F nephrostomy drain was advanced over the wire and into the collecting system. The wire was removed and the [place] loop was formed. The catheter was attached to the skin and a sterile dressing was applied. The catheter was then attached to a gravity drainage bag.    The patient tolerated the procedure well and experienced no immediate complications. The patient was then brought to the recovery room in good condition.  Impression: Technically successful Nephrostomy Tube placement.    Electronically signed by: Andrew Buenrostro  Date:    10/04/2024  Time:    16:37  IR Nephrostomy Tube Placement  Narrative: PROCEDURE:  US and Fluoroscopy Guided Nephrostomy Tube and Ureteral Stent Placement    CLINICAL HISTORY:  69-year-old female with deflated right collecting system and inferior pole hydronephrosis status post superior pole ureteral stent placement    ANESTHESIA:  Level of sedation: Local anesthesia    Medications: 0 mg Versed IV; 0 mg Fentanyl IV; 0 mg Ativan IV; other: None    Administration: Moderate sedation was administered during this procedure. Continuous monitoring of the patient's level of consciousness and physiological status was provided throughout the procedure by an independent trained observer under the  direct supervision of the operating physician.    Duration of sedation: 0 minutes    Antibiotic prophylaxis: None.  Patient already treated with meropenem    PHYSICIANS:  Andrew Buenrostro MD    RADIATION DOSE:  Fluoroscopy time: 3.5 minutes    Radiation exposure dose: 145 mGy    Exposure images: 0    CONTRAST:  5 cc of Isovue 370    PROCEDURAL SUMMARY:  After informed consent was obtained, the patient was placed prone on the angiography table. The skin overlying the right kidney was then prepped and draped in the usual sterile fashion.    The skin and soft tissues were anesthetized using 1% lidocaine.  Real-time ultrasound imaging was performed, the poor imaging was demonstrated.  Fluoroscopic imaging demonstrated contrast within the inferior pole of the right renal collecting system.  Under real-time fluoroscopic imaging, a 25g needle was advanced into an inferior pole calyx of the selected kidney.  The stylet was removed and return of urine was confirmed.  Contrast and air was injected to opacify the renal collecting system.  A Mandril wire was then advanced through the needle into the collecting system.  The needle was removed and the coaxial dilator was advanced over the wire into the kidney.  The inner dilators and wire were removed and a J-wire was advanced through the outer sheath of the dilator into kidney.  The sheath was then removed and an 8F nephrostomy drain was advanced over the wire and into the collecting system. The wire was removed and the Commodore loop was formed. The catheter was attached to the skin and a sterile dressing was applied. The catheter was then attached to a gravity drainage bag.    The patient tolerated the procedure well and experienced no immediate complications. The patient was then brought to the recovery room in good condition.  Impression: Technically successful Nephrostomy Tube placement.    Electronically signed by: Andrew Buenrostro  Date:    10/04/2024  Time:    16:37  SURG FL  Surgery Fluoro Usage  See OP Notes for results.     IMPRESSION: See OP Notes for results.     This procedure was auto-finalized by: Virtual Radiologist  CT Abdomen Pelvis With IV Contrast NO Oral Contrast  Narrative: EXAMINATION:  CT ABDOMEN PELVIS WITH IV CONTRAST    CLINICAL HISTORY:  Abdominal abscess/infection suspected;Abdominal pain, acute, nonlocalized;abd pain/flank pain, leukocytosis (WBC 22K), R hip pain;    TECHNIQUE:  Low dose axial images, sagittal and coronal reformations were obtained from the lung bases to the pubic symphysis following the IV administration of 100 mL of Omnipaque 350 .  Oral contrast not given.  DLP 1193.  Automated exposure control used.    COMPARISON:  06/21/2024    FINDINGS:  Liver normal enhancement with no focal lesion.    Gallbladder and biliary ductal system are normal.    Pancreas normal.  Stomach decompressed.  Spleen and adrenal glands normal.    Moderate renal cortical thinning, scarring bilaterally.  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  There is a ureteral stent within the upper pole moiety with resolution or reduction and upper pole moiety hydronephrosis and hydroureter, with persistent moderate severe hydronephrosis hydroureter involving the lower pole moiety.  On the left there is a percutaneous nephrostomy/ureteral stent drainage tube with no hydronephrosis.    Aorta tapers normally.  Severe atherosclerotic calcification.    Bowel loops normal with no thickening or dilation.    Mild lymph node proliferation in the periaortic region some lymph nodes upper normal size, stable.  Right pelvis there is a suspected enlarged lymph node measuring 2.6 cm short axis on image 112 series 2 compared to 2.1 cm on the prior there is a borderline enlarged lymph node on the left, also larger compared to the prior.    Uterus, adnexa, bladder, rectum  normal    Bilateral sacroiliitis.  Bones otherwise intact.    Lung bases clear.  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  The upper pole moiety is decompressed with a ureteral stent with the lower pole more demonstrating moderate severe hydronephrosis which is persistent from the prior exam.    Left nephrostomy ureteral stent drain with resolution of hydronephrosis.    Enlarged right pelvic lymph node increased in size from the prior, malignancy suspected.    Mildly enlarged upper normal sized lymph nodes in the periaortic region and left pelvis, nonspecific, similar to the prior exam.    Electronically signed by: Tete Sun MD  Date:    10/04/2024  Time:    07:37      Macarena Leggett MD  Department of Hospital Medicine   [facility]   10/05/2024

## 2024-10-05 NOTE — PROGRESS NOTES
.UROLOGY  PROGRESS  NOTE    Fior Joya 1955  [id number]  10/5/2024    POD 1    Reports pain from PCN  Labs reviewed  VSS, afebrile      Exam:    NAD  Resp unlabored  Abd soft, NTND   bloody drainage from right PCN, faheem urine draining from left PCN      Recent Results (from the past 24 hours)   Protime-INR    Collection Time: 10/04/24  1:58 PM   Result Value Ref Range    PT 19.6 (H) 12.5 - 14.5 seconds    INR 1.6 (H) <=1.3   Vancomycin, Random    Collection Time: 10/05/24  3:15 AM   Result Value Ref Range    Vancomycin Random <1.4 (L) 15.0 - 20.0 ug/ml   Renal Function Panel    Collection Time: 10/05/24  3:15 AM   Result Value Ref Range    Sodium 131 (L) 136 - 145 mmol/L    Potassium 5.0 3.5 - 5.1 mmol/L    Chloride 94 (L) 98 - 107 mmol/L    CO2 14 (L) 23 - 31 mmol/L    Glucose 84 82 - 115 mg/dL    Blood Urea Nitrogen 85.5 (H) 9.8 - 20.1 mg/dL    Creatinine 8.03 (H) 0.55 - 1.02 mg/dL    Calcium 8.0 (L) 8.4 - 10.2 mg/dL    Albumin 2.2 (L) 3.4 - 4.8 g/dL    Phosphorus Level 7.2 (H) 2.3 - 4.7 mg/dL    eGFR 5 mL/min/1.73/m2   CBC with Differential    Collection Time: 10/05/24  3:15 AM   Result Value Ref Range    WBC 36.16 (H) 4.50 - 11.50 x10(3)/mcL    RBC 3.91 (L) 4.20 - 5.40 x10(6)/mcL    Hgb 11.8 (L) 12.0 - 16.0 g/dL    Hct 34.7 (L) 37.0 - 47.0 %    MCV 88.7 80.0 - 94.0 fL    MCH 30.2 27.0 - 31.0 pg    MCHC 34.0 33.0 - 36.0 g/dL    RDW 15.7 11.5 - 17.0 %    Platelet 278 130 - 400 x10(3)/mcL    MPV 10.2 7.4 - 10.4 fL    NRBC% 0.2 %   Manual Differential    Collection Time: 10/05/24  3:15 AM   Result Value Ref Range    WBC 36.16 x10(3)/mcL    Neutrophils % 96 %    Lymphs % 2 %    Monocytes % 2 %    Basophils % 1 %    Neutrophils Abs 34.7136 (H) 2.1 - 9.2 x10(3)/mcL    Lymphs Abs 0.7232 0.6 - 4.6 x10(3)/mcL    Monocytes Abs 0.7232 0.1 - 1.3 x10(3)/mcL    Basophils Abs 0.3616 (H) 0 - 0.2 x10(3)/mcL    Platelets Normal Normal, Adequate    RBC Morph Abnormal (A) Normal    Poikilocytosis 2+ (A) (none)    Ramos Cells 2+  (A) (none)   Hepatitis B Surface Antigen    Collection Time: 10/05/24  3:15 AM   Result Value Ref Range    Hep BsAg Interp Nonreactive Nonreactive         Assessment:  Bilateral hydronephrosis  -had left PCN placed in June of this year due to inability to place retrograde ureteral stent, also had right ureteral stent placed in one of the two collecting systems on 6/27  -s/p Cystoscopy with right stent exchange upper pole moiety, Retrograde pyelogram and Bladder biopsy 10/4  -unable to identify UO intra-op therefore IR placed a right PCN to the collecting system without a stent 10/4    Questionable bladder mass  -biopsy pending    Urosepsis  -repeat urine cultures with no growth  WBC elevated today likely from manipulation of stent/PCN    ESRD   -on HD      Plan:  Continue empiric abx, following cultures and tailoring accordingly. She will need to follow up for stent and PCN management. Awaiting bladder biopsy results.     Shabana Covarrubias, VICKIP

## 2024-10-05 NOTE — PLAN OF CARE
Problem: Adult Inpatient Plan of Care  Goal: Plan of Care Review  Outcome: Progressing  Goal: Patient-Specific Goal (Individualized)  Outcome: Progressing  Goal: Absence of Hospital-Acquired Illness or Injury  Outcome: Progressing  Goal: Optimal Comfort and Wellbeing  Outcome: Progressing  Goal: Readiness for Transition of Care  Outcome: Progressing     Problem: Bariatric Environmental Safety  Goal: Safety Maintained with Care  Outcome: Progressing     Problem: Skin Injury Risk Increased  Goal: Skin Health and Integrity  Outcome: Progressing     Problem: Hemodialysis  Goal: Safe, Effective Therapy Delivery  Outcome: Progressing  Goal: Effective Tissue Perfusion  Outcome: Progressing  Goal: Absence of Infection Signs and Symptoms  Outcome: Progressing     Problem: Infection  Goal: Absence of Infection Signs and Symptoms  Outcome: Progressing     Problem: Wound  Goal: Optimal Coping  Outcome: Progressing  Goal: Optimal Functional Ability  Outcome: Progressing  Goal: Absence of Infection Signs and Symptoms  Outcome: Progressing  Goal: Improved Oral Intake  Outcome: Progressing  Goal: Optimal Pain Control and Function  Outcome: Progressing  Goal: Skin Health and Integrity  Outcome: Progressing  Goal: Optimal Wound Healing  Outcome: Progressing

## 2024-10-05 NOTE — CONSULTS
INFECTIOUS DISEASE CONSULTATION NOTE     Patient Name: Fior Joya  Patient : 1955  Age/Sex: 69 y.o.  female  Room/Bed: 550/550 A  Admission Date/Time: 10/3/2024  4:08 PM  Date of consultation:  10/5/2024  Referring MD: Macarena Leggett MD     Date: 10/5/2024  Time: 5:57 AM    Reason for consultation:      MRSA bacteremia    Chief complain:       Lower back pain    History of present illness:      Fior Joya is a 69 y.o. female with a history significant for  ESRD on hemodialysis through right IJ permanent catheter, chronic hydronephrosis with left nephrostomy tube, hypertension who was admitted on 10/3/2024 with  lower back pain. Patient said that her symptoms started approximately 1 week ago with some neck pain that moved down to her shoulder. She then developed lower back pain that moved to her right leg.  Patient also reports having cloudy drainage from her nephrostomy tube for the last 1 month.  Patient reports worsening back/right flank pain for the last few days.  Vital signs on admission showed temperature  97.2 F, heart rate 79, blood pressure 110/75.  Laboratory workup showed WBC 96761, creatinine 6.70, lactic acid 1.8.  Urinalysis showed more than 100 WBC.  She had 2 sets of blood culture obtained which are growing MRSA.  CT abdomen showed moderate severe hydronephrosis which is persistent from prior exam , left nephrostomy ureteral stent drain with resolution of hydronephrosis.  Patient underwent nephrostomy tube placement by IR. She has been receiving IV antibiotic as below.    Review of system:      A review of the patient's history and complaints did not reveal any medical problems other than those outlined in the HPI. Specifically, there were no additional constitutional complaints, and no complaints of problems with the eyes, ears, nose, and mouth, cardiovascular, respiratory, gastrointestinal, musculoskeletal, neurological, integumentary, psychiatric, endocrine, or hematological  systems.    Antibiotics Received:     Metronidazole 10/3    Cefepime 10/3   Vancomycin 10/3    Social history:      Social History     Socioeconomic History    Marital status:    Tobacco Use    Smoking status: Never    Smokeless tobacco: Never   Substance and Sexual Activity    Alcohol use: Never    Drug use: Never    Sexual activity: Not Currently     Social Drivers of Health     Financial Resource Strain: Low Risk  (10/4/2024)    Overall Financial Resource Strain (CARDIA)     Difficulty of Paying Living Expenses: Not hard at all   Food Insecurity: No Food Insecurity (10/4/2024)    Hunger Vital Sign     Worried About Running Out of Food in the Last Year: Never true     Ran Out of Food in the Last Year: Never true   Transportation Needs: No Transportation Needs (10/4/2024)    TRANSPORTATION NEEDS     Transportation : No   Physical Activity: Unknown (10/4/2024)    Exercise Vital Sign     Days of Exercise per Week: 0 days   Stress: No Stress Concern Present (10/4/2024)    [employer] [place] [employer] - Occupational Stress Questionnaire     Feeling of Stress : Not at all   Housing Stability: Low Risk  (10/4/2024)    Housing Stability Vital Sign     Unable to Pay for Housing in the Last Year: No     Homeless in the Last Year: No       Past medical history:     History reviewed. No pertinent past medical history.    Past Surgical history:     Past Surgical History:   Procedure Laterality Date    CYSTOSCOPY W/ URETERAL STENT PLACEMENT Right 6/27/2024    Procedure: CYSTOSCOPY, WITH URETERAL STENT INSERTION;  Surgeon: Otis Person MD;  Location: [facility] OR;  Service: Urology;  Laterality: Right;  CYSTOSCOPY, BILATERAL RETROGRADE PYELOGRAMS, POSSIBLE STENT PLACEMENT.    INSERTION OF TUNNELED CENTRAL VENOUS HEMODIALYSIS CATHETER N/A 7/3/2024    Procedure: Insertion, Catheter, Central Venous, Hemodialysis;  Surgeon: Uche Barcenas MD;  Location: [facility] CATH LAB;  Service: Peripheral Vascular;   Laterality: N/A;       Family history:     No family history on file.    Allergy history:      Review of patient's allergies indicates:   Allergen Reactions    Asa [aspirin] Anaphylaxis    Penicillins      Received ceftriaxone from , no reactions        Objective:    Vital signs:  Vitals:    10/04/24 2336 10/04/24 2342 10/05/24 0507 10/05/24 0508   BP:  (!) 104/54  (!) 155/73   BP Location:       Patient Position:       Pulse: 85 91     Resp:       Temp: 97.5 °F (36.4 °C)   97.7 °F (36.5 °C)   TempSrc: Oral   Oral   SpO2: 100%  100%    Weight:       Height:         Temp (24hrs), Av.6 °F (36.4 °C), Min:97.2 °F (36.2 °C), Max:98.1 °F (36.7 °C)    Physical examination:  GEN: Awake, appears very uncomfortable due to back pain  EYES: EOMI, no scleral icterus  HENT: MMM. No oral lesions. Fair dentition.  NECK: Supple, no cervical lymphadenopathy or meningismus.   CARDIO: RRR, no murmur.  PULM/CHEST: CTAB. No increased work of breathing.  Right-sided dialysis catheter in place.  ABD: Normal bowel sounds. Soft, not tender or distended. No HSM appreciated.  MSK: no obvious effusion, swelling, increased warmth, or erythema of major joints. No pedal edema.  SKIN: No rashes. No stigmata of endocarditis.  NEURO: AOx3. Speech fluent. Face symmetric.  PSYCH: Mood appropriate    Diagnostic data:     CBC  Recent Labs   Lab 10/03/24  1620 10/04/24  0311 10/04/24  1358 10/05/24  031   WBC 22.51  22.51* 23.93  23.93*  --  36.16  36.16*   HGB 12.6 13.2  --  11.8*    254  --  278   INR  --   --  1.6*  --        BMP  Recent Labs   Lab 10/03/24  1620 10/04/24  0311 10/05/24  031   * 131* 131*   K 3.5 4.4 5.0   CL 96* 96* 94*   CO2 18* 13* 14*   BUN 65.3* 70.0* 85.5*   CREATININE 6.70* 6.98* 8.03*   CALCIUM 8.7 8.7 8.0*   MG  --  2.20  --    PHOS  --  4.2 7.2*     Recent Labs   Lab 10/03/24  1620 10/04/24  0311 10/05/24  0315   WBC 22.51  22.51* 23.93  23.93* 36.16  36.16*   HGB 12.6 13.2 11.8*   HCT 38.6 39.5  34.7*    254 278     Estimated Creatinine Clearance: 9.3 mL/min (A) (based on SCr of 8.03 mg/dL (H)).    Lab Results   Component Value Date    CREATININE 8.03 (H) 10/05/2024    CREATININE 6.98 (H) 10/04/2024    CREATININE 6.70 (H) 10/03/2024    ALKPHOS 182 (H) 10/04/2024    ALKPHOS 179 (H) 10/03/2024    ALKPHOS 79 07/08/2024        Medications   Inpatient  Scheduled Meds:   atorvastatin  20 mg Oral QHS    ceFEPime IV (PEDS and ADULTS)  2 g Intravenous Q24H    folic acid  1 mg Oral Daily    labetaloL  200 mg Oral Q12H    meperidine  12.5 mg Intravenous Once    mupirocin   Nasal BID    ondansetron  4 mg Intravenous Once    pantoprazole  40 mg Oral Daily    sevelamer carbonate  800 mg Oral TID WM    vancomycin (VANCOCIN) IV (PEDS and ADULTS)  2,000 mg Intravenous Once     Continuous Infusions:   sodium bicarbonate 150 mEq in D5W 1,000 mL infusion   Intravenous Continuous 75 mL/hr at 10/04/24 1405 New Bag at 10/04/24 1405     PRN Meds:.  Current Facility-Administered Medications:     acetaminophen, 650 mg, Oral, Q4H PRN    albuterol-ipratropium, 3 mL, Nebulization, Once PRN    aluminum-magnesium hydroxide-simethicone, 30 mL, Oral, QID PRN    diphenhydrAMINE, 25 mg, Intravenous, Q6H PRN    HYDROcodone-acetaminophen, 1 tablet, Oral, Q6H PRN    melatonin, 6 mg, Oral, Nightly PRN    metoclopramide, 10 mg, Intravenous, Q10 Min PRN    ondansetron, 4 mg, Intravenous, Q4H PRN    polyethylene glycol, 17 g, Oral, BID PRN    prochlorperazine, 5 mg, Intravenous, Q6H PRN    senna-docusate 8.6-50 mg, 2 tablet, Oral, BID PRN    sodium chloride 0.9%, 10 mL, Intravenous, PRN    vancomycin - pharmacy to dose, , Intravenous, pharmacy to manage frequency    Home Meds  Current Outpatient Medications   Medication Instructions    atorvastatin (LIPITOR) 20 mg, Nightly    calcium carbonate (TUMS) 1,000 mg, Oral, 2 times daily    ergocalciferol (ERGOCALCIFEROL) 50,000 Units, Oral, Every 72 hours    FeroSuL 325 mg, Daily    folic acid  (FOLVITE) 1 mg, Oral, Daily    labetaloL (NORMODYNE) 200 mg, Oral, Every 12 hours    loratadine (CLARITIN) 10 mg, Daily    pantoprazole (PROTONIX) 40 mg, Oral, Daily    polyethylene glycol (GLYCOLAX) 17 g, Oral, Daily    senna-docusate 8.6-50 mg (SENNA WITH DOCUSATE SODIUM) 8.6-50 mg per tablet 1 tablet, Daily PRN    sevelamer carbonate (RENVELA) 800 mg, Oral, 3 times daily with meals       Diagnostic studies:      IR Nephrostomy Tube Placement   Final Result      Technically successful Nephrostomy Tube placement.         Electronically signed by: Andrew Buenrostro   Date:    10/04/2024   Time:    16:37      US Guided Needle Placement   Final Result      Technically successful Nephrostomy Tube placement.         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  The upper pole moiety is decompressed with a ureteral stent with the lower pole more demonstrating moderate severe hydronephrosis which is persistent from the prior exam.      Left nephrostomy ureteral stent drain with resolution of hydronephrosis.      Enlarged right pelvic lymph node increased in size from the prior, malignancy suspected.      Mildly enlarged upper normal sized lymph nodes in the periaortic region and left pelvis, nonspecific, similar to the prior exam.         Electronically signed by: Tete Sun MD   Date:    10/04/2024   Time:    07:37      X-Ray Lumbar Spine Ap And Lateral   Final Result      Limited examination due to patient's body habitus but degenerative changes seen throughout the lower lumbar spine         Electronically signed by: Garland Raya   Date:    10/03/2024   Time:    18:11          Assessment and Plan:     Fior Joya is a 69 y.o. female with:    MRSA bacteremia    Severe right-sided hydronephrosis status post IR guided nephrostomy tube placement 10/4/24   Left nephrostomy tube in place  ESRD on dialysis via right IJ PermCath     Impression:     The source of this patient's bacteremia unclear, could be either her dialysis catheter versus  tract.  We will repeat blood cultures today and every 24 hours until we document cultures sterilization.  It is advisable to remove all long-term catheter in the setting of staph bacteremia and we would recommend removing her dialysis catheter after discussing with Nephrology team. Given community onset in the presence of dialysis catheter, she is at risk of endovascular infection.  We will start with TTE, unlikely MADELYN to evaluate for endocarditis. We will continue with vancomycin alone and consider adding beta-lactam such as ceftaroline for potential synergy if blood culture remains persistently positive.  Cefepime can be discontinued    Recommendations:     Obtain 2 sets of blood culture  Obtained TTE, we will likely need MADELYN  Continue IV pharmacy dosed vancomycin  Okay to discontinue cefepime  Recommend cervical/thoracic/lumbar MRI with contrast if okay with Nephrology team  Recommend removing her HD dialysis catheter  Anticipating a long course of IV antibiotic    Above recommendations conveyed to primary team    The antibiotics being administered require intensive monitoring for drug toxicity    All questions and concerns addressed with patient and understands and agrees with plan.      Thank you for allowing us to contribute to this patient's care. Please call ID with any questions.     Tim Kohler MD  Attending, Infectious Disease     10/5/2024 5:57 AM

## 2024-10-05 NOTE — PROGRESS NOTES
NEPHROLOGY PROGRESS NOTE      Patient Demographics:  Name:  Fior Joya  Age: 69 y.o.  MRN:  [id number]  Admission Date: 10/3/2024      Subjective:      Some back pain    MRI planned    Current Facility-Administered Medications   Medication Dose Route Frequency Provider Last Rate Last Admin    acetaminophen tablet 650 mg  650 mg Oral Q4H PRN Tawana Loaiza MD        albuterol-ipratropium 2.5 mg-0.5 mg/3 mL nebulizer solution 3 mL  3 mL Nebulization Once PRN Mark Ying DO        aluminum-magnesium hydroxide-simethicone 200-200-20 mg/5 mL suspension 30 mL  30 mL Oral QID PRN Tawana Loaiza MD        atorvastatin tablet 20 mg  20 mg Oral QHS Tawana Loaiza MD   20 mg at 10/04/24 0315    diphenhydrAMINE injection 25 mg  25 mg Intravenous Q6H PRN Mark Ying DO        folic acid tablet 1 mg  1 mg Oral Daily Tawana Loaiza MD   1 mg at 10/05/24 0846    HYDROcodone-acetaminophen 5-325 mg per tablet 1 tablet  1 tablet Oral Q6H PRN Tawana Loaiza MD   1 tablet at 10/05/24 0733    labetaloL tablet 200 mg  200 mg Oral Q12H Tawana Loaiza MD   200 mg at 10/05/24 0846    melatonin tablet 6 mg  6 mg Oral Nightly PRN Tawana Loaiza MD        metoclopramide injection 10 mg  10 mg Intravenous Q10 Min PRN Mark Ying DO        mupirocin 2 % ointment   Nasal BID Tawana Loaiza MD   Given at 10/05/24 0846    ondansetron injection 4 mg  4 mg Intravenous Q4H PRN Tawana Loaiza MD        ondansetron injection 4 mg  4 mg Intravenous Once Mark Ying DO        pantoprazole EC tablet 40 mg  40 mg Oral Daily Tawana Loaiza MD   40 mg at 10/05/24 0846    polyethylene glycol packet 17 g  17 g Oral BID PRN Tawana Loaiza MD        prochlorperazine injection Soln 5 mg  5 mg Intravenous Q6H PRN Josse, Tawana OKEEFE MD        senna-docusate 8.6-50 mg per tablet 2 tablet  2 tablet Oral BID PRN Josse,  "Tawana OKEEFE MD        sevelamer carbonate tablet 800 mg  800 mg Oral TID WM Tawana Loaiza MD   800 mg at 10/05/24 1144    sodium bicarbonate 150 mEq in D5W 1,000 mL infusion   Intravenous Continuous Tawana Loaiza MD 75 mL/hr at 10/05/24 0736 New Bag at 10/05/24 0736    sodium chloride 0.9% flush 10 mL  10 mL Intravenous PRN Tawana Loaiza MD        vancomycin - pharmacy to dose   Intravenous pharmacy to manage frequency Tawana Loaiza MD               Review of Systems   Constitutional:  Positive for malaise/fatigue.   HENT: Negative.     Eyes: Negative.    Respiratory: Negative.     Cardiovascular: Negative.    Gastrointestinal: Negative.    Genitourinary: Negative.    Musculoskeletal: Negative.    Skin: Negative.    Neurological:  Positive for weakness.         Objective:    /61   Pulse 96   Temp 98 °F (36.7 °C) (Oral)   Resp 18   Ht 5' 3" (1.6 m)   Wt (!) 140.2 kg (309 lb)   SpO2 (!) 91%   BMI 54.74 kg/m²       Intake/Output Summary (Last 24 hours) at 10/5/2024 1147  Last data filed at 10/4/2024 1155  Gross per 24 hour   Intake 500 ml   Output --   Net 500 ml             Physical Exam  Vitals reviewed.   Constitutional:       Appearance: Normal appearance.   HENT:      Head: Normocephalic and atraumatic.      Nose: Nose normal.   Eyes:      Extraocular Movements: Extraocular movements intact.   Cardiovascular:      Rate and Rhythm: Normal rate and regular rhythm.      Pulses: Normal pulses.      Heart sounds: Normal heart sounds.   Pulmonary:      Effort: Pulmonary effort is normal.      Breath sounds: Normal breath sounds.   Abdominal:      General: Bowel sounds are normal.      Palpations: Abdomen is soft.   Musculoskeletal:         General: Normal range of motion.      Cervical back: Normal range of motion.      Comments: Sig deconditioning   Skin:     General: Skin is warm and dry.   Neurological:      General: No focal deficit present.      Mental " Status: She is alert and oriented to person, place, and time. Mental status is at baseline.   Psychiatric:         Mood and Affect: Mood normal.            Inpatient Diagnostics:  Recent Results (from the past 24 hours)   Protime-INR    Collection Time: 10/04/24  1:58 PM   Result Value Ref Range    PT 19.6 (H) 12.5 - 14.5 seconds    INR 1.6 (H) <=1.3   Vancomycin, Random    Collection Time: 10/05/24  3:15 AM   Result Value Ref Range    Vancomycin Random <1.4 (L) 15.0 - 20.0 ug/ml   Renal Function Panel    Collection Time: 10/05/24  3:15 AM   Result Value Ref Range    Sodium 131 (L) 136 - 145 mmol/L    Potassium 5.0 3.5 - 5.1 mmol/L    Chloride 94 (L) 98 - 107 mmol/L    CO2 14 (L) 23 - 31 mmol/L    Glucose 84 82 - 115 mg/dL    Blood Urea Nitrogen 85.5 (H) 9.8 - 20.1 mg/dL    Creatinine 8.03 (H) 0.55 - 1.02 mg/dL    Calcium 8.0 (L) 8.4 - 10.2 mg/dL    Albumin 2.2 (L) 3.4 - 4.8 g/dL    Phosphorus Level 7.2 (H) 2.3 - 4.7 mg/dL    eGFR 5 mL/min/1.73/m2   CBC with Differential    Collection Time: 10/05/24  3:15 AM   Result Value Ref Range    WBC 36.16 (H) 4.50 - 11.50 x10(3)/mcL    RBC 3.91 (L) 4.20 - 5.40 x10(6)/mcL    Hgb 11.8 (L) 12.0 - 16.0 g/dL    Hct 34.7 (L) 37.0 - 47.0 %    MCV 88.7 80.0 - 94.0 fL    MCH 30.2 27.0 - 31.0 pg    MCHC 34.0 33.0 - 36.0 g/dL    RDW 15.7 11.5 - 17.0 %    Platelet 278 130 - 400 x10(3)/mcL    MPV 10.2 7.4 - 10.4 fL    NRBC% 0.2 %   Manual Differential    Collection Time: 10/05/24  3:15 AM   Result Value Ref Range    WBC 36.16 x10(3)/mcL    Neutrophils % 96 %    Lymphs % 2 %    Monocytes % 2 %    Basophils % 1 %    Neutrophils Abs 34.7136 (H) 2.1 - 9.2 x10(3)/mcL    Lymphs Abs 0.7232 0.6 - 4.6 x10(3)/mcL    Monocytes Abs 0.7232 0.1 - 1.3 x10(3)/mcL    Basophils Abs 0.3616 (H) 0 - 0.2 x10(3)/mcL    Platelets Normal Normal, Adequate    RBC Morph Abnormal (A) Normal    Poikilocytosis 2+ (A) (none)    [place] Cells 2+ (A) (none)   Hepatitis B Surface Antigen    Collection Time: 10/05/24  3:15 AM    Result Value Ref Range    Hep BsAg Interp Nonreactive Nonreactive     A/P--NE 10/05    1---ESRD on HD--Cont TTS schedule while inpatient  2---10/4 s/p Right Nephrostomy Tube Placement---Urology on baord  3---Metabolic Acidosis--Cont Bicarb gtt--HD today  4---HTN--Controlled  5---Anemia secondary to CKD---Check Fe Studies/ Follow H&H    IV--D5W + 3 Amps Bicarb at 75cc/hr    ORDERS:  HD today AFTER MRI  Fe Studies  CBC/CMP in am      DCI Ryley Zimmerman DNP, ANP-C    10/5/2024

## 2024-10-05 NOTE — PROGRESS NOTES
Pharmacokinetic Assessment Follow Up: IV Vancomycin    Vancomycin serum concentration assessment(s):    The random level was drawn correctly and can be used to guide therapy at this time. The measurement is below the desired definitive target range of 15 to 20 mcg/mL.    Vancomycin Regimen Plan:    Re-dose when the random level is less than 20 mcg/mL, next level to be drawn at 0430 on 10/06    Scheduled Administration Times    Pt did not received scheduled loading dose at 1145, 10/04. Giving LD  2000mg IV this morning.  Check random level with Labs tomorrow morning.    Drug levels (last 3 results):  Recent Labs   Lab Result Units 10/05/24  0315   Vancomycin Random ug/ml <1.4*       Vancomycin Administrations:  vancomycin given in the last 96 hours                     vancomycin 2 g in dextrose 5 % 500 mL IVPB (mg) 2,000 mg New Bag 10/05/24 0848                    Pharmacy will continue to follow and monitor vancomycin.    Please contact pharmacy at extension [phone] for questions regarding this assessment.    Thank you for the consult,   Jet Mitchell       Patient brief summary:  Fior Joya is a 69 y.o. female initiated on antimicrobial therapy with IV Vancomycin for treatment of bacteremia    The patient's current regimen is pulse dose    Drug Allergies:   Review of patient's allergies indicates:   Allergen Reactions    Asa [aspirin] Anaphylaxis    Penicillins      Received ceftriaxone from 6/27, no reactions        Actual Body Weight:  Wt Readings from Last 1 Encounters:   10/03/24 (!) 140.2 kg (309 lb)       Renal Function:   Estimated Creatinine Clearance: 9.3 mL/min (A) (based on SCr of 8.03 mg/dL (H)).,     Dialysis Method (if applicable):  intermittent HD TTS . Probably Will get HD this afternoon.    CBC (last 72 hours):  Recent Labs   Lab Result Units 10/03/24  1620 10/04/24  0311 10/05/24  0315   WBC x10(3)/mcL 22.51  22.51* 23.93  23.93* 36.16  36.16*   Hgb g/dL 12.6 13.2 11.8*   Hct % 38.6 39.5 34.7*    Platelet x10(3)/mcL 263 254 278   Monocytes % % 1 1 2   Eosinophils % % 1 2  --    Basophils % % 1  --  1       Metabolic Panel (last 72 hours):  Recent Labs   Lab Result Units 10/03/24  1620 10/03/24  2008 10/04/24  0311 10/05/24  0315   Sodium mmol/L 133*  --  131* 131*   Potassium mmol/L 3.5  --  4.4 5.0   Chloride mmol/L 96*  --  96* 94*   CO2 mmol/L 18*  --  13* 14*   Glucose mg/dL 124*  --  84 84   Glucose, UA   --  Normal  --   --    Blood Urea Nitrogen mg/dL 65.3*  --  70.0* 85.5*   Creatinine mg/dL 6.70*  --  6.98* 8.03*   Albumin g/dL 2.4*  --  2.3* 2.2*   Bilirubin Total mg/dL 0.5  --  0.4  --    ALP unit/L 179*  --  182*  --    AST unit/L 127*  --  91*  --    ALT unit/L 117*  --  104*  --    Magnesium Level mg/dL  --   --  2.20  --    Phosphorus Level mg/dL  --   --  4.2 7.2*       Microbiologic Results:  Microbiology Results (last 7 days)       Procedure Component Value Units Date/Time    Blood culture #2 **CANNOT BE ORDERED STAT** [[id number]]  (Abnormal) Collected: 10/03/24 2020    Order Status: Completed Specimen: Blood Updated: 10/05/24 0653     Blood Culture Identification and Susceptibility To Follow      Gram-positive coccus probable staph     GRAM STAIN Gram Positive Cocci, probable Staphylococcus      Seen in gram stain of broth only      1 of 1 Pediatric bottle positive    Blood culture #1 **CANNOT BE ORDERED STAT** [[id number]]  (Abnormal) Collected: 10/03/24 2020    Order Status: Completed Specimen: Blood Updated: 10/05/24 0653     Blood Culture Identification and Susceptibility To Follow      Gram-positive coccus probable staph     GRAM STAIN Gram Positive Cocci, probable Staphylococcus      Seen in gram stain of broth only      2 of 2 bottles positive    Blood Culture [[id number]] Collected: 10/05/24 0620    Order Status: Resulted Specimen: Blood Updated: 10/05/24 0629    Blood Culture [[id number]] Collected: 10/05/24 0620    Order Status: Resulted Specimen: Blood Updated: 10/05/24 0629     Blood Culture [[id number]]     Order Status: Canceled Specimen: Blood     BCID2 Panel [[id number]]  (Abnormal) Collected: 10/03/24 2020    Order Status: Completed Specimen: Blood Updated: 10/04/24 1107     CTX-M (ESBL ) N/A     IMP (Cabapenemase ) N/A     KPC resistance gene (Carbapenemase ) N/A     mcr-1 N/A     mecA ID N/A     Comment: Note: Antimicrobial resistance can occur via multiple mechanisms. A Not Detected result for antimicrobial resistance gene(s) does not indicate antimicrobial susceptibility. Subculturing is required for species identification and susceptibility testing of   isolates.        mecA/C and MREJ (MRSA) gene Detected     NDM (Carbapenemase ) N/A     OXA-48-like (Carbapenemase ) N/A     Royal/B (VRE gene) N/A     VIM (Carbapenemase ) N/A     Enterococcus faecalis Not Detected     Enterococcus faecium Not Detected     Listeria monocytogenes Not Detected     Staphylococcus spp. Detected     Staphylococcus aureus Detected     Staphylococcus epidermidis Not Detected     Staphylococcus lugdunensis Not Detected     Streptococcus spp. Not Detected     Streptococcus agalactiae (Group B) Not Detected     Streptococcus pneumoniae Not Detected     Streptococcus pyogenes (Group A) Not Detected     Acinetobacter calcoaceticus/baumannii complex Not Detected     Bacteroides fragilis Not Detected     Enterobacterales Not Detected     Enterobacter cloacae complex Not Detected     Escherichia coli Not Detected     Klebsiella aerogenes Not Detected     Klebsiella oxytoca Not Detected     Klebsiella pneumoniae group Not Detected     Proteus spp. Not Detected     Salmonella spp. Not Detected     Serratia marcescens Not Detected     Haemophilus influenzae Not Detected     Neisseria meningitidis Not Detected     Pseudomonas aeruginosa Not Detected     Stenotrophomonas maltophilia Not Detected     Candida albicans Not Detected     Candida auris Not Detected      Debbie glabrata Not Detected     Candida krusei Not Detected     Candida parapsilosis Not Detected     Candida tropicalis Not Detected     Cryptococcus neoformans/gattii Not Detected    Narrative:      The [employer] BCID2 Panel is a multiplexed nucleic acid test intended for the use with SIM Digital® 2.0 or SIM Digital® Lytics Systems for the simultaneous qualitative detection and identification of multiple bacterial and yeast nucleic acids and select genetic determinants associated with antimicrobial resistance.  The [employer] BCID2 Panel test is performed directly on blood culture samples identified as positive by a continuous monitoring blood culture system.  Results are intended to be interpreted in conjunction with Gram stain results.    Urine culture [[id number]] Collected: 10/03/24 2008    Order Status: Completed Specimen: Urine Updated: 10/04/24 0643     Urine Culture No Growth At 24 Hours

## 2024-10-05 NOTE — PROGRESS NOTES
Pt semi fowlers in bed A&O X 4 noted to be drowsy. Chest rise and fall noted, respirations even and unlabored. Bed locked and in lowest position with call light within reach. Pt voices no needs at this time. Nephrostomy tubes noted to right and left side with no minimal serosanguineous drainage. SR X 3

## 2024-10-06 LAB
ABS NEUT (OLG): 23.96 X10(3)/MCL (ref 2.1–9.2)
ALBUMIN SERPL-MCNC: 2.2 G/DL (ref 3.4–4.8)
ALBUMIN/GLOB SERPL: 0.4 RATIO (ref 1.1–2)
ALP SERPL-CCNC: 219 UNIT/L (ref 40–150)
ALT SERPL-CCNC: 103 UNIT/L (ref 0–55)
ANION GAP SERPL CALC-SCNC: 20 MEQ/L
ANISOCYTOSIS BLD QL SMEAR: ABNORMAL
APICAL FOUR CHAMBER EJECTION FRACTION: 63 %
APICAL TWO CHAMBER EJECTION FRACTION: 68 %
AST SERPL-CCNC: 89 UNIT/L (ref 5–34)
AV INDEX (PROSTH): 0.83
AV MEAN GRADIENT: 11 MMHG
AV PEAK GRADIENT: 19.4 MMHG
AV VALVE AREA BY VELOCITY RATIO: 2 CM²
AV VALVE AREA: 2.6 CM²
AV VELOCITY RATIO: 0.64
BACTERIA BLD CULT: ABNORMAL
BACTERIA BLD CULT: ABNORMAL
BILIRUB SERPL-MCNC: 0.4 MG/DL
BSA FOR ECHO PROCEDURE: 2.5 M2
BUN SERPL-MCNC: 57.7 MG/DL (ref 9.8–20.1)
CALCIUM SERPL-MCNC: 8.1 MG/DL (ref 8.4–10.2)
CHLORIDE SERPL-SCNC: 91 MMOL/L (ref 98–107)
CO2 SERPL-SCNC: 22 MMOL/L (ref 23–31)
CREAT SERPL-MCNC: 5.84 MG/DL (ref 0.55–1.02)
CREAT/UREA NIT SERPL: 10
CV ECHO LV RWT: 0.52 CM
DOP CALC AO PEAK VEL: 2.2 M/S
DOP CALC AO VTI: 38.6 CM
DOP CALC LVOT AREA: 3.1 CM2
DOP CALC LVOT DIAMETER: 2 CM
DOP CALC LVOT PEAK VEL: 1.4 M/S
DOP CALC LVOT STROKE VOLUME: 100.5 CM3
DOP CALC MV VTI: 50 CM
DOP CALCLVOT PEAK VEL VTI: 32 CM
E WAVE DECELERATION TIME: 193 MSEC
E/A RATIO: 0.86
E/E' RATIO: 15.18 M/S
ECHO LV POSTERIOR WALL: 1.4 CM (ref 0.6–1.1)
EOSINOPHIL NFR BLD MANUAL: 0.26 X10(3)/MCL (ref 0–0.9)
EOSINOPHIL NFR BLD MANUAL: 1 %
ERYTHROCYTE [DISTWIDTH] IN BLOOD BY AUTOMATED COUNT: 15.4 % (ref 11.5–17)
FRACTIONAL SHORTENING: 31.5 % (ref 28–44)
GFR SERPLBLD CREATININE-BSD FMLA CKD-EPI: 7 ML/MIN/1.73/M2
GLOBULIN SER-MCNC: 4.9 GM/DL (ref 2.4–3.5)
GLUCOSE SERPL-MCNC: 72 MG/DL (ref 82–115)
GRAM STN SPEC: ABNORMAL
HCT VFR BLD AUTO: 32.7 % (ref 37–47)
HGB BLD-MCNC: 11.6 G/DL (ref 12–16)
HR MV ECHO: 73 BPM
INSTRUMENT WBC (OLG): 26.04 X10(3)/MCL
INTERVENTRICULAR SEPTUM: 1.1 CM (ref 0.6–1.1)
LEFT ATRIUM AREA SYSTOLIC (APICAL 2 CHAMBER): 21 CM2
LEFT ATRIUM AREA SYSTOLIC (APICAL 4 CHAMBER): 22.3 CM2
LEFT ATRIUM SIZE: 4.5 CM
LEFT INTERNAL DIMENSION IN SYSTOLE: 3.7 CM (ref 2.1–4)
LEFT VENTRICLE DIASTOLIC VOLUME INDEX: 60.52 ML/M2
LEFT VENTRICLE DIASTOLIC VOLUME: 141 ML
LEFT VENTRICLE END DIASTOLIC VOLUME APICAL 2 CHAMBER: 84.4 ML
LEFT VENTRICLE END DIASTOLIC VOLUME APICAL 4 CHAMBER: 73.7 ML
LEFT VENTRICLE END SYSTOLIC VOLUME APICAL 2 CHAMBER: 68.7 ML
LEFT VENTRICLE END SYSTOLIC VOLUME APICAL 4 CHAMBER: 61.4 ML
LEFT VENTRICLE MASS INDEX: 120.1 G/M2
LEFT VENTRICLE SYSTOLIC VOLUME INDEX: 24.9 ML/M2
LEFT VENTRICLE SYSTOLIC VOLUME: 58.1 ML
LEFT VENTRICULAR INTERNAL DIMENSION IN DIASTOLE: 5.4 CM (ref 3.5–6)
LEFT VENTRICULAR MASS: 279.8 G
LV LATERAL E/E' RATIO: 12.9 M/S
LV SEPTAL E/E' RATIO: 18.43 M/S
LVED V (TEICH): 141 ML
LVES V (TEICH): 58.1 ML
LVOT MG: 4 MMHG
LVOT MV: 0.93 CM/S
LYMPHOCYTES NFR BLD MANUAL: 0.78 X10(3)/MCL
LYMPHOCYTES NFR BLD MANUAL: 3 %
MACROCYTES BLD QL SMEAR: ABNORMAL
MCH RBC QN AUTO: 29.7 PG (ref 27–31)
MCHC RBC AUTO-ENTMCNC: 35.5 G/DL (ref 33–36)
MCV RBC AUTO: 83.6 FL (ref 80–94)
MONOCYTES NFR BLD MANUAL: 1.04 X10(3)/MCL (ref 0.1–1.3)
MONOCYTES NFR BLD MANUAL: 4 %
MV MEAN GRADIENT: 4 MMHG
MV PEAK A VEL: 1.5 M/S
MV PEAK E VEL: 1.29 M/S
MV PEAK GRADIENT: 9 MMHG
MV STENOSIS PRESSURE HALF TIME: 108 MS
MV VALVE AREA BY CONTINUITY EQUATION: 2.01 CM2
MV VALVE AREA P 1/2 METHOD: 2.04 CM2
NEUTROPHILS NFR BLD MANUAL: 92 %
NRBC BLD AUTO-RTO: 0.3 %
NRBC BLD MANUAL-RTO: 1 %
OHS LV EJECTION FRACTION SIMPSONS BIPLANE MOD: 67 %
PISA TR MAX VEL: 3.2 M/S
PLATELET # BLD AUTO: 302 X10(3)/MCL (ref 130–400)
PLATELET # BLD EST: NORMAL 10*3/UL
PMV BLD AUTO: 10.4 FL (ref 7.4–10.4)
POIKILOCYTOSIS BLD QL SMEAR: ABNORMAL
POTASSIUM SERPL-SCNC: 4.1 MMOL/L (ref 3.5–5.1)
PROT SERPL-MCNC: 7.1 GM/DL (ref 5.8–7.6)
PV PEAK GRADIENT: 9 MMHG
PV PEAK VELOCITY: 1.48 M/S
RA PRESSURE ESTIMATED: 3 MMHG
RBC # BLD AUTO: 3.91 X10(6)/MCL (ref 4.2–5.4)
RBC MORPH BLD: ABNORMAL
RV TB RVSP: 6 MMHG
SODIUM SERPL-SCNC: 133 MMOL/L (ref 136–145)
TARGETS BLD QL SMEAR: ABNORMAL
TDI LATERAL: 0.1 M/S
TDI SEPTAL: 0.07 M/S
TDI: 0.09 M/S
TR MAX PG: 41 MMHG
TRICUSPID ANNULAR PLANE SYSTOLIC EXCURSION: 1.88 CM
TV REST PULMONARY ARTERY PRESSURE: 44 MMHG
VANCOMYCIN SERPL-MCNC: 17.8 UG/ML (ref 15–20)
WBC # BLD AUTO: 26.04 X10(3)/MCL (ref 4.5–11.5)
Z-SCORE OF LEFT VENTRICULAR DIMENSION IN END DIASTOLE: -5.59
Z-SCORE OF LEFT VENTRICULAR DIMENSION IN END SYSTOLE: -3.39

## 2024-10-06 PROCEDURE — 99900031 HC PATIENT EDUCATION (STAT)

## 2024-10-06 PROCEDURE — 25000003 PHARM REV CODE 250: Performed by: UROLOGY

## 2024-10-06 PROCEDURE — 36415 COLL VENOUS BLD VENIPUNCTURE: CPT | Performed by: NURSE PRACTITIONER

## 2024-10-06 PROCEDURE — 21400001 HC TELEMETRY ROOM

## 2024-10-06 PROCEDURE — 80202 ASSAY OF VANCOMYCIN: CPT | Performed by: INTERNAL MEDICINE

## 2024-10-06 PROCEDURE — 85025 COMPLETE CBC W/AUTO DIFF WBC: CPT | Performed by: NURSE PRACTITIONER

## 2024-10-06 PROCEDURE — 63600175 PHARM REV CODE 636 W HCPCS: Performed by: INTERNAL MEDICINE

## 2024-10-06 PROCEDURE — 99900035 HC TECH TIME PER 15 MIN (STAT)

## 2024-10-06 PROCEDURE — 27000221 HC OXYGEN, UP TO 24 HOURS

## 2024-10-06 PROCEDURE — 80053 COMPREHEN METABOLIC PANEL: CPT | Performed by: NURSE PRACTITIONER

## 2024-10-06 RX ORDER — ENOXAPARIN SODIUM 100 MG/ML
30 INJECTION SUBCUTANEOUS EVERY 24 HOURS
Status: DISCONTINUED | OUTPATIENT
Start: 2024-10-06 | End: 2024-10-23

## 2024-10-06 RX ADMIN — ENOXAPARIN SODIUM 30 MG: 30 INJECTION SUBCUTANEOUS at 04:10

## 2024-10-06 RX ADMIN — ATORVASTATIN CALCIUM 20 MG: 10 TABLET, FILM COATED ORAL at 08:10

## 2024-10-06 RX ADMIN — MUPIROCIN: 20 OINTMENT TOPICAL at 08:10

## 2024-10-06 RX ADMIN — LABETALOL HYDROCHLORIDE 200 MG: 200 TABLET, FILM COATED ORAL at 08:10

## 2024-10-06 RX ADMIN — HYDROCODONE BITARTRATE AND ACETAMINOPHEN 1 TABLET: 5; 325 TABLET ORAL at 06:10

## 2024-10-06 RX ADMIN — FOLIC ACID 1 MG: 1 TABLET ORAL at 08:10

## 2024-10-06 RX ADMIN — PANTOPRAZOLE SODIUM 40 MG: 40 TABLET, DELAYED RELEASE ORAL at 08:10

## 2024-10-06 NOTE — PROGRESS NOTES
INFECTIOUS DISEASE FOLLOW-UP NOTE       Patient Name: Fior Joya  Patient : 1955  Age/Sex: 69 y.o. female  Room/Bed: 891/891 A  Admission Date/Time: 10/3/2024  4:08 PM    Date: 10/6/2024  Time: 6:13 AM    Reason for follow-up:      MRSA bacteremia    Summary:     Fior Joya is a 69 y.o. female with a history significant for  ESRD on hemodialysis through right IJ permanent catheter, chronic hydronephrosis with left nephrostomy tube, hypertension who was admitted on 10/3/2024 with  lower back pain. Patient said that her symptoms started approximately 1 week ago with some neck pain that moved down to her shoulder. She then developed lower back pain that moved to her right leg.  Patient also reports having cloudy drainage from her nephrostomy tube for the last 1 month.  Patient reports worsening back/right flank pain for the last few days.  Vital signs on admission showed temperature  97.2 F, heart rate 79, blood pressure 110/75.  Laboratory workup showed WBC 86995, creatinine 6.70, lactic acid 1.8.  Urinalysis showed more than 100 WBC.  She had 2 sets of blood culture obtained which are growing MRSA.  CT abdomen showed moderate severe hydronephrosis which is persistent from prior exam , left nephrostomy ureteral stent drain with resolution of hydronephrosis.  Patient underwent nephrostomy tube placement by IR. She has been receiving IV antibiotic as below.    10/5:  Repeated blood cultures still positive for MRSA.  Cervical/thoracic/lumbar MRI unremarkable    Antimicrobial history:      Metronidazole 10/3  Cefepime 10/3-10/    Vancomycin 10/3-    24 hours events:      No acute events overnight   Remains hemodynamically stable    Subjective     Patient seen and examined, chart reviewed.  Patient was lying down in bed in no acute distress.  She reports feeling okay.  Her back pain is improving.    Review of Symptoms:     Patient reports no nausea, vomiting, diarrhea, cough, dyspnea, chest pain or  palpitations    Objective     Vital signs  Vitals:    10/05/24 2042 10/05/24 2147 10/06/24 0011 10/06/24 0511   BP: 135/71 135/71 123/71 134/71   Pulse: 97 97 92 87   Resp:       Temp: 97.5 °F (36.4 °C)  98.3 °F (36.8 °C) 97.9 °F (36.6 °C)   TempSrc: Oral  Oral Oral   SpO2: (!) 91%  95% (!) 94%   Weight:       Height:          Temp (24hrs), Av.9 °F (36.6 °C), Min:97.5 °F (36.4 °C), Max:98.3 °F (36.8 °C)    Physical exam:  GEN: Awake, resting comfortably  EYES: EOMI, no scleral icterus  HENT: MMM. No oral lesions. Fair dentition.  NECK: Supple, no cervical lymphadenopathy or meningismus.   CARDIO: RRR, no murmur.  PULM/CHEST: CTAB. No increased work of breathing  ABD: Normal bowel sounds. Soft, not tender or distended. No HSM appreciated.  MSK: no obvious effusion, swelling, increased warmth, or erythema of major joints. No pedal edema.  SKIN: No rashes. No stigmata of endocarditis.  NEURO: AOx3. Speech fluent. Face symmetric.  PSYCH: Mood appropriate      Intake/Output Summary (Last 24 hours) at 10/6/2024 0613  Last data filed at 10/5/2024 2009  Gross per 24 hour   Intake --   Output 1000 ml   Net -1000 ml        Diagnostic Test     CBC, INR  Recent Labs   Lab 10/03/24  1620 10/04/24  0311 10/04/24  1358 10/05/24  0315 10/06/24  0449   WBC 22.51  22.51* 23.93  23.93*  --  36.16  36.16* 26.04*   HGB 12.6 13.2  --  11.8* 11.6*    254  --  278 302   INR  --   --  1.6*  --   --        BMP  Recent Labs   Lab 10/03/24  1620 10/04/24  0311 10/05/24  0315 10/06/24  0449   * 131* 131* 133*   K 3.5 4.4 5.0 4.1   CL 96* 96* 94* 91*   CO2 18* 13* 14* 22*   BUN 65.3* 70.0* 85.5* 57.7*   CREATININE 6.70* 6.98* 8.03* 5.84*   CALCIUM 8.7 8.7 8.0* 8.1*   MG  --  2.20  --   --    PHOS  --  4.2 7.2*  --      Recent Labs   Lab 10/04/24  0311 10/05/24  0315 10/06/24  0449   WBC 23.93  23.93* 36.16  36.16* 26.04*   HGB 13.2 11.8* 11.6*   HCT 39.5 34.7* 32.7*    278 302     Estimated Creatinine Clearance: 12.7  mL/min (A) (based on SCr of 5.84 mg/dL (H)).    Lab Results   Component Value Date    CREATININE 5.84 (H) 10/06/2024    CREATININE 8.03 (H) 10/05/2024    CREATININE 6.98 (H) 10/04/2024    ALKPHOS 219 (H) 10/06/2024    ALKPHOS 182 (H) 10/04/2024    ALKPHOS 179 (H) 10/03/2024        Medications   Inpatient  Scheduled Meds:   atorvastatin  20 mg Oral QHS    cefTRIAXone (Rocephin) IV (PEDS and ADULTS)  1 g Intravenous Once    folic acid  1 mg Oral Daily    labetaloL  200 mg Oral Q12H    mupirocin   Nasal BID    ondansetron  4 mg Intravenous Once    pantoprazole  40 mg Oral Daily    sevelamer carbonate  800 mg Oral TID WM     Continuous Infusions:  PRN Meds:.  Current Facility-Administered Medications:     acetaminophen, 650 mg, Oral, Q4H PRN    albuterol-ipratropium, 3 mL, Nebulization, Once PRN    aluminum-magnesium hydroxide-simethicone, 30 mL, Oral, QID PRN    diphenhydrAMINE, 25 mg, Intravenous, Q6H PRN    HYDROcodone-acetaminophen, 1 tablet, Oral, Q6H PRN    melatonin, 6 mg, Oral, Nightly PRN    metoclopramide, 10 mg, Intravenous, Q10 Min PRN    ondansetron, 4 mg, Intravenous, Q4H PRN    polyethylene glycol, 17 g, Oral, BID PRN    prochlorperazine, 5 mg, Intravenous, Q6H PRN    senna-docusate 8.6-50 mg, 2 tablet, Oral, BID PRN    sodium chloride 0.9%, 10 mL, Intravenous, PRN    vancomycin - pharmacy to dose, , Intravenous, pharmacy to manage frequency    Home Meds  Current Outpatient Medications   Medication Instructions    atorvastatin (LIPITOR) 20 mg, Nightly    calcium carbonate (TUMS) 1,000 mg, Oral, 2 times daily    ergocalciferol (ERGOCALCIFEROL) 50,000 Units, Oral, Every 72 hours    FeroSuL 325 mg, Daily    folic acid (FOLVITE) 1 mg, Oral, Daily    labetaloL (NORMODYNE) 200 mg, Oral, Every 12 hours    loratadine (CLARITIN) 10 mg, Daily    pantoprazole (PROTONIX) 40 mg, Oral, Daily    polyethylene glycol (GLYCOLAX) 17 g, Oral, Daily    senna-docusate 8.6-50 mg (SENNA WITH DOCUSATE SODIUM) 8.6-50 mg per tablet  1 tablet, Daily PRN    sevelamer carbonate (RENVELA) 800 mg, Oral, 3 times daily with meals       Imaging (personally reviewed):     MRI Lumbar Spine W WO Cont   Final Result      No acute abnormality of the lumbar spine.         Electronically signed by: Edmundo Vásquez MD   Date:    10/05/2024   Time:    16:55      MRI Thoracic Spine W WO Cont   Final Result      Very limited examination due to significant motion artifact      No evidence of osteomyelitis or discitis seen at no evidence of epidural abscess seen in thoracic spine.  Lumbar and cervical spine discussed on separate report         Electronically signed by: Garland Raya   Date:    10/05/2024   Time:    16:57      MRI Cervical Spine W WO Cont   Final Result      Very limited examination due to motion artifact.  Only gross findings are visible.  Fine details are not visible.  No obvious epidural abscess is seen.  There is some increased signal in the cord at C6-C7 which shows some enhancement.  Findings could represent intra cord lesion or possible myelomalacia.         Electronically signed by: Garland Raya   Date:    10/05/2024   Time:    16:55      IR Nephrostomy Tube Placement   Final Result      Technically successful Nephrostomy Tube placement.         Electronically signed by: Andrew Buenrostro   Date:    10/04/2024   Time:    16:37      US Guided Needle Placement   Final Result      Technically successful Nephrostomy Tube placement.         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  The upper pole moiety is decompressed with a ureteral stent with the lower pole more demonstrating moderate severe hydronephrosis which is persistent from the prior exam.      Left nephrostomy  ureteral stent drain with resolution of hydronephrosis.      Enlarged right pelvic lymph node increased in size from the prior, malignancy suspected.      Mildly enlarged upper normal sized lymph nodes in the periaortic region and left pelvis, nonspecific, similar to the prior exam.         Electronically signed by: Tete Sun MD   Date:    10/04/2024   Time:    07:37      X-Ray Lumbar Spine Ap And Lateral   Final Result      Limited examination due to patient's body habitus but degenerative changes seen throughout the lower lumbar spine         Electronically signed by: Garland Raya   Date:    10/03/2024   Time:    18:11          Assessment:     Fior Joya is a 69 y.o. female with:  MRSA bacteremia:  Source unclear, could be either dialysis catheter versus  tract but urine culture on admission remains negative  Positive blood culture 10/3  Repeat blood culture 10/5 positive  TTE showed no clear vegetation  Cervical/thoracic/lumbar MRI unremarkable  Severe right-sided hydronephrosis status post IR guided nephrostomy tube placement 10/4/24   Left nephrostomy tube in place  ESRD on dialysis via right IJ PermCath    Patient is at risk of endovascular infection given community onset and the presence of dialysis catheter.  Her TTE showed no clear vegetation.  There was moderate tricuspid valve regurgitation.  We would recommend proceeding with MADELYN to evaluate for endocarditis.  Her dialysis catheter needs to be removed.       Plan:     Please obtain 2 sets of blood culture  Continue IV pharmacy dose vanc  Recommend MADELYN to rule out endocarditis  Recommend removing dialysis catheter  Anticipating a long course of IV antibiotic    All questions and concerns addressed with patient and understands and agrees with plan.      Thank you very much for allowing me to participate in the care of this patient.      ID will continue to follow. Please page with questions.    Tim Metzger MD   Attending, Infectious Disease      10/6/2024 6:13 AM    Speech recognition software was used in the preparation of this note, errors in transcription may be present. Please call/page if there are questions.

## 2024-10-06 NOTE — PROGRESS NOTES
NEPHROLOGY PROGRESS NOTE      Patient Demographics:  Name:  Fior Joya  Age: 69 y.o.  MRN:  [id number]  Admission Date: 10/3/2024      Subjective:      Resting quietly    MRI Spine NEGATIVE for Osteo/ Abscess    HD done yesterday    Current Facility-Administered Medications   Medication Dose Route Frequency Provider Last Rate Last Admin    acetaminophen tablet 650 mg  650 mg Oral Q4H PRN Tawana Loaiza MD        albuterol-ipratropium 2.5 mg-0.5 mg/3 mL nebulizer solution 3 mL  3 mL Nebulization Once PRN Mark Ying DO        aluminum-magnesium hydroxide-simethicone 200-200-20 mg/5 mL suspension 30 mL  30 mL Oral QID PRN Tawana Loaiza MD        atorvastatin tablet 20 mg  20 mg Oral QHS Tawana Loaiza MD   20 mg at 10/05/24 2147    cefTRIAXone (Rocephin) 1 g in D5W 100 mL IVPB (MB+)  1 g Intravenous Once Macarena Leggett MD        diphenhydrAMINE injection 25 mg  25 mg Intravenous Q6H PRN Mark Ying DO        folic acid tablet 1 mg  1 mg Oral Daily Tawana Loaiza MD   1 mg at 10/06/24 0854    HYDROcodone-acetaminophen 5-325 mg per tablet 1 tablet  1 tablet Oral Q6H PRN Tawana Loaiza MD   1 tablet at 10/06/24 0644    labetaloL tablet 200 mg  200 mg Oral Q12H Tawana Loaiza MD   200 mg at 10/06/24 0854    melatonin tablet 6 mg  6 mg Oral Nightly PRN Tawana Loaiza MD        metoclopramide injection 10 mg  10 mg Intravenous Q10 Min PRN Mark Ying DO        mupirocin 2 % ointment   Nasal BID Tawana Loaiza MD   Given at 10/06/24 0854    ondansetron injection 4 mg  4 mg Intravenous Q4H PRN Tawana Loaiza MD        ondansetron injection 4 mg  4 mg Intravenous Once Mark Ying DO        pantoprazole EC tablet 40 mg  40 mg Oral Daily Tawana Loaiza MD   40 mg at 10/06/24 0854    polyethylene glycol packet 17 g  17 g Oral BID ARACELIN Tawana Loaiza MD        prochlorperazine injection Soln 5  "mg  5 mg Intravenous Q6H PRN Tawana Loaiza MD        senna-docusate 8.6-50 mg per tablet 2 tablet  2 tablet Oral BID PRN Tawana Loaiza MD        sevelamer carbonate tablet 800 mg  800 mg Oral TID WM Tawana Loaiza MD   800 mg at 10/05/24 1144    sodium chloride 0.9% flush 10 mL  10 mL Intravenous PRN Tawana Loaiza MD        vancomycin - pharmacy to dose   Intravenous pharmacy to manage frequency Tawana Loaiza MD               Review of Systems   Constitutional:  Positive for malaise/fatigue.   HENT: Negative.     Eyes: Negative.    Respiratory: Negative.     Cardiovascular: Negative.    Gastrointestinal: Negative.    Genitourinary: Negative.    Musculoskeletal:  Positive for back pain and myalgias.   Skin: Negative.    Neurological:  Positive for weakness.         Objective:    BP (!) 153/76   Pulse 93   Temp 97.6 °F (36.4 °C) (Oral)   Resp 18   Ht 5' 3" (1.6 m)   Wt (!) 140.2 kg (309 lb)   SpO2 (!) 94%   BMI 54.74 kg/m²       Intake/Output Summary (Last 24 hours) at 10/6/2024 1014  Last data filed at 10/6/2024 0650  Gross per 24 hour   Intake --   Output 1050 ml   Net -1050 ml             Physical Exam  Vitals reviewed.   Constitutional:       Appearance: Normal appearance.   HENT:      Head: Normocephalic and atraumatic.      Nose: Nose normal.   Eyes:      Extraocular Movements: Extraocular movements intact.   Cardiovascular:      Rate and Rhythm: Normal rate and regular rhythm.      Pulses: Normal pulses.      Heart sounds: Normal heart sounds.   Pulmonary:      Effort: Pulmonary effort is normal.      Breath sounds: Normal breath sounds.   Abdominal:      General: Bowel sounds are normal.      Palpations: Abdomen is soft.   Musculoskeletal:         General: Normal range of motion.      Cervical back: Normal range of motion.      Comments: Some deconditioning   Skin:     General: Skin is warm and dry.   Neurological:      General: No focal deficit " present.      Mental Status: She is alert and oriented to person, place, and time. Mental status is at baseline.   Psychiatric:         Mood and Affect: Mood normal.            Inpatient Diagnostics:  Recent Results (from the past 24 hours)   Echo    Collection Time: 10/05/24  9:20 PM   Result Value Ref Range    BSA 2.5 m2    Johnson's Biplane MOD Ejection Fraction 67 %    A2C EF 68 %    A4C EF 63 %    LVOT stroke volume 100.5 cm3    LVIDd 5.4 3.5 - 6.0 cm    LV Systolic Volume 58.10 mL    LV Systolic Volume Index 24.9 mL/m2    LVIDs 3.7 2.1 - 4.0 cm    LV ESV A2C 68.70 mL    LV Diastolic Volume 141.00 mL    LV ESV A4C 61.40 mL    LV Diastolic Volume Index 60.52 mL/m2    LV EDV A2C 84.682913880542554 mL    LV EDV A4C 73.70 mL    Left Ventricular End Systolic Volume by Teichholz Method 58.10 mL    Left Ventricular End Diastolic Volume by Teichholz Method 141.00 mL    IVS 1.1 0.6 - 1.1 cm    LVOT diameter 2.0 cm    LVOT area 3.1 cm2    FS 31.5 28 - 44 %    Left Ventricle Relative Wall Thickness 0.52 cm    PW 1.4 (A) 0.6 - 1.1 cm    LV mass 279.8 g    LV Mass Index 120.1 g/m2    MV Peak E Gurjit 1.29 m/s    TDI LATERAL 0.10 m/s    TDI SEPTAL 0.07 m/s    E/E' ratio 15.18 m/s    MV Peak A Gurjit 1.50 m/s    TR Max Gurjit 3.2 m/s    E/A ratio 0.86     E wave deceleration time 193.00 msec    LV SEPTAL E/E' RATIO 18.43 m/s    LV LATERAL E/E' RATIO 12.90 m/s    LVOT peak gurjit 1.4 m/s    Left Ventricular Outflow Tract Mean Velocity 0.93 cm/s    Left Ventricular Outflow Tract Mean Gradient 4.00 mmHg    TAPSE 1.88 cm    LA size 4.50 cm    AV mean gradient 11.0 mmHg    AV peak gradient 19.4 mmHg    Ao peak gurjit 2.2 m/s    Ao VTI 38.6 cm    LVOT peak VTI 32.0 cm    AV valve area 2.6 cm²    AV Velocity Ratio 0.64     AV index (prosthetic) 0.83     VICTORIANO by Velocity Ratio 2.0 cm²    MV mean gradient 4 mmHg    MV peak gradient 9 mmHg    MV stenosis pressure 1/2 time 108.00 ms    MV valve area p 1/2 method 2.04 cm2    MV valve area by continuity  eq 2.01 cm2    MV VTI 50.0 cm    Triscuspid Valve Regurgitation Peak Gradient 41 mmHg    PV PEAK VELOCITY 1.48 m/s    PV peak gradient 9 mmHg    Mean e' 0.09 m/s    ZLVIDS -3.39     ZLVIDD -5.59     LA area A4C 22.30 cm2    LA area A2C 21.00 cm2    Mitral Valve Heart Rate 73 bpm    TV resting pulmonary artery pressure 44 mmHg    RV TB RVSP 6 mmHg    Est. RA pres 3 mmHg   Vancomycin, Random    Collection Time: 10/06/24  4:49 AM   Result Value Ref Range    Vancomycin Random 17.8 15.0 - 20.0 ug/ml   Comprehensive Metabolic Panel    Collection Time: 10/06/24  4:49 AM   Result Value Ref Range    Sodium 133 (L) 136 - 145 mmol/L    Potassium 4.1 3.5 - 5.1 mmol/L    Chloride 91 (L) 98 - 107 mmol/L    CO2 22 (L) 23 - 31 mmol/L    Glucose 72 (L) 82 - 115 mg/dL    Blood Urea Nitrogen 57.7 (H) 9.8 - 20.1 mg/dL    Creatinine 5.84 (H) 0.55 - 1.02 mg/dL    Calcium 8.1 (L) 8.4 - 10.2 mg/dL    Protein Total 7.1 5.8 - 7.6 gm/dL    Albumin 2.2 (L) 3.4 - 4.8 g/dL    Globulin 4.9 (H) 2.4 - 3.5 gm/dL    Albumin/Globulin Ratio 0.4 (L) 1.1 - 2.0 ratio    Bilirubin Total 0.4 <=1.5 mg/dL     (H) 40 - 150 unit/L     (H) 0 - 55 unit/L    AST 89 (H) 5 - 34 unit/L    eGFR 7 mL/min/1.73/m2    Anion Gap 20.0 mEq/L    BUN/Creatinine Ratio 10    CBC with Differential    Collection Time: 10/06/24  4:49 AM   Result Value Ref Range    WBC 26.04 (H) 4.50 - 11.50 x10(3)/mcL    RBC 3.91 (L) 4.20 - 5.40 x10(6)/mcL    Hgb 11.6 (L) 12.0 - 16.0 g/dL    Hct 32.7 (L) 37.0 - 47.0 %    MCV 83.6 80.0 - 94.0 fL    MCH 29.7 27.0 - 31.0 pg    MCHC 35.5 33.0 - 36.0 g/dL    RDW 15.4 11.5 - 17.0 %    Platelet 302 130 - 400 x10(3)/mcL    MPV 10.4 7.4 - 10.4 fL    NRBC% 0.3 %   Manual Differential    Collection Time: 10/06/24  4:49 AM   Result Value Ref Range    WBC 26.04 x10(3)/mcL    Neutrophils % 92 %    Lymphs % 3 %    Monocytes % 4 %    Eosinophils % 1 %    nRBC % 1 %    Neutrophils Abs 23.9568 (H) 2.1 - 9.2 x10(3)/mcL    Lymphs Abs 0.7812 0.6 - 4.6  x10(3)/mcL    Monocytes Abs 1.0416 0.1 - 1.3 x10(3)/mcL    Eosinophils Abs 0.2604 0 - 0.9 x10(3)/mcL    Platelets Normal Normal, Adequate    RBC Morph Abnormal (A) Normal    Poikilocytosis 1+ (A) (none)    Anisocytosis 1+ (A) (none)    Macrocytosis 3+ (A) (none)    Target Cells 1+ (A) (none)       A/P--NE 10/06    1---ESRD on HD--Cont TTS schedule while inpatient  2---10/4 s/p Right Nephrostomy Tube Placement---Urology on baord  3---Metabolic Acidosis--Resolved/ DC Bicarb gtt--  4---HTN--Controlled  5---Anemia secondary to CKD--- Fe Studies reviewed/ Follow H&H     IV--D5W + 3 Amps Bicarb at 75cc/hr     ORDERS:  DC Bicarb gtt  CBC/CMP in am        DCI Ryley Zimmerman DNP, ANP-C    10/6/2024

## 2024-10-06 NOTE — NURSING
10/05/24 2009   Tunneled Central Line - Double Lumen  07/03/24 0838 Atrial Right   Placement Date/Time: 07/03/24 0838   Inserted by: MD  Hand Hygiene: Performed  Barrier Precautions: Performed  Skin Antisepsis: ChloraPrep  Location: Atrial Right  : Norsedepath 19 CM  Pressure Injectable Catheter: Yes  Catheter Size (F...   Dressing Intervention Sterile dressing change   Date on Dressing 10/05/24   Dressing Due to be Changed 10/12/24   Post-Hemodialysis Assessment   Blood Volume Processed (Liters) 54 L   Dialyzer Clearance Moderately streaked   Duration of Treatment 180 minutes   Total UF (mL) 1000 mL   Patient Response to Treatment Tolerated well   Post-Hemodialysis Comments Tx completed as ordered, CVC dressing changed. Ran 3 hrs, NET removed= 1L

## 2024-10-06 NOTE — PROGRESS NOTES
.UROLOGY  PROGRESS  NOTE    Fior Joya 1955  [id number]  10/6/2024    POD 2    Continues with c/o pain from PCN site  Labs reviewed  VSS, afebrile  50 ml UO from right PCN overnight  Left PCN bag full with urine       Exam:    NAD, ill appearing  Resp unlabored  Abd obese, soft, NTND   bloody drainage from right PCN, faheem urine draining from left PCN      Recent Results (from the past 24 hours)   Echo    Collection Time: 10/05/24  9:20 PM   Result Value Ref Range    BSA 2.5 m2    Johnson's Biplane MOD Ejection Fraction 67 %    A2C EF 68 %    A4C EF 63 %    LVOT stroke volume 100.5 cm3    LVIDd 5.4 3.5 - 6.0 cm    LV Systolic Volume 58.10 mL    LV Systolic Volume Index 24.9 mL/m2    LVIDs 3.7 2.1 - 4.0 cm    LV ESV A2C 68.70 mL    LV Diastolic Volume 141.00 mL    LV ESV A4C 61.40 mL    LV Diastolic Volume Index 60.52 mL/m2    LV EDV A2C 84.657570473276292 mL    LV EDV A4C 73.70 mL    Left Ventricular End Systolic Volume by Teichholz Method 58.10 mL    Left Ventricular End Diastolic Volume by Teichholz Method 141.00 mL    IVS 1.1 0.6 - 1.1 cm    LVOT diameter 2.0 cm    LVOT area 3.1 cm2    FS 31.5 28 - 44 %    Left Ventricle Relative Wall Thickness 0.52 cm    PW 1.4 (A) 0.6 - 1.1 cm    LV mass 279.8 g    LV Mass Index 120.1 g/m2    MV Peak E Gurjit 1.29 m/s    TDI LATERAL 0.10 m/s    TDI SEPTAL 0.07 m/s    E/E' ratio 15.18 m/s    MV Peak A Gurjit 1.50 m/s    TR Max Gurjit 3.2 m/s    E/A ratio 0.86     E wave deceleration time 193.00 msec    LV SEPTAL E/E' RATIO 18.43 m/s    LV LATERAL E/E' RATIO 12.90 m/s    LVOT peak gurjit 1.4 m/s    Left Ventricular Outflow Tract Mean Velocity 0.93 cm/s    Left Ventricular Outflow Tract Mean Gradient 4.00 mmHg    TAPSE 1.88 cm    LA size 4.50 cm    AV mean gradient 11.0 mmHg    AV peak gradient 19.4 mmHg    Ao peak gurjit 2.2 m/s    Ao VTI 38.6 cm    LVOT peak VTI 32.0 cm    AV valve area 2.6 cm²    AV Velocity Ratio 0.64     AV index (prosthetic) 0.83     VICTORIANO by Velocity Ratio 2.0 cm²     MV mean gradient 4 mmHg    MV peak gradient 9 mmHg    MV stenosis pressure 1/2 time 108.00 ms    MV valve area p 1/2 method 2.04 cm2    MV valve area by continuity eq 2.01 cm2    MV VTI 50.0 cm    Triscuspid Valve Regurgitation Peak Gradient 41 mmHg    PV PEAK VELOCITY 1.48 m/s    PV peak gradient 9 mmHg    Mean e' 0.09 m/s    ZLVIDS -3.39     ZLVIDD -5.59     LA area A4C 22.30 cm2    LA area A2C 21.00 cm2    Mitral Valve Heart Rate 73 bpm    TV resting pulmonary artery pressure 44 mmHg    RV TB RVSP 6 mmHg    Est. RA pres 3 mmHg   Vancomycin, Random    Collection Time: 10/06/24  4:49 AM   Result Value Ref Range    Vancomycin Random 17.8 15.0 - 20.0 ug/ml   Comprehensive Metabolic Panel    Collection Time: 10/06/24  4:49 AM   Result Value Ref Range    Sodium 133 (L) 136 - 145 mmol/L    Potassium 4.1 3.5 - 5.1 mmol/L    Chloride 91 (L) 98 - 107 mmol/L    CO2 22 (L) 23 - 31 mmol/L    Glucose 72 (L) 82 - 115 mg/dL    Blood Urea Nitrogen 57.7 (H) 9.8 - 20.1 mg/dL    Creatinine 5.84 (H) 0.55 - 1.02 mg/dL    Calcium 8.1 (L) 8.4 - 10.2 mg/dL    Protein Total 7.1 5.8 - 7.6 gm/dL    Albumin 2.2 (L) 3.4 - 4.8 g/dL    Globulin 4.9 (H) 2.4 - 3.5 gm/dL    Albumin/Globulin Ratio 0.4 (L) 1.1 - 2.0 ratio    Bilirubin Total 0.4 <=1.5 mg/dL     (H) 40 - 150 unit/L     (H) 0 - 55 unit/L    AST 89 (H) 5 - 34 unit/L    eGFR 7 mL/min/1.73/m2    Anion Gap 20.0 mEq/L    BUN/Creatinine Ratio 10    CBC with Differential    Collection Time: 10/06/24  4:49 AM   Result Value Ref Range    WBC 26.04 (H) 4.50 - 11.50 x10(3)/mcL    RBC 3.91 (L) 4.20 - 5.40 x10(6)/mcL    Hgb 11.6 (L) 12.0 - 16.0 g/dL    Hct 32.7 (L) 37.0 - 47.0 %    MCV 83.6 80.0 - 94.0 fL    MCH 29.7 27.0 - 31.0 pg    MCHC 35.5 33.0 - 36.0 g/dL    RDW 15.4 11.5 - 17.0 %    Platelet 302 130 - 400 x10(3)/mcL    MPV 10.4 7.4 - 10.4 fL    NRBC% 0.3 %   Manual Differential    Collection Time: 10/06/24  4:49 AM   Result Value Ref Range    WBC 26.04 x10(3)/mcL    Neutrophils  % 92 %    Lymphs % 3 %    Monocytes % 4 %    Eosinophils % 1 %    nRBC % 1 %    Neutrophils Abs 23.9568 (H) 2.1 - 9.2 x10(3)/mcL    Lymphs Abs 0.7812 0.6 - 4.6 x10(3)/mcL    Monocytes Abs 1.0416 0.1 - 1.3 x10(3)/mcL    Eosinophils Abs 0.2604 0 - 0.9 x10(3)/mcL    Platelets Normal Normal, Adequate    RBC Morph Abnormal (A) Normal    Poikilocytosis 1+ (A) (none)    Anisocytosis 1+ (A) (none)    Macrocytosis 3+ (A) (none)    Target Cells 1+ (A) (none)         Assessment:  Bilateral hydronephrosis  -had left PCN placed in June of this year due to inability to place retrograde ureteral stent, also had right ureteral stent placed in one of the two collecting systems on 6/27  -s/p Cystoscopy with right stent exchange upper pole moiety, Retrograde pyelogram and Bladder biopsy 10/4  -unable to identify UO intra-op therefore IR placed a right PCN to the collecting system without a stent 10/4    Questionable bladder mass  -biopsy pending    Urosepsis  -blood cultures +MRSA, on   -repeat urine cultures with no growth  -WBC down to 26.04 form 36.16    ESRD   -on HD      Plan:  Continue empiric abx, following cultures and tailoring accordingly. She will need to follow up for stent and PCN management. She will be discharged with both PCN tubes. Awaiting bladder biopsy results. No further  recs.    REAL Traore

## 2024-10-06 NOTE — PROGRESS NOTES
[facility] Medicine Progress Note        Chief Complaint: Inpatient Follow-up for     HPI: 69-year-old female with past medical history of hypertension, hyperlipidemia, chronic hydronephrosis with left nephrostomy tube, right ureteral stent, end-stage renal disease on hemodialysis, chronic anemia presented with right flank pain for the past 5 days and also reported cloudy urine output from the urostomy CT with IV contrast showed moderate to severe hydronephrosis with enlarged right pelvic lymph node increase in size from the prior with suspected malignancy also showed duplicated collecting system with dilation of the right lower pole urology was consulted patient is status post cystoscopy with right stent exchange and bladder biopsy and they were not able to identify the left ureteral orifice therefore had left percutaneous nephrostomy. Blood culture is growing Gram-positive cocci 2/2 bottles initially patient was started on cefepime but now we added vancomycin repeat blood cultures ordered  Id was consulted MRI of the CT and L-spine was ordered that was negative for any abscess blood culture is growing MRSA TTE as such did not show any vegetation cardiology consulted for MADELYN  Bilateral hydronephrosis Status post right upper lobe ureteral stent exchange and right lower pole nephrostomy tube placement  Interval Hx:   Patient seen and examined this morning vitals stable complains of pain all over    Case was discussed with patient's nurse and  on the floor.    Objective/physical exam:  General: In no acute distress, afebrile  Chest: Clear to auscultation bilaterally  Heart: RRR, +S1, S2, no appreciable murmur  Abdomen: Soft, nontender, BS +  MSK: Warm, no lower extremity edema, no clubbing or cyanosis  Neurologic: Alert and oriented x4,   VITAL SIGNS: 24 HRS MIN & MAX LAST   Temp  Min: 97.5 °F (36.4 °C)  Max: 98.3 °F (36.8 °C) 97.6 °F (36.4 °C)   BP  Min: 118/69   Max: 153/76 118/69   Pulse  Min: 70  Max: 97  70   Resp  Min: 18  Max: 18 18   SpO2  Min: 91 %  Max: 95 % 95 %     I have reviewed the following labs:  Recent Labs   Lab 10/04/24  0311 10/05/24  0315 10/06/24  0449   WBC 23.93  23.93* 36.16  36.16* 26.04  26.04*   RBC 4.37 3.91* 3.91*   HGB 13.2 11.8* 11.6*   HCT 39.5 34.7* 32.7*   MCV 90.4 88.7 83.6   MCH 30.2 30.2 29.7   MCHC 33.4 34.0 35.5   RDW 15.4 15.7 15.4    278 302   MPV 10.8* 10.2 10.4     Recent Labs   Lab 10/03/24  1620 10/04/24  0311 10/05/24  0315 10/06/24  0449   * 131* 131* 133*   K 3.5 4.4 5.0 4.1   CL 96* 96* 94* 91*   CO2 18* 13* 14* 22*   BUN 65.3* 70.0* 85.5* 57.7*   CREATININE 6.70* 6.98* 8.03* 5.84*   CALCIUM 8.7 8.7 8.0* 8.1*   MG  --  2.20  --   --    ALBUMIN 2.4* 2.3* 2.2* 2.2*   ALKPHOS 179* 182*  --  219*   * 104*  --  103*   * 91*  --  89*   BILITOT 0.5 0.4  --  0.4     Microbiology Results (last 7 days)       Procedure Component Value Units Date/Time    Blood culture #2 **CANNOT BE ORDERED STAT** [[id number]]  (Abnormal)  (Susceptibility) Collected: 10/03/24 2020    Order Status: Completed Specimen: Blood Updated: 10/06/24 0627     Blood Culture Methicillin resistant Staphylococcus aureus     GRAM STAIN Gram Positive Cocci, probable Staphylococcus      Seen in gram stain of broth only      1 of 1 Pediatric bottle positive    Blood culture #1 **CANNOT BE ORDERED STAT** [[id number]]  (Abnormal)  (Susceptibility) Collected: 10/03/24 2020    Order Status: Completed Specimen: Blood Updated: 10/06/24 0627     Blood Culture Methicillin resistant Staphylococcus aureus     GRAM STAIN Gram Positive Cocci, probable Staphylococcus      Seen in gram stain of broth only      2 of 2 bottles positive    Blood Culture [[id number]]  (Abnormal) Collected: 10/05/24 0620    Order Status: Completed Specimen: Blood Updated: 10/06/24 0006     GRAM STAIN Gram Positive Cocci, probable Staphylococcus      2 of 2 bottles positive       Seen in gram stain of broth only    Blood Culture [[id number]]  (Abnormal) Collected: 10/05/24 0620    Order Status: Completed Specimen: Blood Updated: 10/05/24 2117     GRAM STAIN Gram Positive Cocci, probable Staphylococcus      Seen in gram stain of broth only      1 of 2 Aerobic bottles positive    Urine culture [[id number]] Collected: 10/03/24 2008    Order Status: Completed Specimen: Urine Updated: 10/05/24 1003     Urine Culture No Significant Growth    Blood Culture [[id number]]     Order Status: Canceled Specimen: Blood     BCID2 Panel [[id number]]  (Abnormal) Collected: 10/03/24 2020    Order Status: Completed Specimen: Blood Updated: 10/04/24 1107     CTX-M (ESBL ) N/A     IMP (Cabapenemase ) N/A     KPC resistance gene (Carbapenemase ) N/A     mcr-1 N/A     mecA ID N/A     Comment: Note: Antimicrobial resistance can occur via multiple mechanisms. A Not Detected result for antimicrobial resistance gene(s) does not indicate antimicrobial susceptibility. Subculturing is required for species identification and susceptibility testing of   isolates.        mecA/C and MREJ (MRSA) gene Detected     NDM (Carbapenemase ) N/A     OXA-48-like (Carbapenemase ) N/A     Royal/B (VRE gene) N/A     VIM (Carbapenemase ) N/A     Enterococcus faecalis Not Detected     Enterococcus faecium Not Detected     Listeria monocytogenes Not Detected     Staphylococcus spp. Detected     Staphylococcus aureus Detected     Staphylococcus epidermidis Not Detected     Staphylococcus lugdunensis Not Detected     Streptococcus spp. Not Detected     Streptococcus agalactiae (Group B) Not Detected     Streptococcus pneumoniae Not Detected     Streptococcus pyogenes (Group A) Not Detected     Acinetobacter calcoaceticus/baumannii complex Not Detected     Bacteroides fragilis Not Detected     Enterobacterales Not Detected     Enterobacter cloacae complex Not Detected     Escherichia coli Not  Detected     Klebsiella aerogenes Not Detected     Klebsiella oxytoca Not Detected     Klebsiella pneumoniae group Not Detected     Proteus spp. Not Detected     Salmonella spp. Not Detected     Serratia marcescens Not Detected     Haemophilus influenzae Not Detected     Neisseria meningitidis Not Detected     Pseudomonas aeruginosa Not Detected     Stenotrophomonas maltophilia Not Detected     Candida albicans Not Detected     Candida auris Not Detected     Candida glabrata Not Detected     Candida krusei Not Detected     Candida parapsilosis Not Detected     Candida tropicalis Not Detected     Cryptococcus neoformans/gattii Not Detected    Narrative:      The [employer] BCID2 Panel is a multiplexed nucleic acid test intended for the use with [employer]® [employer].0 or [employer]® [employer] for the simultaneous qualitative detection and identification of multiple bacterial and yeast nucleic acids and select genetic determinants associated with antimicrobial resistance.  The [employer] BCID2 Panel test is performed directly on blood culture samples identified as positive by a continuous monitoring blood culture system.  Results are intended to be interpreted in conjunction with Gram stain results.             See below for Radiology    Assessment/Plan:  Bilateral hydronephrosis Status post right upper lobe ureteral stent exchange and right lower pole nephrostomy tube placement  Persistent MRSA bacteremia  Abnormal tissue at the bladder base status post biopsy  Left percutaneous nephrostomy  End-stage renal disease on hemodialysis  Metabolic acidosis  Transaminitis, improving  History of hypertension, hyperlipidemia, bilateral chronic hydronephrosis with left nephrostomy tube in place and right ureter stent, ESRD on HD TTS, and chronic anemia     Blood cultures were repeated on October 5 and they are positive TTE negative, MRI of the CT and L-spine negative for any abscess   Infectious diseases wants dialysis  catheter to be removed so we have reached out to Nephrology  Patient was dialyzed yesterday   Continue with vancomycin patient was given a dose of Rocephin since patient had a lot of manipulation done in the urinary track   urology following  Bicarb drip has been discontinued bicarb of 22  Repeat blood work in a.m.     Prophylaxis: Lovenox  VTE prophylaxis:     Patient condition:  Stable/Fair/Guarded/ Serious/ Critical    Anticipated discharge and Disposition:         All diagnosis and differential diagnosis have been reviewed; assessment and plan has been documented; I have personally reviewed the labs and test results that are presently available; I have reviewed the patients medication list; I have reviewed the consulting providers response and recommendations. I have reviewed or attempted to review medical records based upon their availability    All of the patient's questions have been  addressed and answered. Patient's is agreeable to the above stated plan. I will continue to monitor closely and make adjustments to medical management as needed.    Portions of this note dictated using EMR integrated voice recognition software, and may be subject to voice recognition errors not corrected at proofreading. Please contact writer for clarification if needed.   _____________________________________________________________________    Malnutrition Status:    Scheduled Med:   atorvastatin  20 mg Oral QHS    cefTRIAXone (Rocephin) IV (PEDS and ADULTS)  1 g Intravenous Once    folic acid  1 mg Oral Daily    labetaloL  200 mg Oral Q12H    mupirocin   Nasal BID    ondansetron  4 mg Intravenous Once    pantoprazole  40 mg Oral Daily    sevelamer carbonate  800 mg Oral TID WM      Continuous Infusions:       PRN Meds:    Current Facility-Administered Medications:     acetaminophen, 650 mg, Oral, Q4H PRN    albuterol-ipratropium, 3 mL, Nebulization, Once PRN    aluminum-magnesium hydroxide-simethicone, 30 mL, Oral, QID PRN     diphenhydrAMINE, 25 mg, Intravenous, Q6H PRN    HYDROcodone-acetaminophen, 1 tablet, Oral, Q6H PRN    melatonin, 6 mg, Oral, Nightly PRN    metoclopramide, 10 mg, Intravenous, Q10 Min PRN    ondansetron, 4 mg, Intravenous, Q4H PRN    polyethylene glycol, 17 g, Oral, BID PRN    prochlorperazine, 5 mg, Intravenous, Q6H PRN    senna-docusate 8.6-50 mg, 2 tablet, Oral, BID PRN    sodium chloride 0.9%, 10 mL, Intravenous, PRN    vancomycin - pharmacy to dose, , Intravenous, pharmacy to manage frequency     Radiology:  I have personally reviewed the following imaging and agree with the radiologist.     MRI Thoracic Spine W WO Cont  Narrative: EXAMINATION:  MRI THORACIC SPINE W WO CONTRAST    CLINICAL HISTORY:  abscess suspected;    TECHNIQUE:  The patient's thoracic spine was examined in the axial sagittal planes in T1, T2, stir and postcontrast T1 sequences.    COMPARISON:  None    FINDINGS:  Examination is limited due to significant motion artifact.    No obvious fracture is seen.  No obvious dislocation is seen.  Cord signal in the thoracic spine appears unremarkable.  No obvious discitis or osteomyelitis is seen.  No epidural abscess is seen.  Impression: Very limited examination due to significant motion artifact    No evidence of osteomyelitis or discitis seen at no evidence of epidural abscess seen in thoracic spine.  Lumbar and cervical spine discussed on separate report    Electronically signed by: Garland Raya  Date:    10/05/2024  Time:    16:57  MRI Cervical Spine W WO Cont  Narrative: EXAMINATION:  MRI CERVICAL SPINE W WO CONTRAST    CLINICAL HISTORY:  abscess suspected;    TECHNIQUE:  The patient's cervical spine were examined the axial sagittal planes in T1, T2, stir sequences.    COMPARISON:  None    FINDINGS:  The examination is very limited due to significant motion artifact.  No obvious fracture or dislocation is seen.  There is area of increased signal within the cord at the level of C 6 C7.  There is  also some enhancement seen in the region.  Findings could represent myelomalacia or but the enhancement suggest possible intra cord lesion is possible.  There are degenerative changes seen throughout the cervical spine which are not well evaluated on this examination due to significant motion artifact.  Impression: Very limited examination due to motion artifact.  Only gross findings are visible.  Fine details are not visible.  No obvious epidural abscess is seen.  There is some increased signal in the cord at C6-C7 which shows some enhancement.  Findings could represent intra cord lesion or possible myelomalacia.    Electronically signed by: Garland Raya  Date:    10/05/2024  Time:    16:55  MRI Lumbar Spine W WO Cont  Narrative: EXAMINATION:  MRI LUMBAR SPINE W WO CONTRAST    CLINICAL HISTORY:  Suspected abscess.    TECHNIQUE:  Multi slice, multisequence images of the lumbar spine were obtained in multiple projections with and without the use of intravenous contrast.  20 cc gadolinium based contrast was administered IV.    COMPARISON:  CT of the abdomen and pelvis 10/03/2024.    FINDINGS:  Evaluation is limited secondary to motion artifact.  Sagittal alignment is maintained.  There is no spondylolisthesis.  There is no abnormal marrow edema.  The conus terminates at the level of L1.  The visualized lower cord is normal in signal and morphology.    There is no epidural fluid collection.  There is no evidence of abscess.  There is lower lumbar facet arthropathy.  There is no canal or neural foraminal compromise.    The paraspinal musculature is normal.  The aorta is nonaneurysmal.  There is no retroperitoneal lymphadenopathy.  Impression: No acute abnormality of the lumbar spine.    Electronically signed by: Edmundo Vásquez MD  Date:    10/05/2024  Time:    16:55      Macarena Leggett MD  Department of Hospital Medicine   [facility]   10/06/2024

## 2024-10-07 LAB
ABS NEUT (OLG): 23.02 X10(3)/MCL (ref 2.1–9.2)
ALBUMIN SERPL-MCNC: 2.3 G/DL (ref 3.4–4.8)
ALBUMIN/GLOB SERPL: 0.5 RATIO (ref 1.1–2)
ALLENS TEST BLOOD GAS (OHS): YES
ALP SERPL-CCNC: 209 UNIT/L (ref 40–150)
ALT SERPL-CCNC: 71 UNIT/L (ref 0–55)
AMMONIA PLAS-MSCNC: 26.7 UMOL/L (ref 18–72)
ANION GAP SERPL CALC-SCNC: 25 MEQ/L
ANISOCYTOSIS BLD QL SMEAR: ABNORMAL
AST SERPL-CCNC: 43 UNIT/L (ref 5–34)
BASE EXCESS BLD CALC-SCNC: -4.6 MMOL/L (ref -2–2)
BILIRUB SERPL-MCNC: 0.4 MG/DL
BLOOD GAS SAMPLE TYPE (OHS): ABNORMAL
BUN SERPL-MCNC: 74.7 MG/DL (ref 9.8–20.1)
BURR CELLS (OLG): ABNORMAL
CA-I BLD-SCNC: 0.95 MMOL/L (ref 1.12–1.23)
CALCIUM SERPL-MCNC: 7.5 MG/DL (ref 8.4–10.2)
CHLORIDE SERPL-SCNC: 93 MMOL/L (ref 98–107)
CO2 BLDA-SCNC: 19.9 MMOL/L
CO2 SERPL-SCNC: 17 MMOL/L (ref 23–31)
COHGB MFR BLDA: 1.7 % (ref 0.5–1.5)
CREAT SERPL-MCNC: 6.8 MG/DL (ref 0.55–1.02)
CREAT/UREA NIT SERPL: 11
DRAWN BY BLOOD GAS (OHS): ABNORMAL
EOSINOPHIL NFR BLD MANUAL: 0.51 X10(3)/MCL (ref 0–0.9)
EOSINOPHIL NFR BLD MANUAL: 2 %
ERYTHROCYTE [DISTWIDTH] IN BLOOD BY AUTOMATED COUNT: 15.9 % (ref 11.5–17)
GFR SERPLBLD CREATININE-BSD FMLA CKD-EPI: 6 ML/MIN/1.73/M2
GLOBULIN SER-MCNC: 4.4 GM/DL (ref 2.4–3.5)
GLUCOSE SERPL-MCNC: 50 MG/DL (ref 82–115)
HCO3 BLDA-SCNC: 19 MMOL/L (ref 22–26)
HCT VFR BLD AUTO: 33.3 % (ref 37–47)
HGB BLD-MCNC: 11.4 G/DL (ref 12–16)
INHALED O2 CONCENTRATION: 21 %
INSTRUMENT WBC (OLG): 25.58 X10(3)/MCL
LYMPHOCYTES NFR BLD MANUAL: 1.02 X10(3)/MCL
LYMPHOCYTES NFR BLD MANUAL: 4 %
MACROCYTES BLD QL SMEAR: ABNORMAL
MCH RBC QN AUTO: 29.8 PG (ref 27–31)
MCHC RBC AUTO-ENTMCNC: 34.2 G/DL (ref 33–36)
MCV RBC AUTO: 87.2 FL (ref 80–94)
METAMYELOCYTES NFR BLD MANUAL: 2 %
METHGB MFR BLDA: 2.1 % (ref 0.4–1.5)
MONOCYTES NFR BLD MANUAL: 0.51 X10(3)/MCL (ref 0.1–1.3)
MONOCYTES NFR BLD MANUAL: 2 %
NEUTROPHILS NFR BLD MANUAL: 90 %
NRBC BLD AUTO-RTO: 0.2 %
O2 HB BLOOD GAS (OHS): 93 % (ref 94–97)
OXYHGB MFR BLDA: 12 G/DL (ref 12–16)
PCO2 BLDA: 30 MMHG (ref 35–45)
PH BLDA: 7.41 [PH] (ref 7.35–7.45)
PLASMA CELLS BLD QL SMEAR: 1 %
PLATELET # BLD AUTO: 275 X10(3)/MCL (ref 130–400)
PLATELET # BLD EST: NORMAL 10*3/UL
PMV BLD AUTO: 10.2 FL (ref 7.4–10.4)
PO2 BLDA: 75 MMHG (ref 80–100)
POIKILOCYTOSIS BLD QL SMEAR: ABNORMAL
POTASSIUM BLOOD GAS (OHS): 4.4 MMOL/L (ref 3.5–5)
POTASSIUM SERPL-SCNC: 4.7 MMOL/L (ref 3.5–5.1)
PROT SERPL-MCNC: 6.7 GM/DL (ref 5.8–7.6)
RBC # BLD AUTO: 3.82 X10(6)/MCL (ref 4.2–5.4)
RBC MORPH BLD: ABNORMAL
SAMPLE SITE BLOOD GAS (OHS): ABNORMAL
SAO2 % BLDA: 95 %
SODIUM BLOOD GAS (OHS): 128 MMOL/L (ref 137–145)
SODIUM SERPL-SCNC: 135 MMOL/L (ref 136–145)
TARGETS BLD QL SMEAR: ABNORMAL
TSH SERPL-ACNC: 6.67 UIU/ML (ref 0.35–4.94)
WBC # BLD AUTO: 25.58 X10(3)/MCL (ref 4.5–11.5)

## 2024-10-07 PROCEDURE — 36415 COLL VENOUS BLD VENIPUNCTURE: CPT | Performed by: NURSE PRACTITIONER

## 2024-10-07 PROCEDURE — 85025 COMPLETE CBC W/AUTO DIFF WBC: CPT | Performed by: NURSE PRACTITIONER

## 2024-10-07 PROCEDURE — 80053 COMPREHEN METABOLIC PANEL: CPT | Performed by: NURSE PRACTITIONER

## 2024-10-07 PROCEDURE — 36415 COLL VENOUS BLD VENIPUNCTURE: CPT | Performed by: INTERNAL MEDICINE

## 2024-10-07 PROCEDURE — 82140 ASSAY OF AMMONIA: CPT | Performed by: INTERNAL MEDICINE

## 2024-10-07 PROCEDURE — 99233 SBSQ HOSP IP/OBS HIGH 50: CPT | Mod: ,,, | Performed by: GENERAL PRACTICE

## 2024-10-07 PROCEDURE — 36600 WITHDRAWAL OF ARTERIAL BLOOD: CPT

## 2024-10-07 PROCEDURE — 25000003 PHARM REV CODE 250: Performed by: UROLOGY

## 2024-10-07 PROCEDURE — 21400001 HC TELEMETRY ROOM

## 2024-10-07 PROCEDURE — 82803 BLOOD GASES ANY COMBINATION: CPT

## 2024-10-07 PROCEDURE — 92610 EVALUATE SWALLOWING FUNCTION: CPT

## 2024-10-07 PROCEDURE — 25000003 PHARM REV CODE 250: Performed by: INTERNAL MEDICINE

## 2024-10-07 PROCEDURE — 63600175 PHARM REV CODE 636 W HCPCS: Performed by: INTERNAL MEDICINE

## 2024-10-07 PROCEDURE — 27000207 HC ISOLATION

## 2024-10-07 PROCEDURE — 90935 HEMODIALYSIS ONE EVALUATION: CPT

## 2024-10-07 PROCEDURE — 84443 ASSAY THYROID STIM HORMONE: CPT | Performed by: INTERNAL MEDICINE

## 2024-10-07 PROCEDURE — 87184 SC STD DISK METHOD PER PLATE: CPT | Performed by: GENERAL PRACTICE

## 2024-10-07 PROCEDURE — 99900035 HC TECH TIME PER 15 MIN (STAT)

## 2024-10-07 RX ADMIN — VANCOMYCIN HYDROCHLORIDE 500 MG: 500 INJECTION, POWDER, LYOPHILIZED, FOR SOLUTION INTRAVENOUS at 08:10

## 2024-10-07 RX ADMIN — LABETALOL HYDROCHLORIDE 200 MG: 200 TABLET, FILM COATED ORAL at 08:10

## 2024-10-07 RX ADMIN — HYDROCODONE BITARTRATE AND ACETAMINOPHEN 1 TABLET: 5; 325 TABLET ORAL at 08:10

## 2024-10-07 RX ADMIN — SEVELAMER CARBONATE 800 MG: 800 TABLET, FILM COATED ORAL at 08:10

## 2024-10-07 RX ADMIN — PANTOPRAZOLE SODIUM 40 MG: 40 TABLET, DELAYED RELEASE ORAL at 08:10

## 2024-10-07 RX ADMIN — HYDROCODONE BITARTRATE AND ACETAMINOPHEN 1 TABLET: 5; 325 TABLET ORAL at 07:10

## 2024-10-07 RX ADMIN — FOLIC ACID 1 MG: 1 TABLET ORAL at 08:10

## 2024-10-07 RX ADMIN — MUPIROCIN: 20 OINTMENT TOPICAL at 08:10

## 2024-10-07 RX ADMIN — ATORVASTATIN CALCIUM 20 MG: 10 TABLET, FILM COATED ORAL at 08:10

## 2024-10-07 RX ADMIN — SEVELAMER CARBONATE 800 MG: 800 TABLET, FILM COATED ORAL at 12:10

## 2024-10-07 RX ADMIN — SEVELAMER CARBONATE 800 MG: 800 TABLET, FILM COATED ORAL at 07:10

## 2024-10-07 RX ADMIN — ENOXAPARIN SODIUM 30 MG: 30 INJECTION SUBCUTANEOUS at 08:10

## 2024-10-07 NOTE — PROGRESS NOTES
10/07/24 1800   Tunneled Central Line - Double Lumen  07/03/24 0838 Atrial Right   Placement Date/Time: 07/03/24 0838   Inserted by: MD  Hand Hygiene: Performed  Barrier Precautions: Performed  Skin Antisepsis: ChloraPrep  Location: Atrial Right  : Stealth10 19 CM  Pressure Injectable Catheter: Yes  Catheter Size (F...   Dressing Type CHG impregnated dressing/sponge;Transparent (Tegaderm)   Dressing Status Clean;Dry;Intact   Dressing Intervention Integrity maintained   Date on Dressing 10/05/24   Post-Hemodialysis Assessment   Blood Volume Processed (Liters) 72 L   Dialyzer Clearance Lightly streaked   Duration of Treatment 240 minutes   Additional Fluid Intake (mL) 500 mL   Total UF (mL) 500 mL   Net Fluid Removal 0   Patient Response to Treatment tolerated well   Post-Hemodialysis Comments tx complete, pt reinfused, cvc deaccessed, new sterile caps applied

## 2024-10-07 NOTE — CONSULTS
[facility] - [facility]    Cardiology  Consult Note    Patient Name: Fior Joya  MRN: [id number]  Admission Date: 10/3/2024  Hospital Length of Stay: 3 days  Code Status: Full Code   Attending Provider: Macarena Leggett MD   Consulting Provider: Duncan Rocha MD  Primary Care Physician: No, Primary Doctor  Principal Problem:<principal problem not specified>    Patient information was obtained from past medical records and ER records.     Subjective:     Chief Complaint/Reason for Consult: Transesophageal Echo    HPI: 68 yo F w/ past medical history of hypertension, hyperlipidemia, chronic hydronephrosis with left nephrostomy tube, right ureteral stent, end-stage renal disease on hemodialysis, chronic anemia presented for flank pain per chart review. Patient underwent ureteral stents 2/2 hydronephrosis seen on CT imaging likely 2/2 suspected malignancy. Blood cxs G+ cocci and was started on empiric abx. Patient with infectious workup with MRI spine, TTE, and CT imaging without any identifiable source of infection. Infectious disease consulted and recommending endocarditis rule out with MADELYN. Patient uremic and not very responsive 10/7/24 with last HD session on 10/5. Cardiology consulted for MADELYN.      PMH: HTN, HLD, chronic hydronephrosis, ESRD on HD TTS, chronic anemia  PSH: Tunneled cath insertion for HD, cystoscopy w/ ureteral stent placement  Family History: Unable to obtain due to patient uremic status  Social History: Negative tobacco, alcohol use per chart review    Previous Cardiac Diagnostics:   Echo 10/5    Left Ventricle: The left ventricle is normal in size. Increased wall thickness. There is normal systolic function with a visually estimated ejection fraction of 60 - 65%. Grade I diastolic dysfunction.    Right Ventricle: Normal right ventricular cavity size. Systolic function is normal. TAPSE is 1.88 cm.    Left Atrium: Left atrium is dilated.    Mitral Valve: There is mitral annular  calcification present. There is trace regurgitation.    Tricuspid Valve: There is moderate regurgitation.    Pulmonary Artery: The estimated pulmonary artery systolic pressure is 44 mmHg.    IVC/SVC: Normal venous pressure at 3 mmHg.    History reviewed. No pertinent past medical history.  Past Surgical History:   Procedure Laterality Date    CYSTOSCOPY W/ URETERAL STENT PLACEMENT Right 6/27/2024    Procedure: CYSTOSCOPY, WITH URETERAL STENT INSERTION;  Surgeon: Otis Person MD;  Location: [facility] OR;  Service: Urology;  Laterality: Right;  CYSTOSCOPY, BILATERAL RETROGRADE PYELOGRAMS, POSSIBLE STENT PLACEMENT.    INSERTION OF TUNNELED CENTRAL VENOUS HEMODIALYSIS CATHETER N/A 7/3/2024    Procedure: Insertion, Catheter, Central Venous, Hemodialysis;  Surgeon: Uche Barcenas MD;  Location: [facility] CATH LAB;  Service: Peripheral Vascular;  Laterality: N/A;     Review of patient's allergies indicates:   Allergen Reactions    Asa [aspirin] Anaphylaxis    Penicillins      Received ceftriaxone from 6/27, no reactions      No current facility-administered medications on file prior to encounter.     Current Outpatient Medications on File Prior to Encounter   Medication Sig    atorvastatin (LIPITOR) 20 MG tablet Take 20 mg by mouth every evening.    calcium carbonate (TUMS) 200 mg calcium (500 mg) chewable tablet Take 2 tablets (1,000 mg total) by mouth 2 (two) times daily.    ergocalciferol (ERGOCALCIFEROL) 50,000 unit Cap Take 1 capsule (50,000 Units total) by mouth every 72 hours.    FEROSUL 325 mg (65 mg iron) Tab tablet Take 325 mg by mouth once daily.    folic acid (FOLVITE) 1 MG tablet Take 1 tablet (1 mg total) by mouth once daily.    labetaloL (NORMODYNE) 200 MG tablet Take 1 tablet (200 mg total) by mouth every 12 (twelve) hours.    loratadine (CLARITIN) 10 mg tablet Take 10 mg by mouth once daily.    pantoprazole (PROTONIX) 40 MG tablet Take 1 tablet (40 mg total) by mouth once daily.    senna-docusate 8.6-50 mg  (SENNA WITH DOCUSATE SODIUM) 8.6-50 mg per tablet Take 1 tablet by mouth daily as needed for Constipation.    sevelamer carbonate (RENVELA) 800 mg Tab Take 800 mg by mouth 3 (three) times daily with meals.    polyethylene glycol (GLYCOLAX) 17 gram PwPk Take 17 g by mouth once daily. (Patient not taking: Reported on 10/4/2024)     Family History    None       Tobacco Use    Smoking status: Never    Smokeless tobacco: Never   Substance and Sexual Activity    Alcohol use: Never    Drug use: Never    Sexual activity: Not Currently       Review of Systems   Unable to perform ROS: Acuity of condition (Uremic)     Objective:     Vital Signs (Most Recent):  Temp: 98.7 °F (37.1 °C) (10/07/24 0740)  Pulse: 97 (10/07/24 0740)  Resp: (!) 22 (10/07/24 0757)  BP: (!) 142/77 (10/07/24 0804)  SpO2: 97 % (10/07/24 0740) Vital Signs (24h Range):  Temp:  [97.8 °F (36.6 °C)-98.7 °F (37.1 °C)] 98.7 °F (37.1 °C)  Pulse:  [69-97] 97  Resp:  [18-22] 22  SpO2:  [95 %-97 %] 97 %  BP: (118-144)/(69-85) 142/77   Weight: (!) 140.2 kg (309 lb)  Body mass index is 54.74 kg/m².  SpO2: 97 %       Intake/Output Summary (Last 24 hours) at 10/7/2024 0929  Last data filed at 10/7/2024 0437  Gross per 24 hour   Intake --   Output 675 ml   Net -675 ml     Lines/Drains/Airways       Central Venous Catheter Line  Duration             Tunneled Central Line - Double Lumen  07/03/24 0838 Atrial Right 96 days              Drain  Duration                  Nephrostomy 06/28/24 1025 Left 8 Fr. 100 days         Nephrostomy 10/04/24 1506 Right 8 Fr. 2 days              Peripheral Intravenous Line  Duration                  Peripheral IV - Single Lumen 10/04/24 0835 20 G Left Antecubital 3 days                  Significant Labs:   Chemistries:   Recent Labs   Lab 10/03/24  1620 10/04/24  0311 10/05/24  0315 10/06/24  0449 10/07/24  0541   * 131* 131* 133* 135*   K 3.5 4.4 5.0 4.1 4.7   CL 96* 96* 94* 91* 93*   CO2 18* 13* 14* 22* 17*   BUN 65.3* 70.0* 85.5*  57.7* 74.7*   CREATININE 6.70* 6.98* 8.03* 5.84* 6.80*   CALCIUM 8.7 8.7 8.0* 8.1* 7.5*   BILITOT 0.5 0.4  --  0.4 0.4   ALKPHOS 179* 182*  --  219* 209*   * 104*  --  103* 71*   * 91*  --  89* 43*   GLUCOSE 124* 84 84 72* 50*   MG  --  2.20  --   --   --    PHOS  --  4.2 7.2*  --   --         CBC/Anemia Labs: Coags:    Recent Labs   Lab 10/05/24  0315 10/06/24  0449 10/07/24  0541   WBC 36.16  36.16* 26.04  26.04* 25.58  25.58*   HGB 11.8* 11.6* 11.4*   HCT 34.7* 32.7* 33.3*    302 275   MCV 88.7 83.6 87.2   RDW 15.7 15.4 15.9   IRON 20*  --   --    FERRITIN 4,645.60*  --   --     Recent Labs   Lab 10/04/24  1358   INR 1.6*        Significant Imaging:  Imaging Results              SURG FL Surgery Fluoro Usage (Final result)  Result time 10/04/24 12:16:41      Final result by Lidya Weller  (10/04/24 12:16:41)                   Narrative:    See OP Notes for results.     IMPRESSION: See OP Notes for results.             This procedure was auto-finalized by: Virtual Radiologist                                     CT Abdomen Pelvis With IV Contrast NO Oral Contrast (Final result)  Result time 10/04/24 07:37:11      Final result by Demarcus Sun MD (10/04/24 07:37:11)                   Impression:      Right kidney demonstrates a duplicated collecting system with duplicated ureters possibly with separate UVJ insertions.  The upper pole moiety is decompressed with a ureteral stent with the lower pole more demonstrating moderate severe hydronephrosis which is persistent from the prior exam.    Left nephrostomy ureteral stent drain with resolution of hydronephrosis.    Enlarged right pelvic lymph node increased in size from the prior, malignancy suspected.    Mildly enlarged upper normal sized lymph nodes in the periaortic region and left pelvis, nonspecific, similar to the prior exam.      Electronically signed by: Tete Sun MD  Date:    10/04/2024  Time:    07:37                Narrative:    EXAMINATION:  CT ABDOMEN PELVIS WITH IV CONTRAST    CLINICAL HISTORY:  Abdominal abscess/infection suspected;Abdominal pain, acute, nonlocalized;abd pain/flank pain, leukocytosis (WBC 22K), R hip pain;    TECHNIQUE:  Low dose axial images, sagittal and coronal reformations were obtained from the lung bases to the pubic symphysis following the IV administration of 100 mL of Omnipaque 350 .  Oral contrast not given.  DLP 1193.  Automated exposure control used.    COMPARISON:  06/21/2024    FINDINGS:  Liver normal enhancement with no focal lesion.    Gallbladder and biliary ductal system are normal.    Pancreas normal.  Stomach decompressed.  Spleen and adrenal glands normal.    Moderate renal cortical thinning, scarring bilaterally.  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  There is a ureteral stent within the upper pole moiety with resolution or reduction and upper pole moiety hydronephrosis and hydroureter, with persistent moderate severe hydronephrosis hydroureter involving the lower pole moiety.  On the left there is a percutaneous nephrostomy/ureteral stent drainage tube with no hydronephrosis.    Aorta tapers normally.  Severe atherosclerotic calcification.    Bowel loops normal with no thickening or dilation.    Mild lymph node proliferation in the periaortic region some lymph nodes upper normal size, stable.  Right pelvis there is a suspected enlarged lymph node measuring 2.6 cm short axis on image 112 series 2 compared to 2.1 cm on the prior there is a borderline enlarged lymph node on the left, also larger compared to the prior.    Uterus, adnexa, bladder, rectum normal    Bilateral sacroiliitis.  Bones otherwise intact.    Lung bases clear.                        Preliminary result by Arturo Mariano Jr., MD (10/03/24 22:17:12)                    Impression:    1. 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 A right ureteral stent is seen inserted in the upper pole moiety with slight decrease in hydronephrosis of the upper moiety. A left ureteral stent is now seen in place with interval resolution of the left hydronephrosis. Correlate clinically as regards further evaluation and follow up.  2. No rim enhancing organized fluid collection is seen in the abdomen to suggest an abscess.  3. Details and other findings as discussed above.               Narrative:    START OF REPORT:  Technique: CT of the abdomen and pelvis was performed with axial images as well as sagittal and coronal reconstruction images with intravenous contrast.    Comparison: Comparison is with study [id number] 22:42:55.    Clinical History: Abdominal abscess/infection suspected, Abdominal pain, acute, nonlocalized, abd pain/flank pain, leukocytosis (WBC 22K), R hip pain.    Dosage Information: Automated Exposure Control was utilized 1192.50 mGy.cm.    Findings:  Lines and Tubes: None.  Thorax:  Lungs: There is minimal nonspecific dependent change at the lung bases. No focal infiltrate or consolidation is seen.  Pleura: No effusions or thickening.  Heart: Stable mild cardiomegaly is seen.  Abdomen:  Abdominal Wall: There is extensive stranding of the subcutaneous fat as well as the intraabdominal suggesting an element of anasarca edema of uncertain etiology.  Liver: Mild fatty infiltration of the liver is present. The liver otherwise appears unremarkable.  Biliary System: No intrahepatic or extrahepatic biliary duct dilatation is seen.  Gallbladder: The gallbladder appears unremarkable.  Pancreas: The pancreas appears unremarkable.  Spleen: The spleen appears unremarkable.  Adrenals: The adrenal glands appear unremarkable.  Kidneys: No stones,  cysts or masses are seen in both kidneys. 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 A right ureteral stent is seen inserted in the upper pole moiety with slight decrease in hydronephrosis of the upper moiety. A left ureteral stent is now seen in place with interval resolution of the left hydronephrosis.  Aorta: There is mild widely scattered calcification of the.  IVC: Unremarkable.  Bowel: No rim enhancing organized fluid collection is seen in the abdomen to suggest an abscess.  Esophagus: There is a small hiatal hernia.  Stomach: The stomach appears unremarkable.  Duodenum: Unremarkable appearing duodenum.  Small Bowel: The small bowel appears unremarkable.  Colon: Nondistended.  Appendix: The appendix is not identified but no inflammatory changes are seen in the right lower quadrant to suggest appendicitis.  Peritoneum: No intraperitoneal free air or ascites is seen.    Pelvis:  Bladder: The bladder is nondistended and cannot be definitively evaluated.  Female:  Uterus: The uterus appears unremarkable.  Ovaries: No adnexal masses are seen.    Bony structures:  Dorsal Spine: There is subtle spondylosis of the visualized dorsal spine.  Bony Pelvis: There is subtle degenerative change of the hip.                                         X-Ray Lumbar Spine Ap And Lateral (Final result)  Result time 10/03/24 18:11:51      Final result by Zhao Raya MD (10/03/24 18:11:51)                   Impression:      Limited examination due to patient's body habitus but degenerative changes seen throughout the lower lumbar spine      Electronically signed by: Garland Raya  Date:    10/03/2024  Time:    18:11               Narrative:    EXAMINATION:  XR LUMBAR SPINE AP AND LATERAL    CLINICAL HISTORY:  low back pain;    TECHNIQUE:  AP, lateral and spot images were performed  of the lumbar spine.    COMPARISON:  None    FINDINGS:  None a shin is limited due to patient's body habitus.  The vertebral body heights and alignment are well maintained.  No fracture is seen.  No dislocation is seen.  There are degenerative changes seen throughout the lumbar spine.  There is multilevel facet arthropathy seen.  Multilevel foraminal stenosis is seen.  There are bilateral nephroureteral stents seen                                      Physical Exam  Constitutional:       Comments: Uremic appearing on examination, confused, little response to questions   HENT:      Head: Normocephalic and atraumatic.   Cardiovascular:      Rate and Rhythm: Normal rate and regular rhythm.   Pulmonary:      Effort: Pulmonary effort is normal.       Home Medications:   No current facility-administered medications on file prior to encounter.     Current Outpatient Medications on File Prior to Encounter   Medication Sig Dispense Refill    atorvastatin (LIPITOR) 20 MG tablet Take 20 mg by mouth every evening.      calcium carbonate (TUMS) 200 mg calcium (500 mg) chewable tablet Take 2 tablets (1,000 mg total) by mouth 2 (two) times daily. 120 tablet 11    ergocalciferol (ERGOCALCIFEROL) 50,000 unit Cap Take 1 capsule (50,000 Units total) by mouth every 72 hours. 10 capsule 2    FEROSUL 325 mg (65 mg iron) Tab tablet Take 325 mg by mouth once daily.      folic acid (FOLVITE) 1 MG tablet Take 1 tablet (1 mg total) by mouth once daily. 30 tablet 0    labetaloL (NORMODYNE) 200 MG tablet Take 1 tablet (200 mg total) by mouth every 12 (twelve) hours. 60 tablet 11    loratadine (CLARITIN) 10 mg tablet Take 10 mg by mouth once daily.      pantoprazole (PROTONIX) 40 MG tablet Take 1 tablet (40 mg total) by mouth once daily. 90 tablet 3    senna-docusate 8.6-50 mg (SENNA WITH DOCUSATE SODIUM) 8.6-50 mg per tablet Take 1 tablet by mouth daily as needed for Constipation.      sevelamer carbonate (RENVELA) 800 mg Tab Take 800 mg by  mouth 3 (three) times daily with meals.      polyethylene glycol (GLYCOLAX) 17 gram PwPk Take 17 g by mouth once daily. (Patient not taking: Reported on 10/4/2024)       Current Schedule Inpatient Medications:   atorvastatin  20 mg Oral QHS    cefTRIAXone (Rocephin) IV (PEDS and ADULTS)  1 g Intravenous Once    enoxaparin  30 mg Subcutaneous Daily    folic acid  1 mg Oral Daily    labetaloL  200 mg Oral Q12H    mupirocin   Nasal BID    ondansetron  4 mg Intravenous Once    pantoprazole  40 mg Oral Daily    sevelamer carbonate  800 mg Oral TID WM     Continuous Infusions:    Assessment:   B/l hydronephrosis s/p R ureteral stent exchange and nephrostomy tube placement  Persistent MRSA bacteremia  ESRD on HD TTS  Metabolic acidosis  Hx of HTN, HLD, ESRD on HD, chronic anemia      Plan:     Will schedule for MADELYN on 10/8/24 to evaluate for endocarditis, keep patient NPO  ESRD management per nephrology, consider dialysis 2/2 patient uremic appearing this morning  Continue abx  Continue with labetalol 200 bid    Thank you for your consult.     Duncan Rocha MD  Cardiology  [facility] - [facility]  10/07/2024    Attending physician note  I have rounded with medical resident. I personally reviewed case related differential diagnoses, assessment and plan. A thorough physical examination noted above is performed by me. I have personally reviewed the labs, test results and medication lists that are currently available.  See above MDM formulated by me (Graham Cowart MD):     All of the patient's and/or family's questions have been addressed and answered to the best of my ability.

## 2024-10-07 NOTE — PROGRESS NOTES
Pharmacokinetic Assessment Follow Up: IV Vancomycin    Vancomycin serum concentration assessment and plan:    Random level on 10/6 was within desired range of 15-20 (17.8 mcg/mL). Since dialysis is planned for today (per Nephrology), will plan for 500 mg after dialysis. A level remains scheduled for 10/8 with morning labs.    Drug levels (last 3 results):  Recent Labs   Lab Result Units 10/05/24  0315 10/06/24  0449   Vancomycin Random ug/ml <1.4* 17.8       Vancomycin Administrations:  vancomycin given in the last 96 hours                     vancomycin 2 g in dextrose 5 % 500 mL IVPB (mg) 2,000 mg New Bag 10/05/24 0848                    Pharmacy will continue to follow and monitor vancomycin.    Please contact pharmacy at extension [phone] for questions regarding this assessment.    Thank you for the consult,   Uche Swift       Patient brief summary:  Fior Joya is a 69 y.o. female initiated on antimicrobial therapy with IV Vancomycin for treatment of bacteremia    Drug Allergies:   Review of patient's allergies indicates:   Allergen Reactions    Asa [aspirin] Anaphylaxis    Penicillins      Received ceftriaxone from 6/27, no reactions        Actual Body Weight:  Wt Readings from Last 1 Encounters:   10/03/24 (!) 140.2 kg (309 lb)       Renal Function:   Estimated Creatinine Clearance: 10.9 mL/min (A) (based on SCr of 6.8 mg/dL (H)).     Dialysis Method (if applicable):  intermittent HD TTS - HD planned today off schedule    CBC (last 72 hours):  Recent Labs   Lab Result Units 10/05/24  0315 10/06/24  0449 10/07/24  0541   WBC x10(3)/mcL 36.16  36.16* 26.04  26.04* 25.58  25.58*   Hgb g/dL 11.8* 11.6* 11.4*   Hct % 34.7* 32.7* 33.3*   Platelet x10(3)/mcL 278 302 275   Monocytes % % 2 4 2   Eosinophils % %  --  1 2   Basophils % % 1  --   --        Metabolic Panel (last 72 hours):  Recent Labs   Lab Result Units 10/05/24  0315 10/06/24  0449 10/07/24  0541 10/07/24  1125   Sodium mmol/L 131* 133* 135*  --     Sodium, Blood Gas mmol/L  --   --   --  128*   Potassium mmol/L 5.0 4.1 4.7  --    Potassium, Blood Gas mmol/L  --   --   --  4.4   Chloride mmol/L 94* 91* 93*  --    CO2 mmol/L 14* 22* 17*  --    Glucose mg/dL 84 72* 50*  --    Blood Urea Nitrogen mg/dL 85.5* 57.7* 74.7*  --    Creatinine mg/dL 8.03* 5.84* 6.80*  --    Albumin g/dL 2.2* 2.2* 2.3*  --    Bilirubin Total mg/dL  --  0.4 0.4  --    ALP unit/L  --  219* 209*  --    AST unit/L  --  89* 43*  --    ALT unit/L  --  103* 71*  --    Phosphorus Level mg/dL 7.2*  --   --   --        Microbiologic Results:  Microbiology Results (last 7 days)       Procedure Component Value Units Date/Time    Blood Culture [[id number]]  (Abnormal) Collected: 10/05/24 0620    Order Status: Completed Specimen: Blood Updated: 10/07/24 1300     Blood Culture Susceptibility To Follow      Staphylococcus aureus     GRAM STAIN Gram Positive Cocci, probable Staphylococcus      2 of 2 bottles positive      Seen in gram stain of broth only    Blood Culture [[id number]]  (Abnormal) Collected: 10/05/24 0620    Order Status: Completed Specimen: Blood Updated: 10/07/24 1300     Blood Culture Susceptibility To Follow      Staphylococcus aureus     GRAM STAIN Gram Positive Cocci, probable Staphylococcus      Seen in gram stain of broth only      2 of 2 bottles positive    Blood culture #2 **CANNOT BE ORDERED STAT** [[id number]]  (Abnormal)  (Susceptibility) Collected: 10/03/24 2020    Order Status: Completed Specimen: Blood Updated: 10/06/24 0627     Blood Culture Methicillin resistant Staphylococcus aureus     GRAM STAIN Gram Positive Cocci, probable Staphylococcus      Seen in gram stain of broth only      1 of 1 Pediatric bottle positive    Blood culture #1 **CANNOT BE ORDERED STAT** [[id number]]  (Abnormal)  (Susceptibility) Collected: 10/03/24 2020    Order Status: Completed Specimen: Blood Updated: 10/06/24 0627     Blood Culture Methicillin resistant Staphylococcus aureus     GRAM STAIN  Gram Positive Cocci, probable Staphylococcus      Seen in gram stain of broth only      2 of 2 bottles positive    Urine culture [[id number]] Collected: 10/03/24 2008    Order Status: Completed Specimen: Urine Updated: 10/05/24 1003     Urine Culture No Significant Growth    Blood Culture [[id number]]     Order Status: Canceled Specimen: Blood     BCID2 Panel [[id number]]  (Abnormal) Collected: 10/03/24 2020    Order Status: Completed Specimen: Blood Updated: 10/04/24 1107     CTX-M (ESBL ) N/A     IMP (Cabapenemase ) N/A     KPC resistance gene (Carbapenemase ) N/A     mcr-1 N/A     mecA ID N/A     Comment: Note: Antimicrobial resistance can occur via multiple mechanisms. A Not Detected result for antimicrobial resistance gene(s) does not indicate antimicrobial susceptibility. Subculturing is required for species identification and susceptibility testing of   isolates.        mecA/C and MREJ (MRSA) gene Detected     NDM (Carbapenemase ) N/A     OXA-48-like (Carbapenemase ) N/A     Royal/B (VRE gene) N/A     VIM (Carbapenemase ) N/A     Enterococcus faecalis Not Detected     Enterococcus faecium Not Detected     Listeria monocytogenes Not Detected     Staphylococcus spp. Detected     Staphylococcus aureus Detected     Staphylococcus epidermidis Not Detected     Staphylococcus lugdunensis Not Detected     Streptococcus spp. Not Detected     Streptococcus agalactiae (Group B) Not Detected     Streptococcus pneumoniae Not Detected     Streptococcus pyogenes (Group A) Not Detected     Acinetobacter calcoaceticus/baumannii complex Not Detected     Bacteroides fragilis Not Detected     Enterobacterales Not Detected     Enterobacter cloacae complex Not Detected     Escherichia coli Not Detected     Klebsiella aerogenes Not Detected     Klebsiella oxytoca Not Detected     Klebsiella pneumoniae group Not Detected     Proteus spp. Not Detected     Salmonella spp. Not Detected      Serratia marcescens Not Detected     Haemophilus influenzae Not Detected     Neisseria meningitidis Not Detected     Pseudomonas aeruginosa Not Detected     Stenotrophomonas maltophilia Not Detected     Candida albicans Not Detected     Candida auris Not Detected     Candida glabrata Not Detected     Candida krusei Not Detected     Candida parapsilosis Not Detected     Candida tropicalis Not Detected     Cryptococcus neoformans/gattii Not Detected    Narrative:      The [employer] BCID2 Panel is a multiplexed nucleic acid test intended for the use with [employer]® 2.0 or [employer]® [employer] Systems for the simultaneous qualitative detection and identification of multiple bacterial and yeast nucleic acids and select genetic determinants associated with antimicrobial resistance.  The [employer] BCID2 Panel test is performed directly on blood culture samples identified as positive by a continuous monitoring blood culture system.  Results are intended to be interpreted in conjunction with Gram stain results.

## 2024-10-07 NOTE — PROGRESS NOTES
Infectious Disease  Progress Note    Patient Name: Fior Joya   MRN: [id number]   Admission Date: 10/3/2024   Hospital Length of Stay: 3 days  Attending Physician: Macarena Leggett MD   Primary Care Provider: Linda, Primary Doctor     Isolation Status: Contact     Assessment/Plan:        69 y.o. female with a history significant for ESRD on hemodialysis through right IJ permanent catheter, chronic hydronephrosis with left nephrostomy tube, hypertension who was admitted on 10/3/2024 with lower back pain. Patient said that her symptoms started approximately 1 week ago with some neck pain that moved down to her shoulder. She then developed lower back pain that moved to her right leg. Patient also reports having cloudy drainage from her nephrostomy tube for the last 1 month. Patient reports worsening back/right flank pain for the last few days. Vital signs on admission showed temperature 97.2 F, heart rate 79, blood pressure 110/75. Laboratory workup showed WBC 11978, creatinine 6.70, lactic acid 1.8. Urinalysis showed more than 100 WBC. She had 2 sets of blood culture obtained which are growing MRSA. 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  The upper pole moiety is decompressed with a ureteral stent but the lower pole demonstrating moderate severe hydronephrosis, left nephrostomy ureteral stent drain with resolution of hydronephrosis. Patient underwent nephrostomy tube placement by IR into the inferior pole of the right renal collecting system.  Patient was also noted to MRSA bacteremia, she was started on vancomycin, on 10/07/2024, she appears to had worsening mental status, she has persistent leukocytosis.  ID is consulted for assistance in management.    MRSA bacteremia  ESRD on HD  Encephalopathy  Sepsis  Hydronephrosis s/p ureteral stents and nephrostomy tubes    -Patient with lethargy, arousable and reports no  pain but not oriented to person, time or place  -On exam, she has tenderness to palpation on the L side of the abdomen and suprapubic area  -She has bloody urine in the L sided nephrostomy tube  -Blood cultures positive for MRSA and remain positive as of 10/05    Recommendations:  -Continue Vancomycin dosing per pharmacy for goal trough 15-20   -repeat blood cultures today   -will also repeat CT scan of the abdomen and pelvis with IV contrast in order to evaluate the left flank and abdomen given this is the only localizing sign on exam   -patient needs to have HD catheter removed, temporary catheter can be placed due to need for ongoing dialysis especially with altered mental status   -agree with MADELYN  -according to the results repeat CT scans in the patient's clinical progression, we may need to add Gram-negative coverage especially in the concern of possible hydronephrotic kidney that is not appropriately decompressed  -discussed with primary team attending   -no family at bedside     ID will continue following. Please contact us with any questions or concerns.    Antibiotics History:  Metronidazole 10/3  Cefepime 10/3-10/5  Vancomycin 10/3-    Portions of this note dictated using EMR integrated voice recognition software, and may be subject to voice recognition errors not corrected at proofreading. Please contact writer for clarification if needed.    Subjective:     Principal Problem: <principal problem not specified>     Interval History:   Encephalopathic, poorly responsive to evaluation    Review of Systems   Review of Systems   Unable to perform ROS: Medical condition        Objective:     Vital Signs (Most Recent):  Temp: 98.6 °F (37 °C) (10/07/24 1139)  Pulse: 94 (10/07/24 1139)  Resp: (!) 22 (10/07/24 0757)  BP: (!) 150/87 (10/07/24 1139)  SpO2: 96 % (10/07/24 1139)  Vital Signs (24h Range):  Temp:  [97.8 °F (36.6 °C)-98.7 °F (37.1 °C)] 98.6 °F (37 °C)  Pulse:  [83-97] 94  Resp:  [18-22] 22  SpO2:  [95  %-97 %] 96 %  BP: (129-150)/(77-87) 150/87      Weight:   Wt Readings from Last 1 Encounters:   10/03/24 (!) 140.2 kg (309 lb)      Body mass index is Body mass index is 54.74 kg/m².     Estimated Creatinine Clearance: Estimated Creatinine Clearance: 10.9 mL/min (A) (based on SCr of 6.8 mg/dL (H)).     Lines/Drains/Airways       Central Venous Catheter Line  Duration             Tunneled Central Line - Double Lumen  07/03/24 0838 Atrial Right 96 days              Drain  Duration                  Nephrostomy 06/28/24 1025 Left 8 Fr. 101 days         Nephrostomy 10/04/24 1506 Right 8 Fr. 3 days              Peripheral Intravenous Line  Duration                  Peripheral IV - Single Lumen 10/04/24 0835 20 G Left Antecubital 3 days                     Physical Exam  Physical Exam  Constitutional:       Appearance: Normal appearance. She is obese.   HENT:      Head: Normocephalic and atraumatic.      Mouth/Throat:      Pharynx: No oropharyngeal exudate or posterior oropharyngeal erythema.   Eyes:      Extraocular Movements: Extraocular movements intact.      Pupils: Pupils are equal, round, and reactive to light.   Cardiovascular:      Rate and Rhythm: Normal rate and regular rhythm.      Heart sounds: No murmur heard.     Comments: Right chest wall HD catheter  Pulmonary:      Effort: No respiratory distress.      Breath sounds: No wheezing, rhonchi or rales.   Abdominal:      General: Bowel sounds are normal. There is no distension.      Palpations: Abdomen is soft.      Tenderness: There is abdominal tenderness (Generalized however more localized on the left side).   Musculoskeletal:         General: No swelling or tenderness.      Cervical back: Neck supple. No rigidity or tenderness.   Lymphadenopathy:      Cervical: No cervical adenopathy.   Skin:     Findings: No lesion or rash.   Neurological:      General: No focal deficit present.      Mental Status: She is alert. Mental status is at baseline.      Cranial  Nerves: No cranial nerve deficit.      Motor: No weakness.   Psychiatric:         Mood and Affect: Mood normal.         Behavior: Behavior normal.          Significant Labs: CBC:   Recent Labs   Lab 10/07/24  0541 10/08/24  0458   WBC 25.58  25.58* 26.23*   HGB 11.4* 11.0*   HCT 33.3* 31.3*    255     CMP:   Recent Labs   Lab 10/07/24  0541 10/08/24  0458   * 136   K 4.7 4.4   CL 93* 97*   CO2 17* 21*   BUN 74.7* 42.4*   CREATININE 6.80* 4.42*   CALCIUM 7.5* 8.2*   ALBUMIN 2.3*  --    BILITOT 0.4  --    ALKPHOS 209*  --    AST 43*  --    ALT 71*  --        Microbiology Results (last 7 days)       Procedure Component Value Units Date/Time    Blood Culture [[id number]]     Order Status: Sent Specimen: Blood     Blood Culture [[id number]]     Order Status: Sent Specimen: Blood     Blood Culture [[id number]]  (Abnormal) Collected: 10/05/24 0620    Order Status: Completed Specimen: Blood Updated: 10/07/24 1300     Blood Culture Susceptibility To Follow      Staphylococcus aureus     GRAM STAIN Gram Positive Cocci, probable Staphylococcus      2 of 2 bottles positive      Seen in gram stain of broth only    Blood Culture [[id number]]  (Abnormal) Collected: 10/05/24 0620    Order Status: Completed Specimen: Blood Updated: 10/07/24 1300     Blood Culture Susceptibility To Follow      Staphylococcus aureus     GRAM STAIN Gram Positive Cocci, probable Staphylococcus      Seen in gram stain of broth only      2 of 2 bottles positive    Blood culture #2 **CANNOT BE ORDERED STAT** [[id number]]  (Abnormal)  (Susceptibility) Collected: 10/03/24 2020    Order Status: Completed Specimen: Blood Updated: 10/06/24 0627     Blood Culture Methicillin resistant Staphylococcus aureus     GRAM STAIN Gram Positive Cocci, probable Staphylococcus      Seen in gram stain of broth only      1 of 1 Pediatric bottle positive    Blood culture #1 **CANNOT BE ORDERED STAT** [[id number]]  (Abnormal)  (Susceptibility) Collected: 10/03/24  2020    Order Status: Completed Specimen: Blood Updated: 10/06/24 0627     Blood Culture Methicillin resistant Staphylococcus aureus     GRAM STAIN Gram Positive Cocci, probable Staphylococcus      Seen in gram stain of broth only      2 of 2 bottles positive    Urine culture [[id number]] Collected: 10/03/24 2008    Order Status: Completed Specimen: Urine Updated: 10/05/24 1003     Urine Culture No Significant Growth    Blood Culture [[id number]]     Order Status: Canceled Specimen: Blood     BCID2 Panel [[id number]]  (Abnormal) Collected: 10/03/24 2020    Order Status: Completed Specimen: Blood Updated: 10/04/24 1107     CTX-M (ESBL ) N/A     IMP (Cabapenemase ) N/A     KPC resistance gene (Carbapenemase ) N/A     mcr-1 N/A     mecA ID N/A     Comment: Note: Antimicrobial resistance can occur via multiple mechanisms. A Not Detected result for antimicrobial resistance gene(s) does not indicate antimicrobial susceptibility. Subculturing is required for species identification and susceptibility testing of   isolates.        mecA/C and MREJ (MRSA) gene Detected     NDM (Carbapenemase ) N/A     OXA-48-like (Carbapenemase ) N/A     Royal/B (VRE gene) N/A     VIM (Carbapenemase ) N/A     Enterococcus faecalis Not Detected     Enterococcus faecium Not Detected     Listeria monocytogenes Not Detected     Staphylococcus spp. Detected     Staphylococcus aureus Detected     Staphylococcus epidermidis Not Detected     Staphylococcus lugdunensis Not Detected     Streptococcus spp. Not Detected     Streptococcus agalactiae (Group B) Not Detected     Streptococcus pneumoniae Not Detected     Streptococcus pyogenes (Group A) Not Detected     Acinetobacter calcoaceticus/baumannii complex Not Detected     Bacteroides fragilis Not Detected     Enterobacterales Not Detected     Enterobacter cloacae complex Not Detected     Escherichia coli Not Detected     Klebsiella aerogenes Not Detected      Klebsiella oxytoca Not Detected     Klebsiella pneumoniae group Not Detected     Proteus spp. Not Detected     Salmonella spp. Not Detected     Serratia marcescens Not Detected     Haemophilus influenzae Not Detected     Neisseria meningitidis Not Detected     Pseudomonas aeruginosa Not Detected     Stenotrophomonas maltophilia Not Detected     Candida albicans Not Detected     Candida auris Not Detected     Candida glabrata Not Detected     Candida krusei Not Detected     Candida parapsilosis Not Detected     Candida tropicalis Not Detected     Cryptococcus neoformans/gattii Not Detected    Narrative:      The [employer] BCID2 Panel is a multiplexed nucleic acid test intended for the use with [employer]® 2.0 or [employer]® [employer] Systems for the simultaneous qualitative detection and identification of multiple bacterial and yeast nucleic acids and select genetic determinants associated with antimicrobial resistance.  The [employer] BCID2 Panel test is performed directly on blood culture samples identified as positive by a continuous monitoring blood culture system.  Results are intended to be interpreted in conjunction with Gram stain results.             Significant Imaging: I have reviewed all pertinent imaging results/findings within the past 24 hours.      Milo Campbell MD  Infectious Disease  [facility]

## 2024-10-07 NOTE — NURSING
Called RT  to confirm they saw stat ABG's. Pt leaving for stat CT. Called RT again requesting to meet pt in CT, they reported they could not. Writer asked to call back when pt returns to unit.      [id number]  Rt notified pt back in room

## 2024-10-07 NOTE — PT/OT/SLP EVAL
[facility]  Speech Language Pathology Department  Clinical Swallow Evaluation    Patient Name:  Fior Joya   MRN:  [id number]    Recommendations     General recommendations:  Modified Barium Swallow Study when appropriate   Solid texture recommendation:  NPO  Liquid consistency recommendation: NPO   Medications: crushed in puree      History     Fior Joya is a/n 69 y.o. female with a history of chronic hydronephrosis with left nephrostomy tube, right ureteral stent and chronic anemia was admitted with right flank pain. SLP consulted to evaluate swallow function.     Past Surgical History:   Procedure Laterality Date    CYSTOSCOPY W/ URETERAL STENT PLACEMENT Right 6/27/2024    Procedure: CYSTOSCOPY, WITH URETERAL STENT INSERTION;  Surgeon: Otis Person MD;  Location: [facility] OR;  Service: Urology;  Laterality: Right;  CYSTOSCOPY, BILATERAL RETROGRADE PYELOGRAMS, POSSIBLE STENT PLACEMENT.    CYSTOSCOPY W/ URETERAL STENT PLACEMENT Bilateral 10/4/2024    Procedure: CYSTOSCOPY, WITH URETERAL STENT INSERTION;  Surgeon: Tawana Loaiza MD;  Location: [facility] OR;  Service: Urology;  Laterality: Bilateral;  CYSTO WITH BILAT URETERAL STENT EXCHANGE    INSERTION OF TUNNELED CENTRAL VENOUS HEMODIALYSIS CATHETER N/A 7/3/2024    Procedure: Insertion, Catheter, Central Venous, Hemodialysis;  Surgeon: Uche Barcenas MD;  Location: [facility] CATH LAB;  Service: Peripheral Vascular;  Laterality: N/A;     Home diet texture/consistency: Regular and thin liquids  Current method of nutrition: NPO    Patient complaint: denies    Imaging   Results for orders placed during the hospital encounter of 06/22/24    X-Ray Chest 1 View    Narrative  EXAMINATION:  XR CHEST 1 VIEW    CLINICAL HISTORY:  sob;    TECHNIQUE:  Single frontal view of the chest was performed.    COMPARISON:  06/22/2024    FINDINGS:  There is pulmonary edema and pulmonary venous congestion bilaterally.  There is no significant pleural  effusion..  The heart is enlarged in size.  Aorta is unremarkable.  The bones and joints show no acute abnormality.    Impression  Findings seen consistent with CHF/increased volume status    Electronically signed by: Garland Raya  Date:    06/22/2024  Time:    13:06    Subjective     Patient lethargic and confused.  Spiritual/Cultural/Mu-ism Beliefs/Practices that affect care:  no    Pain/Comfort:  0/10    Objective     ORAL MUSCULATURE  Dentition: own teeth and missing teeth  Secretion Management: adequate  Mucosal Quality: good  Vocal Quality: adequate  Volitional Cough: Able to clear secretions      PO TRIALS  Consistency Fed By Oral Symptoms Pharyngeal Symptoms   Thin liquid by straw SLP None Reduced laryngeal movement to palpation  Multiple swallows  Cough after swallow   Puree SLP None None     Assessment     Signs/sx of aspiration observed. REC: NPO. MBS when pt awake/alert. OK for meds crushed in puree.     Outcome Measures     Functional Oral Intake Scale: 1 - Nothing by mouth    Education     Patient provided with verbal education regarding POC.  Understanding was verbalized, however additional teaching warranted.    Plan     SLP Follow-Up:    10/8/24  Plan of Care reviewed with:  patient     Time Tracking     SLP Treatment Date:   10/07/24  Speech Start Time:  1045  Speech Stop Time:  1100     Speech Total Time (min):  15 min    Billable minutes:  Swallow and Oral Function Evaluation, 15 minutes     10/07/2024

## 2024-10-07 NOTE — CARE UPDATE
Patient seen and examined, chart reviewed, full note to follow.     -Patient with lethargy, arousable and reports no pain but not oriented to person, time or place  -On exam, she has tenderness to palpation on the L side of the abdomen and suprapubic area  -She has bloody urine in the L sided nephrostomy tube  -Blood cultures positive for MRSA and remain positive as of 10/05    -Continue Vancomycin dosing per pharmacy for goal trough 15-20   -repeat blood cultures today   -will also repeat CT scan of the abdomen and pelvis with IV contrast in order to evaluate the left flank and abdomen given this is the only localizing sign on exam   -patient needs to have HD catheter removed, temporary catheter can be placed due to need for ongoing dialysis especially with altered mental status   -agree with MADELYN  -discussed with primary team attending   -no family at bedside     Milo Campbell MD  Infectious Disease  [facility]

## 2024-10-07 NOTE — PROGRESS NOTES
Inpatient Nutrition Assessment    Admit Date: 10/3/2024   Total duration of encounter: 4 days   Patient Age: 69 y.o.    Nutrition Recommendation/Prescription     Advance diet as tolerated to goal: renal dialysis diet  Trial Novasource Renal BID once diet advanced (475 kcal, 22 gm protein per serving)  Continue antiemetic and bowel regimen PRN  Monitor labs, intake and weight    Communication of Recommendations:  EMR    Nutrition Assessment     Malnutrition Assessment/Nutrition-Focused Physical Exam    Malnutrition Context: acute illness or injury (10/07/24 1256)  Malnutrition Level: other (see comments) (unable to determine) (10/07/24 1256)  Energy Intake (Malnutrition): less than or equal to 50% for greater than or equal to 5 days (10/07/24 1256)  Weight Loss (Malnutrition): other (see comments) (does not meet criteira) (10/07/24 1256)  Subcutaneous Fat (Malnutrition): other (see comments) (unable to assess) (10/07/24 1256)           Muscle Mass (Malnutrition): other (see comments) (unable to assess) (10/07/24 1256)                                   A minimum of two characteristics is recommended for diagnosis of either severe or non-severe malnutrition.    Chart Review    Reason Seen: continuous nutrition monitoring    Malnutrition Screening Tool Results   Have you recently lost weight without trying?: No  Have you been eating poorly because of a decreased appetite?: No   MST Score: 0   Diagnosis:  Bilateral hydronephrosis Status post right upper lobe ureteral stent exchange and right lower pole nephrostomy tube placement  Persistent MRSA bacteremia  Abnormal tissue at the bladder base status post biopsy  Left percutaneous nephrostomy  End-stage renal disease on hemodialysis  Metabolic acidosis  Transaminitis, improving    Relevant Medical History: HTN, HLD, chronic hydronephrosis with left nephrostomy tube, right ureteral stent, ESRD on HD, chronic anemia    Scheduled Medications:  atorvastatin, 20 mg,  QHS  cefTRIAXone (Rocephin) IV (PEDS and ADULTS), 1 g, Once  enoxaparin, 30 mg, Daily  folic acid, 1 mg, Daily  labetaloL, 200 mg, Q12H  mupirocin, , BID  ondansetron, 4 mg, Once  pantoprazole, 40 mg, Daily  sevelamer carbonate, 800 mg, TID WM  vancomycin (VANCOCIN) IV (PEDS and ADULTS), 500 mg, Once    Continuous Infusions:   PRN Medications:  acetaminophen, 650 mg, Q4H PRN  albuterol-ipratropium, 3 mL, Once PRN  aluminum-magnesium hydroxide-simethicone, 30 mL, QID PRN  diphenhydrAMINE, 25 mg, Q6H PRN  HYDROcodone-acetaminophen, 1 tablet, Q6H PRN  melatonin, 6 mg, Nightly PRN  metoclopramide, 10 mg, Q10 Min PRN  ondansetron, 4 mg, Q4H PRN  polyethylene glycol, 17 g, BID PRN  prochlorperazine, 5 mg, Q6H PRN  senna-docusate 8.6-50 mg, 2 tablet, BID PRN  sodium chloride 0.9%, 10 mL, PRN  vancomycin - pharmacy to dose, , pharmacy to manage frequency    Calorie Containing IV Medications: no significant kcals from medications at this time    Recent Labs   Lab 10/03/24  1620 10/04/24  0311 10/05/24  0315 10/06/24  0449 10/07/24  0541   * 131* 131* 133* 135*   K 3.5 4.4 5.0 4.1 4.7   CALCIUM 8.7 8.7 8.0* 8.1* 7.5*   PHOS  --  4.2 7.2*  --   --    MG  --  2.20  --   --   --    CL 96* 96* 94* 91* 93*   CO2 18* 13* 14* 22* 17*   BUN 65.3* 70.0* 85.5* 57.7* 74.7*   CREATININE 6.70* 6.98* 8.03* 5.84* 6.80*   EGFRNORACEVR 6 6 5 7 6   GLUCOSE 124* 84 84 72* 50*   BILITOT 0.5 0.4  --  0.4 0.4   ALKPHOS 179* 182*  --  219* 209*   * 104*  --  103* 71*   * 91*  --  89* 43*   ALBUMIN 2.4* 2.3* 2.2* 2.2* 2.3*   WBC 22.51  22.51* 23.93  23.93* 36.16  36.16* 26.04  26.04* 25.58  25.58*   HGB 12.6 13.2 11.8* 11.6* 11.4*   HCT 38.6 39.5 34.7* 32.7* 33.3*     Nutrition Orders:  Diet NPO      Appetite/Oral Intake: NPO/NPO  Factors Affecting Nutritional Intake: NPO  Social Needs Impacting Access to Food: unable to assess at this time; will attempt on follow-up  Food/[employer]/Cultural Preferences: unable to  "obtain  Food Allergies: no known food allergies  Last Bowel Movement: 10/02/24  Wound(s):  none noted    Comments    10/7/24: Pt off floor for stat CT at time of visit. Pt has been NPO since admit (x 4 days). Per MST, no reports of decreased appetite or unintentional weight loss PTA. No weight loss noted per EMR weight history. Per MD note, ST consulted; will monitor diet advancement and order ONS to ensure adequate nutrition.    Anthropometrics    Height: 5' 3" (160 cm), Height Method: Stated  Last Weight: (!) 140.2 kg (309 lb) (10/03/24 1604), Weight Method: Stated  BMI (Calculated): 54.8  BMI Classification: obese grade III (BMI >/=40)     Ideal Body Weight (IBW), Female: 115 lb     % Ideal Body Weight, Female (lb): 268.7 %                    Usual Body Weight (UBW), k.2 kg  % Usual Body Weight: 105.45     Usual Weight Provided By: EMR weight history    Wt Readings from Last 5 Encounters:   10/03/24 (!) 140.2 kg (309 lb)   07/10/24 (!) 140.2 kg (309 lb)   24 113.4 kg (250 lb)     Weight Change(s) Since Admission:   Wt Readings from Last 1 Encounters:   10/03/24 1604 (!) 140.2 kg (309 lb)   Admit Weight: (!) 140.2 kg (309 lb) (10/03/24 1604), Weight Method: Stated    Estimated Needs    Weight Used For Calorie Calculations: (!) 140.2 kg (309 lb 1.4 oz)  Energy Calorie Requirements (kcal): 1901 kcal (1.0 SF)  Energy Need Method: [facility]  Weight Used For Protein Calculations: 87.5 kg (192 lb 14.4 oz) (AdjustedBW used)  Protein Requirements: 105-114 gm (1.2-1.3 gm/kg AdjustedBW)  Fluid Requirements (mL): 1901 mL (1 mL/kcal)        Enteral Nutrition     Patient not receiving enteral nutrition at this time.    Parenteral Nutrition     Patient not receiving parenteral nutrition support at this time.    Evaluation of Received Nutrient Intake    Calories: not meeting estimated needs  Protein: not meeting estimated needs    Patient Education     Not applicable.    Nutrition Diagnosis     PES: Inadequate " oral intake related to acute illness as evidenced by NPO since admit. (new)     Nutrition Interventions     Intervention(s): general/healthful diet, commercial beverage, prescription medication, and collaboration with other providers    Goal: Meet greater than 80% of nutritional needs by follow-up. (new)    Nutrition Goals & Monitoring     Dietitian will monitor: food and beverage intake, weight, electrolyte/renal panel, glucose/endocrine profile, and gastrointestinal profile  Discharge planning: too early to determine; pending clinical course  Nutrition Risk/Follow-Up: moderate (follow-up in 3-5 days)   Please consult if re-assessment needed sooner.

## 2024-10-07 NOTE — PROGRESS NOTES
Nephrology  Note    Patient Name: Fior Joya  Age: 69 y.o.  : 1955  MRN: [id number]  Admission Date: 10/3/2024        Fior Joya is a 69 y.o. female who presents with right flank pain.  Past medical history significant for ESRD on hemodialysis TTS at [facility] via right IJ PermCath, chronic hydronephrosis with left nephrostomy tube in place, and stents to right ureter, hypertension, anemia, hyperphosphatemia, and hyperlipidemia.  Patient presents with worsening right flank pain.  She states the pain started in her right neck, in his now in her right flank/hip area.  Patient was started on hemodialysis in 2024 after being found to have significant acute kidney injury and hydronephrosis.  She has been maintained on hemodialysis at [facility] on a TTS schedule.  Prior to hospitalization her last dialysis was on 10/01/2024.  Nephrology consulted for ESRD management.  In the emergency department she was noted to have low blood pressure and has been given IV fluids.  Urology has also been consulted for concern hydronephrosis needing stent exchange.  She has also been started on antibiotics for possible UTI.  She has had nausea, and vomiting.  No chest pain, shortness of breath, or lower extremity edema.    10/07/2024  Chart reviewed.  Weekend events noted.  Patient has been NPO at least since her admission to the hospital and also has no IV fluids.  Lying down in the bed.  Having jerky movements all over the body and uncomfortable.  Now she has nephrostomy tube on both sides.  Both draining.  Serosanguineous fluid noted.  Patient is very slow to answer questions but did recognize me by name and she knows her own name.     Current Facility-Administered Medications   Medication Dose Route Frequency Provider Last Rate Last Admin    acetaminophen tablet 650 mg  650 mg Oral Q4H PRN Tawana Laoiza MD        albuterol-ipratropium 2.5 mg-0.5 mg/3 mL nebulizer solution 3 mL  3 mL Nebulization Once PRN  Mark Ying,         aluminum-magnesium hydroxide-simethicone 200-200-20 mg/5 mL suspension 30 mL  30 mL Oral QID PRN Tawana Loaiza MD        atorvastatin tablet 20 mg  20 mg Oral QHS Tawana Loaiza MD   20 mg at 10/06/24 2024    cefTRIAXone (Rocephin) 1 g in D5W 100 mL IVPB (MB+)  1 g Intravenous Once Macarena Leggett MD        diphenhydrAMINE injection 25 mg  25 mg Intravenous Q6H PRN Mark Ying,         enoxaparin injection 30 mg  30 mg Subcutaneous Daily Macarena Leggett MD   30 mg at 10/06/24 1646    folic acid tablet 1 mg  1 mg Oral Daily Josse, Tawana OKEEFE MD   1 mg at 10/07/24 0804    HYDROcodone-acetaminophen 5-325 mg per tablet 1 tablet  1 tablet Oral Q6H PRN Tawana Loaiza MD   1 tablet at 10/07/24 0757    labetaloL tablet 200 mg  200 mg Oral Q12H Tawana Loaiza MD   200 mg at 10/07/24 0804    melatonin tablet 6 mg  6 mg Oral Nightly PRN Tawana Loaiza MD        metoclopramide injection 10 mg  10 mg Intravenous Q10 Min PRN Mark Ying, DO        mupirocin 2 % ointment   Nasal BID Tawana Loaiza MD   Given at 10/06/24 2024    ondansetron injection 4 mg  4 mg Intravenous Q4H PRN Tawana Loaiza MD        ondansetron injection 4 mg  4 mg Intravenous Once Mark Ying,         pantoprazole EC tablet 40 mg  40 mg Oral Daily Tawana Loaiza MD   40 mg at 10/07/24 0804    polyethylene glycol packet 17 g  17 g Oral BID PRN Tawana Loaiza MD        prochlorperazine injection Soln 5 mg  5 mg Intravenous Q6H PRN Tawana Loaiza MD        senna-docusate 8.6-50 mg per tablet 2 tablet  2 tablet Oral BID PRN Tawana Loaiza MD        sevelamer carbonate tablet 800 mg  800 mg Oral TID  Tawana Loaiza MD   800 mg at 10/07/24 0754    sodium chloride 0.9% flush 10 mL  10 mL Intravenous PRN Tawana Loaiza MD        vancomycin - pharmacy to dose   Intravenous  pharmacy to manage frequency Tawana Loaiza MD           No, Primary Doctor    History reviewed. No pertinent past medical history.   Past Surgical History:   Procedure Laterality Date    CYSTOSCOPY W/ URETERAL STENT PLACEMENT Right 6/27/2024    Procedure: CYSTOSCOPY, WITH URETERAL STENT INSERTION;  Surgeon: Otis Person MD;  Location: [facility] OR;  Service: Urology;  Laterality: Right;  CYSTOSCOPY, BILATERAL RETROGRADE PYELOGRAMS, POSSIBLE STENT PLACEMENT.    INSERTION OF TUNNELED CENTRAL VENOUS HEMODIALYSIS CATHETER N/A 7/3/2024    Procedure: Insertion, Catheter, Central Venous, Hemodialysis;  Surgeon: Uche Barcenas MD;  Location: [facility] CATH LAB;  Service: Peripheral Vascular;  Laterality: N/A;      No family history on file.  Social History     Tobacco Use    Smoking status: Never    Smokeless tobacco: Never   Substance Use Topics    Alcohol use: Never     Medications Prior to Admission   Medication Sig Dispense Refill Last Dose/Taking    atorvastatin (LIPITOR) 20 MG tablet Take 20 mg by mouth every evening.   10/3/2024    calcium carbonate (TUMS) 200 mg calcium (500 mg) chewable tablet Take 2 tablets (1,000 mg total) by mouth 2 (two) times daily. 120 tablet 11 10/3/2024 Morning    ergocalciferol (ERGOCALCIFEROL) 50,000 unit Cap Take 1 capsule (50,000 Units total) by mouth every 72 hours. 10 capsule 2 Past Week    FEROSUL 325 mg (65 mg iron) Tab tablet Take 325 mg by mouth once daily.   10/3/2024 Morning    folic acid (FOLVITE) 1 MG tablet Take 1 tablet (1 mg total) by mouth once daily. 30 tablet 0 10/3/2024 Morning    labetaloL (NORMODYNE) 200 MG tablet Take 1 tablet (200 mg total) by mouth every 12 (twelve) hours. 60 tablet 11 10/3/2024 Morning    loratadine (CLARITIN) 10 mg tablet Take 10 mg by mouth once daily.   10/3/2024    pantoprazole (PROTONIX) 40 MG tablet Take 1 tablet (40 mg total) by mouth once daily. 90 tablet 3 10/3/2024 Morning    senna-docusate 8.6-50 mg (SENNA WITH DOCUSATE  SODIUM) 8.6-50 mg per tablet Take 1 tablet by mouth daily as needed for Constipation.   Past Week    sevelamer carbonate (RENVELA) 800 mg Tab Take 800 mg by mouth 3 (three) times daily with meals.   10/3/2024 Morning    polyethylene glycol (GLYCOLAX) 17 gram PwPk Take 17 g by mouth once daily. (Patient not taking: Reported on 10/4/2024)   Not Taking     Review of patient's allergies indicates:   Allergen Reactions    Asa [aspirin] Anaphylaxis    Penicillins      Received ceftriaxone from 6/27, no reactions             Review of Systems:     Comprehensive 10pt ROS negative except as noted per history.      Objective:       VITAL SIGNS: 24 HR MIN & MAX LAST    Temp  Min: 97.8 °F (36.6 °C)  Max: 98.7 °F (37.1 °C)  98.7 °F (37.1 °C)        BP  Min: 118/69  Max: 144/85  (!) 142/77     Pulse  Min: 69  Max: 97  97     Resp  Min: 18  Max: 22  (!) 22    SpO2  Min: 95 %  Max: 97 %  97 %      GEN: Chronically ill appearing WF in NAD.  Well developed and nourished.  No respiratory distress noted.  Having jerky movements all over the body suggestive of athetosis.  HEENT: Conjunctiva anicteric, pupils equal reactive.  CV: RRR +S1,S2 without murmur  PULM: CTAB, unlabored  ABD: Soft, NT/ND abdomen with NABS  :  Nephrostomy tubes in both right and left kidneys.  Draining serosanguineous fluids.  EXT: No cyanosis or edema  SKIN: Warm and dry  PSYCH: Awake, alert and answers questions slowly but appropriately.  Dialysis access:  Right IJ PermCath            Component Value Date/Time     (L) 10/07/2024 0541     (L) 10/06/2024 0449    K 4.7 10/07/2024 0541    K 4.1 10/06/2024 0449    CO2 17 (L) 10/07/2024 0541    CO2 22 (L) 10/06/2024 0449    BUN 74.7 (H) 10/07/2024 0541    BUN 57.7 (H) 10/06/2024 0449    CREATININE 6.80 (H) 10/07/2024 0541    CREATININE 5.84 (H) 10/06/2024 0449    CALCIUM 7.5 (L) 10/07/2024 0541    CALCIUM 8.1 (L) 10/06/2024 0449    PHOS 7.2 (H) 10/05/2024 0315            Component Value Date/Time    WBC  25.58 (H) 10/07/2024 0541    WBC 25.58 10/07/2024 0541    WBC 26.04 (H) 10/06/2024 0449    WBC 26.04 10/06/2024 0449    HGB 11.4 (L) 10/07/2024 0541    HGB 11.6 (L) 10/06/2024 0449    HCT 33.3 (L) 10/07/2024 0541    HCT 32.7 (L) 10/06/2024 0449     10/07/2024 0541     10/06/2024 0449             MRI Lumbar Spine W WO Cont   Final Result      No acute abnormality of the lumbar spine.         Electronically signed by: Edmundo Vásquez MD   Date:    10/05/2024   Time:    16:55      MRI Thoracic Spine W WO Cont   Final Result      Very limited examination due to significant motion artifact      No evidence of osteomyelitis or discitis seen at no evidence of epidural abscess seen in thoracic spine.  Lumbar and cervical spine discussed on separate report         Electronically signed by: Garland Raya   Date:    10/05/2024   Time:    16:57      MRI Cervical Spine W WO Cont   Final Result      Very limited examination due to motion artifact.  Only gross findings are visible.  Fine details are not visible.  No obvious epidural abscess is seen.  There is some increased signal in the cord at C6-C7 which shows some enhancement.  Findings could represent intra cord lesion or possible myelomalacia.         Electronically signed by: Garland Raya   Date:    10/05/2024   Time:    16:55      IR Nephrostomy Tube Placement   Final Result      Technically successful Nephrostomy Tube placement.         Electronically signed by: Andrew Buenrostro   Date:    10/04/2024   Time:    16:37      US Guided Needle Placement   Final Result      Technically successful Nephrostomy Tube placement.         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  The upper pole  moiety is decompressed with a ureteral stent with the lower pole more demonstrating moderate severe hydronephrosis which is persistent from the prior exam.      Left nephrostomy ureteral stent drain with resolution of hydronephrosis.      Enlarged right pelvic lymph node increased in size from the prior, malignancy suspected.      Mildly enlarged upper normal sized lymph nodes in the periaortic region and left pelvis, nonspecific, similar to the prior exam.         Electronically signed by: Tete Sun MD   Date:    10/04/2024   Time:    07:37      X-Ray Lumbar Spine Ap And Lateral   Final Result      Limited examination due to patient's body habitus but degenerative changes seen throughout the lower lumbar spine         Electronically signed by: Garland Raya   Date:    10/03/2024   Time:    18:11          Assessment / Plan:       ESRD - normally TTS at [facility]  Metabolic acidosis  Sepsis/UTI -   Bilateral hydronephrosis - now with nephrostomy tube both sides  Jerky movements all over the body.    Plan:  Patient seems to have uremic symptoms and needs aggressive dialysis therapy which may help her jerky movements all over the body.  So I will give her dialysis today for 4 hours and again tomorrow just to remove uremic toxins and may need dialysis again on Wednesday.  Patient has been NPO ever since the admission and has no significant IV fluids going on either, will order speech therapy evaluation and patient needs to start with some nutrition.

## 2024-10-07 NOTE — PROGRESS NOTES
.UROLOGY  PROGRESS  NOTE    Fior Joya 1955  [id number]  10/7/2024    POD 3 cystoscopy, right stent exchange upper pole moiety, bladder biopsy; nephrostomy tube placement in lower pole of right kidney with IR    Being dialyzed during rounds  Sleepy  No acute events overnight    VSS, afebrile  525ml left PCN  0ml right PCN  WBC 25.58  H&H 11.4/33.3  Bun/cr 74.7/6.8      Exam:    NAD, ill appearing  Resp unlabored  Abd obese, soft, NTND   No drainage from right PCN, left PCN with cranberry drainage      Recent Results (from the past 24 hours)   Comprehensive Metabolic Panel    Collection Time: 10/07/24  5:41 AM   Result Value Ref Range    Sodium 135 (L) 136 - 145 mmol/L    Potassium 4.7 3.5 - 5.1 mmol/L    Chloride 93 (L) 98 - 107 mmol/L    CO2 17 (L) 23 - 31 mmol/L    Glucose 50 (L) 82 - 115 mg/dL    Blood Urea Nitrogen 74.7 (H) 9.8 - 20.1 mg/dL    Creatinine 6.80 (H) 0.55 - 1.02 mg/dL    Calcium 7.5 (L) 8.4 - 10.2 mg/dL    Protein Total 6.7 5.8 - 7.6 gm/dL    Albumin 2.3 (L) 3.4 - 4.8 g/dL    Globulin 4.4 (H) 2.4 - 3.5 gm/dL    Albumin/Globulin Ratio 0.5 (L) 1.1 - 2.0 ratio    Bilirubin Total 0.4 <=1.5 mg/dL     (H) 40 - 150 unit/L    ALT 71 (H) 0 - 55 unit/L    AST 43 (H) 5 - 34 unit/L    eGFR 6 mL/min/1.73/m2    Anion Gap 25.0 mEq/L    BUN/Creatinine Ratio 11    CBC with Differential    Collection Time: 10/07/24  5:41 AM   Result Value Ref Range    WBC 25.58 (H) 4.50 - 11.50 x10(3)/mcL    RBC 3.82 (L) 4.20 - 5.40 x10(6)/mcL    Hgb 11.4 (L) 12.0 - 16.0 g/dL    Hct 33.3 (L) 37.0 - 47.0 %    MCV 87.2 80.0 - 94.0 fL    MCH 29.8 27.0 - 31.0 pg    MCHC 34.2 33.0 - 36.0 g/dL    RDW 15.9 11.5 - 17.0 %    Platelet 275 130 - 400 x10(3)/mcL    MPV 10.2 7.4 - 10.4 fL    NRBC% 0.2 %   Manual Differential    Collection Time: 10/07/24  5:41 AM   Result Value Ref Range    WBC 25.58 x10(3)/mcL    Neutrophils % 90 %    Lymphs % 4 %    Monocytes % 2 %    Eosinophils % 2 %    Metamyelocytes % 2 (H) <=0 %    Plasmacytes %  1 %    Neutrophils Abs 23.022 (H) 2.1 - 9.2 x10(3)/mcL    Lymphs Abs 1.0232 0.6 - 4.6 x10(3)/mcL    Monocytes Abs 0.5116 0.1 - 1.3 x10(3)/mcL    Eosinophils Abs 0.5116 0 - 0.9 x10(3)/mcL    Platelets Normal Normal, Adequate    RBC Morph Abnormal (A) Normal    Poikilocytosis 1+ (A) (none)    Anisocytosis 1+ (A) (none)    Macrocytosis 1+ (A) (none)    [place] Cells 1+ (A) (none)    Target Cells 1+ (A) (none)         Assessment:  Bilateral hydronephrosis  -had left PCN placed in June of this year due to inability to place retrograde ureteral stent, also had right ureteral stent placed in one of the two collecting systems on 6/27  -s/p Cystoscopy with right stent exchange upper pole moiety, Retrograde pyelogram and Bladder biopsy 10/4  -unable to identify UO intra-op therefore IR placed a right PCN to the collecting system without a stent 10/4    Questionable bladder mass  -biopsy pending    Urosepsis  -blood cultures +MRSA, on rocephin  -repeat urine cultures with no growth  -WBC improving    ESRD   -on HD      Plan:  -Continue culture specific antibiotics  -Pathology pending  -Continue bilateral PCN on discharge. She will f/u outpatient for stent and PCN management.   -Following      Donna Varghese [facility]

## 2024-10-08 ENCOUNTER — ANESTHESIA EVENT (OUTPATIENT)
Dept: CARDIOLOGY | Facility: HOSPITAL | Age: 69
End: 2024-10-08
Payer: MEDICARE

## 2024-10-08 ENCOUNTER — ANESTHESIA (OUTPATIENT)
Dept: CARDIOLOGY | Facility: HOSPITAL | Age: 69
End: 2024-10-08
Payer: MEDICARE

## 2024-10-08 LAB
ANION GAP SERPL CALC-SCNC: 18 MEQ/L
BACTERIA BLD CULT: ABNORMAL
BACTERIA BLD CULT: ABNORMAL
BASOPHILS # BLD AUTO: 0.1 X10(3)/MCL
BASOPHILS NFR BLD AUTO: 0.4 %
BSA FOR ECHO PROCEDURE: 2.5 M2
BUN SERPL-MCNC: 42.4 MG/DL (ref 9.8–20.1)
CALCIUM SERPL-MCNC: 8.2 MG/DL (ref 8.4–10.2)
CHLORIDE SERPL-SCNC: 97 MMOL/L (ref 98–107)
CO2 SERPL-SCNC: 21 MMOL/L (ref 23–31)
CREAT SERPL-MCNC: 4.42 MG/DL (ref 0.55–1.02)
CREAT/UREA NIT SERPL: 10
EOSINOPHIL # BLD AUTO: 0.12 X10(3)/MCL (ref 0–0.9)
EOSINOPHIL NFR BLD AUTO: 0.5 %
ERYTHROCYTE [DISTWIDTH] IN BLOOD BY AUTOMATED COUNT: 16 % (ref 11.5–17)
GFR SERPLBLD CREATININE-BSD FMLA CKD-EPI: 10 ML/MIN/1.73/M2
GLUCOSE SERPL-MCNC: 69 MG/DL (ref 82–115)
GRAM STN SPEC: ABNORMAL
HCT VFR BLD AUTO: 31.3 % (ref 37–47)
HGB BLD-MCNC: 11 G/DL (ref 12–16)
IMM GRANULOCYTES # BLD AUTO: 1.16 X10(3)/MCL (ref 0–0.04)
IMM GRANULOCYTES NFR BLD AUTO: 4.4 %
LYMPHOCYTES # BLD AUTO: 1.96 X10(3)/MCL (ref 0.6–4.6)
LYMPHOCYTES NFR BLD AUTO: 7.5 %
MCH RBC QN AUTO: 30.1 PG (ref 27–31)
MCHC RBC AUTO-ENTMCNC: 35.1 G/DL (ref 33–36)
MCV RBC AUTO: 85.8 FL (ref 80–94)
MONOCYTES # BLD AUTO: 0.96 X10(3)/MCL (ref 0.1–1.3)
MONOCYTES NFR BLD AUTO: 3.7 %
NEUTROPHILS # BLD AUTO: 21.93 X10(3)/MCL (ref 2.1–9.2)
NEUTROPHILS NFR BLD AUTO: 83.5 %
NRBC BLD AUTO-RTO: 0.1 %
PLATELET # BLD AUTO: 255 X10(3)/MCL (ref 130–400)
PMV BLD AUTO: 11.4 FL (ref 7.4–10.4)
POTASSIUM SERPL-SCNC: 4.4 MMOL/L (ref 3.5–5.1)
PSYCHE PATHOLOGY RESULT: NORMAL
RBC # BLD AUTO: 3.65 X10(6)/MCL (ref 4.2–5.4)
SODIUM SERPL-SCNC: 136 MMOL/L (ref 136–145)
VANCOMYCIN SERPL-MCNC: 17.5 UG/ML (ref 15–20)
WBC # BLD AUTO: 26.23 X10(3)/MCL (ref 4.5–11.5)

## 2024-10-08 PROCEDURE — 80048 BASIC METABOLIC PNL TOTAL CA: CPT | Performed by: INTERNAL MEDICINE

## 2024-10-08 PROCEDURE — 27000221 HC OXYGEN, UP TO 24 HOURS

## 2024-10-08 PROCEDURE — 99233 SBSQ HOSP IP/OBS HIGH 50: CPT | Mod: ,,, | Performed by: GENERAL PRACTICE

## 2024-10-08 PROCEDURE — 25000003 PHARM REV CODE 250: Performed by: UROLOGY

## 2024-10-08 PROCEDURE — 25500020 PHARM REV CODE 255: Performed by: INTERNAL MEDICINE

## 2024-10-08 PROCEDURE — 80202 ASSAY OF VANCOMYCIN: CPT | Performed by: INTERNAL MEDICINE

## 2024-10-08 PROCEDURE — 27000207 HC ISOLATION

## 2024-10-08 PROCEDURE — 80100016 HC MAINTENANCE HEMODIALYSIS

## 2024-10-08 PROCEDURE — 25000003 PHARM REV CODE 250

## 2024-10-08 PROCEDURE — 63600175 PHARM REV CODE 636 W HCPCS: Performed by: INTERNAL MEDICINE

## 2024-10-08 PROCEDURE — 25000003 PHARM REV CODE 250: Performed by: INTERNAL MEDICINE

## 2024-10-08 PROCEDURE — 85025 COMPLETE CBC W/AUTO DIFF WBC: CPT | Performed by: INTERNAL MEDICINE

## 2024-10-08 PROCEDURE — 63600175 PHARM REV CODE 636 W HCPCS

## 2024-10-08 PROCEDURE — 21400001 HC TELEMETRY ROOM

## 2024-10-08 PROCEDURE — 36415 COLL VENOUS BLD VENIPUNCTURE: CPT | Performed by: INTERNAL MEDICINE

## 2024-10-08 RX ORDER — PROPOFOL 10 MG/ML
VIAL (ML) INTRAVENOUS
Status: DISCONTINUED | OUTPATIENT
Start: 2024-10-08 | End: 2024-10-08

## 2024-10-08 RX ORDER — LIDOCAINE HYDROCHLORIDE 20 MG/ML
INJECTION INTRAVENOUS
Status: DISCONTINUED | OUTPATIENT
Start: 2024-10-08 | End: 2024-10-08

## 2024-10-08 RX ORDER — OXYCODONE HYDROCHLORIDE 5 MG/1
5 TABLET ORAL ONCE
Status: COMPLETED | OUTPATIENT
Start: 2024-10-08 | End: 2024-10-08

## 2024-10-08 RX ADMIN — ENOXAPARIN SODIUM 30 MG: 30 INJECTION SUBCUTANEOUS at 04:10

## 2024-10-08 RX ADMIN — SODIUM CHLORIDE, SODIUM GLUCONATE, SODIUM ACETATE, POTASSIUM CHLORIDE AND MAGNESIUM CHLORIDE: 526; 502; 368; 37; 30 INJECTION, SOLUTION INTRAVENOUS at 02:10

## 2024-10-08 RX ADMIN — HYDROCODONE BITARTRATE AND ACETAMINOPHEN 1 TABLET: 5; 325 TABLET ORAL at 10:10

## 2024-10-08 RX ADMIN — LABETALOL HYDROCHLORIDE 200 MG: 200 TABLET, FILM COATED ORAL at 08:10

## 2024-10-08 RX ADMIN — MUPIROCIN: 20 OINTMENT TOPICAL at 08:10

## 2024-10-08 RX ADMIN — ATORVASTATIN CALCIUM 20 MG: 10 TABLET, FILM COATED ORAL at 08:10

## 2024-10-08 RX ADMIN — ACETAMINOPHEN 650 MG: 325 TABLET ORAL at 08:10

## 2024-10-08 RX ADMIN — LIDOCAINE HYDROCHLORIDE 80 MG: 20 INJECTION INTRAVENOUS at 02:10

## 2024-10-08 RX ADMIN — PANTOPRAZOLE SODIUM 40 MG: 40 TABLET, DELAYED RELEASE ORAL at 03:10

## 2024-10-08 RX ADMIN — VANCOMYCIN HYDROCHLORIDE 500 MG: 500 INJECTION, POWDER, LYOPHILIZED, FOR SOLUTION INTRAVENOUS at 05:10

## 2024-10-08 RX ADMIN — Medication 6 MG: at 08:10

## 2024-10-08 RX ADMIN — FOLIC ACID 1 MG: 1 TABLET ORAL at 03:10

## 2024-10-08 RX ADMIN — PROPOFOL 100 MG: 10 INJECTION, EMULSION INTRAVENOUS at 02:10

## 2024-10-08 RX ADMIN — OXYCODONE HYDROCHLORIDE 5 MG: 5 TABLET ORAL at 11:10

## 2024-10-08 RX ADMIN — IOHEXOL 100 ML: 350 INJECTION, SOLUTION INTRAVENOUS at 04:10

## 2024-10-08 RX ADMIN — HYDROCODONE BITARTRATE AND ACETAMINOPHEN 1 TABLET: 5; 325 TABLET ORAL at 08:10

## 2024-10-08 RX ADMIN — HYDROCODONE BITARTRATE AND ACETAMINOPHEN 1 TABLET: 5; 325 TABLET ORAL at 04:10

## 2024-10-08 NOTE — PROGRESS NOTES
[facility] - [facility] Surg    Cardiology  Progress Note    Patient Name: Fior Joya  MRN: [id number]  Admission Date: 10/3/2024  Hospital Length of Stay: 4 days  Code Status: Full Code   Attending Physician: Macarena Leggett MD   Primary Care Physician: Linda, Primary Doctor  Expected Discharge Date:   Principal Problem:<principal problem not specified>    Subjective:     HPI: 68 yo F w/ past medical history of hypertension, hyperlipidemia, chronic hydronephrosis with left nephrostomy tube, right ureteral stent, end-stage renal disease on hemodialysis, chronic anemia presented for flank pain per chart review. Patient underwent ureteral stents 2/2 hydronephrosis seen on CT imaging likely 2/2 suspected malignancy. Blood cxs G+ cocci and was started on empiric abx. Patient with infectious workup with MRI spine, TTE, and CT imaging without any identifiable source of infection. Infectious disease consulted and recommending endocarditis rule out with MADELYN. Patient uremic and not very responsive 10/7/24 with last HD session on 10/5. Cardiology consulted for MADELYN.        PMH: HTN, HLD, chronic hydronephrosis, ESRD on HD TTS, chronic anemia  PSH: Tunneled cath insertion for HD, cystoscopy w/ ureteral stent placement  Family History: Unable to obtain due to patient uremic status  Social History: Negative tobacco, alcohol use per chart review     Previous Cardiac Diagnostics:   Echo 10/5    Left Ventricle: The left ventricle is normal in size. Increased wall thickness. There is normal systolic function with a visually estimated ejection fraction of 60 - 65%. Grade I diastolic dysfunction.    Right Ventricle: Normal right ventricular cavity size. Systolic function is normal. TAPSE is 1.88 cm.    Left Atrium: Left atrium is dilated.    Mitral Valve: There is mitral annular calcification present. There is trace regurgitation.    Tricuspid Valve: There is moderate regurgitation.    Pulmonary Artery: The estimated  pulmonary artery systolic pressure is 44 mmHg.    IVC/SVC: Normal venous pressure at 3 mmHg.      Objective:     Vital Signs (Most Recent):  Temp: 98.2 °F (36.8 °C) (10/08/24 0738)  Pulse: 92 (10/08/24 0738)  Resp: 17 (10/08/24 0844)  BP: (!) 150/76 (10/08/24 0738)  SpO2: (!) 93 % (10/08/24 0738) Vital Signs (24h Range):  Temp:  [98.2 °F (36.8 °C)-99 °F (37.2 °C)] 98.2 °F (36.8 °C)  Pulse:  [] 92  Resp:  [17-18] 17  SpO2:  [92 %-96 %] 93 %  BP: (114-150)/(55-87) 150/76   Weight: (!) 140.2 kg (309 lb)  Body mass index is 54.74 kg/m².  SpO2: (!) 93 %       Intake/Output Summary (Last 24 hours) at 10/8/2024 0911  Last data filed at 10/8/2024 0542  Gross per 24 hour   Intake 500 ml   Output 1075 ml   Net -575 ml     Lines/Drains/Airways       Central Venous Catheter Line  Duration             Tunneled Central Line - Double Lumen  07/03/24 0838 Atrial Right 97 days              Drain  Duration                  Nephrostomy 06/28/24 1025 Left 8 Fr. 101 days         Nephrostomy 10/04/24 1506 Right 8 Fr. 3 days              Peripheral Intravenous Line  Duration                  Peripheral IV - Single Lumen 10/04/24 0835 20 G Left Antecubital 4 days                    Significant Labs:   Chemistries:   Recent Labs   Lab 10/04/24  0311 10/05/24  0315 10/06/24  0449 10/07/24  0541 10/08/24  0458   * 131* 133* 135* 136   K 4.4 5.0 4.1 4.7 4.4   CL 96* 94* 91* 93* 97*   CO2 13* 14* 22* 17* 21*   BUN 70.0* 85.5* 57.7* 74.7* 42.4*   CREATININE 6.98* 8.03* 5.84* 6.80* 4.42*   CALCIUM 8.7 8.0* 8.1* 7.5* 8.2*   BILITOT 0.4  --  0.4 0.4  --    ALKPHOS 182*  --  219* 209*  --    *  --  103* 71*  --    AST 91*  --  89* 43*  --    GLUCOSE 84 84 72* 50* 69*   MG 2.20  --   --   --   --    PHOS 4.2 7.2*  --   --   --         CBC/Anemia Labs: Coags:    Recent Labs   Lab 10/05/24  0315 10/06/24  0449 10/07/24  0541 10/08/24  0458   WBC 36.16  36.16* 26.04  26.04* 25.58  25.58* 26.23*   HGB 11.8* 11.6* 11.4* 11.0*   HCT  34.7* 32.7* 33.3* 31.3*    302 275 255   MCV 88.7 83.6 87.2 85.8   RDW 15.7 15.4 15.9 16.0   IRON 20*  --   --   --    FERRITIN 4,645.60*  --   --   --     Recent Labs   Lab 10/04/24  1358   INR 1.6*          Physical Exam  Constitutional:       Appearance: Normal appearance.   HENT:      Head: Normocephalic and atraumatic.   Eyes:      Extraocular Movements: Extraocular movements intact.   Cardiovascular:      Rate and Rhythm: Normal rate and regular rhythm.      Heart sounds: No murmur heard.     Comments: Right chest wall HD catheter  Pulmonary:      Effort: Pulmonary effort is normal. No respiratory distress.      Breath sounds: Normal breath sounds. No wheezing.   Abdominal:      General: Bowel sounds are normal. There is no distension.      Palpations: Abdomen is soft.      Tenderness: There is abdominal tenderness (Generalized).   Musculoskeletal:         General: No swelling or tenderness.      Cervical back: Normal range of motion and neck supple.   Neurological:      Mental Status: She is alert.         Current Schedule Inpatient Medications:   atorvastatin  20 mg Oral QHS    cefTRIAXone (Rocephin) IV (PEDS and ADULTS)  1 g Intravenous Once    enoxaparin  30 mg Subcutaneous Daily    folic acid  1 mg Oral Daily    labetaloL  200 mg Oral Q12H    mupirocin   Nasal BID    ondansetron  4 mg Intravenous Once    pantoprazole  40 mg Oral Daily    sevelamer carbonate  800 mg Oral TID WM    vancomycin (VANCOCIN) 500 mg in D5W 100 mL IVPB (MB+)  500 mg Intravenous Once     Continuous Infusions:      Assessment:   B/l hydronephrosis s/p R ureteral stent exchange and nephrostomy tube placement  Persistent MRSA bacteremia  ESRD on HD TTS  Metabolic acidosis  Hx of HTN, HLD, ESRD on HD, chronic anemia      Plan:   MADELYN today to evaluate for endocarditis  ESRD management per nephrology, patient uremia improved  Continue abx  Continue with labetalol 200 bid  Will follow post MADELYN    Duncan Rocha MD  Cardiology  [facility] - [address] [place] [address] Surg    I have reviewed the notes, assessments, and/or procedures performed by the resident, I concur with her/his documentation of Fior Joya.

## 2024-10-08 NOTE — ANESTHESIA POSTPROCEDURE EVALUATION
Anesthesia Post Evaluation    Patient: Fior Joya    Procedure(s) Performed: * No procedures listed *    Final Anesthesia Type: MAC      Patient location during evaluation: Cath Lab  Patient participation: Yes- Able to Participate  Level of consciousness: awake and alert  Post-procedure vital signs: reviewed and stable  Pain management: adequate  Airway patency: patent  HE mitigation strategies: Multimodal analgesia  PONV status at discharge: No PONV  Anesthetic complications: no      Cardiovascular status: blood pressure returned to baseline and hemodynamically stable  Respiratory status: room air  Hydration status: euvolemic                Vitals Value Taken Time   /76 10/08/24 0738   Temp 36.8 °C (98.2 °F) 10/08/24 0738   Pulse 92 10/08/24 0738   Resp 18 10/08/24 1116   SpO2 93 % 10/08/24 0738         No case tracking events are documented in the log.      Pain/Tino Score: Pain Rating Prior to Med Admin: 7 (10/8/2024 11:16 AM)  Pain Rating Post Med Admin: 4 (10/7/2024  9:01 PM)

## 2024-10-08 NOTE — PROGRESS NOTES
[facility] Medicine Progress Note        Chief Complaint: Inpatient Follow-up for     HPI: 69-year-old female with past medical history of hypertension, hyperlipidemia, chronic hydronephrosis with left nephrostomy tube, right ureteral stent, end-stage renal disease on hemodialysis, chronic anemia presented with right flank pain for the past 5 days and also reported cloudy urine output from the urostomy CT with IV contrast showed moderate to severe hydronephrosis with enlarged right pelvic lymph node increase in size from the prior with suspected malignancy also showed duplicated collecting system with dilation of the right lower pole urology was consulted patient is status post cystoscopy with right stent exchange and bladder biopsy and they were not able to identify the left ureteral orifice therefore had left percutaneous nephrostomy. Blood culture is growing Gram-positive cocci 2/2 bottles initially patient was started on cefepime but now we added vancomycin repeat blood cultures ordered  Id was consulted MRI of the CT and L-spine was ordered that was negative for any abscess blood culture is growing MRSA TTE as such did not show any vegetation cardiology consulted for MADELYN  Bilateral hydronephrosis Status post right upper lobe ureteral stent exchange and right lower pole nephrostomy tube placement  Cultures remains persistently positive.  Patient was slightly confused on October 7 so we had ordered CT of the head without contrast that was negative, ammonia, TSH everything was normal we also ordered CT of the abdomen and pelvis with contrast that was negative for any abscess cardiology consulted for MADELYN.  Plan is to remove the dialysis catheter      Interval Hx:   Patient seen and examined this morning much more alert awake oriented x4 asking for pain medications was getting dialysis  Objective/physical exam:  General: In no acute distress, afebrile  Chest: Clear to  auscultation bilaterally  Heart: RRR, +S1, S2, no appreciable murmur  Abdomen: Soft, nontender, BS +  MSK: Warm, no lower extremity edema, no clubbing or cyanosis  Neurologic:  Alert awake oriented x4  VITAL SIGNS: 24 HRS MIN & MAX LAST   Temp  Min: 98.2 °F (36.8 °C)  Max: 99 °F (37.2 °C) 98.2 °F (36.8 °C)   BP  Min: 114/55  Max: 150/76 (!) 150/76   Pulse  Min: 92  Max: 104  92   Resp  Min: 17  Max: 18 18   SpO2  Min: 92 %  Max: 96 % (!) 93 %     I have reviewed the following labs:  Recent Labs   Lab 10/06/24  0449 10/07/24  0541 10/08/24  0458   WBC 26.04  26.04* 25.58  25.58* 26.23*   RBC 3.91* 3.82* 3.65*   HGB 11.6* 11.4* 11.0*   HCT 32.7* 33.3* 31.3*   MCV 83.6 87.2 85.8   MCH 29.7 29.8 30.1   MCHC 35.5 34.2 35.1   RDW 15.4 15.9 16.0    275 255   MPV 10.4 10.2 11.4*     Recent Labs   Lab 10/04/24  0311 10/05/24  0315 10/06/24  0449 10/07/24  0541 10/07/24  1125 10/08/24  0458   * 131* 133* 135*  --  136   K 4.4 5.0 4.1 4.7  --  4.4   CL 96* 94* 91* 93*  --  97*   CO2 13* 14* 22* 17*  --  21*   BUN 70.0* 85.5* 57.7* 74.7*  --  42.4*   CREATININE 6.98* 8.03* 5.84* 6.80*  --  4.42*   CALCIUM 8.7 8.0* 8.1* 7.5*  --  8.2*   PH  --   --   --   --  7.410  --    MG 2.20  --   --   --   --   --    ALBUMIN 2.3* 2.2* 2.2* 2.3*  --   --    ALKPHOS 182*  --  219* 209*  --   --    *  --  103* 71*  --   --    AST 91*  --  89* 43*  --   --    BILITOT 0.4  --  0.4 0.4  --   --      Microbiology Results (last 7 days)       Procedure Component Value Units Date/Time    Blood Culture [[id number]]  (Abnormal)  (Susceptibility) Collected: 10/05/24 0620    Order Status: Completed Specimen: Blood Updated: 10/08/24 0810     Blood Culture Methicillin resistant Staphylococcus aureus     GRAM STAIN Gram Positive Cocci, probable Staphylococcus      Seen in gram stain of broth only      2 of 2 bottles positive    Blood Culture [[id number]]  (Abnormal)  (Susceptibility) Collected: 10/05/24 0620    Order Status: Completed  Specimen: Blood Updated: 10/08/24 0809     Blood Culture Methicillin resistant Staphylococcus aureus     GRAM STAIN Gram Positive Cocci, probable Staphylococcus      2 of 2 bottles positive      Seen in gram stain of broth only    Blood Culture [[id number]] Collected: 10/07/24 2103    Order Status: Resulted Specimen: Blood Updated: 10/07/24 2138    Blood Culture [[id number]] Collected: 10/07/24 2103    Order Status: Resulted Specimen: Blood Updated: 10/07/24 2138    Blood culture #2 **CANNOT BE ORDERED STAT** [[id number]]  (Abnormal)  (Susceptibility) Collected: 10/03/24 2020    Order Status: Completed Specimen: Blood Updated: 10/06/24 0627     Blood Culture Methicillin resistant Staphylococcus aureus     GRAM STAIN Gram Positive Cocci, probable Staphylococcus      Seen in gram stain of broth only      1 of 1 Pediatric bottle positive    Blood culture #1 **CANNOT BE ORDERED STAT** [[id number]]  (Abnormal)  (Susceptibility) Collected: 10/03/24 2020    Order Status: Completed Specimen: Blood Updated: 10/06/24 0627     Blood Culture Methicillin resistant Staphylococcus aureus     GRAM STAIN Gram Positive Cocci, probable Staphylococcus      Seen in gram stain of broth only      2 of 2 bottles positive    Urine culture [[id number]] Collected: 10/03/24 2008    Order Status: Completed Specimen: Urine Updated: 10/05/24 1003     Urine Culture No Significant Growth    Blood Culture [[id number]]     Order Status: Canceled Specimen: Blood     BCID2 Panel [[id number]]  (Abnormal) Collected: 10/03/24 2020    Order Status: Completed Specimen: Blood Updated: 10/04/24 1107     CTX-M (ESBL ) N/A     IMP (Cabapenemase ) N/A     KPC resistance gene (Carbapenemase ) N/A     mcr-1 N/A     mecA ID N/A     Comment: Note: Antimicrobial resistance can occur via multiple mechanisms. A Not Detected result for antimicrobial resistance gene(s) does not indicate antimicrobial susceptibility. Subculturing is required for  species identification and susceptibility testing of   isolates.        mecA/C and MREJ (MRSA) gene Detected     NDM (Carbapenemase ) N/A     OXA-48-like (Carbapenemase ) N/A     Royal/B (VRE gene) N/A     VIM (Carbapenemase ) N/A     Enterococcus faecalis Not Detected     Enterococcus faecium Not Detected     Listeria monocytogenes Not Detected     Staphylococcus spp. Detected     Staphylococcus aureus Detected     Staphylococcus epidermidis Not Detected     Staphylococcus lugdunensis Not Detected     Streptococcus spp. Not Detected     Streptococcus agalactiae (Group B) Not Detected     Streptococcus pneumoniae Not Detected     Streptococcus pyogenes (Group A) Not Detected     Acinetobacter calcoaceticus/baumannii complex Not Detected     Bacteroides fragilis Not Detected     Enterobacterales Not Detected     Enterobacter cloacae complex Not Detected     Escherichia coli Not Detected     Klebsiella aerogenes Not Detected     Klebsiella oxytoca Not Detected     Klebsiella pneumoniae group Not Detected     Proteus spp. Not Detected     Salmonella spp. Not Detected     Serratia marcescens Not Detected     Haemophilus influenzae Not Detected     Neisseria meningitidis Not Detected     Pseudomonas aeruginosa Not Detected     Stenotrophomonas maltophilia Not Detected     Candida albicans Not Detected     Candida auris Not Detected     Candida glabrata Not Detected     Candida krusei Not Detected     Candida parapsilosis Not Detected     Candida tropicalis Not Detected     Cryptococcus neoformans/gattii Not Detected    Narrative:      The [employer] BCID2 Panel is a multiplexed nucleic acid test intended for the use with [employer]® 2.0 or [employer]® Holographic Projection for [employer] Systems for the simultaneous qualitative detection and identification of multiple bacterial and yeast nucleic acids and select genetic determinants associated with antimicrobial resistance.  The [employer] BCID2 Panel test is performed  directly on blood culture samples identified as positive by a continuous monitoring blood culture system.  Results are intended to be interpreted in conjunction with Gram stain results.             See below for Radiology    Assessment/Plan:  Bilateral hydronephrosis Status post right upper lobe ureteral stent exchange and right lower pole nephrostomy tube placement  Persistent MRSA bacteremia  Encephalopathy most likely metabolic resolved  Abnormal tissue at the bladder base status post biopsy  Left percutaneous nephrostomy  End-stage renal disease on hemodialysis  Metabolic acidosis  Transaminitis, improving  History of hypertension, hyperlipidemia, bilateral chronic hydronephrosis with left nephrostomy tube in place and right ureter stent, ESRD on HD TTS, and chronic anemia     Plan to remove tunneled dialysis catheter because of persistent bacteremia  Plan for MADELYN today  CT of the head without contrast was negative for any acute infarct me ordered ABGs that showed pH of 7.4 with pCO2 of 30 PO2 of 75  TSH is slightly elevated we will order free T4  Repeat blood cultures were ordered on October 7th  Blood cultures were repeated on October 5 and they are positive TTE negative, MRI of the CT and L-spine negative for any abscess   Continue with vancomycin patient was given a dose of Rocephin since patient had a lot of manipulation done in the urinary track   urology following  Repeat blood work in a.m.    Prophylaxis: Lovenox  VTE prophylaxis:     Patient condition:  Stable/Fair/Guarded/ Serious/ Critical    Anticipated discharge and Disposition:         All diagnosis and differential diagnosis have been reviewed; assessment and plan has been documented; I have personally reviewed the labs and test results that are presently available; I have reviewed the patients medication list; I have reviewed the consulting providers response and recommendations. I have reviewed or attempted to review medical records based upon  their availability    All of the patient's questions have been  addressed and answered. Patient's is agreeable to the above stated plan. I will continue to monitor closely and make adjustments to medical management as needed.    Portions of this note dictated using EMR integrated voice recognition software, and may be subject to voice recognition errors not corrected at proofreading. Please contact writer for clarification if needed.   _____________________________________________________________________    Malnutrition Status:    Scheduled Med:   atorvastatin  20 mg Oral QHS    cefTRIAXone (Rocephin) IV (PEDS and ADULTS)  1 g Intravenous Once    enoxaparin  30 mg Subcutaneous Daily    folic acid  1 mg Oral Daily    labetaloL  200 mg Oral Q12H    mupirocin   Nasal BID    ondansetron  4 mg Intravenous Once    pantoprazole  40 mg Oral Daily    sevelamer carbonate  800 mg Oral TID WM    vancomycin (VANCOCIN) 500 mg in D5W 100 mL IVPB (MB+)  500 mg Intravenous Once      Continuous Infusions:       PRN Meds:    Current Facility-Administered Medications:     acetaminophen, 650 mg, Oral, Q4H PRN    albuterol-ipratropium, 3 mL, Nebulization, Once PRN    aluminum-magnesium hydroxide-simethicone, 30 mL, Oral, QID PRN    diphenhydrAMINE, 25 mg, Intravenous, Q6H PRN    HYDROcodone-acetaminophen, 1 tablet, Oral, Q6H PRN    melatonin, 6 mg, Oral, Nightly PRN    metoclopramide, 10 mg, Intravenous, Q10 Min PRN    ondansetron, 4 mg, Intravenous, Q4H PRN    polyethylene glycol, 17 g, Oral, BID PRN    prochlorperazine, 5 mg, Intravenous, Q6H PRN    senna-docusate 8.6-50 mg, 2 tablet, Oral, BID PRN    sodium chloride 0.9%, 10 mL, Intravenous, PRN    vancomycin - pharmacy to dose, , Intravenous, pharmacy to manage frequency     Radiology:  I have personally reviewed the following imaging and agree with the radiologist.     CT Abdomen Pelvis With IV Contrast NO Oral Contrast  Narrative: EXAMINATION:  CT ABDOMEN PELVIS WITH IV  CONTRAST    CLINICAL HISTORY:  Abdominal abscess/infection suspected;    TECHNIQUE:  Helically acquired images with axial, sagittal and coronal reformations were obtained from the lung bases to the pubic symphysis after the IV administration of contrast.    Automated tube current modulation, weight-based exposure dosing, and/or iterative reconstruction technique utilized to reach lowest reasonably achievable exposure rate.    DLP: 1526 mGy*cm    COMPARISON:  CT abdomen pelvis 10/03/2024    FINDINGS:  HEART: Cardiomegaly.  There are calcifications in the region the aortic valve and mitral valve annulus.    LUNG BASES: Basilar atelectasis.    LIVER: Normal attenuation. No appreciable focal hepatic lesion.    BILIARY: Vicarious excretion of contrast at the gallbladder.    PANCREAS: Evaluation for pancreatitis limited given mild anasarca and body habitus.    SPLEEN: Normal in size    ADRENALS: No mass.    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  Right percutaneous nephrostomy at the upper pole moiety.  Right ureteral stent at the upper pole moiety.  Decompressed upper pole moiety.  Hydronephrotic lower pole moiety with AP diameter of the renal pelvis measuring approximately 3.5 cm, unchanged.  The right lower pole ureter is dilated to the level of the ureterovesical junction.  Left percutaneous nephroureteral stent.  No left hydronephrosis.    GI TRACT/MESENTERY:  No evidence of bowel obstruction or inflammation.    PERITONEUM: No free fluid.No free air.    LYMPH NODES: Presumed reactive retroperitoneal lymph nodes.    VASCULATURE: Aortoiliac atherosclerosis.    BLADDER: Nondistended bladder limits CT evaluation    REPRODUCTIVE ORGANS: Normal as visualized.    SOFT TISSUES: Body wall edema.    BONES: No acute osseous abnormality.    LIMITATIONS: Exam limited by artifact related to patient body habitus, positioning, dose lowering technique and/or motion.  Impression: 1. No significant  interval change compared to previous exam.  Ureteral stents in place.  2. Duplicated right kidney and right ureter with persistent hydroureteronephrosis of the lower pole moiety.    Electronically signed by: Deidra Dobson  Date:    10/08/2024  Time:    06:49      Macarena Leggett MD  Department of Hospital Medicine   [facility]   10/08/2024

## 2024-10-08 NOTE — PROGRESS NOTES
Nephrology  Note    Patient Name: Fior Joya  Age: 69 y.o.  : 1955  MRN: [id number]  Admission Date: 10/3/2024        Fior Joya is a 69 y.o. female who presents with right flank pain.  Past medical history significant for ESRD on hemodialysis TTS at [facility] via right IJ PermCath, chronic hydronephrosis with left nephrostomy tube in place, and stents to right ureter, hypertension, anemia, hyperphosphatemia, and hyperlipidemia.  Patient presents with worsening right flank pain.  She states the pain started in her right neck, in his now in her right flank/hip area.  Patient was started on hemodialysis in 2024 after being found to have significant acute kidney injury and hydronephrosis.  She has been maintained on hemodialysis at [facility] on a TTS schedule.  Prior to hospitalization her last dialysis was on 10/01/2024.  Nephrology consulted for ESRD management.  In the emergency department she was noted to have low blood pressure and has been given IV fluids.  Urology has also been consulted for concern hydronephrosis needing stent exchange.  She has also been started on antibiotics for possible UTI.  She has had nausea, and vomiting.  No chest pain, shortness of breath, or lower extremity edema.    10/07/2024  Chart reviewed.  Weekend events noted.  Patient has been NPO at least since her admission to the hospital and also has no IV fluids.  Lying down in the bed.  Having jerky movements all over the body and uncomfortable.  Now she has nephrostomy tube on both sides.  Both draining.  Serosanguineous fluid noted.  Patient is very slow to answer questions but did recognize me by name and she knows her own name.     10/08/2024  No acute events overnight.  Seen on hemodialysis.  She was endorsing shortness of breath, and has some lower extremity edema.    Current Facility-Administered Medications   Medication Dose Route Frequency Provider Last Rate Last Admin    acetaminophen tablet 650 mg  650 mg Oral  Q4H PRN Tawana Loaiza MD        albuterol-ipratropium 2.5 mg-0.5 mg/3 mL nebulizer solution 3 mL  3 mL Nebulization Once PRN Mark Ying,         aluminum-magnesium hydroxide-simethicone 200-200-20 mg/5 mL suspension 30 mL  30 mL Oral QID PRN Tawana Loaiza MD        atorvastatin tablet 20 mg  20 mg Oral QHS Tawana Loaiza MD   20 mg at 10/07/24 2001    cefTRIAXone (Rocephin) 1 g in D5W 100 mL IVPB (MB+)  1 g Intravenous Once Macarena Leggett MD        diphenhydrAMINE injection 25 mg  25 mg Intravenous Q6H PRN Mark Ying DO        enoxaparin injection 30 mg  30 mg Subcutaneous Daily Macarena Leggett MD   30 mg at 10/07/24 2001    folic acid tablet 1 mg  1 mg Oral Daily Tawana Loaiza MD   1 mg at 10/07/24 0804    HYDROcodone-acetaminophen 5-325 mg per tablet 1 tablet  1 tablet Oral Q6H PRN Tawana Loaiza MD   1 tablet at 10/08/24 0844    labetaloL tablet 200 mg  200 mg Oral Q12H Tawana Loaiza MD   200 mg at 10/07/24 2000    melatonin tablet 6 mg  6 mg Oral Nightly PRN Tawana Loaiza MD        metoclopramide injection 10 mg  10 mg Intravenous Q10 Min PRN Mark Ying DO        mupirocin 2 % ointment   Nasal BID Tawana Loaiza MD   Given at 10/07/24 2001    ondansetron injection 4 mg  4 mg Intravenous Q4H PRN Tawana Loaiza MD        ondansetron injection 4 mg  4 mg Intravenous Once Mark Ying DO        pantoprazole EC tablet 40 mg  40 mg Oral Daily Tawana Loaiza MD   40 mg at 10/07/24 0804    polyethylene glycol packet 17 g  17 g Oral BID PRN Tawana Loaiza MD        prochlorperazine injection Soln 5 mg  5 mg Intravenous Q6H PRN Nolan Loaizaannette M, MD        senna-docusate 8.6-50 mg per tablet 2 tablet  2 tablet Oral BID PRN Tawana Loaiza MD        sevelamer carbonate tablet 800 mg  800 mg Oral TID  Tawana Loaiza MD   800 mg at 10/07/24 2000     sodium chloride 0.9% flush 10 mL  10 mL Intravenous PRN Tawana Loaiza MD        vancomycin (VANCOCIN) 500 mg in D5W 100 mL IVPB (MB+)  500 mg Intravenous Once Macarena Leggett MD        vancomycin - pharmacy to dose   Intravenous pharmacy to manage frequency Tawana Loaiza MD           No, Primary Doctor    History reviewed. No pertinent past medical history.   Past Surgical History:   Procedure Laterality Date    CYSTOSCOPY W/ URETERAL STENT PLACEMENT Right 6/27/2024    Procedure: CYSTOSCOPY, WITH URETERAL STENT INSERTION;  Surgeon: Otis Person MD;  Location: [facility] OR;  Service: Urology;  Laterality: Right;  CYSTOSCOPY, BILATERAL RETROGRADE PYELOGRAMS, POSSIBLE STENT PLACEMENT.    CYSTOSCOPY W/ URETERAL STENT PLACEMENT Bilateral 10/4/2024    Procedure: CYSTOSCOPY, WITH URETERAL STENT INSERTION;  Surgeon: Tawana Loaiza MD;  Location: [facility] OR;  Service: Urology;  Laterality: Bilateral;  CYSTO WITH BILAT URETERAL STENT EXCHANGE    INSERTION OF TUNNELED CENTRAL VENOUS HEMODIALYSIS CATHETER N/A 7/3/2024    Procedure: Insertion, Catheter, Central Venous, Hemodialysis;  Surgeon: Uche Barcenas MD;  Location: [facility] CATH LAB;  Service: Peripheral Vascular;  Laterality: N/A;      No family history on file.  Social History     Tobacco Use    Smoking status: Never    Smokeless tobacco: Never   Substance Use Topics    Alcohol use: Never     Medications Prior to Admission   Medication Sig Dispense Refill Last Dose/Taking    atorvastatin (LIPITOR) 20 MG tablet Take 20 mg by mouth every evening.   10/3/2024    calcium carbonate (TUMS) 200 mg calcium (500 mg) chewable tablet Take 2 tablets (1,000 mg total) by mouth 2 (two) times daily. 120 tablet 11 10/3/2024 Morning    ergocalciferol (ERGOCALCIFEROL) 50,000 unit Cap Take 1 capsule (50,000 Units total) by mouth every 72 hours. 10 capsule 2 Past Week    FEROSUL 325 mg (65 mg iron) Tab tablet Take 325 mg by mouth once daily.   10/3/2024  Morning    folic acid (FOLVITE) 1 MG tablet Take 1 tablet (1 mg total) by mouth once daily. 30 tablet 0 10/3/2024 Morning    labetaloL (NORMODYNE) 200 MG tablet Take 1 tablet (200 mg total) by mouth every 12 (twelve) hours. 60 tablet 11 10/3/2024 Morning    loratadine (CLARITIN) 10 mg tablet Take 10 mg by mouth once daily.   10/3/2024    pantoprazole (PROTONIX) 40 MG tablet Take 1 tablet (40 mg total) by mouth once daily. 90 tablet 3 10/3/2024 Morning    senna-docusate 8.6-50 mg (SENNA WITH DOCUSATE SODIUM) 8.6-50 mg per tablet Take 1 tablet by mouth daily as needed for Constipation.   Past Week    sevelamer carbonate (RENVELA) 800 mg Tab Take 800 mg by mouth 3 (three) times daily with meals.   10/3/2024 Morning    polyethylene glycol (GLYCOLAX) 17 gram PwPk Take 17 g by mouth once daily. (Patient not taking: Reported on 10/4/2024)   Not Taking     Review of patient's allergies indicates:   Allergen Reactions    Asa [aspirin] Anaphylaxis    Penicillins      Received ceftriaxone from 6/27, no reactions             Review of Systems:     Comprehensive 10pt ROS negative except as noted per history.      Objective:       VITAL SIGNS: 24 HR MIN & MAX LAST    Temp  Min: 98.2 °F (36.8 °C)  Max: 99 °F (37.2 °C)  98.2 °F (36.8 °C)        BP  Min: 114/55  Max: 150/76  (!) 150/76     Pulse  Min: 92  Max: 104  92     Resp  Min: 17  Max: 18  17    SpO2  Min: 92 %  Max: 96 %  (!) 93 %      GEN: Chronically ill appearing WF in NAD.    CV: RRR +S1,S2 without murmur  PULM: CTAB, unlabored  ABD: Soft, NT/ND abdomen with NABS  :  Nephrostomy tubes in both right and left kidneys.  Draining serosanguineous fluids.  EXT: No cyanosis or edema  SKIN: Warm and dry  PSYCH: Awake, alert and answers questions slowly but appropriately.  Dialysis access:  Right IJ PermCath            Component Value Date/Time     10/08/2024 0458     (L) 10/07/2024 0541    K 4.4 10/08/2024 0458    K 4.7 10/07/2024 0541    CO2 21 (L) 10/08/2024 0458     CO2 17 (L) 10/07/2024 0541    BUN 42.4 (H) 10/08/2024 0458    BUN 74.7 (H) 10/07/2024 0541    CREATININE 4.42 (H) 10/08/2024 0458    CREATININE 6.80 (H) 10/07/2024 0541    CALCIUM 8.2 (L) 10/08/2024 0458    CALCIUM 7.5 (L) 10/07/2024 0541    PHOS 7.2 (H) 10/05/2024 0315            Component Value Date/Time    WBC 26.23 (H) 10/08/2024 0458    WBC 25.58 (H) 10/07/2024 0541    WBC 25.58 10/07/2024 0541    WBC 26.04 10/06/2024 0449    HGB 11.0 (L) 10/08/2024 0458    HGB 11.4 (L) 10/07/2024 0541    HCT 31.3 (L) 10/08/2024 0458    HCT 33.3 (L) 10/07/2024 0541     10/08/2024 0458     10/07/2024 0541             CT Abdomen Pelvis With IV Contrast NO Oral Contrast   Final Result      1. No significant interval change compared to previous exam.  Ureteral stents in place.   2. Duplicated right kidney and right ureter with persistent hydroureteronephrosis of the lower pole moiety.         Electronically signed by: Deidra Dobson   Date:    10/08/2024   Time:    06:49      CT Head Without Contrast   Final Result      No acute intracranial findings.         Electronically signed by: Alexx Patricia   Date:    10/07/2024   Time:    10:46      MRI Lumbar Spine W WO Cont   Final Result      No acute abnormality of the lumbar spine.         Electronically signed by: Edmundo Vásquez MD   Date:    10/05/2024   Time:    16:55      MRI Thoracic Spine W WO Cont   Final Result      Very limited examination due to significant motion artifact      No evidence of osteomyelitis or discitis seen at no evidence of epidural abscess seen in thoracic spine.  Lumbar and cervical spine discussed on separate report         Electronically signed by: Garland Raya   Date:    10/05/2024   Time:    16:57      MRI Cervical Spine W WO Cont   Final Result      Very limited examination due to motion artifact.  Only gross findings are visible.  Fine details are not visible.  No obvious epidural abscess is seen.  There is some increased signal in the  cord at C6-C7 which shows some enhancement.  Findings could represent intra cord lesion or possible myelomalacia.         Electronically signed by: Garland Raya   Date:    10/05/2024   Time:    16:55      IR Nephrostomy Tube Placement   Final Result      Technically successful Nephrostomy Tube placement.         Electronically signed by: Andrew Buenrostro   Date:    10/04/2024   Time:    16:37      US Guided Needle Placement   Final Result      Technically successful Nephrostomy Tube placement.         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  The upper pole moiety is decompressed with a ureteral stent with the lower pole more demonstrating moderate severe hydronephrosis which is persistent from the prior exam.      Left nephrostomy ureteral stent drain with resolution of hydronephrosis.      Enlarged right pelvic lymph node increased in size from the prior, malignancy suspected.      Mildly enlarged upper normal sized lymph nodes in the periaortic region and left pelvis, nonspecific, similar to the prior exam.         Electronically signed by: Tete Sun MD   Date:    10/04/2024   Time:    07:37      X-Ray Lumbar Spine Ap And Lateral   Final Result      Limited examination due to patient's body habitus but degenerative changes seen throughout the lower lumbar spine         Electronically signed by: Garland Raya   Date:    10/03/2024   Time:    18:11          Assessment / Plan:       ESRD - [facility]  Metabolic acidosis  MRSA bacteremia  Bilateral hydronephrosis - now with nephrostomy tube both sides    Plan:  Seen on hemodialysis at 9:20 a.m..  She does appear slightly more hypervolemic.  We will aim for 2.5 L UF  with HD today.  Continue TTS hemodialysis.  Consult vascular surgery for removal of tunneled dialysis catheter with persistent bacteremia.

## 2024-10-08 NOTE — NURSING
10/08/24 1228   Post-Hemodialysis Assessment   Rinseback Volume (mL) 500 mL   Blood Volume Processed (Liters) 84 L   Dialyzer Clearance Lightly streaked   Duration of Treatment 240 minutes   Additional Fluid Intake (mL) 0 mL   Total UF (mL) 3000 mL   Net Fluid Removal 2500   Patient Response to Treatment pt tolerated well with no issues. New caps applied.

## 2024-10-08 NOTE — PLAN OF CARE
Problem: Adult Inpatient Plan of Care  Goal: Plan of Care Review  Outcome: Progressing  Goal: Patient-Specific Goal (Individualized)  Outcome: Progressing  Goal: Absence of Hospital-Acquired Illness or Injury  Outcome: Progressing  Goal: Optimal Comfort and Wellbeing  Outcome: Progressing  Goal: Readiness for Transition of Care  Outcome: Progressing     Problem: Bariatric Environmental Safety  Goal: Safety Maintained with Care  Outcome: Progressing     Problem: Skin Injury Risk Increased  Goal: Skin Health and Integrity  Outcome: Progressing     Problem: Hemodialysis  Goal: Safe, Effective Therapy Delivery  Outcome: Progressing  Goal: Effective Tissue Perfusion  Outcome: Progressing  Goal: Absence of Infection Signs and Symptoms  Outcome: Progressing     Problem: Infection  Goal: Absence of Infection Signs and Symptoms  Outcome: Progressing     Problem: Wound  Goal: Optimal Coping  Outcome: Progressing  Goal: Optimal Functional Ability  Outcome: Progressing  Goal: Improved Oral Intake  Outcome: Progressing  Goal: Optimal Pain Control and Function  Outcome: Progressing  Goal: Skin Health and Integrity  Outcome: Progressing  Goal: Optimal Wound Healing  Outcome: Progressing

## 2024-10-08 NOTE — PROGRESS NOTES
.UROLOGY  PROGRESS  NOTE    Fior Joya 1955  [id number]  10/8/2024    POD 4 cystoscopy, right stent exchange upper pole moiety, bladder biopsy; nephrostomy tube placement in lower pole of right kidney with IR    Patient off unit for MADELYN    VSS, afebrile  575ml left PCN  0ml right PCN  WBC 26.23  H&H 11.0/31.3  Bun/cr 42.4/4.42      Exam:    Patient off unit    Recent Results (from the past 24 hours)   Blood Gas    Collection Time: 10/07/24 11:25 AM   Result Value Ref Range    Sample Type Arterial Blood     Sample site Left Radial Artery     Drawn by sd rrt     pH, Blood gas 7.410 7.350 - 7.450    pCO2, Blood gas 30.0 (L) 35.0 - 45.0 mmHg    pO2, Blood gas 75.0 (L) 80.0 - 100.0 mmHg    Sodium, Blood Gas 128 (L) 137 - 145 mmol/L    Potassium, Blood Gas 4.4 3.5 - 5.0 mmol/L    Calcium Level Ionized 0.95 (L) 1.12 - 1.23 mmol/L    TOC2, Blood gas 19.9 mmol/L    Base Excess, Blood gas -4.60 (L) -2.00 - 2.00 mmol/L    sO2, Blood gas 95.0 %    HCO3, Blood gas 19.0 (L) 22.0 - 26.0 mmol/L    THb, Blood gas 12.0 12 - 16 g/dL    O2 Hb, Blood Gas 93.0 (L) 94.0 - 97.0 %    CO Hgb 1.7 (H) 0.5 - 1.5 %    Met Hgb 2.1 (H) 0.4 - 1.5 %    Allens Test Yes     FIO2, Blood gas 21 %   Ammonia    Collection Time: 10/07/24  9:03 PM   Result Value Ref Range    Ammonia Level 26.7 18.0 - 72.0 umol/L   Vancomycin, Random    Collection Time: 10/08/24  4:58 AM   Result Value Ref Range    Vancomycin Random 17.5 15.0 - 20.0 ug/ml   Basic Metabolic Panel    Collection Time: 10/08/24  4:58 AM   Result Value Ref Range    Sodium 136 136 - 145 mmol/L    Potassium 4.4 3.5 - 5.1 mmol/L    Chloride 97 (L) 98 - 107 mmol/L    CO2 21 (L) 23 - 31 mmol/L    Glucose 69 (L) 82 - 115 mg/dL    Blood Urea Nitrogen 42.4 (H) 9.8 - 20.1 mg/dL    Creatinine 4.42 (H) 0.55 - 1.02 mg/dL    BUN/Creatinine Ratio 10     Calcium 8.2 (L) 8.4 - 10.2 mg/dL    Anion Gap 18.0 mEq/L    eGFR 10 mL/min/1.73/m2   CBC with Differential    Collection Time: 10/08/24  4:58 AM   Result  Value Ref Range    WBC 26.23 (H) 4.50 - 11.50 x10(3)/mcL    RBC 3.65 (L) 4.20 - 5.40 x10(6)/mcL    Hgb 11.0 (L) 12.0 - 16.0 g/dL    Hct 31.3 (L) 37.0 - 47.0 %    MCV 85.8 80.0 - 94.0 fL    MCH 30.1 27.0 - 31.0 pg    MCHC 35.1 33.0 - 36.0 g/dL    RDW 16.0 11.5 - 17.0 %    Platelet 255 130 - 400 x10(3)/mcL    MPV 11.4 (H) 7.4 - 10.4 fL    Neut % 83.5 %    Lymph % 7.5 %    Mono % 3.7 %    Eos % 0.5 %    Basophil % 0.4 %    Lymph # 1.96 0.6 - 4.6 x10(3)/mcL    Neut # 21.93 (H) 2.1 - 9.2 x10(3)/mcL    Mono # 0.96 0.1 - 1.3 x10(3)/mcL    Eos # 0.12 0 - 0.9 x10(3)/mcL    Baso # 0.10 <=0.2 x10(3)/mcL    IG# 1.16 (H) 0 - 0.04 x10(3)/mcL    IG% 4.4 %    NRBC% 0.1 %         Assessment:  Bilateral hydronephrosis  -had left PCN placed in June of this year due to inability to place retrograde ureteral stent, also had right ureteral stent placed in one of the two collecting systems on 6/27  -s/p Cystoscopy with right stent exchange upper pole moiety, Retrograde pyelogram and Bladder biopsy 10/4  -unable to identify UO intra-op therefore IR placed a right PCN to the collecting system without a stent 10/4    Questionable bladder mass  -biopsy pending    Urosepsis  -blood cultures +MRSA, on rocephin  -repeat urine cultures with no growth  -WBC improving    ESRD   -on HD      Plan:  -Continue culture specific antibiotics  -Pathology pending  -Continue bilateral PCN on discharge. She will f/u outpatient for stent and PCN management.   -Following      JANIYA RoyalCNP-BC

## 2024-10-08 NOTE — TRANSFER OF CARE
"Anesthesia Transfer of Care Note    Patient: Fior Joya    Procedure(s) Performed: * No procedures listed *    Patient location: Cath Lab    Anesthesia Type: MAC    Transport from OR: Transported from OR on room air with adequate spontaneous ventilation    Post pain: adequate analgesia    Post assessment: no apparent anesthetic complications    Post vital signs: stable    Level of consciousness: awake    Nausea/Vomiting: no nausea/vomiting    Complications: none    Transfer of care protocol was followed    Last vitals: Visit Vitals  BP (!) 150/76   Pulse 92   Temp 36.8 °C (98.2 °F) (Oral)   Resp 18   Ht 5' 3" (1.6 m)   Wt (!) 140.2 kg (309 lb)   SpO2 (!) 93%   BMI 54.74 kg/m²     "

## 2024-10-08 NOTE — PROGRESS NOTES
Pharmacokinetic Assessment Follow Up: IV Vancomycin    Vancomycin serum concentration assessment(s):    Random level on 10/8 was within desired range of 15-20 (17.5 mcg/mL). Since dialysis is planned for today (10/8), plan for 500 mg after dialysis. Vancomycin random draw planned for 10/10 @0430 in case dialysis is done on Wednesday. Planned HD on TTS.       Vancomycin Regimen Plan:    Re-dose when the random level is less than 25 mcg/mL, next level to be drawn at 0430 on 10/10  Re-dose post dialysis when the random level is less than 25 mcg/mL, next level to be drawn at 0430 on 10/10.      Drug levels (last 3 results):  Recent Labs   Lab Result Units 10/06/24  0449 10/08/24  0458   Vancomycin Random ug/ml 17.8 17.5       Vancomycin Administrations:  vancomycin given in the last 96 hours                     vancomycin (VANCOCIN) 500 mg in D5W 100 mL IVPB (MB+) (mg) 500 mg New Bag 10/07/24 2024    vancomycin 2 g in dextrose 5 % 500 mL IVPB (mg) 2,000 mg New Bag 10/05/24 0848                    Pharmacy will continue to follow and monitor vancomycin.    Please contact pharmacy at extension [phone] for questions regarding this assessment.    Thank you for the consult,   Yovani Chavez       Patient brief summary:  Fior Joya is a 69 y.o. female initiated on antimicrobial therapy with IV Vancomycin for treatment of bacteremia      Drug Allergies:   Review of patient's allergies indicates:   Allergen Reactions    Asa [aspirin] Anaphylaxis    Penicillins      Received ceftriaxone from 6/27, no reactions        Actual Body Weight:  Wt Readings from Last 1 Encounters:   10/03/24 (!) 140.2 kg (309 lb)       Renal Function:   Estimated Creatinine Clearance: 16.8 mL/min (A) (based on SCr of 4.42 mg/dL (H)).,     Dialysis Method (if applicable):  intermittent HD - TTS    CBC (last 72 hours):  Recent Labs   Lab Result Units 10/06/24  0449 10/07/24  0541 10/08/24  0458   WBC x10(3)/mcL 26.04  26.04* 25.58  25.58* 26.23*   Hgb  g/dL 11.6* 11.4* 11.0*   Hct % 32.7* 33.3* 31.3*   Platelet x10(3)/mcL 302 275 255   Mono % %  --   --  3.7   Monocytes % % 4 2  --    Eos % %  --   --  0.5   Eosinophils % % 1 2  --    Basophil % %  --   --  0.4       Metabolic Panel (last 72 hours):  Recent Labs   Lab Result Units 10/06/24  0449 10/07/24  0541 10/07/24  1125 10/08/24  0458   Sodium mmol/L 133* 135*  --  136   Sodium, Blood Gas mmol/L  --   --  128*  --    Potassium mmol/L 4.1 4.7  --  4.4   Potassium, Blood Gas mmol/L  --   --  4.4  --    Chloride mmol/L 91* 93*  --  97*   CO2 mmol/L 22* 17*  --  21*   Glucose mg/dL 72* 50*  --  69*   Blood Urea Nitrogen mg/dL 57.7* 74.7*  --  42.4*   Creatinine mg/dL 5.84* 6.80*  --  4.42*   Albumin g/dL 2.2* 2.3*  --   --    Bilirubin Total mg/dL 0.4 0.4  --   --    ALP unit/L 219* 209*  --   --    AST unit/L 89* 43*  --   --    ALT unit/L 103* 71*  --   --        Microbiologic Results:  Microbiology Results (last 7 days)       Procedure Component Value Units Date/Time    Blood Culture [[id number]] Collected: 10/07/24 2103    Order Status: Resulted Specimen: Blood Updated: 10/07/24 2138    Blood Culture [[id number]] Collected: 10/07/24 2103    Order Status: Resulted Specimen: Blood Updated: 10/07/24 2138    Blood Culture [[id number]]  (Abnormal) Collected: 10/05/24 0620    Order Status: Completed Specimen: Blood Updated: 10/07/24 1300     Blood Culture Susceptibility To Follow      Staphylococcus aureus     GRAM STAIN Gram Positive Cocci, probable Staphylococcus      2 of 2 bottles positive      Seen in gram stain of broth only    Blood Culture [[id number]]  (Abnormal) Collected: 10/05/24 0620    Order Status: Completed Specimen: Blood Updated: 10/07/24 1300     Blood Culture Susceptibility To Follow      Staphylococcus aureus     GRAM STAIN Gram Positive Cocci, probable Staphylococcus      Seen in gram stain of broth only      2 of 2 bottles positive    Blood culture #2 **CANNOT BE ORDERED STAT** [[id number]]   (Abnormal)  (Susceptibility) Collected: 10/03/24 2020    Order Status: Completed Specimen: Blood Updated: 10/06/24 0627     Blood Culture Methicillin resistant Staphylococcus aureus     GRAM STAIN Gram Positive Cocci, probable Staphylococcus      Seen in gram stain of broth only      1 of 1 Pediatric bottle positive    Blood culture #1 **CANNOT BE ORDERED STAT** [[id number]]  (Abnormal)  (Susceptibility) Collected: 10/03/24 2020    Order Status: Completed Specimen: Blood Updated: 10/06/24 0627     Blood Culture Methicillin resistant Staphylococcus aureus     GRAM STAIN Gram Positive Cocci, probable Staphylococcus      Seen in gram stain of broth only      2 of 2 bottles positive    Urine culture [[id number]] Collected: 10/03/24 2008    Order Status: Completed Specimen: Urine Updated: 10/05/24 1003     Urine Culture No Significant Growth    Blood Culture [[id number]]     Order Status: Canceled Specimen: Blood     BCID2 Panel [[id number]]  (Abnormal) Collected: 10/03/24 2020    Order Status: Completed Specimen: Blood Updated: 10/04/24 1107     CTX-M (ESBL ) N/A     IMP (Cabapenemase ) N/A     KPC resistance gene (Carbapenemase ) N/A     mcr-1 N/A     mecA ID N/A     Comment: Note: Antimicrobial resistance can occur via multiple mechanisms. A Not Detected result for antimicrobial resistance gene(s) does not indicate antimicrobial susceptibility. Subculturing is required for species identification and susceptibility testing of   isolates.        mecA/C and MREJ (MRSA) gene Detected     NDM (Carbapenemase ) N/A     OXA-48-like (Carbapenemase ) N/A     Royal/B (VRE gene) N/A     VIM (Carbapenemase ) N/A     Enterococcus faecalis Not Detected     Enterococcus faecium Not Detected     Listeria monocytogenes Not Detected     Staphylococcus spp. Detected     Staphylococcus aureus Detected     Staphylococcus epidermidis Not Detected     Staphylococcus lugdunensis Not Detected      Streptococcus spp. Not Detected     Streptococcus agalactiae (Group B) Not Detected     Streptococcus pneumoniae Not Detected     Streptococcus pyogenes (Group A) Not Detected     Acinetobacter calcoaceticus/baumannii complex Not Detected     Bacteroides fragilis Not Detected     Enterobacterales Not Detected     Enterobacter cloacae complex Not Detected     Escherichia coli Not Detected     Klebsiella aerogenes Not Detected     Klebsiella oxytoca Not Detected     Klebsiella pneumoniae group Not Detected     Proteus spp. Not Detected     Salmonella spp. Not Detected     Serratia marcescens Not Detected     Haemophilus influenzae Not Detected     Neisseria meningitidis Not Detected     Pseudomonas aeruginosa Not Detected     Stenotrophomonas maltophilia Not Detected     Candida albicans Not Detected     Candida auris Not Detected     Candida glabrata Not Detected     Candida krusei Not Detected     Candida parapsilosis Not Detected     Candida tropicalis Not Detected     Cryptococcus neoformans/gattii Not Detected    Narrative:      The [employer] BCID2 Panel is a multiplexed nucleic acid test intended for the use with easy2comply (Dynasec)® 2.0 or easy2comply (Dyn[employer]c)® [employer] for the simultaneous qualitative detection and identification of multiple bacterial and yeast nucleic acids and select genetic determinants associated with antimicrobial resistance.  The [employer] BCID2 Panel test is performed directly on blood culture samples identified as positive by a continuous monitoring blood culture system.  Results are intended to be interpreted in conjunction with Gram stain results.

## 2024-10-08 NOTE — PROGRESS NOTES
Inpatient Nutrition Assessment    Admit Date: 10/3/2024   Total duration of encounter: 5 days   Patient Age: 69 y.o.    Nutrition Recommendation/Prescription     Advance diet as tolerated to goal: renal dialysis diet; diet modification per SLP recs  Trial Novasource Renal BID once diet advanced (provides 475 kcal, 22 gm protein per serving)  If po intake not feasible, recommend initiating EN. Recommend Novasource Renal at goal rate 50 mL/hr to provide:   2000 kcal/d (105% est needs)   90 gm protein/d (86% est needs)   720 mL fluid     Can add free water flushes if not receiving IV fluids. 95 mL free water flush every 2 hours to provide total 1860 mL fluid/d.    Continue antiemetic and bowel regimen PRN  Monitor labs, intake and weight    Communication of Recommendations: reviewed with nurse    Nutrition Assessment     Malnutrition Assessment/Nutrition-Focused Physical Exam    Malnutrition Context: acute illness or injury (10/07/24 1256)  Malnutrition Level: other (see comments) (unable to determine) (10/07/24 1256)  Energy Intake (Malnutrition): less than or equal to 50% for greater than or equal to 5 days (10/07/24 1256)  Weight Loss (Malnutrition): other (see comments) (does not meet criteira) (10/07/24 1256)  Subcutaneous Fat (Malnutrition): other (see comments) (unable to assess) (10/07/24 1256)           Muscle Mass (Malnutrition): other (see comments) (unable to assess) (10/07/24 1256)                                   A minimum of two characteristics is recommended for diagnosis of either severe or non-severe malnutrition.    Chart Review    Reason Seen: continuous nutrition monitoring    Malnutrition Screening Tool Results   Have you recently lost weight without trying?: No  Have you been eating poorly because of a decreased appetite?: No   MST Score: 0   Diagnosis:  Bilateral hydronephrosis Status post right upper lobe ureteral stent exchange and right lower pole nephrostomy tube placement  Persistent MRSA  bacteremia  Abnormal tissue at the bladder base status post biopsy  Left percutaneous nephrostomy  End-stage renal disease on hemodialysis  Metabolic acidosis  Transaminitis, improving    Relevant Medical History: HTN, HLD, chronic hydronephrosis with left nephrostomy tube, right ureteral stent, ESRD on HD, chronic anemia     Scheduled Medications:  atorvastatin, 20 mg, QHS  cefTRIAXone (Rocephin) IV (PEDS and ADULTS), 1 g, Once  enoxaparin, 30 mg, Daily  folic acid, 1 mg, Daily  labetaloL, 200 mg, Q12H  mupirocin, , BID  ondansetron, 4 mg, Once  pantoprazole, 40 mg, Daily  sevelamer carbonate, 800 mg, TID WM  vancomycin (VANCOCIN) 500 mg in D5W 100 mL IVPB (MB+), 500 mg, Once    Continuous Infusions:   PRN Medications:  acetaminophen, 650 mg, Q4H PRN  albuterol-ipratropium, 3 mL, Once PRN  aluminum-magnesium hydroxide-simethicone, 30 mL, QID PRN  diphenhydrAMINE, 25 mg, Q6H PRN  HYDROcodone-acetaminophen, 1 tablet, Q6H PRN  melatonin, 6 mg, Nightly PRN  metoclopramide, 10 mg, Q10 Min PRN  ondansetron, 4 mg, Q4H PRN  polyethylene glycol, 17 g, BID PRN  prochlorperazine, 5 mg, Q6H PRN  senna-docusate 8.6-50 mg, 2 tablet, BID PRN  sodium chloride 0.9%, 10 mL, PRN  vancomycin - pharmacy to dose, , pharmacy to manage frequency    Calorie Containing IV Medications: no significant kcals from medications at this time    Recent Labs   Lab 10/03/24  1620 10/04/24  0311 10/05/24  0315 10/06/24  0449 10/07/24  0541 10/07/24  2103 10/08/24  0458   * 131* 131* 133* 135*  --  136   K 3.5 4.4 5.0 4.1 4.7  --  4.4   CALCIUM 8.7 8.7 8.0* 8.1* 7.5*  --  8.2*   PHOS  --  4.2 7.2*  --   --   --   --    MG  --  2.20  --   --   --   --   --    CL 96* 96* 94* 91* 93*  --  97*   CO2 18* 13* 14* 22* 17*  --  21*   BUN 65.3* 70.0* 85.5* 57.7* 74.7*  --  42.4*   CREATININE 6.70* 6.98* 8.03* 5.84* 6.80*  --  4.42*   EGFRNORACEVR 6 6 5 7 6  --  10   GLUCOSE 124* 84 84 72* 50*  --  69*   BILITOT 0.5 0.4  --  0.4 0.4  --   --    ALKPHOS  "179* 182*  --  219* 209*  --   --    * 104*  --  103* 71*  --   --    * 91*  --  89* 43*  --   --    ALBUMIN 2.4* 2.3* 2.2* 2.2* 2.3*  --   --    AMMONIA  --   --   --   --   --  26.7  --    WBC 22.51  22.51* 23.93  23.93* 36.16  36.16* 26.04  26.04* 25.58  25.58*  --  26.23*   HGB 12.6 13.2 11.8* 11.6* 11.4*  --  11.0*   HCT 38.6 39.5 34.7* 32.7* 33.3*  --  31.3*     Nutrition Orders:  Diet NPO      Appetite/Oral Intake: NPO/NPO  Factors Affecting Nutritional Intake: NPO  Social Needs Impacting Access to Food: unable to assess at this time; will attempt on follow-up  Food/Restoration/Cultural Preferences: unable to obtain  Food Allergies: no known food allergies  Last Bowel Movement: 10/02/24  Wound(s):  none noted    Comments    10/7/24: Pt off floor for stat CT at time of visit. Pt has been NPO since admit (x 4 days). Per MST, no reports of decreased appetite or unintentional weight loss PTA. No weight loss noted per EMR weight history. Per MD note, ST consulted; will monitor diet advancement and order ONS to ensure adequate nutrition.     10/8/24: Pt remains NPO per SLP, MBS pending when patient is more alert. Pt has been NPO since admit (>4 days). TF recs provided for if/when medically appropriate. RN notified.     Anthropometrics    Height: 5' 3" (160 cm), Height Method: Stated  Last Weight: (!) 140.2 kg (309 lb) (10/03/24 1604), Weight Method: Stated  BMI (Calculated): 54.8  BMI Classification: obese grade III (BMI >/=40)     Ideal Body Weight (IBW), Female: 115 lb     % Ideal Body Weight, Female (lb): 268.7 %                    Usual Body Weight (UBW), k.2 kg  % Usual Body Weight: 105.45     Usual Weight Provided By: EMR weight history    Wt Readings from Last 5 Encounters:   10/03/24 (!) 140.2 kg (309 lb)   07/10/24 (!) 140.2 kg (309 lb)   24 113.4 kg (250 lb)     Weight Change(s) Since Admission:   Wt Readings from Last 1 Encounters:   10/03/24 1604 (!) 140.2 kg (309 lb) "   Admit Weight: (!) 140.2 kg (309 lb) (10/03/24 1604), Weight Method: Stated    Estimated Needs    Weight Used For Calorie Calculations: (!) 140.2 kg (309 lb 1.4 oz)  Energy Calorie Requirements (kcal): 1901 kcal (1.0 SF)  Energy Need Method: Travis-St Jeor  Weight Used For Protein Calculations: 87.5 kg (192 lb 14.4 oz) (AdjustedBW used)  Protein Requirements: 105-114 gm (1.2-1.3 gm/kg AdjustedBW)  Fluid Requirements (mL): 1901 mL (1 mL/kcal)        Enteral Nutrition     Patient not receiving enteral nutrition at this time.    Parenteral Nutrition     Patient not receiving parenteral nutrition support at this time.    Evaluation of Received Nutrient Intake    Calories: not meeting estimated needs  Protein: not meeting estimated needs    Patient Education     Not applicable.    Nutrition Diagnosis     PES: Inadequate oral intake related to acute illness as evidenced by NPO since admit. (active)     Nutrition Interventions     Intervention(s): general/healthful diet, modified composition of meals/snacks, modified composition of enteral nutrition, modified rate of enteral nutrition, commercial beverage, and collaboration with other providers    Goal: Meet greater than 80% of nutritional needs by follow-up. (goal progressing)    Nutrition Goals & Monitoring     Dietitian will monitor: food and beverage intake, energy intake, enteral nutrition intake, weight, electrolyte/renal panel, and gastrointestinal profile  Discharge planning: too early to determine; pending clinical course  Nutrition Risk/Follow-Up: high (follow-up in 1-4 days)   Please consult if re-assessment needed sooner.

## 2024-10-08 NOTE — CONSULTS
Vascular Surgery - Consult Note  Vega Gusman PA-C    SUBJECTIVE:     Reason for Consult: Need for tunneled dialysis catheter removal s/t persistent bacteremia     History of Present Illness: Fior Joya is a 69 y.o. female with ESRD on HD TTS utilizing right chest wall catheter with persistent bacteremia and need for a line holiday, i.e. TDC removal. Patient has a history of chronic hydronephrosis with left nephrostomy tube in place, right ureter stent, hypertension, anemia, hyperphosphatemia, and hyperlipidemia. Patient initially presented to the hospital right flank pain radiating to her lower back and her right hip.  In the emergency department, she was diagnosed with sepsis, urinary tract infection, as well as concern for pyelonephritis and was started on IV antibiotics. No clear source of her sepsis has been identified so we were asked to remove her TDC for a few days to monitor cultures.     I spoke to and examined patient while she was in dialysis.      Review of patient's allergies indicates:   Allergen Reactions    Asa [aspirin] Anaphylaxis    Penicillins      Received ceftriaxone from 6/27, no reactions        History reviewed. No pertinent past medical history.  Past Surgical History:   Procedure Laterality Date    CYSTOSCOPY W/ URETERAL STENT PLACEMENT Right 6/27/2024    Procedure: CYSTOSCOPY, WITH URETERAL STENT INSERTION;  Surgeon: Oits Person MD;  Location: [facility] OR;  Service: Urology;  Laterality: Right;  CYSTOSCOPY, BILATERAL RETROGRADE PYELOGRAMS, POSSIBLE STENT PLACEMENT.    CYSTOSCOPY W/ URETERAL STENT PLACEMENT Bilateral 10/4/2024    Procedure: CYSTOSCOPY, WITH URETERAL STENT INSERTION;  Surgeon: Tawana Loaiza MD;  Location: [facility] OR;  Service: Urology;  Laterality: Bilateral;  CYSTO WITH BILAT URETERAL STENT EXCHANGE    INSERTION OF TUNNELED CENTRAL VENOUS HEMODIALYSIS CATHETER N/A 7/3/2024    Procedure: Insertion, Catheter, Central Venous, Hemodialysis;  Surgeon: Swapna  Uche TOUSSAINT MD;  Location: [facility] CATH LAB;  Service: Peripheral Vascular;  Laterality: N/A;     No family history on file.  Social History     Tobacco Use    Smoking status: Never    Smokeless tobacco: Never   Substance Use Topics    Alcohol use: Never    Drug use: Never        Prior to Admission medications    Medication Sig Start Date End Date Taking? Authorizing Provider   atorvastatin (LIPITOR) 20 MG tablet Take 20 mg by mouth every evening. 8/13/24  Yes Provider, Historical   calcium carbonate (TUMS) 200 mg calcium (500 mg) chewable tablet Take 2 tablets (1,000 mg total) by mouth 2 (two) times daily. 7/10/24 7/10/25 Yes Moise Villafana MD   ergocalciferol (ERGOCALCIFEROL) 50,000 unit Cap Take 1 capsule (50,000 Units total) by mouth every 72 hours. 7/12/24 10/10/24 Yes Moise Villafana MD   FEROSUL 325 mg (65 mg iron) Tab tablet Take 325 mg by mouth once daily. 7/10/24  Yes Provider, Historical   folic acid (FOLVITE) 1 MG tablet Take 1 tablet (1 mg total) by mouth once daily. 7/11/24 10/4/24 Yes Moise Villafana MD   labetaloL (NORMODYNE) 200 MG tablet Take 1 tablet (200 mg total) by mouth every 12 (twelve) hours. 7/10/24 7/10/25 Yes Moise Villafana MD   loratadine (CLARITIN) 10 mg tablet Take 10 mg by mouth once daily.   Yes Provider, Historical   pantoprazole (PROTONIX) 40 MG tablet Take 1 tablet (40 mg total) by mouth once daily. 7/11/24 7/11/25 Yes Moise Villafana MD   senna-docusate 8.6-50 mg (SENNA WITH DOCUSATE SODIUM) 8.6-50 mg per tablet Take 1 tablet by mouth daily as needed for Constipation.   Yes Provider, Historical   sevelamer carbonate (RENVELA) 800 mg Tab Take 800 mg by mouth 3 (three) times daily with meals. 8/30/24  Yes Provider, Historical   polyethylene glycol (GLYCOLAX) 17 gram PwPk Take 17 g by mouth once daily.  Patient not taking: Reported on 10/4/2024 7/11/24   Moise Villafana MD       Review of Systems:  Review of Systems   Constitutional:  Negative  for chills, fever and weight loss.   Eyes:  Negative for blurred vision and double vision.   Respiratory:  Negative for cough, shortness of breath and wheezing.    Cardiovascular:  Negative for chest pain and palpitations.   Gastrointestinal:  Negative for abdominal pain, nausea and vomiting.   Genitourinary:  Positive for flank pain.   Musculoskeletal:  Positive for back pain.   Neurological:  Negative for dizziness, speech change, weakness and headaches.   All other systems reviewed and are negative.      OBJECTIVE:     Vital Signs (Most Recent):  Temp: 98.2 °F (36.8 °C) (10/08/24 0738)  Pulse: 92 (10/08/24 0738)  Resp: 18 (10/08/24 1116)  BP: (!) 150/76 (10/08/24 0738)  SpO2: (!) 93 % (10/08/24 0738)    Admission: Weight: (!) 140.2 kg (309 lb) (10/03/24 1604)   Most Recent: Weight: (!) 140.2 kg (309 lb) (10/03/24 1604)    Physical Exam:  Vascular Physical Exam  Vitals and nursing note reviewed.   Constitutional:       General: She is not in acute distress.  HENT:      Head: Normocephalic.      Nose: Nose normal.   Cardiovascular:      Rate and Rhythm: Normal rate and regular rhythm.   Abdominal:      General: There is no distension.      Tenderness: There is abdominal tenderness in the left lower quadrant. There is no guarding or rebound.      Comments: bilateral nephrostomy tubes in place   Musculoskeletal:      Right lower leg: No edema.      Left lower leg: No edema.   Lymphadenopathy:      Cervical: No cervical adenopathy.   Neurological:      General: No focal deficit present.      Mental Status: She is alert.      Cranial Nerves: No cranial nerve deficit.   Psychiatric:         Mood and Affect: Mood normal.   Arteriovenous access:     Catheters/Devices: Right chest tunneled dialysis catheter present.         Diagnostic Results:  CT Abdomen Pelvis With IV Contrast NO Oral Contrast (10/7/2024)  Impression:  1. No significant interval change compared to previous exam.  Ureteral stents in place.  2. Duplicated  right kidney and right ureter with persistent hydroureteronephrosis of the lower pole moiety.    MRI Cervical Spine W WO Cont  Impression:  Very limited examination due to motion artifact.  Only gross findings are visible.  Fine details are not visible.  No obvious epidural abscess is seen.  There is some increased signal in the cord at C6-C7 which shows some enhancement.  Findings could represent intra cord lesion or possible myelomalacia.    MRI Thoracic Spine W WO Cont  Impression:  Very limited examination due to significant motion artifact  No evidence of osteomyelitis or discitis seen at no evidence of epidural abscess seen in thoracic spine.  Lumbar and cervical spine discussed on separate report    MRI Lumbar Spine W WO Cont   Impression:  No acute abnormality of the lumbar spine.    Transthoracic echo (TTE) complete     Left Ventricle: The left ventricle is normal in size. Increased wall thickness. There is normal systolic function with a visually estimated ejection fraction of 60 - 65%. Grade I diastolic dysfunction.    Right Ventricle: Normal right ventricular cavity size. Systolic function is normal. TAPSE is 1.88 cm.    Left Atrium: Left atrium is dilated.    Mitral Valve: There is mitral annular calcification present. There is trace regurgitation.    Tricuspid Valve: There is moderate regurgitation.    Pulmonary Artery: The estimated pulmonary artery systolic pressure is 44 mmHg.    IVC/SVC: Normal venous pressure at 3 mmHg.    ASSESSMENT/PLAN:   Fior Joya is a 68 y/o female with ESRD on HD TTS utilizing right chest wall TDC. She has persistent bacteremia despite antibiotics and is in need of a line holiday. I have discussed TDC removal procedure with patient including risks. Consents obtained and placed in the chart. All questions answered. Will remove catheter once she returns to her room from dialysis.     The above findings, diagnostics, and treatment plan were discussed with Dr. Barcenas who is in  agreement with the plan of care except as stated in additional documentation.      Thank you for your consult. Please feel free to reach me with any questions.    Vega Gusman PA-C  [facility]  [phone]

## 2024-10-08 NOTE — PROCEDURES
Procedure Note     Preop diagnosis:  ESRD, Bacteremia   Postop diagnosis: Same  Procedure:  PermCath removal  Anesthesia:  1% lidocaine plain  Procedure in detail:  The chest wall was prepped and draped in a standard sterile fashion.  Local anesthetic was infused in the skin at the catheter exit site.  Blunt dissection was used to dissect the cuff from the surrounding structures.  The catheter was then removed and pressure was held.  A sterile dressing was placed.  Specimen:  PermCath (documented but not sent to pathology)  Complications: None  Blood loss:  Less than 5 mL    I performed this procedure under the direct supervision of Dr. Colleen Barcenas MD.

## 2024-10-08 NOTE — ANESTHESIA PREPROCEDURE EVALUATION
10/08/2024  Fior Joya is a 69 y.o., female with ESRD (dialysis [facility]Th[facility]) with bilateral nephrostomy tubes for hydronephrosis and MRSA bacteremia now scheduled for MADELYN     s/p cystoscopy and stent placement 10/4/24 under [facility] without complication    BMI 55     EKG (6/22/24): Sinus tachycardia with occasional Premature ventricular complexes   ST and T wave abnormality, consider lateral ischemia   Abnormal ECG   When compared with ECG of 21-JUN-2024 19:36,   Premature ventricular complexes are now Present   ST now depressed in Anterior leads   T wave inversion now evident in Lateral leads     10/5/24 TTE        Left Ventricle: The left ventricle is normal in size. Increased wall thickness. There is normal systolic function with a visually estimated ejection fraction of 60 - 65%. Grade I diastolic dysfunction.    Right Ventricle: Normal right ventricular cavity size. Systolic function is normal. TAPSE is 1.88 cm.    Left Atrium: Left atrium is dilated.    Mitral Valve: There is mitral annular calcification present. There is trace regurgitation.    Tricuspid Valve: There is moderate regurgitation.    Pulmonary Artery: The estimated pulmonary artery systolic pressure is 44 mmHg.    IVC/SVC: Normal venous pressure at 3 mmHg.    Pre-op Assessment    I have reviewed the Patient Summary Reports.     I have reviewed the Nursing Notes.    I have reviewed the Medications.     Review of Systems  Anesthesia Hx:  No problems with previous Anesthesia               Denies Personal Hx of Anesthesia complications.                    Social:  Non-Smoker       Hematology/Oncology:       -- Anemia:                                  Cardiovascular:  Exercise tolerance: poor                                           Renal/:  Chronic Renal Disease, ESRD, Dialysis                Hepatic/GI:     GERD, well controlled              Endocrine:        Morbid Obesity / BMI > 40      Physical Exam  General: Well nourished, Cooperative, Alert and Oriented    Airway:  Mallampati: III   Mouth Opening: Normal  TM Distance: Normal  Neck ROM: Normal ROM    Dental:  Intact, Dentures    Chest/Lungs:  Clear to auscultation, Normal Respiratory Rate    Heart:  Rate: Normal  Rhythm: Regular Rhythm  Sounds: Normal    Abdomen:  Normal, Soft, Nontender        Anesthesia Plan  Type of Anesthesia, risks & benefits discussed:    Anesthesia Type: Gen Natural Airway  Intra-op Monitoring Plan: Standard ASA Monitors  Post Op Pain Control Plan: multimodal analgesia  Induction:  IV  Informed Consent: Informed consent signed with the Patient and all parties understand the risks and agree with anesthesia plan.  All questions answered.   ASA Score: 4  Day of Surgery Review of History & Physical: H&P Update referred to the surgeon/provider.    Ready For Surgery From Anesthesia Perspective.     .

## 2024-10-08 NOTE — PROGRESS NOTES
Infectious Disease  Progress Note    Patient Name: Fior Joya   MRN: [id number]   Admission Date: 10/3/2024   Hospital Length of Stay: 5 days  Attending Physician: Macarena Leggett MD   Primary Care Provider: Linda, Primary Doctor     Isolation Status: Contact     Assessment/Plan:        69 y.o. female with a history significant for ESRD on hemodialysis through right IJ permanent catheter, chronic hydronephrosis with left nephrostomy tube, hypertension who was admitted on 10/3/2024 with lower back pain. Patient said that her symptoms started approximately 1 week ago with some neck pain that moved down to her shoulder. She then developed lower back pain that moved to her right leg. Patient also reports having cloudy drainage from her nephrostomy tube for the last 1 month. Patient reports worsening back/right flank pain for the last few days. Vital signs on admission showed temperature 97.2 F, heart rate 79, blood pressure 110/75. Laboratory workup showed WBC 15336, creatinine 6.70, lactic acid 1.8. Urinalysis showed more than 100 WBC. She had 2 sets of blood culture obtained which are growing MRSA. 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  The upper pole moiety is decompressed with a ureteral stent but the lower pole demonstrating moderate severe hydronephrosis, left nephrostomy ureteral stent drain with resolution of hydronephrosis. Patient underwent nephrostomy tube placement by IR into the inferior pole of the right renal collecting system.  Patient was also noted to MRSA bacteremia, she was started on vancomycin, on 10/07/2024, she appears to had worsening mental status, she has persistent leukocytosis.  ID is consulted for assistance in management.    MRSA bacteremia  ESRD on HD  Encephalopathy  Sepsis  Hydronephrosis s/p ureteral stents and nephrostomy tubes    -much more awake patient, she had removal of the  right chest wall HD catheter  -no fevers  -repeat blood cultures 10/07/2024 remain positive, these were done before removal of the HD line  -MADELYN done 10/08/2024, no evidence of vegetation  -patient complaining of right hip pain as well as low back pain    Recommendations:  -Continue Vancomycin dosing per pharmacy for goal trough 15-20   -repeat blood cultures tomorrow  -given negative MADELYN, however with presence of MRSA bacteremia that is persistent, and right hip pain as well as low back pain, I reviewed the images with Radiology, MRI of the lumbar spine does not show any evidence of osteomyelitis or epidural enhancement, she also has a CT of the abdomen and pelvis that does not show any concern at the level of the spine but also no evidence of infection at the site of the right hip where the patient has complaining of pain and tenderness  -for now will monitor the repeat blood cultures now that the line is removed, hopefully we can achieve sterility if not, we may have to add 2nd agent as salvage therapy    ID will continue following. Please contact us with any questions or concerns.    Antibiotics History:  Metronidazole 10/3  Cefepime 10/3-10/5  Vancomycin 10/3-    Portions of this note dictated using EMR integrated voice recognition software, and may be subject to voice recognition errors not corrected at proofreading. Please contact writer for clarification if needed.    Subjective:     Principal Problem: <principal problem not specified>     Interval History:   Much more awake today, conversational, appropriate, oriented to place and time, reports pain at the level of the right hip as well as low back    Review of Systems   Review of Systems   All other systems reviewed and are negative.       Objective:     Vital Signs (Most Recent):  Temp: 98 °F (36.7 °C) (10/09/24 1153)  Pulse: 100 (10/09/24 1153)  Resp: 20 (10/09/24 1007)  BP: (!) 180/83 (10/09/24 1153)  SpO2: 99 % (10/09/24 1153)  Vital Signs (24h  Range):  Temp:  [98 °F (36.7 °C)-99.6 °F (37.6 °C)] 98 °F (36.7 °C)  Pulse:  [] 100  Resp:  [16-20] 20  SpO2:  [93 %-99 %] 99 %  BP: (115-180)/(57-83) 180/83      Weight:   Wt Readings from Last 1 Encounters:   10/03/24 (!) 140.2 kg (309 lb)      Body mass index is Body mass index is 54.74 kg/m².     Estimated Creatinine Clearance: Estimated Creatinine Clearance: 21.3 mL/min (A) (based on SCr of 3.49 mg/dL (H)).     Lines/Drains/Airways       Drain  Duration                  Nephrostomy 06/28/24 1025 Left 8 Fr. 103 days         Nephrostomy 10/04/24 1506 Right 8 Fr. 4 days              Peripheral Intravenous Line  Duration                  Peripheral IV - Single Lumen 10/09/24 0900 20 G Anterior;Left;Proximal Forearm <1 day                     Physical Exam  Physical Exam  Constitutional:       Appearance: Normal appearance. She is obese.   HENT:      Head: Normocephalic and atraumatic.      Mouth/Throat:      Pharynx: No oropharyngeal exudate or posterior oropharyngeal erythema.   Eyes:      Extraocular Movements: Extraocular movements intact.      Pupils: Pupils are equal, round, and reactive to light.   Cardiovascular:      Rate and Rhythm: Normal rate and regular rhythm.      Heart sounds: No murmur heard.     Comments: Right chest wall HD catheter  Pulmonary:      Effort: No respiratory distress.      Breath sounds: No wheezing, rhonchi or rales.   Abdominal:      General: Bowel sounds are normal. There is no distension.      Palpations: Abdomen is soft.      Tenderness: There is abdominal tenderness (Right hip tenderness).   Musculoskeletal:         General: No swelling or tenderness.      Cervical back: Neck supple. No rigidity or tenderness.   Lymphadenopathy:      Cervical: No cervical adenopathy.   Skin:     Findings: No lesion or rash.   Neurological:      General: No focal deficit present.      Mental Status: She is alert. Mental status is at baseline.      Cranial Nerves: No cranial nerve deficit.       Motor: No weakness.   Psychiatric:         Mood and Affect: Mood normal.         Behavior: Behavior normal.          Significant Labs: CBC:   Recent Labs   Lab 10/08/24  0458 10/09/24  0650   WBC 26.23* 21.12*   HGB 11.0* 11.1*   HCT 31.3* 33.2*    208     CMP:   Recent Labs   Lab 10/08/24  0458 10/09/24  0650    136   K 4.4 4.1   CL 97* 94*   CO2 21* 27   BUN 42.4* 28.0*   CREATININE 4.42* 3.49*   CALCIUM 8.2* 8.0*       Microbiology Results (last 7 days)       Procedure Component Value Units Date/Time    Blood Culture [[id number]]  (Abnormal) Collected: 10/07/24 2103    Order Status: Completed Specimen: Blood Updated: 10/09/24 1201     Blood Culture Susceptibility To Follow      Staphylococcus aureus     GRAM STAIN Gram Positive Cocci, probable Staphylococcus      Seen in gram stain of broth only      1 of 1 Pediatric bottle positive    Blood Culture [[id number]]  (Abnormal) Collected: 10/07/24 2103    Order Status: Completed Specimen: Blood Updated: 10/09/24 0634     GRAM STAIN 1 of 1 Pediatric bottle positive      Gram Positive Cocci, probable Staphylococcus      Seen in gram stain of broth only    Blood Culture [[id number]]  (Abnormal)  (Susceptibility) Collected: 10/05/24 0620    Order Status: Completed Specimen: Blood Updated: 10/08/24 0810     Blood Culture Methicillin resistant Staphylococcus aureus     GRAM STAIN Gram Positive Cocci, probable Staphylococcus      Seen in gram stain of broth only      2 of 2 bottles positive    Blood Culture [[id number]]  (Abnormal)  (Susceptibility) Collected: 10/05/24 0620    Order Status: Completed Specimen: Blood Updated: 10/08/24 0809     Blood Culture Methicillin resistant Staphylococcus aureus     GRAM STAIN Gram Positive Cocci, probable Staphylococcus      2 of 2 bottles positive      Seen in gram stain of broth only    Blood culture #2 **CANNOT BE ORDERED STAT** [[id number]]  (Abnormal)  (Susceptibility) Collected: 10/03/24 2020    Order Status:  Completed Specimen: Blood Updated: 10/06/24 0627     Blood Culture Methicillin resistant Staphylococcus aureus     GRAM STAIN Gram Positive Cocci, probable Staphylococcus      Seen in gram stain of broth only      1 of 1 Pediatric bottle positive    Blood culture #1 **CANNOT BE ORDERED STAT** [[id number]]  (Abnormal)  (Susceptibility) Collected: 10/03/24 2020    Order Status: Completed Specimen: Blood Updated: 10/06/24 0627     Blood Culture Methicillin resistant Staphylococcus aureus     GRAM STAIN Gram Positive Cocci, probable Staphylococcus      Seen in gram stain of broth only      2 of 2 bottles positive    Urine culture [[id number]] Collected: 10/03/24 2008    Order Status: Completed Specimen: Urine Updated: 10/05/24 1003     Urine Culture No Significant Growth    Blood Culture [[id number]]     Order Status: Canceled Specimen: Blood     BCID2 Panel [[id number]]  (Abnormal) Collected: 10/03/24 2020    Order Status: Completed Specimen: Blood Updated: 10/04/24 1107     CTX-M (ESBL ) N/A     IMP (Cabapenemase ) N/A     KPC resistance gene (Carbapenemase ) N/A     mcr-1 N/A     mecA ID N/A     Comment: Note: Antimicrobial resistance can occur via multiple mechanisms. A Not Detected result for antimicrobial resistance gene(s) does not indicate antimicrobial susceptibility. Subculturing is required for species identification and susceptibility testing of   isolates.        mecA/C and MREJ (MRSA) gene Detected     NDM (Carbapenemase ) N/A     OXA-48-like (Carbapenemase ) N/A     Royal/B (VRE gene) N/A     VIM (Carbapenemase ) N/A     Enterococcus faecalis Not Detected     Enterococcus faecium Not Detected     Listeria monocytogenes Not Detected     Staphylococcus spp. Detected     Staphylococcus aureus Detected     Staphylococcus epidermidis Not Detected     Staphylococcus lugdunensis Not Detected     Streptococcus spp. Not Detected     Streptococcus agalactiae (Group B)  Not Detected     Streptococcus pneumoniae Not Detected     Streptococcus pyogenes (Group A) Not Detected     Acinetobacter calcoaceticus/baumannii complex Not Detected     Bacteroides fragilis Not Detected     Enterobacterales Not Detected     Enterobacter cloacae complex Not Detected     Escherichia coli Not Detected     Klebsiella aerogenes Not Detected     Klebsiella oxytoca Not Detected     Klebsiella pneumoniae group Not Detected     Proteus spp. Not Detected     Salmonella spp. Not Detected     Serratia marcescens Not Detected     Haemophilus influenzae Not Detected     Neisseria meningitidis Not Detected     Pseudomonas aeruginosa Not Detected     Stenotrophomonas maltophilia Not Detected     Candida albicans Not Detected     Candida auris Not Detected     Candida glabrata Not Detected     Candida krusei Not Detected     Candida parapsilosis Not Detected     Candida tropicalis Not Detected     Cryptococcus neoformans/gattii Not Detected    Narrative:      The [employer] BCID2 Panel is a multiplexed nucleic acid test intended for the use with Hylete® 2.0 or Hylete® OnBeep Systems for the simultaneous qualitative detection and identification of multiple bacterial and yeast nucleic acids and select genetic determinants associated with antimicrobial resistance.  The [employer] BCID2 Panel test is performed directly on blood culture samples identified as positive by a continuous monitoring blood culture system.  Results are intended to be interpreted in conjunction with Gram stain results.             Significant Imaging: I have reviewed all pertinent imaging results/findings within the past 24 hours.      Milo Campbell MD  Infectious Disease  [facility]

## 2024-10-09 LAB
ANION GAP SERPL CALC-SCNC: 15 MEQ/L
BASOPHILS # BLD AUTO: 0.05 X10(3)/MCL
BASOPHILS NFR BLD AUTO: 0.2 %
BUN SERPL-MCNC: 28 MG/DL (ref 9.8–20.1)
CALCIUM SERPL-MCNC: 8 MG/DL (ref 8.4–10.2)
CHLORIDE SERPL-SCNC: 94 MMOL/L (ref 98–107)
CO2 SERPL-SCNC: 27 MMOL/L (ref 23–31)
CREAT SERPL-MCNC: 3.49 MG/DL (ref 0.55–1.02)
CREAT/UREA NIT SERPL: 8
EOSINOPHIL # BLD AUTO: 0.15 X10(3)/MCL (ref 0–0.9)
EOSINOPHIL NFR BLD AUTO: 0.7 %
ERYTHROCYTE [DISTWIDTH] IN BLOOD BY AUTOMATED COUNT: 15.8 % (ref 11.5–17)
GFR SERPLBLD CREATININE-BSD FMLA CKD-EPI: 14 ML/MIN/1.73/M2
GLUCOSE SERPL-MCNC: 121 MG/DL (ref 82–115)
HCT VFR BLD AUTO: 33.2 % (ref 37–47)
HGB BLD-MCNC: 11.1 G/DL (ref 12–16)
IMM GRANULOCYTES # BLD AUTO: 0.65 X10(3)/MCL (ref 0–0.04)
IMM GRANULOCYTES NFR BLD AUTO: 3.1 %
LYMPHOCYTES # BLD AUTO: 1.54 X10(3)/MCL (ref 0.6–4.6)
LYMPHOCYTES NFR BLD AUTO: 7.3 %
MCH RBC QN AUTO: 29.4 PG (ref 27–31)
MCHC RBC AUTO-ENTMCNC: 33.4 G/DL (ref 33–36)
MCV RBC AUTO: 87.8 FL (ref 80–94)
MONOCYTES # BLD AUTO: 1.02 X10(3)/MCL (ref 0.1–1.3)
MONOCYTES NFR BLD AUTO: 4.8 %
NEUTROPHILS # BLD AUTO: 17.71 X10(3)/MCL (ref 2.1–9.2)
NEUTROPHILS NFR BLD AUTO: 83.9 %
NRBC BLD AUTO-RTO: 0 %
PLATELET # BLD AUTO: 208 X10(3)/MCL (ref 130–400)
PMV BLD AUTO: 10.2 FL (ref 7.4–10.4)
POTASSIUM SERPL-SCNC: 4.1 MMOL/L (ref 3.5–5.1)
RBC # BLD AUTO: 3.78 X10(6)/MCL (ref 4.2–5.4)
SODIUM SERPL-SCNC: 136 MMOL/L (ref 136–145)
WBC # BLD AUTO: 21.12 X10(3)/MCL (ref 4.5–11.5)

## 2024-10-09 PROCEDURE — 27000221 HC OXYGEN, UP TO 24 HOURS

## 2024-10-09 PROCEDURE — 85025 COMPLETE CBC W/AUTO DIFF WBC: CPT | Performed by: INTERNAL MEDICINE

## 2024-10-09 PROCEDURE — 63600175 PHARM REV CODE 636 W HCPCS: Performed by: INTERNAL MEDICINE

## 2024-10-09 PROCEDURE — 25000003 PHARM REV CODE 250: Performed by: UROLOGY

## 2024-10-09 PROCEDURE — 99233 SBSQ HOSP IP/OBS HIGH 50: CPT | Mod: ,,, | Performed by: GENERAL PRACTICE

## 2024-10-09 PROCEDURE — 87077 CULTURE AEROBIC IDENTIFY: CPT | Performed by: GENERAL PRACTICE

## 2024-10-09 PROCEDURE — 25000003 PHARM REV CODE 250: Performed by: INTERNAL MEDICINE

## 2024-10-09 PROCEDURE — 87154 CUL TYP ID BLD PTHGN 6+ TRGT: CPT | Performed by: GENERAL PRACTICE

## 2024-10-09 PROCEDURE — 63600175 PHARM REV CODE 636 W HCPCS: Performed by: ANESTHESIOLOGY

## 2024-10-09 PROCEDURE — 27000207 HC ISOLATION

## 2024-10-09 PROCEDURE — 99900031 HC PATIENT EDUCATION (STAT)

## 2024-10-09 PROCEDURE — 36415 COLL VENOUS BLD VENIPUNCTURE: CPT | Performed by: GENERAL PRACTICE

## 2024-10-09 PROCEDURE — 99900035 HC TECH TIME PER 15 MIN (STAT)

## 2024-10-09 PROCEDURE — 36415 COLL VENOUS BLD VENIPUNCTURE: CPT | Performed by: INTERNAL MEDICINE

## 2024-10-09 PROCEDURE — 21400001 HC TELEMETRY ROOM

## 2024-10-09 PROCEDURE — 92610 EVALUATE SWALLOWING FUNCTION: CPT

## 2024-10-09 PROCEDURE — 80048 BASIC METABOLIC PNL TOTAL CA: CPT | Performed by: INTERNAL MEDICINE

## 2024-10-09 RX ORDER — SERTRALINE HYDROCHLORIDE 25 MG/1
25 TABLET, FILM COATED ORAL DAILY
Status: DISCONTINUED | OUTPATIENT
Start: 2024-10-09 | End: 2024-10-28 | Stop reason: HOSPADM

## 2024-10-09 RX ORDER — HYDROCODONE BITARTRATE AND ACETAMINOPHEN 10; 325 MG/1; MG/1
1 TABLET ORAL EVERY 4 HOURS PRN
Status: DISCONTINUED | OUTPATIENT
Start: 2024-10-09 | End: 2024-10-16

## 2024-10-09 RX ADMIN — Medication 6 MG: at 08:10

## 2024-10-09 RX ADMIN — SERTRALINE HYDROCHLORIDE 25 MG: 25 TABLET ORAL at 10:10

## 2024-10-09 RX ADMIN — ENOXAPARIN SODIUM 30 MG: 30 INJECTION SUBCUTANEOUS at 04:10

## 2024-10-09 RX ADMIN — SEVELAMER CARBONATE 800 MG: 800 TABLET, FILM COATED ORAL at 09:10

## 2024-10-09 RX ADMIN — SEVELAMER CARBONATE 800 MG: 800 TABLET, FILM COATED ORAL at 11:10

## 2024-10-09 RX ADMIN — HYDROCODONE BITARTRATE AND ACETAMINOPHEN 1 TABLET: 10; 325 TABLET ORAL at 04:10

## 2024-10-09 RX ADMIN — DIPHENHYDRAMINE HYDROCHLORIDE 25 MG: 50 INJECTION INTRAMUSCULAR; INTRAVENOUS at 02:10

## 2024-10-09 RX ADMIN — HYDROCODONE BITARTRATE AND ACETAMINOPHEN 1 TABLET: 5; 325 TABLET ORAL at 08:10

## 2024-10-09 RX ADMIN — LABETALOL HYDROCHLORIDE 200 MG: 200 TABLET, FILM COATED ORAL at 08:10

## 2024-10-09 RX ADMIN — HYDROCODONE BITARTRATE AND ACETAMINOPHEN 1 TABLET: 10; 325 TABLET ORAL at 10:10

## 2024-10-09 RX ADMIN — HYDROCODONE BITARTRATE AND ACETAMINOPHEN 1 TABLET: 5; 325 TABLET ORAL at 05:10

## 2024-10-09 RX ADMIN — ATORVASTATIN CALCIUM 20 MG: 10 TABLET, FILM COATED ORAL at 08:10

## 2024-10-09 RX ADMIN — ACETAMINOPHEN 650 MG: 325 TABLET ORAL at 02:10

## 2024-10-09 RX ADMIN — LABETALOL HYDROCHLORIDE 200 MG: 200 TABLET, FILM COATED ORAL at 09:10

## 2024-10-09 RX ADMIN — FOLIC ACID 1 MG: 1 TABLET ORAL at 09:10

## 2024-10-09 RX ADMIN — SEVELAMER CARBONATE 800 MG: 800 TABLET, FILM COATED ORAL at 04:10

## 2024-10-09 RX ADMIN — PANTOPRAZOLE SODIUM 40 MG: 40 TABLET, DELAYED RELEASE ORAL at 09:10

## 2024-10-09 NOTE — PROGRESS NOTES
Nephrology  Note    Patient Name: Fior Joya  Age: 69 y.o.  : 1955  MRN: [id number]  Admission Date: 10/3/2024        Fior Joya is a 69 y.o. female who presents with right flank pain.  Past medical history significant for ESRD on hemodialysis TTS at [facility] via right IJ PermCath, chronic hydronephrosis with left nephrostomy tube in place, and stents to right ureter, hypertension, anemia, hyperphosphatemia, and hyperlipidemia.  Patient presents with worsening right flank pain.  She states the pain started in her right neck, in his now in her right flank/hip area.  Patient was started on hemodialysis in 2024 after being found to have significant acute kidney injury and hydronephrosis.  She has been maintained on hemodialysis at [facility] on a TTS schedule.  Prior to hospitalization her last dialysis was on 10/01/2024.  Nephrology consulted for ESRD management.  In the emergency department she was noted to have low blood pressure and has been given IV fluids.  Urology has also been consulted for concern hydronephrosis needing stent exchange.  She has also been started on antibiotics for possible UTI.  She has had nausea, and vomiting.  No chest pain, shortness of breath, or lower extremity edema.    10/07/2024  Chart reviewed.  Weekend events noted.  Patient has been NPO at least since her admission to the hospital and also has no IV fluids.  Lying down in the bed.  Having jerky movements all over the body and uncomfortable.  Now she has nephrostomy tube on both sides.  Both draining.  Serosanguineous fluid noted.  Patient is very slow to answer questions but did recognize me by name and she knows her own name.     10/08/2024  No acute events overnight.  Seen on hemodialysis.  She was endorsing shortness of breath, and has some lower extremity edema.    10/09/2024   Mentally she has cleared up completely.  Complains of pains all over the body for which she is on p.r.n. pain medications.  Also  complains of being nervous and on some anxiety medication.  No shortness of breath.  Tunneled dialysis catheter was removed yesterday.    Current Facility-Administered Medications   Medication Dose Route Frequency Provider Last Rate Last Admin    acetaminophen tablet 650 mg  650 mg Oral Q4H PRN Tawana Loaiza MD   650 mg at 10/09/24 0256    albuterol-ipratropium 2.5 mg-0.5 mg/3 mL nebulizer solution 3 mL  3 mL Nebulization Once PRN Mark Ying DO        aluminum-magnesium hydroxide-simethicone 200-200-20 mg/5 mL suspension 30 mL  30 mL Oral QID PRN Tawana Loaiza MD        atorvastatin tablet 20 mg  20 mg Oral QHS Tawana Loaiza MD   20 mg at 10/08/24 2012    cefTRIAXone (Rocephin) 1 g in D5W 100 mL IVPB (MB+)  1 g Intravenous Once Macarena Leggett MD        diphenhydrAMINE injection 25 mg  25 mg Intravenous Q6H PRN Mark Ying DO   25 mg at 10/09/24 0256    enoxaparin injection 30 mg  30 mg Subcutaneous Daily Macarena Leggett MD   30 mg at 10/08/24 1629    folic acid tablet 1 mg  1 mg Oral Daily Tawana Loaiza MD   1 mg at 10/09/24 0917    HYDROcodone-acetaminophen  mg per tablet 1 tablet  1 tablet Oral Q4H PRN Macarena Leggett MD        HYDROcodone-acetaminophen 5-325 mg per tablet 1 tablet  1 tablet Oral Q6H PRN Tawana Loaiza MD   1 tablet at 10/09/24 0551    labetaloL tablet 200 mg  200 mg Oral Q12H Tawana Loaiza MD   200 mg at 10/09/24 0917    melatonin tablet 6 mg  6 mg Oral Nightly PRN Tawana Loaiza MD   6 mg at 10/08/24 2013    metoclopramide injection 10 mg  10 mg Intravenous Q10 Min PRN Mark Ying DO        ondansetron injection 4 mg  4 mg Intravenous Q4H PRN Tawana Loaiza MD        ondansetron injection 4 mg  4 mg Intravenous Once Stepan, Mark C, DO        pantoprazole EC tablet 40 mg  40 mg Oral Daily Tawana Loaiza MD   40 mg at 10/09/24 0917    polyethylene glycol packet 17 g  17  g Oral BID PRN Tawana Loaiza MD        prochlorperazine injection Soln 5 mg  5 mg Intravenous Q6H PRN Tawana Loaiza MD        senna-docusate 8.6-50 mg per tablet 2 tablet  2 tablet Oral BID PRN Tawana Loaiza MD        sertraline tablet 25 mg  25 mg Oral Daily Shin Light MD        sevelamer carbonate tablet 800 mg  800 mg Oral TID  Tawana Loaiza MD   800 mg at 10/09/24 0917    sodium chloride 0.9% flush 10 mL  10 mL Intravenous PRN Tawana Loaiza MD        vancomycin - pharmacy to dose   Intravenous pharmacy to manage frequency Tawana Loaiza MD           No, Primary Doctor    History reviewed. No pertinent past medical history.   Past Surgical History:   Procedure Laterality Date    CYSTOSCOPY W/ URETERAL STENT PLACEMENT Right 6/27/2024    Procedure: CYSTOSCOPY, WITH URETERAL STENT INSERTION;  Surgeon: Otis Person MD;  Location: [facility];  Service: Urology;  Laterality: Right;  CYSTOSCOPY, BILATERAL RETROGRADE PYELOGRAMS, POSSIBLE STENT PLACEMENT.    CYSTOSCOPY W/ URETERAL STENT PLACEMENT Bilateral 10/4/2024    Procedure: CYSTOSCOPY, WITH URETERAL STENT INSERTION;  Surgeon: Tawana Loaiza MD;  Location: [facility] OR;  Service: Urology;  Laterality: Bilateral;  CYSTO WITH BILAT URETERAL STENT EXCHANGE    INSERTION OF TUNNELED CENTRAL VENOUS HEMODIALYSIS CATHETER N/A 7/3/2024    Procedure: Insertion, Catheter, Central Venous, Hemodialysis;  Surgeon: Uche Barcenas MD;  Location: [facility] CATH LAB;  Service: Peripheral Vascular;  Laterality: N/A;      No family history on file.  Social History     Tobacco Use    Smoking status: Never    Smokeless tobacco: Never   Substance Use Topics    Alcohol use: Never     Medications Prior to Admission   Medication Sig Dispense Refill Last Dose/Taking    atorvastatin (LIPITOR) 20 MG tablet Take 20 mg by mouth every evening.   10/3/2024    calcium carbonate (TUMS) 200 mg calcium (500 mg)  chewable tablet Take 2 tablets (1,000 mg total) by mouth 2 (two) times daily. 120 tablet 11 10/3/2024 Morning    ergocalciferol (ERGOCALCIFEROL) 50,000 unit Cap Take 1 capsule (50,000 Units total) by mouth every 72 hours. 10 capsule 2 Past Week    FEROSUL 325 mg (65 mg iron) Tab tablet Take 325 mg by mouth once daily.   10/3/2024 Morning    folic acid (FOLVITE) 1 MG tablet Take 1 tablet (1 mg total) by mouth once daily. 30 tablet 0 10/3/2024 Morning    labetaloL (NORMODYNE) 200 MG tablet Take 1 tablet (200 mg total) by mouth every 12 (twelve) hours. 60 tablet 11 10/3/2024 Morning    loratadine (CLARITIN) 10 mg tablet Take 10 mg by mouth once daily.   10/3/2024    pantoprazole (PROTONIX) 40 MG tablet Take 1 tablet (40 mg total) by mouth once daily. 90 tablet 3 10/3/2024 Morning    senna-docusate 8.6-50 mg (SENNA WITH DOCUSATE SODIUM) 8.6-50 mg per tablet Take 1 tablet by mouth daily as needed for Constipation.   Past Week    sevelamer carbonate (RENVELA) 800 mg Tab Take 800 mg by mouth 3 (three) times daily with meals.   10/3/2024 Morning    polyethylene glycol (GLYCOLAX) 17 gram PwPk Take 17 g by mouth once daily. (Patient not taking: Reported on 10/4/2024)   Not Taking     Review of patient's allergies indicates:   Allergen Reactions    Asa [aspirin] Anaphylaxis    Penicillins      Received ceftriaxone from 6/27, no reactions             Review of Systems:     Comprehensive 10pt ROS negative except as noted per history.      Objective:       VITAL SIGNS: 24 HR MIN & MAX LAST    Temp  Min: 98 °F (36.7 °C)  Max: 99.6 °F (37.6 °C)  98.3 °F (36.8 °C)        BP  Min: 115/62  Max: 164/69  (!) 152/77     Pulse  Min: 77  Max: 104  77     Resp  Min: 16  Max: 20  20    SpO2  Min: 93 %  Max: 99 %  99 %      GEN:  Morbidly obese.  Awake alert oriented to time person place.  No respiratory distress noted.  HEENT unremarkable  CV: RRR without rub or gallop.  PULM: CTAB, unlabored  ABD: Soft, NT/ND abdomen with NABS  :   Nephrostomy tubes in both right and left kidneys.  Draining serosanguineous fluids.  EXT: No cyanosis or edema  SKIN: Warm and dry  PSYCH: Awake, alert and oriented x3 now.  Dialysis access:  None.            Component Value Date/Time     10/09/2024 0650     10/08/2024 0458    K 4.1 10/09/2024 0650    K 4.4 10/08/2024 0458    CO2 27 10/09/2024 0650    CO2 21 (L) 10/08/2024 0458    BUN 28.0 (H) 10/09/2024 0650    BUN 42.4 (H) 10/08/2024 0458    CREATININE 3.49 (H) 10/09/2024 0650    CREATININE 4.42 (H) 10/08/2024 0458    CALCIUM 8.0 (L) 10/09/2024 0650    CALCIUM 8.2 (L) 10/08/2024 0458    PHOS 7.2 (H) 10/05/2024 0315            Component Value Date/Time    WBC 21.12 (H) 10/09/2024 0650    WBC 26.23 (H) 10/08/2024 0458    WBC 25.58 10/07/2024 0541    WBC 26.04 10/06/2024 0449    HGB 11.1 (L) 10/09/2024 0650    HGB 11.0 (L) 10/08/2024 0458    HCT 33.2 (L) 10/09/2024 0650    HCT 31.3 (L) 10/08/2024 0458     10/09/2024 0650     10/08/2024 0458             CT Abdomen Pelvis With IV Contrast NO Oral Contrast   Final Result      1. No significant interval change compared to previous exam.  Ureteral stents in place.   2. Duplicated right kidney and right ureter with persistent hydroureteronephrosis of the lower pole moiety.         Electronically signed by: Deidra Dobson   Date:    10/08/2024   Time:    06:49      CT Head Without Contrast   Final Result      No acute intracranial findings.         Electronically signed by: Alexx Patricia   Date:    10/07/2024   Time:    10:46      MRI Lumbar Spine W WO Cont   Final Result      No acute abnormality of the lumbar spine.         Electronically signed by: Edmundo Vásquez MD   Date:    10/05/2024   Time:    16:55      MRI Thoracic Spine W WO Cont   Final Result      Very limited examination due to significant motion artifact      No evidence of osteomyelitis or discitis seen at no evidence of epidural abscess seen in thoracic spine.  Lumbar and cervical  spine discussed on separate report         Electronically signed by: Garland Raya   Date:    10/05/2024   Time:    16:57      MRI Cervical Spine W WO Cont   Final Result      Very limited examination due to motion artifact.  Only gross findings are visible.  Fine details are not visible.  No obvious epidural abscess is seen.  There is some increased signal in the cord at C6-C7 which shows some enhancement.  Findings could represent intra cord lesion or possible myelomalacia.         Electronically signed by: Garland Raya   Date:    10/05/2024   Time:    16:55      IR Nephrostomy Tube Placement   Final Result      Technically successful Nephrostomy Tube placement.         Electronically signed by: Andrew Buenrostro   Date:    10/04/2024   Time:    16:37      US Guided Needle Placement   Final Result      Technically successful Nephrostomy Tube placement.         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  The upper pole moiety is decompressed with a ureteral stent with the lower pole more demonstrating moderate severe hydronephrosis which is persistent from the prior exam.      Left nephrostomy ureteral stent drain with resolution of hydronephrosis.      Enlarged right pelvic lymph node increased in size from the prior, malignancy suspected.      Mildly enlarged upper normal sized lymph nodes in the periaortic region and left pelvis, nonspecific, similar to the prior exam.         Electronically signed by: Tete Sun MD   Date:    10/04/2024   Time:    07:37      X-Ray Lumbar Spine Ap And Lateral   Final Result      Limited examination due to patient's body habitus but degenerative changes seen throughout the lower lumbar spine          Electronically signed by: Garland Raya   Date:    10/03/2024   Time:    18:11          Assessment / Plan:       ESRD - normally TTS at [facility]  Metabolic acidosis  MRSA bacteremia  Bilateral hydronephrosis - now with nephrostomy tube both sides    Plan:  Zoloft 25 mg p.o. q.d. starting today for anxiety.    Okay to wait till Friday to get a tunneled dialysis catheter placement done

## 2024-10-09 NOTE — PROGRESS NOTES
[facility] Hospital Medicine Progress Note        Chief Complaint: Inpatient Follow-up for     HPI: 69-year-old female with past medical history of hypertension, hyperlipidemia, chronic hydronephrosis with left nephrostomy tube, right ureteral stent, end-stage renal disease on hemodialysis, chronic anemia presented with right flank pain for the past 5 days and also reported cloudy urine output from the urostomy CT with IV contrast showed moderate to severe hydronephrosis with enlarged right pelvic lymph node increase in size from the prior with suspected malignancy also showed duplicated collecting system with dilation of the right lower pole urology was consulted patient is status post cystoscopy with right stent exchange and bladder biopsy and they were not able to identify the left ureteral orifice therefore had left percutaneous nephrostomy. Blood culture is growing Gram-positive cocci 2/2 bottles initially patient was started on cefepime but now we added vancomycin repeat blood cultures ordered  Id was consulted MRI of the CT and L-spine was ordered that was negative for any abscess blood culture is growing MRSA TTE as such did not show any vegetation cardiology consulted for MADELYN  Bilateral hydronephrosis Status post right upper lobe ureteral stent exchange and right lower pole nephrostomy tube placement  Cultures remains persistently positive.  Patient was slightly confused on October 7 so we had ordered CT of the head without contrast that was negative, ammonia, TSH everything was normal we also ordered CT of the abdomen and pelvis with contrast that was negative for any abscess cardiology consulted for MADELYN.  The was negative for endocarditis.  Dialysis catheter was removed  Interval Hx:   Patient seen and examined this morning much more alert awake oriented x4 patient still has a lot of bloody output from the urostomy bag    Objective/physical exam:  General: In no acute  distress, afebrile  Chest: Clear to auscultation bilaterally  Heart: RRR, +S1, S2, no appreciable murmur  Abdomen: Soft, nontender, BS +  MSK: Warm, no lower extremity edema, no clubbing or cyanosis  Neurologic:  Alert awake oriented x4  VITAL SIGNS: 24 HRS MIN & MAX LAST   Temp  Min: 98 °F (36.7 °C)  Max: 99.6 °F (37.6 °C) 98 °F (36.7 °C)   BP  Min: 115/62  Max: 180/83 (!) 180/83   Pulse  Min: 77  Max: 104  100   Resp  Min: 16  Max: 20 20   SpO2  Min: 93 %  Max: 99 % 99 %     I have reviewed the following labs:  Recent Labs   Lab 10/07/24  0541 10/08/24  0458 10/09/24  0650   WBC 25.58  25.58* 26.23* 21.12*   RBC 3.82* 3.65* 3.78*   HGB 11.4* 11.0* 11.1*   HCT 33.3* 31.3* 33.2*   MCV 87.2 85.8 87.8   MCH 29.8 30.1 29.4   MCHC 34.2 35.1 33.4   RDW 15.9 16.0 15.8    255 208   MPV 10.2 11.4* 10.2     Recent Labs   Lab 10/04/24  0311 10/05/24  0315 10/06/24  0449 10/07/24  0541 10/07/24  1125 10/08/24  0458 10/09/24  0650   * 131* 133* 135*  --  136 136   K 4.4 5.0 4.1 4.7  --  4.4 4.1   CL 96* 94* 91* 93*  --  97* 94*   CO2 13* 14* 22* 17*  --  21* 27   BUN 70.0* 85.5* 57.7* 74.7*  --  42.4* 28.0*   CREATININE 6.98* 8.03* 5.84* 6.80*  --  4.42* 3.49*   CALCIUM 8.7 8.0* 8.1* 7.5*  --  8.2* 8.0*   PH  --   --   --   --  7.410  --   --    MG 2.20  --   --   --   --   --   --    ALBUMIN 2.3* 2.2* 2.2* 2.3*  --   --   --    ALKPHOS 182*  --  219* 209*  --   --   --    *  --  103* 71*  --   --   --    AST 91*  --  89* 43*  --   --   --    BILITOT 0.4  --  0.4 0.4  --   --   --      Microbiology Results (last 7 days)       Procedure Component Value Units Date/Time    Blood Culture [[id number]]     Order Status: Sent Specimen: Blood     Blood Culture [[id number]]     Order Status: Sent Specimen: Blood     Blood Culture [[id number]]  (Abnormal) Collected: 10/07/24 2103    Order Status: Completed Specimen: Blood Updated: 10/09/24 1201     Blood Culture Susceptibility To Follow      Staphylococcus aureus      GRAM STAIN Gram Positive Cocci, probable Staphylococcus      Seen in gram stain of broth only      1 of 1 Pediatric bottle positive    Blood Culture [[id number]]  (Abnormal) Collected: 10/07/24 2103    Order Status: Completed Specimen: Blood Updated: 10/09/24 0634     GRAM STAIN 1 of 1 Pediatric bottle positive      Gram Positive Cocci, probable Staphylococcus      Seen in gram stain of broth only    Blood Culture [[id number]]  (Abnormal)  (Susceptibility) Collected: 10/05/24 0620    Order Status: Completed Specimen: Blood Updated: 10/08/24 0810     Blood Culture Methicillin resistant Staphylococcus aureus     GRAM STAIN Gram Positive Cocci, probable Staphylococcus      Seen in gram stain of broth only      2 of 2 bottles positive    Blood Culture [[id number]]  (Abnormal)  (Susceptibility) Collected: 10/05/24 0620    Order Status: Completed Specimen: Blood Updated: 10/08/24 0809     Blood Culture Methicillin resistant Staphylococcus aureus     GRAM STAIN Gram Positive Cocci, probable Staphylococcus      2 of 2 bottles positive      Seen in gram stain of broth only    Blood culture #2 **CANNOT BE ORDERED STAT** [[id number]]  (Abnormal)  (Susceptibility) Collected: 10/03/24 2020    Order Status: Completed Specimen: Blood Updated: 10/06/24 0627     Blood Culture Methicillin resistant Staphylococcus aureus     GRAM STAIN Gram Positive Cocci, probable Staphylococcus      Seen in gram stain of broth only      1 of 1 Pediatric bottle positive    Blood culture #1 **CANNOT BE ORDERED STAT** [[id number]]  (Abnormal)  (Susceptibility) Collected: 10/03/24 2020    Order Status: Completed Specimen: Blood Updated: 10/06/24 0627     Blood Culture Methicillin resistant Staphylococcus aureus     GRAM STAIN Gram Positive Cocci, probable Staphylococcus      Seen in gram stain of broth only      2 of 2 bottles positive    Urine culture [[id number]] Collected: 10/03/24 2008    Order Status: Completed Specimen: Urine Updated: 10/05/24  1003     Urine Culture No Significant Growth    Blood Culture [[id number]]     Order Status: Canceled Specimen: Blood     BCID2 Panel [[id number]]  (Abnormal) Collected: 10/03/24 2020    Order Status: Completed Specimen: Blood Updated: 10/04/24 1107     CTX-M (ESBL ) N/A     IMP (Cabapenemase ) N/A     KPC resistance gene (Carbapenemase ) N/A     mcr-1 N/A     mecA ID N/A     Comment: Note: Antimicrobial resistance can occur via multiple mechanisms. A Not Detected result for antimicrobial resistance gene(s) does not indicate antimicrobial susceptibility. Subculturing is required for species identification and susceptibility testing of   isolates.        mecA/C and MREJ (MRSA) gene Detected     NDM (Carbapenemase ) N/A     OXA-48-like (Carbapenemase ) N/A     Royal/B (VRE gene) N/A     VIM (Carbapenemase ) N/A     Enterococcus faecalis Not Detected     Enterococcus faecium Not Detected     Listeria monocytogenes Not Detected     Staphylococcus spp. Detected     Staphylococcus aureus Detected     Staphylococcus epidermidis Not Detected     Staphylococcus lugdunensis Not Detected     Streptococcus spp. Not Detected     Streptococcus agalactiae (Group B) Not Detected     Streptococcus pneumoniae Not Detected     Streptococcus pyogenes (Group A) Not Detected     Acinetobacter calcoaceticus/baumannii complex Not Detected     Bacteroides fragilis Not Detected     Enterobacterales Not Detected     Enterobacter cloacae complex Not Detected     Escherichia coli Not Detected     Klebsiella aerogenes Not Detected     Klebsiella oxytoca Not Detected     Klebsiella pneumoniae group Not Detected     Proteus spp. Not Detected     Salmonella spp. Not Detected     Serratia marcescens Not Detected     Haemophilus influenzae Not Detected     Neisseria meningitidis Not Detected     Pseudomonas aeruginosa Not Detected     Stenotrophomonas maltophilia Not Detected     Candida albicans Not  Detected     Candida auris Not Detected     Candida glabrata Not Detected     Candida krusei Not Detected     Candida parapsilosis Not Detected     Candida tropicalis Not Detected     Cryptococcus neoformans/gattii Not Detected    Narrative:      The [employer] BCID2 Panel is a multiplexed nucleic acid test intended for the use with [employer]® 2.0 or 5 [employer]back® [employer] for the simultaneous qualitative detection and identification of multiple bacterial and yeast nucleic acids and select genetic determinants associated with antimicrobial resistance.  The [employer] BCID2 Panel test is performed directly on blood culture samples identified as positive by a continuous monitoring blood culture system.  Results are intended to be interpreted in conjunction with Gram stain results.             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  Left percutaneous nephrostomy  End-stage renal disease on hemodialysis  Metabolic acidosis  Transaminitis, improving  History of hypertension, hyperlipidemia, bilateral chronic hydronephrosis with left nephrostomy tube in place and right ureter stent, ESRD on HD TTS, and chronic anemia     Repeat blood cultures again today  MADELYN done on October 8 was negative   Dialysis catheter was removed on October 8th  CT of the head without contrast was negative for any acute infarct me ordered ABGs that showed   TSH is slightly elevated we will order free T4  Repeat blood cultures were ordered on October 7th  Blood cultures were repeated on October 7 and they are positive TTE negative, MRI of the CT and L-spine negative for any  abscess   Continue with vancomycin   urology following and aware of the biopsy results  Repeat blood work in a.m.    Prophylaxis: Lovenox  VTE prophylaxis:     Patient condition:  Stable/Fair/Guarded/ Serious/ Critical    Anticipated discharge and Disposition:         All diagnosis and differential diagnosis have been reviewed; assessment and plan has been documented; I have personally reviewed the labs and test results that are presently available; I have reviewed the patients medication list; I have reviewed the consulting providers response and recommendations. I have reviewed or attempted to review medical records based upon their availability    All of the patient's questions have been  addressed and answered. Patient's is agreeable to the above stated plan. I will continue to monitor closely and make adjustments to medical management as needed.    Portions of this note dictated using EMR integrated voice recognition software, and may be subject to voice recognition errors not corrected at proofreading. Please contact writer for clarification if needed.   _____________________________________________________________________    Malnutrition Status:    Scheduled Med:   atorvastatin  20 mg Oral QHS    cefTRIAXone (Rocephin) IV (PEDS and ADULTS)  1 g Intravenous Once    enoxaparin  30 mg Subcutaneous Daily    folic acid  1 mg Oral Daily    labetaloL  200 mg Oral Q12H    ondansetron  4 mg Intravenous Once    pantoprazole  40 mg Oral Daily    sertraline  25 mg Oral Daily    sevelamer carbonate  800 mg Oral TID WM      Continuous Infusions:       PRN Meds:    Current Facility-Administered Medications:     acetaminophen, 650 mg, Oral, Q4H PRN    albuterol-ipratropium, 3 mL, Nebulization, Once PRN    aluminum-magnesium hydroxide-simethicone, 30 mL, Oral, QID PRN    diphenhydrAMINE, 25 mg, Intravenous, Q6H PRN    HYDROcodone-acetaminophen, 1 tablet, Oral, Q4H PRN    HYDROcodone-acetaminophen, 1 tablet, Oral, Q6H PRN     melatonin, 6 mg, Oral, Nightly PRN    metoclopramide, 10 mg, Intravenous, Q10 Min PRN    ondansetron, 4 mg, Intravenous, Q4H PRN    polyethylene glycol, 17 g, Oral, BID PRN    prochlorperazine, 5 mg, Intravenous, Q6H PRN    senna-docusate 8.6-50 mg, 2 tablet, Oral, BID PRN    sodium chloride 0.9%, 10 mL, Intravenous, PRN    vancomycin - pharmacy to dose, , Intravenous, pharmacy to manage frequency     Radiology:  I have personally reviewed the following imaging and agree with the radiologist.     Transesophageal echo (MADELYN)  Procedure: MADELYN    Indication: MRSA bacteremia    Findings:  No valvular vegetations.  CT Abdomen Pelvis With IV Contrast NO Oral Contrast  Narrative: EXAMINATION:  CT ABDOMEN PELVIS WITH IV CONTRAST    CLINICAL HISTORY:  Abdominal abscess/infection suspected;    TECHNIQUE:  Helically acquired images with axial, sagittal and coronal reformations were obtained from the lung bases to the pubic symphysis after the IV administration of contrast.    Automated tube current modulation, weight-based exposure dosing, and/or iterative reconstruction technique utilized to reach lowest reasonably achievable exposure rate.    DLP: 1526 mGy*cm    COMPARISON:  CT abdomen pelvis 10/03/2024    FINDINGS:  HEART: Cardiomegaly.  There are calcifications in the region the aortic valve and mitral valve annulus.    LUNG BASES: Basilar atelectasis.    LIVER: Normal attenuation. No appreciable focal hepatic lesion.    BILIARY: Vicarious excretion of contrast at the gallbladder.    PANCREAS: Evaluation for pancreatitis limited given mild anasarca and body habitus.    SPLEEN: Normal in size    ADRENALS: No mass.    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  Right percutaneous nephrostomy at the upper pole moiety.  Right ureteral stent at the upper pole moiety.  Decompressed upper pole moiety.  Hydronephrotic lower pole moiety with AP diameter of the renal pelvis measuring approximately 3.5  cm, unchanged.  The right lower pole ureter is dilated to the level of the ureterovesical junction.  Left percutaneous nephroureteral stent.  No left hydronephrosis.    GI TRACT/MESENTERY:  No evidence of bowel obstruction or inflammation.    PERITONEUM: No free fluid.No free air.    LYMPH NODES: Presumed reactive retroperitoneal lymph nodes.    VASCULATURE: Aortoiliac atherosclerosis.    BLADDER: Nondistended bladder limits CT evaluation    REPRODUCTIVE ORGANS: Normal as visualized.    SOFT TISSUES: Body wall edema.    BONES: No acute osseous abnormality.    LIMITATIONS: Exam limited by artifact related to patient body habitus, positioning, dose lowering technique and/or motion.  Impression: 1. No significant interval change compared to previous exam.  Ureteral stents in place.  2. Duplicated right kidney and right ureter with persistent hydroureteronephrosis of the lower pole moiety.    Electronically signed by: Deidra Dobson  Date:    10/08/2024  Time:    06:49      Macarena Leggett MD  Department of Hospital Medicine   [facility]   10/09/2024

## 2024-10-09 NOTE — PT/OT/SLP EVAL
[facility]  Speech Language Pathology Department  Clinical Swallow Evaluation    Patient Name:  Fior Joya   MRN:  [id number]    Recommendations     General recommendations:  SLP follow up x1  Solid texture recommendation:  Easy to Chew Diet - IDDSI Level 7  Liquid consistency recommendation: Thin liquids - IDDSI Level 0   Medications: per patient preference  Swallow strategies/precautions: small bites/sips, slow rate, and upright for PO intake    History     Fior Joya is a/n 69 y.o. female with a history of chronic hydronephrosis with left nephrostomy tube, right ureteral stent and chronic anemia was admitted with right flank pain. SLP initially evaluated 10/7, however pt was lethargic. SLP to re-evaluate.     Past Surgical History:   Procedure Laterality Date    CYSTOSCOPY W/ URETERAL STENT PLACEMENT Right 6/27/2024    Procedure: CYSTOSCOPY, WITH URETERAL STENT INSERTION;  Surgeon: Otis Person MD;  Location: [facility];  Service: Urology;  Laterality: Right;  CYSTOSCOPY, BILATERAL RETROGRADE PYELOGRAMS, POSSIBLE STENT PLACEMENT.    CYSTOSCOPY W/ URETERAL STENT PLACEMENT Bilateral 10/4/2024    Procedure: CYSTOSCOPY, WITH URETERAL STENT INSERTION;  Surgeon: Tawana Loaiza MD;  Location: [facility] OR;  Service: Urology;  Laterality: Bilateral;  CYSTO WITH BILAT URETERAL STENT EXCHANGE    INSERTION OF TUNNELED CENTRAL VENOUS HEMODIALYSIS CATHETER N/A 7/3/2024    Procedure: Insertion, Catheter, Central Venous, Hemodialysis;  Surgeon: Uche Barcenas MD;  Location: [facility] CATH LAB;  Service: Peripheral Vascular;  Laterality: N/A;     Home diet texture/consistency: Regular and thin liquids  Current method of nutrition: NPO    Imaging   Results for orders placed during the hospital encounter of 06/22/24    X-Ray Chest 1 View    Narrative  EXAMINATION:  XR CHEST 1 VIEW    CLINICAL HISTORY:  sob;    TECHNIQUE:  Single frontal view of the chest was  performed.    COMPARISON:  06/22/2024    FINDINGS:  There is pulmonary edema and pulmonary venous congestion bilaterally.  There is no significant pleural effusion..  The heart is enlarged in size.  Aorta is unremarkable.  The bones and joints show no acute abnormality.    Impression  Findings seen consistent with CHF/increased volume status      Electronically signed by: Garland Raya  Date:    06/22/2024  Time:    13:06    Subjective     Patient awake, alert, oriented x4, and cooperative.  Spiritual/Cultural/Restorationism Beliefs/Practices that affect care:  no    Pain/Comfort:  0/10    Respiratory Status:  room air    Objective     ORAL MUSCULATURE  Dentition: own teeth  Secretion Management: adequate  Mucosal Quality: good  Facial Movement: WFL  Buccal Strength & Mobility: WFL  Mandibular Strength & Mobility: WFL  Vocal Quality: adequate  Volitional Cough: Able to clear secretions    PO TRIALS  Consistency Fed By Oral Symptoms Pharyngeal Symptoms   Thin liquid by straw Self None None   Puree Self None None   Chewable solid Self Prolonged bolus formation/mastication None     Assessment     No signs/sx of aspiration observed. REC: Easy to chew solids and thin liquids. SLP to follow up x1 regarding diet tolerance.     Outcome Measures     Functional Oral Intake Scale: 6 - Total oral diet with multiple consistencies without special preparation, but with specific food limitation    Education     Patient provided with verbal education regarding POC.  Understanding was verbalized.    Plan     SLP Follow-Up:    10/10/24  Plan of Care reviewed with:  patient     Time Tracking     SLP Treatment Date:   10/09/24  Speech Start Time:  0910  Speech Stop Time:  0925     Speech Total Time (min):  15 min    Billable minutes:  Swallow and Oral Function Evaluation, 15 minutes     10/09/2024

## 2024-10-09 NOTE — PROGRESS NOTES
.UROLOGY  PROGRESS  NOTE    Fior Joya 1955  [id number]  10/9/2024    POD 5 cystoscopy, right stent exchange upper pole moiety, bladder biopsy; nephrostomy tube placement in lower pole of right kidney with IR    Patient resting in bed  C/o back pain   No acute events overnight     VSS, afebrile  150ml left PCN (300 yesterday)  0ml right PCN overnight (30 yesterday)  WBC 21.12  H&H 11.1/33.2  Bun/cr 28/3.49      Exam:    NAD  Resp unlabored  Abd obese, soft, NTND   bloody drainage right PCN, left PCN with cranberry drainage       Recent Results (from the past 24 hours)   Transesophageal echo (MADELYN)    Collection Time: 10/08/24  3:08 PM   Result Value Ref Range    BSA 2.5 m2   Basic Metabolic Panel    Collection Time: 10/09/24  6:50 AM   Result Value Ref Range    Sodium 136 136 - 145 mmol/L    Potassium 4.1 3.5 - 5.1 mmol/L    Chloride 94 (L) 98 - 107 mmol/L    CO2 27 23 - 31 mmol/L    Glucose 121 (H) 82 - 115 mg/dL    Blood Urea Nitrogen 28.0 (H) 9.8 - 20.1 mg/dL    Creatinine 3.49 (H) 0.55 - 1.02 mg/dL    BUN/Creatinine Ratio 8     Calcium 8.0 (L) 8.4 - 10.2 mg/dL    Anion Gap 15.0 mEq/L    eGFR 14 mL/min/1.73/m2   CBC with Differential    Collection Time: 10/09/24  6:50 AM   Result Value Ref Range    WBC 21.12 (H) 4.50 - 11.50 x10(3)/mcL    RBC 3.78 (L) 4.20 - 5.40 x10(6)/mcL    Hgb 11.1 (L) 12.0 - 16.0 g/dL    Hct 33.2 (L) 37.0 - 47.0 %    MCV 87.8 80.0 - 94.0 fL    MCH 29.4 27.0 - 31.0 pg    MCHC 33.4 33.0 - 36.0 g/dL    RDW 15.8 11.5 - 17.0 %    Platelet 208 130 - 400 x10(3)/mcL    MPV 10.2 7.4 - 10.4 fL    Neut % 83.9 %    Lymph % 7.3 %    Mono % 4.8 %    Eos % 0.7 %    Basophil % 0.2 %    Lymph # 1.54 0.6 - 4.6 x10(3)/mcL    Neut # 17.71 (H) 2.1 - 9.2 x10(3)/mcL    Mono # 1.02 0.1 - 1.3 x10(3)/mcL    Eos # 0.15 0 - 0.9 x10(3)/mcL    Baso # 0.05 <=0.2 x10(3)/mcL    IG# 0.65 (H) 0 - 0.04 x10(3)/mcL    IG% 3.1 %    NRBC% 0.0 %     Pathology Result    Comment:          FINAL DIAGNOSIS     BLADDER TISSUE,  BIOPSY:     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     NO DETRUSOR MUSCLE IS PRESENT FOR EVALUATION.     Electronically Signed by:  Sánchez Goodwin M.D. , Pathologist  (Case signed 10/08/2024 at 01:25pm)     Comment  The differential diagnosis includes pure squamous cell carcinoma and urothelial  carcinoma with extensive squamous differentiation. This distinction requires  evaluation of the entire resected tumor. In addition, clinical/radiological  correlation is advised to insure the tumor is compatible with a bladder primary.  This case has also been reviewed in intradepartmental consultation.     Source  BLADDER TISSUE     Clinical Information  BLADDER     Gross Description  Received fresh, and subsequently placed in formalin labeled `Fior Joya,  bladder` and listed on the requisition as `bladder tissue` are 4 irregular  fragments of pink-tan friable tissue that range from 0.4 to 1.6 cm in greatest  dimension.  Submitted intact in cassette 1A.  0-F-1     AM 10/7/2024     Microscopic Description  1. Microscopic examination performed.  Please see diagnosis.       Assessment:  Bilateral hydronephrosis  -had left PCN placed in June of this year due to inability to place retrograde ureteral stent, also had right ureteral stent placed in one of the two collecting systems on 6/27  -s/p Cystoscopy with right stent exchange upper pole moiety, Retrograde pyelogram and Bladder biopsy 10/4  -unable to identify UO intra-op therefore IR placed a right PCN to the collecting system without a stent 10/4    Questionable bladder mass  -pathology reveals carcinoma with extensive squamous differentiation    Urosepsis  -blood cultures +MRSA, on rocephin  -repeat urine cultures with no growth  -WBC improving    ESRD   -on HD      Plan:  -continue bilateral nephrostomy tubes on d/c  -will discuss pathology with patient's established urologist, Dr. Otis Person.        Donna Varghese, [facility]

## 2024-10-09 NOTE — PLAN OF CARE
Problem: Adult Inpatient Plan of Care  Goal: Plan of Care Review  Outcome: Progressing  Goal: Patient-Specific Goal (Individualized)  Outcome: Progressing  Goal: Absence of Hospital-Acquired Illness or Injury  Outcome: Progressing  Goal: Optimal Comfort and Wellbeing  Outcome: Progressing  Goal: Readiness for Transition of Care  Outcome: Progressing     Problem: Bariatric Environmental Safety  Goal: Safety Maintained with Care  Outcome: Progressing     Problem: Skin Injury Risk Increased  Goal: Skin Health and Integrity  Outcome: Progressing     Problem: Infection  Goal: Absence of Infection Signs and Symptoms  Outcome: Progressing

## 2024-10-09 NOTE — PROGRESS NOTES
Vascular Surgery - Progress Note  Vega Gusman PA-C    Patient Name: Fior Joya                   : 1955     MRN: [id number]   Date of Admission: 10/3/2024  Date of Exam: 10/09/2024     Subjective:     Interval History: No acute issues over night. Vitals stable, afebrile.  Still complaining of low back pain, same as yesterday. Tunneled chest wall cath was pulled yesterday; denies any issues.     Objective     Vital Signs (Most Recent):      Temp: 98.3 °F (36.8 °C) (10/09/24 0602)  Pulse: 77 (10/09/24 0602)  Resp: 20 (10/09/24 0602)  BP: (!) 152/77 (10/09/24 0602)  SpO2: 99 % (10/09/24 0602) Vital Signs (24h Range):    Temp:  [98 °F (36.7 °C)-99.6 °F (37.6 °C)] 98.3 °F (36.8 °C)  Pulse:  [] 77  Resp:  [16-20] 20  SpO2:  [93 %-99 %] 99 %  BP: (115-164)/(57-77) 152/77        Intake/Output - Last 3 Shifts         10/07 0700  10/08 0659 10/08 0700  10/09 0659 10/09 0700  10/10 0659    Other 500 0     Total Intake(mL/kg) 500 (3.6) 0 (0)     Urine (mL/kg/hr) 575 (0.2) 480 (0.1)     Other 500 3000     Total Output 1075 3480     Net -575 -3480                  Significant Labs:  CBC:   Recent Labs   Lab 10/09/24  0650   WBC 21.12*   RBC 3.78*   HGB 11.1*   HCT 33.2*      MCV 87.8   MCH 29.4   MCHC 33.4     CMP:   Recent Labs   Lab 10/09/24  0650   CALCIUM 8.0*      K 4.1   CO2 27   CL 94*   BUN 28.0*   CREATININE 3.49*     Significant Diagnostics:        Physical Exam:     Constitutional: Awake, alert, in good spirits  Respiratory: Normal respiratory effort  Musculoskeletal: ROM noted in all extremities    Neurologic: Strength and sensation are equal bilaterally  Skin: Right chest wall with dressing, no saturation noted       Assessment and Plan     Ms. Joya is a 69 y.o. female with ESRD on HD (TTS) who had right tunneled chest wall catheter removed yesterday due to persistent bacteremia and need for line holiday.   - Recommend repeat blood cultures in order to steer whether she gets temp  cath or TDC on Friday     The above findings, diagnostics, and treatment plan were discussed with Dr. Barcenas who is in agreement with the plan of care except as stated in additional documentation.      Vega Gusman PA-C  Vascular Surgery  [facility]    Active Diagnoses:     There are no hospital problems to display for this patient.        Medications:     Continuous Infusion:     Scheduled Medications:    atorvastatin  20 mg Oral QHS    cefTRIAXone (Rocephin) IV (PEDS and ADULTS)  1 g Intravenous Once    enoxaparin  30 mg Subcutaneous Daily    folic acid  1 mg Oral Daily    labetaloL  200 mg Oral Q12H    ondansetron  4 mg Intravenous Once    pantoprazole  40 mg Oral Daily    sevelamer carbonate  800 mg Oral TID WM       PRN Medications:   Current Facility-Administered Medications:     acetaminophen, 650 mg, Oral, Q4H PRN    albuterol-ipratropium, 3 mL, Nebulization, Once PRN    aluminum-magnesium hydroxide-simethicone, 30 mL, Oral, QID PRN    diphenhydrAMINE, 25 mg, Intravenous, Q6H PRN    HYDROcodone-acetaminophen, 1 tablet, Oral, Q4H PRN    HYDROcodone-acetaminophen, 1 tablet, Oral, Q6H PRN    melatonin, 6 mg, Oral, Nightly PRN    metoclopramide, 10 mg, Intravenous, Q10 Min PRN    ondansetron, 4 mg, Intravenous, Q4H PRN    polyethylene glycol, 17 g, Oral, BID PRN    prochlorperazine, 5 mg, Intravenous, Q6H PRN    senna-docusate 8.6-50 mg, 2 tablet, Oral, BID PRN    sodium chloride 0.9%, 10 mL, Intravenous, PRN    vancomycin - pharmacy to dose, , Intravenous, pharmacy to manage frequency

## 2024-10-10 LAB
ACB COMPLEX DNA BLD POS QL NAA+NON-PROBE: NOT DETECTED
ANION GAP SERPL CALC-SCNC: 17 MEQ/L
B FRAGILIS DNA BLD POS QL NAA+PROBE: NOT DETECTED
BACTERIA BLD CULT: ABNORMAL
BUN SERPL-MCNC: 44 MG/DL (ref 9.8–20.1)
C ALBICANS DNA BLD POS QL NAA+PROBE: NOT DETECTED
C AURIS DNA BLD POS QL NAA+NON-PROBE: NOT DETECTED
C GATTII+NEOFOR DNA CSF QL NAA+NON-PROBE: NOT DETECTED
C GLABRATA DNA BLD POS QL NAA+PROBE: NOT DETECTED
C KRUSEI DNA BLD POS QL NAA+PROBE: NOT DETECTED
C PARAP DNA BLD POS QL NAA+PROBE: NOT DETECTED
C TROPICLS DNA BLD POS QL NAA+PROBE: NOT DETECTED
CALCIUM SERPL-MCNC: 8.1 MG/DL (ref 8.4–10.2)
CHLORIDE SERPL-SCNC: 90 MMOL/L (ref 98–107)
CO2 SERPL-SCNC: 25 MMOL/L (ref 23–31)
COLISTIN RES MCR-1 ISLT/SPM QL: ABNORMAL
CREAT SERPL-MCNC: 4.57 MG/DL (ref 0.55–1.02)
CREAT/UREA NIT SERPL: 10
E CLOAC COMP DNA BLD POS QL NAA+PROBE: NOT DETECTED
E COLI DNA BLD POS QL NAA+PROBE: NOT DETECTED
E FAECALIS+OTHR E SP RRNA BLD POS FISH: NOT DETECTED
E FAECIUM HSP60 BLD POS QL PROBE: NOT DETECTED
ENTEROBACTERALES DNA BLD POS NAA+N-PRB: NOT DETECTED
ESBL CFT TO CFT-CLAV IC RTO BD POS IMP: ABNORMAL
GFR SERPLBLD CREATININE-BSD FMLA CKD-EPI: 10 ML/MIN/1.73/M2
GLUCOSE SERPL-MCNC: 137 MG/DL (ref 82–115)
GP B STREP DNA CSF QL NAA+NON-PROBE: NOT DETECTED
GRAM STN SPEC: ABNORMAL
HAEM INFLU DNA CSF QL NAA+NON-PROBE: NOT DETECTED
IMP CARBAPENEMASE ISLT QL IA.RAPID: ABNORMAL
K OXYTOCA OMPA BLD POS QL PROBE: NOT DETECTED
KLEBSIELLA SP DNA BLD POS QL NAA+NON-PRB: NOT DETECTED
KLEBSIELLA SP DNA BLD POS QL NAA+NON-PRB: NOT DETECTED
KPC CARBAPENEMASE ISLT QL IA.RAPID: ABNORMAL
L MONOCYTOG DNA CSF QL NAA+NON-PROBE: NOT DETECTED
MECA+MECC NOSE QL NAA+PROBE: ABNORMAL
MECA+MECC+MREJ ISLT/SPM QL: DETECTED
N MEN DNA CSF QL NAA+NON-PROBE: NOT DETECTED
NDM CARBAPENEMASE ISLT QL IA.RAPID: ABNORMAL
OXA-48-LIKE CRBPNASE ISLT QL IA.RAPID: ABNORMAL
P AERUGINOSA DNA BLD POS QL NAA+PROBE: NOT DETECTED
POTASSIUM SERPL-SCNC: 4.5 MMOL/L (ref 3.5–5.1)
PROTEUS SP DNA BLD POS QL NAA+PROBE: NOT DETECTED
S AUREUS DNA BLD POS QL NAA+PROBE: DETECTED
S ENT+BONG DNA STL QL NAA+NON-PROBE: NOT DETECTED
S EPIDERMIDIS HSP60 BLD POS QL PROBE: NOT DETECTED
S LUGDUNENSIS SODA BLD POS QL PROBE: NOT DETECTED
S MALTOPH DNA BLD POS QL NAA+PROBE: NOT DETECTED
S MARCESCENS DNA BLD POS QL NAA+PROBE: NOT DETECTED
S PNEUM DNA CSF QL NAA+NON-PROBE: NOT DETECTED
S PYOGENES HSP60 BLD POS QL PROBE: NOT DETECTED
SODIUM SERPL-SCNC: 132 MMOL/L (ref 136–145)
STAPH SP TUF BLD POS QL PROBE: DETECTED
STREPTOCOCCUS SP TUF BLD POS QL PROBE: NOT DETECTED
VAN(A+B+C1+C2) GENES ISLT/SPM: ABNORMAL
VANCOMYCIN SERPL-MCNC: 12.2 UG/ML (ref 15–20)
VIM CARBAPENEMASE ISLT QL IA.RAPID: ABNORMAL

## 2024-10-10 PROCEDURE — 63600175 PHARM REV CODE 636 W HCPCS: Performed by: UROLOGY

## 2024-10-10 PROCEDURE — 36415 COLL VENOUS BLD VENIPUNCTURE: CPT | Performed by: STUDENT IN AN ORGANIZED HEALTH CARE EDUCATION/TRAINING PROGRAM

## 2024-10-10 PROCEDURE — 25000003 PHARM REV CODE 250: Performed by: INTERNAL MEDICINE

## 2024-10-10 PROCEDURE — 80048 BASIC METABOLIC PNL TOTAL CA: CPT | Performed by: STUDENT IN AN ORGANIZED HEALTH CARE EDUCATION/TRAINING PROGRAM

## 2024-10-10 PROCEDURE — 36415 COLL VENOUS BLD VENIPUNCTURE: CPT | Performed by: INTERNAL MEDICINE

## 2024-10-10 PROCEDURE — 27000221 HC OXYGEN, UP TO 24 HOURS

## 2024-10-10 PROCEDURE — 80202 ASSAY OF VANCOMYCIN: CPT | Performed by: INTERNAL MEDICINE

## 2024-10-10 PROCEDURE — 97166 OT EVAL MOD COMPLEX 45 MIN: CPT

## 2024-10-10 PROCEDURE — 99233 SBSQ HOSP IP/OBS HIGH 50: CPT | Mod: ,,, | Performed by: GENERAL PRACTICE

## 2024-10-10 PROCEDURE — 25000003 PHARM REV CODE 250: Performed by: UROLOGY

## 2024-10-10 PROCEDURE — 99900035 HC TECH TIME PER 15 MIN (STAT)

## 2024-10-10 PROCEDURE — 63600175 PHARM REV CODE 636 W HCPCS: Performed by: INTERNAL MEDICINE

## 2024-10-10 PROCEDURE — 27000207 HC ISOLATION

## 2024-10-10 PROCEDURE — 97162 PT EVAL MOD COMPLEX 30 MIN: CPT

## 2024-10-10 PROCEDURE — 36415 COLL VENOUS BLD VENIPUNCTURE: CPT | Performed by: GENERAL PRACTICE

## 2024-10-10 PROCEDURE — 87184 SC STD DISK METHOD PER PLATE: CPT | Performed by: GENERAL PRACTICE

## 2024-10-10 PROCEDURE — 21400001 HC TELEMETRY ROOM

## 2024-10-10 PROCEDURE — 99223 1ST HOSP IP/OBS HIGH 75: CPT | Mod: ,,, | Performed by: INTERNAL MEDICINE

## 2024-10-10 RX ADMIN — SEVELAMER CARBONATE 800 MG: 800 TABLET, FILM COATED ORAL at 04:10

## 2024-10-10 RX ADMIN — ATORVASTATIN CALCIUM 20 MG: 10 TABLET, FILM COATED ORAL at 08:10

## 2024-10-10 RX ADMIN — SEVELAMER CARBONATE 800 MG: 800 TABLET, FILM COATED ORAL at 08:10

## 2024-10-10 RX ADMIN — LABETALOL HYDROCHLORIDE 200 MG: 200 TABLET, FILM COATED ORAL at 08:10

## 2024-10-10 RX ADMIN — HYDROCODONE BITARTRATE AND ACETAMINOPHEN 1 TABLET: 10; 325 TABLET ORAL at 10:10

## 2024-10-10 RX ADMIN — HYDROCODONE BITARTRATE AND ACETAMINOPHEN 1 TABLET: 5; 325 TABLET ORAL at 04:10

## 2024-10-10 RX ADMIN — SERTRALINE HYDROCHLORIDE 25 MG: 25 TABLET ORAL at 08:10

## 2024-10-10 RX ADMIN — HYDROCODONE BITARTRATE AND ACETAMINOPHEN 1 TABLET: 10; 325 TABLET ORAL at 08:10

## 2024-10-10 RX ADMIN — FOLIC ACID 1 MG: 1 TABLET ORAL at 08:10

## 2024-10-10 RX ADMIN — ACETAMINOPHEN 650 MG: 325 TABLET ORAL at 11:10

## 2024-10-10 RX ADMIN — ACETAMINOPHEN 650 MG: 325 TABLET ORAL at 10:10

## 2024-10-10 RX ADMIN — PANTOPRAZOLE SODIUM 40 MG: 40 TABLET, DELAYED RELEASE ORAL at 08:10

## 2024-10-10 RX ADMIN — VANCOMYCIN HYDROCHLORIDE 750 MG: 750 INJECTION, POWDER, LYOPHILIZED, FOR SOLUTION INTRAVENOUS at 08:10

## 2024-10-10 RX ADMIN — ENOXAPARIN SODIUM 30 MG: 30 INJECTION SUBCUTANEOUS at 04:10

## 2024-10-10 RX ADMIN — SEVELAMER CARBONATE 800 MG: 800 TABLET, FILM COATED ORAL at 11:10

## 2024-10-10 RX ADMIN — HYDROCODONE BITARTRATE AND ACETAMINOPHEN 1 TABLET: 10; 325 TABLET ORAL at 04:10

## 2024-10-10 NOTE — PT/OT/SLP EVAL
"Occupational Therapy  Evaluation    Name: Fior Joya  MRN: [id number]  Admitting Diagnosis: The primary encounter diagnosis was Sepsis. Diagnoses of Chest pain, Acute pyelonephritis, Hydronephrosis with ureteral stricture, not elsewhere classified, MRSA bacteremia, Urinary tract infection without hematuria, site unspecified, Hydronephrosis, unspecified hydronephrosis type, Retained ureteral stent, Metabolic acidosis, and ESRD on hemodialysis were also pertinent to this visit.    Recent Surgery: Procedure(s) (LRB):  CYSTOSCOPY, WITH URETERAL STENT INSERTION (Bilateral) 6 Days Post-Op    Recommendations:     Discharge therapy intensity: Moderate Intensity Therapy   Discharge Equipment Recommendations:   (tbd)  Barriers to discharge:  None    Assessment:     Fior Joya is a 69 y.o. female with a medical diagnosis of The primary encounter diagnosis was Sepsis. Diagnoses of Chest pain, Acute pyelonephritis, Hydronephrosis with ureteral stricture, not elsewhere classified, MRSA bacteremia, Urinary tract infection without hematuria, site unspecified, Hydronephrosis, unspecified hydronephrosis type, Retained ureteral stent, Metabolic acidosis, and ESRD on hemodialysis were also pertinent to this visit.   She presents with the following performance deficits affecting function: weakness, impaired endurance, impaired self care skills, impaired functional mobility, pain.     At al, pt's mobility is very limited by c/o pain in R side back and hip, reports it feels like "nerve pain", only tolerated ~1 min EOB then abruptly laid her self back into the bed with assist for LE's, unable to attempt any standing.  Rehab Prognosis: Good; patient would benefit from acute skilled OT services to address these deficits and reach maximum level of function.       Plan:     Patient to be seen 5 x/week to address the above listed problems via self-care/home management, therapeutic activities, therapeutic exercises  Plan of Care Expires: " 11/07/24  Plan of Care Reviewed with: patient    Subjective     Chief Complaint: R leg and hip pain  Patient/Family Comments/goals: decrease pain    Occupational Profile:  Living Environment: lives with son in house, ramp  Previous level of function: uses walker, ambulates short distances 12 ft and 6 ft, able to dress herself, sponge bathes  Roles and Routines: mom  Equipment Used at Home: wheelchair, walker, rolling, bedside commode (ramp)  Assistance upon Discharge: some from son    Pain/Comfort:  Pain Rating 1: 10/10 (R leg and back)  Pain Addressed 1: Pre-medicate for activity, Distraction, Cessation of Activity  Pain Rating Post-Intervention 1: 10/10    Patients cultural, spiritual, Pent[employer]al conflicts given the current situation:      Objective:     OT communicated with nsg and PT prior to session.      Patient was found supine with peripheral IV (urostomy) upon OT entry to room.    General Precautions: Standard, fall  Orthopedic Precautions:    Braces:      Vital Signs:     Bed Mobility:    Sup to sit with mod assist x 2, able to assist well with Ue's and LLE  Tolerated only brief sitting before lying her UB back down    Functional Mobility/Transfers:  U/a to stand  Functional Mobility:     Activities of Daily Living:  Total assist socks    AMPAC 6 Click ADL:  AMPAC Total Score:      Functional Cognition:  intact    Visual Perceptual Skills:  intact    Upper Extremity Function:  Right Upper Extremity:   wfl    Left Upper Extremity:  wfl    Balance:   SBA eOB             Patient Education:  Patient provided with verbal education education regarding OT role/goals/POC and Discharge/DME recommendations.  Understanding was verbalized.     Patient left HOB elevated with all lines intact and call button in reach.    GOALS:   Multidisciplinary Problems       Occupational Therapy Goals          Problem: Occupational Therapy    Goal Priority Disciplines Outcome Interventions   Occupational Therapy Goal     OT, PT/OT  Progressing    Description: Goals to be met by: 11/7/24     Patient will increase functional independence with ADLs by performing:    LE Dressing with Modified Andrew.  Grooming while seated with Modified Andrew. Progress to standing  Toileting from bedside commode with Modified Andrew for hygiene and clothing management.   Toilet transfer to bedside commode with Modified Andrew. Progress to toilet as appropriate.                          History:     History reviewed. No pertinent past medical history.      Past Surgical History:   Procedure Laterality Date    CYSTOSCOPY W/ URETERAL STENT PLACEMENT Right 6/27/2024    Procedure: CYSTOSCOPY, WITH URETERAL STENT INSERTION;  Surgeon: Otis Person MD;  Location: [facility] OR;  Service: Urology;  Laterality: Right;  CYSTOSCOPY, BILATERAL RETROGRADE PYELOGRAMS, POSSIBLE STENT PLACEMENT.    CYSTOSCOPY W/ URETERAL STENT PLACEMENT Bilateral 10/4/2024    Procedure: CYSTOSCOPY, WITH URETERAL STENT INSERTION;  Surgeon: Tawana Loaiza MD;  Location: [facility] OR;  Service: Urology;  Laterality: Bilateral;  CYSTO WITH BILAT URETERAL STENT EXCHANGE    INSERTION OF TUNNELED CENTRAL VENOUS HEMODIALYSIS CATHETER N/A 7/3/2024    Procedure: Insertion, Catheter, Central Venous, Hemodialysis;  Surgeon: Uche Barcenas MD;  Location: [facility] CATH LAB;  Service: Peripheral Vascular;  Laterality: N/A;       Time Tracking:     OT Date of Treatment: 10/10/24  OT Start Time: 1110  OT Stop Time: 1130  OT Total Time (min): 20 min    Billable Minutes:Evaluation mod complexity    10/10/2024

## 2024-10-10 NOTE — PROGRESS NOTES
Infectious Disease  Progress Note    Patient Name: Fior Joya   MRN: [id number]   Admission Date: 10/3/2024   Hospital Length of Stay: 6 days  Attending Physician: Macarena Leggett MD   Primary Care Provider: Linda, Primary Doctor     Isolation Status: Contact     Assessment/Plan:        69 y.o. female with a history significant for ESRD on hemodialysis through right IJ permanent catheter, chronic hydronephrosis with left nephrostomy tube, hypertension who was admitted on 10/3/2024 with lower back pain. Patient said that her symptoms started approximately 1 week ago with some neck pain that moved down to her shoulder. She then developed lower back pain that moved to her right leg. Patient also reports having cloudy drainage from her nephrostomy tube for the last 1 month. Patient reports worsening back/right flank pain for the last few days. Vital signs on admission showed temperature 97.2 F, heart rate 79, blood pressure 110/75. Laboratory workup showed WBC 84689, creatinine 6.70, lactic acid 1.8. Urinalysis showed more than 100 WBC. She had 2 sets of blood culture obtained which are growing MRSA. 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  The upper pole moiety is decompressed with a ureteral stent but the lower pole demonstrating moderate severe hydronephrosis, left nephrostomy ureteral stent drain with resolution of hydronephrosis. Patient underwent nephrostomy tube placement by IR into the inferior pole of the right renal collecting system.  Patient was also noted to MRSA bacteremia, she was started on vancomycin, on 10/07/2024, she appears to had worsening mental status, she has persistent leukocytosis.  ID is consulted for assistance in management.    MRSA bacteremia  ESRD on HD, HD line removed 10/08/2024  Encephalopathy  Sepsis  Hydronephrosis s/p ureteral stents and nephrostomy tubes  New diagnosis of bladder  cancer    -no fevers, no chills, however today she reports significant pain again in the low back and radiating to the right hip and knee  -repeat blood cultures 10/07/2024 remain positive, these were done before removal of the HD line, HD line was removed on 10/08/2024   -blood cultures repeated on 10/09/2024, unfortunately they remain positive, although burden appears to be less  -MADELYN done 10/08/2024, no evidence of vegetation  -patient complaining of right hip pain as well as low back pain  -noted pathology of the bladder from 10/04/2024 concern for squamous cell carcinoma    Recommendations:  -Continue Vancomycin dosing per pharmacy for goal trough 15-20   -unfortunately we have not achieved sterility of the blood cultures, if the patient is needing dialysis please place a temporary line, would not place tunneled HD line yet  -I will order a BC ID on the most recent positive blood culture to confirm this is not a contaminant and actually still Staph aureus.  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 Please contact us with any questions or concerns.    Antibiotics History:  Metronidazole 10/3  Cefepime 10/3-10/5  Vancomycin 10/3-    Portions of this note dictated using EMR integrated voice recognition software, and may be subject to voice recognition errors not corrected at proofreading. Please contact writer for clarification if needed.    Subjective:     Principal Problem: <principal problem not specified>     Interval History:   Continues to pain in the low back and the right hip.  The  patient is noted to have ongoing bacteremia unfortunately, it appears the burden is decreased however.  Will have to continue monitoring for now    Review of Systems   Review of Systems   All other systems reviewed and are negative.       Objective:     Vital Signs (Most Recent):  Temp: 97.8 °F (36.6 °C) (10/10/24 1138)  Pulse: 70 (10/10/24 1129)  Resp: 18 (10/10/24 1046)  BP: (!) 159/82 (10/10/24 1129)  SpO2: 95 % (10/10/24 1129)  Vital Signs (24h Range):  Temp:  [97.3 °F (36.3 °C)-98.4 °F (36.9 °C)] 97.8 °F (36.6 °C)  Pulse:  [57-92] 70  Resp:  [18-20] 18  SpO2:  [95 %-99 %] 95 %  BP: (130-173)/(72-82) 159/82      Weight:   Wt Readings from Last 1 Encounters:   10/10/24 (!) 140.2 kg (309 lb 1.4 oz)      Body mass index is Body mass index is 54.75 kg/m².     Estimated Creatinine Clearance: Estimated Creatinine Clearance: 16.3 mL/min (A) (based on SCr of 4.57 mg/dL (H)).     Lines/Drains/Airways       Drain  Duration                  Nephrostomy 06/28/24 1025 Left 8 Fr. 104 days         Nephrostomy 10/04/24 1506 Right 8 Fr. 6 days              Peripheral Intravenous Line  Duration                  Peripheral IV - Single Lumen 10/09/24 0900 20 G Anterior;Left;Proximal Forearm 1 day                     Physical Exam  Physical Exam  Constitutional:       Appearance: Normal appearance. She is obese.   HENT:      Head: Normocephalic and atraumatic.      Mouth/Throat:      Pharynx: No oropharyngeal exudate or posterior oropharyngeal erythema.   Eyes:      Extraocular Movements: Extraocular movements intact.      Pupils: Pupils are equal, round, and reactive to light.   Cardiovascular:      Rate and Rhythm: Normal rate and regular rhythm.      Heart sounds: No murmur heard.     Comments: Site of removal of the right chest wall HD catheter is clean  Pulmonary:      Effort: No respiratory distress.      Breath sounds: No wheezing, rhonchi or rales.   Abdominal:      General: Bowel sounds are normal. There is no distension.       Palpations: Abdomen is soft.      Tenderness: There is no abdominal tenderness.   Musculoskeletal:         General: No swelling or tenderness.      Cervical back: Neck supple. No rigidity or tenderness.      Comments: Still tenderness at the r hip    Lymphadenopathy:      Cervical: No cervical adenopathy.   Skin:     Findings: No lesion or rash.   Neurological:      General: No focal deficit present.      Mental Status: She is alert. Mental status is at baseline.      Cranial Nerves: No cranial nerve deficit.      Motor: No weakness.   Psychiatric:         Mood and Affect: Mood normal.         Behavior: Behavior normal.          Significant Labs: CBC:   Recent Labs   Lab 10/09/24  0650   WBC 21.12*   HGB 11.1*   HCT 33.2*        CMP:   Recent Labs   Lab 10/09/24  0650 10/10/24  0738    132*   K 4.1 4.5   CL 94* 90*   CO2 27 25   BUN 28.0* 44.0*   CREATININE 3.49* 4.57*   CALCIUM 8.0* 8.1*       Microbiology Results (last 7 days)       Procedure Component Value Units Date/Time    Blood Culture [[id number]]     Order Status: Sent Specimen: Blood     Blood Culture [[id number]]     Order Status: Sent Specimen: Blood     Blood Culture [[id number]]  (Abnormal) Collected: 10/09/24 1728    Order Status: Completed Specimen: Blood Updated: 10/10/24 1415     GRAM STAIN Gram Positive Cocci, probable Staphylococcus      Seen in gram stain of broth only      1 of 2 Aerobic bottles positive    Blood Culture [[id number]]  (Abnormal) Collected: 10/07/24 2103    Order Status: Completed Specimen: Blood Updated: 10/10/24 0709     Blood Culture Culture In Progress     GRAM STAIN 1 of 1 Pediatric bottle positive      Gram Positive Cocci, probable Staphylococcus      Seen in gram stain of broth only    Blood Culture [[id number]]  (Abnormal)  (Susceptibility) Collected: 10/07/24 2103    Order Status: Completed Specimen: Blood Updated: 10/10/24 0650     Blood Culture Methicillin resistant Staphylococcus aureus     GRAM  STAIN Gram Positive Cocci, probable Staphylococcus      Seen in gram stain of broth only      1 of 1 Pediatric bottle positive    Blood Culture [[id number]] Collected: 10/09/24 1728    Order Status: Resulted Specimen: Blood Updated: 10/09/24 1813    Blood Culture [[id number]]  (Abnormal)  (Susceptibility) Collected: 10/05/24 0620    Order Status: Completed Specimen: Blood Updated: 10/08/24 0810     Blood Culture Methicillin resistant Staphylococcus aureus     GRAM STAIN Gram Positive Cocci, probable Staphylococcus      Seen in gram stain of broth only      2 of 2 bottles positive    Blood Culture [[id number]]  (Abnormal)  (Susceptibility) Collected: 10/05/24 0620    Order Status: Completed Specimen: Blood Updated: 10/08/24 0809     Blood Culture Methicillin resistant Staphylococcus aureus     GRAM STAIN Gram Positive Cocci, probable Staphylococcus      2 of 2 bottles positive      Seen in gram stain of broth only    Blood culture #2 **CANNOT BE ORDERED STAT** [[id number]]  (Abnormal)  (Susceptibility) Collected: 10/03/24 2020    Order Status: Completed Specimen: Blood Updated: 10/06/24 0627     Blood Culture Methicillin resistant Staphylococcus aureus     GRAM STAIN Gram Positive Cocci, probable Staphylococcus      Seen in gram stain of broth only      1 of 1 Pediatric bottle positive    Blood culture #1 **CANNOT BE ORDERED STAT** [[id number]]  (Abnormal)  (Susceptibility) Collected: 10/03/24 2020    Order Status: Completed Specimen: Blood Updated: 10/06/24 0627     Blood Culture Methicillin resistant Staphylococcus aureus     GRAM STAIN Gram Positive Cocci, probable Staphylococcus      Seen in gram stain of broth only      2 of 2 bottles positive    Urine culture [[id number]] Collected: 10/03/24 2008    Order Status: Completed Specimen: Urine Updated: 10/05/24 1003     Urine Culture No Significant Growth    Blood Culture [[id number]]     Order Status: Canceled Specimen: Blood     BCID2 Panel [[id number]]   (Abnormal) Collected: 10/03/24 2020    Order Status: Completed Specimen: Blood Updated: 10/04/24 1107     CTX-M (ESBL ) N/A     IMP (Cabapenemase ) N/A     KPC resistance gene (Carbapenemase ) N/A     mcr-1 N/A     mecA ID N/A     Comment: Note: Antimicrobial resistance can occur via multiple mechanisms. A Not Detected result for antimicrobial resistance gene(s) does not indicate antimicrobial susceptibility. Subculturing is required for species identification and susceptibility testing of   isolates.        mecA/C and MREJ (MRSA) gene Detected     NDM (Carbapenemase ) N/A     OXA-48-like (Carbapenemase ) N/A     Royal/B (VRE gene) N/A     VIM (Carbapenemase ) N/A     Enterococcus faecalis Not Detected     Enterococcus faecium Not Detected     Listeria monocytogenes Not Detected     Staphylococcus spp. Detected     Staphylococcus aureus Detected     Staphylococcus epidermidis Not Detected     Staphylococcus lugdunensis Not Detected     Streptococcus spp. Not Detected     Streptococcus agalactiae (Group B) Not Detected     Streptococcus pneumoniae Not Detected     Streptococcus pyogenes (Group A) Not Detected     Acinetobacter calcoaceticus/baumannii complex Not Detected     Bacteroides fragilis Not Detected     Enterobacterales Not Detected     Enterobacter cloacae complex Not Detected     Escherichia coli Not Detected     Klebsiella aerogenes Not Detected     Klebsiella oxytoca Not Detected     Klebsiella pneumoniae group Not Detected     Proteus spp. Not Detected     Salmonella spp. Not Detected     Serratia marcescens Not Detected     Haemophilus influenzae Not Detected     Neisseria meningitidis Not Detected     Pseudomonas aeruginosa Not Detected     Stenotrophomonas maltophilia Not Detected     Candida albicans Not Detected     Candida auris Not Detected     Candida glabrata Not Detected     Candida krusei Not Detected     Candida parapsilosis Not Detected      Candida tropicalis Not Detected     Cryptococcus neoformans/gattii Not Detected    Narrative:      The [employer] BCID2 Panel is a multiplexed nucleic acid test intended for the use with BitPoster® 2.0 or BitPoster® Excep Apps Systems for the simultaneous qualitative detection and identification of multiple bacterial and yeast nucleic acids and select genetic determinants associated with antimicrobial resistance.  The [employer] BCID2 Panel test is performed directly on blood culture samples identified as positive by a continuous monitoring blood culture system.  Results are intended to be interpreted in conjunction with Gram stain results.             Significant Imaging: I have reviewed all pertinent imaging results/findings within the past 24 hours.      Milo Campbell MD  Infectious Disease  [facility]

## 2024-10-10 NOTE — PLAN OF CARE
Problem: Adult Inpatient Plan of Care  Goal: Plan of Care Review  Outcome: Progressing  Goal: Patient-Specific Goal (Individualized)  Outcome: Progressing  Goal: Absence of Hospital-Acquired Illness or Injury  Outcome: Progressing  Goal: Optimal Comfort and Wellbeing  Outcome: Progressing  Goal: Readiness for Transition of Care  Outcome: Progressing     Problem: Bariatric Environmental Safety  Goal: Safety Maintained with Care  Outcome: Progressing     Problem: Skin Injury Risk Increased  Goal: Skin Health and Integrity  Outcome: Progressing     Problem: Hemodialysis  Goal: Safe, Effective Therapy Delivery  Outcome: Progressing  Goal: Effective Tissue Perfusion  Outcome: Progressing  Goal: Absence of Infection Signs and Symptoms  Outcome: Progressing     Problem: Infection  Goal: Absence of Infection Signs and Symptoms  Outcome: Progressing     Problem: Wound  Goal: Optimal Coping  Outcome: Progressing  Goal: Optimal Functional Ability  Outcome: Progressing  Goal: Improved Oral Intake  Outcome: Progressing  Goal: Optimal Pain Control and Function  Outcome: Progressing  Goal: Skin Health and Integrity  Outcome: Progressing  Goal: Optimal Wound Healing  Outcome: Progressing     Problem: Fall Injury Risk  Goal: Absence of Fall and Fall-Related Injury  Outcome: Progressing

## 2024-10-10 NOTE — PROGRESS NOTES
[facility] Medicine Progress Note        Chief Complaint: Inpatient Follow-up for     HPI: 69-year-old female with past medical history of hypertension, hyperlipidemia, chronic hydronephrosis with left nephrostomy tube, right ureteral stent, end-stage renal disease on hemodialysis, chronic anemia presented with right flank pain for the past 5 days and also reported cloudy urine output from the urostomy CT with IV contrast showed moderate to severe hydronephrosis with enlarged right pelvic lymph node increase in size from the prior with suspected malignancy also showed duplicated collecting system with dilation of the right lower pole urology was consulted patient is status post cystoscopy with right stent exchange and bladder biopsy and they were not able to identify the left ureteral orifice therefore had left percutaneous nephrostomy. Blood culture is growing Gram-positive cocci 2/2 bottles initially patient was started on cefepime but now we added vancomycin repeat blood cultures ordered  Id was consulted MRI of the CT and L-spine was ordered that was negative for any abscess blood culture is growing MRSA TTE as such did not show any vegetation cardiology consulted for MADELYN  Bilateral hydronephrosis Status post right upper lobe ureteral stent exchange and right lower pole nephrostomy tube placement  Cultures remains persistently positive.  Patient was slightly confused on October 7 so we had ordered CT of the head without contrast that was negative, ammonia, TSH everything was normal we also ordered CT of the abdomen and pelvis with contrast that was negative for any abscess cardiology consulted for MADELYN.  The was negative for endocarditis.  Dialysis catheter was removed    Interval Hx:   Seen and examined this morning.  Discussed with her the diagnosis of cancer noted from pathology.  We will get Oncology to see her.  Pending blood cultures that were collected last  night.      Objective/physical exam:  General: In no acute distress, afebrile  Chest: Clear to auscultation bilaterally  Heart: RRR, +S1, S2, no appreciable murmur  Abdomen: Soft, nontender, BS +  MSK: Warm, 1+ pitting bilateral lower extremity edema, no clubbing or cyanosis  Neurologic:  Alert awake oriented x4    VITAL SIGNS: 24 HRS MIN & MAX LAST   Temp  Min: 97.3 °F (36.3 °C)  Max: 98.4 °F (36.9 °C) 97.8 °F (36.6 °C)   BP  Min: 130/74  Max: 173/72 (!) 159/82   Pulse  Min: 57  Max: 92  70   Resp  Min: 18  Max: 20 18   SpO2  Min: 95 %  Max: 99 % 95 %     I have reviewed the following labs:  Recent Labs   Lab 10/07/24  0541 10/08/24  0458 10/09/24  0650   WBC 25.58  25.58* 26.23* 21.12*   RBC 3.82* 3.65* 3.78*   HGB 11.4* 11.0* 11.1*   HCT 33.3* 31.3* 33.2*   MCV 87.2 85.8 87.8   MCH 29.8 30.1 29.4   MCHC 34.2 35.1 33.4   RDW 15.9 16.0 15.8    255 208   MPV 10.2 11.4* 10.2     Recent Labs   Lab 10/04/24  0311 10/05/24  0315 10/06/24  0449 10/07/24  0541 10/07/24  1125 10/08/24  0458 10/09/24  0650 10/10/24  0738   * 131* 133* 135*  --  136 136 132*   K 4.4 5.0 4.1 4.7  --  4.4 4.1 4.5   CL 96* 94* 91* 93*  --  97* 94* 90*   CO2 13* 14* 22* 17*  --  21* 27 25   BUN 70.0* 85.5* 57.7* 74.7*  --  42.4* 28.0* 44.0*   CREATININE 6.98* 8.03* 5.84* 6.80*  --  4.42* 3.49* 4.57*   CALCIUM 8.7 8.0* 8.1* 7.5*  --  8.2* 8.0* 8.1*   PH  --   --   --   --  7.410  --   --   --    MG 2.20  --   --   --   --   --   --   --    ALBUMIN 2.3* 2.2* 2.2* 2.3*  --   --   --   --    ALKPHOS 182*  --  219* 209*  --   --   --   --    *  --  103* 71*  --   --   --   --    AST 91*  --  89* 43*  --   --   --   --    BILITOT 0.4  --  0.4 0.4  --   --   --   --      Microbiology Results (last 7 days)       Procedure Component Value Units Date/Time    Blood Culture [[id number]]  (Abnormal) Collected: 10/07/24 2103    Order Status: Completed Specimen: Blood Updated: 10/10/24 0709     Blood Culture Culture In Progress     GRAM  STAIN 1 of 1 Pediatric bottle positive      Gram Positive Cocci, probable Staphylococcus      Seen in gram stain of broth only    Blood Culture [[id number]]  (Abnormal)  (Susceptibility) Collected: 10/07/24 2103    Order Status: Completed Specimen: Blood Updated: 10/10/24 0650     Blood Culture Methicillin resistant Staphylococcus aureus     GRAM STAIN Gram Positive Cocci, probable Staphylococcus      Seen in gram stain of broth only      1 of 1 Pediatric bottle positive    Blood Culture [[id number]] Collected: 10/09/24 1728    Order Status: Resulted Specimen: Blood Updated: 10/09/24 1813    Blood Culture [[id number]] Collected: 10/09/24 1728    Order Status: Resulted Specimen: Blood Updated: 10/09/24 1812    Blood Culture [[id number]]  (Abnormal)  (Susceptibility) Collected: 10/05/24 0620    Order Status: Completed Specimen: Blood Updated: 10/08/24 0810     Blood Culture Methicillin resistant Staphylococcus aureus     GRAM STAIN Gram Positive Cocci, probable Staphylococcus      Seen in gram stain of broth only      2 of 2 bottles positive    Blood Culture [[id number]]  (Abnormal)  (Susceptibility) Collected: 10/05/24 0620    Order Status: Completed Specimen: Blood Updated: 10/08/24 0809     Blood Culture Methicillin resistant Staphylococcus aureus     GRAM STAIN Gram Positive Cocci, probable Staphylococcus      2 of 2 bottles positive      Seen in gram stain of broth only    Blood culture #2 **CANNOT BE ORDERED STAT** [[id number]]  (Abnormal)  (Susceptibility) Collected: 10/03/24 2020    Order Status: Completed Specimen: Blood Updated: 10/06/24 0627     Blood Culture Methicillin resistant Staphylococcus aureus     GRAM STAIN Gram Positive Cocci, probable Staphylococcus      Seen in gram stain of broth only      1 of 1 Pediatric bottle positive    Blood culture #1 **CANNOT BE ORDERED STAT** [[id number]]  (Abnormal)  (Susceptibility) Collected: 10/03/24 2020    Order Status: Completed Specimen: Blood Updated:  10/06/24 0627     Blood Culture Methicillin resistant Staphylococcus aureus     GRAM STAIN Gram Positive Cocci, probable Staphylococcus      Seen in gram stain of broth only      2 of 2 bottles positive    Urine culture [[id number]] Collected: 10/03/24 2008    Order Status: Completed Specimen: Urine Updated: 10/05/24 1003     Urine Culture No Significant Growth    Blood Culture [[id number]]     Order Status: Canceled Specimen: Blood     BCID2 Panel [[id number]]  (Abnormal) Collected: 10/03/24 2020    Order Status: Completed Specimen: Blood Updated: 10/04/24 1107     CTX-M (ESBL ) N/A     IMP (Cabapenemase ) N/A     KPC resistance gene (Carbapenemase ) N/A     mcr-1 N/A     mecA ID N/A     Comment: Note: Antimicrobial resistance can occur via multiple mechanisms. A Not Detected result for antimicrobial resistance gene(s) does not indicate antimicrobial susceptibility. Subculturing is required for species identification and susceptibility testing of   isolates.        mecA/C and MREJ (MRSA) gene Detected     NDM (Carbapenemase ) N/A     OXA-48-like (Carbapenemase ) N/A     Royal/B (VRE gene) N/A     VIM (Carbapenemase ) N/A     Enterococcus faecalis Not Detected     Enterococcus faecium Not Detected     Listeria monocytogenes Not Detected     Staphylococcus spp. Detected     Staphylococcus aureus Detected     Staphylococcus epidermidis Not Detected     Staphylococcus lugdunensis Not Detected     Streptococcus spp. Not Detected     Streptococcus agalactiae (Group B) Not Detected     Streptococcus pneumoniae Not Detected     Streptococcus pyogenes (Group A) Not Detected     Acinetobacter calcoaceticus/baumannii complex Not Detected     Bacteroides fragilis Not Detected     Enterobacterales Not Detected     Enterobacter cloacae complex Not Detected     Escherichia coli Not Detected     Klebsiella aerogenes Not Detected     Klebsiella oxytoca Not Detected     Klebsiella  pneumoniae group Not Detected     Proteus spp. Not Detected     Salmonella spp. Not Detected     Serratia marcescens Not Detected     Haemophilus influenzae Not Detected     Neisseria meningitidis Not Detected     Pseudomonas aeruginosa Not Detected     Stenotrophomonas maltophilia Not Detected     Candida albicans Not Detected     Candida auris Not Detected     Candida glabrata Not Detected     Candida krusei Not Detected     Candida parapsilosis Not Detected     Candida tropicalis Not Detected     Cryptococcus neoformans/gattii Not Detected    Narrative:      The GI-View BCID2 Panel is a multiplexed nucleic acid test intended for the use with [employer]® [employer] or [employer]® [employer] for the simultaneous qualitative detection and identification of multiple bacterial and yeast nucleic acids and select genetic determinants associated with antimicrobial resistance.  The GI-View BCID2 Panel test is performed directly on blood culture samples identified as positive by a continuous monitoring blood culture system.  Results are intended to be interpreted in conjunction with Gram stain results.             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    Infectious Disease following.  Pending further recommendations, continue vancomycin.  Urology following.  Temporary dialysis catheter placement tomorrow    Prophylaxis: Lovenox  VTE prophylaxis:     Patient condition:  Stable/Fair/Guarded/ Serious/ Critical    Anticipated discharge and Disposition:         All diagnosis and differential diagnosis have been reviewed; assessment and plan has been documented; I have personally reviewed the labs and test results that are presently available; I have reviewed the patients medication list; I have reviewed the consulting providers response and recommendations. I have reviewed or attempted to review medical records based upon their availability    All of the patient's questions have been  addressed and answered. Patient's is agreeable to the above stated plan. I will continue to monitor closely and make adjustments to medical management as needed.    Portions of this note dictated using EMR integrated voice recognition software, and may be subject to voice recognition errors not corrected at proofreading. Please contact writer for clarification if needed.   _____________________________________________________________________    Malnutrition Status:    Scheduled Med:   atorvastatin  20 mg Oral QHS    cefTRIAXone (Rocephin) IV (PEDS and ADULTS)  1 g Intravenous Once    enoxaparin  30 mg Subcutaneous Daily    folic acid  1 mg Oral Daily    labetaloL  200 mg Oral Q12H    ondansetron  4 mg Intravenous Once    pantoprazole  40 mg Oral Daily    sertraline  25 mg Oral Daily    sevelamer carbonate  800 mg Oral TID WM    vancomycin 750 mg in D5W 250 mL IVPB (admixture device)  750 mg Intravenous Once      Continuous Infusions:       PRN Meds:    Current Facility-Administered Medications:     acetaminophen, 650 mg, Oral, Q4H PRN    albuterol-ipratropium, 3 mL, Nebulization, Once PRN    aluminum-magnesium hydroxide-simethicone, 30 mL, Oral, QID PRN     diphenhydrAMINE, 25 mg, Intravenous, Q6H PRN    HYDROcodone-acetaminophen, 1 tablet, Oral, Q4H PRN    HYDROcodone-acetaminophen, 1 tablet, Oral, Q6H PRN    melatonin, 6 mg, Oral, Nightly PRN    metoclopramide, 10 mg, Intravenous, Q10 Min PRN    ondansetron, 4 mg, Intravenous, Q4H PRN    polyethylene glycol, 17 g, Oral, BID PRN    prochlorperazine, 5 mg, Intravenous, Q6H PRN    senna-docusate 8.6-50 mg, 2 tablet, Oral, BID PRN    sodium chloride 0.9%, 10 mL, Intravenous, PRN    vancomycin - pharmacy to dose, , Intravenous, pharmacy to manage frequency     Radiology:  I have personally reviewed the following imaging and agree with the radiologist.     Transesophageal echo (MADELYN)  Procedure: MADELYN    Indication: MRSA bacteremia    Findings:  No valvular vegetations.  CT Abdomen Pelvis With IV Contrast NO Oral Contrast  Narrative: EXAMINATION:  CT ABDOMEN PELVIS WITH IV CONTRAST    CLINICAL HISTORY:  Abdominal abscess/infection suspected;    TECHNIQUE:  Helically acquired images with axial, sagittal and coronal reformations were obtained from the lung bases to the pubic symphysis after the IV administration of contrast.    Automated tube current modulation, weight-based exposure dosing, and/or iterative reconstruction technique utilized to reach lowest reasonably achievable exposure rate.    DLP: 1526 mGy*cm    COMPARISON:  CT abdomen pelvis 10/03/2024    FINDINGS:  HEART: Cardiomegaly.  There are calcifications in the region the aortic valve and mitral valve annulus.    LUNG BASES: Basilar atelectasis.    LIVER: Normal attenuation. No appreciable focal hepatic lesion.    BILIARY: Vicarious excretion of contrast at the gallbladder.    PANCREAS: Evaluation for pancreatitis limited given mild anasarca and body habitus.    SPLEEN: Normal in size    ADRENALS: No mass.    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  Right percutaneous nephrostomy at the upper pole moiety.  Right ureteral stent at  the upper pole moiety.  Decompressed upper pole moiety.  Hydronephrotic lower pole moiety with AP diameter of the renal pelvis measuring approximately 3.5 cm, unchanged.  The right lower pole ureter is dilated to the level of the ureterovesical junction.  Left percutaneous nephroureteral stent.  No left hydronephrosis.    GI TRACT/MESENTERY:  No evidence of bowel obstruction or inflammation.    PERITONEUM: No free fluid.No free air.    LYMPH NODES: Presumed reactive retroperitoneal lymph nodes.    VASCULATURE: Aortoiliac atherosclerosis.    BLADDER: Nondistended bladder limits CT evaluation    REPRODUCTIVE ORGANS: Normal as visualized.    SOFT TISSUES: Body wall edema.    BONES: No acute osseous abnormality.    LIMITATIONS: Exam limited by artifact related to patient body habitus, positioning, dose lowering technique and/or motion.  Impression: 1. No significant interval change compared to previous exam.  Ureteral stents in place.  2. Duplicated right kidney and right ureter with persistent hydroureteronephrosis of the lower pole moiety.    Electronically signed by: Deidra Dobson  Date:    10/08/2024  Time:    06:49      Uriel Fletcher MD  Department of Hospital Medicine   [facility]   10/10/2024

## 2024-10-10 NOTE — H&P (VIEW-ONLY)
Subjective:       Patient ID: Fior Joya is a 69 y.o. female.    Chief Complaint: Back Pain (Pt wheeled into triage and presents via POV. Pt reports lower back pain and R hip pain that began 5 days PTA. Urostomy placed approx 2 months ago with minimal cloudy output that pt reports for approx 1 month. ESRD tues/thurs/Sat dialysis but did not present today d/t increasing pain. 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  No detrusor muscle present for evaluation.  Likely metastatic disease to lymph nodes in the abdomen and pelvis.    Current Treatment:   Pending further discussion with the patient.  Initial discussion on 10/10/2024, patient stated that she did not believe the pathology report because she did not believe that she underwent any procedure on 10/04/2024.  She stated that she did not want to discuss cancer diagnosis further because she did not believe that she had cancer.    Treatment History:  Cystoscopy on 10/04/2024.    HPI:  69-year-old female multiple medical comorbidities including chronic anemia, end-stage renal disease on hemodialysis, chronic hydronephrosis with left nephrostomy tube and right ureteral stent who originally presented to [facility] on 10/03/2024 with right-sided flank pain.  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  The upper pole moiety was decompressed with a ureteral stent with a lower pole more demonstrating moderate severe hydronephrosis persistent from prior exam.  Left nephrostomy ureteral stent drain with resolution of hydronephrosis.  There was enlarged right pelvic lymph node increased in size, malignancy suspected.  There was also mildly enlarged upper normal sized lymph node in the  periaortic region and the left pelvis that were nonspecific.  Patient was admitted to the hospitalist service.    She was seen by Urology and underwent cystoscopy on 10/04/2024 with right stent exchange of the upper pole moiety, retrograde pyelogram.  This procedure revealed significant amount of friable tissue located at the bladder base.  Multiple biopsies were obtained.  Pathology revealed invasive moderately differentiated carcinoma with extensive squamous differentiation.  There was no detrusor muscle present for evaluation.  The pathologist made a comment that the differential diagnosis was pure squamous cell carcinoma versus urothelial carcinoma with extensive squamous differentiation.  A ureteral stent was placed in the right ureter.  A 2nd ureteral orifice was not found.  On 10/04/2024, the patient also underwent fluoroscopy in ultrasound-guided right lower pole nephrostomy tube placement.      Patient also found to have MRSA bacteremia.  He MRI of the C, T, L-spine on 10/05/2024 showed no obvious epidural abscess, osteomyelitis, or discitis.  There was increased signal in the cord at C6-C7 which showed some enhancement and could represent intra cord lesion or possible myelomalacia.  TTE on 10/05/2024 showed normal EF with no valve vegetation.  MADELYN on 10/08/2024 showed no valve vegetation.    To findings of squamous cell carcinoma, Oncology consulted on 10/10/2024.  I saw the patient on that day.  When I was talking to her, she stated that she never had any scope or procedures done.  I explained to her that she had, she adamantly denied any procedures.  I explained that her pathology showed bladder cancer, she told me that that was not possible.  I asked her if she would like for me to discuss treatment of bladder cancer, she told me that she did not want me to.    Interval History:   Initial consult    History reviewed. No pertinent past medical history.   Past Surgical History:   Procedure Laterality Date     CYSTOSCOPY W/ URETERAL STENT PLACEMENT Right 6/27/2024    Procedure: CYSTOSCOPY, WITH URETERAL STENT INSERTION;  Surgeon: Otis Person MD;  Location: [facility] OR;  Service: Urology;  Laterality: Right;  CYSTOSCOPY, BILATERAL RETROGRADE PYELOGRAMS, POSSIBLE STENT PLACEMENT.    CYSTOSCOPY W/ URETERAL STENT PLACEMENT Bilateral 10/4/2024    Procedure: CYSTOSCOPY, WITH URETERAL STENT INSERTION;  Surgeon: Tawana Loaiza MD;  Location: [facility] OR;  Service: Urology;  Laterality: Bilateral;  CYSTO WITH BILAT URETERAL STENT EXCHANGE    INSERTION OF TUNNELED CENTRAL VENOUS HEMODIALYSIS CATHETER N/A 7/3/2024    Procedure: Insertion, Catheter, Central Venous, Hemodialysis;  Surgeon: Uche Barcenas MD;  Location: [facility] CATH LAB;  Service: Peripheral Vascular;  Laterality: N/A;     Social History     Socioeconomic History    Marital status:    Tobacco Use    Smoking status: Never    Smokeless tobacco: Never   Substance and Sexual Activity    Alcohol use: Never    Drug use: Never    Sexual activity: Not Currently     Social Drivers of Health     Financial Resource Strain: Low Risk  (10/4/2024)    Overall Financial Resource Strain (CARDIA)     Difficulty of Paying Living Expenses: Not hard at all   Food Insecurity: No Food Insecurity (10/4/2024)    Hunger Vital Sign     Worried About Running Out of Food in the Last Year: Never true     Ran Out of Food in the Last Year: Never true   Transportation Needs: No Transportation Needs (10/4/2024)    TRANSPORTATION NEEDS     Transportation : No   Physical Activity: Unknown (10/4/2024)    Exercise Vital Sign     Days of Exercise per Week: 0 days   Stress: No Stress Concern Present (10/4/2024)    [employer] [place] [employer] - Occupational Stress Questionnaire     Feeling of Stress : Not at all   Housing Stability: Low Risk  (10/4/2024)    Housing Stability Vital Sign     Unable to Pay for Housing in the Last Year: No     Homeless in the Last Year: No       No family history on file.   Review of patient's allergies indicates:   Allergen Reactions    Asa [aspirin] Anaphylaxis    Penicillins      Received ceftriaxone from 6/27, no reactions       Review of Systems   Constitutional:  Negative for appetite change and fever.   HENT:  Negative for mouth sores and sore throat.    Respiratory:  Negative for chest tightness and shortness of breath.    Cardiovascular:  Negative for chest pain and leg swelling.   Gastrointestinal:  Positive for abdominal pain. Negative for blood in stool.   Genitourinary:  Positive for dysuria and flank pain. Negative for pelvic pain and vaginal bleeding.   Musculoskeletal:  Positive for back pain. Negative for neck pain.   Neurological:  Negative for dizziness and weakness.   Hematological:  Negative for adenopathy.         Objective:      Physical Exam  Vitals reviewed. Exam conducted with a chaperone present.   Constitutional:       General: She is not in acute distress.     Appearance: Normal appearance.   HENT:      Head: Normocephalic and atraumatic.      Nose: Nose normal.      Mouth/Throat:      Mouth: Mucous membranes are moist.   Eyes:      Extraocular Movements: Extraocular movements intact.      Conjunctiva/sclera: Conjunctivae normal.   Cardiovascular:      Rate and Rhythm: Normal rate and regular rhythm.      Pulses: Normal pulses.      Heart sounds: Normal heart sounds.   Pulmonary:      Effort: Pulmonary effort is normal.      Breath sounds: Normal breath sounds.   Abdominal:      General: Bowel sounds are normal.      Palpations: Abdomen is soft.   Genitourinary:     Comments: Nephrostomy tube  Musculoskeletal:         General: No swelling. Normal range of motion.      Cervical back: Normal range of motion and neck supple.      Right lower leg: No edema.      Left lower leg: No edema.   Lymphadenopathy:      Cervical: No cervical adenopathy.   Skin:     General: Skin is warm and dry.   Neurological:      General: No focal  deficit present.      Mental Status: She is alert and oriented to person, place, and time. Mental status is at baseline.   Psychiatric:         Mood and Affect: Mood normal.         Behavior: Behavior normal.         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  No detrusor muscle present for evaluation.  Likely metastatic disease to lymph nodes in the abdomen and pelvis.        Plan:       I went to the patient's room to discuss her pathology results and discuss possible treatment plans.  While there, the patient told me that she never underwent a procedure, she stated that it was not possible for her to have cancer.    I tried to explain that her pathology from her cystoscopy on 10/04/2024 showed that she does have bladder cancer.  She again denied the she had a.  I asked her if she would like me to explained treatment options to her, she declined.    I had a conversation with the patient's son on the afternoon of 10/10/2024.  I explained that I tried to discuss the diagnosis and treatment plans with his mother, he stated that he would discuss with the.  I explained that I would follow up with her tomorrow.    Ultimately, it was the patient's decision of whether she wants to treat her cancer or not.  It is her decision of whether or not she wants to discuss diagnosis/prognosis.     We would not treat her while she was in the hospital.  She would need to see me in clinic.  I will set her up for a follow up.      Chao Woody II, MD

## 2024-10-10 NOTE — PROGRESS NOTES
Vascular Surgery - Progress Note  Vega Gusman PA-C    Patient Name: Fior Joya                   : 1955     MRN: [id number]   Date of Admission: 10/3/2024  Date of Exam: 10/10/2024     Subjective:     Interval History: No acute issues. Vitals stable, afebrile. No issues with chest wall site.     Objective     Vital Signs (Most Recent):      Temp: 97.8 °F (36.6 °C) (10/10/24 1138)  Pulse: 70 (10/10/24 1129)  Resp: 18 (10/10/24 1046)  BP: (!) 159/82 (10/10/24 1129)  SpO2: 95 % (10/10/24 1129) Vital Signs (24h Range):    Temp:  [97.3 °F (36.3 °C)-98.4 °F (36.9 °C)] 97.8 °F (36.6 °C)  Pulse:  [] 70  Resp:  [18-20] 18  SpO2:  [95 %-99 %] 95 %  BP: (130-180)/(72-83) 159/82        Intake/Output - Last 3 Shifts         10/08 0700  10/09 0659 10/09 0700  10/10 0659 10/10 0700  10/11 0659    Other 0      Total Intake(mL/kg) 0 (0)      Urine (mL/kg/hr) 480 (0.1) 100 (0)     Other 3000      Total Output 3480 100     Net -3480 -100                  Significant Labs:  CBC:   Recent Labs   Lab 10/09/24  0650   WBC 21.12*   RBC 3.78*   HGB 11.1*   HCT 33.2*      MCV 87.8   MCH 29.4   MCHC 33.4     CMP:   Recent Labs   Lab 10/10/24  0738   CALCIUM 8.1*   *   K 4.5   CO2 25   CL 90*   BUN 44.0*   CREATININE 4.57*     Significant Diagnostics:        Physical Exam:     Constitutional: Awake, alert  Respiratory: Normal respiratory effort  Musculoskeletal: ROM noted in all extremities    Skin: Right chest wall with dressing, no saturation noted       Assessment and Plan     Ms. Joya is a 69 y.o. female with ESRD on HD (TTS) who had right tunneled chest wall catheter removed 2 days ago due to persistent bacteremia and need for line holiday.   - Plan for temp cath placement tomorrow - consents obtained and on chart  - Will place tunneled dialysis catheter next week if blood cultures clear    The above findings, diagnostics, and treatment plan were discussed with Dr. Barcenas who is in agreement with the  plan of care except as stated in additional documentation.        Vega Gusman PA-C  Vascular Surgery  [facility]    Active Diagnoses:     There are no hospital problems to display for this patient.        Medications:     Continuous Infusion:     Scheduled Medications:    atorvastatin  20 mg Oral QHS    cefTRIAXone (Rocephin) IV (PEDS and ADULTS)  1 g Intravenous Once    enoxaparin  30 mg Subcutaneous Daily    folic acid  1 mg Oral Daily    labetaloL  200 mg Oral Q12H    ondansetron  4 mg Intravenous Once    pantoprazole  40 mg Oral Daily    sertraline  25 mg Oral Daily    sevelamer carbonate  800 mg Oral TID WM    vancomycin 750 mg in D5W 250 mL IVPB (admixture device)  750 mg Intravenous Once       PRN Medications:   Current Facility-Administered Medications:     acetaminophen, 650 mg, Oral, Q4H PRN    albuterol-ipratropium, 3 mL, Nebulization, Once PRN    aluminum-magnesium hydroxide-simethicone, 30 mL, Oral, QID PRN    diphenhydrAMINE, 25 mg, Intravenous, Q6H PRN    HYDROcodone-acetaminophen, 1 tablet, Oral, Q4H PRN    HYDROcodone-acetaminophen, 1 tablet, Oral, Q6H PRN    melatonin, 6 mg, Oral, Nightly PRN    metoclopramide, 10 mg, Intravenous, Q10 Min PRN    ondansetron, 4 mg, Intravenous, Q4H PRN    polyethylene glycol, 17 g, Oral, BID PRN    prochlorperazine, 5 mg, Intravenous, Q6H PRN    senna-docusate 8.6-50 mg, 2 tablet, Oral, BID PRN    sodium chloride 0.9%, 10 mL, Intravenous, PRN    vancomycin - pharmacy to dose, , Intravenous, pharmacy to manage frequency

## 2024-10-10 NOTE — PROGRESS NOTES
Pharmacokinetic Assessment Follow Up: IV Vancomycin    Vancomycin serum concentration assessment(s):    The random level was drawn correctly and can be used to guide therapy at this time. The measurement is within the desired definitive target range of 10 to 15 mcg/mL.    Vancomycin Regimen Plan:    Give Vancomycin 750 mg IVPB x1 today after HD. Re-dose when the random level is less than 25 mcg/mL, next level to be drawn at 0430 on 10/12.    Drug levels (last 3 results):  Recent Labs   Lab Result Units 10/08/24  0458 10/10/24  0419   Vancomycin Random ug/ml 17.5 12.2*       Pharmacy will continue to follow and monitor vancomycin.    Please contact pharmacy at extension [phone] for questions regarding this assessment.    Thank you for the consult,   Angeline Mcmahon       Patient brief summary:  Fior Joya is a 69 y.o. female initiated on antimicrobial therapy with IV Vancomycin for treatment of bacteremia.    Drug Allergies:   Review of patient's allergies indicates:   Allergen Reactions    Asa [aspirin] Anaphylaxis    Penicillins      Received ceftriaxone from 6/27, no reactions        Actual Body Weight:   140.2 kg    Renal Function:   Estimated Creatinine Clearance: 21.3 mL/min (A) (based on SCr of 3.49 mg/dL (H)).,     Dialysis Method (if applicable):  intermittent HD every Tue, Thur, Sat.    CBC (last 72 hours):  Recent Labs   Lab Result Units 10/08/24  0458 10/09/24  0650   WBC x10(3)/mcL 26.23* 21.12*   Hgb g/dL 11.0* 11.1*   Hct % 31.3* 33.2*   Platelet x10(3)/mcL 255 208   Mono % % 3.7 4.8   Eos % % 0.5 0.7   Basophil % % 0.4 0.2       Metabolic Panel (last 72 hours):  Recent Labs   Lab Result Units 10/07/24  1125 10/08/24  0458 10/09/24  0650   Sodium mmol/L  --  136 136   Sodium, Blood Gas mmol/L 128*  --   --    Potassium mmol/L  --  4.4 4.1   Potassium, Blood Gas mmol/L 4.4  --   --    Chloride mmol/L  --  97* 94*   CO2 mmol/L  --  21* 27   Glucose mg/dL  --  69* 121*   Blood Urea Nitrogen mg/dL  --  42.4*  28.0*   Creatinine mg/dL  --  4.42* 3.49*       Vancomycin Administrations:  vancomycin given in the last 96 hours                     vancomycin (VANCOCIN) 500 mg in D5W 100 mL IVPB (MB+) (mg) 500 mg New Bag 10/08/24 1735    vancomycin (VANCOCIN) 500 mg in D5W 100 mL IVPB (MB+) (mg) 500 mg New Bag 10/07/24 2024                    Microbiologic Results:  Microbiology Results (last 7 days)       Procedure Component Value Units Date/Time    Blood Culture [[id number]]  (Abnormal) Collected: 10/07/24 2103    Order Status: Completed Specimen: Blood Updated: 10/10/24 0709     Blood Culture Culture In Progress     GRAM STAIN 1 of 1 Pediatric bottle positive      Gram Positive Cocci, probable Staphylococcus      Seen in gram stain of broth only    Blood Culture [[id number]]  (Abnormal)  (Susceptibility) Collected: 10/07/24 2103    Order Status: Completed Specimen: Blood Updated: 10/10/24 0650     Blood Culture Methicillin resistant Staphylococcus aureus     GRAM STAIN Gram Positive Cocci, probable Staphylococcus      Seen in gram stain of broth only      1 of 1 Pediatric bottle positive    Blood Culture [[id number]] Collected: 10/09/24 1728    Order Status: Resulted Specimen: Blood Updated: 10/09/24 1813    Blood Culture [[id number]] Collected: 10/09/24 1728    Order Status: Resulted Specimen: Blood Updated: 10/09/24 1812    Blood Culture [[id number]]  (Abnormal)  (Susceptibility) Collected: 10/05/24 0620    Order Status: Completed Specimen: Blood Updated: 10/08/24 0810     Blood Culture Methicillin resistant Staphylococcus aureus     GRAM STAIN Gram Positive Cocci, probable Staphylococcus      Seen in gram stain of broth only      2 of 2 bottles positive    Blood Culture [[id number]]  (Abnormal)  (Susceptibility) Collected: 10/05/24 0620    Order Status: Completed Specimen: Blood Updated: 10/08/24 0809     Blood Culture Methicillin resistant Staphylococcus aureus     GRAM STAIN Gram Positive Cocci, probable Staphylococcus       2 of 2 bottles positive      Seen in gram stain of broth only    Blood culture #2 **CANNOT BE ORDERED STAT** [[id number]]  (Abnormal)  (Susceptibility) Collected: 10/03/24 2020    Order Status: Completed Specimen: Blood Updated: 10/06/24 0627     Blood Culture Methicillin resistant Staphylococcus aureus     GRAM STAIN Gram Positive Cocci, probable Staphylococcus      Seen in gram stain of broth only      1 of 1 Pediatric bottle positive    Blood culture #1 **CANNOT BE ORDERED STAT** [[id number]]  (Abnormal)  (Susceptibility) Collected: 10/03/24 2020    Order Status: Completed Specimen: Blood Updated: 10/06/24 0627     Blood Culture Methicillin resistant Staphylococcus aureus     GRAM STAIN Gram Positive Cocci, probable Staphylococcus      Seen in gram stain of broth only      2 of 2 bottles positive    Urine culture [[id number]] Collected: 10/03/24 2008    Order Status: Completed Specimen: Urine Updated: 10/05/24 1003     Urine Culture No Significant Growth    Blood Culture [[id number]]     Order Status: Canceled Specimen: Blood     BCID2 Panel [[id number]]  (Abnormal) Collected: 10/03/24 2020    Order Status: Completed Specimen: Blood Updated: 10/04/24 1107     CTX-M (ESBL ) N/A     IMP (Cabapenemase ) N/A     KPC resistance gene (Carbapenemase ) N/A     mcr-1 N/A     mecA ID N/A     Comment: Note: Antimicrobial resistance can occur via multiple mechanisms. A Not Detected result for antimicrobial resistance gene(s) does not indicate antimicrobial susceptibility. Subculturing is required for species identification and susceptibility testing of   isolates.        mecA/C and MREJ (MRSA) gene Detected     NDM (Carbapenemase ) N/A     OXA-48-like (Carbapenemase ) N/A     Royal/B (VRE gene) N/A     VIM (Carbapenemase ) N/A     Enterococcus faecalis Not Detected     Enterococcus faecium Not Detected     Listeria monocytogenes Not Detected     Staphylococcus spp. Detected      Staphylococcus aureus Detected     Staphylococcus epidermidis Not Detected     Staphylococcus lugdunensis Not Detected     Streptococcus spp. Not Detected     Streptococcus agalactiae (Group B) Not Detected     Streptococcus pneumoniae Not Detected     Streptococcus pyogenes (Group A) Not Detected     Acinetobacter calcoaceticus/baumannii complex Not Detected     Bacteroides fragilis Not Detected     Enterobacterales Not Detected     Enterobacter cloacae complex Not Detected     Escherichia coli Not Detected     Klebsiella aerogenes Not Detected     Klebsiella oxytoca Not Detected     Klebsiella pneumoniae group Not Detected     Proteus spp. Not Detected     Salmonella spp. Not Detected     Serratia marcescens Not Detected     Haemophilus influenzae Not Detected     Neisseria meningitidis Not Detected     Pseudomonas aeruginosa Not Detected     Stenotrophomonas maltophilia Not Detected     Candida albicans Not Detected     Candida auris Not Detected     Candida glabrata Not Detected     Candida krusei Not Detected     Candida parapsilosis Not Detected     Candida tropicalis Not Detected     Cryptococcus neoformans/gattii Not Detected    Narrative:      The [employer] BCID2 Panel is a multiplexed nucleic acid test intended for the use with Perzo® 2.0 or Perzo® STAT[employer] Systems for the simultaneous qualitative detection and identification of multiple bacterial and yeast nucleic acids and select genetic determinants associated with antimicrobial resistance.  The [employer] BCID2 Panel test is performed directly on blood culture samples identified as positive by a continuous monitoring blood culture system.  Results are intended to be interpreted in conjunction with Gram stain results.

## 2024-10-10 NOTE — PLAN OF CARE
Problem: Occupational Therapy  Goal: Occupational Therapy Goal  Description: Goals to be met by: 11/7/24     Patient will increase functional independence with ADLs by performing:    LE Dressing with Modified [place].  Grooming while seated with Modified [place]. Progress to standing  Toileting from bedside commode with Modified [place] for hygiene and clothing management.   Toilet transfer to bedside commode with Modified [place]. Progress to toilet as appropriate.     Outcome: Progressing

## 2024-10-10 NOTE — PROGRESS NOTES
Infectious Disease  Progress Note    Patient Name: Fior Joya   MRN: [id number]   Admission Date: 10/3/2024   Hospital Length of Stay: 5 days  Attending Physician: Macarena Leggett MD   Primary Care Provider: Linda, Primary Doctor     Isolation Status: Contact     Assessment/Plan:        69 y.o. female with a history significant for ESRD on hemodialysis through right IJ permanent catheter, chronic hydronephrosis with left nephrostomy tube, hypertension who was admitted on 10/3/2024 with lower back pain. Patient said that her symptoms started approximately 1 week ago with some neck pain that moved down to her shoulder. She then developed lower back pain that moved to her right leg. Patient also reports having cloudy drainage from her nephrostomy tube for the last 1 month. Patient reports worsening back/right flank pain for the last few days. Vital signs on admission showed temperature 97.2 F, heart rate 79, blood pressure 110/75. Laboratory workup showed WBC 50828, creatinine 6.70, lactic acid 1.8. Urinalysis showed more than 100 WBC. She had 2 sets of blood culture obtained which are growing MRSA. 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  The upper pole moiety is decompressed with a ureteral stent but the lower pole demonstrating moderate severe hydronephrosis, left nephrostomy ureteral stent drain with resolution of hydronephrosis. Patient underwent nephrostomy tube placement by IR into the inferior pole of the right renal collecting system.  Patient was also noted to MRSA bacteremia, she was started on vancomycin, on 10/07/2024, she appears to had worsening mental status, she has persistent leukocytosis.  ID is consulted for assistance in management.    MRSA bacteremia  ESRD on HD, HD line removed 10/08/2024  Encephalopathy  Sepsis  Hydronephrosis s/p ureteral stents and nephrostomy tubes    -mental status  continues to improve, patient is even more awake today than she was yesterday.  No fevers, no chills, doing well, she is eating dinner on my evaluation.  -repeat blood cultures 10/07/2024 remain positive, these were done before removal of the HD line, will repeat them today.  -MADELYN done 10/08/2024, no evidence of vegetation  -patient complaining of right hip pain as well as low back pain    Recommendations:  -Continue Vancomycin dosing per pharmacy for goal trough 15-20   -patient will need line to be replaced, would like to have at least 48 hours of negative cultures prior to replacing a more permanent line.  -hip and low back pain that she was complaining of yesterday is significantly improved today  -for now will monitor the repeat blood cultures now that the line is removed, hopefully we can achieve sterility if not, we may have to add 2nd agent as salvage therapy    ID will continue following. Please contact us with any questions or concerns.    Antibiotics History:  Metronidazole 10/3  Cefepime 10/3-10/5  Vancomycin 10/3-    Portions of this note dictated using EMR integrated voice recognition software, and may be subject to voice recognition errors not corrected at proofreading. Please contact writer for clarification if needed.    Subjective:     Principal Problem: <principal problem not specified>     Interval History:   Mental status continues to improve, she is more awake today than she was yesterday.  Her right hip pain and low back pain are improved today as well.    Review of Systems   Review of Systems   All other systems reviewed and are negative.       Objective:     Vital Signs (Most Recent):  Temp: 98.4 °F (36.9 °C) (10/09/24 1954)  Pulse: 75 (10/09/24 1954)  Resp: 18 (10/09/24 2054)  BP: 130/74 (10/09/24 2054)  SpO2: 95 % (10/09/24 1954)  Vital Signs (24h Range):  Temp:  [98 °F (36.7 °C)-98.4 °F (36.9 °C)] 98.4 °F (36.9 °C)  Pulse:  [] 75  Resp:  [18-20] 18  SpO2:  [95 %-99 %] 95 %  BP:  (130-180)/(72-83) 130/74      Weight:   Wt Readings from Last 1 Encounters:   10/03/24 (!) 140.2 kg (309 lb)      Body mass index is Body mass index is 54.74 kg/m².     Estimated Creatinine Clearance: Estimated Creatinine Clearance: 21.3 mL/min (A) (based on SCr of 3.49 mg/dL (H)).     Lines/Drains/Airways       Drain  Duration                  Nephrostomy 06/28/24 1025 Left 8 Fr. 103 days         Nephrostomy 10/04/24 1506 Right 8 Fr. 5 days              Peripheral Intravenous Line  Duration                  Peripheral IV - Single Lumen 10/09/24 0900 20 G Anterior;Left;Proximal Forearm <1 day                     Physical Exam  Physical Exam  Constitutional:       Appearance: Normal appearance. She is obese.   HENT:      Head: Normocephalic and atraumatic.      Mouth/Throat:      Pharynx: No oropharyngeal exudate or posterior oropharyngeal erythema.   Eyes:      Extraocular Movements: Extraocular movements intact.      Pupils: Pupils are equal, round, and reactive to light.   Cardiovascular:      Rate and Rhythm: Normal rate and regular rhythm.      Heart sounds: No murmur heard.     Comments: Site of removal of the right chest wall HD catheter is clean  Pulmonary:      Effort: No respiratory distress.      Breath sounds: No wheezing, rhonchi or rales.   Abdominal:      General: Bowel sounds are normal. There is no distension.      Palpations: Abdomen is soft.      Tenderness: There is no abdominal tenderness.   Musculoskeletal:         General: No swelling or tenderness.      Cervical back: Neck supple. No rigidity or tenderness.   Lymphadenopathy:      Cervical: No cervical adenopathy.   Skin:     Findings: No lesion or rash.   Neurological:      General: No focal deficit present.      Mental Status: She is alert. Mental status is at baseline.      Cranial Nerves: No cranial nerve deficit.      Motor: No weakness.   Psychiatric:         Mood and Affect: Mood normal.         Behavior: Behavior normal.           Significant Labs: CBC:   Recent Labs   Lab 10/08/24  0458 10/09/24  0650   WBC 26.23* 21.12*   HGB 11.0* 11.1*   HCT 31.3* 33.2*    208     CMP:   Recent Labs   Lab 10/08/24  0458 10/09/24  0650    136   K 4.4 4.1   CL 97* 94*   CO2 21* 27   BUN 42.4* 28.0*   CREATININE 4.42* 3.49*   CALCIUM 8.2* 8.0*       Microbiology Results (last 7 days)       Procedure Component Value Units Date/Time    Blood Culture [[id number]] Collected: 10/09/24 1728    Order Status: Resulted Specimen: Blood Updated: 10/09/24 1813    Blood Culture [[id number]] Collected: 10/09/24 1728    Order Status: Resulted Specimen: Blood Updated: 10/09/24 1812    Blood Culture [[id number]]  (Abnormal) Collected: 10/07/24 2103    Order Status: Completed Specimen: Blood Updated: 10/09/24 1201     Blood Culture Susceptibility To Follow      Staphylococcus aureus     GRAM STAIN Gram Positive Cocci, probable Staphylococcus      Seen in gram stain of broth only      1 of 1 Pediatric bottle positive    Blood Culture [[id number]]  (Abnormal) Collected: 10/07/24 2103    Order Status: Completed Specimen: Blood Updated: 10/09/24 0634     GRAM STAIN 1 of 1 Pediatric bottle positive      Gram Positive Cocci, probable Staphylococcus      Seen in gram stain of broth only    Blood Culture [[id number]]  (Abnormal)  (Susceptibility) Collected: 10/05/24 0620    Order Status: Completed Specimen: Blood Updated: 10/08/24 0810     Blood Culture Methicillin resistant Staphylococcus aureus     GRAM STAIN Gram Positive Cocci, probable Staphylococcus      Seen in gram stain of broth only      2 of 2 bottles positive    Blood Culture [[id number]]  (Abnormal)  (Susceptibility) Collected: 10/05/24 0620    Order Status: Completed Specimen: Blood Updated: 10/08/24 0809     Blood Culture Methicillin resistant Staphylococcus aureus     GRAM STAIN Gram Positive Cocci, probable Staphylococcus      2 of 2 bottles positive      Seen in gram stain of broth only    Blood  culture #2 **CANNOT BE ORDERED STAT** [[id number]]  (Abnormal)  (Susceptibility) Collected: 10/03/24 2020    Order Status: Completed Specimen: Blood Updated: 10/06/24 0627     Blood Culture Methicillin resistant Staphylococcus aureus     GRAM STAIN Gram Positive Cocci, probable Staphylococcus      Seen in gram stain of broth only      1 of 1 Pediatric bottle positive    Blood culture #1 **CANNOT BE ORDERED STAT** [[id number]]  (Abnormal)  (Susceptibility) Collected: 10/03/24 2020    Order Status: Completed Specimen: Blood Updated: 10/06/24 0627     Blood Culture Methicillin resistant Staphylococcus aureus     GRAM STAIN Gram Positive Cocci, probable Staphylococcus      Seen in gram stain of broth only      2 of 2 bottles positive    Urine culture [[id number]] Collected: 10/03/24 2008    Order Status: Completed Specimen: Urine Updated: 10/05/24 1003     Urine Culture No Significant Growth    Blood Culture [[id number]]     Order Status: Canceled Specimen: Blood     BCID2 Panel [[id number]]  (Abnormal) Collected: 10/03/24 2020    Order Status: Completed Specimen: Blood Updated: 10/04/24 1107     CTX-M (ESBL ) N/A     IMP (Cabapenemase ) N/A     KPC resistance gene (Carbapenemase ) N/A     mcr-1 N/A     mecA ID N/A     Comment: Note: Antimicrobial resistance can occur via multiple mechanisms. A Not Detected result for antimicrobial resistance gene(s) does not indicate antimicrobial susceptibility. Subculturing is required for species identification and susceptibility testing of   isolates.        mecA/C and MREJ (MRSA) gene Detected     NDM (Carbapenemase ) N/A     OXA-48-like (Carbapenemase ) N/A     Royal/B (VRE gene) N/A     VIM (Carbapenemase ) N/A     Enterococcus faecalis Not Detected     Enterococcus faecium Not Detected     Listeria monocytogenes Not Detected     Staphylococcus spp. Detected     Staphylococcus aureus Detected     Staphylococcus epidermidis Not  Detected     Staphylococcus lugdunensis Not Detected     Streptococcus spp. Not Detected     Streptococcus agalactiae (Group B) Not Detected     Streptococcus pneumoniae Not Detected     Streptococcus pyogenes (Group A) Not Detected     Acinetobacter calcoaceticus/baumannii complex Not Detected     Bacteroides fragilis Not Detected     Enterobacterales Not Detected     Enterobacter cloacae complex Not Detected     Escherichia coli Not Detected     Klebsiella aerogenes Not Detected     Klebsiella oxytoca Not Detected     Klebsiella pneumoniae group Not Detected     Proteus spp. Not Detected     Salmonella spp. Not Detected     Serratia marcescens Not Detected     Haemophilus influenzae Not Detected     Neisseria meningitidis Not Detected     Pseudomonas aeruginosa Not Detected     Stenotrophomonas maltophilia Not Detected     Candida albicans Not Detected     Candida auris Not Detected     Candida glabrata Not Detected     Candida krusei Not Detected     Candida parapsilosis Not Detected     Candida tropicalis Not Detected     Cryptococcus neoformans/gattii Not Detected    Narrative:      The [employer] BCID2 Panel is a multiplexed nucleic acid test intended for the use with [employer]® 2.0 or [employer]® Chronicity Systems for the simultaneous qualitative detection and identification of multiple bacterial and yeast nucleic acids and select genetic determinants associated with antimicrobial resistance.  The [employer] BCID2 Panel test is performed directly on blood culture samples identified as positive by a continuous monitoring blood culture system.  Results are intended to be interpreted in conjunction with Gram stain results.             Significant Imaging: I have reviewed all pertinent imaging results/findings within the past 24 hours.      Milo Campbell MD  Infectious Disease  [facility]

## 2024-10-10 NOTE — CONSULTS
Subjective:       Patient ID: Fior Joya is a 69 y.o. female.    Chief Complaint: Back Pain (Pt wheeled into triage and presents via POV. Pt reports lower back pain and R hip pain that began 5 days PTA. Urostomy placed approx 2 months ago with minimal cloudy output that pt reports for approx 1 month. ESRD tues/thurs/Sat dialysis but did not present today d/t increasing pain. 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  No detrusor muscle present for evaluation.  Likely metastatic disease to lymph nodes in the abdomen and pelvis.    Current Treatment:   Pending further discussion with the patient.  Initial discussion on 10/10/2024, patient stated that she did not believe the pathology report because she did not believe that she underwent any procedure on 10/04/2024.  She stated that she did not want to discuss cancer diagnosis further because she did not believe that she had cancer.    Treatment History:  Cystoscopy on 10/04/2024.    HPI:  69-year-old female multiple medical comorbidities including chronic anemia, end-stage renal disease on hemodialysis, chronic hydronephrosis with left nephrostomy tube and right ureteral stent who originally presented to [facility] on 10/03/2024 with right-sided flank pain.  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  The upper pole moiety was decompressed with a ureteral stent with a lower pole more demonstrating moderate severe hydronephrosis persistent from prior exam.  Left nephrostomy ureteral stent drain with resolution of hydronephrosis.  There was enlarged right pelvic lymph node increased in size, malignancy suspected.  There was also mildly enlarged upper normal sized lymph node in the  periaortic region and the left pelvis that were nonspecific.  Patient was admitted to the hospitalist service.    She was seen by Urology and underwent cystoscopy on 10/04/2024 with right stent exchange of the upper pole moiety, retrograde pyelogram.  This procedure revealed significant amount of friable tissue located at the bladder base.  Multiple biopsies were obtained.  Pathology revealed invasive moderately differentiated carcinoma with extensive squamous differentiation.  There was no detrusor muscle present for evaluation.  The pathologist made a comment that the differential diagnosis was pure squamous cell carcinoma versus urothelial carcinoma with extensive squamous differentiation.  A ureteral stent was placed in the right ureter.  A 2nd ureteral orifice was not found.  On 10/04/2024, the patient also underwent fluoroscopy in ultrasound-guided right lower pole nephrostomy tube placement.      Patient also found to have MRSA bacteremia.  He MRI of the C, T, L-spine on 10/05/2024 showed no obvious epidural abscess, osteomyelitis, or discitis.  There was increased signal in the cord at C6-C7 which showed some enhancement and could represent intra cord lesion or possible myelomalacia.  TTE on 10/05/2024 showed normal EF with no valve vegetation.  MADELYN on 10/08/2024 showed no valve vegetation.    To findings of squamous cell carcinoma, Oncology consulted on 10/10/2024.  I saw the patient on that day.  When I was talking to her, she stated that she never had any scope or procedures done.  I explained to her that she had, she adamantly denied any procedures.  I explained that her pathology showed bladder cancer, she told me that that was not possible.  I asked her if she would like for me to discuss treatment of bladder cancer, she told me that she did not want me to.    Interval History:   Initial consult    History reviewed. No pertinent past medical history.   Past Surgical History:   Procedure Laterality Date     CYSTOSCOPY W/ URETERAL STENT PLACEMENT Right 6/27/2024    Procedure: CYSTOSCOPY, WITH URETERAL STENT INSERTION;  Surgeon: Otis Person MD;  Location: [facility] OR;  Service: Urology;  Laterality: Right;  CYSTOSCOPY, BILATERAL RETROGRADE PYELOGRAMS, POSSIBLE STENT PLACEMENT.    CYSTOSCOPY W/ URETERAL STENT PLACEMENT Bilateral 10/4/2024    Procedure: CYSTOSCOPY, WITH URETERAL STENT INSERTION;  Surgeon: Tawana Loaiza MD;  Location: [facility] OR;  Service: Urology;  Laterality: Bilateral;  CYSTO WITH BILAT URETERAL STENT EXCHANGE    INSERTION OF TUNNELED CENTRAL VENOUS HEMODIALYSIS CATHETER N/A 7/3/2024    Procedure: Insertion, Catheter, Central Venous, Hemodialysis;  Surgeon: Uche Barcenas MD;  Location: [facility] CATH LAB;  Service: Peripheral Vascular;  Laterality: N/A;     Social History     Socioeconomic History    Marital status:    Tobacco Use    Smoking status: Never    Smokeless tobacco: Never   Substance and Sexual Activity    Alcohol use: Never    Drug use: Never    Sexual activity: Not Currently     Social Drivers of Health     Financial Resource Strain: Low Risk  (10/4/2024)    Overall Financial Resource Strain (CARDIA)     Difficulty of Paying Living Expenses: Not hard at all   Food Insecurity: No Food Insecurity (10/4/2024)    Hunger Vital Sign     Worried About Running Out of Food in the Last Year: Never true     Ran Out of Food in the Last Year: Never true   Transportation Needs: No Transportation Needs (10/4/2024)    TRANSPORTATION NEEDS     Transportation : No   Physical Activity: Unknown (10/4/2024)    Exercise Vital Sign     Days of Exercise per Week: 0 days   Stress: No Stress Concern Present (10/4/2024)    [employer] - Occupational Stress Questionnaire     Feeling of Stress : Not at all   Housing Stability: Low Risk  (10/4/2024)    Housing Stability Vital Sign     Unable to Pay for Housing in the Last Year: No     Homeless in the Last Year: No       No family history on file.   Review of patient's allergies indicates:   Allergen Reactions    Asa [aspirin] Anaphylaxis    Penicillins      Received ceftriaxone from 6/27, no reactions       Review of Systems   Constitutional:  Negative for appetite change and fever.   HENT:  Negative for mouth sores and sore throat.    Respiratory:  Negative for chest tightness and shortness of breath.    Cardiovascular:  Negative for chest pain and leg swelling.   Gastrointestinal:  Positive for abdominal pain. Negative for blood in stool.   Genitourinary:  Positive for dysuria and flank pain. Negative for pelvic pain and vaginal bleeding.   Musculoskeletal:  Positive for back pain. Negative for neck pain.   Neurological:  Negative for dizziness and weakness.   Hematological:  Negative for adenopathy.         Objective:      Physical Exam  Vitals reviewed. Exam conducted with a chaperone present.   Constitutional:       General: She is not in acute distress.     Appearance: Normal appearance.   HENT:      Head: Normocephalic and atraumatic.      Nose: Nose normal.      Mouth/Throat:      Mouth: Mucous membranes are moist.   Eyes:      Extraocular Movements: Extraocular movements intact.      Conjunctiva/sclera: Conjunctivae normal.   Cardiovascular:      Rate and Rhythm: Normal rate and regular rhythm.      Pulses: Normal pulses.      Heart sounds: Normal heart sounds.   Pulmonary:      Effort: Pulmonary effort is normal.      Breath sounds: Normal breath sounds.   Abdominal:      General: Bowel sounds are normal.      Palpations: Abdomen is soft.   Genitourinary:     Comments: Nephrostomy tube  Musculoskeletal:         General: No swelling. Normal range of motion.      Cervical back: Normal range of motion and neck supple.      Right lower leg: No edema.      Left lower leg: No edema.   Lymphadenopathy:      Cervical: No cervical adenopathy.   Skin:     General: Skin is warm and dry.   Neurological:      General: No focal  deficit present.      Mental Status: She is alert and oriented to person, place, and time. Mental status is at baseline.   Psychiatric:         Mood and Affect: Mood normal.         Behavior: Behavior normal.         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  No detrusor muscle present for evaluation.  Likely metastatic disease to lymph nodes in the abdomen and pelvis.        Plan:       I went to the patient's room to discuss her pathology results and discuss possible treatment plans.  While there, the patient told me that she never underwent a procedure, she stated that it was not possible for her to have cancer.    I tried to explain that her pathology from her cystoscopy on 10/04/2024 showed that she does have bladder cancer.  She again denied the she had a.  I asked her if she would like me to explained treatment options to her, she declined.    I had a conversation with the patient's son on the afternoon of 10/10/2024.  I explained that I tried to discuss the diagnosis and treatment plans with his mother, he stated that he would discuss with the.  I explained that I would follow up with her tomorrow.    Ultimately, it was the patient's decision of whether she wants to treat her cancer or not.  It is her decision of whether or not she wants to discuss diagnosis/prognosis.     We would not treat her while she was in the hospital.  She would need to see me in clinic.  I will set her up for a follow up.      Chao Woody II, MD

## 2024-10-10 NOTE — PT/OT/SLP EVAL
Physical Therapy Evaluation    Patient Name:  Fior Joya   MRN:  [id number]    Recommendations:     Discharge therapy intensity: Moderate Intensity Therapy   Discharge Equipment Recommendations: to be determined by next level of care   Barriers to discharge: Impaired mobility and Ongoing medical needs    Assessment:     Fior Joya is a 69 y.o. female admitted with a medical diagnosis of bilateral hydronephrosis s/p (B) nephrostomy placement, persistent MRSA bacteremia, bladder carcinoma, ESRD on HD.  She presents with the following impairments/functional limitations: weakness, impaired endurance, impaired functional mobility, impaired self care skills, pain, impaired balance . Evaluation limited by severe pain and inability to tolerate sitting EOB for less than 1 minute. Required 2 person assist this date but anticipate functional mobility to improve once pain is under control. Pt was previously mod independent; for now recommending moderate intensity therapy.    Rehab Prognosis: Good; patient would benefit from acute skilled PT services to address these deficits and reach maximum level of function.    Recent Surgery: Procedure(s) (LRB):  CYSTOSCOPY, WITH URETERAL STENT INSERTION (Bilateral) 6 Days Post-Op    Plan:     During this hospitalization, patient would benefit from acute PT services 5 x/week to address the identified rehab impairments via gait training, therapeutic activities, therapeutic exercises, neuromuscular re-education and progress toward the following goals:    Plan of Care Expires:  11/10/24    Subjective     Chief Complaint: severe pain in lower back  Patient/Family Comments/goals: PLOF  Pain/Comfort:  Pain Rating 1: 10/10 (lower back and R hip)    Patients cultural, spiritual, Nondenominational conflicts given the current situation: no    Living Environment:  Pt lives with her son in a [facility] with ramp entry.  Prior to admission, patients level of function was Mike with RW for short distances vs.  Independent in WC; can t/f self onto toilet; spongebaths currently.  Equipment used at home: wheelchair, walker, rolling, bedside commode.  DME owned (not currently used): none.  Upon discharge, patient will have assistance from son.    Objective:     Communicated with RN prior to session.  Patient found HOB elevated with peripheral IV, nephrostomy, telemetry, pulse ox (continuous)  upon PT entry to room.    General Precautions: Standard, fall  Orthopedic Precautions:    Braces:    Respiratory Status: Room air  Blood Pressure: NT      Exams:  RLE Strength: MARIKA formally due to inability to sit EOB, appears at least 2/5  LLE Strength: as above  Skin integrity:  visible skin intact      Functional Mobility:  Bed Mobility:     Supine to Sit: moderate assistance and of 2 persons  Sit to Supine: impulsively leaned back to supine while still EOB, required 2 person A to return pt to vertical orientation in bed  Balance: Sitting balance = Ana      AM-PAC 6 CLICK MOBILITY  Total Score:8       Treatment & Education:  Declined rolling to adjust hovermat, states she was in too much pain    Patient provided with verbal education education regarding PT role/goals/POC, fall prevention, safety awareness, and discharge/DME recommendations.  Understanding was verbalized, however additional teaching warranted.     Patient left HOB elevated with all lines intact, call button in reach, and RN notified.    GOALS:   Multidisciplinary Problems       Physical Therapy Goals          Problem: Physical Therapy    Goal Priority Disciplines Outcome Interventions   Physical Therapy Goal     PT, PT/OT Progressing    Description: Goals to be met by: 11/10/24     Patient will increase functional independence with mobility by performin. Supine to sit with Mather  2. Sit to stand transfer with Modified Mather  3. Bed to chair transfer with Modified Mather using Rolling Walker  4. Gait  > or = 25 feet with Modified Mather  using Rolling Walker.                          History:     History reviewed. No pertinent past medical history.    Past Surgical History:   Procedure Laterality Date    CYSTOSCOPY W/ URETERAL STENT PLACEMENT Right 6/27/2024    Procedure: CYSTOSCOPY, WITH URETERAL STENT INSERTION;  Surgeon: Otis Person MD;  Location: [facility];  Service: Urology;  Laterality: Right;  CYSTOSCOPY, BILATERAL RETROGRADE PYELOGRAMS, POSSIBLE STENT PLACEMENT.    CYSTOSCOPY W/ URETERAL STENT PLACEMENT Bilateral 10/4/2024    Procedure: CYSTOSCOPY, WITH URETERAL STENT INSERTION;  Surgeon: Tawana Loaiza MD;  Location: [facility] OR;  Service: Urology;  Laterality: Bilateral;  CYSTO WITH BILAT URETERAL STENT EXCHANGE    INSERTION OF TUNNELED CENTRAL VENOUS HEMODIALYSIS CATHETER N/A 7/3/2024    Procedure: Insertion, Catheter, Central Venous, Hemodialysis;  Surgeon: Uche Barcenas MD;  Location: [facility] CATH LAB;  Service: Peripheral Vascular;  Laterality: N/A;       Time Tracking:     PT Received On: 10/10/24  PT Start Time: 1101     PT Stop Time: 1122  PT Total Time (min): 21 min     Billable Minutes: Evaluation MOD      10/10/2024

## 2024-10-10 NOTE — PLAN OF CARE
Problem: Adult Inpatient Plan of Care  Goal: Plan of Care Review  Outcome: Progressing  Goal: Patient-Specific Goal (Individualized)  Outcome: Progressing  Goal: Absence of Hospital-Acquired Illness or Injury  Outcome: Progressing  Goal: Optimal Comfort and Wellbeing  Outcome: Progressing  Goal: Readiness for Transition of Care  Outcome: Progressing     Problem: Bariatric Environmental Safety  Goal: Safety Maintained with Care  Outcome: Progressing     Problem: Skin Injury Risk Increased  Goal: Skin Health and Integrity  Outcome: Progressing     Problem: Hemodialysis  Goal: Safe, Effective Therapy Delivery  Outcome: Progressing  Goal: Effective Tissue Perfusion  Outcome: Progressing  Goal: Absence of Infection Signs and Symptoms  Outcome: Progressing     Problem: Infection  Goal: Absence of Infection Signs and Symptoms  Outcome: Progressing     Problem: Fall Injury Risk  Goal: Absence of Fall and Fall-Related Injury  Outcome: Progressing

## 2024-10-10 NOTE — PROGRESS NOTES
Nephrology  Note    Patient Name: Fior Joya  Age: 69 y.o.  : 1955  MRN: [id number]  Admission Date: 10/3/2024        Fior Joya is a 69 y.o. female who presents with right flank pain.  Past medical history significant for ESRD on hemodialysis TTS at [facility] via right IJ PermCath, chronic hydronephrosis with left nephrostomy tube in place, and stents to right ureter, hypertension, anemia, hyperphosphatemia, and hyperlipidemia.  Patient presents with worsening right flank pain.  She states the pain started in her right neck, in his now in her right flank/hip area.  Patient was started on hemodialysis in 2024 after being found to have significant acute kidney injury and hydronephrosis.  She has been maintained on hemodialysis at [facility] on a TTS schedule.  Prior to hospitalization her last dialysis was on 10/01/2024.  Nephrology consulted for ESRD management.  In the emergency department she was noted to have low blood pressure and has been given IV fluids.  Urology has also been consulted for concern hydronephrosis needing stent exchange.  She has also been started on antibiotics for possible UTI.  She has had nausea, and vomiting.  No chest pain, shortness of breath, or lower extremity edema.    10/07/2024  Chart reviewed.  Weekend events noted.  Patient has been NPO at least since her admission to the hospital and also has no IV fluids.  Lying down in the bed.  Having jerky movements all over the body and uncomfortable.  Now she has nephrostomy tube on both sides.  Both draining.  Serosanguineous fluid noted.  Patient is very slow to answer questions but did recognize me by name and she knows her own name.     10/08/2024  No acute events overnight.  Seen on hemodialysis.  She was endorsing shortness of breath, and has some lower extremity edema.    10/09/2024   Mentally she has cleared up completely.  Complains of pains all over the body for which she is on p.r.n. pain medications.  Also  complains of being nervous and on some anxiety medication.  No shortness of breath.  Tunneled dialysis catheter was removed yesterday.    10/10/2024  No acute events overnight.  No chest pain, shortness of breath, abdominal pain, nausea, vomiting, or lower extremity edema.  Found to have bladder cancer by Urology.    Current Facility-Administered Medications   Medication Dose Route Frequency Provider Last Rate Last Admin    acetaminophen tablet 650 mg  650 mg Oral Q4H PRN Tawana Loaiza MD   650 mg at 10/09/24 0256    albuterol-ipratropium 2.5 mg-0.5 mg/3 mL nebulizer solution 3 mL  3 mL Nebulization Once PRN Mark Ying DO        aluminum-magnesium hydroxide-simethicone 200-200-20 mg/5 mL suspension 30 mL  30 mL Oral QID PRN Tawana Loaiza MD        atorvastatin tablet 20 mg  20 mg Oral QHS Tawana Loaiza MD   20 mg at 10/09/24 2053    cefTRIAXone (Rocephin) 1 g in D5W 100 mL IVPB (MB+)  1 g Intravenous Once Macarena Leggett MD        diphenhydrAMINE injection 25 mg  25 mg Intravenous Q6H PRN Mark Ying DO   25 mg at 10/09/24 0256    enoxaparin injection 30 mg  30 mg Subcutaneous Daily Macarena Leggett MD   30 mg at 10/09/24 1615    folic acid tablet 1 mg  1 mg Oral Daily Tawana Loaiza MD   1 mg at 10/10/24 0845    HYDROcodone-acetaminophen  mg per tablet 1 tablet  1 tablet Oral Q4H PRN Macarena Leggett MD   1 tablet at 10/09/24 1615    HYDROcodone-acetaminophen 5-325 mg per tablet 1 tablet  1 tablet Oral Q6H PRN Tawana Loaiza MD   1 tablet at 10/10/24 0431    labetaloL tablet 200 mg  200 mg Oral Q12H Tawana Loaiza MD   200 mg at 10/10/24 0846    melatonin tablet 6 mg  6 mg Oral Nightly PRN Tawana Loaiza MD   6 mg at 10/09/24 2053    metoclopramide injection 10 mg  10 mg Intravenous Q10 Min PRN Mark Ying DO        ondansetron injection 4 mg  4 mg Intravenous Q4H PRN Tawana Loaiza MD         ondansetron injection 4 mg  4 mg Intravenous Once Mark Ying DO        pantoprazole EC tablet 40 mg  40 mg Oral Daily Tawana Loaiza MD   40 mg at 10/10/24 0845    polyethylene glycol packet 17 g  17 g Oral BID PRN Tawana Loaiza MD        prochlorperazine injection Soln 5 mg  5 mg Intravenous Q6H PRN Tawana Loaiza MD        senna-docusate 8.6-50 mg per tablet 2 tablet  2 tablet Oral BID PRN Tawana Loaiza MD        sertraline tablet 25 mg  25 mg Oral Daily Shni Light MD   25 mg at 10/10/24 0846    sevelamer carbonate tablet 800 mg  800 mg Oral TID WM Tawana Loaiza MD   800 mg at 10/10/24 0845    sodium chloride 0.9% flush 10 mL  10 mL Intravenous PRN Tawana Loaiza MD        vancomycin - pharmacy to dose   Intravenous pharmacy to manage frequency Tawana Loaiza MD        vancomycin 750 mg in D5W 250 mL IVPB (admixture device)  750 mg Intravenous Once Tawana Loaiza MD           No, Primary Doctor    History reviewed. No pertinent past medical history.   Past Surgical History:   Procedure Laterality Date    CYSTOSCOPY W/ URETERAL STENT PLACEMENT Right 6/27/2024    Procedure: CYSTOSCOPY, WITH URETERAL STENT INSERTION;  Surgeon: Otis Person MD;  Location: [facility];  Service: Urology;  Laterality: Right;  CYSTOSCOPY, BILATERAL RETROGRADE PYELOGRAMS, POSSIBLE STENT PLACEMENT.    CYSTOSCOPY W/ URETERAL STENT PLACEMENT Bilateral 10/4/2024    Procedure: CYSTOSCOPY, WITH URETERAL STENT INSERTION;  Surgeon: Tawana Loaiza MD;  Location: [facility] OR;  Service: Urology;  Laterality: Bilateral;  CYSTO WITH BILAT URETERAL STENT EXCHANGE    INSERTION OF TUNNELED CENTRAL VENOUS HEMODIALYSIS CATHETER N/A 7/3/2024    Procedure: Insertion, Catheter, Central Venous, Hemodialysis;  Surgeon: Uche Barcenas MD;  Location: [facility] CATH LAB;  Service: Peripheral Vascular;  Laterality: N/A;      No family history on  file.  Social History     Tobacco Use    Smoking status: Never    Smokeless tobacco: Never   Substance Use Topics    Alcohol use: Never     Medications Prior to Admission   Medication Sig Dispense Refill Last Dose/Taking    atorvastatin (LIPITOR) 20 MG tablet Take 20 mg by mouth every evening.   10/3/2024    calcium carbonate (TUMS) 200 mg calcium (500 mg) chewable tablet Take 2 tablets (1,000 mg total) by mouth 2 (two) times daily. 120 tablet 11 10/3/2024 Morning    ergocalciferol (ERGOCALCIFEROL) 50,000 unit Cap Take 1 capsule (50,000 Units total) by mouth every 72 hours. 10 capsule 2 Past Week    FEROSUL 325 mg (65 mg iron) Tab tablet Take 325 mg by mouth once daily.   10/3/2024 Morning    folic acid (FOLVITE) 1 MG tablet Take 1 tablet (1 mg total) by mouth once daily. 30 tablet 0 10/3/2024 Morning    labetaloL (NORMODYNE) 200 MG tablet Take 1 tablet (200 mg total) by mouth every 12 (twelve) hours. 60 tablet 11 10/3/2024 Morning    loratadine (CLARITIN) 10 mg tablet Take 10 mg by mouth once daily.   10/3/2024    pantoprazole (PROTONIX) 40 MG tablet Take 1 tablet (40 mg total) by mouth once daily. 90 tablet 3 10/3/2024 Morning    senna-docusate 8.6-50 mg (SENNA WITH DOCUSATE SODIUM) 8.6-50 mg per tablet Take 1 tablet by mouth daily as needed for Constipation.   Past Week    sevelamer carbonate (RENVELA) 800 mg Tab Take 800 mg by mouth 3 (three) times daily with meals.   10/3/2024 Morning    polyethylene glycol (GLYCOLAX) 17 gram PwPk Take 17 g by mouth once daily. (Patient not taking: Reported on 10/4/2024)   Not Taking     Review of patient's allergies indicates:   Allergen Reactions    Asa [aspirin] Anaphylaxis    Penicillins      Received ceftriaxone from 6/27, no reactions             Review of Systems:     Comprehensive 10pt ROS negative except as noted per history.      Objective:       VITAL SIGNS: 24 HR MIN & MAX LAST    Temp  Min: 97.3 °F (36.3 °C)  Max: 98.4 °F (36.9 °C)  97.3 °F (36.3 °C)        BP   Min: 130/74  Max: 180/83  (!) 145/74     Pulse  Min: 57  Max: 100  65     Resp  Min: 18  Max: 20  18    SpO2  Min: 95 %  Max: 99 %  97 %      GEN:  Chronically ill-appearing WF.  Morbidly obese  CV: RRR without rub or gallop.  PULM: CTAB, unlabored  ABD: Soft, NT/ND abdomen with NABS  :  Nephrostomy tubes in both right and left kidneys.  Draining serosanguineous fluids.  EXT: No cyanosis or edema  SKIN: Warm and dry  PSYCH: Awake, alert and oriented x3 now.  Dialysis access:  None.            Component Value Date/Time     (L) 10/10/2024 0738     10/09/2024 0650    K 4.5 10/10/2024 0738    K 4.1 10/09/2024 0650    CO2 25 10/10/2024 0738    CO2 27 10/09/2024 0650    BUN 44.0 (H) 10/10/2024 0738    BUN 28.0 (H) 10/09/2024 0650    CREATININE 4.57 (H) 10/10/2024 0738    CREATININE 3.49 (H) 10/09/2024 0650    CALCIUM 8.1 (L) 10/10/2024 0738    CALCIUM 8.0 (L) 10/09/2024 0650    PHOS 7.2 (H) 10/05/2024 0315            Component Value Date/Time    WBC 21.12 (H) 10/09/2024 0650    WBC 26.23 (H) 10/08/2024 0458    WBC 25.58 10/07/2024 0541    WBC 26.04 10/06/2024 0449    HGB 11.1 (L) 10/09/2024 0650    HGB 11.0 (L) 10/08/2024 0458    HCT 33.2 (L) 10/09/2024 0650    HCT 31.3 (L) 10/08/2024 0458     10/09/2024 0650     10/08/2024 0458             CT Abdomen Pelvis With IV Contrast NO Oral Contrast   Final Result      1. No significant interval change compared to previous exam.  Ureteral stents in place.   2. Duplicated right kidney and right ureter with persistent hydroureteronephrosis of the lower pole moiety.         Electronically signed by: Deidra Cox:    10/08/2024   Time:    06:49      CT Head Without Contrast   Final Result      No acute intracranial findings.         Electronically signed by: Alexx Patricia   Date:    10/07/2024   Time:    10:46      MRI Lumbar Spine W WO Cont   Final Result      No acute abnormality of the lumbar spine.         Electronically signed by: Edmundo Vásquez,  MD   Date:    10/05/2024   Time:    16:55      MRI Thoracic Spine W WO Cont   Final Result      Very limited examination due to significant motion artifact      No evidence of osteomyelitis or discitis seen at no evidence of epidural abscess seen in thoracic spine.  Lumbar and cervical spine discussed on separate report         Electronically signed by: Garland Raya   Date:    10/05/2024   Time:    16:57      MRI Cervical Spine W WO Cont   Final Result      Very limited examination due to motion artifact.  Only gross findings are visible.  Fine details are not visible.  No obvious epidural abscess is seen.  There is some increased signal in the cord at C6-C7 which shows some enhancement.  Findings could represent intra cord lesion or possible myelomalacia.         Electronically signed by: Garland Raya   Date:    10/05/2024   Time:    16:55      IR Nephrostomy Tube Placement   Final Result      Technically successful Nephrostomy Tube placement.         Electronically signed by: Andrew Buenrostro   Date:    10/04/2024   Time:    16:37      US Guided Needle Placement   Final Result      Technically successful Nephrostomy Tube placement.         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  The upper pole moiety is decompressed with a ureteral stent with the lower pole more demonstrating moderate severe hydronephrosis which is persistent from the prior exam.      Left nephrostomy ureteral stent drain with resolution of hydronephrosis.      Enlarged right pelvic lymph node increased in size from the prior, malignancy suspected.      Mildly enlarged upper normal sized lymph nodes in the periaortic region and left pelvis, nonspecific, similar to the  prior exam.         Electronically signed by: Teet Sun MD   Date:    10/04/2024   Time:    07:37      X-Ray Lumbar Spine Ap And Lateral   Final Result      Limited examination due to patient's body habitus but degenerative changes seen throughout the lower lumbar spine         Electronically signed by: Garland Raya   Date:    10/03/2024   Time:    18:11          Assessment / Plan:       ESRD - normally TTS at [facility].  Tunneled dialysis catheter removed 10/08/2024 due to persistent MRSA bacteremia  Metabolic acidosis  MRSA bacteremia  Bilateral hydronephrosis - now with nephrostomy tube both sides    Plan:  No severe electrolyte abnormalities, uremia, or volume overload that would require urgent hemodialysis.  Awaiting repeat blood cultures to come back.  If no growth times 48 hours to by tomorrow she can have a PermCath replaced, and restart dialysis.  We will follow.

## 2024-10-10 NOTE — PT/OT/SLP PROGRESS
[facility]  Speech Language Pathology Department  Diet Tolerance Follow-up    Patient Name:  Fior Joya   MRN:  [id number]    Recommendations     General recommendations:  SLP intervention not indicated  Solid texture recommendation:  Easy to Chew Diet - IDDSI Level 7  Liquid consistency recommendation: Thin liquids - IDDSI Level 0   Medications: per patient preference  Swallow strategies/precautions: small bites/sips, slow rate, and upright for PO intake    Diet Tolerance     Nursing reports no difficulty regarding diet tolerance.    Outcome Measures     Functional Oral Intake Scale: 6 - Total oral diet with multiple consistencies without special preparation, but with specific food limitation      Plan     SLP Follow-Up:  No      Time Tracking     SLP Treatment Date: 10/10/2024

## 2024-10-10 NOTE — PROGRESS NOTES
.UROLOGY  PROGRESS  NOTE    Fior Joya 1955  [id number]  10/10/2024    POD 6 cystoscopy, right stent exchange upper pole moiety, bladder biopsy; nephrostomy tube placement in lower pole of right kidney with IR    Patient resting in bed  No complaints during rounds  No acute events overnight  She reports she is feeling pretty good today      VSS, afebrile  Nephrostomy output not documented overnight  Bun/cr 44/4.57      Exam:    NAD  Resp unlabored  Abd obese, soft, NTND   bloody drainage right PCN, left PCN with cranberry drainage       Recent Results (from the past 24 hours)   Vancomycin, Random    Collection Time: 10/10/24  4:19 AM   Result Value Ref Range    Vancomycin Random 12.2 (L) 15.0 - 20.0 ug/ml   Basic Metabolic Panel    Collection Time: 10/10/24  7:38 AM   Result Value Ref Range    Sodium 132 (L) 136 - 145 mmol/L    Potassium 4.5 3.5 - 5.1 mmol/L    Chloride 90 (L) 98 - 107 mmol/L    CO2 25 23 - 31 mmol/L    Glucose 137 (H) 82 - 115 mg/dL    Blood Urea Nitrogen 44.0 (H) 9.8 - 20.1 mg/dL    Creatinine 4.57 (H) 0.55 - 1.02 mg/dL    BUN/Creatinine Ratio 10     Calcium 8.1 (L) 8.4 - 10.2 mg/dL    Anion Gap 17.0 mEq/L    eGFR 10 mL/min/1.73/m2     Pathology Result    Comment:          FINAL DIAGNOSIS     BLADDER TISSUE, BIOPSY:     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     NO DETRUSOR MUSCLE IS PRESENT FOR EVALUATION.     Electronically Signed by:  Sánchez Goodwin M.D. , Pathologist  (Case signed 10/08/2024 at 01:25pm)     Comment  The differential diagnosis includes pure squamous cell carcinoma and urothelial  carcinoma with extensive squamous differentiation. This distinction requires  evaluation of the entire resected tumor. In addition, clinical/radiological  correlation is advised to insure the tumor is compatible with a bladder primary.  This case has also been reviewed in intradepartmental consultation.     Source  BLADDER TISSUE      Clinical Information  BLADDER     Gross Description  Received fresh, and subsequently placed in formalin labeled `Fior Joya,  bladder` and listed on the requisition as `bladder tissue` are 4 irregular  fragments of pink-tan friable tissue that range from 0.4 to 1.6 cm in greatest  dimension.  Submitted intact in cassette 1A.  0-F-1     AM 10/7/2024     Microscopic Description  1. Microscopic examination performed.  Please see diagnosis.       Assessment:  Bilateral hydronephrosis  -had left PCN placed in June of this year due to inability to place retrograde ureteral stent, also had right ureteral stent placed in one of the two collecting systems on 6/27  -s/p Cystoscopy with right stent exchange upper pole moiety, Retrograde pyelogram and Bladder biopsy 10/4  -unable to identify UO intra-op therefore IR placed a right PCN to the collecting system without a stent 10/4    Questionable bladder mass  - s/p biopsy - pathology reveals squamous cell carcinoma of bladder vs squamous differentiation of transitional cell carcinoma    Urosepsis  -blood cultures +MRSA, on rocephin; repeat BC pending  -repeat urine cultures with no growth    ESRD   -on HD      Plan:  -continue bilateral nephrostomy tubes on d/c  -Culture specific antibiotics  -Discussed pathology results with patient. The patient says she does not believe pathology results, but is open to oncology consult.  Additional recs if any to follow from Dr. Otis Person.        Donna Varghese, [facility]-BC

## 2024-10-11 LAB
ALBUMIN SERPL-MCNC: 2 G/DL (ref 3.4–4.8)
BASOPHILS # BLD AUTO: 0.07 X10(3)/MCL
BASOPHILS NFR BLD AUTO: 0.4 %
BUN SERPL-MCNC: 55.1 MG/DL (ref 9.8–20.1)
CALCIUM SERPL-MCNC: 8.5 MG/DL (ref 8.4–10.2)
CHLORIDE SERPL-SCNC: 88 MMOL/L (ref 98–107)
CO2 SERPL-SCNC: 21 MMOL/L (ref 23–31)
CREAT SERPL-MCNC: 5.32 MG/DL (ref 0.55–1.02)
EOSINOPHIL # BLD AUTO: 0.38 X10(3)/MCL (ref 0–0.9)
EOSINOPHIL NFR BLD AUTO: 2 %
ERYTHROCYTE [DISTWIDTH] IN BLOOD BY AUTOMATED COUNT: 15.7 % (ref 11.5–17)
GFR SERPLBLD CREATININE-BSD FMLA CKD-EPI: 8 ML/MIN/1.73/M2
GLUCOSE SERPL-MCNC: 86 MG/DL (ref 82–115)
HCT VFR BLD AUTO: 30.7 % (ref 37–47)
HGB BLD-MCNC: 9.9 G/DL (ref 12–16)
IMM GRANULOCYTES # BLD AUTO: 0.28 X10(3)/MCL (ref 0–0.04)
IMM GRANULOCYTES NFR BLD AUTO: 1.5 %
LYMPHOCYTES # BLD AUTO: 1.12 X10(3)/MCL (ref 0.6–4.6)
LYMPHOCYTES NFR BLD AUTO: 5.9 %
MAGNESIUM SERPL-MCNC: 2.1 MG/DL (ref 1.6–2.6)
MCH RBC QN AUTO: 29.3 PG (ref 27–31)
MCHC RBC AUTO-ENTMCNC: 32.2 G/DL (ref 33–36)
MCV RBC AUTO: 90.8 FL (ref 80–94)
MONOCYTES # BLD AUTO: 1.22 X10(3)/MCL (ref 0.1–1.3)
MONOCYTES NFR BLD AUTO: 6.5 %
NEUTROPHILS # BLD AUTO: 15.76 X10(3)/MCL (ref 2.1–9.2)
NEUTROPHILS NFR BLD AUTO: 83.7 %
NRBC BLD AUTO-RTO: 0 %
PHOSPHATE SERPL-MCNC: 5.7 MG/DL (ref 2.3–4.7)
PLATELET # BLD AUTO: 209 X10(3)/MCL (ref 130–400)
PMV BLD AUTO: 11.4 FL (ref 7.4–10.4)
POTASSIUM SERPL-SCNC: 4.5 MMOL/L (ref 3.5–5.1)
RBC # BLD AUTO: 3.38 X10(6)/MCL (ref 4.2–5.4)
SODIUM SERPL-SCNC: 127 MMOL/L (ref 136–145)
WBC # BLD AUTO: 18.83 X10(3)/MCL (ref 4.5–11.5)

## 2024-10-11 PROCEDURE — 27000207 HC ISOLATION

## 2024-10-11 PROCEDURE — 25000003 PHARM REV CODE 250: Performed by: UROLOGY

## 2024-10-11 PROCEDURE — 25000003 PHARM REV CODE 250: Performed by: INTERNAL MEDICINE

## 2024-10-11 PROCEDURE — 80100016 HC MAINTENANCE HEMODIALYSIS

## 2024-10-11 PROCEDURE — 80069 RENAL FUNCTION PANEL: CPT | Performed by: STUDENT IN AN ORGANIZED HEALTH CARE EDUCATION/TRAINING PROGRAM

## 2024-10-11 PROCEDURE — 02HV33Z INSERTION OF INFUSION DEVICE INTO SUPERIOR VENA CAVA, PERCUTANEOUS APPROACH: ICD-10-PCS | Performed by: SURGERY

## 2024-10-11 PROCEDURE — 36415 COLL VENOUS BLD VENIPUNCTURE: CPT | Performed by: STUDENT IN AN ORGANIZED HEALTH CARE EDUCATION/TRAINING PROGRAM

## 2024-10-11 PROCEDURE — 85025 COMPLETE CBC W/AUTO DIFF WBC: CPT | Performed by: STUDENT IN AN ORGANIZED HEALTH CARE EDUCATION/TRAINING PROGRAM

## 2024-10-11 PROCEDURE — 21400001 HC TELEMETRY ROOM

## 2024-10-11 PROCEDURE — 83735 ASSAY OF MAGNESIUM: CPT | Performed by: INTERNAL MEDICINE

## 2024-10-11 PROCEDURE — 99232 SBSQ HOSP IP/OBS MODERATE 35: CPT | Mod: ,,, | Performed by: INTERNAL MEDICINE

## 2024-10-11 PROCEDURE — 63600175 PHARM REV CODE 636 W HCPCS: Performed by: INTERNAL MEDICINE

## 2024-10-11 RX ORDER — HEPARIN SODIUM 1000 [USP'U]/ML
3000 INJECTION, SOLUTION INTRAVENOUS; SUBCUTANEOUS
Status: DISCONTINUED | OUTPATIENT
Start: 2024-10-11 | End: 2024-10-28 | Stop reason: HOSPADM

## 2024-10-11 RX ADMIN — HYDROCODONE BITARTRATE AND ACETAMINOPHEN 1 TABLET: 10; 325 TABLET ORAL at 08:10

## 2024-10-11 RX ADMIN — ATORVASTATIN CALCIUM 20 MG: 10 TABLET, FILM COATED ORAL at 08:10

## 2024-10-11 RX ADMIN — SEVELAMER CARBONATE 800 MG: 800 TABLET, FILM COATED ORAL at 12:10

## 2024-10-11 RX ADMIN — PANTOPRAZOLE SODIUM 40 MG: 40 TABLET, DELAYED RELEASE ORAL at 10:10

## 2024-10-11 RX ADMIN — SERTRALINE HYDROCHLORIDE 25 MG: 25 TABLET ORAL at 10:10

## 2024-10-11 RX ADMIN — ACETAMINOPHEN 650 MG: 325 TABLET ORAL at 11:10

## 2024-10-11 RX ADMIN — HYDROCODONE BITARTRATE AND ACETAMINOPHEN 1 TABLET: 10; 325 TABLET ORAL at 07:10

## 2024-10-11 RX ADMIN — HYDROCODONE BITARTRATE AND ACETAMINOPHEN 1 TABLET: 10; 325 TABLET ORAL at 04:10

## 2024-10-11 RX ADMIN — ENOXAPARIN SODIUM 30 MG: 30 INJECTION SUBCUTANEOUS at 04:10

## 2024-10-11 RX ADMIN — LABETALOL HYDROCHLORIDE 200 MG: 200 TABLET, FILM COATED ORAL at 10:10

## 2024-10-11 RX ADMIN — HYDROCODONE BITARTRATE AND ACETAMINOPHEN 1 TABLET: 10; 325 TABLET ORAL at 03:10

## 2024-10-11 RX ADMIN — SEVELAMER CARBONATE 800 MG: 800 TABLET, FILM COATED ORAL at 04:10

## 2024-10-11 RX ADMIN — ACETAMINOPHEN 650 MG: 325 TABLET ORAL at 03:10

## 2024-10-11 RX ADMIN — HYDROCODONE BITARTRATE AND ACETAMINOPHEN 1 TABLET: 10; 325 TABLET ORAL at 12:10

## 2024-10-11 RX ADMIN — FOLIC ACID 1 MG: 1 TABLET ORAL at 10:10

## 2024-10-11 NOTE — PROGRESS NOTES
Subjective:       Patient ID: Fior Joya is a 69 y.o. female.    Chief Complaint: Back Pain (Pt wheeled into triage and presents via POV. Pt reports lower back pain and R hip pain that began 5 days PTA. Urostomy placed approx 2 months ago with minimal cloudy output that pt reports for approx 1 month. ESRD tues/thurs/Sat dialysis but did not present today d/t increasing pain. 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  No detrusor muscle present for evaluation.  Likely metastatic disease to lymph nodes in the abdomen and pelvis.    Current Treatment:   Pending further discussion with the patient.  Initial discussion on 10/10/2024, patient stated that she did not believe the pathology report because she did not believe that she underwent any procedure on 10/04/2024.  She stated that she did not want to discuss cancer diagnosis further because she did not believe that she had cancer.  On 10/11/2024, she agreed to be seen in [place] for possible treatment.    Treatment History:  Cystoscopy on 10/04/2024.    HPI:  69-year-old female multiple medical comorbidities including chronic anemia, end-stage renal disease on hemodialysis, chronic hydronephrosis with left nephrostomy tube and right ureteral stent who originally presented to [facility] on 10/03/2024 with right-sided flank pain.  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  The upper pole moiety was decompressed with a ureteral stent with a lower pole more demonstrating moderate severe hydronephrosis persistent from prior exam.  Left nephrostomy ureteral stent drain with resolution of hydronephrosis.  There was enlarged right pelvic lymph node increased in size, malignancy suspected.   There was also mildly enlarged upper normal sized lymph node in the periaortic region and the left pelvis that were nonspecific.  Patient was admitted to the hospitalist service.    She was seen by Urology and underwent cystoscopy on 10/04/2024 with right stent exchange of the upper pole moiety, retrograde pyelogram.  This procedure revealed significant amount of friable tissue located at the bladder base.  Multiple biopsies were obtained.  Pathology revealed invasive moderately differentiated carcinoma with extensive squamous differentiation.  There was no detrusor muscle present for evaluation.  The pathologist made a comment that the differential diagnosis was pure squamous cell carcinoma versus urothelial carcinoma with extensive squamous differentiation.  A ureteral stent was placed in the right ureter.  A 2nd ureteral orifice was not found.  On 10/04/2024, the patient also underwent fluoroscopy in ultrasound-guided right lower pole nephrostomy tube placement.      Patient also found to have MRSA bacteremia.  He MRI of the C, T, L-spine on 10/05/2024 showed no obvious epidural abscess, osteomyelitis, or discitis.  There was increased signal in the cord at C6-C7 which showed some enhancement and could represent intra cord lesion or possible myelomalacia.  TTE on 10/05/2024 showed normal EF with no valve vegetation.  MADELYN on 10/08/2024 showed no valve vegetation.    To findings of squamous cell carcinoma, Oncology consulted on 10/10/2024.  I saw the patient on that day.  When I was talking to her, she stated that she never had any scope or procedures done.  I explained to her that she had, she adamantly denied any procedures.  I explained that her pathology showed bladder cancer, she told me that that was not possible.  I asked her if she would like for me to discuss treatment of bladder cancer, she told me that she did not want me to.    Interval History:   Patient seen and examined this morning.  Her son was at  bedside.  I once again discussed her diagnosis.  She was more receiving of the discussion this morning.  She agreed to have an outpatient PET/CT scan and to follow up with Oncology, but due to vicinity, she requests to be seen in [place].    History reviewed. No pertinent past medical history.   Past Surgical History:   Procedure Laterality Date    CYSTOSCOPY W/ URETERAL STENT PLACEMENT Right 6/27/2024    Procedure: CYSTOSCOPY, WITH URETERAL STENT INSERTION;  Surgeon: Otis Person MD;  Location: [facility];  Service: Urology;  Laterality: Right;  CYSTOSCOPY, BILATERAL RETROGRADE PYELOGRAMS, POSSIBLE STENT PLACEMENT.    CYSTOSCOPY W/ URETERAL STENT PLACEMENT Bilateral 10/4/2024    Procedure: CYSTOSCOPY, WITH URETERAL STENT INSERTION;  Surgeon: Tawana Loaiza MD;  Location: [facility];  Service: Urology;  Laterality: Bilateral;  CYSTO WITH BILAT URETERAL STENT EXCHANGE    INSERTION OF TUNNELED CENTRAL VENOUS HEMODIALYSIS CATHETER N/A 7/3/2024    Procedure: Insertion, Catheter, Central Venous, Hemodialysis;  Surgeon: Uche Barcenas MD;  Location: [facility] CATH LAB;  Service: Peripheral Vascular;  Laterality: N/A;     Social History     Socioeconomic History    Marital status:    Tobacco Use    Smoking status: Never    Smokeless tobacco: Never   Substance and Sexual Activity    Alcohol use: Never    Drug use: Never    Sexual activity: Not Currently     Social Drivers of Health     Financial Resource Strain: Low Risk  (10/4/2024)    Overall Financial Resource Strain (CARDIA)     Difficulty of Paying Living Expenses: Not hard at all   Food Insecurity: No Food Insecurity (10/4/2024)    Hunger Vital Sign     Worried About Running Out of Food in the Last Year: Never true     Ran Out of Food in the Last Year: Never true   Transportation Needs: No Transportation Needs (10/4/2024)    TRANSPORTATION NEEDS     Transportation : No   Physical Activity: Unknown (10/4/2024)    Exercise Vital Sign     Days of  Exercise per Week: 0 days   Stress: No Stress Concern Present (10/4/2024)    [employer] - Occupational Stress Questionnaire     Feeling of Stress : Not at all   Housing Stability: Low Risk  (10/4/2024)    Housing Stability Vital Sign     Unable to Pay for Housing in the Last Year: No     Homeless in the Last Year: No      No family history on file.   Review of patient's allergies indicates:   Allergen Reactions    Asa [aspirin] Anaphylaxis    Penicillins      Received ceftriaxone from 6/27, no reactions       Review of Systems   Constitutional:  Negative for appetite change and fever.   HENT:  Negative for mouth sores and sore throat.    Respiratory:  Negative for chest tightness and shortness of breath.    Cardiovascular:  Negative for chest pain and leg swelling.   Gastrointestinal:  Positive for abdominal pain. Negative for blood in stool.   Genitourinary:  Positive for dysuria and flank pain. Negative for pelvic pain and vaginal bleeding.   Musculoskeletal:  Positive for back pain. Negative for neck pain.   Neurological:  Negative for dizziness and weakness.   Hematological:  Negative for adenopathy.         Objective:      Physical Exam  Vitals reviewed. Exam conducted with a chaperone present.   Constitutional:       General: She is not in acute distress.     Appearance: Normal appearance.   HENT:      Head: Normocephalic and atraumatic.      Nose: Nose normal.      Mouth/Throat:      Mouth: Mucous membranes are moist.   Eyes:      Extraocular Movements: Extraocular movements intact.      Conjunctiva/sclera: Conjunctivae normal.   Cardiovascular:      Rate and Rhythm: Normal rate and regular rhythm.      Pulses: Normal pulses.      Heart sounds: Normal heart sounds.   Pulmonary:      Effort: Pulmonary effort is normal.      Breath sounds: Normal breath sounds.   Abdominal:      General: Bowel sounds are normal.      Palpations: Abdomen is soft.   Genitourinary:     Comments:  Nephrostomy tube  Musculoskeletal:         General: No swelling. Normal range of motion.      Cervical back: Normal range of motion and neck supple.      Right lower leg: No edema.      Left lower leg: No edema.   Lymphadenopathy:      Cervical: No cervical adenopathy.   Skin:     General: Skin is warm and dry.   Neurological:      General: No focal deficit present.      Mental Status: She is alert and oriented to person, place, and time. Mental status is at baseline.   Psychiatric:         Mood and Affect: Mood normal.         Behavior: Behavior normal.         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  No detrusor muscle present for evaluation.  Likely metastatic disease to lymph nodes in the abdomen and pelvis.        Plan:       I went to the patient's room to discuss her pathology results and discuss possible treatment plans.  While there, the patient told me that she never underwent a procedure, she stated that it was not possible for her to have cancer.    I tried to explain that her pathology from her cystoscopy on 10/04/2024 showed that she does have bladder cancer.  She again denied the she had a.  I asked her if she would like me to explained treatment options to her, she declined.    I had a conversation with the patient's son on the afternoon of 10/10/2024.  I explained that I tried to discuss the diagnosis and treatment plans with his mother, he stated that he would discuss with the.  I explained that I would follow up with her tomorrow.    Ultimately, it was the patient's decision of whether she wants to treat her cancer or not.  It is her decision of whether or not she wants to discuss diagnosis/prognosis.     On 10/11/2024, her son was at bedside.  I once again had a discussion with the patient, this time she agreed to undergo further workup with a PET/CT scan and be seen by Oncology  and [facility] on an outpatient basis to discuss therapy.    We will arrange this.      Chao Woody II, MD

## 2024-10-11 NOTE — PT/OT/SLP PROGRESS
Physical Therapy    Missed Treatment Session    Patient Name:  Fior Joya   MRN:  [id number]      Patient not seen at this time secondary to off the floor for dialysis.    Will follow-up as patient is appropriate/available/agreeable to participate and as therapists' schedule allows.

## 2024-10-11 NOTE — PLAN OF CARE
Problem: Adult Inpatient Plan of Care  Goal: Plan of Care Review  10/10/2024 2022 by Venus Mckenna RN  Outcome: Progressing  10/10/2024 2021 by Venus Mckenna RN  Outcome: Progressing  Goal: Patient-Specific Goal (Individualized)  10/10/2024 2022 by Venus Mckenna RN  Outcome: Progressing  10/10/2024 2021 by Venus Mckenna RN  Outcome: Progressing  Goal: Absence of Hospital-Acquired Illness or Injury  10/10/2024 2022 by Venus Mckenna RN  Outcome: Progressing  10/10/2024 2021 by Venus Mckenna RN  Outcome: Progressing  Goal: Optimal Comfort and Wellbeing  10/10/2024 2022 by Venus Mckenna RN  Outcome: Progressing  10/10/2024 2021 by Venus Mckenna RN  Outcome: Progressing  Goal: Readiness for Transition of Care  10/10/2024 2022 by Venus Mckenna RN  Outcome: Progressing  10/10/2024 2021 by Venus Mckenna RN  Outcome: Progressing     Problem: Bariatric Environmental Safety  Goal: Safety Maintained with Care  10/10/2024 2022 by Venus Mckenna RN  Outcome: Progressing  10/10/2024 2021 by Venus Mckenna RN  Outcome: Progressing     Problem: Skin Injury Risk Increased  Goal: Skin Health and Integrity  10/10/2024 2022 by Venus Mckenna RN  Outcome: Progressing  10/10/2024 2021 by Venus Mckenna RN  Outcome: Progressing     Problem: Hemodialysis  Goal: Safe, Effective Therapy Delivery  10/10/2024 2022 by Venus Mckenna RN  Outcome: Progressing  10/10/2024 2021 by Venus Mckenna RN  Outcome: Progressing  Goal: Effective Tissue Perfusion  10/10/2024 2022 by Venus Mckenna RN  Outcome: Progressing  10/10/2024 2021 by Venus Mckenna RN  Outcome: Progressing  Goal: Absence of Infection Signs and Symptoms  10/10/2024 2022 by Venus Mckenna RN  Outcome: Progressing  10/10/2024 2021 by Venus Mckenna RN  Outcome: Progressing     Problem: Infection  Goal: Absence of Infection Signs and Symptoms  10/10/2024 2022 by Venus Mckenna RN  Outcome: Progressing  10/10/2024 2021 by Clarisa,  SANAZ Donovan  Outcome: Progressing     Problem: Wound  Goal: Optimal Coping  10/10/2024 2022 by Venus Mckenna RN  Outcome: Progressing  10/10/2024 2021 by Venus Mckenna RN  Outcome: Progressing  Goal: Optimal Functional Ability  10/10/2024 2022 by Venus Mckenna RN  Outcome: Progressing  10/10/2024 2021 by Venus Mckenna RN  Outcome: Progressing  Goal: Improved Oral Intake  10/10/2024 2022 by Venus Mckenna RN  Outcome: Progressing  10/10/2024 2021 by Venus Mckenna RN  Outcome: Progressing  Goal: Optimal Pain Control and Function  10/10/2024 2022 by Venus Mckenna RN  Outcome: Progressing  10/10/2024 2021 by Venus Mckenna RN  Outcome: Progressing  Goal: Skin Health and Integrity  10/10/2024 2022 by Venus Mckenna RN  Outcome: Progressing  10/10/2024 2021 by Venus Mckenna RN  Outcome: Progressing  Goal: Optimal Wound Healing  10/10/2024 2022 by Venus Mckenna RN  Outcome: Progressing  10/10/2024 2021 by Venus Mckenna RN  Outcome: Progressing     Problem: Fall Injury Risk  Goal: Absence of Fall and Fall-Related Injury  10/10/2024 2022 by Venus Mckenna RN  Outcome: Progressing  10/10/2024 2021 by Venus Mckenna RN  Outcome: Progressing

## 2024-10-11 NOTE — PROCEDURES
Procedure note    Procedure:  Insertion of non tunneled dialysis catheter using ultrasound guidance in the left IJ  Preoperative diagnosis:  End-stage renal disease  Postop diagnosis:  same  Indication:  Ms. Joya is a 69-year-old female with a history of end-stage renal disease on dialysis.  She had a right IJ tunnel catheter which was removed due to bacteremia.  She had a new non tunneled catheter placed for dialysis as she still has positive blood cultures.  Procedure in detail:  After informed consent was obtained, and risks and benefits discussed with the patient, they were placed supine in the bed.  The left neck was prepped and draped in a standard sterile fashion.  Ultrasound evaluation of the IJ was performed which noted it to be patent.  Local anesthetic was infused in the skin overlying the vein.  Ultrasound-guided access of the vein was obtained and a Seldinger technique and a wire was passed.  A #11 blade was used to make a skin nick.  The tract was then serially dilated over the wire.  A 20 cm dialysis catheter was then advanced over the wire.  All ports flushed and aspirated well.  The catheter was sutured to the skin with silk sutures.  A sterile dressing was placed.  Chest x-ray shows the catheter to be in place in the SVC

## 2024-10-11 NOTE — PROGRESS NOTES
Nephrology  Note    Patient Name: Fior Joya  Age: 69 y.o.  : 1955  MRN: [id number]  Admission Date: 10/3/2024        Fior Joya is a 69 y.o. female who presents with right flank pain.  Past medical history significant for ESRD on hemodialysis TTS at [facility] via right IJ PermCath, chronic hydronephrosis with left nephrostomy tube in place, and stents to right ureter, hypertension, anemia, hyperphosphatemia, and hyperlipidemia.  Patient presents with worsening right flank pain.  She states the pain started in her right neck, in his now in her right flank/hip area.  Patient was started on hemodialysis in 2024 after being found to have significant acute kidney injury and hydronephrosis.  She has been maintained on hemodialysis at [facility] on a TTS schedule.  Prior to hospitalization her last dialysis was on 10/01/2024.  Nephrology consulted for ESRD management.  In the emergency department she was noted to have low blood pressure and has been given IV fluids.  Urology has also been consulted for concern hydronephrosis needing stent exchange.  She has also been started on antibiotics for possible UTI.  She has had nausea, and vomiting.  No chest pain, shortness of breath, or lower extremity edema.    10/07/2024  Chart reviewed.  Weekend events noted.  Patient has been NPO at least since her admission to the hospital and also has no IV fluids.  Lying down in the bed.  Having jerky movements all over the body and uncomfortable.  Now she has nephrostomy tube on both sides.  Both draining.  Serosanguineous fluid noted.  Patient is very slow to answer questions but did recognize me by name and she knows her own name.     10/08/2024  No acute events overnight.  Seen on hemodialysis.  She was endorsing shortness of breath, and has some lower extremity edema.    10/09/2024   Mentally she has cleared up completely.  Complains of pains all over the body for which she is on p.r.n. pain medications.  Also  complains of being nervous and on some anxiety medication.  No shortness of breath.  Tunneled dialysis catheter was removed yesterday.    10/10/2024  No acute events overnight.  No chest pain, shortness of breath, abdominal pain, nausea, vomiting, or lower extremity edema.  Found to have bladder cancer by Urology.    10/11/2024  No acute events overnight.  No new complaints.  Blood cultures remain positive.  No chest pain, shortness of breath, nausea, vomiting.      Current Facility-Administered Medications   Medication Dose Route Frequency Provider Last Rate Last Admin    acetaminophen tablet 650 mg  650 mg Oral Q4H PRN Tawana Loaiza MD   650 mg at 10/10/24 2224    albuterol-ipratropium 2.5 mg-0.5 mg/3 mL nebulizer solution 3 mL  3 mL Nebulization Once PRN Mark Ying DO        aluminum-magnesium hydroxide-simethicone 200-200-20 mg/5 mL suspension 30 mL  30 mL Oral QID PRN Tawana Loaiza MD        atorvastatin tablet 20 mg  20 mg Oral QHS Tawana Loaiza MD   20 mg at 10/10/24 2034    diphenhydrAMINE injection 25 mg  25 mg Intravenous Q6H PRN Mark Ying DO   25 mg at 10/09/24 0256    enoxaparin injection 30 mg  30 mg Subcutaneous Daily Macarena Leggett MD   30 mg at 10/10/24 1637    folic acid tablet 1 mg  1 mg Oral Daily Tawana Loaiza MD   1 mg at 10/11/24 1006    HYDROcodone-acetaminophen  mg per tablet 1 tablet  1 tablet Oral Q4H PRN Macarena Leggett MD   1 tablet at 10/11/24 0729    HYDROcodone-acetaminophen 5-325 mg per tablet 1 tablet  1 tablet Oral Q6H PRN Tawana Loaiza MD   1 tablet at 10/10/24 0431    labetaloL tablet 200 mg  200 mg Oral Q12H Tawana Loaiza MD   200 mg at 10/11/24 1006    melatonin tablet 6 mg  6 mg Oral Nightly PRN Tawana Loaiza MD   6 mg at 10/09/24 2053    metoclopramide injection 10 mg  10 mg Intravenous Q10 Min PRN Mark Ying DO        ondansetron injection 4 mg  4 mg Intravenous Q4H  Tawana Lazcano MD        ondansetron injection 4 mg  4 mg Intravenous Once Mark Ying DO        pantoprazole EC tablet 40 mg  40 mg Oral Daily Tawana Loaiza MD   40 mg at 10/11/24 1006    polyethylene glycol packet 17 g  17 g Oral BID PRN Tawana Loaiza MD        prochlorperazine injection Soln 5 mg  5 mg Intravenous Q6H PRN Tawana Loaiza MD        senna-docusate 8.6-50 mg per tablet 2 tablet  2 tablet Oral BID PRN Tawana Loaiza MD        sertraline tablet 25 mg  25 mg Oral Daily Shin Light MD   25 mg at 10/11/24 1006    sevelamer carbonate tablet 800 mg  800 mg Oral TID WM Tawana Loaiza MD   800 mg at 10/10/24 1637    sodium chloride 0.9% flush 10 mL  10 mL Intravenous PRN Tawana Loaiza MD        vancomycin - pharmacy to dose   Intravenous pharmacy to manage frequency Tawana Loaiza MD           No, Primary Doctor    History reviewed. No pertinent past medical history.   Past Surgical History:   Procedure Laterality Date    CYSTOSCOPY W/ URETERAL STENT PLACEMENT Right 6/27/2024    Procedure: CYSTOSCOPY, WITH URETERAL STENT INSERTION;  Surgeon: Otis Person MD;  Location: [facility];  Service: Urology;  Laterality: Right;  CYSTOSCOPY, BILATERAL RETROGRADE PYELOGRAMS, POSSIBLE STENT PLACEMENT.    CYSTOSCOPY W/ URETERAL STENT PLACEMENT Bilateral 10/4/2024    Procedure: CYSTOSCOPY, WITH URETERAL STENT INSERTION;  Surgeon: Tawana Loaiza MD;  Location: [facility] OR;  Service: Urology;  Laterality: Bilateral;  CYSTO WITH BILAT URETERAL STENT EXCHANGE    INSERTION OF TUNNELED CENTRAL VENOUS HEMODIALYSIS CATHETER N/A 7/3/2024    Procedure: Insertion, Catheter, Central Venous, Hemodialysis;  Surgeon: Uche Barcenas MD;  Location: [facility] CATH LAB;  Service: Peripheral Vascular;  Laterality: N/A;      No family history on file.  Social History     Tobacco Use    Smoking status: Never    Smokeless tobacco:  Never   Substance Use Topics    Alcohol use: Never     Medications Prior to Admission   Medication Sig Dispense Refill Last Dose/Taking    atorvastatin (LIPITOR) 20 MG tablet Take 20 mg by mouth every evening.   10/3/2024    calcium carbonate (TUMS) 200 mg calcium (500 mg) chewable tablet Take 2 tablets (1,000 mg total) by mouth 2 (two) times daily. 120 tablet 11 10/3/2024 Morning    [] ergocalciferol (ERGOCALCIFEROL) 50,000 unit Cap Take 1 capsule (50,000 Units total) by mouth every 72 hours. 10 capsule 2 Past Week    FEROSUL 325 mg (65 mg iron) Tab tablet Take 325 mg by mouth once daily.   10/3/2024 Morning    folic acid (FOLVITE) 1 MG tablet Take 1 tablet (1 mg total) by mouth once daily. 30 tablet 0 10/3/2024 Morning    labetaloL (NORMODYNE) 200 MG tablet Take 1 tablet (200 mg total) by mouth every 12 (twelve) hours. 60 tablet 11 10/3/2024 Morning    loratadine (CLARITIN) 10 mg tablet Take 10 mg by mouth once daily.   10/3/2024    pantoprazole (PROTONIX) 40 MG tablet Take 1 tablet (40 mg total) by mouth once daily. 90 tablet 3 10/3/2024 Morning    senna-docusate 8.6-50 mg (SENNA WITH DOCUSATE SODIUM) 8.6-50 mg per tablet Take 1 tablet by mouth daily as needed for Constipation.   Past Week    sevelamer carbonate (RENVELA) 800 mg Tab Take 800 mg by mouth 3 (three) times daily with meals.   10/3/2024 Morning    polyethylene glycol (GLYCOLAX) 17 gram PwPk Take 17 g by mouth once daily. (Patient not taking: Reported on 10/4/2024)   Not Taking     Review of patient's allergies indicates:   Allergen Reactions    Asa [aspirin] Anaphylaxis    Penicillins      Received ceftriaxone from , no reactions             Review of Systems:     Comprehensive 10pt ROS negative except as noted per history.      Objective:       VITAL SIGNS: 24 HR MIN & MAX LAST    Temp  Min: 97.4 °F (36.3 °C)  Max: 98.4 °F (36.9 °C)  97.6 °F (36.4 °C)        BP  Min: 101/62  Max: 159/82  129/72     Pulse  Min: 54  Max: 70  61     Resp   Min: 18  Max: 18  18    SpO2  Min: 95 %  Max: 100 %  98 %      GEN:  Chronically ill-appearing WF.  Morbidly obese  CV: RRR without rub or gallop.  PULM: CTAB, unlabored  ABD: Soft, NT/ND abdomen with NABS  :  Nephrostomy tubes in both right and left kidneys.  Draining serosanguineous fluids.  EXT: No cyanosis or edema  SKIN: Warm and dry  PSYCH: Awake, alert and oriented x3 now.  Dialysis access:  None.            Component Value Date/Time     (L) 10/11/2024 0457     (L) 10/10/2024 0738    K 4.5 10/11/2024 0457    K 4.5 10/10/2024 0738    CO2 21 (L) 10/11/2024 0457    CO2 25 10/10/2024 0738    BUN 55.1 (H) 10/11/2024 0457    BUN 44.0 (H) 10/10/2024 0738    CREATININE 5.32 (H) 10/11/2024 0457    CREATININE 4.57 (H) 10/10/2024 0738    CALCIUM 8.5 10/11/2024 0457    CALCIUM 8.1 (L) 10/10/2024 0738    PHOS 5.7 (H) 10/11/2024 0457            Component Value Date/Time    WBC 18.83 (H) 10/11/2024 0457    WBC 21.12 (H) 10/09/2024 0650    WBC 25.58 10/07/2024 0541    WBC 26.04 10/06/2024 0449    HGB 9.9 (L) 10/11/2024 0457    HGB 11.1 (L) 10/09/2024 0650    HCT 30.7 (L) 10/11/2024 0457    HCT 33.2 (L) 10/09/2024 0650     10/11/2024 0457     10/09/2024 0650             CT Abdomen Pelvis With IV Contrast NO Oral Contrast   Final Result      1. No significant interval change compared to previous exam.  Ureteral stents in place.   2. Duplicated right kidney and right ureter with persistent hydroureteronephrosis of the lower pole moiety.         Electronically signed by: Deidra Dobson   Date:    10/08/2024   Time:    06:49      CT Head Without Contrast   Final Result      No acute intracranial findings.         Electronically signed by: Alexx Patricia   Date:    10/07/2024   Time:    10:46      MRI Lumbar Spine W WO Cont   Final Result      No acute abnormality of the lumbar spine.         Electronically signed by: Edmundo Vásquez MD   Date:    10/05/2024   Time:    16:55      MRI Thoracic Spine W WO Cont    Final Result      Very limited examination due to significant motion artifact      No evidence of osteomyelitis or discitis seen at no evidence of epidural abscess seen in thoracic spine.  Lumbar and cervical spine discussed on separate report         Electronically signed by: Garland Raya   Date:    10/05/2024   Time:    16:57      MRI Cervical Spine W WO Cont   Final Result      Very limited examination due to motion artifact.  Only gross findings are visible.  Fine details are not visible.  No obvious epidural abscess is seen.  There is some increased signal in the cord at C6-C7 which shows some enhancement.  Findings could represent intra cord lesion or possible myelomalacia.         Electronically signed by: Garland Raya   Date:    10/05/2024   Time:    16:55      IR Nephrostomy Tube Placement   Final Result      Technically successful Nephrostomy Tube placement.         Electronically signed by: Andrew Buenrostro   Date:    10/04/2024   Time:    16:37      US Guided Needle Placement   Final Result      Technically successful Nephrostomy Tube placement.         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  The upper pole moiety is decompressed with a ureteral stent with the lower pole more demonstrating moderate severe hydronephrosis which is persistent from the prior exam.      Left nephrostomy ureteral stent drain with resolution of hydronephrosis.      Enlarged right pelvic lymph node increased in size from the prior, malignancy suspected.      Mildly enlarged upper normal sized lymph nodes in the periaortic region and left pelvis, nonspecific, similar to the prior exam.         Electronically signed by: Tete Sun MD    Date:    10/04/2024   Time:    07:37      X-Ray Lumbar Spine Ap And Lateral   Final Result      Limited examination due to patient's body habitus but degenerative changes seen throughout the lower lumbar spine         Electronically signed by: Garland Raya   Date:    10/03/2024   Time:    18:11          Assessment / Plan:       ESRD - normally TTS at [facility].  Tunneled dialysis catheter removed 10/08/2024 due to persistent MRSA bacteremia  Metabolic acidosis  MRSA bacteremia  Bilateral hydronephrosis - now with nephrostomy tube both sides    Plan:  Blood cultures remain positive.  Vascular surgery is planning to place a temporary dialysis catheter today for re-initiation of hemodialysis.  We will dialyze today, and again tomorrow to get her on her normal TTS schedule.  We will need to wait until blood cultures are negative before placing a tunneled dialysis catheter.

## 2024-10-11 NOTE — PROGRESS NOTES
10/11/24 1629        Hemodialysis Catheter 10/11/24 left internal jugular   Placement Date: 10/11/24   Hand Hygiene: Performed  Location: left internal jugular   Site Assessment No drainage;No redness;No swelling;No warmth   Line Securement Device Secured with sutures   Dressing Type CHG impregnated dressing/sponge;Transparent (Tegaderm)   Dressing Status Clean;Dry;Intact   Dressing Intervention Integrity maintained   Date on Dressing 10/11/24   Dressing Due to be Changed 10/16/24   Post-Hemodialysis Assessment   Blood Volume Processed (Liters) 54 L   Dialyzer Clearance Lightly streaked   Duration of Treatment 180 minutes   Additional Fluid Intake (mL) 500 mL   Total UF (mL) 2000 mL   Net Fluid Removal 1500   Patient Response to Treatment tolerated well   Post-Hemodialysis Comments tx complete, pt reinfused, cvc deaccessed, new sterile caps applied

## 2024-10-11 NOTE — PROGRESS NOTES
Inpatient Nutrition Assessment    Admit Date: 10/3/2024   Total duration of encounter: 8 days   Patient Age: 69 y.o.    Nutrition Recommendation/Prescription     Diet as tolerated to goal: renal diet; diet modification per SLP recs  Continue bowel regimen PRN  Monitor labs, intake and weight    Communication of Recommendations: reviewed with nurse, reviewed with patient, and reviewed with family    Nutrition Assessment     Malnutrition Assessment/Nutrition-Focused Physical Exam    Malnutrition Context: acute illness or injury (10/07/24 1256)  Malnutrition Level: other (see comments) (unable to determine) (10/07/24 1256)  Energy Intake (Malnutrition): less than or equal to 50% for greater than or equal to 5 days (10/07/24 1256)  Weight Loss (Malnutrition): other (see comments) (does not meet criteira) (10/07/24 1256)  Subcutaneous Fat (Malnutrition): other (see comments) (unable to assess) (10/07/24 1256)           Muscle Mass (Malnutrition): other (see comments) (unable to assess) (10/07/24 1256)                                   A minimum of two characteristics is recommended for diagnosis of either severe or non-severe malnutrition.    Chart Review    Reason Seen: continuous nutrition monitoring and follow-up    Malnutrition Screening Tool Results   Have you recently lost weight without trying?: No  Have you been eating poorly because of a decreased appetite?: No   MST Score: 0   Diagnosis:  Bilateral hydronephrosis Status post right upper lobe ureteral stent exchange and right lower pole nephrostomy tube placement  Persistent MRSA bacteremia  Abnormal tissue at the bladder base status post biopsy  Left percutaneous nephrostomy  End-stage renal disease on hemodialysis  Metabolic acidosis  Transaminitis, improving    Relevant Medical History: HTN, HLD, chronic hydronephrosis with left nephrostomy tube, right ureteral stent, ESRD on HD, chronic anemia     Scheduled Medications:  atorvastatin, 20 mg, QHS  enoxaparin,  30 mg, Daily  folic acid, 1 mg, Daily  labetaloL, 200 mg, Q12H  ondansetron, 4 mg, Once  pantoprazole, 40 mg, Daily  sertraline, 25 mg, Daily  sevelamer carbonate, 800 mg, TID WM    Continuous Infusions:   PRN Medications:  acetaminophen, 650 mg, Q4H PRN  albuterol-ipratropium, 3 mL, Once PRN  aluminum-magnesium hydroxide-simethicone, 30 mL, QID PRN  diphenhydrAMINE, 25 mg, Q6H PRN  HYDROcodone-acetaminophen, 1 tablet, Q4H PRN  HYDROcodone-acetaminophen, 1 tablet, Q6H PRN  melatonin, 6 mg, Nightly PRN  metoclopramide, 10 mg, Q10 Min PRN  ondansetron, 4 mg, Q4H PRN  polyethylene glycol, 17 g, BID PRN  prochlorperazine, 5 mg, Q6H PRN  senna-docusate 8.6-50 mg, 2 tablet, BID PRN  sodium chloride 0.9%, 10 mL, PRN  vancomycin - pharmacy to dose, , pharmacy to manage frequency    Calorie Containing IV Medications: no significant kcals from medications at this time    Recent Labs   Lab 10/05/24  0315 10/06/24  0449 10/07/24  0541 10/07/24  2103 10/08/24  0458 10/09/24  0650 10/10/24  0738 10/11/24  0457   * 133* 135*  --  136 136 132* 127*   K 5.0 4.1 4.7  --  4.4 4.1 4.5 4.5   CALCIUM 8.0* 8.1* 7.5*  --  8.2* 8.0* 8.1* 8.5   PHOS 7.2*  --   --   --   --   --   --  5.7*   CL 94* 91* 93*  --  97* 94* 90* 88*   CO2 14* 22* 17*  --  21* 27 25 21*   BUN 85.5* 57.7* 74.7*  --  42.4* 28.0* 44.0* 55.1*   CREATININE 8.03* 5.84* 6.80*  --  4.42* 3.49* 4.57* 5.32*   EGFRNORACEVR 5 7 6  --  10 14 10 8   GLUCOSE 84 72* 50*  --  69* 121* 137* 86   BILITOT  --  0.4 0.4  --   --   --   --   --    ALKPHOS  --  219* 209*  --   --   --   --   --    ALT  --  103* 71*  --   --   --   --   --    AST  --  89* 43*  --   --   --   --   --    ALBUMIN 2.2* 2.2* 2.3*  --   --   --   --  2.0*   AMMONIA  --   --   --  26.7  --   --   --   --    WBC 36.16  36.16* 26.04  26.04* 25.58  25.58*  --  26.23* 21.12*  --  18.83*   HGB 11.8* 11.6* 11.4*  --  11.0* 11.1*  --  9.9*   HCT 34.7* 32.7* 33.3*  --  31.3* 33.2*  --  30.7*     Nutrition  "Orders:  Diet Soft & Bite Sized (IDDSI Level 6) Renal      Appetite/Oral Intake: fair/50-75% of meals  Factors Affecting Nutritional Intake: none identified  Social Needs Impacting Access to Food: none identified  Food/Zoroastrianism/Cultural Preferences: none reported  Food Allergies: no known food allergies  Last Bowel Movement: 10/07/24  Wound(s):  none noted    Comments    10/7/24: Pt off floor for stat CT at time of visit. Pt has been NPO since admit (x 4 days). Per MST, no reports of decreased appetite or unintentional weight loss PTA. No weight loss noted per EMR weight history. Per MD note, ST consulted; will monitor diet advancement and order ONS to ensure adequate nutrition.     10/8/24: Pt remains NPO per SLP, MBS pending when patient is more alert. Pt has been NPO since admit (>4 days). TF recs provided for if/when medically appropriate. RN notified.     10/11/24: Patient reports fair oral intake of meals served. Denies any nausea, vomiting, diarrhea and constipation.     Anthropometrics    Height: 5' 3" (160 cm), Height Method: Stated  Last Weight: (!) 140.2 kg (309 lb 1.4 oz) (10/10/24 1517), Weight Method: Standard Scale  BMI (Calculated): 54.8  BMI Classification: obese grade III (BMI >/=40)     Ideal Body Weight (IBW), Female: 115 lb     % Ideal Body Weight, Female (lb): 268.77 %                    Usual Body Weight (UBW), k.2 kg  % Usual Body Weight: 105.45     Usual Weight Provided By: EMR weight history    Wt Readings from Last 5 Encounters:   10/10/24 (!) 140.2 kg (309 lb 1.4 oz)   07/10/24 (!) 140.2 kg (309 lb)   24 113.4 kg (250 lb)     Weight Change(s) Since Admission:   Wt Readings from Last 1 Encounters:   10/10/24 1517 (!) 140.2 kg (309 lb 1.4 oz)   10/03/24 1604 (!) 140.2 kg (309 lb)   Admit Weight: (!) 140.2 kg (309 lb) (10/03/24 1604), Weight Method: Stated    Estimated Needs    Weight Used For Calorie Calculations: (!) 140.2 kg (309 lb 1.4 oz)  Energy Calorie Requirements " (kcal): 1901 kcal (1.0 SF)  Energy Need Method: Benton-St Jeor  Weight Used For Protein Calculations: 87.5 kg (192 lb 14.4 oz) (AdjustedBW used)  Protein Requirements: 105-114 gm (1.2-1.3 gm/kg AdjustedBW)  Fluid Requirements (mL): urine output + 1,000 mL        Enteral Nutrition     Patient not receiving enteral nutrition at this time.    Parenteral Nutrition     Patient not receiving parenteral nutrition support at this time.    Evaluation of Received Nutrient Intake    Calories: meeting estimated needs  Protein: meeting estimated needs    Patient Education     Not applicable.    Nutrition Diagnosis     PES: Inadequate oral intake related to acute illness as evidenced by NPO since admit. (resolved)     Nutrition Interventions     Intervention(s): general/healthful diet, modified composition of meals/snacks, modified composition of enteral nutrition, modified rate of enteral nutrition, commercial beverage, and collaboration with other providers    Goal: Meet greater than 80% of nutritional needs by follow-up. (goal progressing)    Nutrition Goals & Monitoring     Dietitian will monitor: food and beverage intake, energy intake, weight, electrolyte/renal panel, and gastrointestinal profile   Discharge planning: continue renal diet  Nutrition Risk/Follow-Up: high (follow-up in 1-4 days)   Please consult if re-assessment needed sooner.

## 2024-10-11 NOTE — PROGRESS NOTES
[facility] [place] [facility] [place] Progress Note        Chief Complaint: Inpatient Follow-up for     HPI: 69-year-old female with past medical history of hypertension, hyperlipidemia, chronic hydronephrosis with left nephrostomy tube, right ureteral stent, end-stage renal disease on hemodialysis, chronic anemia presented with right flank pain for the past 5 days and also reported cloudy urine output from the urostomy CT with IV contrast showed moderate to severe hydronephrosis with enlarged right pelvic lymph node increase in size from the prior with suspected malignancy also showed duplicated collecting system with dilation of the right lower pole urology was consulted patient is status post cystoscopy with right stent exchange and bladder biopsy and they were not able to identify the left ureteral orifice therefore had left percutaneous nephrostomy. Blood culture is growing Gram-positive cocci 2/2 bottles initially patient was started on cefepime but now we added vancomycin repeat blood cultures ordered  Id was consulted MRI of the CT and L-spine was ordered that was negative for any abscess blood culture is growing MRSA TTE as such did not show any vegetation cardiology consulted for MADELYN  Bilateral hydronephrosis Status post right upper lobe ureteral stent exchange and right lower pole nephrostomy tube placement  Cultures remains persistently positive.  Patient was slightly confused on October 7 so we had ordered CT of the head without contrast that was negative, ammonia, TSH everything was normal we also ordered CT of the abdomen and pelvis with contrast that was negative for any abscess cardiology consulted for MADELYN.  The was negative for endocarditis.  Dialysis catheter was removed    Interval Hx:   Cultures remaining positive as of the 9th.  Repeated again yesterday.  Only complaint that she has his lower back with radiation down the right leg from the buttock area to the knee.  Son is  present in the room.  She does remember having cystoscopy however she did not want to talk about the cancer results.      Objective/physical exam:  General: In no acute distress, afebrile  Chest: Clear to auscultation bilaterally  Heart: RRR, +S1, S2, no appreciable murmur  Abdomen: Soft, nontender, BS +  MSK: Warm, 1+ pitting bilateral lower extremity edema, no clubbing or cyanosis  Neurologic:  Alert awake oriented x4    VITAL SIGNS: 24 HRS MIN & MAX LAST   Temp  Min: 97.4 °F (36.3 °C)  Max: 98.4 °F (36.9 °C) 97.6 °F (36.4 °C)   BP  Min: 101/62  Max: 159/82 129/72   Pulse  Min: 54  Max: 70  61   Resp  Min: 18  Max: 18 18   SpO2  Min: 95 %  Max: 100 % 98 %     I have reviewed the following labs:  Recent Labs   Lab 10/08/24  0458 10/09/24  0650 10/11/24  0457   WBC 26.23* 21.12* 18.83*   RBC 3.65* 3.78* 3.38*   HGB 11.0* 11.1* 9.9*   HCT 31.3* 33.2* 30.7*   MCV 85.8 87.8 90.8   MCH 30.1 29.4 29.3   MCHC 35.1 33.4 32.2*   RDW 16.0 15.8 15.7    208 209   MPV 11.4* 10.2 11.4*     Recent Labs   Lab 10/06/24  0449 10/07/24  0541 10/07/24  1125 10/08/24  0458 10/09/24  0650 10/10/24  0738 10/11/24  0457   * 135*  --    < > 136 132* 127*   K 4.1 4.7  --    < > 4.1 4.5 4.5   CL 91* 93*  --    < > 94* 90* 88*   CO2 22* 17*  --    < > 27 25 21*   BUN 57.7* 74.7*  --    < > 28.0* 44.0* 55.1*   CREATININE 5.84* 6.80*  --    < > 3.49* 4.57* 5.32*   CALCIUM 8.1* 7.5*  --    < > 8.0* 8.1* 8.5   PH  --   --  7.410  --   --   --   --    ALBUMIN 2.2* 2.3*  --   --   --   --  2.0*   ALKPHOS 219* 209*  --   --   --   --   --    * 71*  --   --   --   --   --    AST 89* 43*  --   --   --   --   --    BILITOT 0.4 0.4  --   --   --   --   --     < > = values in this interval not displayed.     Microbiology Results (last 7 days)       Procedure Component Value Units Date/Time    Blood Culture [[id number]]  (Abnormal) Collected: 10/09/24 7445    Order Status: Completed Specimen: Blood Updated: 10/11/24 0713     Blood  Culture Identification and Susceptibility To Follow      Gram-positive coccus probable staph     GRAM STAIN Gram Positive Cocci, probable Staphylococcus      Seen in gram stain of broth only      1 of 2 Aerobic bottles positive    Blood Culture [[id number]]  (Normal) Collected: 10/09/24 1728    Order Status: Completed Specimen: Blood Updated: 10/10/24 1902     Blood Culture No Growth At 24 Hours    BCID2 Panel [[id number]]  (Abnormal) Collected: 10/09/24 1728    Order Status: Completed Specimen: Blood Updated: 10/10/24 1716     CTX-M (ESBL ) N/A     IMP (Cabapenemase ) N/A     KPC resistance gene (Carbapenemase ) N/A     mcr-1 N/A     mecA ID N/A     Comment: Note: Antimicrobial resistance can occur via multiple mechanisms. A Not Detected result for antimicrobial resistance gene(s) does not indicate antimicrobial susceptibility. Subculturing is required for species identification and susceptibility testing of   isolates.        mecA/C and MREJ (MRSA) gene Detected     NDM (Carbapenemase ) N/A     OXA-48-like (Carbapenemase ) N/A     Royal/B (VRE gene) N/A     VIM (Carbapenemase ) N/A     Enterococcus faecalis Not Detected     Enterococcus faecium Not Detected     Listeria monocytogenes Not Detected     Staphylococcus spp. Detected     Staphylococcus aureus Detected     Staphylococcus epidermidis Not Detected     Staphylococcus lugdunensis Not Detected     Streptococcus spp. Not Detected     Streptococcus agalactiae (Group B) Not Detected     Streptococcus pneumoniae Not Detected     Streptococcus pyogenes (Group A) Not Detected     Acinetobacter calcoaceticus/baumannii complex Not Detected     Bacteroides fragilis Not Detected     Enterobacterales Not Detected     Enterobacter cloacae complex Not Detected     Escherichia coli Not Detected     Klebsiella aerogenes Not Detected     Klebsiella oxytoca Not Detected     Klebsiella pneumoniae group Not Detected     Proteus  spp. Not Detected     Salmonella spp. Not Detected     Serratia marcescens Not Detected     Haemophilus influenzae Not Detected     Neisseria meningitidis Not Detected     Pseudomonas aeruginosa Not Detected     Stenotrophomonas maltophilia Not Detected     Candida albicans Not Detected     Candida auris Not Detected     Candida glabrata Not Detected     Candida krusei Not Detected     Candida parapsilosis Not Detected     Candida tropicalis Not Detected     Cryptococcus neoformans/gattii Not Detected    Narrative:      The [employer] BCID2 Panel is a multiplexed nucleic acid test intended for the use with [employer]® [employer].0 or [employer] for the simultaneous qualitative detection and identification of multiple bacterial and yeast nucleic acids and select genetic determinants associated with antimicrobial resistance.  The [employer] BCID2 Panel test is performed directly on blood culture samples identified as positive by a continuous monitoring blood culture system.  Results are intended to be interpreted in conjunction with Gram stain results.    Blood Culture [[id number]] Collected: 10/10/24 1605    Order Status: Resulted Specimen: Blood Updated: 10/10/24 1633    Blood Culture [[id number]] Collected: 10/10/24 1605    Order Status: Resulted Specimen: Blood Updated: 10/10/24 1633    Blood Culture [[id number]]  (Abnormal) Collected: 10/07/24 2103    Order Status: Completed Specimen: Blood Updated: 10/10/24 0709     Blood Culture Culture In Progress     GRAM STAIN 1 of 1 Pediatric bottle positive      Gram Positive Cocci, probable Staphylococcus      Seen in gram stain of broth only    Blood Culture [[id number]]  (Abnormal)  (Susceptibility) Collected: 10/07/24 2103    Order Status: Completed Specimen: Blood Updated: 10/10/24 0650     Blood Culture Methicillin resistant Staphylococcus aureus     GRAM STAIN Gram Positive Cocci, probable Staphylococcus      Seen in gram stain of broth only      1  of 1 Pediatric bottle positive    Blood Culture [[id number]]  (Abnormal)  (Susceptibility) Collected: 10/05/24 0620    Order Status: Completed Specimen: Blood Updated: 10/08/24 0810     Blood Culture Methicillin resistant Staphylococcus aureus     GRAM STAIN Gram Positive Cocci, probable Staphylococcus      Seen in gram stain of broth only      2 of 2 bottles positive    Blood Culture [[id number]]  (Abnormal)  (Susceptibility) Collected: 10/05/24 0620    Order Status: Completed Specimen: Blood Updated: 10/08/24 0809     Blood Culture Methicillin resistant Staphylococcus aureus     GRAM STAIN Gram Positive Cocci, probable Staphylococcus      2 of 2 bottles positive      Seen in gram stain of broth only    Blood culture #2 **CANNOT BE ORDERED STAT** [[id number]]  (Abnormal)  (Susceptibility) Collected: 10/03/24 2020    Order Status: Completed Specimen: Blood Updated: 10/06/24 0627     Blood Culture Methicillin resistant Staphylococcus aureus     GRAM STAIN Gram Positive Cocci, probable Staphylococcus      Seen in gram stain of broth only      1 of 1 Pediatric bottle positive    Blood culture #1 **CANNOT BE ORDERED STAT** [[id number]]  (Abnormal)  (Susceptibility) Collected: 10/03/24 2020    Order Status: Completed Specimen: Blood Updated: 10/06/24 0627     Blood Culture Methicillin resistant Staphylococcus aureus     GRAM STAIN Gram Positive Cocci, probable Staphylococcus      Seen in gram stain of broth only      2 of 2 bottles positive    Urine culture [[id number]] Collected: 10/03/24 2008    Order Status: Completed Specimen: Urine Updated: 10/05/24 1003     Urine Culture No Significant Growth    Blood Culture [[id number]]     Order Status: Canceled Specimen: Blood     BCID2 Panel [[id number]]  (Abnormal) Collected: 10/03/24 2020    Order Status: Completed Specimen: Blood Updated: 10/04/24 1107     CTX-M (ESBL ) N/A     IMP (Cabapenemase ) N/A     KPC resistance gene (Carbapenemase ) N/A      mcr-1 N/A     mecA ID N/A     Comment: Note: Antimicrobial resistance can occur via multiple mechanisms. A Not Detected result for antimicrobial resistance gene(s) does not indicate antimicrobial susceptibility. Subculturing is required for species identification and susceptibility testing of   isolates.        mecA/C and MREJ (MRSA) gene Detected     NDM (Carbapenemase ) N/A     OXA-48-like (Carbapenemase ) N/A     Royal/B (VRE gene) N/A     VIM (Carbapenemase ) N/A     Enterococcus faecalis Not Detected     Enterococcus faecium Not Detected     Listeria monocytogenes Not Detected     Staphylococcus spp. Detected     Staphylococcus aureus Detected     Staphylococcus epidermidis Not Detected     Staphylococcus lugdunensis Not Detected     Streptococcus spp. Not Detected     Streptococcus agalactiae (Group B) Not Detected     Streptococcus pneumoniae Not Detected     Streptococcus pyogenes (Group A) Not Detected     Acinetobacter calcoaceticus/baumannii complex Not Detected     Bacteroides fragilis Not Detected     Enterobacterales Not Detected     Enterobacter cloacae complex Not Detected     Escherichia coli Not Detected     Klebsiella aerogenes Not Detected     Klebsiella oxytoca Not Detected     Klebsiella pneumoniae group Not Detected     Proteus spp. Not Detected     Salmonella spp. Not Detected     Serratia marcescens Not Detected     Haemophilus influenzae Not Detected     Neisseria meningitidis Not Detected     Pseudomonas aeruginosa Not Detected     Stenotrophomonas maltophilia Not Detected     Candida albicans Not Detected     Candida auris Not Detected     Candida glabrata Not Detected     Candida krusei Not Detected     Candida parapsilosis Not Detected     Candida tropicalis Not Detected     Cryptococcus neoformans/gattii Not Detected    Narrative:      The [employer] BCID2 Panel is a multiplexed nucleic acid test intended for the use with [employer]® 2.0 or [employer]® [employer] Systems for the simultaneous qualitative detection and identification of multiple bacterial and yeast nucleic acids and select genetic determinants associated with antimicrobial resistance.  The [employer] BCID2 Panel test is performed directly on blood culture samples identified as positive by a continuous monitoring blood culture system.  Results are intended to be interpreted in conjunction with Gram stain results.             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    Infectious Disease following.  Pending further recommendations, continue vancomycin.  Follow repeat cultures from yesterday.  CT abdomen pelvis with contrast negative for acute infection, MRI CTL spine was motion artifact.  Urology following.  Temporary dialysis catheter placement, hold off on tunneled until blood cultures are cleared.    Prophylaxis: Lovenox  VTE prophylaxis:     Patient condition:  Stable/Fair/Guarded/ Serious/ Critical    Anticipated discharge and Disposition:         All diagnosis and differential diagnosis have been reviewed; assessment and plan has been documented; I have personally reviewed the labs and test results that are presently  available; I have reviewed the patients medication list; I have reviewed the consulting providers response and recommendations. I have reviewed or attempted to review medical records based upon their availability    All of the patient's questions have been  addressed and answered. Patient's is agreeable to the above stated plan. I will continue to monitor closely and make adjustments to medical management as needed.    Portions of this note dictated using EMR integrated voice recognition software, and may be subject to voice recognition errors not corrected at proofreading. Please contact writer for clarification if needed.   _____________________________________________________________________    Malnutrition Status:    Scheduled Med:   atorvastatin  20 mg Oral QHS    enoxaparin  30 mg Subcutaneous Daily    folic acid  1 mg Oral Daily    labetaloL  200 mg Oral Q12H    ondansetron  4 mg Intravenous Once    pantoprazole  40 mg Oral Daily    sertraline  25 mg Oral Daily    sevelamer carbonate  800 mg Oral TID WM      Continuous Infusions:       PRN Meds:    Current Facility-Administered Medications:     acetaminophen, 650 mg, Oral, Q4H PRN    albuterol-ipratropium, 3 mL, Nebulization, Once PRN    aluminum-magnesium hydroxide-simethicone, 30 mL, Oral, QID PRN    diphenhydrAMINE, 25 mg, Intravenous, Q6H PRN    HYDROcodone-acetaminophen, 1 tablet, Oral, Q4H PRN    HYDROcodone-acetaminophen, 1 tablet, Oral, Q6H PRN    melatonin, 6 mg, Oral, Nightly PRN    metoclopramide, 10 mg, Intravenous, Q10 Min PRN    ondansetron, 4 mg, Intravenous, Q4H PRN    polyethylene glycol, 17 g, Oral, BID PRN    prochlorperazine, 5 mg, Intravenous, Q6H PRN    senna-docusate 8.6-50 mg, 2 tablet, Oral, BID PRN    sodium chloride 0.9%, 10 mL, Intravenous, PRN    vancomycin - pharmacy to dose, , Intravenous, pharmacy to manage frequency     Radiology:  I have personally reviewed the following imaging and agree with the radiologist.      Transesophageal echo (MADELYN)  Procedure: MADELYN    Indication: MRSA bacteremia    Findings:  No valvular vegetations.  CT Abdomen Pelvis With IV Contrast NO Oral Contrast  Narrative: EXAMINATION:  CT ABDOMEN PELVIS WITH IV CONTRAST    CLINICAL HISTORY:  Abdominal abscess/infection suspected;    TECHNIQUE:  Helically acquired images with axial, sagittal and coronal reformations were obtained from the lung bases to the pubic symphysis after the IV administration of contrast.    Automated tube current modulation, weight-based exposure dosing, and/or iterative reconstruction technique utilized to reach lowest reasonably achievable exposure rate.    DLP: 1526 mGy*cm    COMPARISON:  CT abdomen pelvis 10/03/2024    FINDINGS:  HEART: Cardiomegaly.  There are calcifications in the region the aortic valve and mitral valve annulus.    LUNG BASES: Basilar atelectasis.    LIVER: Normal attenuation. No appreciable focal hepatic lesion.    BILIARY: Vicarious excretion of contrast at the gallbladder.    PANCREAS: Evaluation for pancreatitis limited given mild anasarca and body habitus.    SPLEEN: Normal in size    ADRENALS: No mass.    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  Right percutaneous nephrostomy at the upper pole moiety.  Right ureteral stent at the upper pole moiety.  Decompressed upper pole moiety.  Hydronephrotic lower pole moiety with AP diameter of the renal pelvis measuring approximately 3.5 cm, unchanged.  The right lower pole ureter is dilated to the level of the ureterovesical junction.  Left percutaneous nephroureteral stent.  No left hydronephrosis.    GI TRACT/MESENTERY:  No evidence of bowel obstruction or inflammation.    PERITONEUM: No free fluid.No free air.    LYMPH NODES: Presumed reactive retroperitoneal lymph nodes.    VASCULATURE: Aortoiliac atherosclerosis.    BLADDER: Nondistended bladder limits CT evaluation    REPRODUCTIVE ORGANS: Normal as visualized.    SOFT  TISSUES: Body wall edema.    BONES: No acute osseous abnormality.    LIMITATIONS: Exam limited by artifact related to patient body habitus, positioning, dose lowering technique and/or motion.  Impression: 1. No significant interval change compared to previous exam.  Ureteral stents in place.  2. Duplicated right kidney and right ureter with persistent hydroureteronephrosis of the lower pole moiety.    Electronically signed by: Deidra Dobson  Date:    10/08/2024  Time:    06:49      Uriel Fletcher MD  Department of Hospital Medicine   [facility]   10/11/2024

## 2024-10-11 NOTE — PROGRESS NOTES
.UROLOGY  PROGRESS  NOTE    Fior Joya 1955  [id number]  10/11/2024    POD 7 cystoscopy, right stent exchange upper pole moiety, bladder biopsy; nephrostomy tube placement in lower pole of right kidney with IR      No acute events overnight  Son at bedside  Reports met with Oncology this AM      VSS, afebrile  600 mL left nephrostomy   75 mL right nephrostomy  WBC 18.83  H&H 9.9/30.7   BUN and creatinine 55.1/5.32    Exam:    NAD  Resp unlabored  Abd obese, soft, NTND   bloody drainage from bilateral PCN       Recent Results (from the past 24 hours)   Renal Function Panel    Collection Time: 10/11/24  4:57 AM   Result Value Ref Range    Sodium 127 (L) 136 - 145 mmol/L    Potassium 4.5 3.5 - 5.1 mmol/L    Chloride 88 (L) 98 - 107 mmol/L    CO2 21 (L) 23 - 31 mmol/L    Glucose 86 82 - 115 mg/dL    Blood Urea Nitrogen 55.1 (H) 9.8 - 20.1 mg/dL    Creatinine 5.32 (H) 0.55 - 1.02 mg/dL    Calcium 8.5 8.4 - 10.2 mg/dL    Albumin 2.0 (L) 3.4 - 4.8 g/dL    Phosphorus Level 5.7 (H) 2.3 - 4.7 mg/dL    eGFR 8 mL/min/1.73/m2   CBC with Differential    Collection Time: 10/11/24  4:57 AM   Result Value Ref Range    WBC 18.83 (H) 4.50 - 11.50 x10(3)/mcL    RBC 3.38 (L) 4.20 - 5.40 x10(6)/mcL    Hgb 9.9 (L) 12.0 - 16.0 g/dL    Hct 30.7 (L) 37.0 - 47.0 %    MCV 90.8 80.0 - 94.0 fL    MCH 29.3 27.0 - 31.0 pg    MCHC 32.2 (L) 33.0 - 36.0 g/dL    RDW 15.7 11.5 - 17.0 %    Platelet 209 130 - 400 x10(3)/mcL    MPV 11.4 (H) 7.4 - 10.4 fL    Neut % 83.7 %    Lymph % 5.9 %    Mono % 6.5 %    Eos % 2.0 %    Basophil % 0.4 %    Lymph # 1.12 0.6 - 4.6 x10(3)/mcL    Neut # 15.76 (H) 2.1 - 9.2 x10(3)/mcL    Mono # 1.22 0.1 - 1.3 x10(3)/mcL    Eos # 0.38 0 - 0.9 x10(3)/mcL    Baso # 0.07 <=0.2 x10(3)/mcL    IG# 0.28 (H) 0 - 0.04 x10(3)/mcL    IG% 1.5 %    NRBC% 0.0 %     Pathology Result    Comment:          FINAL DIAGNOSIS     BLADDER TISSUE, BIOPSY:     INVASIVE MODERATELY DIFFERENTIATED CARCINOMA WITH EXTENSIVE SQUAMOUS  DIFFERENTIATION  (SEE COMMENT).     NO DETRUSOR MUSCLE IS PRESENT FOR EVALUATION.     Electronically Signed by:  Sánchez Goodwin M.D. , Pathologist  (Case signed 10/08/2024 at 01:25pm)     Comment  The differential diagnosis includes pure squamous cell carcinoma and urothelial  carcinoma with extensive squamous differentiation. This distinction requires  evaluation of the entire resected tumor. In addition, clinical/radiological  correlation is advised to insure the tumor is compatible with a bladder primary.  This case has also been reviewed in intradepartmental consultation.     Source  BLADDER TISSUE     Clinical Information  BLADDER     Gross Description  Received fresh, and subsequently placed in formalin labeled `Fior Joya,  bladder` and listed on the requisition as `bladder tissue` are 4 irregular  fragments of pink-tan friable tissue that range from 0.4 to 1.6 cm in greatest  dimension.  Submitted intact in cassette 1A.  0-F-1     AM 10/7/2024     Microscopic Description  1. Microscopic examination performed.  Please see diagnosis.       Assessment:  Bilateral hydronephrosis  -had left PCN placed in June of this year due to inability to place retrograde ureteral stent, also had right ureteral stent placed in one of the two collecting systems on 6/27  -s/p Cystoscopy with right stent exchange upper pole moiety, Retrograde pyelogram and Bladder biopsy 10/4  -unable to identify UO intra-op therefore IR placed a right PCN to the collecting system without a stent 10/4    s/p bladder biopsy   -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   -oncology has evaluated patient - plans for f/u outpatient to discuss treatment options    Urosepsis  -blood cultures +MRSA, on rocephin; repeat BC pending  -repeat urine cultures with no growth    ESRD   -on HD      Plan:  -continue bilateral nephrostomy tubes on d/c  -Culture specific antibiotics  -Otherwise no additional  recommendations from Urology standpoint at this time.   -She will f/u with her established oncologist Dr. Otis Person after discharge   -Please call as needed over the weekend with any issues.         Donna Varghese, [facility]

## 2024-10-12 LAB
BACTERIA BLD CULT: ABNORMAL
GRAM STN SPEC: ABNORMAL
VANCOMYCIN SERPL-MCNC: 15.5 UG/ML (ref 15–20)

## 2024-10-12 PROCEDURE — 25000003 PHARM REV CODE 250: Performed by: INTERNAL MEDICINE

## 2024-10-12 PROCEDURE — 63600175 PHARM REV CODE 636 W HCPCS: Performed by: INTERNAL MEDICINE

## 2024-10-12 PROCEDURE — 63600175 PHARM REV CODE 636 W HCPCS: Mod: JZ,JG | Performed by: GENERAL PRACTICE

## 2024-10-12 PROCEDURE — 25000003 PHARM REV CODE 250: Performed by: GENERAL PRACTICE

## 2024-10-12 PROCEDURE — 80202 ASSAY OF VANCOMYCIN: CPT | Performed by: UROLOGY

## 2024-10-12 PROCEDURE — 99233 SBSQ HOSP IP/OBS HIGH 50: CPT | Mod: ,,, | Performed by: GENERAL PRACTICE

## 2024-10-12 PROCEDURE — 21400001 HC TELEMETRY ROOM

## 2024-10-12 PROCEDURE — 27000207 HC ISOLATION

## 2024-10-12 PROCEDURE — 36415 COLL VENOUS BLD VENIPUNCTURE: CPT | Performed by: UROLOGY

## 2024-10-12 PROCEDURE — 80100016 HC MAINTENANCE HEMODIALYSIS

## 2024-10-12 PROCEDURE — 25000003 PHARM REV CODE 250: Performed by: UROLOGY

## 2024-10-12 PROCEDURE — 94760 N-INVAS EAR/PLS OXIMETRY 1: CPT

## 2024-10-12 RX ADMIN — CEFTAROLINE FOSAMIL 200 MG: 600 POWDER, FOR SOLUTION INTRAVENOUS at 03:10

## 2024-10-12 RX ADMIN — SEVELAMER CARBONATE 800 MG: 800 TABLET, FILM COATED ORAL at 05:10

## 2024-10-12 RX ADMIN — HYDROCODONE BITARTRATE AND ACETAMINOPHEN 1 TABLET: 10; 325 TABLET ORAL at 09:10

## 2024-10-12 RX ADMIN — SERTRALINE HYDROCHLORIDE 25 MG: 25 TABLET ORAL at 08:10

## 2024-10-12 RX ADMIN — SEVELAMER CARBONATE 800 MG: 800 TABLET, FILM COATED ORAL at 12:10

## 2024-10-12 RX ADMIN — PANTOPRAZOLE SODIUM 40 MG: 40 TABLET, DELAYED RELEASE ORAL at 08:10

## 2024-10-12 RX ADMIN — HYDROCODONE BITARTRATE AND ACETAMINOPHEN 1 TABLET: 10; 325 TABLET ORAL at 05:10

## 2024-10-12 RX ADMIN — HYDROCODONE BITARTRATE AND ACETAMINOPHEN 1 TABLET: 10; 325 TABLET ORAL at 01:10

## 2024-10-12 RX ADMIN — CEFTAROLINE FOSAMIL 200 MG: 600 POWDER, FOR SOLUTION INTRAVENOUS at 11:10

## 2024-10-12 RX ADMIN — SEVELAMER CARBONATE 800 MG: 800 TABLET, FILM COATED ORAL at 08:10

## 2024-10-12 RX ADMIN — ATORVASTATIN CALCIUM 20 MG: 10 TABLET, FILM COATED ORAL at 08:10

## 2024-10-12 RX ADMIN — ACETAMINOPHEN 650 MG: 325 TABLET ORAL at 08:10

## 2024-10-12 RX ADMIN — ENOXAPARIN SODIUM 30 MG: 30 INJECTION SUBCUTANEOUS at 05:10

## 2024-10-12 RX ADMIN — VANCOMYCIN HYDROCHLORIDE 500 MG: 500 INJECTION, POWDER, LYOPHILIZED, FOR SOLUTION INTRAVENOUS at 08:10

## 2024-10-12 RX ADMIN — FOLIC ACID 1 MG: 1 TABLET ORAL at 08:10

## 2024-10-12 NOTE — PROGRESS NOTES
[facility] Progress Note        Chief Complaint: Inpatient Follow-up for     HPI: 69-year-old female with past medical history of hypertension, hyperlipidemia, chronic hydronephrosis with left nephrostomy tube, right ureteral stent, end-stage renal disease on hemodialysis, chronic anemia presented with right flank pain for the past 5 days and also reported cloudy urine output from the urostomy CT with IV contrast showed moderate to severe hydronephrosis with enlarged right pelvic lymph node increase in size from the prior with suspected malignancy also showed duplicated collecting system with dilation of the right lower pole urology was consulted patient is status post cystoscopy with right stent exchange and bladder biopsy and they were not able to identify the left ureteral orifice therefore had left percutaneous nephrostomy. Blood culture is growing Gram-positive cocci 2/2 bottles initially patient was started on cefepime but now we added vancomycin repeat blood cultures ordered  Id was consulted MRI of the CT and L-spine was ordered that was negative for any abscess blood culture is growing MRSA TTE as such did not show any vegetation cardiology consulted for MADELYN  Bilateral hydronephrosis Status post right upper lobe ureteral stent exchange and right lower pole nephrostomy tube placement  Cultures remains persistently positive.  Patient was slightly confused on October 7 so we had ordered CT of the head without contrast that was negative, ammonia, TSH everything was normal we also ordered CT of the abdomen and pelvis with contrast that was negative for any abscess cardiology consulted for MADELYN.  The was negative for endocarditis.  Dialysis catheter was removed    Interval Hx:   The latest cultures still remain positive.  Obtain WBC scan.  Has no new complaints.  Still with bloody output from the nephrostomy tubes bilaterally.  Urology is aware of  that.      Objective/physical exam:  General: In no acute distress, afebrile  Chest: Clear to auscultation bilaterally  Heart: RRR, +S1, S2, no appreciable murmur  Abdomen: Soft, nontender, BS +, bilateral nephrostomy tubes with bloody output  MSK: Warm, 1+ pitting bilateral lower extremity edema, no clubbing or cyanosis  Neurologic:  Alert awake oriented x4    VITAL SIGNS: 24 HRS MIN & MAX LAST   Temp  Min: 97.3 °F (36.3 °C)  Max: 98.4 °F (36.9 °C) 98.4 °F (36.9 °C)   BP  Min: 105/64  Max: 131/70 (!) 126/56   Pulse  Min: 58  Max: 79  79   Resp  Min: 17  Max: 20 20   SpO2  Min: 95 %  Max: 98 % 95 %     I have reviewed the following labs:  Recent Labs   Lab 10/08/24  0458 10/09/24  0650 10/11/24  0457   WBC 26.23* 21.12* 18.83*   RBC 3.65* 3.78* 3.38*   HGB 11.0* 11.1* 9.9*   HCT 31.3* 33.2* 30.7*   MCV 85.8 87.8 90.8   MCH 30.1 29.4 29.3   MCHC 35.1 33.4 32.2*   RDW 16.0 15.8 15.7    208 209   MPV 11.4* 10.2 11.4*     Recent Labs   Lab 10/06/24  0449 10/07/24  0541 10/07/24  1125 10/08/24  0458 10/09/24  0650 10/10/24  0738 10/11/24  0457   * 135*  --    < > 136 132* 127*   K 4.1 4.7  --    < > 4.1 4.5 4.5   CL 91* 93*  --    < > 94* 90* 88*   CO2 22* 17*  --    < > 27 25 21*   BUN 57.7* 74.7*  --    < > 28.0* 44.0* 55.1*   CREATININE 5.84* 6.80*  --    < > 3.49* 4.57* 5.32*   CALCIUM 8.1* 7.5*  --    < > 8.0* 8.1* 8.5   PH  --   --  7.410  --   --   --   --    MG  --   --   --   --   --   --  2.10   ALBUMIN 2.2* 2.3*  --   --   --   --  2.0*   ALKPHOS 219* 209*  --   --   --   --   --    * 71*  --   --   --   --   --    AST 89* 43*  --   --   --   --   --    BILITOT 0.4 0.4  --   --   --   --   --     < > = values in this interval not displayed.     Microbiology Results (last 7 days)       Procedure Component Value Units Date/Time    Blood Culture [[id number]]  (Abnormal)  (Susceptibility) Collected: 10/07/24 2103    Order Status: Completed Specimen: Blood Updated: 10/12/24 0658     Blood  Culture Methicillin resistant Staphylococcus aureus     GRAM STAIN 1 of 1 Pediatric bottle positive      Gram Positive Cocci, probable Staphylococcus      Seen in gram stain of broth only    Blood Culture [[id number]]  (Abnormal) Collected: 10/10/24 1605    Order Status: Completed Specimen: Blood Updated: 10/11/24 2020     Blood Culture No Growth At 24 Hours     GRAM STAIN Seen in gram stain of broth only      2 of 2 bottles positive      Gram Positive Cocci, probable Staphylococcus    Blood Culture [[id number]]  (Abnormal) Collected: 10/10/24 1605    Order Status: Completed Specimen: Blood Updated: 10/11/24 2014     Blood Culture No Growth At 24 Hours     GRAM STAIN 1 of 2 Anaerobic bottles positive      Gram Positive Cocci, probable Staphylococcus      Seen in gram stain of broth only    BCID2 Panel [[id number]] Collected: 10/10/24 1605    Order Status: Canceled Specimen: Blood Updated: 10/11/24 2011    Blood Culture [[id number]]  (Normal) Collected: 10/09/24 1728    Order Status: Completed Specimen: Blood Updated: 10/11/24 1900     Blood Culture No Growth At 48 Hours    Blood Culture [[id number]]  (Abnormal) Collected: 10/09/24 1728    Order Status: Completed Specimen: Blood Updated: 10/11/24 0725     Blood Culture Identification and Susceptibility To Follow      Gram-positive coccus probable staph     GRAM STAIN Gram Positive Cocci, probable Staphylococcus      Seen in gram stain of broth only      1 of 2 Aerobic bottles positive    BCID2 Panel [[id number]]  (Abnormal) Collected: 10/09/24 1728    Order Status: Completed Specimen: Blood Updated: 10/10/24 1716     CTX-M (ESBL ) N/A     IMP (Cabapenemase ) N/A     KPC resistance gene (Carbapenemase ) N/A     mcr-1 N/A     mecA ID N/A     Comment: Note: Antimicrobial resistance can occur via multiple mechanisms. A Not Detected result for antimicrobial resistance gene(s) does not indicate antimicrobial susceptibility. Subculturing is required  for species identification and susceptibility testing of   isolates.        mecA/C and MREJ (MRSA) gene Detected     NDM (Carbapenemase ) N/A     OXA-48-like (Carbapenemase ) N/A     Royal/B (VRE gene) N/A     VIM (Carbapenemase ) N/A     Enterococcus faecalis Not Detected     Enterococcus faecium Not Detected     Listeria monocytogenes Not Detected     Staphylococcus spp. Detected     Staphylococcus aureus Detected     Staphylococcus epidermidis Not Detected     Staphylococcus lugdunensis Not Detected     Streptococcus spp. Not Detected     Streptococcus agalactiae (Group B) Not Detected     Streptococcus pneumoniae Not Detected     Streptococcus pyogenes (Group A) Not Detected     Acinetobacter calcoaceticus/baumannii complex Not Detected     Bacteroides fragilis Not Detected     Enterobacterales Not Detected     Enterobacter cloacae complex Not Detected     Escherichia coli Not Detected     Klebsiella aerogenes Not Detected     Klebsiella oxytoca Not Detected     Klebsiella pneumoniae group Not Detected     Proteus spp. Not Detected     Salmonella spp. Not Detected     Serratia marcescens Not Detected     Haemophilus influenzae Not Detected     Neisseria meningitidis Not Detected     Pseudomonas aeruginosa Not Detected     Stenotrophomonas maltophilia Not Detected     Candida albicans Not Detected     Candida auris Not Detected     Candida glabrata Not Detected     Candida krusei Not Detected     Candida parapsilosis Not Detected     Candida tropicalis Not Detected     Cryptococcus neoformans/gattii Not Detected    Narrative:      The GENIAC BCID2 Panel is a multiplexed nucleic acid test intended for the use with [employer]® 2.0 or [employer]® [employer] for the simultaneous qualitative detection and identification of multiple bacterial and yeast nucleic acids and select genetic determinants associated with antimicrobial resistance.  The GENIAC BCID2 Panel test is  performed directly on blood culture samples identified as positive by a continuous monitoring blood culture system.  Results are intended to be interpreted in conjunction with Gram stain results.    Blood Culture [[id number]]  (Abnormal)  (Susceptibility) Collected: 10/07/24 2103    Order Status: Completed Specimen: Blood Updated: 10/10/24 0650     Blood Culture Methicillin resistant Staphylococcus aureus     GRAM STAIN Gram Positive Cocci, probable Staphylococcus      Seen in gram stain of broth only      1 of 1 Pediatric bottle positive    Blood Culture [[id number]]  (Abnormal)  (Susceptibility) Collected: 10/05/24 0620    Order Status: Completed Specimen: Blood Updated: 10/08/24 0810     Blood Culture Methicillin resistant Staphylococcus aureus     GRAM STAIN Gram Positive Cocci, probable Staphylococcus      Seen in gram stain of broth only      2 of 2 bottles positive    Blood Culture [[id number]]  (Abnormal)  (Susceptibility) Collected: 10/05/24 0620    Order Status: Completed Specimen: Blood Updated: 10/08/24 0809     Blood Culture Methicillin resistant Staphylococcus aureus     GRAM STAIN Gram Positive Cocci, probable Staphylococcus      2 of 2 bottles positive      Seen in gram stain of broth only    Blood culture #2 **CANNOT BE ORDERED STAT** [[id number]]  (Abnormal)  (Susceptibility) Collected: 10/03/24 2020    Order Status: Completed Specimen: Blood Updated: 10/06/24 0627     Blood Culture Methicillin resistant Staphylococcus aureus     GRAM STAIN Gram Positive Cocci, probable Staphylococcus      Seen in gram stain of broth only      1 of 1 Pediatric bottle positive    Blood culture #1 **CANNOT BE ORDERED STAT** [[id number]]  (Abnormal)  (Susceptibility) Collected: 10/03/24 2020    Order Status: Completed Specimen: Blood Updated: 10/06/24 0627     Blood Culture Methicillin resistant Staphylococcus aureus     GRAM STAIN Gram Positive Cocci, probable Staphylococcus      Seen in gram stain of broth only       2 of 2 bottles positive    Urine culture [[id number]] Collected: 10/03/24 2008    Order Status: Completed Specimen: Urine Updated: 10/05/24 1003     Urine Culture No Significant Growth             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    Infectious Disease following.  Pending further recommendations, continue vancomycin.  Follow repeat cultures from yesterday.  CT abdomen pelvis with contrast negative for acute infection, MRI CTL spine was motion artifact.  Urology following.  Temporary dialysis catheter placement, hold off on tunneled until blood cultures are cleared.  Obtain tagged WBC scan to see if we can localize source of infection    Prophylaxis: Lovenox  VTE prophylaxis:     Patient condition:  Stable/Fair/Guarded/ Serious/ Critical    Anticipated discharge and Disposition:         All diagnosis and differential diagnosis have been reviewed; assessment and plan has been documented; I have personally reviewed the labs and test results that are presently available; I have reviewed the patients medication list; I have reviewed the consulting providers response and recommendations. I have reviewed or attempted to review medical records  based upon their availability    All of the patient's questions have been  addressed and answered. Patient's is agreeable to the above stated plan. I will continue to monitor closely and make adjustments to medical management as needed.    Portions of this note dictated using EMR integrated voice recognition software, and may be subject to voice recognition errors not corrected at proofreading. Please contact writer for clarification if needed.   _____________________________________________________________________    Malnutrition Status:    Scheduled Med:   atorvastatin  20 mg Oral QHS    enoxaparin  30 mg Subcutaneous Daily    folic acid  1 mg Oral Daily    ondansetron  4 mg Intravenous Once    pantoprazole  40 mg Oral Daily    sertraline  25 mg Oral Daily    sevelamer carbonate  800 mg Oral TID WM    vancomycin (VANCOCIN) IV (PEDS and ADULTS)  500 mg Intravenous Once      Continuous Infusions:       PRN Meds:    Current Facility-Administered Medications:     acetaminophen, 650 mg, Oral, Q4H PRN    albuterol-ipratropium, 3 mL, Nebulization, Once PRN    aluminum-magnesium hydroxide-simethicone, 30 mL, Oral, QID PRN    diphenhydrAMINE, 25 mg, Intravenous, Q6H PRN    heparin (porcine), 3,000 Units, Intravenous, PRN    HYDROcodone-acetaminophen, 1 tablet, Oral, Q4H PRN    HYDROcodone-acetaminophen, 1 tablet, Oral, Q6H PRN    melatonin, 6 mg, Oral, Nightly PRN    metoclopramide, 10 mg, Intravenous, Q10 Min PRN    ondansetron, 4 mg, Intravenous, Q4H PRN    polyethylene glycol, 17 g, Oral, BID PRN    prochlorperazine, 5 mg, Intravenous, Q6H PRN    senna-docusate 8.6-50 mg, 2 tablet, Oral, BID PRN    sodium chloride 0.9%, 10 mL, Intravenous, PRN    vancomycin - pharmacy to dose, , Intravenous, pharmacy to manage frequency     Radiology:  I have personally reviewed the following imaging and agree with the radiologist.     X-Ray Chest 1 View for Line/Tube Placement  Narrative: EXAMINATION:  XR CHEST 1 VIEW FOR LINE/TUBE  PLACEMENT    CLINICAL HISTORY:  Temp cath placement;Sepsis, unspecified organism    TECHNIQUE:  One view    COMPARISON:  June 22, 2024.    FINDINGS:  Left IJ approach central venous catheter terminates within the right atrium.  There is no pneumothorax.  Cardiopericardial silhouette appearance is similar.  Lungs are remarkable for mild to moderate congestive ground-glass opacities.  No focally dense consolidation or significant fluid within the pleural spaces  Impression: New central line terminates within right atrium.    Electronically signed by: Alvaro Watson  Date:    10/11/2024  Time:    11:53      Uriel Fletcher MD  Department of Hospital Medicine   [facility]   10/12/2024

## 2024-10-12 NOTE — PROGRESS NOTES
Nephrology  Note    Patient Name: Fior Joya  Age: 69 y.o.  : 1955  MRN: [id number]  Admission Date: 10/3/2024        Fior Joya is a 69 y.o. female who presents with right flank pain.  Past medical history significant for ESRD on hemodialysis TTS at [facility] via right IJ PermCath, chronic hydronephrosis with left nephrostomy tube in place, and stents to right ureter, hypertension, anemia, hyperphosphatemia, and hyperlipidemia.  Patient presents with worsening right flank pain.  She states the pain started in her right neck, in his now in her right flank/hip area.  Patient was started on hemodialysis in 2024 after being found to have significant acute kidney injury and hydronephrosis.  She has been maintained on hemodialysis at [facility] on a TTS schedule.  Prior to hospitalization her last dialysis was on 10/01/2024.  Nephrology consulted for ESRD management.  In the emergency department she was noted to have low blood pressure and has been given IV fluids.  Urology has also been consulted for concern hydronephrosis needing stent exchange.  She has also been started on antibiotics for possible UTI.  She has had nausea, and vomiting.  No chest pain, shortness of breath, or lower extremity edema.    10/07/2024  Chart reviewed.  Weekend events noted.  Patient has been NPO at least since her admission to the hospital and also has no IV fluids.  Lying down in the bed.  Having jerky movements all over the body and uncomfortable.  Now she has nephrostomy tube on both sides.  Both draining.  Serosanguineous fluid noted.  Patient is very slow to answer questions but did recognize me by name and she knows her own name.     10/08/2024  No acute events overnight.  Seen on hemodialysis.  She was endorsing shortness of breath, and has some lower extremity edema.    10/09/2024   Mentally she has cleared up completely.  Complains of pains all over the body for which she is on p.r.n. pain medications.  Also  complains of being nervous and on some anxiety medication.  No shortness of breath.  Tunneled dialysis catheter was removed yesterday.    10/10/2024  No acute events overnight.  No chest pain, shortness of breath, abdominal pain, nausea, vomiting, or lower extremity edema.  Found to have bladder cancer by Urology.    10/11/2024  No acute events overnight.  No new complaints.  Blood cultures remain positive.  No chest pain, shortness of breath, nausea, vomiting.    10/12/2024  Patient had Vas-Cath placed yesterday, and she was re-initiated on hemodialysis.  Dialyzed yesterday with 1.5 L UF.  No new complaints today.  No chest pain, shortness of breath, nausea, vomiting.  She does have mild lower extremity edema.      Current Facility-Administered Medications   Medication Dose Route Frequency Provider Last Rate Last Admin    acetaminophen tablet 650 mg  650 mg Oral Q4H PRN Tawana Loaiza MD   650 mg at 10/11/24 2335    albuterol-ipratropium 2.5 mg-0.5 mg/3 mL nebulizer solution 3 mL  3 mL Nebulization Once PRN Mark Ying DO        aluminum-magnesium hydroxide-simethicone 200-200-20 mg/5 mL suspension 30 mL  30 mL Oral QID PRN Tawana Loaiza MD        atorvastatin tablet 20 mg  20 mg Oral QHS Tawana Loaiza MD   20 mg at 10/11/24 2027    diphenhydrAMINE injection 25 mg  25 mg Intravenous Q6H PRN Mark Ying DO   25 mg at 10/09/24 0256    enoxaparin injection 30 mg  30 mg Subcutaneous Daily Macarena Leggett MD   30 mg at 10/11/24 1637    folic acid tablet 1 mg  1 mg Oral Daily Tawana Loaiza MD   1 mg at 10/12/24 0807    heparin (porcine) injection 3,000 Units  3,000 Units Intravenous PRN Neno Mercado DO        HYDROcodone-acetaminophen  mg per tablet 1 tablet  1 tablet Oral Q4H PRN Macarena Leggett MD   1 tablet at 10/12/24 0526    HYDROcodone-acetaminophen 5-325 mg per tablet 1 tablet  1 tablet Oral Q6H PRN Tawana Loaiza MD   1 tablet at  10/10/24 0431    melatonin tablet 6 mg  6 mg Oral Nightly PRN Tawana Loaiza MD   6 mg at 10/09/24 2053    metoclopramide injection 10 mg  10 mg Intravenous Q10 Min PRN Mark Ying, DO        ondansetron injection 4 mg  4 mg Intravenous Q4H PRN Tawana Loaiza MD        ondansetron injection 4 mg  4 mg Intravenous Once Mark Ying, DO        pantoprazole EC tablet 40 mg  40 mg Oral Daily Tawana Loaiza MD   40 mg at 10/12/24 0807    polyethylene glycol packet 17 g  17 g Oral BID PRN Tawana Loaiza MD        prochlorperazine injection Soln 5 mg  5 mg Intravenous Q6H PRN Tawana Loiaza MD        senna-docusate 8.6-50 mg per tablet 2 tablet  2 tablet Oral BID PRN Tawana Loaiza MD        sertraline tablet 25 mg  25 mg Oral Daily Shin Light MD   25 mg at 10/12/24 0807    sevelamer carbonate tablet 800 mg  800 mg Oral TID WM Tawana Loaiza MD   800 mg at 10/12/24 0807    sodium chloride 0.9% flush 10 mL  10 mL Intravenous PRN Tawana Loaiza MD        vancomycin (VANCOCIN) 500 mg in D5W 100 mL IVPB (MB+)  500 mg Intravenous Once Macarena Leggett MD        vancomycin - pharmacy to dose   Intravenous pharmacy to manage frequency Tawana Loaiza MD           No, Primary Doctor    History reviewed. No pertinent past medical history.   Past Surgical History:   Procedure Laterality Date    CYSTOSCOPY W/ URETERAL STENT PLACEMENT Right 6/27/2024    Procedure: CYSTOSCOPY, WITH URETERAL STENT INSERTION;  Surgeon: Otis Person MD;  Location: [facility] OR;  Service: Urology;  Laterality: Right;  CYSTOSCOPY, BILATERAL RETROGRADE PYELOGRAMS, POSSIBLE STENT PLACEMENT.    CYSTOSCOPY W/ URETERAL STENT PLACEMENT Bilateral 10/4/2024    Procedure: CYSTOSCOPY, WITH URETERAL STENT INSERTION;  Surgeon: Tawana Loaiza MD;  Location: [facility] OR;  Service: Urology;  Laterality: Bilateral;  CYSTO WITH BILAT URETERAL STENT EXCHANGE     INSERTION OF TUNNELED CENTRAL VENOUS HEMODIALYSIS CATHETER N/A 7/3/2024    Procedure: Insertion, Catheter, Central Venous, Hemodialysis;  Surgeon: Uche Barcenas MD;  Location: [facility] CATH LAB;  Service: Peripheral Vascular;  Laterality: N/A;      No family history on file.  Social History     Tobacco Use    Smoking status: Never    Smokeless tobacco: Never   Substance Use Topics    Alcohol use: Never     Medications Prior to Admission   Medication Sig Dispense Refill Last Dose/Taking    atorvastatin (LIPITOR) 20 MG tablet Take 20 mg by mouth every evening.   10/3/2024    calcium carbonate (TUMS) 200 mg calcium (500 mg) chewable tablet Take 2 tablets (1,000 mg total) by mouth 2 (two) times daily. 120 tablet 11 10/3/2024 Morning    [] ergocalciferol (ERGOCALCIFEROL) 50,000 unit Cap Take 1 capsule (50,000 Units total) by mouth every 72 hours. 10 capsule 2 Past Week    FEROSUL 325 mg (65 mg iron) Tab tablet Take 325 mg by mouth once daily.   10/3/2024 Morning    folic acid (FOLVITE) 1 MG tablet Take 1 tablet (1 mg total) by mouth once daily. 30 tablet 0 10/3/2024 Morning    labetaloL (NORMODYNE) 200 MG tablet Take 1 tablet (200 mg total) by mouth every 12 (twelve) hours. 60 tablet 11 10/3/2024 Morning    loratadine (CLARITIN) 10 mg tablet Take 10 mg by mouth once daily.   10/3/2024    pantoprazole (PROTONIX) 40 MG tablet Take 1 tablet (40 mg total) by mouth once daily. 90 tablet 3 10/3/2024 Morning    senna-docusate 8.6-50 mg (SENNA WITH DOCUSATE SODIUM) 8.6-50 mg per tablet Take 1 tablet by mouth daily as needed for Constipation.   Past Week    sevelamer carbonate (RENVELA) 800 mg Tab Take 800 mg by mouth 3 (three) times daily with meals.   10/3/2024 Morning    polyethylene glycol (GLYCOLAX) 17 gram PwPk Take 17 g by mouth once daily. (Patient not taking: Reported on 10/4/2024)   Not Taking     Review of patient's allergies indicates:   Allergen Reactions    Asa [aspirin] Anaphylaxis    Penicillins       Received ceftriaxone from 6/27, no reactions             Review of Systems:     Comprehensive 10pt ROS negative except as noted per history.      Objective:       VITAL SIGNS: 24 HR MIN & MAX LAST    Temp  Min: 97.3 °F (36.3 °C)  Max: 98.4 °F (36.9 °C)  98.4 °F (36.9 °C)        BP  Min: 105/64  Max: 131/70  (!) 126/56     Pulse  Min: 58  Max: 79  79     Resp  Min: 17  Max: 20  20    SpO2  Min: 95 %  Max: 98 %  95 %      GEN:  Chronically ill-appearing WF.  Morbidly obese  CV: RRR without rub or gallop.  PULM: CTAB, unlabored  ABD: Soft, NT/ND abdomen with NABS  :  Nephrostomy tubes in both right and left kidneys.    EXT:  1+ lower extremity edema  SKIN: Warm and dry  PSYCH: Awake, alert and appropriately conversant  Dialysis access:  Left IJ Vas-Cath            Component Value Date/Time     (L) 10/11/2024 0457     (L) 10/10/2024 0738    K 4.5 10/11/2024 0457    K 4.5 10/10/2024 0738    CO2 21 (L) 10/11/2024 0457    CO2 25 10/10/2024 0738    BUN 55.1 (H) 10/11/2024 0457    BUN 44.0 (H) 10/10/2024 0738    CREATININE 5.32 (H) 10/11/2024 0457    CREATININE 4.57 (H) 10/10/2024 0738    CALCIUM 8.5 10/11/2024 0457    CALCIUM 8.1 (L) 10/10/2024 0738    PHOS 5.7 (H) 10/11/2024 0457            Component Value Date/Time    WBC 18.83 (H) 10/11/2024 0457    WBC 21.12 (H) 10/09/2024 0650    WBC 25.58 10/07/2024 0541    WBC 26.04 10/06/2024 0449    HGB 9.9 (L) 10/11/2024 0457    HGB 11.1 (L) 10/09/2024 0650    HCT 30.7 (L) 10/11/2024 0457    HCT 33.2 (L) 10/09/2024 0650     10/11/2024 0457     10/09/2024 0650             X-Ray Chest 1 View for Line/Tube Placement   Final Result      New central line terminates within right atrium.         Electronically signed by: Alvaro Watson   Date:    10/11/2024   Time:    11:53      CT Abdomen Pelvis With IV Contrast NO Oral Contrast   Final Result      1. No significant interval change compared to previous exam.  Ureteral stents in place.   2. Duplicated right  kidney and right ureter with persistent hydroureteronephrosis of the lower pole moiety.         Electronically signed by: Deidra Dobson   Date:    10/08/2024   Time:    06:49      CT Head Without Contrast   Final Result      No acute intracranial findings.         Electronically signed by: Alexx Patricia   Date:    10/07/2024   Time:    10:46      MRI Lumbar Spine W WO Cont   Final Result      No acute abnormality of the lumbar spine.         Electronically signed by: Edmundo Vásquez MD   Date:    10/05/2024   Time:    16:55      MRI Thoracic Spine W WO Cont   Final Result      Very limited examination due to significant motion artifact      No evidence of osteomyelitis or discitis seen at no evidence of epidural abscess seen in thoracic spine.  Lumbar and cervical spine discussed on separate report         Electronically signed by: Garland Raya   Date:    10/05/2024   Time:    16:57      MRI Cervical Spine W WO Cont   Final Result      Very limited examination due to motion artifact.  Only gross findings are visible.  Fine details are not visible.  No obvious epidural abscess is seen.  There is some increased signal in the cord at C6-C7 which shows some enhancement.  Findings could represent intra cord lesion or possible myelomalacia.         Electronically signed by: Garland Raya   Date:    10/05/2024   Time:    16:55      IR Nephrostomy Tube Placement   Final Result      Technically successful Nephrostomy Tube placement.         Electronically signed by: Andrew Buenrostro   Date:    10/04/2024   Time:    16:37      US Guided Needle Placement   Final Result      Technically successful Nephrostomy Tube placement.         Electronically signed by: Andrew Buenrostro   Date:    10/04/2024   Time:    16:37      SURG FL Surgery Fluoro Usage   Final Result      CT Abdomen Pelvis With IV Contrast NO Oral Contrast   Final Result      Right kidney demonstrates a duplicated collecting system with duplicated ureters possibly with  separate UVJ insertions.  The upper pole moiety is decompressed with a ureteral stent with the lower pole more demonstrating moderate severe hydronephrosis which is persistent from the prior exam.      Left nephrostomy ureteral stent drain with resolution of hydronephrosis.      Enlarged right pelvic lymph node increased in size from the prior, malignancy suspected.      Mildly enlarged upper normal sized lymph nodes in the periaortic region and left pelvis, nonspecific, similar to the prior exam.         Electronically signed by: Tete Sun MD   Date:    10/04/2024   Time:    07:37      X-Ray Lumbar Spine Ap And Lateral   Final Result      Limited examination due to patient's body habitus but degenerative changes seen throughout the lower lumbar spine         Electronically signed by: Garland Raya   Date:    10/03/2024   Time:    18:11          Assessment / Plan:       ESRD - normally TTS at [facility].  Tunneled dialysis catheter removed 10/08/2024 due to persistent MRSA bacteremia  Metabolic acidosis  MRSA bacteremia  Bilateral hydronephrosis - now with nephrostomy tube both sides    Plan:  Plan for hemodialysis again today to get back on regular TTS schedule.  2.5 L UF as tolerated.  She will eventually need a tunneled dialysis catheter placed once her blood cultures are negative.

## 2024-10-12 NOTE — PROGRESS NOTES
Pharmacokinetic Assessment Follow Up: IV Vancomycin    Vancomycin serum concentration assessment(s):    The random level was drawn correctly and can be used to guide therapy at this time. The measurement is within the desired definitive target range of 15 to 20 mcg/mL.    Vancomycin Regimen Plan:  Dosing with HD  500 mg x 1 today after HD  Re-dose when the random level is less than 20 mcg/mL, next level to be drawn at 0430 on 10/13    Scheduled Administration Times        Drug levels (last 3 results):  Recent Labs   Lab Result Units 10/10/24  0419 10/12/24  0515   Vancomycin Random ug/ml 12.2* 15.5       Vancomycin Administrations:  vancomycin given in the last 96 hours                     vancomycin 750 mg in D5W 250 mL IVPB (admixture device) (mg) 750 mg New Bag 10/10/24 2041    vancomycin (VANCOCIN) 500 mg in D5W 100 mL IVPB (MB+) (mg) 500 mg New Bag 10/08/24 1735                    Pharmacy will continue to follow and monitor vancomycin.    Please contact pharmacy at extension [phone] for questions regarding this assessment.    Thank you for the consult,   Yayo Sweet       Patient brief summary:  Fior Joya is a 69 y.o. female initiated on antimicrobial therapy with IV Vancomycin for treatment of bacteremia    The patient's current regimen is dosing with HD    Drug Allergies:   Review of patient's allergies indicates:   Allergen Reactions    Asa [aspirin] Anaphylaxis    Penicillins      Received ceftriaxone from 6/27, no reactions        Actual Body Weight:  Wt Readings from Last 1 Encounters:   10/10/24 (!) 140.2 kg (309 lb 1.4 oz)       Renal Function:   Estimated Creatinine Clearance: 14 mL/min (A) (based on SCr of 5.32 mg/dL (H)).,     Dialysis Method (if applicable):  intermittent HD    CBC (last 72 hours):  Recent Labs   Lab Result Units 10/11/24  0457   WBC x10(3)/mcL 18.83*   Hgb g/dL 9.9*   Hct % 30.7*   Platelet x10(3)/mcL 209   Mono % % 6.5   Eos % % 2.0   Basophil % % 0.4       Metabolic Panel (last  72 hours):  Recent Labs   Lab Result Units 10/10/24  0738 10/11/24  0457   Sodium mmol/L 132* 127*   Potassium mmol/L 4.5 4.5   Chloride mmol/L 90* 88*   CO2 mmol/L 25 21*   Glucose mg/dL 137* 86   Blood Urea Nitrogen mg/dL 44.0* 55.1*   Creatinine mg/dL 4.57* 5.32*   Albumin g/dL  --  2.0*   Magnesium Level mg/dL  --  2.10   Phosphorus Level mg/dL  --  5.7*       Microbiologic Results:  Microbiology Results (last 7 days)       Procedure Component Value Units Date/Time    Blood Culture [[id number]]  (Abnormal)  (Susceptibility) Collected: 10/07/24 2103    Order Status: Completed Specimen: Blood Updated: 10/12/24 0658     Blood Culture Methicillin resistant Staphylococcus aureus     GRAM STAIN 1 of 1 Pediatric bottle positive      Gram Positive Cocci, probable Staphylococcus      Seen in gram stain of broth only    Blood Culture [[id number]]  (Abnormal) Collected: 10/10/24 1605    Order Status: Completed Specimen: Blood Updated: 10/11/24 2020     Blood Culture No Growth At 24 Hours     GRAM STAIN Seen in gram stain of broth only      2 of 2 bottles positive      Gram Positive Cocci, probable Staphylococcus    Blood Culture [[id number]]  (Abnormal) Collected: 10/10/24 1605    Order Status: Completed Specimen: Blood Updated: 10/11/24 2014     Blood Culture No Growth At 24 Hours     GRAM STAIN 1 of 2 Anaerobic bottles positive      Gram Positive Cocci, probable Staphylococcus      Seen in gram stain of broth only    BCID2 Panel [[id number]] Collected: 10/10/24 1605    Order Status: Canceled Specimen: Blood Updated: 10/11/24 2011    Blood Culture [[id number]]  (Normal) Collected: 10/09/24 1728    Order Status: Completed Specimen: Blood Updated: 10/11/24 1900     Blood Culture No Growth At 48 Hours    Blood Culture [[id number]]  (Abnormal) Collected: 10/09/24 1728    Order Status: Completed Specimen: Blood Updated: 10/11/24 0725     Blood Culture Identification and Susceptibility To Follow      Gram-positive coccus  probable staph     GRAM STAIN Gram Positive Cocci, probable Staphylococcus      Seen in gram stain of broth only      1 of 2 Aerobic bottles positive    BCID2 Panel [[id number]]  (Abnormal) Collected: 10/09/24 1728    Order Status: Completed Specimen: Blood Updated: 10/10/24 1716     CTX-M (ESBL ) N/A     IMP (Cabapenemase ) N/A     KPC resistance gene (Carbapenemase ) N/A     mcr-1 N/A     mecA ID N/A     Comment: Note: Antimicrobial resistance can occur via multiple mechanisms. A Not Detected result for antimicrobial resistance gene(s) does not indicate antimicrobial susceptibility. Subculturing is required for species identification and susceptibility testing of   isolates.        mecA/C and MREJ (MRSA) gene Detected     NDM (Carbapenemase ) N/A     OXA-48-like (Carbapenemase ) N/A     Royal/B (VRE gene) N/A     VIM (Carbapenemase ) N/A     Enterococcus faecalis Not Detected     Enterococcus faecium Not Detected     Listeria monocytogenes Not Detected     Staphylococcus spp. Detected     Staphylococcus aureus Detected     Staphylococcus epidermidis Not Detected     Staphylococcus lugdunensis Not Detected     Streptococcus spp. Not Detected     Streptococcus agalactiae (Group B) Not Detected     Streptococcus pneumoniae Not Detected     Streptococcus pyogenes (Group A) Not Detected     Acinetobacter calcoaceticus/baumannii complex Not Detected     Bacteroides fragilis Not Detected     Enterobacterales Not Detected     Enterobacter cloacae complex Not Detected     Escherichia coli Not Detected     Klebsiella aerogenes Not Detected     Klebsiella oxytoca Not Detected     Klebsiella pneumoniae group Not Detected     Proteus spp. Not Detected     Salmonella spp. Not Detected     Serratia marcescens Not Detected     Haemophilus influenzae Not Detected     Neisseria meningitidis Not Detected     Pseudomonas aeruginosa Not Detected     Stenotrophomonas maltophilia Not  Detected     Candida albicans Not Detected     Candida auris Not Detected     Candida glabrata Not Detected     Candida krusei Not Detected     Candida parapsilosis Not Detected     Candida tropicalis Not Detected     Cryptococcus neoformans/gattii Not Detected    Narrative:      The [employer] BCID2 Panel is a multiplexed nucleic acid test intended for the use with 2 Minutes® 2.0 or BioFire® FilmArray® [employer] for the simultaneous qualitative detection and identification of multiple bacterial and yeast nucleic acids and select genetic determinants associated with antimicrobial resistance.  The [employer] BCID2 Panel test is performed directly on blood culture samples identified as positive by a continuous monitoring blood culture system.  Results are intended to be interpreted in conjunction with Gram stain results.    Blood Culture [[id number]]  (Abnormal)  (Susceptibility) Collected: 10/07/24 2103    Order Status: Completed Specimen: Blood Updated: 10/10/24 0650     Blood Culture Methicillin resistant Staphylococcus aureus     GRAM STAIN Gram Positive Cocci, probable Staphylococcus      Seen in gram stain of broth only      1 of 1 Pediatric bottle positive    Blood Culture [[id number]]  (Abnormal)  (Susceptibility) Collected: 10/05/24 0620    Order Status: Completed Specimen: Blood Updated: 10/08/24 0810     Blood Culture Methicillin resistant Staphylococcus aureus     GRAM STAIN Gram Positive Cocci, probable Staphylococcus      Seen in gram stain of broth only      2 of 2 bottles positive    Blood Culture [[id number]]  (Abnormal)  (Susceptibility) Collected: 10/05/24 0620    Order Status: Completed Specimen: Blood Updated: 10/08/24 0809     Blood Culture Methicillin resistant Staphylococcus aureus     GRAM STAIN Gram Positive Cocci, probable Staphylococcus      2 of 2 bottles positive      Seen in gram stain of broth only    Blood culture #2 **CANNOT BE ORDERED STAT** [[id number]]  (Abnormal)   (Susceptibility) Collected: 10/03/24 2020    Order Status: Completed Specimen: Blood Updated: 10/06/24 0627     Blood Culture Methicillin resistant Staphylococcus aureus     GRAM STAIN Gram Positive Cocci, probable Staphylococcus      Seen in gram stain of broth only      1 of 1 Pediatric bottle positive    Blood culture #1 **CANNOT BE ORDERED STAT** [[id number]]  (Abnormal)  (Susceptibility) Collected: 10/03/24 2020    Order Status: Completed Specimen: Blood Updated: 10/06/24 0627     Blood Culture Methicillin resistant Staphylococcus aureus     GRAM STAIN Gram Positive Cocci, probable Staphylococcus      Seen in gram stain of broth only      2 of 2 bottles positive    Urine culture [[id number]] Collected: 10/03/24 2008    Order Status: Completed Specimen: Urine Updated: 10/05/24 1003     Urine Culture No Significant Growth

## 2024-10-12 NOTE — PROGRESS NOTES
Infectious Disease  Progress Note    Patient Name: Fior Joya   MRN: [id number]   Admission Date: 10/3/2024   Hospital Length of Stay: 8 days  Attending Physician: Uriel Fletcher MD   Primary Care Provider: Linda, Primary Doctor     Isolation Status: Contact     Assessment/Plan:        69 y.o. female with a history significant for ESRD on hemodialysis through right IJ permanent catheter, chronic hydronephrosis with left nephrostomy tube, hypertension who was admitted on 10/3/2024 with lower back pain. Patient said that her symptoms started approximately 1 week ago with some neck pain that moved down to her shoulder. She then developed lower back pain that moved to her right leg. Patient also reports having cloudy drainage from her nephrostomy tube for the last 1 month. Patient reports worsening back/right flank pain for the last few days. Vital signs on admission showed temperature 97.2 F, heart rate 79, blood pressure 110/75. Laboratory workup showed WBC 22449, creatinine 6.70, lactic acid 1.8. Urinalysis showed more than 100 WBC. She had 2 sets of blood culture obtained which are growing MRSA. 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  The upper pole moiety is decompressed with a ureteral stent but the lower pole demonstrating moderate severe hydronephrosis, left nephrostomy ureteral stent drain with resolution of hydronephrosis. Patient underwent nephrostomy tube placement by IR into the inferior pole of the right renal collecting system.  Patient was also noted to MRSA bacteremia, she was started on vancomycin, on 10/07/2024, she appears to had worsening mental status, she has persistent leukocytosis.  ID is consulted for assistance in management.    MRSA bacteremia  ESRD on HD, HD line removed 10/08/2024  Encephalopathy  Sepsis  Hydronephrosis s/p ureteral stents and nephrostomy tubes  New diagnosis of bladder  cancer    -MADELYN done 10/08/2024, no evidence of vegetation  -noted pathology of the bladder from 10/04/2024 concern for squamous cell carcinoma  -Persistent bacteremia despite removal of lines  -Had some issues with her PCN tubes especially on the L, has been removed  -No fevers, continues to have bloody output in the nephrostomy tubes      Recommendations:  -Continue Vancomycin dosing per pharmacy for goal trough 15-20   -Add Ceftaroline 200mg IV q8h for now in an effort to achieve sterility  -May need to replace Vancomycin for Daptomycin if this is not achieved (studies were mostly done on Combination of Daptomycin + Ceftaroline) will however continue with current Vancomycin due to ease of dosing with HD  -unfortunately we have not achieved sterility of the blood cultures, if the patient is needing dialysis please place a temporary line, would not place tunneled HD line yet  -Could the PCN tubes be the kidneys be the ongoing sources of bacteremia? Especially with ongoing hydronephrosis on the R, could be behaving as an abscess? May need ot be repositioned? 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 Please contact us with any questions or concerns.    Antibiotics History:  Metronidazole 10/3  Cefepime 10/3-10/5  Vancomycin 10/3-  Ceftaroline 10/12 - Present    Portions of this note dictated using EMR integrated voice recognition software, and may be subject to voice recognition errors not corrected at proofreading. Please contact writer for clarification if needed.    Subjective:     Principal Problem: <principal problem not specified>     Interval History:   Appears improved today, no fevers, no chills,  some issues with the PCN tube on the L being somewhat displaced     Review of Systems   Review of Systems   All other systems reviewed and are negative.       Objective:     Vital Signs (Most Recent):  Temp: 97.9 °F (36.6 °C) (10/12/24 1535)  Pulse: 76 (10/12/24 1535)  Resp: 18 (10/12/24 1732)  BP: 110/64 (10/12/24 1535)  SpO2: 95 % (10/12/24 1535)  Vital Signs (24h Range):  Temp:  [97.3 °F (36.3 °C)-98.6 °F (37 °C)] 97.9 °F (36.6 °C)  Pulse:  [58-79] 76  Resp:  [18-20] 18  SpO2:  [95 %-98 %] 95 %  BP: (106-131)/(54-73) 110/64      Weight:   Wt Readings from Last 1 Encounters:   10/10/24 (!) 140.2 kg (309 lb 1.4 oz)      Body mass index is Body mass index is 54.75 kg/m².     Estimated Creatinine Clearance: Estimated Creatinine Clearance: 14 mL/min (A) (based on SCr of 5.32 mg/dL (H)).     Lines/Drains/Airways       Central Venous Catheter Line  Duration                  Hemodialysis Catheter 10/11/24 left internal jugular 1 day              Drain  Duration                  Nephrostomy 06/28/24 1025 Left 8 Fr. 106 days         Nephrostomy 10/04/24 1506 Right 8 Fr. 8 days              Peripheral Intravenous Line  Duration                  Peripheral IV - Single Lumen 10/09/24 0900 20 G Anterior;Left;Proximal Forearm 3 days                     Physical Exam  Physical Exam  Constitutional:       Appearance: Normal appearance. She is obese.   HENT:      Head: Normocephalic and atraumatic.      Mouth/Throat:      Pharynx: No oropharyngeal exudate or posterior oropharyngeal erythema.   Eyes:      Extraocular Movements: Extraocular movements intact.      Pupils: Pupils are equal, round, and reactive to light.   Cardiovascular:      Rate and Rhythm: Normal rate and regular rhythm.      Heart sounds: No murmur heard.     Comments: Site of removal of the right chest wall HD catheter is clean  Pulmonary:      Effort: No respiratory distress.      Breath sounds: No wheezing, rhonchi or rales.   Abdominal:      General: Bowel  sounds are normal. There is no distension.      Palpations: Abdomen is soft.      Tenderness: There is no abdominal tenderness.      Comments: Leonard PCN tubes bloody output   Musculoskeletal:         General: No swelling or tenderness.      Cervical back: Neck supple. No rigidity or tenderness.      Comments: Still tenderness at the r hip    Lymphadenopathy:      Cervical: No cervical adenopathy.   Skin:     Findings: No lesion or rash.   Neurological:      General: No focal deficit present.      Mental Status: She is alert. Mental status is at baseline.      Cranial Nerves: No cranial nerve deficit.      Motor: No weakness.   Psychiatric:         Mood and Affect: Mood normal.         Behavior: Behavior normal.          Significant Labs: CBC:   Recent Labs   Lab 10/11/24  0457   WBC 18.83*   HGB 9.9*   HCT 30.7*        CMP:   Recent Labs   Lab 10/11/24  0457   *   K 4.5   CL 88*   CO2 21*   BUN 55.1*   CREATININE 5.32*   CALCIUM 8.5   ALBUMIN 2.0*       Microbiology Results (last 7 days)       Procedure Component Value Units Date/Time    Blood Culture [[id number]]  (Abnormal) Collected: 10/10/24 1605    Order Status: Completed Specimen: Blood Updated: 10/12/24 1104     Blood Culture Susceptibility To Follow      Staphylococcus aureus ssp aureus     GRAM STAIN 1 of 2 Anaerobic bottles positive      Gram Positive Cocci, probable Staphylococcus      Seen in gram stain of broth only    Blood Culture [[id number]]  (Abnormal) Collected: 10/10/24 1605    Order Status: Completed Specimen: Blood Updated: 10/12/24 1104     Blood Culture Susceptibility To Follow      Staphylococcus aureus ssp aureus     GRAM STAIN Seen in gram stain of broth only      2 of 2 bottles positive      Gram Positive Cocci, probable Staphylococcus    Blood Culture [[id number]]  (Abnormal) Collected: 10/09/24 1728    Order Status: Completed Specimen: Blood Updated: 10/12/24 1103     Blood Culture Susceptibility To Follow      Staphylococcus  aureus ssp aureus     GRAM STAIN Gram Positive Cocci, probable Staphylococcus      Seen in gram stain of broth only      1 of 2 Aerobic bottles positive    Blood Culture [[id number]]  (Abnormal)  (Susceptibility) Collected: 10/07/24 2103    Order Status: Completed Specimen: Blood Updated: 10/12/24 0658     Blood Culture Methicillin resistant Staphylococcus aureus     GRAM STAIN 1 of 1 Pediatric bottle positive      Gram Positive Cocci, probable Staphylococcus      Seen in gram stain of broth only    BCID2 Panel [[id number]] Collected: 10/10/24 1605    Order Status: Canceled Specimen: Blood Updated: 10/11/24 2011    Blood Culture [[id number]]  (Normal) Collected: 10/09/24 1728    Order Status: Completed Specimen: Blood Updated: 10/11/24 1900     Blood Culture No Growth At 48 Hours    BCID2 Panel [[id number]]  (Abnormal) Collected: 10/09/24 1728    Order Status: Completed Specimen: Blood Updated: 10/10/24 1716     CTX-M (ESBL ) N/A     IMP (Cabapenemase ) N/A     KPC resistance gene (Carbapenemase ) N/A     mcr-1 N/A     mecA ID N/A     Comment: Note: Antimicrobial resistance can occur via multiple mechanisms. A Not Detected result for antimicrobial resistance gene(s) does not indicate antimicrobial susceptibility. Subculturing is required for species identification and susceptibility testing of   isolates.        mecA/C and MREJ (MRSA) gene Detected     NDM (Carbapenemase ) N/A     OXA-48-like (Carbapenemase ) N/A     Royal/B (VRE gene) N/A     VIM (Carbapenemase ) N/A     Enterococcus faecalis Not Detected     Enterococcus faecium Not Detected     Listeria monocytogenes Not Detected     Staphylococcus spp. Detected     Staphylococcus aureus Detected     Staphylococcus epidermidis Not Detected     Staphylococcus lugdunensis Not Detected     Streptococcus spp. Not Detected     Streptococcus agalactiae (Group B) Not Detected     Streptococcus pneumoniae Not Detected      Streptococcus pyogenes (Group A) Not Detected     Acinetobacter calcoaceticus/baumannii complex Not Detected     Bacteroides fragilis Not Detected     Enterobacterales Not Detected     Enterobacter cloacae complex Not Detected     Escherichia coli Not Detected     Klebsiella aerogenes Not Detected     Klebsiella oxytoca Not Detected     Klebsiella pneumoniae group Not Detected     Proteus spp. Not Detected     Salmonella spp. Not Detected     Serratia marcescens Not Detected     Haemophilus influenzae Not Detected     Neisseria meningitidis Not Detected     Pseudomonas aeruginosa Not Detected     Stenotrophomonas maltophilia Not Detected     Candida albicans Not Detected     Candida auris Not Detected     Candida glabrata Not Detected     Candida krusei Not Detected     Candida parapsilosis Not Detected     Candida tropicalis Not Detected     Cryptococcus neoformans/gattii Not Detected    Narrative:      The [email or web address] BCID2 Panel is a multiplexed nucleic acid test intended for the use with [employer] or [employer] for the simultaneous qualitative detection and identification of multiple bacterial and yeast nucleic acids and select genetic determinants associated with antimicrobial resistance.  The [email or web address] BCID2 Panel test is performed directly on blood culture samples identified as positive by a continuous monitoring blood culture system.  Results are intended to be interpreted in conjunction with Gram stain results.    Blood Culture [[id number]]  (Abnormal)  (Susceptibility) Collected: 10/07/24 2103    Order Status: Completed Specimen: Blood Updated: 10/10/24 0650     Blood Culture Methicillin resistant Staphylococcus aureus     GRAM STAIN Gram Positive Cocci, probable Staphylococcus      Seen in gram stain of broth only      1 of 1 Pediatric bottle positive    Blood Culture [[id number]]  (Abnormal)  (Susceptibility) Collected: 10/05/24 0620    Order Status: Completed Specimen:  Blood Updated: 10/08/24 0810     Blood Culture Methicillin resistant Staphylococcus aureus     GRAM STAIN Gram Positive Cocci, probable Staphylococcus      Seen in gram stain of broth only      2 of 2 bottles positive    Blood Culture [[id number]]  (Abnormal)  (Susceptibility) Collected: 10/05/24 0620    Order Status: Completed Specimen: Blood Updated: 10/08/24 0809     Blood Culture Methicillin resistant Staphylococcus aureus     GRAM STAIN Gram Positive Cocci, probable Staphylococcus      2 of 2 bottles positive      Seen in gram stain of broth only    Blood culture #2 **CANNOT BE ORDERED STAT** [[id number]]  (Abnormal)  (Susceptibility) Collected: 10/03/24 2020    Order Status: Completed Specimen: Blood Updated: 10/06/24 0627     Blood Culture Methicillin resistant Staphylococcus aureus     GRAM STAIN Gram Positive Cocci, probable Staphylococcus      Seen in gram stain of broth only      1 of 1 Pediatric bottle positive    Blood culture #1 **CANNOT BE ORDERED STAT** [[id number]]  (Abnormal)  (Susceptibility) Collected: 10/03/24 2020    Order Status: Completed Specimen: Blood Updated: 10/06/24 0627     Blood Culture Methicillin resistant Staphylococcus aureus     GRAM STAIN Gram Positive Cocci, probable Staphylococcus      Seen in gram stain of broth only      2 of 2 bottles positive             Significant Imaging: I have reviewed all pertinent imaging results/findings within the past 24 hours.      Milo Campbell MD  Infectious Disease  [facility]

## 2024-10-12 NOTE — NURSING
10/12/24 1239   Post-Hemodialysis Assessment   Blood Volume Processed (Liters) 48.3 L   Dialyzer Clearance Lightly streaked   Duration of Treatment 120 minutes   Additional Fluid Intake (mL) 500 mL   Total UF (mL) 2000 mL   Net Fluid Removal 1500   Patient Response to Treatment Tolerated well   Post-Hemodialysis Comments Treatment ended early due to refusal to complete. Educated on importance of completing tx. Verbalized understanding, AAOx4. States too uncomfortable and hurting to finish. Refused to be repositioned. Only wants to be taken off. NET fluid removed = 1.5 liters. Left CVC functioned well. No issues during treatment. Vitals stable.

## 2024-10-13 LAB
ALBUMIN SERPL-MCNC: 2 G/DL (ref 3.4–4.8)
ALBUMIN/GLOB SERPL: 0.4 RATIO (ref 1.1–2)
ALP SERPL-CCNC: 151 UNIT/L (ref 40–150)
ALT SERPL-CCNC: 12 UNIT/L (ref 0–55)
ANION GAP SERPL CALC-SCNC: 18 MEQ/L
AST SERPL-CCNC: 12 UNIT/L (ref 5–34)
BACTERIA BLD CULT: ABNORMAL
BASOPHILS # BLD AUTO: 0.08 X10(3)/MCL
BASOPHILS NFR BLD AUTO: 0.4 %
BILIRUB SERPL-MCNC: 0.3 MG/DL
BUN SERPL-MCNC: 39.2 MG/DL (ref 9.8–20.1)
CALCIUM SERPL-MCNC: 8.5 MG/DL (ref 8.4–10.2)
CHLORIDE SERPL-SCNC: 92 MMOL/L (ref 98–107)
CO2 SERPL-SCNC: 17 MMOL/L (ref 23–31)
CREAT SERPL-MCNC: 4.1 MG/DL (ref 0.55–1.02)
CREAT/UREA NIT SERPL: 10
EOSINOPHIL # BLD AUTO: 0.27 X10(3)/MCL (ref 0–0.9)
EOSINOPHIL NFR BLD AUTO: 1.3 %
ERYTHROCYTE [DISTWIDTH] IN BLOOD BY AUTOMATED COUNT: 15.8 % (ref 11.5–17)
GFR SERPLBLD CREATININE-BSD FMLA CKD-EPI: 11 ML/MIN/1.73/M2
GLOBULIN SER-MCNC: 5.3 GM/DL (ref 2.4–3.5)
GLUCOSE SERPL-MCNC: 79 MG/DL (ref 82–115)
GRAM STN SPEC: ABNORMAL
HCT VFR BLD AUTO: 31.7 % (ref 37–47)
HGB BLD-MCNC: 10.1 G/DL (ref 12–16)
IMM GRANULOCYTES # BLD AUTO: 0.28 X10(3)/MCL (ref 0–0.04)
IMM GRANULOCYTES NFR BLD AUTO: 1.4 %
LYMPHOCYTES # BLD AUTO: 1.24 X10(3)/MCL (ref 0.6–4.6)
LYMPHOCYTES NFR BLD AUTO: 6 %
MAGNESIUM SERPL-MCNC: 1.9 MG/DL (ref 1.6–2.6)
MCH RBC QN AUTO: 29.3 PG (ref 27–31)
MCHC RBC AUTO-ENTMCNC: 31.9 G/DL (ref 33–36)
MCV RBC AUTO: 91.9 FL (ref 80–94)
MONOCYTES # BLD AUTO: 1.63 X10(3)/MCL (ref 0.1–1.3)
MONOCYTES NFR BLD AUTO: 7.9 %
NEUTROPHILS # BLD AUTO: 17.13 X10(3)/MCL (ref 2.1–9.2)
NEUTROPHILS NFR BLD AUTO: 83 %
NRBC BLD AUTO-RTO: 0 %
PHOSPHATE SERPL-MCNC: 4.9 MG/DL (ref 2.3–4.7)
PLATELET # BLD AUTO: 288 X10(3)/MCL (ref 130–400)
PMV BLD AUTO: 11.5 FL (ref 7.4–10.4)
POTASSIUM SERPL-SCNC: 4.6 MMOL/L (ref 3.5–5.1)
PROT SERPL-MCNC: 7.3 GM/DL (ref 5.8–7.6)
RBC # BLD AUTO: 3.45 X10(6)/MCL (ref 4.2–5.4)
SODIUM SERPL-SCNC: 127 MMOL/L (ref 136–145)
VANCOMYCIN SERPL-MCNC: 19.5 UG/ML (ref 15–20)
WBC # BLD AUTO: 20.63 X10(3)/MCL (ref 4.5–11.5)

## 2024-10-13 PROCEDURE — 21400001 HC TELEMETRY ROOM

## 2024-10-13 PROCEDURE — 84100 ASSAY OF PHOSPHORUS: CPT | Performed by: INTERNAL MEDICINE

## 2024-10-13 PROCEDURE — 80202 ASSAY OF VANCOMYCIN: CPT | Performed by: INTERNAL MEDICINE

## 2024-10-13 PROCEDURE — 25000003 PHARM REV CODE 250: Performed by: INTERNAL MEDICINE

## 2024-10-13 PROCEDURE — 63600175 PHARM REV CODE 636 W HCPCS: Performed by: INTERNAL MEDICINE

## 2024-10-13 PROCEDURE — 36415 COLL VENOUS BLD VENIPUNCTURE: CPT | Performed by: INTERNAL MEDICINE

## 2024-10-13 PROCEDURE — 83735 ASSAY OF MAGNESIUM: CPT | Performed by: INTERNAL MEDICINE

## 2024-10-13 PROCEDURE — C1752 CATH,HEMODIALYSIS,SHORT-TERM: HCPCS

## 2024-10-13 PROCEDURE — 80053 COMPREHEN METABOLIC PANEL: CPT | Performed by: INTERNAL MEDICINE

## 2024-10-13 PROCEDURE — 85025 COMPLETE CBC W/AUTO DIFF WBC: CPT | Performed by: INTERNAL MEDICINE

## 2024-10-13 PROCEDURE — 25000003 PHARM REV CODE 250: Performed by: GENERAL PRACTICE

## 2024-10-13 PROCEDURE — 25000003 PHARM REV CODE 250: Performed by: UROLOGY

## 2024-10-13 PROCEDURE — 63600175 PHARM REV CODE 636 W HCPCS: Mod: JZ,JG | Performed by: GENERAL PRACTICE

## 2024-10-13 PROCEDURE — 27000207 HC ISOLATION

## 2024-10-13 RX ORDER — SODIUM BICARBONATE 650 MG/1
650 TABLET ORAL 3 TIMES DAILY
Status: DISCONTINUED | OUTPATIENT
Start: 2024-10-13 | End: 2024-10-14

## 2024-10-13 RX ADMIN — SODIUM BICARBONATE 650 MG TABLET 650 MG: at 08:10

## 2024-10-13 RX ADMIN — SERTRALINE HYDROCHLORIDE 25 MG: 25 TABLET ORAL at 08:10

## 2024-10-13 RX ADMIN — SEVELAMER CARBONATE 800 MG: 800 TABLET, FILM COATED ORAL at 04:10

## 2024-10-13 RX ADMIN — SEVELAMER CARBONATE 800 MG: 800 TABLET, FILM COATED ORAL at 12:10

## 2024-10-13 RX ADMIN — ATORVASTATIN CALCIUM 20 MG: 10 TABLET, FILM COATED ORAL at 08:10

## 2024-10-13 RX ADMIN — FOLIC ACID 1 MG: 1 TABLET ORAL at 08:10

## 2024-10-13 RX ADMIN — HYDROCODONE BITARTRATE AND ACETAMINOPHEN 1 TABLET: 10; 325 TABLET ORAL at 10:10

## 2024-10-13 RX ADMIN — SODIUM BICARBONATE 650 MG TABLET 650 MG: at 02:10

## 2024-10-13 RX ADMIN — SODIUM BICARBONATE 650 MG TABLET 650 MG: at 10:10

## 2024-10-13 RX ADMIN — HYDROCODONE BITARTRATE AND ACETAMINOPHEN 1 TABLET: 10; 325 TABLET ORAL at 01:10

## 2024-10-13 RX ADMIN — HYDROCODONE BITARTRATE AND ACETAMINOPHEN 1 TABLET: 5; 325 TABLET ORAL at 06:10

## 2024-10-13 RX ADMIN — HYDROCODONE BITARTRATE AND ACETAMINOPHEN 1 TABLET: 5; 325 TABLET ORAL at 02:10

## 2024-10-13 RX ADMIN — SEVELAMER CARBONATE 800 MG: 800 TABLET, FILM COATED ORAL at 08:10

## 2024-10-13 RX ADMIN — VANCOMYCIN HYDROCHLORIDE 500 MG: 500 INJECTION, POWDER, LYOPHILIZED, FOR SOLUTION INTRAVENOUS at 02:10

## 2024-10-13 RX ADMIN — CEFTAROLINE FOSAMIL 200 MG: 600 POWDER, FOR SOLUTION INTRAVENOUS at 04:10

## 2024-10-13 RX ADMIN — PANTOPRAZOLE SODIUM 40 MG: 40 TABLET, DELAYED RELEASE ORAL at 08:10

## 2024-10-13 RX ADMIN — ACETAMINOPHEN 650 MG: 325 TABLET ORAL at 04:10

## 2024-10-13 RX ADMIN — ENOXAPARIN SODIUM 30 MG: 30 INJECTION SUBCUTANEOUS at 04:10

## 2024-10-13 RX ADMIN — HYDROCODONE BITARTRATE AND ACETAMINOPHEN 1 TABLET: 10; 325 TABLET ORAL at 05:10

## 2024-10-13 RX ADMIN — CEFTAROLINE FOSAMIL 200 MG: 600 POWDER, FOR SOLUTION INTRAVENOUS at 08:10

## 2024-10-13 RX ADMIN — ACETAMINOPHEN 650 MG: 325 TABLET ORAL at 12:10

## 2024-10-13 NOTE — PROGRESS NOTES
[facility] Medicine Progress Note        Chief Complaint: Inpatient Follow-up for     HPI: 69-year-old female with past medical history of hypertension, hyperlipidemia, chronic hydronephrosis with left nephrostomy tube, right ureteral stent, end-stage renal disease on hemodialysis, chronic anemia presented with right flank pain for the past 5 days and also reported cloudy urine output from the urostomy CT with IV contrast showed moderate to severe hydronephrosis with enlarged right pelvic lymph node increase in size from the prior with suspected malignancy also showed duplicated collecting system with dilation of the right lower pole urology was consulted patient is status post cystoscopy with right stent exchange and bladder biopsy and they were not able to identify the left ureteral orifice therefore had left percutaneous nephrostomy. Blood culture is growing Gram-positive cocci 2/2 bottles initially patient was started on cefepime but now we added vancomycin repeat blood cultures ordered  Id was consulted MRI of the CT and L-spine was ordered that was negative for any abscess blood culture is growing MRSA TTE as such did not show any vegetation cardiology consulted for MADELYN  Bilateral hydronephrosis Status post right upper lobe ureteral stent exchange and right lower pole nephrostomy tube placement  Cultures remains persistently positive.  Patient was slightly confused on October 7 so we had ordered CT of the head without contrast that was negative, ammonia, TSH everything was normal we also ordered CT of the abdomen and pelvis with contrast that was negative for any abscess cardiology consulted for MADELYN.  The was negative for endocarditis.  Dialysis catheter was removed    Interval Hx:   WBC 20.6 today.  Bicarb 17, start sodium bicarbonate replacement.  Sodium 127, creatinine 4.1.  Reports no difference, the left nephrostomy tube catheter was pulled a little bit, will check  with ultrasound to make sure it is still within the collecting system.      Objective/physical exam:  General: In no acute distress, afebrile  Chest: Clear to auscultation bilaterally  Heart: RRR, +S1, S2, no appreciable murmur  Abdomen: Soft, nontender, BS +, bilateral nephrostomy tubes with bloody output  MSK: Warm, 1+ pitting bilateral lower extremity edema, no clubbing or cyanosis  Neurologic:  Alert awake oriented x4    VITAL SIGNS: 24 HRS MIN & MAX LAST   Temp  Min: 97.8 °F (36.6 °C)  Max: 98.6 °F (37 °C) 97.8 °F (36.6 °C)   BP  Min: 106/54  Max: 132/82 126/67   Pulse  Min: 64  Max: 76  64   Resp  Min: 16  Max: 20 16   SpO2  Min: 95 %  Max: 99 % 96 %     I have reviewed the following labs:  Recent Labs   Lab 10/09/24  0650 10/11/24  0457 10/13/24  0526   WBC 21.12* 18.83* 20.63*   RBC 3.78* 3.38* 3.45*   HGB 11.1* 9.9* 10.1*   HCT 33.2* 30.7* 31.7*   MCV 87.8 90.8 91.9   MCH 29.4 29.3 29.3   MCHC 33.4 32.2* 31.9*   RDW 15.8 15.7 15.8    209 288   MPV 10.2 11.4* 11.5*     Recent Labs   Lab 10/07/24  0541 10/07/24  1125 10/08/24  0458 10/10/24  0738 10/11/24  0457 10/13/24  0526   *  --    < > 132* 127* 127*   K 4.7  --    < > 4.5 4.5 4.6   CL 93*  --    < > 90* 88* 92*   CO2 17*  --    < > 25 21* 17*   BUN 74.7*  --    < > 44.0* 55.1* 39.2*   CREATININE 6.80*  --    < > 4.57* 5.32* 4.10*   CALCIUM 7.5*  --    < > 8.1* 8.5 8.5   PH  --  7.410  --   --   --   --    MG  --   --   --   --  2.10 1.90   ALBUMIN 2.3*  --   --   --  2.0* 2.0*   ALKPHOS 209*  --   --   --   --  151*   ALT 71*  --   --   --   --  12   AST 43*  --   --   --   --  12   BILITOT 0.4  --   --   --   --  0.3    < > = values in this interval not displayed.     Microbiology Results (last 7 days)       Procedure Component Value Units Date/Time    Blood Culture [[id number]]  (Abnormal)  (Susceptibility) Collected: 10/10/24 1602    Order Status: Completed Specimen: Blood Updated: 10/13/24 7904     Blood Culture Methicillin resistant  Staphylococcus aureus     GRAM STAIN Seen in gram stain of broth only      2 of 2 bottles positive      Gram Positive Cocci, probable Staphylococcus    Blood Culture [[id number]]  (Abnormal)  (Susceptibility) Collected: 10/10/24 1605    Order Status: Completed Specimen: Blood Updated: 10/13/24 0627     Blood Culture Methicillin resistant Staphylococcus aureus     GRAM STAIN 1 of 2 Anaerobic bottles positive      Gram Positive Cocci, probable Staphylococcus      Seen in gram stain of broth only    Blood Culture [[id number]]  (Abnormal)  (Susceptibility) Collected: 10/09/24 1728    Order Status: Completed Specimen: Blood Updated: 10/13/24 0625     Blood Culture Methicillin resistant Staphylococcus aureus     GRAM STAIN Gram Positive Cocci, probable Staphylococcus      Seen in gram stain of broth only      1 of 2 Aerobic bottles positive    Blood Culture [[id number]]  (Normal) Collected: 10/09/24 1728    Order Status: Completed Specimen: Blood Updated: 10/12/24 1900     Blood Culture No Growth At 72 Hours    Blood Culture [[id number]]  (Abnormal)  (Susceptibility) Collected: 10/07/24 2103    Order Status: Completed Specimen: Blood Updated: 10/12/24 0658     Blood Culture Methicillin resistant Staphylococcus aureus     GRAM STAIN 1 of 1 Pediatric bottle positive      Gram Positive Cocci, probable Staphylococcus      Seen in gram stain of broth only    BCID2 Panel [[id number]] Collected: 10/10/24 1605    Order Status: Canceled Specimen: Blood Updated: 10/11/24 2011    BCID2 Panel [[id number]]  (Abnormal) Collected: 10/09/24 1728    Order Status: Completed Specimen: Blood Updated: 10/10/24 1716     CTX-M (ESBL ) N/A     IMP (Cabapenemase ) N/A     KPC resistance gene (Carbapenemase ) N/A     mcr-1 N/A     mecA ID N/A     Comment: Note: Antimicrobial resistance can occur via multiple mechanisms. A Not Detected result for antimicrobial resistance gene(s) does not indicate antimicrobial  susceptibility. Subculturing is required for species identification and susceptibility testing of   isolates.        mecA/C and MREJ (MRSA) gene Detected     NDM (Carbapenemase ) N/A     OXA-48-like (Carbapenemase ) N/A     Royal/B (VRE gene) N/A     VIM (Carbapenemase ) N/A     Enterococcus faecalis Not Detected     Enterococcus faecium Not Detected     Listeria monocytogenes Not Detected     Staphylococcus spp. Detected     Staphylococcus aureus Detected     Staphylococcus epidermidis Not Detected     Staphylococcus lugdunensis Not Detected     Streptococcus spp. Not Detected     Streptococcus agalactiae (Group B) Not Detected     Streptococcus pneumoniae Not Detected     Streptococcus pyogenes (Group A) Not Detected     Acinetobacter calcoaceticus/baumannii complex Not Detected     Bacteroides fragilis Not Detected     Enterobacterales Not Detected     Enterobacter cloacae complex Not Detected     Escherichia coli Not Detected     Klebsiella aerogenes Not Detected     Klebsiella oxytoca Not Detected     Klebsiella pneumoniae group Not Detected     Proteus spp. Not Detected     Salmonella spp. Not Detected     Serratia marcescens Not Detected     Haemophilus influenzae Not Detected     Neisseria meningitidis Not Detected     Pseudomonas aeruginosa Not Detected     Stenotrophomonas maltophilia Not Detected     Candida albicans Not Detected     Candida auris Not Detected     Candida glabrata Not Detected     Candida krusei Not Detected     Candida parapsilosis Not Detected     Candida tropicalis Not Detected     Cryptococcus neoformans/gattii Not Detected    Narrative:      The [employer] BCID2 Panel is a multiplexed nucleic acid test intended for the use with [employer]® 2.0 or [employer]® [employer] for the simultaneous qualitative detection and identification of multiple bacterial and yeast nucleic acids and select genetic determinants associated with antimicrobial  resistance.  The [employer] BCID2 Panel test is performed directly on blood culture samples identified as positive by a continuous monitoring blood culture system.  Results are intended to be interpreted in conjunction with Gram stain results.    Blood Culture [[id number]]  (Abnormal)  (Susceptibility) Collected: 10/07/24 2103    Order Status: Completed Specimen: Blood Updated: 10/10/24 0650     Blood Culture Methicillin resistant Staphylococcus aureus     GRAM STAIN Gram Positive Cocci, probable Staphylococcus      Seen in gram stain of broth only      1 of 1 Pediatric bottle positive    Blood Culture [[id number]]  (Abnormal)  (Susceptibility) Collected: 10/05/24 0620    Order Status: Completed Specimen: Blood Updated: 10/08/24 0810     Blood Culture Methicillin resistant Staphylococcus aureus     GRAM STAIN Gram Positive Cocci, probable Staphylococcus      Seen in gram stain of broth only      2 of 2 bottles positive    Blood Culture [[id number]]  (Abnormal)  (Susceptibility) Collected: 10/05/24 0620    Order Status: Completed Specimen: Blood Updated: 10/08/24 0809     Blood Culture Methicillin resistant Staphylococcus aureus     GRAM STAIN Gram Positive Cocci, probable Staphylococcus      2 of 2 bottles positive      Seen in gram stain of broth only             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    Infectious Disease following.  Now on vancomycin and Teflaro.  Ordered a tagged WBC scan to see where this leukocytosis and bacteremia is coming from.  Repeat blood cultures tomorrow.  Urology signed off.  Temporary dialysis catheter placement, hold off on tunneled until blood cultures are cleared.  Obtain tagged WBC scan to see if we can localize source of infection  Check ultrasound of the left kidney to make sure that the catheter is still within the collecting system.    Prophylaxis: Lovenox  VTE prophylaxis:     Patient condition:  Stable/Fair/Guarded/ Serious/ Critical    Anticipated discharge and Disposition:         All diagnosis and differential diagnosis have been reviewed; assessment and plan has been documented; I have personally reviewed the labs and test results that are presently available; I have reviewed the patients medication list; I have reviewed the consulting providers response and recommendations. I have reviewed or attempted to review medical records based upon their availability    All of the patient's questions have been  addressed and answered. Patient's is agreeable to the above stated plan. I will continue to monitor closely and make adjustments to medical management as needed.    Portions of this note dictated using EMR integrated voice recognition software, and may be subject to voice recognition errors not corrected at proofreading. Please contact writer for clarification if needed.   _____________________________________________________________________    Malnutrition Status:    Scheduled Med:   atorvastatin  20 mg Oral QHS    ceftaroline (Teflaro) IV (PEDS and ADULTS)  200 mg Intravenous Q8H    enoxaparin  30 mg Subcutaneous Daily    folic acid  1 mg Oral Daily    ondansetron  4 mg Intravenous Once    pantoprazole  40 mg Oral Daily    sertraline  25 mg Oral Daily    sevelamer carbonate  800 mg Oral TID WM      Continuous  Infusions:       PRN Meds:    Current Facility-Administered Medications:     acetaminophen, 650 mg, Oral, Q4H PRN    albuterol-ipratropium, 3 mL, Nebulization, Once PRN    aluminum-magnesium hydroxide-simethicone, 30 mL, Oral, QID PRN    diphenhydrAMINE, 25 mg, Intravenous, Q6H PRN    heparin (porcine), 3,000 Units, Intravenous, PRN    HYDROcodone-acetaminophen, 1 tablet, Oral, Q4H PRN    HYDROcodone-acetaminophen, 1 tablet, Oral, Q6H PRN    melatonin, 6 mg, Oral, Nightly PRN    metoclopramide, 10 mg, Intravenous, Q10 Min PRN    ondansetron, 4 mg, Intravenous, Q4H PRN    polyethylene glycol, 17 g, Oral, BID PRN    prochlorperazine, 5 mg, Intravenous, Q6H PRN    senna-docusate 8.6-50 mg, 2 tablet, Oral, BID PRN    sodium chloride 0.9%, 10 mL, Intravenous, PRN    vancomycin - pharmacy to dose, , Intravenous, pharmacy to manage frequency     Radiology:  I have personally reviewed the following imaging and agree with the radiologist.     X-Ray Chest 1 View for Line/Tube Placement  Narrative: EXAMINATION:  XR CHEST 1 VIEW FOR LINE/TUBE PLACEMENT    CLINICAL HISTORY:  Temp cath placement;Sepsis, unspecified organism    TECHNIQUE:  One view    COMPARISON:  June 22, 2024.    FINDINGS:  Left IJ approach central venous catheter terminates within the right atrium.  There is no pneumothorax.  Cardiopericardial silhouette appearance is similar.  Lungs are remarkable for mild to moderate congestive ground-glass opacities.  No focally dense consolidation or significant fluid within the pleural spaces  Impression: New central line terminates within right atrium.    Electronically signed by: Alvaro Watson  Date:    10/11/2024  Time:    11:53      Uriel Fletcher MD  Department of Hospital Medicine   [facility]   10/13/2024

## 2024-10-13 NOTE — PROGRESS NOTES
Pharmacokinetic Assessment Follow Up: IV Vancomycin    Vancomycin serum concentration assessment(s):    The random level was drawn correctly and can be used to guide therapy at this time. The measurement is within the desired definitive target range of 15 to 20 mcg/mL.    Vancomycin Regimen Plan:  Give 500mg x 1  Re-dose when the random level is less than 20 mcg/mL, next level to be drawn at 0430 on 10/15    Drug levels (last 3 results):  Recent Labs   Lab Result Units 10/12/24  0515 10/13/24  0526   Vancomycin Random ug/ml 15.5 19.5       Pharmacy will continue to follow and monitor vancomycin.    Please contact pharmacy at extension [phone] for questions regarding this assessment.    Thank you for the consult,   Deanna Julian       Patient brief summary:  Fior Joya is a 69 y.o. female initiated on antimicrobial therapy with IV Vancomycin for treatment of bacteremia    The patient's current regimen is pulse dose    Drug Allergies:   Review of patient's allergies indicates:   Allergen Reactions    Asa [aspirin] Anaphylaxis    Penicillins      Received ceftriaxone from 6/27, no reactions        Actual Body Weight:   140.2 kg    Renal Function:   Estimated Creatinine Clearance: 18.1 mL/min (A) (based on SCr of 4.1 mg/dL (H)).,     Dialysis Method (if applicable):  intermittent HD TTS    CBC (last 72 hours):  Recent Labs   Lab Result Units 10/11/24  0457 10/13/24  0526   WBC x10(3)/mcL 18.83* 20.63*   Hgb g/dL 9.9* 10.1*   Hct % 30.7* 31.7*   Platelet x10(3)/mcL 209 288   Mono % % 6.5 7.9   Eos % % 2.0 1.3   Basophil % % 0.4 0.4       Metabolic Panel (last 72 hours):  Recent Labs   Lab Result Units 10/11/24  0457 10/13/24  0526   Sodium mmol/L 127* 127*   Potassium mmol/L 4.5 4.6   Chloride mmol/L 88* 92*   CO2 mmol/L 21* 17*   Glucose mg/dL 86 79*   Blood Urea Nitrogen mg/dL 55.1* 39.2*   Creatinine mg/dL 5.32* 4.10*   Albumin g/dL 2.0* 2.0*   Bilirubin Total mg/dL  --  0.3   ALP unit/L  --  151*   AST unit/L  --  12    ALT unit/L  --  12   Magnesium Level mg/dL 2.10 1.90   Phosphorus Level mg/dL 5.7* 4.9*       Vancomycin Administrations:  vancomycin given in the last 96 hours                     vancomycin (VANCOCIN) 500 mg in D5W 100 mL IVPB (MB+) (mg) 500 mg New Bag 10/12/24 2014    vancomycin 750 mg in D5W 250 mL IVPB (admixture device) (mg) 750 mg New Bag 10/10/24 2041                    Microbiologic Results:  Microbiology Results (last 7 days)       Procedure Component Value Units Date/Time    Blood Culture [[id number]]  (Abnormal)  (Susceptibility) Collected: 10/10/24 1605    Order Status: Completed Specimen: Blood Updated: 10/13/24 0628     Blood Culture Methicillin resistant Staphylococcus aureus     GRAM STAIN Seen in gram stain of broth only      2 of 2 bottles positive      Gram Positive Cocci, probable Staphylococcus    Blood Culture [[id number]]  (Abnormal)  (Susceptibility) Collected: 10/10/24 1605    Order Status: Completed Specimen: Blood Updated: 10/13/24 0627     Blood Culture Methicillin resistant Staphylococcus aureus     GRAM STAIN 1 of 2 Anaerobic bottles positive      Gram Positive Cocci, probable Staphylococcus      Seen in gram stain of broth only    Blood Culture [[id number]]  (Abnormal)  (Susceptibility) Collected: 10/09/24 1728    Order Status: Completed Specimen: Blood Updated: 10/13/24 0625     Blood Culture Methicillin resistant Staphylococcus aureus     GRAM STAIN Gram Positive Cocci, probable Staphylococcus      Seen in gram stain of broth only      1 of 2 Aerobic bottles positive    Blood Culture [[id number]]  (Normal) Collected: 10/09/24 1728    Order Status: Completed Specimen: Blood Updated: 10/12/24 1900     Blood Culture No Growth At 72 Hours    Blood Culture [[id number]]  (Abnormal)  (Susceptibility) Collected: 10/07/24 2103    Order Status: Completed Specimen: Blood Updated: 10/12/24 0658     Blood Culture Methicillin resistant Staphylococcus aureus     GRAM STAIN 1 of 1 Pediatric  bottle positive      Gram Positive Cocci, probable Staphylococcus      Seen in gram stain of broth only    Ozy MediaD2 Panel [[id number]] Collected: 10/10/24 1605    Order Status: Canceled Specimen: Blood Updated: 10/11/24 2011    BCID2 Panel [[id number]]  (Abnormal) Collected: 10/09/24 1728    Order Status: Completed Specimen: Blood Updated: 10/10/24 1716     CTX-M (ESBL ) N/A     IMP (Cabapenemase ) N/A     KPC resistance gene (Carbapenemase ) N/A     mcr-1 N/A     mecA ID N/A     Comment: Note: Antimicrobial resistance can occur via multiple mechanisms. A Not Detected result for antimicrobial resistance gene(s) does not indicate antimicrobial susceptibility. Subculturing is required for species identification and susceptibility testing of   isolates.        mecA/C and MREJ (MRSA) gene Detected     NDM (Carbapenemase ) N/A     OXA-48-like (Carbapenemase ) N/A     Royal/B (VRE gene) N/A     VIM (Carbapenemase ) N/A     Enterococcus faecalis Not Detected     Enterococcus faecium Not Detected     Listeria monocytogenes Not Detected     Staphylococcus spp. Detected     Staphylococcus aureus Detected     Staphylococcus epidermidis Not Detected     Staphylococcus lugdunensis Not Detected     Streptococcus spp. Not Detected     Streptococcus agalactiae (Group B) Not Detected     Streptococcus pneumoniae Not Detected     Streptococcus pyogenes (Group A) Not Detected     Acinetobacter calcoaceticus/baumannii complex Not Detected     Bacteroides fragilis Not Detected     Enterobacterales Not Detected     Enterobacter cloacae complex Not Detected     Escherichia coli Not Detected     Klebsiella aerogenes Not Detected     Klebsiella oxytoca Not Detected     Klebsiella pneumoniae group Not Detected     Proteus spp. Not Detected     Salmonella spp. Not Detected     Serratia marcescens Not Detected     Haemophilus influenzae Not Detected     Neisseria meningitidis Not Detected      Pseudomonas aeruginosa Not Detected     Stenotrophomonas maltophilia Not Detected     Candida albicans Not Detected     Candida auris Not Detected     Candida glabrata Not Detected     Candida krusei Not Detected     Candida parapsilosis Not Detected     Candida tropicalis Not Detected     Cryptococcus neoformans/gattii Not Detected    Narrative:      The [employer] BCID2 Panel is a multiplexed nucleic acid test intended for the use with [employer]® 2.0 or [employer]® [email or web address] Systems for the simultaneous qualitative detection and identification of multiple bacterial and yeast nucleic acids and select genetic determinants associated with antimicrobial resistance.  The [employer] BCID2 Panel test is performed directly on blood culture samples identified as positive by a continuous monitoring blood culture system.  Results are intended to be interpreted in conjunction with Gram stain results.    Blood Culture [[id number]]  (Abnormal)  (Susceptibility) Collected: 10/07/24 2103    Order Status: Completed Specimen: Blood Updated: 10/10/24 0650     Blood Culture Methicillin resistant Staphylococcus aureus     GRAM STAIN Gram Positive Cocci, probable Staphylococcus      Seen in gram stain of broth only      1 of 1 Pediatric bottle positive    Blood Culture [[id number]]  (Abnormal)  (Susceptibility) Collected: 10/05/24 0620    Order Status: Completed Specimen: Blood Updated: 10/08/24 0810     Blood Culture Methicillin resistant Staphylococcus aureus     GRAM STAIN Gram Positive Cocci, probable Staphylococcus      Seen in gram stain of broth only      2 of 2 bottles positive    Blood Culture [[id number]]  (Abnormal)  (Susceptibility) Collected: 10/05/24 0620    Order Status: Completed Specimen: Blood Updated: 10/08/24 0809     Blood Culture Methicillin resistant Staphylococcus aureus     GRAM STAIN Gram Positive Cocci, probable Staphylococcus      2 of 2 bottles positive      Seen in gram stain of broth only

## 2024-10-14 DIAGNOSIS — C67.9 MALIGNANT NEOPLASM OF URINARY BLADDER, UNSPECIFIED SITE: Primary | ICD-10-CM

## 2024-10-14 LAB
ALBUMIN SERPL-MCNC: 2.2 G/DL (ref 3.4–4.8)
BACTERIA BLD CULT: NORMAL
BASOPHILS # BLD AUTO: 0.07 X10(3)/MCL
BASOPHILS NFR BLD AUTO: 0.4 %
BUN SERPL-MCNC: 51.2 MG/DL (ref 9.8–20.1)
CALCIUM SERPL-MCNC: 9 MG/DL (ref 8.4–10.2)
CHLORIDE SERPL-SCNC: 89 MMOL/L (ref 98–107)
CO2 SERPL-SCNC: 16 MMOL/L (ref 23–31)
CREAT SERPL-MCNC: 5.28 MG/DL (ref 0.55–1.02)
EOSINOPHIL # BLD AUTO: 0.25 X10(3)/MCL (ref 0–0.9)
EOSINOPHIL NFR BLD AUTO: 1.3 %
ERYTHROCYTE [DISTWIDTH] IN BLOOD BY AUTOMATED COUNT: 15.4 % (ref 11.5–17)
GFR SERPLBLD CREATININE-BSD FMLA CKD-EPI: 8 ML/MIN/1.73/M2
GLUCOSE SERPL-MCNC: 66 MG/DL (ref 82–115)
HCT VFR BLD AUTO: 34.5 % (ref 37–47)
HGB BLD-MCNC: 11 G/DL (ref 12–16)
IMM GRANULOCYTES # BLD AUTO: 0.3 X10(3)/MCL (ref 0–0.04)
IMM GRANULOCYTES NFR BLD AUTO: 1.6 %
LYMPHOCYTES # BLD AUTO: 1.09 X10(3)/MCL (ref 0.6–4.6)
LYMPHOCYTES NFR BLD AUTO: 5.7 %
MCH RBC QN AUTO: 29.3 PG (ref 27–31)
MCHC RBC AUTO-ENTMCNC: 31.9 G/DL (ref 33–36)
MCV RBC AUTO: 92 FL (ref 80–94)
MONOCYTES # BLD AUTO: 1.38 X10(3)/MCL (ref 0.1–1.3)
MONOCYTES NFR BLD AUTO: 7.2 %
NEUTROPHILS # BLD AUTO: 16.03 X10(3)/MCL (ref 2.1–9.2)
NEUTROPHILS NFR BLD AUTO: 83.8 %
NRBC BLD AUTO-RTO: 0 %
PHOSPHATE SERPL-MCNC: 6 MG/DL (ref 2.3–4.7)
PLATELET # BLD AUTO: 326 X10(3)/MCL (ref 130–400)
PMV BLD AUTO: 10.9 FL (ref 7.4–10.4)
POTASSIUM SERPL-SCNC: 5.3 MMOL/L (ref 3.5–5.1)
RBC # BLD AUTO: 3.75 X10(6)/MCL (ref 4.2–5.4)
SODIUM SERPL-SCNC: 125 MMOL/L (ref 136–145)
WBC # BLD AUTO: 19.12 X10(3)/MCL (ref 4.5–11.5)

## 2024-10-14 PROCEDURE — 25000003 PHARM REV CODE 250: Performed by: INTERNAL MEDICINE

## 2024-10-14 PROCEDURE — 25000003 PHARM REV CODE 250: Performed by: GENERAL PRACTICE

## 2024-10-14 PROCEDURE — 36415 COLL VENOUS BLD VENIPUNCTURE: CPT | Performed by: STUDENT IN AN ORGANIZED HEALTH CARE EDUCATION/TRAINING PROGRAM

## 2024-10-14 PROCEDURE — 25000003 PHARM REV CODE 250: Performed by: UROLOGY

## 2024-10-14 PROCEDURE — 63600175 PHARM REV CODE 636 W HCPCS: Mod: JZ,JG | Performed by: GENERAL PRACTICE

## 2024-10-14 PROCEDURE — 27000207 HC ISOLATION

## 2024-10-14 PROCEDURE — 51702 INSERT TEMP BLADDER CATH: CPT

## 2024-10-14 PROCEDURE — A9569 TECHNETIUM TC-99M AUTO WBC: HCPCS | Performed by: INTERNAL MEDICINE

## 2024-10-14 PROCEDURE — 21400001 HC TELEMETRY ROOM

## 2024-10-14 PROCEDURE — 99233 SBSQ HOSP IP/OBS HIGH 50: CPT | Mod: ,,, | Performed by: HOSPITALIST

## 2024-10-14 PROCEDURE — 80069 RENAL FUNCTION PANEL: CPT | Performed by: STUDENT IN AN ORGANIZED HEALTH CARE EDUCATION/TRAINING PROGRAM

## 2024-10-14 PROCEDURE — 85025 COMPLETE CBC W/AUTO DIFF WBC: CPT | Performed by: STUDENT IN AN ORGANIZED HEALTH CARE EDUCATION/TRAINING PROGRAM

## 2024-10-14 PROCEDURE — 87040 BLOOD CULTURE FOR BACTERIA: CPT | Performed by: INTERNAL MEDICINE

## 2024-10-14 PROCEDURE — 63600175 PHARM REV CODE 636 W HCPCS: Performed by: INTERNAL MEDICINE

## 2024-10-14 RX ORDER — SODIUM BICARBONATE 650 MG/1
1300 TABLET ORAL 3 TIMES DAILY
Status: DISCONTINUED | OUTPATIENT
Start: 2024-10-14 | End: 2024-10-27

## 2024-10-14 RX ADMIN — CEFTAROLINE FOSAMIL 200 MG: 600 POWDER, FOR SOLUTION INTRAVENOUS at 12:10

## 2024-10-14 RX ADMIN — HYDROCODONE BITARTRATE AND ACETAMINOPHEN 1 TABLET: 5; 325 TABLET ORAL at 10:10

## 2024-10-14 RX ADMIN — CEFTAROLINE FOSAMIL 200 MG: 600 POWDER, FOR SOLUTION INTRAVENOUS at 08:10

## 2024-10-14 RX ADMIN — HYDROCODONE BITARTRATE AND ACETAMINOPHEN 1 TABLET: 10; 325 TABLET ORAL at 06:10

## 2024-10-14 RX ADMIN — SODIUM BICARBONATE 650 MG TABLET 1300 MG: at 02:10

## 2024-10-14 RX ADMIN — PANTOPRAZOLE SODIUM 40 MG: 40 TABLET, DELAYED RELEASE ORAL at 08:10

## 2024-10-14 RX ADMIN — ENOXAPARIN SODIUM 30 MG: 30 INJECTION SUBCUTANEOUS at 04:10

## 2024-10-14 RX ADMIN — ACETAMINOPHEN 650 MG: 325 TABLET ORAL at 08:10

## 2024-10-14 RX ADMIN — CEFTAROLINE FOSAMIL 200 MG: 600 POWDER, FOR SOLUTION INTRAVENOUS at 03:10

## 2024-10-14 RX ADMIN — SEVELAMER CARBONATE 800 MG: 800 TABLET, FILM COATED ORAL at 04:10

## 2024-10-14 RX ADMIN — FOLIC ACID 1 MG: 1 TABLET ORAL at 08:10

## 2024-10-14 RX ADMIN — TECHNETIUM TC-99M EXAMETAZIME 25.3 MILLICURIE: 0.5 INJECTION, POWDER, LYOPHILIZED, FOR SOLUTION INTRAVENOUS at 10:10

## 2024-10-14 RX ADMIN — HYDROCODONE BITARTRATE AND ACETAMINOPHEN 1 TABLET: 10; 325 TABLET ORAL at 10:10

## 2024-10-14 RX ADMIN — SEVELAMER CARBONATE 800 MG: 800 TABLET, FILM COATED ORAL at 08:10

## 2024-10-14 RX ADMIN — SODIUM BICARBONATE 650 MG TABLET 1300 MG: at 08:10

## 2024-10-14 RX ADMIN — SODIUM BICARBONATE 650 MG TABLET 650 MG: at 08:10

## 2024-10-14 RX ADMIN — HYDROCODONE BITARTRATE AND ACETAMINOPHEN 1 TABLET: 10; 325 TABLET ORAL at 02:10

## 2024-10-14 RX ADMIN — ATORVASTATIN CALCIUM 20 MG: 10 TABLET, FILM COATED ORAL at 08:10

## 2024-10-14 RX ADMIN — SEVELAMER CARBONATE 800 MG: 800 TABLET, FILM COATED ORAL at 11:10

## 2024-10-14 RX ADMIN — SERTRALINE HYDROCHLORIDE 25 MG: 25 TABLET ORAL at 08:10

## 2024-10-14 NOTE — PROGRESS NOTES
Nephrology  Note    Patient Name: Fior Joya  Age: 69 y.o.  : 1955  MRN: [id number]  Admission Date: 10/3/2024        Fior Joya is a 69 y.o. female who presents with right flank pain.  Past medical history significant for ESRD on hemodialysis TTS at [facility] via right IJ PermCath, chronic hydronephrosis with left nephrostomy tube in place, and stents to right ureter, hypertension, anemia, hyperphosphatemia, and hyperlipidemia.  Patient presents with worsening right flank pain.  She states the pain started in her right neck, in his now in her right flank/hip area.  Patient was started on hemodialysis in 2024 after being found to have significant acute kidney injury and hydronephrosis.  She has been maintained on hemodialysis at [facility] on a TTS schedule.  Prior to hospitalization her last dialysis was on 10/01/2024.  Nephrology consulted for ESRD management.  In the emergency department she was noted to have low blood pressure and has been given IV fluids.  Urology has also been consulted for concern hydronephrosis needing stent exchange.  She has also been started on antibiotics for possible UTI.  She has had nausea, and vomiting.  No chest pain, shortness of breath, or lower extremity edema.    10/07/2024  Chart reviewed.  Weekend events noted.  Patient has been NPO at least since her admission to the hospital and also has no IV fluids.  Lying down in the bed.  Having jerky movements all over the body and uncomfortable.  Now she has nephrostomy tube on both sides.  Both draining.  Serosanguineous fluid noted.  Patient is very slow to answer questions but did recognize me by name and she knows her own name.     10/08/2024  No acute events overnight.  Seen on hemodialysis.  She was endorsing shortness of breath, and has some lower extremity edema.    10/09/2024   Mentally she has cleared up completely.  Complains of pains all over the body for which she is on p.r.n. pain medications.  Also  complains of being nervous and on some anxiety medication.  No shortness of breath.  Tunneled dialysis catheter was removed yesterday.    10/10/2024  No acute events overnight.  No chest pain, shortness of breath, abdominal pain, nausea, vomiting, or lower extremity edema.  Found to have bladder cancer by Urology.    10/11/2024  No acute events overnight.  No new complaints.  Blood cultures remain positive.  No chest pain, shortness of breath, nausea, vomiting.    10/12/2024  Patient had Vas-Cath placed yesterday, and she was re-initiated on hemodialysis.  Dialyzed yesterday with 1.5 L UF.  No new complaints today.  No chest pain, shortness of breath, nausea, vomiting.  She does have mild lower extremity edema.    10/14/2024   Complains of pain all over the body.  No fever or chills.  Does not like her diet.  No nausea vomiting.  No shortness of breath.  No chest pains.  No cough cold congestion.    Current Facility-Administered Medications   Medication Dose Route Frequency Provider Last Rate Last Admin    acetaminophen tablet 650 mg  650 mg Oral Q4H PRN Tawana Loaiza MD   650 mg at 10/14/24 0814    albuterol-ipratropium 2.5 mg-0.5 mg/3 mL nebulizer solution 3 mL  3 mL Nebulization Once PRN Mark Ying DO        aluminum-magnesium hydroxide-simethicone 200-200-20 mg/5 mL suspension 30 mL  30 mL Oral QID PRN Tawana Loaiza MD        atorvastatin tablet 20 mg  20 mg Oral QHS Tawana Loaiza MD   20 mg at 10/13/24 2005    ceftaroline fosamiL (TEFLARO) 200 mg in D5W 50 mL IVPB  200 mg Intravenous Q8H Milo Campbell MD 50 mL/hr at 10/14/24 0815 200 mg at 10/14/24 0815    diphenhydrAMINE injection 25 mg  25 mg Intravenous Q6H PRN Mark Ying DO   25 mg at 10/09/24 0256    enoxaparin injection 30 mg  30 mg Subcutaneous Daily Macarena Leggett MD   30 mg at 10/13/24 1620    folic acid tablet 1 mg  1 mg Oral Daily Tawana Loaiza MD   1 mg at 10/14/24 0814    heparin (porcine)  injection 3,000 Units  3,000 Units Intravenous PRN Neno Mercado, DO        HYDROcodone-acetaminophen  mg per tablet 1 tablet  1 tablet Oral Q4H PRN Macarena Leggett MD   1 tablet at 10/14/24 0605    HYDROcodone-acetaminophen 5-325 mg per tablet 1 tablet  1 tablet Oral Q6H PRN Tawana Loaiza MD   1 tablet at 10/13/24 1804    melatonin tablet 6 mg  6 mg Oral Nightly PRN Tawana Loaiza MD   6 mg at 10/09/24 2053    metoclopramide injection 10 mg  10 mg Intravenous Q10 Min PRN Mark Ying DO        ondansetron injection 4 mg  4 mg Intravenous Q4H PRN Tawana Loaiza MD        ondansetron injection 4 mg  4 mg Intravenous Once Mark Ying, DO        pantoprazole EC tablet 40 mg  40 mg Oral Daily Tawana Loaiza MD   40 mg at 10/14/24 0814    polyethylene glycol packet 17 g  17 g Oral BID PRN Tawana Loaiza MD        prochlorperazine injection Soln 5 mg  5 mg Intravenous Q6H PRN Tawana Loaiza MD        senna-docusate 8.6-50 mg per tablet 2 tablet  2 tablet Oral BID PRN Tawana Loaiza MD        sertraline tablet 25 mg  25 mg Oral Daily Shin Light MD   25 mg at 10/14/24 0814    sevelamer carbonate tablet 800 mg  800 mg Oral TID  Tawana Loaiza MD   800 mg at 10/14/24 0814    sodium bicarbonate tablet 1,300 mg  1,300 mg Oral TID Shin Light MD        sodium chloride 0.9% flush 10 mL  10 mL Intravenous PRN Tawana Loaiza MD        vancomycin - pharmacy to dose   Intravenous pharmacy to manage frequency Tawana Loaiza MD           No, Primary Doctor    History reviewed. No pertinent past medical history.   Past Surgical History:   Procedure Laterality Date    CYSTOSCOPY W/ URETERAL STENT PLACEMENT Right 6/27/2024    Procedure: CYSTOSCOPY, WITH URETERAL STENT INSERTION;  Surgeon: Otis Person MD;  Location: [facility] OR;  Service: Urology;  Laterality: Right;  CYSTOSCOPY, BILATERAL  RETROGRADE PYELOGRAMS, POSSIBLE STENT PLACEMENT.    CYSTOSCOPY W/ URETERAL STENT PLACEMENT Bilateral 10/4/2024    Procedure: CYSTOSCOPY, WITH URETERAL STENT INSERTION;  Surgeon: Tawana Loaiza MD;  Location: [facility] OR;  Service: Urology;  Laterality: Bilateral;  CYSTO WITH BILAT URETERAL STENT EXCHANGE    INSERTION OF TUNNELED CENTRAL VENOUS HEMODIALYSIS CATHETER N/A 7/3/2024    Procedure: Insertion, Catheter, Central Venous, Hemodialysis;  Surgeon: Uche Barcenas MD;  Location: [facility] CATH LAB;  Service: Peripheral Vascular;  Laterality: N/A;      No family history on file.  Social History     Tobacco Use    Smoking status: Never    Smokeless tobacco: Never   Substance Use Topics    Alcohol use: Never     Medications Prior to Admission   Medication Sig Dispense Refill Last Dose/Taking    atorvastatin (LIPITOR) 20 MG tablet Take 20 mg by mouth every evening.   10/3/2024    calcium carbonate (TUMS) 200 mg calcium (500 mg) chewable tablet Take 2 tablets (1,000 mg total) by mouth 2 (two) times daily. 120 tablet 11 10/3/2024 Morning    [] ergocalciferol (ERGOCALCIFEROL) 50,000 unit Cap Take 1 capsule (50,000 Units total) by mouth every 72 hours. 10 capsule 2 Past Week    FEROSUL 325 mg (65 mg iron) Tab tablet Take 325 mg by mouth once daily.   10/3/2024 Morning    folic acid (FOLVITE) 1 MG tablet Take 1 tablet (1 mg total) by mouth once daily. 30 tablet 0 10/3/2024 Morning    labetaloL (NORMODYNE) 200 MG tablet Take 1 tablet (200 mg total) by mouth every 12 (twelve) hours. 60 tablet 11 10/3/2024 Morning    loratadine (CLARITIN) 10 mg tablet Take 10 mg by mouth once daily.   10/3/2024    pantoprazole (PROTONIX) 40 MG tablet Take 1 tablet (40 mg total) by mouth once daily. 90 tablet 3 10/3/2024 Morning    senna-docusate 8.6-50 mg (SENNA WITH DOCUSATE SODIUM) 8.6-50 mg per tablet Take 1 tablet by mouth daily as needed for Constipation.   Past Week    sevelamer carbonate (RENVELA) 800 mg Tab Take 800 mg  by mouth 3 (three) times daily with meals.   10/3/2024 Morning    polyethylene glycol (GLYCOLAX) 17 gram PwPk Take 17 g by mouth once daily. (Patient not taking: Reported on 10/4/2024)   Not Taking     Review of patient's allergies indicates:   Allergen Reactions    Asa [aspirin] Anaphylaxis    Penicillins      Received ceftriaxone from 6/27, no reactions             Review of Systems:     Comprehensive 10pt ROS negative except as noted per history.      Objective:       VITAL SIGNS: 24 HR MIN & MAX LAST    Temp  Min: 97.4 °F (36.3 °C)  Max: 97.8 °F (36.6 °C)  97.5 °F (36.4 °C)        BP  Min: 118/65  Max: 154/78  (!) 154/78     Pulse  Min: 57  Max: 64  (!) 57     Resp  Min: 15  Max: 18  16    SpO2  Min: 96 %  Max: 100 %  100 %      GEN:  Well developed and nourished.  Awake alert oriented to time person place.  No shortness of breath.  HEENT unremarkable.  Left IJ temporary dialysis catheter noted.  CV: RRR without rub or gallop.  PULM: CTAB, unlabored  ABD: Soft, NT/ND abdomen with NABS  :  Nephrostomy tubes in both right and left kidneys.    EXT:  1+ lower extremity edema  SKIN: Warm and dry  PSYCH: Awake, alert and appropriately conversant  Dialysis access:  Left IJ Vas-Cath            Component Value Date/Time     (L) 10/14/2024 0529     (L) 10/13/2024 0526    K 5.3 (H) 10/14/2024 0529    K 4.6 10/13/2024 0526    CO2 16 (L) 10/14/2024 0529    CO2 17 (L) 10/13/2024 0526    BUN 51.2 (H) 10/14/2024 0529    BUN 39.2 (H) 10/13/2024 0526    CREATININE 5.28 (H) 10/14/2024 0529    CREATININE 4.10 (H) 10/13/2024 0526    CALCIUM 9.0 10/14/2024 0529    CALCIUM 8.5 10/13/2024 0526    PHOS 6.0 (H) 10/14/2024 0529            Component Value Date/Time    WBC 19.12 (H) 10/14/2024 0529    WBC 20.63 (H) 10/13/2024 0526    WBC 25.58 10/07/2024 0541    WBC 26.04 10/06/2024 0449    HGB 11.0 (L) 10/14/2024 0529    HGB 10.1 (L) 10/13/2024 0526    HCT 34.5 (L) 10/14/2024 0529    HCT 31.7 (L) 10/13/2024 0526      10/14/2024 0529     10/13/2024 0526             US Retroperitoneal Limited   Final Result      Duplex right renal collecting system with unchanged hydronephrosis at the lower pole moiety      Stent is faintly visible in the region of the left renal pelvis.  Left kidney is moderately obscured by shadowing which obscures evaluation.         Electronically signed by: Deidra Dobson   Date:    10/13/2024   Time:    14:07      X-Ray Chest 1 View for Line/Tube Placement   Final Result      New central line terminates within right atrium.         Electronically signed by: Alvaro Watson   Date:    10/11/2024   Time:    11:53      CT Abdomen Pelvis With IV Contrast NO Oral Contrast   Final Result      1. No significant interval change compared to previous exam.  Ureteral stents in place.   2. Duplicated right kidney and right ureter with persistent hydroureteronephrosis of the lower pole moiety.         Electronically signed by: Deidra Dobson   Date:    10/08/2024   Time:    06:49      CT Head Without Contrast   Final Result      No acute intracranial findings.         Electronically signed by: Alexx Patricia   Date:    10/07/2024   Time:    10:46      MRI Lumbar Spine W WO Cont   Final Result      No acute abnormality of the lumbar spine.         Electronically signed by: Edmundo Vásquez MD   Date:    10/05/2024   Time:    16:55      MRI Thoracic Spine W WO Cont   Final Result      Very limited examination due to significant motion artifact      No evidence of osteomyelitis or discitis seen at no evidence of epidural abscess seen in thoracic spine.  Lumbar and cervical spine discussed on separate report         Electronically signed by: Garland Raya   Date:    10/05/2024   Time:    16:57      MRI Cervical Spine W WO Cont   Final Result      Very limited examination due to motion artifact.  Only gross findings are visible.  Fine details are not visible.  No obvious epidural abscess is seen.  There is some increased  signal in the cord at C6-C7 which shows some enhancement.  Findings could represent intra cord lesion or possible myelomalacia.         Electronically signed by: Garland Raya   Date:    10/05/2024   Time:    16:55      IR Nephrostomy Tube Placement   Final Result      Technically successful Nephrostomy Tube placement.         Electronically signed by: Andrew Buenrostro   Date:    10/04/2024   Time:    16:37      US Guided Needle Placement   Final Result      Technically successful Nephrostomy Tube placement.         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  The upper pole moiety is decompressed with a ureteral stent with the lower pole more demonstrating moderate severe hydronephrosis which is persistent from the prior exam.      Left nephrostomy ureteral stent drain with resolution of hydronephrosis.      Enlarged right pelvic lymph node increased in size from the prior, malignancy suspected.      Mildly enlarged upper normal sized lymph nodes in the periaortic region and left pelvis, nonspecific, similar to the prior exam.         Electronically signed by: Tete Sun MD   Date:    10/04/2024   Time:    07:37      X-Ray Lumbar Spine Ap And Lateral   Final Result      Limited examination due to patient's body habitus but degenerative changes seen throughout the lower lumbar spine         Electronically signed by: Garland Raya   Date:    10/03/2024   Time:    18:11      NM Inflammatory Whole Body    (Results Pending)       Assessment / Plan:       ESRD - normally TTS at [facility].  Tunneled dialysis catheter removed 10/08/2024 due to persistent MRSA bacteremia  Metabolic acidosis  MRSA bacteremia  Bilateral hydronephrosis - now with  nephrostomy tube both sides    Plan:  Will increase sodium bicarb to 1300 mg 3 times a day  Consult adult nutritionist to discuss with the patient her dietary choices  Hemodialysis tomorrow, will increase dialysis time to 3-1/2 hours

## 2024-10-14 NOTE — CONSULTS
Inpatient Nutrition Assessment    Admit Date: 10/3/2024   Total duration of encounter: 11 days   Patient Age: 69 y.o.    Nutrition Recommendation/Prescription     Continue renal dialysis diet, texture modification per SLP   Trial Novasource Renal TID (provide 475 kcal and 22 gm protein per serving)  Honor patient food preferences  Continue bowel regimen and antiemetic   Monitor labs, intake and weight    Communication of Recommendations: reviewed with patient    Nutrition Assessment     Malnutrition Assessment/Nutrition-Focused Physical Exam    Malnutrition Context: acute illness or injury (10/14/24 1330)  Malnutrition Level: other (see comments) (does not meet criteria) (10/14/24 1330)  Energy Intake (Malnutrition): less than 75% for greater than 7 days (10/14/24 1330)  Weight Loss (Malnutrition): other (see comments) (does not meet criteria) (10/14/24 1330)  Subcutaneous Fat (Malnutrition): other (see comments) (does not meet criteria) (10/14/24 1330)           Muscle Mass (Malnutrition): other (see comments) (does not meet criteria) (10/14/24 1330)                                   A minimum of two characteristics is recommended for diagnosis of either severe or non-severe malnutrition.    Chart Review    Reason Seen: physician consult for Please discuss with the patient about her dietary choices and follow-up    Malnutrition Screening Tool Results   Have you recently lost weight without trying?: No  Have you been eating poorly because of a decreased appetite?: No   MST Score: 0   Diagnosis:  Bilateral hydronephrosis s/p right upper lobe ureteral stent exchange and right lower pole nephrostomy tube placement  Persistent MRSA bacteremia  Encephalopathy most likely metabolic-resolved  Bladder lesion-carcinoma with extensive squamous differentiation  Left percutaneous nephrostomy  End-stage renal disease on hemodialysis  Metabolic acidosis  Transaminitis, improving    Relevant Medical History: HTN, HLD, chronic  hydronephrosis with left nephrostomy tube, right ureteral stent, ESRD on HD, chronic anemia     Scheduled Medications:  atorvastatin, 20 mg, QHS  ceftaroline (Teflaro) IV (PEDS and ADULTS), 200 mg, Q8H  enoxaparin, 30 mg, Daily  folic acid, 1 mg, Daily  ondansetron, 4 mg, Once  pantoprazole, 40 mg, Daily  sertraline, 25 mg, Daily  sevelamer carbonate, 800 mg, TID WM  sodium bicarbonate, 1,300 mg, TID    Continuous Infusions:   PRN Medications:  acetaminophen, 650 mg, Q4H PRN  albuterol-ipratropium, 3 mL, Once PRN  aluminum-magnesium hydroxide-simethicone, 30 mL, QID PRN  diphenhydrAMINE, 25 mg, Q6H PRN  heparin (porcine), 3,000 Units, PRN  HYDROcodone-acetaminophen, 1 tablet, Q4H PRN  HYDROcodone-acetaminophen, 1 tablet, Q6H PRN  melatonin, 6 mg, Nightly PRN  metoclopramide, 10 mg, Q10 Min PRN  ondansetron, 4 mg, Q4H PRN  polyethylene glycol, 17 g, BID PRN  prochlorperazine, 5 mg, Q6H PRN  senna-docusate 8.6-50 mg, 2 tablet, BID PRN  sodium chloride 0.9%, 10 mL, PRN  vancomycin - pharmacy to dose, , pharmacy to manage frequency    Calorie Containing IV Medications: no significant kcals from medications at this time    Recent Labs   Lab 10/07/24  2103 10/08/24  0458 10/09/24  0650 10/10/24  0738 10/11/24  0457 10/13/24  0526 10/14/24  0529   NA  --  136 136 132* 127* 127* 125*   K  --  4.4 4.1 4.5 4.5 4.6 5.3*   CALCIUM  --  8.2* 8.0* 8.1* 8.5 8.5 9.0   PHOS  --   --   --   --  5.7* 4.9* 6.0*   MG  --   --   --   --  2.10 1.90  --    CL  --  97* 94* 90* 88* 92* 89*   CO2  --  21* 27 25 21* 17* 16*   BUN  --  42.4* 28.0* 44.0* 55.1* 39.2* 51.2*   CREATININE  --  4.42* 3.49* 4.57* 5.32* 4.10* 5.28*   EGFRNORACEVR  --  10 14 10 8 11 8   GLUCOSE  --  69* 121* 137* 86 79* 66*   BILITOT  --   --   --   --   --  0.3  --    ALKPHOS  --   --   --   --   --  151*  --    ALT  --   --   --   --   --  12  --    AST  --   --   --   --   --  12  --    ALBUMIN  --   --   --   --  2.0* 2.0* 2.2*   AMMONIA 26.7  --   --   --   --   --  "  --    WBC  --  26.23* 21.12*  --  18.83* 20.63* 19.12*   HGB  --  11.0* 11.1*  --  9.9* 10.1* 11.0*   HCT  --  31.3* 33.2*  --  30.7* 31.7* 34.5*     Nutrition Orders:  Diet Easy to Chew (IDDSI Level 7) Renal  Dietary nutrition supplements TID; Novasource Renal - Vanilla    Appetite/Oral Intake: fair/50-75% of meals  Factors Affecting Nutritional Intake: constipation, decreased appetite, and nausea  Social Needs Impacting Access to Food: none identified  Food/Orthodoxy/Cultural Preferences:  no eggs or grits for breakfast, likes corn flakes, oatmeal and toast with jelly and butter  Food Allergies: no known food allergies  Last Bowel Movement: 10/07/24  Wound(s):  none noted    Comments    10/7/24: Pt off floor for stat CT at time of visit. Pt has been NPO since admit (x 4 days). Per MST, no reports of decreased appetite or unintentional weight loss PTA. No weight loss noted per EMR weight history. Per MD note, ST consulted; will monitor diet advancement and order ONS to ensure adequate nutrition.      10/8/24: Pt remains NPO per SLP, MBS pending when patient is more alert. Pt has been NPO since admit (>4 days). TF recs provided for if/when medically appropriate. RN notified.      10/11/24: Patient reports fair oral intake of meals served. Denies any nausea, vomiting, diarrhea and constipation.     10/14/24: RD consulted to discuss dietary options with patient. Patient complains of eating the same foods every day. Obtained breakfast food preferences and relayed to kitchen. Pt agreed to ONS with meals. Also provided patient with educational handout on renal diet. Discussed the lunch and dinner menu options with the patient as well. Pt reports continued poor-fair intake of meals, +nausea this AM and constipation. Reports good intake PTA and denies unintentional weight loss. No muscle or fat depletion noted per NFPE.    Anthropometrics    Height: 5' 3" (160 cm), Height Method: Stated  Last Weight: (!) 140.2 kg (309 lb " 1.4 oz) (10/10/24 1517), Weight Method: Standard Scale  BMI (Calculated): 54.8  BMI Classification: obese grade III (BMI >/=40)     Ideal Body Weight (IBW), Female: 115 lb     % Ideal Body Weight, Female (lb): 268.77 %                    Usual Body Weight (UBW), k.2 kg  % Usual Body Weight: 105.45     Usual Weight Provided By: EMR weight history and patient denies unintentional weight loss    Wt Readings from Last 5 Encounters:   10/10/24 (!) 140.2 kg (309 lb 1.4 oz)   07/10/24 (!) 140.2 kg (309 lb)   24 113.4 kg (250 lb)     Weight Change(s) Since Admission:   Wt Readings from Last 1 Encounters:   10/10/24 1517 (!) 140.2 kg (309 lb 1.4 oz)   10/03/24 1604 (!) 140.2 kg (309 lb)   Admit Weight: (!) 140.2 kg (309 lb) (10/03/24 1604), Weight Method: Stated    Estimated Needs    Weight Used For Calorie Calculations: (!) 140.2 kg (309 lb 1.4 oz)  Energy Calorie Requirements (kcal): 1901 kcal (1.0 SF)  Energy Need Method: Throckmorton-St Jeor  Weight Used For Protein Calculations: 87.5 kg (192 lb 14.4 oz) (AdjustedBW used)  Protein Requirements: 105-114 gm (1.2-1.3 gm/kg AdjustedBW)  Fluid Requirements (mL): urine output + 1,000 mL        Enteral Nutrition     Patient not receiving enteral nutrition at this time.    Parenteral Nutrition     Patient not receiving parenteral nutrition support at this time.    Evaluation of Received Nutrient Intake    Calories: not meeting estimated needs  Protein: not meeting estimated needs    Patient Education     Education Provided: renal diet  Teaching Method: explanation and printed materials  Comprehension: verbalizes understanding  Barriers to Learning: none evident  Expected Compliance: good  Comments: All questions were answered and dietitian's contact information was provided.     Nutrition Diagnosis     PES: Inadequate oral intake related to acute illness as evidenced by <75% intake for >7 days. (new)     Nutrition Interventions     Intervention(s): general/healthful diet,  commercial beverage, prescription medication, and collaboration with other providers    Goal: Meet greater than 80% of nutritional needs by follow-up. (goal progressing)  Goal: Consume % of oral supplements by follow-up. (new)    Nutrition Goals & Monitoring     Dietitian will monitor: food and beverage intake, weight, electrolyte/renal panel, glucose/endocrine profile, and gastrointestinal profile  Discharge planning: continue renal diet  Nutrition Risk/Follow-Up: high (follow-up in 1-4 days)   Please consult if re-assessment needed sooner.

## 2024-10-14 NOTE — PROGRESS NOTES
Infectious Disease  Progress Note    Patient Name: Fior Joya   MRN: [id number]   Admission Date: 10/3/2024   Hospital Length of Stay: 9 days  Attending Physician: Uriel Fletcher MD   Primary Care Provider: Linda, Primary Doctor     Isolation Status: Contact     Assessment/Plan:   69 y.o. female with a history significant for ESRD on hemodialysis through right IJ permanent catheter, chronic hydronephrosis with left nephrostomy tube, hypertension who was admitted on 10/3/2024 with lower back pain. Patient said that her symptoms started approximately 1 week ago with some neck pain that moved down to her shoulder. She then developed lower back pain that moved to her right leg. Patient also reports having cloudy drainage from her nephrostomy tube for the last 1 month. Patient reports worsening back/right flank pain for the last few days. Vital signs on admission showed temperature 97.2 F, heart rate 79, blood pressure 110/75. Laboratory workup showed WBC 52145, creatinine 6.70, lactic acid 1.8. Urinalysis showed more than 100 WBC. She had 2 sets of blood culture obtained which are growing MRSA. 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  The upper pole moiety is decompressed with a ureteral stent but the lower pole demonstrating moderate severe hydronephrosis, left nephrostomy ureteral stent drain with resolution of hydronephrosis. Patient underwent nephrostomy tube placement by IR into the inferior pole of the right renal collecting system.  Patient was also noted to MRSA bacteremia, she was started on vancomycin, on 10/07/2024, she appears to had worsening mental status, she has persistent leukocytosis.  ID is consulted for assistance in management.    10/14 - afebrile. blood cx in process     1) Bacteremia  - MRSA  - suspect possible seeding of PCN tubes  - NM scan   - MADELYN 10/8 negative  - HD line removed 10/8   - BCx  10/10 - MRSA 3/4  - BCx 10/14 - in process   - currently on vanco, goal 15-20, Rx to dose with HD  - currently on ceftaroline dual therapy     2)  Cancer  - new dx  SCC bladder  - hydronephrosis s/p ureteral stents and nephrostomy tubes  - Oncology consulted     3) Leukocytosis  - in setting of acute infection     Discussed with patient  Discussed and seen with RN     ID will continue following. Please contact us with any questions or concerns.  Subjective:     Principal Problem: <principal problem not specified>     Interval History:   Appears improved today, no fevers, no chills, some issues with the PCN tube on the L being somewhat displaced     Review of Systems   Review of Systems   All other systems reviewed and are negative.       Objective:     Vital Signs (Most Recent):  Temp: 97.8 °F (36.6 °C) (10/13/24 0400)  Pulse: 64 (10/13/24 0847)  Resp: 18 (10/13/24 1804)  BP: 126/67 (10/13/24 0847)  SpO2: 96 % (10/13/24 0847)  Vital Signs (24h Range):  Temp:  [97.8 °F (36.6 °C)-98.2 °F (36.8 °C)] 97.8 °F (36.6 °C)  Pulse:  [64-75] 64  Resp:  [16-18] 18  SpO2:  [96 %-99 %] 96 %  BP: (122-132)/(63-82) 126/67      Weight:   Wt Readings from Last 1 Encounters:   10/10/24 (!) 140.2 kg (309 lb 1.4 oz)      Body mass index is Body mass index is 54.75 kg/m².     Estimated Creatinine Clearance: Estimated Creatinine Clearance: 18.1 mL/min (A) (based on SCr of 4.1 mg/dL (H)).     Lines/Drains/Airways       Central Venous Catheter Line  Duration                  Hemodialysis Catheter 10/11/24 left internal jugular 2 days              Drain  Duration                  Nephrostomy 06/28/24 1025 Left 8 Fr. 107 days         Nephrostomy 10/04/24 1506 Right 8 Fr. 9 days              Peripheral Intravenous Line  Duration                  Peripheral IV - Single Lumen 10/09/24 0900 20 G Anterior;Left;Proximal Forearm 4 days                     Physical Exam  Physical Exam  Constitutional:       Appearance: Normal appearance. She is  obese.   HENT:      Head: Normocephalic and atraumatic.      Mouth/Throat:      Pharynx: No oropharyngeal exudate or posterior oropharyngeal erythema.   Eyes:      Extraocular Movements: Extraocular movements intact.      Pupils: Pupils are equal, round, and reactive to light.   Cardiovascular:      Rate and Rhythm: Normal rate and regular rhythm.      Heart sounds: No murmur heard.     Comments: Site of removal of the right chest wall HD catheter is clean  Pulmonary:      Effort: No respiratory distress.      Breath sounds: No wheezing, rhonchi or rales.   Abdominal:      General: Bowel sounds are normal. There is no distension.      Palpations: Abdomen is soft.      Tenderness: There is no abdominal tenderness.      Comments: Leonard PCN tubes bloody output   Genitourinary:     Comments: B/l PCNT bloody drainage    Mario - gross hematuria   Musculoskeletal:         General: No swelling or tenderness.      Cervical back: Neck supple. No rigidity or tenderness.      Comments: Still tenderness at the r hip    Lymphadenopathy:      Cervical: No cervical adenopathy.   Skin:     Findings: No lesion or rash.   Neurological:      General: No focal deficit present.      Mental Status: She is alert. Mental status is at baseline.      Cranial Nerves: No cranial nerve deficit.      Motor: No weakness.   Psychiatric:         Mood and Affect: Mood normal.         Behavior: Behavior normal.          Significant Labs: CBC:   Recent Labs   Lab 10/13/24  0526   WBC 20.63*   HGB 10.1*   HCT 31.7*        CMP:   Recent Labs   Lab 10/13/24  0526   *   K 4.6   CL 92*   CO2 17*   BUN 39.2*   CREATININE 4.10*   CALCIUM 8.5   ALBUMIN 2.0*   BILITOT 0.3   ALKPHOS 151*   AST 12   ALT 12       Microbiology Results (last 7 days)       Procedure Component Value Units Date/Time    Blood Culture [[id number]]  (Normal) Collected: 10/09/24 1728    Order Status: Completed Specimen: Blood Updated: 10/13/24 1900     Blood Culture No Growth At  96 Hours    Blood Culture [[id number]]  (Abnormal)  (Susceptibility) Collected: 10/10/24 1605    Order Status: Completed Specimen: Blood Updated: 10/13/24 0628     Blood Culture Methicillin resistant Staphylococcus aureus     GRAM STAIN Seen in gram stain of broth only      2 of 2 bottles positive      Gram Positive Cocci, probable Staphylococcus    Blood Culture [[id number]]  (Abnormal)  (Susceptibility) Collected: 10/10/24 1605    Order Status: Completed Specimen: Blood Updated: 10/13/24 0627     Blood Culture Methicillin resistant Staphylococcus aureus     GRAM STAIN 1 of 2 Anaerobic bottles positive      Gram Positive Cocci, probable Staphylococcus      Seen in gram stain of broth only    Blood Culture [[id number]]  (Abnormal)  (Susceptibility) Collected: 10/09/24 1728    Order Status: Completed Specimen: Blood Updated: 10/13/24 0625     Blood Culture Methicillin resistant Staphylococcus aureus     GRAM STAIN Gram Positive Cocci, probable Staphylococcus      Seen in gram stain of broth only      1 of 2 Aerobic bottles positive    Blood Culture [[id number]]  (Abnormal)  (Susceptibility) Collected: 10/07/24 2103    Order Status: Completed Specimen: Blood Updated: 10/12/24 0658     Blood Culture Methicillin resistant Staphylococcus aureus     GRAM STAIN 1 of 1 Pediatric bottle positive      Gram Positive Cocci, probable Staphylococcus      Seen in gram stain of broth only    BCID2 Panel [[id number]] Collected: 10/10/24 1605    Order Status: Canceled Specimen: Blood Updated: 10/11/24 2011    BCID2 Panel [[id number]]  (Abnormal) Collected: 10/09/24 1728    Order Status: Completed Specimen: Blood Updated: 10/10/24 1716     CTX-M (ESBL ) N/A     IMP (Cabapenemase ) N/A     KPC resistance gene (Carbapenemase ) N/A     mcr-1 N/A     mecA ID N/A     Comment: Note: Antimicrobial resistance can occur via multiple mechanisms. A Not Detected result for antimicrobial resistance gene(s) does not indicate  antimicrobial susceptibility. Subculturing is required for species identification and susceptibility testing of   isolates.        mecA/C and MREJ (MRSA) gene Detected     NDM (Carbapenemase ) N/A     OXA-48-like (Carbapenemase ) N/A     Royal/B (VRE gene) N/A     VIM (Carbapenemase ) N/A     Enterococcus faecalis Not Detected     Enterococcus faecium Not Detected     Listeria monocytogenes Not Detected     Staphylococcus spp. Detected     Staphylococcus aureus Detected     Staphylococcus epidermidis Not Detected     Staphylococcus lugdunensis Not Detected     Streptococcus spp. Not Detected     Streptococcus agalactiae (Group B) Not Detected     Streptococcus pneumoniae Not Detected     Streptococcus pyogenes (Group A) Not Detected     Acinetobacter calcoaceticus/baumannii complex Not Detected     Bacteroides fragilis Not Detected     Enterobacterales Not Detected     Enterobacter cloacae complex Not Detected     Escherichia coli Not Detected     Klebsiella aerogenes Not Detected     Klebsiella oxytoca Not Detected     Klebsiella pneumoniae group Not Detected     Proteus spp. Not Detected     Salmonella spp. Not Detected     Serratia marcescens Not Detected     Haemophilus influenzae Not Detected     Neisseria meningitidis Not Detected     Pseudomonas aeruginosa Not Detected     Stenotrophomonas maltophilia Not Detected     Candida albicans Not Detected     Candida auris Not Detected     Candida glabrata Not Detected     Candida krusei Not Detected     Candida parapsilosis Not Detected     Candida tropicalis Not Detected     Cryptococcus neoformans/gattii Not Detected    Narrative:      The [employer] BCID2 Panel is a multiplexed nucleic acid test intended for the use with [employer]® 2.0 or Well[employer]® Stone[employer] Systems for the simultaneous qualitative detection and identification of multiple bacterial and yeast nucleic acids and select genetic determinants associated with  antimicrobial resistance.  The [employer] BCID2 Panel test is performed directly on blood culture samples identified as positive by a continuous monitoring blood culture system.  Results are intended to be interpreted in conjunction with Gram stain results.    Blood Culture [[id number]]  (Abnormal)  (Susceptibility) Collected: 10/07/24 2103    Order Status: Completed Specimen: Blood Updated: 10/10/24 0650     Blood Culture Methicillin resistant Staphylococcus aureus     GRAM STAIN Gram Positive Cocci, probable Staphylococcus      Seen in gram stain of broth only      1 of 1 Pediatric bottle positive    Blood Culture [[id number]]  (Abnormal)  (Susceptibility) Collected: 10/05/24 0620    Order Status: Completed Specimen: Blood Updated: 10/08/24 0810     Blood Culture Methicillin resistant Staphylococcus aureus     GRAM STAIN Gram Positive Cocci, probable Staphylococcus      Seen in gram stain of broth only      2 of 2 bottles positive    Blood Culture [[id number]]  (Abnormal)  (Susceptibility) Collected: 10/05/24 0620    Order Status: Completed Specimen: Blood Updated: 10/08/24 0809     Blood Culture Methicillin resistant Staphylococcus aureus     GRAM STAIN Gram Positive Cocci, probable Staphylococcus      2 of 2 bottles positive      Seen in gram stain of broth only             Significant Imaging: I have reviewed all pertinent imaging results/findings within the past 24 hours.      Milo Campbell MD  Infectious Disease  [facility]

## 2024-10-14 NOTE — PROGRESS NOTES
[facility] Medicine Progress Note        Chief Complaint: Inpatient Follow-up for     HPI: 69-year-old female with past medical history of hypertension, hyperlipidemia, chronic hydronephrosis with left nephrostomy tube, right ureteral stent, end-stage renal disease on hemodialysis, chronic anemia presented with right flank pain for the past 5 days and also reported cloudy urine output from the urostomy CT with IV contrast showed moderate to severe hydronephrosis with enlarged right pelvic lymph node increase in size from the prior with suspected malignancy also showed duplicated collecting system with dilation of the right lower pole urology was consulted patient is status post cystoscopy with right stent exchange and bladder biopsy and they were not able to identify the left ureteral orifice therefore had left percutaneous nephrostomy. Blood culture is growing Gram-positive cocci 2/2 bottles initially patient was started on cefepime but now we added vancomycin repeat blood cultures ordered  Id was consulted MRI of the CT and L-spine was ordered that was negative for any abscess blood culture is growing MRSA TTE as such did not show any vegetation cardiology consulted for MADELYN  Bilateral hydronephrosis Status post right upper lobe ureteral stent exchange and right lower pole nephrostomy tube placement  Cultures remains persistently positive.  Patient was slightly confused on October 7 so we had ordered CT of the head without contrast that was negative, ammonia, TSH everything was normal we also ordered CT of the abdomen and pelvis with contrast that was negative for any abscess cardiology consulted for MADELYN.  The was negative for endocarditis.  Dialysis catheter was removed    Interval Hx:   Going for tagged WBC scan today, ultrasound from yesterday still shows a duplex right renal collecting system with unchanged hydronephrosis at the lower pole.  She complains of right  paraspinal pain radiating down the right leg posterior thigh area.      Objective/physical exam:  General: In no acute distress, afebrile  Chest: Clear to auscultation bilaterally  Heart: RRR, +S1, S2, no appreciable murmur  Abdomen: Soft, nontender, BS +, bilateral nephrostomy tubes with bloody output  MSK: Warm, 1+ pitting bilateral lower extremity edema, no clubbing or cyanosis  Neurologic:  Alert awake oriented x4    VITAL SIGNS: 24 HRS MIN & MAX LAST   Temp  Min: 97.4 °F (36.3 °C)  Max: 97.8 °F (36.6 °C) 97.8 °F (36.6 °C)   BP  Min: 118/65  Max: 154/78 (!) 148/73   Pulse  Min: 57  Max: 63  (!) 59   Resp  Min: 15  Max: 18 16   SpO2  Min: 98 %  Max: 100 % 100 %     I have reviewed the following labs:  Recent Labs   Lab 10/11/24  0457 10/13/24  0526 10/14/24  0529   WBC 18.83* 20.63* 19.12*   RBC 3.38* 3.45* 3.75*   HGB 9.9* 10.1* 11.0*   HCT 30.7* 31.7* 34.5*   MCV 90.8 91.9 92.0   MCH 29.3 29.3 29.3   MCHC 32.2* 31.9* 31.9*   RDW 15.7 15.8 15.4    288 326   MPV 11.4* 11.5* 10.9*     Recent Labs   Lab 10/07/24  1125 10/08/24  0458 10/11/24  0457 10/13/24  0526 10/14/24  0529   NA  --    < > 127* 127* 125*   K  --    < > 4.5 4.6 5.3*   CL  --    < > 88* 92* 89*   CO2  --    < > 21* 17* 16*   BUN  --    < > 55.1* 39.2* 51.2*   CREATININE  --    < > 5.32* 4.10* 5.28*   CALCIUM  --    < > 8.5 8.5 9.0   PH 7.410  --   --   --   --    MG  --   --  2.10 1.90  --    ALBUMIN  --   --  2.0* 2.0* 2.2*   ALKPHOS  --   --   --  151*  --    ALT  --   --   --  12  --    AST  --   --   --  12  --    BILITOT  --   --   --  0.3  --     < > = values in this interval not displayed.     Microbiology Results (last 7 days)       Procedure Component Value Units Date/Time    Blood Culture [[id number]] Collected: 10/14/24 0529    Order Status: Resulted Specimen: Blood from Arm, Right Updated: 10/14/24 0602    Blood Culture [[id number]] Collected: 10/14/24 0529    Order Status: Resulted Specimen: Blood from Hand, Right Updated:  10/14/24 0602    Blood Culture [[id number]]  (Normal) Collected: 10/09/24 1728    Order Status: Completed Specimen: Blood Updated: 10/13/24 1900     Blood Culture No Growth At 96 Hours    Blood Culture [[id number]]  (Abnormal)  (Susceptibility) Collected: 10/10/24 1605    Order Status: Completed Specimen: Blood Updated: 10/13/24 0628     Blood Culture Methicillin resistant Staphylococcus aureus     GRAM STAIN Seen in gram stain of broth only      2 of 2 bottles positive      Gram Positive Cocci, probable Staphylococcus    Blood Culture [[id number]]  (Abnormal)  (Susceptibility) Collected: 10/10/24 1605    Order Status: Completed Specimen: Blood Updated: 10/13/24 0627     Blood Culture Methicillin resistant Staphylococcus aureus     GRAM STAIN 1 of 2 Anaerobic bottles positive      Gram Positive Cocci, probable Staphylococcus      Seen in gram stain of broth only    Blood Culture [[id number]]  (Abnormal)  (Susceptibility) Collected: 10/09/24 1728    Order Status: Completed Specimen: Blood Updated: 10/13/24 0625     Blood Culture Methicillin resistant Staphylococcus aureus     GRAM STAIN Gram Positive Cocci, probable Staphylococcus      Seen in gram stain of broth only      1 of 2 Aerobic bottles positive    Blood Culture [[id number]]  (Abnormal)  (Susceptibility) Collected: 10/07/24 2103    Order Status: Completed Specimen: Blood Updated: 10/12/24 0658     Blood Culture Methicillin resistant Staphylococcus aureus     GRAM STAIN 1 of 1 Pediatric bottle positive      Gram Positive Cocci, probable Staphylococcus      Seen in gram stain of broth only    BCID2 Panel [[id number]] Collected: 10/10/24 1605    Order Status: Canceled Specimen: Blood Updated: 10/11/24 2011    BCID2 Panel [[id number]]  (Abnormal) Collected: 10/09/24 1728    Order Status: Completed Specimen: Blood Updated: 10/10/24 1716     CTX-M (ESBL ) N/A     IMP (Cabapenemase ) N/A     KPC resistance gene (Carbapenemase ) N/A     mcr-1  N/A     mecA ID N/A     Comment: Note: Antimicrobial resistance can occur via multiple mechanisms. A Not Detected result for antimicrobial resistance gene(s) does not indicate antimicrobial susceptibility. Subculturing is required for species identification and susceptibility testing of   isolates.        mecA/C and MREJ (MRSA) gene Detected     NDM (Carbapenemase ) N/A     OXA-48-like (Carbapenemase ) N/A     Royal/B (VRE gene) N/A     VIM (Carbapenemase ) N/A     Enterococcus faecalis Not Detected     Enterococcus faecium Not Detected     Listeria monocytogenes Not Detected     Staphylococcus spp. Detected     Staphylococcus aureus Detected     Staphylococcus epidermidis Not Detected     Staphylococcus lugdunensis Not Detected     Streptococcus spp. Not Detected     Streptococcus agalactiae (Group B) Not Detected     Streptococcus pneumoniae Not Detected     Streptococcus pyogenes (Group A) Not Detected     Acinetobacter calcoaceticus/baumannii complex Not Detected     Bacteroides fragilis Not Detected     Enterobacterales Not Detected     Enterobacter cloacae complex Not Detected     Escherichia coli Not Detected     Klebsiella aerogenes Not Detected     Klebsiella oxytoca Not Detected     Klebsiella pneumoniae group Not Detected     Proteus spp. Not Detected     Salmonella spp. Not Detected     Serratia marcescens Not Detected     Haemophilus influenzae Not Detected     Neisseria meningitidis Not Detected     Pseudomonas aeruginosa Not Detected     Stenotrophomonas maltophilia Not Detected     Candida albicans Not Detected     Candida auris Not Detected     Candida glabrata Not Detected     Candida krusei Not Detected     Candida parapsilosis Not Detected     Candida tropicalis Not Detected     Cryptococcus neoformans/gattii Not Detected    Narrative:      The [employer] BCID2 Panel is a multiplexed nucleic acid test intended for the use with Rupture® 2.0 or Rupture®  [employer] for the simultaneous qualitative detection and identification of multiple bacterial and yeast nucleic acids and select genetic determinants associated with antimicrobial resistance.  The [employer] BCID2 Panel test is performed directly on blood culture samples identified as positive by a continuous monitoring blood culture system.  Results are intended to be interpreted in conjunction with Gram stain results.    Blood Culture [[id number]]  (Abnormal)  (Susceptibility) Collected: 10/07/24 2103    Order Status: Completed Specimen: Blood Updated: 10/10/24 0650     Blood Culture Methicillin resistant Staphylococcus aureus     GRAM STAIN Gram Positive Cocci, probable Staphylococcus      Seen in gram stain of broth only      1 of 1 Pediatric bottle positive    Blood Culture [[id number]]  (Abnormal)  (Susceptibility) Collected: 10/05/24 0620    Order Status: Completed Specimen: Blood Updated: 10/08/24 0810     Blood Culture Methicillin resistant Staphylococcus aureus     GRAM STAIN Gram Positive Cocci, probable Staphylococcus      Seen in gram stain of broth only      2 of 2 bottles positive    Blood Culture [[id number]]  (Abnormal)  (Susceptibility) Collected: 10/05/24 0620    Order Status: Completed Specimen: Blood Updated: 10/08/24 0809     Blood Culture Methicillin resistant Staphylococcus aureus     GRAM STAIN Gram Positive Cocci, probable Staphylococcus      2 of 2 bottles positive      Seen in gram stain of broth only             See below for Radiology    Assessment/Plan:  Bilateral hydronephrosis Status post right upper lobe ureteral stent exchange and right lower pole nephrostomy tube placement  Persistent MRSA bacteremia  Encephalopathy most likely metabolic-resolved  Bladder lesion-carcinoma with extensive squamous differentiation  Left percutaneous nephrostomy  End-stage renal disease on hemodialysis  Metabolic acidosis  Transaminitis, improving  History of hypertension, hyperlipidemia, bilateral  chronic hydronephrosis with left nephrostomy tube in place and right ureter stent, ESRD on HD TTS, and chronic anemia       Oncology following.    Infectious Disease following.  Now on vancomycin and Teflaro.  Pending tagged WBC scan and hopefully it can suggest an infectious etiology of the persistent bacteremia.  Urology signed off.  Temporary dialysis catheter placement, hold off on tunneled until blood cultures are cleared.  I will check with Urology regarding this unchanged hydronephrosis at the lower pole on the right.    Prophylaxis: Lovenox  VTE prophylaxis:     Patient condition:  Stable/Fair/Guarded/ Serious/ Critical    Anticipated discharge and Disposition:         All diagnosis and differential diagnosis have been reviewed; assessment and plan has been documented; I have personally reviewed the labs and test results that are presently available; I have reviewed the patients medication list; I have reviewed the consulting providers response and recommendations. I have reviewed or attempted to review medical records based upon their availability    All of the patient's questions have been  addressed and answered. Patient's is agreeable to the above stated plan. I will continue to monitor closely and make adjustments to medical management as needed.    Portions of this note dictated using EMR integrated voice recognition software, and may be subject to voice recognition errors not corrected at proofreading. Please contact writer for clarification if needed.   _____________________________________________________________________    Malnutrition Status:    Scheduled Med:   atorvastatin  20 mg Oral QHS    ceftaroline (Teflaro) IV (PEDS and ADULTS)  200 mg Intravenous Q8H    enoxaparin  30 mg Subcutaneous Daily    folic acid  1 mg Oral Daily    ondansetron  4 mg Intravenous Once    pantoprazole  40 mg Oral Daily    sertraline  25 mg Oral Daily    sevelamer carbonate  800 mg Oral TID WM    sodium bicarbonate   1,300 mg Oral TID      Continuous Infusions:       PRN Meds:    Current Facility-Administered Medications:     acetaminophen, 650 mg, Oral, Q4H PRN    albuterol-ipratropium, 3 mL, Nebulization, Once PRN    aluminum-magnesium hydroxide-simethicone, 30 mL, Oral, QID PRN    diphenhydrAMINE, 25 mg, Intravenous, Q6H PRN    heparin (porcine), 3,000 Units, Intravenous, PRN    HYDROcodone-acetaminophen, 1 tablet, Oral, Q4H PRN    HYDROcodone-acetaminophen, 1 tablet, Oral, Q6H PRN    melatonin, 6 mg, Oral, Nightly PRN    metoclopramide, 10 mg, Intravenous, Q10 Min PRN    ondansetron, 4 mg, Intravenous, Q4H PRN    polyethylene glycol, 17 g, Oral, BID PRN    prochlorperazine, 5 mg, Intravenous, Q6H PRN    senna-docusate 8.6-50 mg, 2 tablet, Oral, BID PRN    sodium chloride 0.9%, 10 mL, Intravenous, PRN    vancomycin - pharmacy to dose, , Intravenous, pharmacy to manage frequency     Radiology:  I have personally reviewed the following imaging and agree with the radiologist.     US Retroperitoneal Limited  Narrative: EXAMINATION:  US RETROPERITONEAL LIMITED    CLINICAL HISTORY:  Check placement of left nephrostomy tube to make sure it is still within the collecting system;    TECHNIQUE:  Retroperitoneal ultrasound.  Limited.    COMPARISON:  CT abdomen pelvis 10/08/2024    FINDINGS:  LIMITATIONS: Exam limited due to poor acoustic window related to shadowing bowel gas and/or body habitus.    RIGHT KIDNEY: The right kidney measures 15 cm.  Duplex right renal collecting system.  Upper pole moiety decompressed.  Shadowing stent at the upper pole moiety partially visible.  Hydronephrosis at the lower pole moiety.  Similar to prior CT.    LEFT KIDNEY: The left kidney measures 11 cm. Moderate to severely obscured by shadowing.Faintly visible stent in the region of the left renal pelvis.  No yovany hydronephrosis.  Component of mild pelviectasis not excluded.    BLADDER: Not imaged  Impression: Duplex right renal collecting system with  unchanged hydronephrosis at the lower pole moiety    Stent is faintly visible in the region of the left renal pelvis.  Left kidney is moderately obscured by shadowing which obscures evaluation.    Electronically signed by: Deidra Dobson  Date:    10/13/2024  Time:    14:07      Uriel Fletcher MD  Department of Hospital Medicine   [facility]   10/14/2024

## 2024-10-14 NOTE — PROGRESS NOTES
.UROLOGY  PROGRESS  NOTE    Fior Joya 1955  [id number]  10/14/2024    POD 10 cystoscopy, right stent exchange upper pole moiety, bladder biopsy; nephrostomy tube placement in lower pole of right kidney with IR      No acute events overnight  No complaints during rounds      VSS, afebrile  400 mL left nephrostomy   25 mL right nephrostomy  WBC 19.12  H&H 11/34.5   BUN and creatinine 51.2/5.28    Repeat BC 10/10 with persistent MRSA    Exam:    NAD  Resp unlabored  Abd obese, soft, NTND   bloody drainage from bilateral PCN       Recent Results (from the past 24 hours)   Renal Function Panel    Collection Time: 10/14/24  5:29 AM   Result Value Ref Range    Sodium 125 (L) 136 - 145 mmol/L    Potassium 5.3 (H) 3.5 - 5.1 mmol/L    Chloride 89 (L) 98 - 107 mmol/L    CO2 16 (L) 23 - 31 mmol/L    Glucose 66 (L) 82 - 115 mg/dL    Blood Urea Nitrogen 51.2 (H) 9.8 - 20.1 mg/dL    Creatinine 5.28 (H) 0.55 - 1.02 mg/dL    Calcium 9.0 8.4 - 10.2 mg/dL    Albumin 2.2 (L) 3.4 - 4.8 g/dL    Phosphorus Level 6.0 (H) 2.3 - 4.7 mg/dL    eGFR 8 mL/min/1.73/m2   CBC with Differential    Collection Time: 10/14/24  5:29 AM   Result Value Ref Range    WBC 19.12 (H) 4.50 - 11.50 x10(3)/mcL    RBC 3.75 (L) 4.20 - 5.40 x10(6)/mcL    Hgb 11.0 (L) 12.0 - 16.0 g/dL    Hct 34.5 (L) 37.0 - 47.0 %    MCV 92.0 80.0 - 94.0 fL    MCH 29.3 27.0 - 31.0 pg    MCHC 31.9 (L) 33.0 - 36.0 g/dL    RDW 15.4 11.5 - 17.0 %    Platelet 326 130 - 400 x10(3)/mcL    MPV 10.9 (H) 7.4 - 10.4 fL    Neut % 83.8 %    Lymph % 5.7 %    Mono % 7.2 %    Eos % 1.3 %    Basophil % 0.4 %    Lymph # 1.09 0.6 - 4.6 x10(3)/mcL    Neut # 16.03 (H) 2.1 - 9.2 x10(3)/mcL    Mono # 1.38 (H) 0.1 - 1.3 x10(3)/mcL    Eos # 0.25 0 - 0.9 x10(3)/mcL    Baso # 0.07 <=0.2 x10(3)/mcL    IG# 0.30 (H) 0 - 0.04 x10(3)/mcL    IG% 1.6 %    NRBC% 0.0 %      Retroperitoneal Limited [[id number]] Resulted: 10/13/24 1407   Order Status: Completed Updated: 10/13/24 1410   Narrative:      EXAMINATION:  US RETROPERITONEAL LIMITED    CLINICAL HISTORY:  Check placement of left nephrostomy tube to make sure it is still within the collecting system;    TECHNIQUE:  Retroperitoneal ultrasound.  Limited.    COMPARISON:  CT abdomen pelvis 10/08/2024    FINDINGS:  LIMITATIONS: Exam limited due to poor acoustic window related to shadowing bowel gas and/or body habitus.    RIGHT KIDNEY: The right kidney measures 15 cm.  Duplex right renal collecting system.  Upper pole moiety decompressed.  Shadowing stent at the upper pole moiety partially visible.  Hydronephrosis at the lower pole moiety.  Similar to prior CT.    LEFT KIDNEY: The left kidney measures 11 cm. Moderate to severely obscured by shadowing.Faintly visible stent in the region of the left renal pelvis.  No yovany hydronephrosis.  Component of mild pelviectasis not excluded.    BLADDER: Not imaged   Impression:       Duplex right renal collecting system with unchanged hydronephrosis at the lower pole moiety    Stent is faintly visible in the region of the left renal pelvis.  Left kidney is moderately obscured by shadowing which obscures evaluation.      Electronically signed by: Deidra Dobson  Date: 10/13/2024  Time: 14:07   CT Abdomen Pelvis With IV Contrast NO Oral Contrast [[id number]]Resulted: 10/08/24 0649Order Status: CompletedUpdated: 10/08/24 0651Narrative:  EXAMINATION:  CT ABDOMEN PELVIS WITH IV CONTRAST    CLINICAL HISTORY:  Abdominal abscess/infection suspected;    TECHNIQUE:  Helically acquired images with axial, sagittal and coronal reformations were obtained from the lung bases to the pubic symphysis after the IV administration of contrast.    Automated tube current modulation, weight-based exposure dosing, and/or iterative reconstruction technique utilized to reach lowest reasonably achievable exposure rate.    DLP: 1526 mGy*cm    COMPARISON:  CT abdomen pelvis 10/03/2024    FINDINGS:  HEART: Cardiomegaly.  There are calcifications  in the region the aortic valve and mitral valve annulus.    LUNG BASES: Basilar atelectasis.    LIVER: Normal attenuation. No appreciable focal hepatic lesion.    BILIARY: Vicarious excretion of contrast at the gallbladder.    PANCREAS: Evaluation for pancreatitis limited given mild anasarca and body habitus.    SPLEEN: Normal in size    ADRENALS: No mass.    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  Right percutaneous nephrostomy at the upper pole moiety.  Right ureteral stent at the upper pole moiety.  Decompressed upper pole moiety.  Hydronephrotic lower pole moiety with AP diameter of the renal pelvis measuring approximately 3.5 cm, unchanged.  The right lower pole ureter is dilated to the level of the ureterovesical junction.  Left percutaneous nephroureteral stent.  No left hydronephrosis.    GI TRACT/MESENTERY:  No evidence of bowel obstruction or inflammation.    PERITONEUM: No free fluid.No free air.    LYMPH NODES: Presumed reactive retroperitoneal lymph nodes.    VASCULATURE: Aortoiliac atherosclerosis.    BLADDER: Nondistended bladder limits CT evaluation    REPRODUCTIVE ORGANS: Normal as visualized.    SOFT TISSUES: Body wall edema.    BONES: No acute osseous abnormality.    LIMITATIONS: Exam limited by artifact related to patient body habitus, positioning, dose lowering technique and/or motion.  Impression:    1. No significant interval change compared to previous exam.  Ureteral stents in place.  2. Duplicated right kidney and right ureter with persistent hydroureteronephrosis of the lower pole moiety.      Electronically signed by: Deidra Dobson  Date: 10/08/2024  Time: 06:49      Assessment:  Bilateral hydronephrosis  -had left PCN placed in June of this year due to inability to place retrograde ureteral stent, also had right ureteral stent placed in one of the two collecting systems on 6/27  -s/p Cystoscopy with right stent exchange upper pole moiety,  Retrograde pyelogram and Bladder biopsy 10/4  -unable to identify UO intra-op therefore IR placed a right PCN to the collecting system without a stent 10/4    s/p bladder biopsy   -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   -oncology has evaluated patient - plans for f/u outpatient to discuss treatment options    Urosepsis  -blood cultures +MRSA, on rocephin; repeat BC remain positive  -repeat urine cultures with no growth    ESRD   -on HD      Plan:  -patient seen and examined with Dr. Cardoza and imaging reviewed.  Tagged WBC scan pending.   -Will obtain cystogram to evaluate for reflux into right lower pole moiety. If reflux is present, no indication for additional intervention regarding her hydronephrosis. If no reflux present, will consult IR for drain placement into lower pole moiety as UO not identified via cystoscopy.   -place indwelling berkowitz and recommend keeping in place after scan  -Discussed with patient/nursing  -Following         Donna Varghese, RUTHIE-BC

## 2024-10-14 NOTE — NURSING
"Pt refused to have cystogram and x-rays of fibula and femur done. Explained to pt why these scans are needed. Pt stated "it is too much" and doesn't want to do them. Talked to x-ray about this and they said we can try again tomorrow and see if she would be willing.     "

## 2024-10-15 LAB — VANCOMYCIN SERPL-MCNC: 18.2 UG/ML (ref 15–20)

## 2024-10-15 PROCEDURE — 63600175 PHARM REV CODE 636 W HCPCS: Mod: JZ,JG | Performed by: GENERAL PRACTICE

## 2024-10-15 PROCEDURE — 99231 SBSQ HOSP IP/OBS SF/LOW 25: CPT | Mod: ,,, | Performed by: HOSPITALIST

## 2024-10-15 PROCEDURE — 25000003 PHARM REV CODE 250: Performed by: GENERAL PRACTICE

## 2024-10-15 PROCEDURE — 25000003 PHARM REV CODE 250: Performed by: UROLOGY

## 2024-10-15 PROCEDURE — 63600175 PHARM REV CODE 636 W HCPCS: Performed by: INTERNAL MEDICINE

## 2024-10-15 PROCEDURE — 87154 CUL TYP ID BLD PTHGN 6+ TRGT: CPT | Performed by: HOSPITALIST

## 2024-10-15 PROCEDURE — 27000207 HC ISOLATION

## 2024-10-15 PROCEDURE — 21400001 HC TELEMETRY ROOM

## 2024-10-15 PROCEDURE — 25000003 PHARM REV CODE 250: Performed by: INTERNAL MEDICINE

## 2024-10-15 PROCEDURE — 63600175 PHARM REV CODE 636 W HCPCS: Performed by: STUDENT IN AN ORGANIZED HEALTH CARE EDUCATION/TRAINING PROGRAM

## 2024-10-15 PROCEDURE — 87184 SC STD DISK METHOD PER PLATE: CPT | Performed by: HOSPITALIST

## 2024-10-15 PROCEDURE — 80100016 HC MAINTENANCE HEMODIALYSIS

## 2024-10-15 PROCEDURE — 36415 COLL VENOUS BLD VENIPUNCTURE: CPT | Performed by: HOSPITALIST

## 2024-10-15 PROCEDURE — 36415 COLL VENOUS BLD VENIPUNCTURE: CPT | Performed by: INTERNAL MEDICINE

## 2024-10-15 PROCEDURE — 80202 ASSAY OF VANCOMYCIN: CPT | Performed by: INTERNAL MEDICINE

## 2024-10-15 PROCEDURE — 25500020 PHARM REV CODE 255: Performed by: INTERNAL MEDICINE

## 2024-10-15 RX ORDER — HYDROMORPHONE HYDROCHLORIDE 2 MG/ML
1 INJECTION, SOLUTION INTRAMUSCULAR; INTRAVENOUS; SUBCUTANEOUS ONCE AS NEEDED
Status: COMPLETED | OUTPATIENT
Start: 2024-10-15 | End: 2024-10-15

## 2024-10-15 RX ADMIN — SERTRALINE HYDROCHLORIDE 25 MG: 25 TABLET ORAL at 08:10

## 2024-10-15 RX ADMIN — HYDROMORPHONE HYDROCHLORIDE 1 MG: 2 INJECTION INTRAMUSCULAR; INTRAVENOUS; SUBCUTANEOUS at 09:10

## 2024-10-15 RX ADMIN — SODIUM BICARBONATE 650 MG TABLET 1300 MG: at 07:10

## 2024-10-15 RX ADMIN — HEPARIN SODIUM 3000 UNITS: 1000 INJECTION INTRAVENOUS; SUBCUTANEOUS at 02:10

## 2024-10-15 RX ADMIN — ACETAMINOPHEN 650 MG: 325 TABLET ORAL at 02:10

## 2024-10-15 RX ADMIN — HYDROCODONE BITARTRATE AND ACETAMINOPHEN 1 TABLET: 10; 325 TABLET ORAL at 03:10

## 2024-10-15 RX ADMIN — HYDROCODONE BITARTRATE AND ACETAMINOPHEN 1 TABLET: 10; 325 TABLET ORAL at 04:10

## 2024-10-15 RX ADMIN — ENOXAPARIN SODIUM 30 MG: 30 INJECTION SUBCUTANEOUS at 06:10

## 2024-10-15 RX ADMIN — HYDROCODONE BITARTRATE AND ACETAMINOPHEN 1 TABLET: 10; 325 TABLET ORAL at 12:10

## 2024-10-15 RX ADMIN — SEVELAMER CARBONATE 800 MG: 800 TABLET, FILM COATED ORAL at 06:10

## 2024-10-15 RX ADMIN — ATORVASTATIN CALCIUM 20 MG: 10 TABLET, FILM COATED ORAL at 07:10

## 2024-10-15 RX ADMIN — IOHEXOL 100 ML: 350 INJECTION, SOLUTION INTRAVENOUS at 11:10

## 2024-10-15 RX ADMIN — CEFTAROLINE FOSAMIL 200 MG: 600 POWDER, FOR SOLUTION INTRAVENOUS at 08:10

## 2024-10-15 RX ADMIN — CEFTAROLINE FOSAMIL 200 MG: 600 POWDER, FOR SOLUTION INTRAVENOUS at 06:10

## 2024-10-15 RX ADMIN — PANTOPRAZOLE SODIUM 40 MG: 40 TABLET, DELAYED RELEASE ORAL at 08:10

## 2024-10-15 RX ADMIN — SEVELAMER CARBONATE 800 MG: 800 TABLET, FILM COATED ORAL at 08:10

## 2024-10-15 RX ADMIN — SODIUM BICARBONATE 650 MG TABLET 1300 MG: at 08:10

## 2024-10-15 RX ADMIN — HYDROCODONE BITARTRATE AND ACETAMINOPHEN 1 TABLET: 10; 325 TABLET ORAL at 08:10

## 2024-10-15 RX ADMIN — DIATRIZOATE MEGLUMINE 250 ML: 300 INJECTION, SOLUTION INTRAVENOUS at 12:10

## 2024-10-15 RX ADMIN — SODIUM BICARBONATE 650 MG TABLET 1300 MG: at 02:10

## 2024-10-15 RX ADMIN — CEFTAROLINE FOSAMIL 200 MG: 600 POWDER, FOR SOLUTION INTRAVENOUS at 12:10

## 2024-10-15 RX ADMIN — VANCOMYCIN HYDROCHLORIDE 500 MG: 500 INJECTION, POWDER, LYOPHILIZED, FOR SOLUTION INTRAVENOUS at 07:10

## 2024-10-15 RX ADMIN — FOLIC ACID 1 MG: 1 TABLET ORAL at 08:10

## 2024-10-15 NOTE — PROGRESS NOTES
Infectious Disease  Progress Note    Patient Name: Fior Joya   MRN: [id number]   Admission Date: 10/3/2024   Hospital Length of Stay: 11 days  Attending Physician: Uriel Fletcher MD   Primary Care Provider: No, Primary Doctor     Isolation Status: Contact     Assessment/Plan:   69 y.o. female with a history significant for ESRD on hemodialysis through right IJ permanent catheter, chronic hydronephrosis with left nephrostomy tube, hypertension who was admitted on 10/3/2024 with lower back pain. Patient said that her symptoms started approximately 1 week ago with some neck pain that moved down to her shoulder. She then developed lower back pain that moved to her right leg. Patient also reports having cloudy drainage from her nephrostomy tube for the last 1 month. Patient reports worsening back/right flank pain for the last few days. Vital signs on admission showed temperature 97.2 F, heart rate 79, blood pressure 110/75. Laboratory workup showed WBC 99943, creatinine 6.70, lactic acid 1.8. Urinalysis showed more than 100 WBC. She had 2 sets of blood culture obtained which are growing MRSA. 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  The upper pole moiety is decompressed with a ureteral stent but the lower pole demonstrating moderate severe hydronephrosis, left nephrostomy ureteral stent drain with resolution of hydronephrosis. Patient underwent nephrostomy tube placement by IR into the inferior pole of the right renal collecting system.  Patient was also noted to MRSA bacteremia, she was started on vancomycin, on 10/07/2024, she appears to had worsening mental status, she has persistent leukocytosis.  ID is consulted for assistance in management.    10/14 - afebrile. blood cx in process   10/15 - off floor. afebrile. Blood cx peristently +. NM scan with increased update in b/l distal femur and tibia is not likely due  to infection. CT obained shows no abnormality. Recommend b/l tube exchange    1) Bacteremia  - MRSA  - suspect possible seeding of PCN tubes  - NM scan 10/12 - . Nonspecific relatively increased radiotracer uptake at the bilateral distal femur and proximal tibia.  Correlation with dedicated plain radiographs of this region recommended. No abnormal radiotracer localization within the visualized chest, abdomen, or pelvis.  - CT LE 10/15 - No appreciable acute osseous abnormality. symmetric uptake at the lower extremities on nuclear medicine exam most suggestive of aseptic marrow expansion.  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 Debris/filling defects within the base the urinary bladder  - MADELYN 10/8 negative  - HD line removed 10/8   - BCx 10/10 - MRSA 3/4  - BCx 10/14 - MRSA 4/4  - repeat in AM  - currently on vanco, goal 15-20, Rx to dose with HD  - currently on ceftaroline dual therapy     2)  Cancer  - new dx  SCC bladder  - hydronephrosis s/p ureteral stents and nephrostomy tubes  - Oncology consulted     3) Leukocytosis  - in setting of acute infection     ID will continue following. Please contact us with any questions or concerns.  Subjective:     Principal Problem: <principal problem not specified>     Interval History:   Appears improved today, no fevers, no chills, some issues with the PCN tube on the L being somewhat displaced      Review of Systems   Review of Systems   All other systems reviewed and are negative.       Objective:     Vital Signs (Most Recent):  Temp: 97.6 °F (36.4 °C) (10/15/24 0822)  Pulse: 60 (10/15/24 0822)  Resp: 20 (10/15/24 1226)  BP: (!) 137/56 (10/15/24 0822)  SpO2: 97 % (10/15/24 0822)  Vital Signs (24h Range):  Temp:  [97.4 °F (36.3 °C)-97.6 °F (36.4 °C)] 97.6 °F (36.4 °C)  Pulse:  [] 60  Resp:  [16-20] 20  SpO2:  [91 %-100 %] 97 %  BP: (131-148)/(54-75) 137/56      Weight:   Wt Readings from Last 1 Encounters:   10/10/24 (!) 140.2 kg (309 lb 1.4 oz)      Body mass index is Body mass index is 54.75 kg/m².     Estimated Creatinine Clearance: Estimated Creatinine Clearance: 14.1 mL/min (A) (based on SCr of 5.28 mg/dL (H)).     Lines/Drains/Airways       Central Venous Catheter Line  Duration                  Hemodialysis Catheter 10/11/24 left internal jugular 4 days              Drain  Duration                  Nephrostomy 06/28/24 1025 Left 8 Fr. 109 days         Nephrostomy 10/04/24 1506 Right 8 Fr. 10 days         Urethral Catheter 10/14/24 1250 16 Fr. 1 day              Peripheral Intravenous Line  Duration                  Peripheral IV - Single Lumen 10/13/24 1953 22 G Left;Distal Forearm 1 day                     Physical Exam  Physical Exam  Constitutional:       Appearance: Normal appearance. She is obese.   HENT:      Head: Normocephalic and atraumatic.      Mouth/Throat:      Pharynx: No oropharyngeal exudate or posterior oropharyngeal erythema.   Eyes:      Extraocular Movements: Extraocular movements intact.      Pupils: Pupils are equal, round, and reactive to light.   Cardiovascular:      Rate and Rhythm: Normal rate and regular rhythm.      Heart sounds: No murmur heard.     Comments: Site of removal of the right chest wall HD catheter is clean  Pulmonary:      Effort: No respiratory distress.      Breath sounds: No wheezing, rhonchi or rales.   Abdominal:      General: Bowel sounds are  normal. There is no distension.      Palpations: Abdomen is soft.      Tenderness: There is no abdominal tenderness.      Comments: Leonard PCN tubes bloody output   Genitourinary:     Comments: B/l PCNT bloody drainage    Mario - gross hematuria   Musculoskeletal:         General: No swelling or tenderness.      Cervical back: Neck supple. No rigidity or tenderness.      Comments: Still tenderness at the r hip    Lymphadenopathy:      Cervical: No cervical adenopathy.   Skin:     Findings: No lesion or rash.   Neurological:      General: No focal deficit present.      Mental Status: She is alert. Mental status is at baseline.      Cranial Nerves: No cranial nerve deficit.      Motor: No weakness.   Psychiatric:         Mood and Affect: Mood normal.         Behavior: Behavior normal.          Significant Labs: CBC:   Recent Labs   Lab 10/14/24  0529   WBC 19.12*   HGB 11.0*   HCT 34.5*        CMP:   Recent Labs   Lab 10/14/24  0529   *   K 5.3*   CL 89*   CO2 16*   BUN 51.2*   CREATININE 5.28*   CALCIUM 9.0   ALBUMIN 2.2*       Microbiology Results (last 7 days)       Procedure Component Value Units Date/Time    Blood Culture [[id number]]  (Abnormal) Collected: 10/14/24 0529    Order Status: Completed Specimen: Blood from Arm, Right Updated: 10/15/24 0935     GRAM STAIN Gram Positive Cocci, probable Staphylococcus      Seen in gram stain of broth only      2 of 2 bottles positive    Blood Culture [[id number]]  (Abnormal) Collected: 10/14/24 0529    Order Status: Completed Specimen: Blood from Hand, Right Updated: 10/15/24 0902     GRAM STAIN Gram Positive Cocci, probable Staphylococcus      Seen in gram stain of broth only      2 of 2 bottles positive    Blood Culture [[id number]]  (Normal) Collected: 10/09/24 1728    Order Status: Completed Specimen: Blood Updated: 10/14/24 1900     Blood Culture No Growth at 5 days    Blood Culture [[id number]]  (Abnormal)  (Susceptibility) Collected: 10/10/24 1605     Order Status: Completed Specimen: Blood Updated: 10/13/24 0628     Blood Culture Methicillin resistant Staphylococcus aureus     GRAM STAIN Seen in gram stain of broth only      2 of 2 bottles positive      Gram Positive Cocci, probable Staphylococcus    Blood Culture [[id number]]  (Abnormal)  (Susceptibility) Collected: 10/10/24 1605    Order Status: Completed Specimen: Blood Updated: 10/13/24 0627     Blood Culture Methicillin resistant Staphylococcus aureus     GRAM STAIN 1 of 2 Anaerobic bottles positive      Gram Positive Cocci, probable Staphylococcus      Seen in gram stain of broth only    Blood Culture [[id number]]  (Abnormal)  (Susceptibility) Collected: 10/09/24 1728    Order Status: Completed Specimen: Blood Updated: 10/13/24 0625     Blood Culture Methicillin resistant Staphylococcus aureus     GRAM STAIN Gram Positive Cocci, probable Staphylococcus      Seen in gram stain of broth only      1 of 2 Aerobic bottles positive    Blood Culture [[id number]]  (Abnormal)  (Susceptibility) Collected: 10/07/24 2103    Order Status: Completed Specimen: Blood Updated: 10/12/24 0658     Blood Culture Methicillin resistant Staphylococcus aureus     GRAM STAIN 1 of 1 Pediatric bottle positive      Gram Positive Cocci, probable Staphylococcus      Seen in gram stain of broth only    BCID2 Panel [[id number]] Collected: 10/10/24 1605    Order Status: Canceled Specimen: Blood Updated: 10/11/24 2011    BCID2 Panel [[id number]]  (Abnormal) Collected: 10/09/24 1728    Order Status: Completed Specimen: Blood Updated: 10/10/24 1716     CTX-M (ESBL ) N/A     IMP (Cabapenemase ) N/A     KPC resistance gene (Carbapenemase ) N/A     mcr-1 N/A     mecA ID N/A     Comment: Note: Antimicrobial resistance can occur via multiple mechanisms. A Not Detected result for antimicrobial resistance gene(s) does not indicate antimicrobial susceptibility. Subculturing is required for species identification and  susceptibility testing of   isolates.        mecA/C and MREJ (MRSA) gene Detected     NDM (Carbapenemase ) N/A     OXA-48-like (Carbapenemase ) N/A     Royal/B (VRE gene) N/A     VIM (Carbapenemase ) N/A     Enterococcus faecalis Not Detected     Enterococcus faecium Not Detected     Listeria monocytogenes Not Detected     Staphylococcus spp. Detected     Staphylococcus aureus Detected     Staphylococcus epidermidis Not Detected     Staphylococcus lugdunensis Not Detected     Streptococcus spp. Not Detected     Streptococcus agalactiae (Group B) Not Detected     Streptococcus pneumoniae Not Detected     Streptococcus pyogenes (Group A) Not Detected     Acinetobacter calcoaceticus/baumannii complex Not Detected     Bacteroides fragilis Not Detected     Enterobacterales Not Detected     Enterobacter cloacae complex Not Detected     Escherichia coli Not Detected     Klebsiella aerogenes Not Detected     Klebsiella oxytoca Not Detected     Klebsiella pneumoniae group Not Detected     Proteus spp. Not Detected     Salmonella spp. Not Detected     Serratia marcescens Not Detected     Haemophilus influenzae Not Detected     Neisseria meningitidis Not Detected     Pseudomonas aeruginosa Not Detected     Stenotrophomonas maltophilia Not Detected     Candida albicans Not Detected     Candida auris Not Detected     Candida glabrata Not Detected     Candida krusei Not Detected     Candida parapsilosis Not Detected     Candida tropicalis Not Detected     Cryptococcus neoformans/gattii Not Detected    Narrative:      The [employer] BCID2 Panel is a multiplexed nucleic acid test intended for the use with [employer]® 2.0 or [employer]® [employer] Systems for the simultaneous qualitative detection and identification of multiple bacterial and yeast nucleic acids and select genetic determinants associated with antimicrobial resistance.  The [employer] BCID2 Panel test is performed directly on blood culture  samples identified as positive by a continuous monitoring blood culture system.  Results are intended to be interpreted in conjunction with Gram stain results.    Blood Culture [[id number]]  (Abnormal)  (Susceptibility) Collected: 10/07/24 2103    Order Status: Completed Specimen: Blood Updated: 10/10/24 0650     Blood Culture Methicillin resistant Staphylococcus aureus     GRAM STAIN Gram Positive Cocci, probable Staphylococcus      Seen in gram stain of broth only      1 of 1 Pediatric bottle positive             Significant Imaging: I have reviewed all pertinent imaging results/findings within the past 24 hours.      Milo Campbell MD  Infectious Disease  [facility]

## 2024-10-15 NOTE — PROGRESS NOTES
[facility] Hospital Medicine Progress Note        Chief Complaint: Inpatient Follow-up for     HPI: 69-year-old female with past medical history of hypertension, hyperlipidemia, chronic hydronephrosis with left nephrostomy tube, right ureteral stent, end-stage renal disease on hemodialysis, chronic anemia presented with right flank pain for the past 5 days and also reported cloudy urine output from the urostomy CT with IV contrast showed moderate to severe hydronephrosis with enlarged right pelvic lymph node increase in size from the prior with suspected malignancy also showed duplicated collecting system with dilation of the right lower pole urology was consulted patient is status post cystoscopy with right stent exchange and bladder biopsy and they were not able to identify the left ureteral orifice therefore had left percutaneous nephrostomy. Blood culture is growing Gram-positive cocci 2/2 bottles initially patient was started on cefepime but now we added vancomycin repeat blood cultures ordered  Id was consulted MRI of the CT and L-spine was ordered that was negative for any abscess blood culture is growing MRSA TTE as such did not show any vegetation cardiology consulted for MADELYN  Bilateral hydronephrosis Status post right upper lobe ureteral stent exchange and right lower pole nephrostomy tube placement  Cultures remains persistently positive.  Patient was slightly confused on October 7 so we had ordered CT of the head without contrast that was negative, ammonia, TSH everything was normal we also ordered CT of the abdomen and pelvis with contrast that was negative for any abscess cardiology consulted for MADELYN.  The was negative for endocarditis.  Dialysis catheter was removed  Tagged WBC scan showing increased uptake at the bilateral distal femur and proximal tibia.    Interval Hx:   Yesterday x-rays was ordered of the area of concern on WBC scan but she refused as she was in  pain.  I will change that to CT scan as we will likely end up needing CT scan to eval deeper with premedication with 1 mg Dilaudid.  She states that she otherwise will not do the procedure without pain medications.      Objective/physical exam:  General: In no acute distress, afebrile  Chest: Clear to auscultation bilaterally  Heart: RRR, +S1, S2, no appreciable murmur  Abdomen: Soft, nontender, BS +, bilateral nephrostomy tubes with bloody output  MSK: Warm, 1+ pitting bilateral lower extremity edema, no clubbing or cyanosis  Neurologic:  Alert awake oriented x4    VITAL SIGNS: 24 HRS MIN & MAX LAST   Temp  Min: 97.4 °F (36.3 °C)  Max: 97.9 °F (36.6 °C) 97.6 °F (36.4 °C)   BP  Min: 131/54  Max: 151/67 (!) 137/56   Pulse  Min: 55  Max: 100  60   Resp  Min: 16  Max: 19 19   SpO2  Min: 91 %  Max: 100 % 97 %     I have reviewed the following labs:  Recent Labs   Lab 10/11/24  0457 10/13/24  0526 10/14/24  0529   WBC 18.83* 20.63* 19.12*   RBC 3.38* 3.45* 3.75*   HGB 9.9* 10.1* 11.0*   HCT 30.7* 31.7* 34.5*   MCV 90.8 91.9 92.0   MCH 29.3 29.3 29.3   MCHC 32.2* 31.9* 31.9*   RDW 15.7 15.8 15.4    288 326   MPV 11.4* 11.5* 10.9*     Recent Labs   Lab 10/11/24  0457 10/13/24  0526 10/14/24  0529   * 127* 125*   K 4.5 4.6 5.3*   CL 88* 92* 89*   CO2 21* 17* 16*   BUN 55.1* 39.2* 51.2*   CREATININE 5.32* 4.10* 5.28*   CALCIUM 8.5 8.5 9.0   MG 2.10 1.90  --    ALBUMIN 2.0* 2.0* 2.2*   ALKPHOS  --  151*  --    ALT  --  12  --    AST  --  12  --    BILITOT  --  0.3  --      Microbiology Results (last 7 days)       Procedure Component Value Units Date/Time    Blood Culture [[id number]]  (Abnormal) Collected: 10/14/24 0529    Order Status: Completed Specimen: Blood from Hand, Right Updated: 10/15/24 0902     GRAM STAIN Gram Positive Cocci, probable Staphylococcus      Seen in gram stain of broth only      2 of 2 bottles positive    Blood Culture [[id number]]  (Normal) Collected: 10/14/24 0529    Order Status:  Completed Specimen: Blood from Arm, Right Updated: 10/15/24 0900     Blood Culture No Growth At 24 Hours    Blood Culture [[id number]]  (Normal) Collected: 10/09/24 1728    Order Status: Completed Specimen: Blood Updated: 10/14/24 1900     Blood Culture No Growth at 5 days    Blood Culture [[id number]]  (Abnormal)  (Susceptibility) Collected: 10/10/24 1605    Order Status: Completed Specimen: Blood Updated: 10/13/24 0628     Blood Culture Methicillin resistant Staphylococcus aureus     GRAM STAIN Seen in gram stain of broth only      2 of 2 bottles positive      Gram Positive Cocci, probable Staphylococcus    Blood Culture [[id number]]  (Abnormal)  (Susceptibility) Collected: 10/10/24 1605    Order Status: Completed Specimen: Blood Updated: 10/13/24 0627     Blood Culture Methicillin resistant Staphylococcus aureus     GRAM STAIN 1 of 2 Anaerobic bottles positive      Gram Positive Cocci, probable Staphylococcus      Seen in gram stain of broth only    Blood Culture [[id number]]  (Abnormal)  (Susceptibility) Collected: 10/09/24 1728    Order Status: Completed Specimen: Blood Updated: 10/13/24 0625     Blood Culture Methicillin resistant Staphylococcus aureus     GRAM STAIN Gram Positive Cocci, probable Staphylococcus      Seen in gram stain of broth only      1 of 2 Aerobic bottles positive    Blood Culture [[id number]]  (Abnormal)  (Susceptibility) Collected: 10/07/24 2103    Order Status: Completed Specimen: Blood Updated: 10/12/24 0658     Blood Culture Methicillin resistant Staphylococcus aureus     GRAM STAIN 1 of 1 Pediatric bottle positive      Gram Positive Cocci, probable Staphylococcus      Seen in gram stain of broth only    BCID2 Panel [[id number]] Collected: 10/10/24 1605    Order Status: Canceled Specimen: Blood Updated: 10/11/24 2011    BCID2 Panel [[id number]]  (Abnormal) Collected: 10/09/24 1728    Order Status: Completed Specimen: Blood Updated: 10/10/24 1716     CTX-M (ESBL ) N/A     IMP  (Cabapenemase ) N/A     KPC resistance gene (Carbapenemase ) N/A     mcr-1 N/A     mecA ID N/A     Comment: Note: Antimicrobial resistance can occur via multiple mechanisms. A Not Detected result for antimicrobial resistance gene(s) does not indicate antimicrobial susceptibility. Subculturing is required for species identification and susceptibility testing of   isolates.        mecA/C and MREJ (MRSA) gene Detected     NDM (Carbapenemase ) N/A     OXA-48-like (Carbapenemase ) N/A     Royal/B (VRE gene) N/A     VIM (Carbapenemase ) N/A     Enterococcus faecalis Not Detected     Enterococcus faecium Not Detected     Listeria monocytogenes Not Detected     Staphylococcus spp. Detected     Staphylococcus aureus Detected     Staphylococcus epidermidis Not Detected     Staphylococcus lugdunensis Not Detected     Streptococcus spp. Not Detected     Streptococcus agalactiae (Group B) Not Detected     Streptococcus pneumoniae Not Detected     Streptococcus pyogenes (Group A) Not Detected     Acinetobacter calcoaceticus/baumannii complex Not Detected     Bacteroides fragilis Not Detected     Enterobacterales Not Detected     Enterobacter cloacae complex Not Detected     Escherichia coli Not Detected     Klebsiella aerogenes Not Detected     Klebsiella oxytoca Not Detected     Klebsiella pneumoniae group Not Detected     Proteus spp. Not Detected     Salmonella spp. Not Detected     Serratia marcescens Not Detected     Haemophilus influenzae Not Detected     Neisseria meningitidis Not Detected     Pseudomonas aeruginosa Not Detected     Stenotrophomonas maltophilia Not Detected     Candida albicans Not Detected     Candida auris Not Detected     Candida glabrata Not Detected     Candida krusei Not Detected     Candida parapsilosis Not Detected     Candida tropicalis Not Detected     Cryptococcus neoformans/gattii Not Detected    Narrative:      The [employer] BCID2 Panel is a multiplexed  nucleic acid test intended for the use with [employer]® [employer] or [employer]® [employer]® [employer] for the simultaneous qualitative detection and identification of multiple bacterial and yeast nucleic acids and select genetic determinants associated with antimicrobial resistance.  The [employer] BCID2 Panel test is performed directly on blood culture samples identified as positive by a continuous monitoring blood culture system.  Results are intended to be interpreted in conjunction with Gram stain results.    Blood Culture [[id number]]  (Abnormal)  (Susceptibility) Collected: 10/07/24 2103    Order Status: Completed Specimen: Blood Updated: 10/10/24 0650     Blood Culture Methicillin resistant Staphylococcus aureus     GRAM STAIN Gram Positive Cocci, probable Staphylococcus      Seen in gram stain of broth only      1 of 1 Pediatric bottle positive             See below for Radiology    Assessment/Plan:  Bilateral hydronephrosis Status post right upper lobe ureteral stent exchange and right lower pole nephrostomy tube placement  Persistent MRSA bacteremia  Encephalopathy most likely metabolic-resolved  Bladder lesion-carcinoma with extensive squamous differentiation  Left percutaneous nephrostomy  End-stage renal disease on hemodialysis  Metabolic acidosis  Transaminitis, improving  History of hypertension, hyperlipidemia, bilateral chronic hydronephrosis with left nephrostomy tube in place and right ureter stent, ESRD on HD TTS, and chronic anemia       Oncology following.    Infectious Disease following.  Now on vancomycin and Teflaro.  WBC scan showing increased uptake in the bilateral distal femur and proximal tibia, CT lower extremity ordered with and without contrast.  Urology following.  Cystogram ordered.  Temporary dialysis catheter placement, hold off on tunneled until blood cultures are cleared.    Prophylaxis: Lovenox  VTE prophylaxis:     Patient condition:  Stable/Fair/Guarded/ Serious/  Critical    Anticipated discharge and Disposition:         All diagnosis and differential diagnosis have been reviewed; assessment and plan has been documented; I have personally reviewed the labs and test results that are presently available; I have reviewed the patients medication list; I have reviewed the consulting providers response and recommendations. I have reviewed or attempted to review medical records based upon their availability    All of the patient's questions have been  addressed and answered. Patient's is agreeable to the above stated plan. I will continue to monitor closely and make adjustments to medical management as needed.    Portions of this note dictated using EMR integrated voice recognition software, and may be subject to voice recognition errors not corrected at proofreading. Please contact writer for clarification if needed.   _____________________________________________________________________    Malnutrition Status:    Scheduled Med:   atorvastatin  20 mg Oral QHS    ceftaroline (Teflaro) IV (PEDS and ADULTS)  200 mg Intravenous Q8H    enoxaparin  30 mg Subcutaneous Daily    folic acid  1 mg Oral Daily    ondansetron  4 mg Intravenous Once    pantoprazole  40 mg Oral Daily    sertraline  25 mg Oral Daily    sevelamer carbonate  800 mg Oral TID WM    sodium bicarbonate  1,300 mg Oral TID      Continuous Infusions:       PRN Meds:    Current Facility-Administered Medications:     acetaminophen, 650 mg, Oral, Q4H PRN    albuterol-ipratropium, 3 mL, Nebulization, Once PRN    aluminum-magnesium hydroxide-simethicone, 30 mL, Oral, QID PRN    diphenhydrAMINE, 25 mg, Intravenous, Q6H PRN    heparin (porcine), 3,000 Units, Intravenous, PRN    HYDROcodone-acetaminophen, 1 tablet, Oral, Q4H PRN    HYDROcodone-acetaminophen, 1 tablet, Oral, Q6H PRN    HYDROmorphone, 1 mg, Intravenous, Once PRN    melatonin, 6 mg, Oral, Nightly PRN    metoclopramide, 10 mg, Intravenous, Q10 Min PRN    ondansetron,  4 mg, Intravenous, Q4H PRN    polyethylene glycol, 17 g, Oral, BID PRN    prochlorperazine, 5 mg, Intravenous, Q6H PRN    senna-docusate 8.6-50 mg, 2 tablet, Oral, BID PRN    sodium chloride 0.9%, 10 mL, Intravenous, PRN    vancomycin - pharmacy to dose, , Intravenous, pharmacy to manage frequency     Radiology:  I have personally reviewed the following imaging and agree with the radiologist.     NM Inflammatory Whole Body  EXAMINATION  NM INFLAMMATORY WHOLE BODY    CLINICAL HISTORY  Persistent bacteremia and leukocytosis.  Unclear source.;    TECHNIQUE  Delayed planar scintigrams were acquired in anterior projections following the uneventful intravenous administration white blood cells labeled with 25.3 mCi non-HEU Tc-99m HMPAO (Ceretec).    COMPARISON  None available at the time of initial interpretation.    FINDINGS  There is no definite abnormal radiotracer localization within the visualize chest, abdomen, or pelvis.  However, there is relatively increased marrow activity of the bilateral distal femur and bilateral proximal tibia.    Normal physiologic distribution of the radiotracer is appreciated within the liver, spleen, kidneys, and bone marrow. Renal excretion is noted, with activity present in the urinary bladder.    IMPRESSION  1. Nonspecific relatively increased radiotracer uptake at the bilateral distal femur and proximal tibia.  Correlation with dedicated plain radiographs of this region recommended.  2. No abnormal radiotracer localization within the visualized chest, abdomen, or pelvis.    Electronically signed by: Malik Santos  Date:    10/14/2024  Time:    14:18      Uriel Fletcher MD  Department of Hospital Medicine   [facility]   10/15/2024

## 2024-10-15 NOTE — PROGRESS NOTES
Pharmacokinetic Assessment Follow Up: IV Vancomycin    Vancomycin serum concentration assessment(s):    The random level was drawn correctly and can be used to guide therapy at this time. The measurement is within the desired definitive target range of 15 to 20 mcg/mL.    Vancomycin Regimen Plan:    Re-dose with vancomycin 500mg x1 post HD.  Re-dose when the random level is less than 20 mcg/mL, next level to be drawn at 04:30 on 10/17 prior to next hemodialysis.     Drug levels (last 3 results):  Recent Labs   Lab Result Units 10/13/24  0526 10/15/24  0513   Vancomycin Random ug/ml 19.5 18.2       Pharmacy will continue to follow and monitor vancomycin.    Please contact pharmacy at extension [phone] for questions regarding this assessment.    Thank you for the consult,   Marcella Arroyo       Patient brief summary:  Fior Joya is a 69 y.o. female initiated on antimicrobial therapy with IV Vancomycin for treatment of bacteremia    The patient's current regimen is pulse dose.    Drug Allergies:   Review of patient's allergies indicates:   Allergen Reactions    Asa [aspirin] Anaphylaxis    Penicillins      Received ceftriaxone from 6/27, no reactions        Actual Body Weight:   140.2kg    Renal Function:   Estimated Creatinine Clearance: 14.1 mL/min (A) (based on SCr of 5.28 mg/dL (H)).,     Dialysis Method (if applicable):  intermittent HD    CBC (last 72 hours):  Recent Labs   Lab Result Units 10/13/24  0526 10/14/24  0529   WBC x10(3)/mcL 20.63* 19.12*   Hgb g/dL 10.1* 11.0*   Hct % 31.7* 34.5*   Platelet x10(3)/mcL 288 326   Mono % % 7.9 7.2   Eos % % 1.3 1.3   Basophil % % 0.4 0.4       Metabolic Panel (last 72 hours):  Recent Labs   Lab Result Units 10/13/24  0526 10/14/24  0529   Sodium mmol/L 127* 125*   Potassium mmol/L 4.6 5.3*   Chloride mmol/L 92* 89*   CO2 mmol/L 17* 16*   Glucose mg/dL 79* 66*   Blood Urea Nitrogen mg/dL 39.2* 51.2*   Creatinine mg/dL 4.10* 5.28*   Albumin g/dL 2.0* 2.2*   Bilirubin Total  mg/dL 0.3  --    ALP unit/L 151*  --    AST unit/L 12  --    ALT unit/L 12  --    Magnesium Level mg/dL 1.90  --    Phosphorus Level mg/dL 4.9* 6.0*       Vancomycin Administrations:  vancomycin given in the last 96 hours                     vancomycin (VANCOCIN) 500 mg in D5W 100 mL IVPB (MB+) (mg) 500 mg New Bag 10/13/24 1426    vancomycin (VANCOCIN) 500 mg in D5W 100 mL IVPB (MB+) (mg) 500 mg New Bag 10/12/24 2014                    Microbiologic Results:  Microbiology Results (last 7 days)       Procedure Component Value Units Date/Time    Blood Culture [[id number]]  (Abnormal) Collected: 10/14/24 0529    Order Status: Completed Specimen: Blood from Arm, Right Updated: 10/15/24 0935     GRAM STAIN Gram Positive Cocci, probable Staphylococcus      Seen in gram stain of broth only      2 of 2 bottles positive    Blood Culture [[id number]]  (Abnormal) Collected: 10/14/24 0529    Order Status: Completed Specimen: Blood from Hand, Right Updated: 10/15/24 0902     GRAM STAIN Gram Positive Cocci, probable Staphylococcus      Seen in gram stain of broth only      2 of 2 bottles positive    Blood Culture [[id number]]  (Normal) Collected: 10/09/24 1728    Order Status: Completed Specimen: Blood Updated: 10/14/24 1900     Blood Culture No Growth at 5 days    Blood Culture [[id number]]  (Abnormal)  (Susceptibility) Collected: 10/10/24 1605    Order Status: Completed Specimen: Blood Updated: 10/13/24 0628     Blood Culture Methicillin resistant Staphylococcus aureus     GRAM STAIN Seen in gram stain of broth only      2 of 2 bottles positive      Gram Positive Cocci, probable Staphylococcus    Blood Culture [[id number]]  (Abnormal)  (Susceptibility) Collected: 10/10/24 1605    Order Status: Completed Specimen: Blood Updated: 10/13/24 0627     Blood Culture Methicillin resistant Staphylococcus aureus     GRAM STAIN 1 of 2 Anaerobic bottles positive      Gram Positive Cocci, probable Staphylococcus      Seen in gram stain of  broth only    Blood Culture [[id number]]  (Abnormal)  (Susceptibility) Collected: 10/09/24 1728    Order Status: Completed Specimen: Blood Updated: 10/13/24 0625     Blood Culture Methicillin resistant Staphylococcus aureus     GRAM STAIN Gram Positive Cocci, probable Staphylococcus      Seen in gram stain of broth only      1 of 2 Aerobic bottles positive    Blood Culture [[id number]]  (Abnormal)  (Susceptibility) Collected: 10/07/24 2103    Order Status: Completed Specimen: Blood Updated: 10/12/24 0658     Blood Culture Methicillin resistant Staphylococcus aureus     GRAM STAIN 1 of 1 Pediatric bottle positive      Gram Positive Cocci, probable Staphylococcus      Seen in gram stain of broth only    BCID2 Panel [[id number]] Collected: 10/10/24 1605    Order Status: Canceled Specimen: Blood Updated: 10/11/24 2011    BCID2 Panel [[id number]]  (Abnormal) Collected: 10/09/24 1728    Order Status: Completed Specimen: Blood Updated: 10/10/24 1716     CTX-M (ESBL ) N/A     IMP (Cabapenemase ) N/A     KPC resistance gene (Carbapenemase ) N/A     mcr-1 N/A     mecA ID N/A     Comment: Note: Antimicrobial resistance can occur via multiple mechanisms. A Not Detected result for antimicrobial resistance gene(s) does not indicate antimicrobial susceptibility. Subculturing is required for species identification and susceptibility testing of   isolates.        mecA/C and MREJ (MRSA) gene Detected     NDM (Carbapenemase ) N/A     OXA-48-like (Carbapenemase ) N/A     Royal/B (VRE gene) N/A     VIM (Carbapenemase ) N/A     Enterococcus faecalis Not Detected     Enterococcus faecium Not Detected     Listeria monocytogenes Not Detected     Staphylococcus spp. Detected     Staphylococcus aureus Detected     Staphylococcus epidermidis Not Detected     Staphylococcus lugdunensis Not Detected     Streptococcus spp. Not Detected     Streptococcus agalactiae (Group B) Not Detected      Streptococcus pneumoniae Not Detected     Streptococcus pyogenes (Group A) Not Detected     Acinetobacter calcoaceticus/baumannii complex Not Detected     Bacteroides fragilis Not Detected     Enterobacterales Not Detected     Enterobacter cloacae complex Not Detected     Escherichia coli Not Detected     Klebsiella aerogenes Not Detected     Klebsiella oxytoca Not Detected     Klebsiella pneumoniae group Not Detected     Proteus spp. Not Detected     Salmonella spp. Not Detected     Serratia marcescens Not Detected     Haemophilus influenzae Not Detected     Neisseria meningitidis Not Detected     Pseudomonas aeruginosa Not Detected     Stenotrophomonas maltophilia Not Detected     Candida albicans Not Detected     Candida auris Not Detected     Candida glabrata Not Detected     Candida krusei Not Detected     Candida parapsilosis Not Detected     Candida tropicalis Not Detected     Cryptococcus neoformans/gattii Not Detected    Narrative:      The [employer] BCID2 Panel is a multiplexed nucleic acid test intended for the use with [employer] or [employer] for the simultaneous qualitative detection and identification of multiple bacterial and yeast nucleic acids and select genetic determinants associated with antimicrobial resistance.  The [employer] BCID2 Panel test is performed directly on blood culture samples identified as positive by a continuous monitoring blood culture system.  Results are intended to be interpreted in conjunction with Gram stain results.    Blood Culture [[id number]]  (Abnormal)  (Susceptibility) Collected: 10/07/24 2103    Order Status: Completed Specimen: Blood Updated: 10/10/24 0650     Blood Culture Methicillin resistant Staphylococcus aureus     GRAM STAIN Gram Positive Cocci, probable Staphylococcus      Seen in gram stain of broth only      1 of 1 Pediatric bottle positive

## 2024-10-15 NOTE — PT/OT/SLP PROGRESS
Attempted OT session this AM at 0905. Pt refused to participate despite max encouragement. C/o back pain however declined offers for repositioning. Education provided to patient on importance of mobility and getting OOB. Will f/u as schedule permits.

## 2024-10-15 NOTE — PT/OT/SLP PROGRESS
Physical Therapy      Patient Name:  Fior Joya   MRN:  [id number]    Attempted x 2 to see pt for PT session today. On AM attempt, pt refused to participate despite max encouragement; c/o back pain however declined offers for repositioning, education provided to patient on importance of mobility and getting OOB. On PM attempt, pt off floor for dialysis. Will f/u as schedule permits.

## 2024-10-15 NOTE — PROGRESS NOTES
.UROLOGY  PROGRESS  NOTE    Fior Joya 1955  [id number]  10/15/2024    POD 11 cystoscopy, right stent exchange upper pole moiety, bladder biopsy; right nephrostomy tube placement  with IR    Refused cystogram yesterday  Agreed to CT today with pain medication prior, plan for CT cystogram at same time  Patient off unit getting scans during rounds    VSS, afebrile  600 mL urethral Mario  25 mL left nephrostomy   25 mL right nephrostomy  No labs this morning  Repeat BC 10/10 with persistent MRSA    Exam:    Off unit       Recent Results (from the past 24 hours)   Vancomycin, Random    Collection Time: 10/15/24  5:13 AM   Result Value Ref Range    Vancomycin Random 18.2 15.0 - 20.0 ug/ml     US Retroperitoneal Limited [[id number]] Resulted: 10/13/24 1407   Order Status: Completed Updated: 10/13/24 1410   Narrative:     EXAMINATION:  US RETROPERITONEAL LIMITED    CLINICAL HISTORY:  Check placement of left nephrostomy tube to make sure it is still within the collecting system;    TECHNIQUE:  Retroperitoneal ultrasound.  Limited.    COMPARISON:  CT abdomen pelvis 10/08/2024    FINDINGS:  LIMITATIONS: Exam limited due to poor acoustic window related to shadowing bowel gas and/or body habitus.    RIGHT KIDNEY: The right kidney measures 15 cm.  Duplex right renal collecting system.  Upper pole moiety decompressed.  Shadowing stent at the upper pole moiety partially visible.  Hydronephrosis at the lower pole moiety.  Similar to prior CT.    LEFT KIDNEY: The left kidney measures 11 cm. Moderate to severely obscured by shadowing.Faintly visible stent in the region of the left renal pelvis.  No yovany hydronephrosis.  Component of mild pelviectasis not excluded.    BLADDER: Not imaged   Impression:       Duplex right renal collecting system with unchanged hydronephrosis at the lower pole moiety    Stent is faintly visible in the region of the left renal pelvis.  Left kidney is moderately obscured by shadowing which obscures  evaluation.      Electronically signed by: Deidra Dobson  Date: 10/13/2024  Time: 14:07   CT Abdomen Pelvis With IV Contrast NO Oral Contrast [[id number]]Resulted: 10/08/24 0649Order Status: CompletedUpdated: 10/08/24 0651Narrative:  EXAMINATION:  CT ABDOMEN PELVIS WITH IV CONTRAST    CLINICAL HISTORY:  Abdominal abscess/infection suspected;    TECHNIQUE:  Helically acquired images with axial, sagittal and coronal reformations were obtained from the lung bases to the pubic symphysis after the IV administration of contrast.    Automated tube current modulation, weight-based exposure dosing, and/or iterative reconstruction technique utilized to reach lowest reasonably achievable exposure rate.    DLP: 1526 mGy*cm    COMPARISON:  CT abdomen pelvis 10/03/2024    FINDINGS:  HEART: Cardiomegaly.  There are calcifications in the region the aortic valve and mitral valve annulus.    LUNG BASES: Basilar atelectasis.    LIVER: Normal attenuation. No appreciable focal hepatic lesion.    BILIARY: Vicarious excretion of contrast at the gallbladder.    PANCREAS: Evaluation for pancreatitis limited given mild anasarca and body habitus.    SPLEEN: Normal in size    ADRENALS: No mass.    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  Right percutaneous nephrostomy at the upper pole moiety.  Right ureteral stent at the upper pole moiety.  Decompressed upper pole moiety.  Hydronephrotic lower pole moiety with AP diameter of the renal pelvis measuring approximately 3.5 cm, unchanged.  The right lower pole ureter is dilated to the level of the ureterovesical junction.  Left percutaneous nephroureteral stent.  No left hydronephrosis.    GI TRACT/MESENTERY:  No evidence of bowel obstruction or inflammation.    PERITONEUM: No free fluid.No free air.    LYMPH NODES: Presumed reactive retroperitoneal lymph nodes.    VASCULATURE: Aortoiliac atherosclerosis.    BLADDER: Nondistended bladder limits CT  evaluation    REPRODUCTIVE ORGANS: Normal as visualized.    SOFT TISSUES: Body wall edema.    BONES: No acute osseous abnormality.    LIMITATIONS: Exam limited by artifact related to patient body habitus, positioning, dose lowering technique and/or motion.  Impression:    1. No significant interval change compared to previous exam.  Ureteral stents in place.  2. Duplicated right kidney and right ureter with persistent hydroureteronephrosis of the lower pole moiety.      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  -Discussed with nursing        Donna Varghese [facility]

## 2024-10-15 NOTE — PLAN OF CARE
Problem: Adult Inpatient Plan of Care  Goal: Plan of Care Review  Outcome: Progressing  Goal: Patient-Specific Goal (Individualized)  Outcome: Progressing  Goal: Absence of Hospital-Acquired Illness or Injury  Outcome: Progressing  Goal: Optimal Comfort and Wellbeing  Outcome: Progressing  Goal: Readiness for Transition of Care  Outcome: Progressing     Problem: Bariatric Environmental Safety  Goal: Safety Maintained with Care  Outcome: Progressing     Problem: Skin Injury Risk Increased  Goal: Skin Health and Integrity  Outcome: Progressing     Problem: Hemodialysis  Goal: Safe, Effective Therapy Delivery  Outcome: Progressing  Goal: Effective Tissue Perfusion  Outcome: Progressing  Goal: Absence of Infection Signs and Symptoms  Outcome: Progressing     Problem: Wound  Goal: Optimal Coping  Outcome: Progressing  Goal: Optimal Functional Ability  Outcome: Progressing  Goal: Improved Oral Intake  Outcome: Progressing  Goal: Optimal Pain Control and Function  Outcome: Progressing  Goal: Skin Health and Integrity  Outcome: Progressing  Goal: Optimal Wound Healing  Outcome: Progressing

## 2024-10-15 NOTE — PROGRESS NOTES
Nephrology  Note    Patient Name: Fior Joya  Age: 69 y.o.  : 1955  MRN: [id number]  Admission Date: 10/3/2024        Fior Joya is a 69 y.o. female who presents with right flank pain.  Past medical history significant for ESRD on hemodialysis TTS at [facility] via right IJ PermCath, chronic hydronephrosis with left nephrostomy tube in place, and stents to right ureter, hypertension, anemia, hyperphosphatemia, and hyperlipidemia.  Patient presents with worsening right flank pain.  She states the pain started in her right neck, in his now in her right flank/hip area.  Patient was started on hemodialysis in 2024 after being found to have significant acute kidney injury and hydronephrosis.  She has been maintained on hemodialysis at [facility] on a TTS schedule.  Prior to hospitalization her last dialysis was on 10/01/2024.  Nephrology consulted for ESRD management.  In the emergency department she was noted to have low blood pressure and has been given IV fluids.  Urology has also been consulted for concern hydronephrosis needing stent exchange.  She has also been started on antibiotics for possible UTI.  She has had nausea, and vomiting.  No chest pain, shortness of breath, or lower extremity edema.    10/07/2024  Chart reviewed.  Weekend events noted.  Patient has been NPO at least since her admission to the hospital and also has no IV fluids.  Lying down in the bed.  Having jerky movements all over the body and uncomfortable.  Now she has nephrostomy tube on both sides.  Both draining.  Serosanguineous fluid noted.  Patient is very slow to answer questions but did recognize me by name and she knows her own name.     10/08/2024  No acute events overnight.  Seen on hemodialysis.  She was endorsing shortness of breath, and has some lower extremity edema.    10/09/2024   Mentally she has cleared up completely.  Complains of pains all over the body for which she is on p.r.n. pain medications.  Also  complains of being nervous and on some anxiety medication.  No shortness of breath.  Tunneled dialysis catheter was removed yesterday.    10/10/2024  No acute events overnight.  No chest pain, shortness of breath, abdominal pain, nausea, vomiting, or lower extremity edema.  Found to have bladder cancer by Urology.    10/11/2024  No acute events overnight.  No new complaints.  Blood cultures remain positive.  No chest pain, shortness of breath, nausea, vomiting.    10/12/2024  Patient had Vas-Cath placed yesterday, and she was re-initiated on hemodialysis.  Dialyzed yesterday with 1.5 L UF.  No new complaints today.  No chest pain, shortness of breath, nausea, vomiting.  She does have mild lower extremity edema.    10/14/2024   Complains of pain all over the body.  No fever or chills.  Does not like her diet.  No nausea vomiting.  No shortness of breath.  No chest pains.  No cough cold congestion.    10/15/2024   Now patient also has indwelling Mario catheter.  Urology have some tagged WBC  scan and cystogram planned.  Patient will also need dialysis today.  No new complaints.    Current Facility-Administered Medications   Medication Dose Route Frequency Provider Last Rate Last Admin    acetaminophen tablet 650 mg  650 mg Oral Q4H PRN Tawana Loaiza MD   650 mg at 10/14/24 2018    albuterol-ipratropium 2.5 mg-0.5 mg/3 mL nebulizer solution 3 mL  3 mL Nebulization Once PRN Mark Ying DO        aluminum-magnesium hydroxide-simethicone 200-200-20 mg/5 mL suspension 30 mL  30 mL Oral QID PRN Tawana Loaiza MD        atorvastatin tablet 20 mg  20 mg Oral QHS Tawana Loaiza MD   20 mg at 10/14/24 2018    ceftaroline fosamiL (TEFLARO) 200 mg in D5W 50 mL IVPB  200 mg Intravenous Q8H Milo Campbell MD   Stopped at 10/15/24 0915    diphenhydrAMINE injection 25 mg  25 mg Intravenous Q6H PRN Mark Ying DO   25 mg at 10/09/24 0256    enoxaparin injection 30 mg  30 mg Subcutaneous Daily  Macarena Leggett MD   30 mg at 10/14/24 1649    folic acid tablet 1 mg  1 mg Oral Daily Tawana Loaiza MD   1 mg at 10/15/24 0814    heparin (porcine) injection 3,000 Units  3,000 Units Intravenous PRN Neno Mercado,         HYDROcodone-acetaminophen  mg per tablet 1 tablet  1 tablet Oral Q4H PRN Macarena Leggett MD   1 tablet at 10/15/24 0811    HYDROcodone-acetaminophen 5-325 mg per tablet 1 tablet  1 tablet Oral Q6H PRN Tawana Loaiza MD   1 tablet at 10/14/24 1006    melatonin tablet 6 mg  6 mg Oral Nightly PRN Tawana Loaiza MD   6 mg at 10/09/24 2053    metoclopramide injection 10 mg  10 mg Intravenous Q10 Min PRN Mark Ying, DO        ondansetron injection 4 mg  4 mg Intravenous Q4H PRN Tawana Loaiza MD        ondansetron injection 4 mg  4 mg Intravenous Once Mark Ying,         pantoprazole EC tablet 40 mg  40 mg Oral Daily Tawana Loaiza MD   40 mg at 10/15/24 0814    polyethylene glycol packet 17 g  17 g Oral BID PRN Tawana Loaiza MD        prochlorperazine injection Soln 5 mg  5 mg Intravenous Q6H PRN Tawana Loaiza MD        senna-docusate 8.6-50 mg per tablet 2 tablet  2 tablet Oral BID PRN Tawana Loaiza MD        sertraline tablet 25 mg  25 mg Oral Daily Shin Light MD   25 mg at 10/15/24 0814    sevelamer carbonate tablet 800 mg  800 mg Oral TID Tawana Piedra MD   800 mg at 10/15/24 0814    sodium bicarbonate tablet 1,300 mg  1,300 mg Oral TID Shin Light MD   1,300 mg at 10/15/24 0814    sodium chloride 0.9% flush 10 mL  10 mL Intravenous PRN Tawana Loaiza MD        vancomycin (VANCOCIN) 500 mg in D5W 100 mL IVPB (MB+)  500 mg Intravenous Once Uriel Fletcher MD        vancomycin - pharmacy to dose   Intravenous pharmacy to manage frequency Tawana Loaiza MD           No, Primary Doctor    History reviewed. No pertinent past medical history.    Past Surgical History:   Procedure Laterality Date    CYSTOSCOPY W/ URETERAL STENT PLACEMENT Right 2024    Procedure: CYSTOSCOPY, WITH URETERAL STENT INSERTION;  Surgeon: Otis Person MD;  Location: [facility] OR;  Service: Urology;  Laterality: Right;  CYSTOSCOPY, BILATERAL RETROGRADE PYELOGRAMS, POSSIBLE STENT PLACEMENT.    CYSTOSCOPY W/ URETERAL STENT PLACEMENT Bilateral 10/4/2024    Procedure: CYSTOSCOPY, WITH URETERAL STENT INSERTION;  Surgeon: Tawana Loaiza MD;  Location: [facility] OR;  Service: Urology;  Laterality: Bilateral;  CYSTO WITH BILAT URETERAL STENT EXCHANGE    INSERTION OF TUNNELED CENTRAL VENOUS HEMODIALYSIS CATHETER N/A 7/3/2024    Procedure: Insertion, Catheter, Central Venous, Hemodialysis;  Surgeon: Uche Barcenas MD;  Location: [facility] CATH LAB;  Service: Peripheral Vascular;  Laterality: N/A;      No family history on file.  Social History     Tobacco Use    Smoking status: Never    Smokeless tobacco: Never   Substance Use Topics    Alcohol use: Never     Medications Prior to Admission   Medication Sig Dispense Refill Last Dose/Taking    atorvastatin (LIPITOR) 20 MG tablet Take 20 mg by mouth every evening.   10/3/2024    calcium carbonate (TUMS) 200 mg calcium (500 mg) chewable tablet Take 2 tablets (1,000 mg total) by mouth 2 (two) times daily. 120 tablet 11 10/3/2024 Morning    [] ergocalciferol (ERGOCALCIFEROL) 50,000 unit Cap Take 1 capsule (50,000 Units total) by mouth every 72 hours. 10 capsule 2 Past Week    FEROSUL 325 mg (65 mg iron) Tab tablet Take 325 mg by mouth once daily.   10/3/2024 Morning    folic acid (FOLVITE) 1 MG tablet Take 1 tablet (1 mg total) by mouth once daily. 30 tablet 0 10/3/2024 Morning    labetaloL (NORMODYNE) 200 MG tablet Take 1 tablet (200 mg total) by mouth every 12 (twelve) hours. 60 tablet 11 10/3/2024 Morning    loratadine (CLARITIN) 10 mg tablet Take 10 mg by mouth once daily.   10/3/2024    pantoprazole (PROTONIX) 40 MG tablet  Take 1 tablet (40 mg total) by mouth once daily. 90 tablet 3 10/3/2024 Morning    senna-docusate 8.6-50 mg (SENNA WITH DOCUSATE SODIUM) 8.6-50 mg per tablet Take 1 tablet by mouth daily as needed for Constipation.   Past Week    sevelamer carbonate (RENVELA) 800 mg Tab Take 800 mg by mouth 3 (three) times daily with meals.   10/3/2024 Morning    polyethylene glycol (GLYCOLAX) 17 gram PwPk Take 17 g by mouth once daily. (Patient not taking: Reported on 10/4/2024)   Not Taking     Review of patient's allergies indicates:   Allergen Reactions    Asa [aspirin] Anaphylaxis    Penicillins      Received ceftriaxone from 6/27, no reactions             Review of Systems:     Comprehensive 10pt ROS negative except as noted per history.      Objective:       VITAL SIGNS: 24 HR MIN & MAX LAST    Temp  Min: 97.4 °F (36.3 °C)  Max: 97.6 °F (36.4 °C)  97.6 °F (36.4 °C)        BP  Min: 131/54  Max: 148/68  (!) 137/56     Pulse  Min: 60  Max: 100  60     Resp  Min: 16  Max: 19  19    SpO2  Min: 91 %  Max: 100 %  97 %      GEN:  Well developed and nourished.  Awake alert oriented to time person place.  No shortness of breath.  HEENT atraumatic normocephalic.  Pupils reactive.  Extraocular movements intact.  No oral lesion.  Neck supple.  No JVD noted.  CV: RRR without rub or gallop.  PULM: CTAB, unlabored  ABD: Soft, NT/ND abdomen with NABS  :  Nephrostomy tubes in both right and left kidneys.    EXT:  1+ lower extremity edema  SKIN: Warm and dry  PSYCH: Awake, alert and appropriately conversant  Dialysis access:  Left IJ Vas-Cath            Component Value Date/Time     (L) 10/14/2024 0529     (L) 10/13/2024 0526    K 5.3 (H) 10/14/2024 0529    K 4.6 10/13/2024 0526    CO2 16 (L) 10/14/2024 0529    CO2 17 (L) 10/13/2024 0526    BUN 51.2 (H) 10/14/2024 0529    BUN 39.2 (H) 10/13/2024 0526    CREATININE 5.28 (H) 10/14/2024 0529    CREATININE 4.10 (H) 10/13/2024 0526    CALCIUM 9.0 10/14/2024 0529    CALCIUM 8.5  10/13/2024 0526    PHOS 6.0 (H) 10/14/2024 0529            Component Value Date/Time    WBC 19.12 (H) 10/14/2024 0529    WBC 20.63 (H) 10/13/2024 0526    WBC 25.58 10/07/2024 0541    WBC 26.04 10/06/2024 0449    HGB 11.0 (L) 10/14/2024 0529    HGB 10.1 (L) 10/13/2024 0526    HCT 34.5 (L) 10/14/2024 0529    HCT 31.7 (L) 10/13/2024 0526     10/14/2024 0529     10/13/2024 0526             NM Inflammatory Whole Body   Final Result      US Retroperitoneal Limited   Final Result      Duplex right renal collecting system with unchanged hydronephrosis at the lower pole moiety      Stent is faintly visible in the region of the left renal pelvis.  Left kidney is moderately obscured by shadowing which obscures evaluation.         Electronically signed by: Deidra Dobson   Date:    10/13/2024   Time:    14:07      X-Ray Chest 1 View for Line/Tube Placement   Final Result      New central line terminates within right atrium.         Electronically signed by: Alvaro Watson   Date:    10/11/2024   Time:    11:53      CT Abdomen Pelvis With IV Contrast NO Oral Contrast   Final Result      1. No significant interval change compared to previous exam.  Ureteral stents in place.   2. Duplicated right kidney and right ureter with persistent hydroureteronephrosis of the lower pole moiety.         Electronically signed by: Deidra Dobson   Date:    10/08/2024   Time:    06:49      CT Head Without Contrast   Final Result      No acute intracranial findings.         Electronically signed by: Alexx Patricia   Date:    10/07/2024   Time:    10:46      MRI Lumbar Spine W WO Cont   Final Result      No acute abnormality of the lumbar spine.         Electronically signed by: Edmundo Vásquez MD   Date:    10/05/2024   Time:    16:55      MRI Thoracic Spine W WO Cont   Final Result      Very limited examination due to significant motion artifact      No evidence of osteomyelitis or discitis seen at no evidence of epidural abscess seen in  thoracic spine.  Lumbar and cervical spine discussed on separate report         Electronically signed by: Garland Raya   Date:    10/05/2024   Time:    16:57      MRI Cervical Spine W WO Cont   Final Result      Very limited examination due to motion artifact.  Only gross findings are visible.  Fine details are not visible.  No obvious epidural abscess is seen.  There is some increased signal in the cord at C6-C7 which shows some enhancement.  Findings could represent intra cord lesion or possible myelomalacia.         Electronically signed by: Garland Raya   Date:    10/05/2024   Time:    16:55      IR Nephrostomy Tube Placement   Final Result      Technically successful Nephrostomy Tube placement.         Electronically signed by: Andrew Buenrostro   Date:    10/04/2024   Time:    16:37      US Guided Needle Placement   Final Result      Technically successful Nephrostomy Tube placement.         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  The upper pole moiety is decompressed with a ureteral stent with the lower pole more demonstrating moderate severe hydronephrosis which is persistent from the prior exam.      Left nephrostomy ureteral stent drain with resolution of hydronephrosis.      Enlarged right pelvic lymph node increased in size from the prior, malignancy suspected.      Mildly enlarged upper normal sized lymph nodes in the periaortic region and left pelvis, nonspecific, similar to the prior exam.         Electronically signed by: Tete Sun MD   Date:    10/04/2024   Time:    07:37      X-Ray Lumbar Spine Ap And Lateral   Final Result      Limited examination due to patient's body habitus but degenerative changes seen  throughout the lower lumbar spine         Electronically signed by: Garland Raya   Date:    10/03/2024   Time:    18:11      CT Lower Extremity W Wo Contrast Bilat    (Results Pending)   CT Abdomen Pelvis  Without Contrast    (Results Pending)       Assessment / Plan:     ESRD - normally TTS at [facility].  Tunneled dialysis catheter removed 10/08/2024 due to persistent MRSA bacteremia  Metabolic acidosis  MRSA bacteremia  Bilateral hydronephrosis - now with nephrostomy tube both sides    Plan:  Hemodialysis today  Other workup per urology  Continue nutritional support.

## 2024-10-15 NOTE — NURSING
10/15/24 1400   Post-Hemodialysis Assessment   Blood Volume Processed (Liters) 31.5 L   Dialyzer Clearance Clear   Duration of Treatment 105 minutes   Total UF (mL) 1150 mL   Patient Response to Treatment pt requested to come off early   Post-Hemodialysis Comments pt stated she was ready to come off after running for 1.5 hours. i explained to her the importance of finishing the ordered tx time. she understood, 15 min later she was stating she couldn't wait and she was ready to get off now. I notified Dr. Light and he gave the order to terminate tx. tx ended, pt reinfused. Net UF 1,150 mL, Last /72 and HR 62. gave report to ISA Mckenna RN

## 2024-10-16 PROCEDURE — 0T9030Z DRAINAGE OF RIGHT KIDNEY WITH DRAINAGE DEVICE, PERCUTANEOUS APPROACH: ICD-10-PCS | Performed by: RADIOLOGY

## 2024-10-16 PROCEDURE — C1752 CATH,HEMODIALYSIS,SHORT-TERM: HCPCS

## 2024-10-16 PROCEDURE — 25000003 PHARM REV CODE 250: Performed by: INTERNAL MEDICINE

## 2024-10-16 PROCEDURE — 63600175 PHARM REV CODE 636 W HCPCS: Mod: JZ,JG | Performed by: GENERAL PRACTICE

## 2024-10-16 PROCEDURE — 0T773DZ DILATION OF LEFT URETER WITH INTRALUMINAL DEVICE, PERCUTANEOUS APPROACH: ICD-10-PCS | Performed by: RADIOLOGY

## 2024-10-16 PROCEDURE — 99233 SBSQ HOSP IP/OBS HIGH 50: CPT | Mod: ,,, | Performed by: HOSPITALIST

## 2024-10-16 PROCEDURE — 63600175 PHARM REV CODE 636 W HCPCS: Performed by: RADIOLOGY

## 2024-10-16 PROCEDURE — 25500020 PHARM REV CODE 255: Performed by: RADIOLOGY

## 2024-10-16 PROCEDURE — 51702 INSERT TEMP BLADDER CATH: CPT

## 2024-10-16 PROCEDURE — 27000207 HC ISOLATION

## 2024-10-16 PROCEDURE — 25000003 PHARM REV CODE 250: Performed by: UROLOGY

## 2024-10-16 PROCEDURE — 25000003 PHARM REV CODE 250: Performed by: GENERAL PRACTICE

## 2024-10-16 PROCEDURE — 87075 CULTR BACTERIA EXCEPT BLOOD: CPT | Performed by: RADIOLOGY

## 2024-10-16 PROCEDURE — 50695 PLMT URETERAL STENT PRQ: CPT | Mod: 59,RT,, | Performed by: RADIOLOGY

## 2024-10-16 PROCEDURE — 87205 SMEAR GRAM STAIN: CPT | Performed by: RADIOLOGY

## 2024-10-16 PROCEDURE — 50387 CHANGE NEPHROURETERAL CATH: CPT | Mod: LT,,, | Performed by: RADIOLOGY

## 2024-10-16 PROCEDURE — 87184 SC STD DISK METHOD PER PLATE: CPT | Performed by: RADIOLOGY

## 2024-10-16 PROCEDURE — 21400001 HC TELEMETRY ROOM

## 2024-10-16 PROCEDURE — 63600175 PHARM REV CODE 636 W HCPCS: Performed by: INTERNAL MEDICINE

## 2024-10-16 RX ORDER — MIDAZOLAM HYDROCHLORIDE 1 MG/ML
INJECTION, SOLUTION INTRAMUSCULAR; INTRAVENOUS
Status: COMPLETED | OUTPATIENT
Start: 2024-10-16 | End: 2024-10-16

## 2024-10-16 RX ORDER — FENTANYL CITRATE 50 UG/ML
INJECTION, SOLUTION INTRAMUSCULAR; INTRAVENOUS
Status: COMPLETED | OUTPATIENT
Start: 2024-10-16 | End: 2024-10-16

## 2024-10-16 RX ORDER — LIDOCAINE HYDROCHLORIDE 20 MG/ML
INJECTION, SOLUTION INFILTRATION; PERINEURAL
Status: COMPLETED | OUTPATIENT
Start: 2024-10-16 | End: 2024-10-16

## 2024-10-16 RX ORDER — IOPAMIDOL 755 MG/ML
35 INJECTION, SOLUTION INTRAVASCULAR
Status: COMPLETED | OUTPATIENT
Start: 2024-10-16 | End: 2024-10-16

## 2024-10-16 RX ORDER — OXYCODONE AND ACETAMINOPHEN 10; 325 MG/1; MG/1
1 TABLET ORAL EVERY 4 HOURS PRN
Status: DISCONTINUED | OUTPATIENT
Start: 2024-10-16 | End: 2024-10-28 | Stop reason: HOSPADM

## 2024-10-16 RX ADMIN — SEVELAMER CARBONATE 800 MG: 800 TABLET, FILM COATED ORAL at 05:10

## 2024-10-16 RX ADMIN — SODIUM BICARBONATE 650 MG TABLET 1300 MG: at 07:10

## 2024-10-16 RX ADMIN — OXYCODONE AND ACETAMINOPHEN 1 TABLET: 10; 325 TABLET ORAL at 01:10

## 2024-10-16 RX ADMIN — HYDROCODONE BITARTRATE AND ACETAMINOPHEN 1 TABLET: 10; 325 TABLET ORAL at 06:10

## 2024-10-16 RX ADMIN — OXYCODONE AND ACETAMINOPHEN 1 TABLET: 10; 325 TABLET ORAL at 06:10

## 2024-10-16 RX ADMIN — FENTANYL CITRATE 50 MCG: 50 INJECTION, SOLUTION INTRAMUSCULAR; INTRAVENOUS at 11:10

## 2024-10-16 RX ADMIN — CEFTAROLINE FOSAMIL 200 MG: 600 POWDER, FOR SOLUTION INTRAVENOUS at 01:10

## 2024-10-16 RX ADMIN — CEFTAROLINE FOSAMIL 200 MG: 600 POWDER, FOR SOLUTION INTRAVENOUS at 09:10

## 2024-10-16 RX ADMIN — SODIUM BICARBONATE 650 MG TABLET 1300 MG: at 03:10

## 2024-10-16 RX ADMIN — ATORVASTATIN CALCIUM 20 MG: 10 TABLET, FILM COATED ORAL at 07:10

## 2024-10-16 RX ADMIN — MIDAZOLAM 1 MG: 1 INJECTION INTRAMUSCULAR; INTRAVENOUS at 11:10

## 2024-10-16 RX ADMIN — LIDOCAINE HYDROCHLORIDE 5 ML: 20 INJECTION, SOLUTION INFILTRATION; PERINEURAL at 12:10

## 2024-10-16 RX ADMIN — HYDROCODONE BITARTRATE AND ACETAMINOPHEN 1 TABLET: 10; 325 TABLET ORAL at 02:10

## 2024-10-16 RX ADMIN — CEFTAROLINE FOSAMIL 200 MG: 600 POWDER, FOR SOLUTION INTRAVENOUS at 05:10

## 2024-10-16 RX ADMIN — LIDOCAINE HYDROCHLORIDE 5 ML: 20 INJECTION, SOLUTION INFILTRATION; PERINEURAL at 11:10

## 2024-10-16 RX ADMIN — IOPAMIDOL 35 ML: 755 INJECTION, SOLUTION INTRAVENOUS at 12:10

## 2024-10-16 RX ADMIN — SERTRALINE HYDROCHLORIDE 25 MG: 25 TABLET ORAL at 09:10

## 2024-10-16 RX ADMIN — ENOXAPARIN SODIUM 30 MG: 30 INJECTION SUBCUTANEOUS at 05:10

## 2024-10-16 NOTE — OP NOTE
"Radiology Post-Procedure Note    Pre Op Diagnosis: Hydronephrosis    Post Op Diagnosis: Same    Secondary Diagnoses:   Problem List Items Addressed This Visit       Metabolic acidosis    Relevant Orders    Prepare patient for dialysis (Completed)    Hemodialysis inpatient If "per protocol" is selected for one or more ingredients (K+, Ca++, Na+, Bicarb) for the dialysate bath solution, select the hyperlink for the protocol instructions. (Completed)     Other Visit Diagnoses       Sepsis    -  Primary    Relevant Orders    Transesophageal echo (MADELYN) (Completed)    SHUNT CART    X-Ray Chest 1 View for Line/Tube Placement (Completed)    Chest pain        Relevant Orders    EKG 12-lead    Acute pyelonephritis        Relevant Orders    Specimen to Pathology (Completed)    Hydronephrosis with ureteral stricture, not elsewhere classified        Relevant Orders    Specimen to Pathology (Completed)    MRSA bacteremia        Relevant Orders    Echo (Completed)    Urinary tract infection without hematuria, site unspecified        Hydronephrosis, unspecified hydronephrosis type        Retained ureteral stent        ESRD on hemodialysis        Relevant Orders    Hemodialysis inpatient If "per protocol" is selected for one or more ingredients (K+, Ca++, Na+, Bicarb) for the dialysate bath solution, select the hyperlink for the protocol instructions.    Inpatient consult to Registered Dietitian/Nutritionist (Completed)    Inpatient consult to Registered Dietitian/Nutritionist    CBC Auto Differential    Comprehensive Metabolic Panel    Phosphorus             Procedure: US & Fluoro Guided Right Inferior Npehrostomy placement with Left NU Stent Exchange    Procedure performed by: Andrew Buenrostro MD    Assistant: William    Written Informed Consent Obtained: Yes    Specimen Removed: Purulent fluid    Estimated Blood Loss: 5 cc    Condition: Stable    Outcome: The patient tolerated the procedure well and was without complications.    For " further details please see the imaging report associated.    Disposition: Transfer back to inpatient unit

## 2024-10-16 NOTE — PROGRESS NOTES
.UROLOGY  PROGRESS  NOTE    Fior Joya 1955  [id number]  10/16/2024    POD 12 cystoscopy, right stent exchange upper pole moiety, bladder biopsy; right nephrostomy tube placement  with IR    Cystogram reviewed   Appears stent and nephrostomy in the same collecting system on the right, additional collecting system remains dilated without reflux   No source of persistent sepsis noted on CT lower extremities  Patient is resting in bed   No acute events overnight     VSS, afebrile  850 mL urethral Mairo  260 mL left nephrostomy   45 mL right nephrostomy  No labs this morning  Repeat BC 10/10 with persistent MRSA    Exam:    NAD  Resp unlabored  Abd obese, soft, NTND   bloody drainage from right PCN, yellow urine in left PCN,  bag empty       No results found for this or any previous visit (from the past 24 hours).    US Retroperitoneal Limited [[id number]] Resulted: 10/13/24 1407   Order Status: Completed Updated: 10/13/24 1410   Narrative:     EXAMINATION:  US RETROPERITONEAL LIMITED    CLINICAL HISTORY:  Check placement of left nephrostomy tube to make sure it is still within the collecting system;    TECHNIQUE:  Retroperitoneal ultrasound.  Limited.    COMPARISON:  CT abdomen pelvis 10/08/2024    FINDINGS:  LIMITATIONS: Exam limited due to poor acoustic window related to shadowing bowel gas and/or body habitus.    RIGHT KIDNEY: The right kidney measures 15 cm.  Duplex right renal collecting system.  Upper pole moiety decompressed.  Shadowing stent at the upper pole moiety partially visible.  Hydronephrosis at the lower pole moiety.  Similar to prior CT.    LEFT KIDNEY: The left kidney measures 11 cm. Moderate to severely obscured by shadowing.Faintly visible stent in the region of the left renal pelvis.  No yovany hydronephrosis.  Component of mild pelviectasis not excluded.    BLADDER: Not imaged   Impression:       Duplex right renal collecting system with unchanged hydronephrosis at the lower pole  moiety    Stent is faintly visible in the region of the left renal pelvis.  Left kidney is moderately obscured by shadowing which obscures evaluation.      Electronically signed by: Deidra Dobson  Date: 10/13/2024  Time: 14:07   CT Abdomen Pelvis With IV Contrast NO Oral Contrast [[id number]]Resulted: 10/08/24 0649Order Status: CompletedUpdated: 10/08/24 0651Narrative:  EXAMINATION:  CT ABDOMEN PELVIS WITH IV CONTRAST    CLINICAL HISTORY:  Abdominal abscess/infection suspected;    TECHNIQUE:  Helically acquired images with axial, sagittal and coronal reformations were obtained from the lung bases to the pubic symphysis after the IV administration of contrast.    Automated tube current modulation, weight-based exposure dosing, and/or iterative reconstruction technique utilized to reach lowest reasonably achievable exposure rate.    DLP: 1526 mGy*cm    COMPARISON:  CT abdomen pelvis 10/03/2024    FINDINGS:  HEART: Cardiomegaly.  There are calcifications in the region the aortic valve and mitral valve annulus.    LUNG BASES: Basilar atelectasis.    LIVER: Normal attenuation. No appreciable focal hepatic lesion.    BILIARY: Vicarious excretion of contrast at the gallbladder.    PANCREAS: Evaluation for pancreatitis limited given mild anasarca and body habitus.    SPLEEN: Normal in size    ADRENALS: No mass.    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  Right percutaneous nephrostomy at the upper pole moiety.  Right ureteral stent at the upper pole moiety.  Decompressed upper pole moiety.  Hydronephrotic lower pole moiety with AP diameter of the renal pelvis measuring approximately 3.5 cm, unchanged.  The right lower pole ureter is dilated to the level of the ureterovesical junction.  Left percutaneous nephroureteral stent.  No left hydronephrosis.    GI TRACT/MESENTERY:  No evidence of bowel obstruction or inflammation.    PERITONEUM: No free fluid.No free air.    LYMPH NODES:  Presumed reactive retroperitoneal lymph nodes.    VASCULATURE: Aortoiliac atherosclerosis.    BLADDER: Nondistended bladder limits CT evaluation    REPRODUCTIVE ORGANS: Normal as visualized.    SOFT TISSUES: Body wall edema.    BONES: No acute osseous abnormality.    LIMITATIONS: Exam limited by artifact related to patient body habitus, positioning, dose lowering technique and/or motion.  Impression:    1. No significant interval change compared to previous exam.  Ureteral stents in place.  2. Duplicated right kidney and right ureter with persistent hydroureteronephrosis of the lower pole moiety.      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   -oncology has evaluated patient - plans for f/u outpatient to discuss treatment options    Sepsis  -blood cultures +MRSA, on rocephin; repeat BC remain positive  -repeat urine cultures with no growth  -tagged WBC scan with nonspecific relatively increased radiotracer uptake at bilateral distal femur and proximal tibia; CT lower extremity without acute abnormality  -CT cystogram yesterday reveals right-sided ureteral stent and nephrostomy tube within one collecting system with remaining collecting system dilated without reflux noted; left nephrostomy tube in appropriate position    ESRD   -on HD      Plan:  -discussed with Dr. Cardoza and  imaging reviewed  -plan for IR to attempt placement of nephrostomy tube in right moiety that remains dilated without reflux.  Infectious Disease requesting replacement of left nephrostomy tube as well due to persistent sepsis.  This was discussed with IR.  -discussed with the patient and she is in agreement with plan  -following      Donna Varghese, [facility]-BC

## 2024-10-16 NOTE — INTERVAL H&P NOTE
The patient has been examined and the H&P has been reviewed:    I concur with the findings and no changes have occurred since H&P was written. 68 yo F with duplicated right renal collecting system with hydronephritis of the inferior pole presents for inferior pole neph tube.        There are no hospital problems to display for this patient.

## 2024-10-16 NOTE — PROGRESS NOTES
Nephrology  Note    Patient Name: Fior Joya  Age: 69 y.o.  : 1955  MRN: [id number]  Admission Date: 10/3/2024        Fior Joya is a 69 y.o. female who presents with right flank pain.  Past medical history significant for ESRD on hemodialysis TTS at [facility] via right IJ PermCath, chronic hydronephrosis with left nephrostomy tube in place, and stents to right ureter, hypertension, anemia, hyperphosphatemia, and hyperlipidemia.  Patient presents with worsening right flank pain.  She states the pain started in her right neck, in his now in her right flank/hip area.  Patient was started on hemodialysis in 2024 after being found to have significant acute kidney injury and hydronephrosis.  She has been maintained on hemodialysis at [facility] on a TTS schedule.  Prior to hospitalization her last dialysis was on 10/01/2024.  Nephrology consulted for ESRD management.  In the emergency department she was noted to have low blood pressure and has been given IV fluids.  Urology has also been consulted for concern hydronephrosis needing stent exchange.  She has also been started on antibiotics for possible UTI.  She has had nausea, and vomiting.  No chest pain, shortness of breath, or lower extremity edema.    10/07/2024  Chart reviewed.  Weekend events noted.  Patient has been NPO at least since her admission to the hospital and also has no IV fluids.  Lying down in the bed.  Having jerky movements all over the body and uncomfortable.  Now she has nephrostomy tube on both sides.  Both draining.  Serosanguineous fluid noted.  Patient is very slow to answer questions but did recognize me by name and she knows her own name.     10/08/2024  No acute events overnight.  Seen on hemodialysis.  She was endorsing shortness of breath, and has some lower extremity edema.    10/09/2024   Mentally she has cleared up completely.  Complains of pains all over the body for which she is on p.r.n. pain medications.  Also  complains of being nervous and on some anxiety medication.  No shortness of breath.  Tunneled dialysis catheter was removed yesterday.    10/10/2024  No acute events overnight.  No chest pain, shortness of breath, abdominal pain, nausea, vomiting, or lower extremity edema.  Found to have bladder cancer by Urology.    10/11/2024  No acute events overnight.  No new complaints.  Blood cultures remain positive.  No chest pain, shortness of breath, nausea, vomiting.    10/12/2024  Patient had Vas-Cath placed yesterday, and she was re-initiated on hemodialysis.  Dialyzed yesterday with 1.5 L UF.  No new complaints today.  No chest pain, shortness of breath, nausea, vomiting.  She does have mild lower extremity edema.    10/14/2024   Complains of pain all over the body.  No fever or chills.  Does not like her diet.  No nausea vomiting.  No shortness of breath.  No chest pains.  No cough cold congestion.    10/15/2024   Now patient also has indwelling Mario catheter.  Urology have some tagged WBC  scan and cystogram planned.  Patient will also need dialysis today.  No new complaints.    10/16/2024   Patient is NPO as she is going for procedure on the nephrostomy tubes.  Patient's son present in the room and he is very angry at every body reporting that patient is not getting the right kind of food to eat, patient is not getting right kind of care, patient is not getting proper therapy and is being told to move around and get out of the bed.  He also reported that he plans to go to the administration and may go seek help from attorneys.  All the above was said by the patient's son when the patient herself is very calm and content.    Current Facility-Administered Medications   Medication Dose Route Frequency Provider Last Rate Last Admin    acetaminophen tablet 650 mg  650 mg Oral Q4H PRN Tawana Loaiza MD   650 mg at 10/15/24 1429    albuterol-ipratropium 2.5 mg-0.5 mg/3 mL nebulizer solution 3 mL  3 mL  Nebulization Once PRN Mark Ying DO        aluminum-magnesium hydroxide-simethicone 200-200-20 mg/5 mL suspension 30 mL  30 mL Oral QID PRN Tawana Loaiza MD        atorvastatin tablet 20 mg  20 mg Oral QHS Tawana Loaiza MD   20 mg at 10/15/24 1911    ceftaroline fosamiL (TEFLARO) 200 mg in D5W 50 mL IVPB  200 mg Intravenous Q8H Milo Campbell MD   Stopped at 10/16/24 0202    diphenhydrAMINE injection 25 mg  25 mg Intravenous Q6H PRN Mrak Ying DO   25 mg at 10/09/24 0256    enoxaparin injection 30 mg  30 mg Subcutaneous Daily Macarena Leggett MD   30 mg at 10/15/24 1801    folic acid tablet 1 mg  1 mg Oral Daily Tawana Loaiza MD   1 mg at 10/15/24 0814    heparin (porcine) injection 3,000 Units  3,000 Units Intravenous PRN Neno Mercado DO   3,000 Units at 10/15/24 1413    HYDROcodone-acetaminophen  mg per tablet 1 tablet  1 tablet Oral Q4H PRN Macarena Leggett MD   1 tablet at 10/16/24 0639    HYDROcodone-acetaminophen 5-325 mg per tablet 1 tablet  1 tablet Oral Q6H PRN Tawana Loaiza MD   1 tablet at 10/14/24 1006    melatonin tablet 6 mg  6 mg Oral Nightly PRN Tawana Loaiza MD   6 mg at 10/09/24 2053    metoclopramide injection 10 mg  10 mg Intravenous Q10 Min PRN Mark Ying DO        ondansetron injection 4 mg  4 mg Intravenous Q4H PRN Tawana Loaiza MD        ondansetron injection 4 mg  4 mg Intravenous Once Mark Ying DO        pantoprazole EC tablet 40 mg  40 mg Oral Daily Tawana Loaiza MD   40 mg at 10/15/24 0814    polyethylene glycol packet 17 g  17 g Oral BID PRN Tawana Loaiza MD        prochlorperazine injection Soln 5 mg  5 mg Intravenous Q6H PRN Machelle Loaizaette M, MD        senna-docusate 8.6-50 mg per tablet 2 tablet  2 tablet Oral BID Tawana Lazcano MD        sertraline tablet 25 mg  25 mg Oral Daily Shin Light MD   25 mg at 10/15/24 0814    sevelamer  carbonate tablet 800 mg  800 mg Oral TID  Tawana Loaiza MD   800 mg at 10/15/24 1801    sodium bicarbonate tablet 1,300 mg  1,300 mg Oral TID Shin Light MD   1,300 mg at 10/15/24 1911    sodium chloride 0.9% flush 10 mL  10 mL Intravenous PRN Tawana Loaiza MD        vancomycin - pharmacy to dose   Intravenous pharmacy to manage frequency Tawana Loaiza MD           No, Primary Doctor    History reviewed. No pertinent past medical history.   Past Surgical History:   Procedure Laterality Date    CYSTOSCOPY W/ URETERAL STENT PLACEMENT Right 2024    Procedure: CYSTOSCOPY, WITH URETERAL STENT INSERTION;  Surgeon: Otis Person MD;  Location: [facility] OR;  Service: Urology;  Laterality: Right;  CYSTOSCOPY, BILATERAL RETROGRADE PYELOGRAMS, POSSIBLE STENT PLACEMENT.    CYSTOSCOPY W/ URETERAL STENT PLACEMENT Bilateral 10/4/2024    Procedure: CYSTOSCOPY, WITH URETERAL STENT INSERTION;  Surgeon: Tawana Loaiza MD;  Location: [facility] OR;  Service: Urology;  Laterality: Bilateral;  CYSTO WITH BILAT URETERAL STENT EXCHANGE    INSERTION OF TUNNELED CENTRAL VENOUS HEMODIALYSIS CATHETER N/A 7/3/2024    Procedure: Insertion, Catheter, Central Venous, Hemodialysis;  Surgeon: Uche Barcenas MD;  Location: [facility] CATH LAB;  Service: Peripheral Vascular;  Laterality: N/A;      No family history on file.  Social History     Tobacco Use    Smoking status: Never    Smokeless tobacco: Never   Substance Use Topics    Alcohol use: Never     Medications Prior to Admission   Medication Sig Dispense Refill Last Dose/Taking    atorvastatin (LIPITOR) 20 MG tablet Take 20 mg by mouth every evening.   10/3/2024    calcium carbonate (TUMS) 200 mg calcium (500 mg) chewable tablet Take 2 tablets (1,000 mg total) by mouth 2 (two) times daily. 120 tablet 11 10/3/2024 Morning    [] ergocalciferol (ERGOCALCIFEROL) 50,000 unit Cap Take 1 capsule (50,000 Units total) by mouth every 72 hours.  10 capsule 2 Past Week    FEROSUL 325 mg (65 mg iron) Tab tablet Take 325 mg by mouth once daily.   10/3/2024 Morning    folic acid (FOLVITE) 1 MG tablet Take 1 tablet (1 mg total) by mouth once daily. 30 tablet 0 10/3/2024 Morning    labetaloL (NORMODYNE) 200 MG tablet Take 1 tablet (200 mg total) by mouth every 12 (twelve) hours. 60 tablet 11 10/3/2024 Morning    loratadine (CLARITIN) 10 mg tablet Take 10 mg by mouth once daily.   10/3/2024    pantoprazole (PROTONIX) 40 MG tablet Take 1 tablet (40 mg total) by mouth once daily. 90 tablet 3 10/3/2024 Morning    senna-docusate 8.6-50 mg (SENNA WITH DOCUSATE SODIUM) 8.6-50 mg per tablet Take 1 tablet by mouth daily as needed for Constipation.   Past Week    sevelamer carbonate (RENVELA) 800 mg Tab Take 800 mg by mouth 3 (three) times daily with meals.   10/3/2024 Morning    polyethylene glycol (GLYCOLAX) 17 gram PwPk Take 17 g by mouth once daily. (Patient not taking: Reported on 10/4/2024)   Not Taking     Review of patient's allergies indicates:   Allergen Reactions    Asa [aspirin] Anaphylaxis    Penicillins      Received ceftriaxone from 6/27, no reactions             Review of Systems:     Comprehensive 10pt ROS negative except as noted per history.      Objective:       VITAL SIGNS: 24 HR MIN & MAX LAST    Temp  Min: 97.4 °F (36.3 °C)  Max: 98.3 °F (36.8 °C)  98.3 °F (36.8 °C)        BP  Min: 103/61  Max: 131/70  131/70     Pulse  Min: 56  Max: 73  73     Resp  Min: 18  Max: 20  18    SpO2  Min: 97 %  Max: 99 %  97 %      GEN:  Well developed and nourished.  Awake alert oriented to time person place.  No shortness of breath.  HEENT atraumatic normocephalic.  Pupils reactive.  Extraocular movements intact.  No oral lesion.  Neck supple.  No JVD noted.  CV: RRR without rub or gallop.  PULM: CTAB, unlabored  ABD: Soft, NT/ND abdomen with NABS  :  Nephrostomy tubes in both right and left kidneys.    EXT:  1+ lower extremity edema  SKIN: Warm and dry  PSYCH:  Awake, alert and appropriately conversant  Dialysis access:  Left IJ Vas-Cath            Component Value Date/Time     (L) 10/14/2024 0529     (L) 10/13/2024 0526    K 5.3 (H) 10/14/2024 0529    K 4.6 10/13/2024 0526    CO2 16 (L) 10/14/2024 0529    CO2 17 (L) 10/13/2024 0526    BUN 51.2 (H) 10/14/2024 0529    BUN 39.2 (H) 10/13/2024 0526    CREATININE 5.28 (H) 10/14/2024 0529    CREATININE 4.10 (H) 10/13/2024 0526    CALCIUM 9.0 10/14/2024 0529    CALCIUM 8.5 10/13/2024 0526    PHOS 6.0 (H) 10/14/2024 0529            Component Value Date/Time    WBC 19.12 (H) 10/14/2024 0529    WBC 20.63 (H) 10/13/2024 0526    WBC 25.58 10/07/2024 0541    WBC 26.04 10/06/2024 0449    HGB 11.0 (L) 10/14/2024 0529    HGB 10.1 (L) 10/13/2024 0526    HCT 34.5 (L) 10/14/2024 0529    HCT 31.7 (L) 10/13/2024 0526     10/14/2024 0529     10/13/2024 0526             CT Abdomen Pelvis  Without Contrast   Final Result      Reflux of contrast from the urinary bladder into the left ureter and left renal pelvis and calices.      Duplicated collecting system seen on the right with nephroureteral stent seen in the lower pole moiety with no hydronephrosis or hydroureter seen in the lower pole moiety and no reflux of contrast seen on the right side.      The right upper pole moiety has evidence of hydronephrosis and hydroureter with no reflux of contrast from the urinary bladder.      Bilateral percutaneous nephrostomy tubes as outlined above with the right-sided percutaneous nephrostomy tube in the renal pelvis of the lower pole moiety      Debris/filling defects within the base the urinary bladder         Electronically signed by: Garland Raya   Date:    10/15/2024   Time:    12:56      CT Lower Extremity W Wo Contrast Bilat   Final Result      No appreciable acute osseous abnormality      Symmetric uptake at the lower extremities on nuclear medicine exam most suggestive of aseptic marrow expansion.          Electronically signed by: Deirda Dobson   Date:    10/15/2024   Time:    13:21      NM Inflammatory Whole Body   Final Result      US Retroperitoneal Limited   Final Result      Duplex right renal collecting system with unchanged hydronephrosis at the lower pole moiety      Stent is faintly visible in the region of the left renal pelvis.  Left kidney is moderately obscured by shadowing which obscures evaluation.         Electronically signed by: Deidra Dobson   Date:    10/13/2024   Time:    14:07      X-Ray Chest 1 View for Line/Tube Placement   Final Result      New central line terminates within right atrium.         Electronically signed by: Alvaro Watson   Date:    10/11/2024   Time:    11:53      CT Abdomen Pelvis With IV Contrast NO Oral Contrast   Final Result      1. No significant interval change compared to previous exam.  Ureteral stents in place.   2. Duplicated right kidney and right ureter with persistent hydroureteronephrosis of the lower pole moiety.         Electronically signed by: Deidra Dobson   Date:    10/08/2024   Time:    06:49      CT Head Without Contrast   Final Result      No acute intracranial findings.         Electronically signed by: Alexx Patricia   Date:    10/07/2024   Time:    10:46      MRI Lumbar Spine W WO Cont   Final Result      No acute abnormality of the lumbar spine.         Electronically signed by: Edmundo Vásquez MD   Date:    10/05/2024   Time:    16:55      MRI Thoracic Spine W WO Cont   Final Result      Very limited examination due to significant motion artifact      No evidence of osteomyelitis or discitis seen at no evidence of epidural abscess seen in thoracic spine.  Lumbar and cervical spine discussed on separate report         Electronically signed by: Garland Raya   Date:    10/05/2024   Time:    16:57      MRI Cervical Spine W WO Cont   Final Result      Very limited examination due to motion artifact.  Only gross findings are visible.  Fine details are  not visible.  No obvious epidural abscess is seen.  There is some increased signal in the cord at C6-C7 which shows some enhancement.  Findings could represent intra cord lesion or possible myelomalacia.         Electronically signed by: Garland Raya   Date:    10/05/2024   Time:    16:55      IR Nephrostomy Tube Placement   Final Result      Technically successful Nephrostomy Tube placement.         Electronically signed by: Andrew Buenrostro   Date:    10/04/2024   Time:    16:37      US Guided Needle Placement   Final Result      Technically successful Nephrostomy Tube placement.         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  The upper pole moiety is decompressed with a ureteral stent with the lower pole more demonstrating moderate severe hydronephrosis which is persistent from the prior exam.      Left nephrostomy ureteral stent drain with resolution of hydronephrosis.      Enlarged right pelvic lymph node increased in size from the prior, malignancy suspected.      Mildly enlarged upper normal sized lymph nodes in the periaortic region and left pelvis, nonspecific, similar to the prior exam.         Electronically signed by: Tete Sun MD   Date:    10/04/2024   Time:    07:37      X-Ray Lumbar Spine Ap And Lateral   Final Result      Limited examination due to patient's body habitus but degenerative changes seen throughout the lower lumbar spine         Electronically signed by: Garland Raya   Date:    10/03/2024   Time:    18:11      IR Nephrostomy Tube Placement    (Results Pending)   IR Nephrostogram    (Results Pending)       Assessment / Plan:     ESRD - normally TTS at [facility].  Tunneled dialysis catheter removed  10/08/2024 due to persistent MRSA bacteremia  Metabolic acidosis  MRSA bacteremia  Bilateral hydronephrosis - now with nephrostomy tube both sides, right side does not have any urine and has mostly blurred where as left nephrostomy tube is putting out some urine and it is little bloody also.    Plan:  I tried to explain to the patient and the son about the procedure being done by Interventional radiologist and the both expressed understanding and seems to have calm down to some degree.  Spent about 35 minutes with the patient and the son explaining to their full satisfaction.  Adult nutritionist was sent to the patient for dietary consultation, will request her for repeat visit.  Patient's son's anger explained to the nurse of the patient who  will also try to explain to the patient and the attending MD.  Check labs tomorrow.  Hemodialysis tomorrow.

## 2024-10-16 NOTE — PROGRESS NOTES
[facility]        Chief Complaint: Inpatient Follow-up for     HPI: 69-year-old female with past medical history of hypertension, hyperlipidemia, chronic hydronephrosis with left nephrostomy tube, right ureteral stent, end-stage renal disease on hemodialysis, chronic anemia presented with right flank pain for the past 5 days and also reported cloudy urine output from the urostomy CT with IV contrast showed moderate to severe hydronephrosis with enlarged right pelvic lymph node increase in size from the prior with suspected malignancy also showed duplicated collecting system with dilation of the right lower pole urology was consulted patient is status post cystoscopy with right stent exchange and bladder biopsy and they were not able to identify the left ureteral orifice therefore had left percutaneous nephrostomy. Blood culture is growing Gram-positive cocci 2/2 bottles initially patient was started on cefepime but now we added vancomycin repeat blood cultures ordered  Id was consulted MRI of the CT and L-spine was ordered that was negative for any abscess blood culture is growing MRSA TTE as such did not show any vegetation cardiology consulted for MADELYN  Bilateral hydronephrosis Status post right upper lobe ureteral stent exchange and right lower pole nephrostomy tube placement  Cultures remains persistently positive.  Patient was slightly confused on October 7 so we had ordered CT of the head without contrast that was negative, ammonia, TSH everything was normal we also ordered CT of the abdomen and pelvis with contrast that was negative for any abscess cardiology consulted for MADELYN.  The was negative for endocarditis.  Dialysis catheter was removed  Tagged WBC scan showing increased uptake at the bilateral distal femur and proximal tibia.    Interval Hx:   Saw her this morning she was calm and cooperative, reports back pain and right buttock area pain.   She had CT abdomen pelvis as well as lower extremity done yesterday which did not reveal infectious source.  She remains bacteremic and Infectious Disease repeated cultures.  This morning I was telling the patient to make sure she is eating and drinking plenty of course whenever she is not NPO.  Son reports that she keeps getting disturbed when she tries to eat she is NPO a lot of times due to procedures.  Discussed to try her best.  After I left the room son came to the nurse's station yelling irate about my recommendations to eat but she keeps getting disturbed such as vitals someone coming to talk to her.        Objective/physical exam:  General: In no acute distress, afebrile  Chest: Clear to auscultation bilaterally  Heart: RRR, +S1, S2, no appreciable murmur  Abdomen: Soft, nontender, BS +, bilateral nephrostomy tubes with left having urine, right is still blood  MSK: Warm, 1+ pitting bilateral lower extremity edema, no clubbing or cyanosis  Neurologic:  Alert awake oriented x4    VITAL SIGNS: 24 HRS MIN & MAX LAST   Temp  Min: 97.4 °F (36.3 °C)  Max: 98.3 °F (36.8 °C) 98.3 °F (36.8 °C)   BP  Min: 103/61  Max: 131/70 131/70   Pulse  Min: 56  Max: 73  73   Resp  Min: 18  Max: 20 18   SpO2  Min: 97 %  Max: 99 % 97 %     I have reviewed the following labs:  Recent Labs   Lab 10/11/24  0457 10/13/24  0526 10/14/24  0529   WBC 18.83* 20.63* 19.12*   RBC 3.38* 3.45* 3.75*   HGB 9.9* 10.1* 11.0*   HCT 30.7* 31.7* 34.5*   MCV 90.8 91.9 92.0   MCH 29.3 29.3 29.3   MCHC 32.2* 31.9* 31.9*   RDW 15.7 15.8 15.4    288 326   MPV 11.4* 11.5* 10.9*     Recent Labs   Lab 10/11/24  0457 10/13/24  0526 10/14/24  0529   * 127* 125*   K 4.5 4.6 5.3*   CL 88* 92* 89*   CO2 21* 17* 16*   BUN 55.1* 39.2* 51.2*   CREATININE 5.32* 4.10* 5.28*   CALCIUM 8.5 8.5 9.0   MG 2.10 1.90  --    ALBUMIN 2.0* 2.0* 2.2*   ALKPHOS  --  151*  --    ALT  --  12  --    AST  --  12  --    BILITOT  --  0.3  --      Microbiology Results  (last 7 days)       Procedure Component Value Units Date/Time    Blood Culture [[id number]] Collected: 10/15/24 2136    Order Status: Resulted Specimen: Blood Updated: 10/15/24 2146    Blood Culture [[id number]] Collected: 10/15/24 2136    Order Status: Resulted Specimen: Blood Updated: 10/15/24 2146    Blood Culture [[id number]]  (Abnormal) Collected: 10/14/24 0529    Order Status: Completed Specimen: Blood from Arm, Right Updated: 10/15/24 0935     GRAM STAIN Gram Positive Cocci, probable Staphylococcus      Seen in gram stain of broth only      2 of 2 bottles positive    Blood Culture [[id number]]  (Abnormal) Collected: 10/14/24 0529    Order Status: Completed Specimen: Blood from Hand, Right Updated: 10/15/24 0902     GRAM STAIN Gram Positive Cocci, probable Staphylococcus      Seen in gram stain of broth only      2 of 2 bottles positive    Blood Culture [[id number]]  (Normal) Collected: 10/09/24 1728    Order Status: Completed Specimen: Blood Updated: 10/14/24 1900     Blood Culture No Growth at 5 days    Blood Culture [[id number]]  (Abnormal)  (Susceptibility) Collected: 10/10/24 1605    Order Status: Completed Specimen: Blood Updated: 10/13/24 0628     Blood Culture Methicillin resistant Staphylococcus aureus     GRAM STAIN Seen in gram stain of broth only      2 of 2 bottles positive      Gram Positive Cocci, probable Staphylococcus    Blood Culture [[id number]]  (Abnormal)  (Susceptibility) Collected: 10/10/24 1605    Order Status: Completed Specimen: Blood Updated: 10/13/24 0627     Blood Culture Methicillin resistant Staphylococcus aureus     GRAM STAIN 1 of 2 Anaerobic bottles positive      Gram Positive Cocci, probable Staphylococcus      Seen in gram stain of broth only    Blood Culture [[id number]]  (Abnormal)  (Susceptibility) Collected: 10/09/24 1728    Order Status: Completed Specimen: Blood Updated: 10/13/24 0625     Blood Culture Methicillin resistant Staphylococcus aureus     GRAM STAIN Gram  Positive Cocci, probable Staphylococcus      Seen in gram stain of broth only      1 of 2 Aerobic bottles positive    Blood Culture [[id number]]  (Abnormal)  (Susceptibility) Collected: 10/07/24 2103    Order Status: Completed Specimen: Blood Updated: 10/12/24 0658     Blood Culture Methicillin resistant Staphylococcus aureus     GRAM STAIN 1 of 1 Pediatric bottle positive      Gram Positive Cocci, probable Staphylococcus      Seen in gram stain of broth only    BCID2 Panel [[id number]] Collected: 10/10/24 1605    Order Status: Canceled Specimen: Blood Updated: 10/11/24 2011    BCID2 Panel [[id number]]  (Abnormal) Collected: 10/09/24 1728    Order Status: Completed Specimen: Blood Updated: 10/10/24 1716     CTX-M (ESBL ) N/A     IMP (Cabapenemase ) N/A     KPC resistance gene (Carbapenemase ) N/A     mcr-1 N/A     mecA ID N/A     Comment: Note: Antimicrobial resistance can occur via multiple mechanisms. A Not Detected result for antimicrobial resistance gene(s) does not indicate antimicrobial susceptibility. Subculturing is required for species identification and susceptibility testing of   isolates.        mecA/C and MREJ (MRSA) gene Detected     NDM (Carbapenemase ) N/A     OXA-48-like (Carbapenemase ) N/A     Royal/B (VRE gene) N/A     VIM (Carbapenemase ) N/A     Enterococcus faecalis Not Detected     Enterococcus faecium Not Detected     Listeria monocytogenes Not Detected     Staphylococcus spp. Detected     Staphylococcus aureus Detected     Staphylococcus epidermidis Not Detected     Staphylococcus lugdunensis Not Detected     Streptococcus spp. Not Detected     Streptococcus agalactiae (Group B) Not Detected     Streptococcus pneumoniae Not Detected     Streptococcus pyogenes (Group A) Not Detected     Acinetobacter calcoaceticus/baumannii complex Not Detected     Bacteroides fragilis Not Detected     Enterobacterales Not Detected     Enterobacter cloacae  complex Not Detected     Escherichia coli Not Detected     Klebsiella aerogenes Not Detected     Klebsiella oxytoca Not Detected     Klebsiella pneumoniae group Not Detected     Proteus spp. Not Detected     Salmonella spp. Not Detected     Serratia marcescens Not Detected     Haemophilus influenzae Not Detected     Neisseria meningitidis Not Detected     Pseudomonas aeruginosa Not Detected     Stenotrophomonas maltophilia Not Detected     Candida albicans Not Detected     Candida auris Not Detected     Candida glabrata Not Detected     Candida krusei Not Detected     Candida parapsilosis Not Detected     Candida tropicalis Not Detected     Cryptococcus neoformans/gattii Not Detected    Narrative:      The [employer] BCID2 Panel is a multiplexed nucleic acid test intended for the use with [employer]® 2.0 or [employer]® [employer] for the simultaneous qualitative detection and identification of multiple bacterial and yeast nucleic acids and select genetic determinants associated with antimicrobial resistance.  The [employer] BCID2 Panel test is performed directly on blood culture samples identified as positive by a continuous monitoring blood culture system.  Results are intended to be interpreted in conjunction with Gram stain results.    Blood Culture [[id number]]  (Abnormal)  (Susceptibility) Collected: 10/07/24 6183    Order Status: Completed Specimen: Blood Updated: 10/10/24 0650     Blood Culture Methicillin resistant Staphylococcus aureus     GRAM STAIN Gram Positive Cocci, probable Staphylococcus      Seen in gram stain of broth only      1 of 1 Pediatric bottle positive             See below for Radiology    Assessment/Plan:  Persistent right-sided hydronephrosis upper pole moiety  Persistent MRSA bacteremia  Encephalopathy most likely metabolic-resolved  Bladder lesion-carcinoma with extensive squamous differentiation  Bilateral percutaneous nephrostomy  End-stage renal disease on  hemodialysis    History of hypertension, hyperlipidemia, bilateral chronic hydronephrosis with left nephrostomy tube in place and right ureter stent, ESRD on HD TTS, and chronic anemia       Oncology following.    Infectious Disease following.  Now on vancomycin and Teflaro.  WBC scan showing increased uptake in the bilateral distal femur and proximal tibia, CT of the area notes no infectious etiology, possibly secondary to bone marrow expansion.  Urology following.  Recommending nephrostomy tube to the upper pole moiety of the right kidney.  Temporary dialysis catheter placement, hold off on tunneled until blood cultures are cleared.  Change [place] to Percocet for better pain control.  Encourage increased oral intake secondary to malnutrition.  Nutritional supplement.    Prophylaxis: Lovenox 30  VTE prophylaxis:     Patient condition:  Stable/Fair/Guarded/ Serious/ Critical    Anticipated discharge and Disposition:         All diagnosis and differential diagnosis have been reviewed; assessment and plan has been documented; I have personally reviewed the labs and test results that are presently available; I have reviewed the patients medication list; I have reviewed the consulting providers response and recommendations. I have reviewed or attempted to review medical records based upon their availability    All of the patient's questions have been  addressed and answered. Patient's is agreeable to the above stated plan. I will continue to monitor closely and make adjustments to medical management as needed.    Portions of this note dictated using EMR integrated voice recognition software, and may be subject to voice recognition errors not corrected at proofreading. Please contact writer for clarification if needed.   _____________________________________________________________________    Malnutrition Status:    Scheduled Med:   atorvastatin  20 mg Oral QHS    ceftaroline (Teflaro) IV (PEDS and ADULTS)  200 mg  Intravenous Q8H    enoxaparin  30 mg Subcutaneous Daily    folic acid  1 mg Oral Daily    ondansetron  4 mg Intravenous Once    pantoprazole  40 mg Oral Daily    sertraline  25 mg Oral Daily    sevelamer carbonate  800 mg Oral TID WM    sodium bicarbonate  1,300 mg Oral TID      Continuous Infusions:       PRN Meds:    Current Facility-Administered Medications:     acetaminophen, 650 mg, Oral, Q4H PRN    albuterol-ipratropium, 3 mL, Nebulization, Once PRN    aluminum-magnesium hydroxide-simethicone, 30 mL, Oral, QID PRN    diphenhydrAMINE, 25 mg, Intravenous, Q6H PRN    heparin (porcine), 3,000 Units, Intravenous, PRN    HYDROcodone-acetaminophen, 1 tablet, Oral, Q4H PRN    HYDROcodone-acetaminophen, 1 tablet, Oral, Q6H PRN    melatonin, 6 mg, Oral, Nightly PRN    metoclopramide, 10 mg, Intravenous, Q10 Min PRN    ondansetron, 4 mg, Intravenous, Q4H PRN    polyethylene glycol, 17 g, Oral, BID PRN    prochlorperazine, 5 mg, Intravenous, Q6H PRN    senna-docusate 8.6-50 mg, 2 tablet, Oral, BID PRN    sodium chloride 0.9%, 10 mL, Intravenous, PRN    vancomycin - pharmacy to dose, , Intravenous, pharmacy to manage frequency     Radiology:  I have personally reviewed the following imaging and agree with the radiologist.     CT Lower Extremity W Wo Contrast Bilat  Narrative: EXAMINATION:  CT LOWER EXTREMITY W WO CONTRAST BILAT    CLINICAL HISTORY:  abnormal wbc scan, eval for infection, distal femur proximal tibia;    TECHNIQUE:  Helically acquired imaging through the lower extremities were obtained prior to and after the IV administration of contrast. Axial, sagittal and coronal reformations were created and interpreted.    Automated tube current modulation, weight-based exposure dosing, and/or iterative reconstruction technique utilized to reach lowest reasonably achievable exposure rate.    DLP: 3551 mGy*cm    COMPARISON:  Nuclear medicine white blood cell scan 10/14/2024, CT abdomen pelvis  10/08/2024    FINDINGS:  BONES/JOINTS: No fracture.  No dislocation.  Degenerative changes at the knees.  No erosions.  No periostitis.    SOFT TISSUES: Partially imaged hydronephrotic lower pole moiety of the right kidney.    OTHER: N/A  Impression: No appreciable acute osseous abnormality    Symmetric uptake at the lower extremities on nuclear medicine exam most suggestive of aseptic marrow expansion.    Electronically signed by: Deidra Dobson  Date:    10/15/2024  Time:    13:21  CT Abdomen Pelvis  Without Contrast  Narrative: EXAMINATION:  CT ABDOMEN PELVIS WITHOUT CONTRAST    CLINICAL HISTORY:  check for reflux into lower moiety of left kidney; CT cystogram;    TECHNIQUE:  Low dose axial images, sagittal and coronal reformations were obtained from the lung bases to the pubic symphysis.  Contrast was instilled into the urinary bladder..  Automatic exposure control is utilized to reduce patient radiation exposure.    COMPARISON:  10/08/2024    FINDINGS:  There is bibasilar atelectasis.    The liver appears normal.  No obvious liver mass or lesion is seen.    Gallbladder appears normal.  No gallstones are seen.    The pancreas appears normal.  No inflammatory changes are seen in the pancreatic region.    The spleen appears normal and adrenal glands appear normal.  No adrenal nodule is seen.    There is a duplicated collecting system seen on the right side.  The inferior pole moiety has a nephroureteral stent within it.  It is decompressed.  The upper pole moiety has evidence of hydronephrosis.  The 2 ureters seem to the conjoined at the level of the bladder.  There is also percutaneous nephrostomy tube seen on the right side.  It appears to be within the renal pelvis of the lower pole moiety.    There is a percutaneous nephrostomy tube seen on the left side as well.  It is within the left renal pelvis.  There is reflux of contrast from the urinary bladder into the left ureter and into the left renal pelvis and  calices.  No duplicated collecting system seen on the left.    No reflux of contrast is seen in the upper or lower pole moiety on the right side..    There appear to be some filling defects or debris within the base the urinary bladder    There is contrast within the bowel presumably from the correlation with patient's clinical history is recommended.  No colitis is seen.  No diverticulitis is seen.  No appendicitis is seen.    No free air seen.  No free fluid is seen.  Uterus appears unremarkable.    Bones show no acute abnormality.  Impression: Reflux of contrast from the urinary bladder into the left ureter and left renal pelvis and calices.    Duplicated collecting system seen on the right with nephroureteral stent seen in the lower pole moiety with no hydronephrosis or hydroureter seen in the lower pole moiety and no reflux of contrast seen on the right side.    The right upper pole moiety has evidence of hydronephrosis and hydroureter with no reflux of contrast from the urinary bladder.    Bilateral percutaneous nephrostomy tubes as outlined above with the right-sided percutaneous nephrostomy tube in the renal pelvis of the lower pole moiety    Debris/filling defects within the base the urinary bladder    Electronically signed by: Garland Raya  Date:    10/15/2024  Time:    12:56      Uriel Fletcher MD  Department of Hospital Medicine   [facility]   10/16/2024

## 2024-10-16 NOTE — PT/OT/SLP PROGRESS
Physical Therapy Treatment    Patient Name:  Fior Joya   MRN:  [id number]    Patient out of room for procedure. PT to f/u tomorrow.

## 2024-10-16 NOTE — PROGRESS NOTES
Infectious Disease  Progress Note    Patient Name: Fior Joya   MRN: [id number]   Admission Date: 10/3/2024   Hospital Length of Stay: 12 days  Attending Physician: Uriel Fletcher MD   Primary Care Provider: Linda, Primary Doctor     Isolation Status: Contact     Assessment/Plan:   69 y.o. female with a history significant for ESRD on hemodialysis through right IJ permanent catheter, chronic hydronephrosis with left nephrostomy tube, hypertension who was admitted on 10/3/2024 with lower back pain. Patient said that her symptoms started approximately 1 week ago with some neck pain that moved down to her shoulder. She then developed lower back pain that moved to her right leg. Patient also reports having cloudy drainage from her nephrostomy tube for the last 1 month. Patient reports worsening back/right flank pain for the last few days. Vital signs on admission showed temperature 97.2 F, heart rate 79, blood pressure 110/75. Laboratory workup showed WBC 81053, creatinine 6.70, lactic acid 1.8. Urinalysis showed more than 100 WBC. She had 2 sets of blood culture obtained which are growing MRSA. 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  The upper pole moiety is decompressed with a ureteral stent but the lower pole demonstrating moderate severe hydronephrosis, left nephrostomy ureteral stent drain with resolution of hydronephrosis. Patient underwent nephrostomy tube placement by IR into the inferior pole of the right renal collecting system.  Patient was also noted to MRSA bacteremia, she was started on vancomycin, on 10/07/2024, she appears to had worsening mental status, she has persistent leukocytosis.  ID is consulted for assistance in management.    10/14 - afebrile. blood cx in process   10/15 - off floor. afebrile. Blood cx peristently +. NM scan with increased update in b/l distal femur and tibia is not likely due  to infection. CT obained shows no abnormality. Recommend b/l tube exchange  10/16 - off floor to IR. afebrile. Blood cx in process.     1) Bacteremia  - MRSA  - suspect possible seeding of PCN tubes  - NM scan 10/12 - . Nonspecific relatively increased radiotracer uptake at the bilateral distal femur and proximal tibia.  Correlation with dedicated plain radiographs of this region recommended. No abnormal radiotracer localization within the visualized chest, abdomen, or pelvis.  - CT LE 10/15 - No appreciable acute osseous abnormality. symmetric uptake at the lower extremities on nuclear medicine exam most suggestive of aseptic marrow expansion.  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 Debris/filling defects within the base the urinary bladder  - MADELYN 10/8 negative  - HD line removed 10/8   - BCx 10/10 - MRSA 3/4  - BCx 10/14 - MRSA 4/4  - repeat in AM  - currently on vanco, goal 15-20, Rx to dose with HD  - currently on ceftaroline dual therapy     2)  Cancer  - new dx  SCC bladder  - hydronephrosis s/p ureteral stents and nephrostomy tubes  - Oncology consulted     3) Leukocytosis  - in setting of acute infection     ID will continue following. Please contact us with any questions or concerns.  Subjective:     Principal Problem: <principal problem not specified>     Interval History:   Appears improved today, no fevers, no chills, some issues  with the PCN tube on the L being somewhat displaced     Review of Systems   Review of Systems   Constitutional:  Negative for chills, diaphoresis, fever and weight loss.   HENT:  Negative for congestion and nosebleeds.    Respiratory:  Negative for cough, sputum production and wheezing.    Cardiovascular:  Negative for chest pain, palpitations and leg swelling.   Gastrointestinal:  Negative for abdominal pain, diarrhea and vomiting.   Genitourinary:  Negative for dysuria, flank pain and frequency.   Musculoskeletal:  Negative for back pain, joint pain and neck pain.   Skin:  Negative for itching and rash.   Neurological:  Negative for sensory change, seizures, weakness and headaches.   Endo/Heme/Allergies:  Does not bruise/bleed easily.   All other systems reviewed and are negative.       Objective:     Vital Signs (Most Recent):  Temp: 98.3 °F (36.8 °C) (10/16/24 0718)  Pulse: 73 (10/16/24 0719)  Resp: 18 (10/16/24 0639)  BP: 131/70 (10/16/24 0719)  SpO2: 97 % (10/16/24 0718)  Vital Signs (24h Range):  Temp:  [97.4 °F (36.3 °C)-98.3 °F (36.8 °C)] 98.3 °F (36.8 °C)  Pulse:  [56-73] 73  Resp:  [18-20] 18  SpO2:  [97 %-99 %] 97 %  BP: (103-131)/(51-70) 131/70      Weight:   Wt Readings from Last 1 Encounters:   10/10/24 (!) 140.2 kg (309 lb 1.4 oz)      Body mass index is Body mass index is 54.75 kg/m².     Estimated Creatinine Clearance: Estimated Creatinine Clearance: 14.1 mL/min (A) (based on SCr of 5.28 mg/dL (H)).     Lines/Drains/Airways       Central Venous Catheter Line  Duration                  Hemodialysis Catheter 10/11/24 left internal jugular 5 days              Drain  Duration                  Nephrostomy 06/28/24 1025 Left 8 Fr. 109 days         Nephrostomy 10/04/24 1506 Right 8 Fr. 11 days         Urethral Catheter 10/14/24 1250 16 Fr. 1 day              Peripheral Intravenous Line  Duration                  Peripheral IV - Single Lumen 10/13/24 1953 22 G Left;Distal Forearm 2 days                  "    Physical Exam  Physical Exam  Constitutional:       Appearance: Normal appearance. She is obese.   HENT:      Head: Normocephalic and atraumatic.      Mouth/Throat:      Pharynx: No oropharyngeal exudate or posterior oropharyngeal erythema.   Eyes:      Extraocular Movements: Extraocular movements intact.      Pupils: Pupils are equal, round, and reactive to light.   Cardiovascular:      Rate and Rhythm: Normal rate and regular rhythm.      Heart sounds: No murmur heard.     Comments: Site of removal of the right chest wall HD catheter is clean  Pulmonary:      Effort: No respiratory distress.      Breath sounds: No wheezing, rhonchi or rales.   Abdominal:      General: Bowel sounds are normal. There is no distension.      Palpations: Abdomen is soft.      Tenderness: There is no abdominal tenderness.      Comments: Leonard PCN tubes bloody output   Genitourinary:     Comments: B/l PCNT bloody drainage    Mario - gross hematuria   Musculoskeletal:         General: No swelling or tenderness.      Cervical back: Neck supple. No rigidity or tenderness.      Comments: Still tenderness at the r hip    Lymphadenopathy:      Cervical: No cervical adenopathy.   Skin:     Findings: No lesion or rash.   Neurological:      General: No focal deficit present.      Mental Status: She is alert. Mental status is at baseline.      Cranial Nerves: No cranial nerve deficit.      Motor: No weakness.   Psychiatric:         Mood and Affect: Mood normal.         Behavior: Behavior normal.          Significant Labs: CBC:   No results for input(s): "WBC", "HGB", "HCT", "PLT" in the last 48 hours.    CMP:   No results for input(s): "NA", "K", "CL", "CO2", "GLU", "BUN", "CREATININE", "CALCIUM", "PROT", "ALBUMIN", "BILITOT", "ALKPHOS", "AST", "ALT", "ANIONGAP", "EGFRNONAA" in the last 48 hours.    Invalid input(s): "ESTGFAFRICA"      Microbiology Results (last 7 days)       Procedure Component Value Units Date/Time    Blood Culture " [[id number]] Collected: 10/15/24 2136    Order Status: Resulted Specimen: Blood Updated: 10/15/24 2146    Blood Culture [[id number]] Collected: 10/15/24 2136    Order Status: Resulted Specimen: Blood Updated: 10/15/24 2146    Blood Culture [[id number]]  (Abnormal) Collected: 10/14/24 0529    Order Status: Completed Specimen: Blood from Arm, Right Updated: 10/15/24 0935     GRAM STAIN Gram Positive Cocci, probable Staphylococcus      Seen in gram stain of broth only      2 of 2 bottles positive    Blood Culture [[id number]]  (Abnormal) Collected: 10/14/24 0529    Order Status: Completed Specimen: Blood from Hand, Right Updated: 10/15/24 0902     GRAM STAIN Gram Positive Cocci, probable Staphylococcus      Seen in gram stain of broth only      2 of 2 bottles positive    Blood Culture [[id number]]  (Normal) Collected: 10/09/24 1728    Order Status: Completed Specimen: Blood Updated: 10/14/24 1900     Blood Culture No Growth at 5 days    Blood Culture [[id number]]  (Abnormal)  (Susceptibility) Collected: 10/10/24 1605    Order Status: Completed Specimen: Blood Updated: 10/13/24 0628     Blood Culture Methicillin resistant Staphylococcus aureus     GRAM STAIN Seen in gram stain of broth only      2 of 2 bottles positive      Gram Positive Cocci, probable Staphylococcus    Blood Culture [[id number]]  (Abnormal)  (Susceptibility) Collected: 10/10/24 1605    Order Status: Completed Specimen: Blood Updated: 10/13/24 0627     Blood Culture Methicillin resistant Staphylococcus aureus     GRAM STAIN 1 of 2 Anaerobic bottles positive      Gram Positive Cocci, probable Staphylococcus      Seen in gram stain of broth only    Blood Culture [[id number]]  (Abnormal)  (Susceptibility) Collected: 10/09/24 1728    Order Status: Completed Specimen: Blood Updated: 10/13/24 0625     Blood Culture Methicillin resistant Staphylococcus aureus     GRAM STAIN Gram Positive Cocci, probable Staphylococcus      Seen in gram stain of broth only       1 of 2 Aerobic bottles positive    Blood Culture [[id number]]  (Abnormal)  (Susceptibility) Collected: 10/07/24 2103    Order Status: Completed Specimen: Blood Updated: 10/12/24 0658     Blood Culture Methicillin resistant Staphylococcus aureus     GRAM STAIN 1 of 1 Pediatric bottle positive      Gram Positive Cocci, probable Staphylococcus      Seen in gram stain of broth only    BCID2 Panel [[id number]] Collected: 10/10/24 1605    Order Status: Canceled Specimen: Blood Updated: 10/11/24 2011    BCID2 Panel [[id number]]  (Abnormal) Collected: 10/09/24 1728    Order Status: Completed Specimen: Blood Updated: 10/10/24 1716     CTX-M (ESBL ) N/A     IMP (Cabapenemase ) N/A     KPC resistance gene (Carbapenemase ) N/A     mcr-1 N/A     mecA ID N/A     Comment: Note: Antimicrobial resistance can occur via multiple mechanisms. A Not Detected result for antimicrobial resistance gene(s) does not indicate antimicrobial susceptibility. Subculturing is required for species identification and susceptibility testing of   isolates.        mecA/C and MREJ (MRSA) gene Detected     NDM (Carbapenemase ) N/A     OXA-48-like (Carbapenemase ) N/A     Royal/B (VRE gene) N/A     VIM (Carbapenemase ) N/A     Enterococcus faecalis Not Detected     Enterococcus faecium Not Detected     Listeria monocytogenes Not Detected     Staphylococcus spp. Detected     Staphylococcus aureus Detected     Staphylococcus epidermidis Not Detected     Staphylococcus lugdunensis Not Detected     Streptococcus spp. Not Detected     Streptococcus agalactiae (Group B) Not Detected     Streptococcus pneumoniae Not Detected     Streptococcus pyogenes (Group A) Not Detected     Acinetobacter calcoaceticus/baumannii complex Not Detected     Bacteroides fragilis Not Detected     Enterobacterales Not Detected     Enterobacter cloacae complex Not Detected     Escherichia coli Not Detected     Klebsiella aerogenes Not  Detected     Klebsiella oxytoca Not Detected     Klebsiella pneumoniae group Not Detected     Proteus spp. Not Detected     Salmonella spp. Not Detected     Serratia marcescens Not Detected     Haemophilus influenzae Not Detected     Neisseria meningitidis Not Detected     Pseudomonas aeruginosa Not Detected     Stenotrophomonas maltophilia Not Detected     Candida albicans Not Detected     Candida auris Not Detected     Candida glabrata Not Detected     Candida krusei Not Detected     Candida parapsilosis Not Detected     Candida tropicalis Not Detected     Cryptococcus neoformans/gattii Not Detected    Narrative:      The W4 BCID2 Panel is a multiplexed nucleic acid test intended for the use with [employer]® [employer] or [employer]® [employer] Systems for the simultaneous qualitative detection and identification of multiple bacterial and yeast nucleic acids and select genetic determinants associated with antimicrobial resistance.  The W4 BCID2 Panel test is performed directly on blood culture samples identified as positive by a continuous monitoring blood culture system.  Results are intended to be interpreted in conjunction with Gram stain results.    Blood Culture [[id number]]  (Abnormal)  (Susceptibility) Collected: 10/07/24 2103    Order Status: Completed Specimen: Blood Updated: 10/10/24 0650     Blood Culture Methicillin resistant Staphylococcus aureus     GRAM STAIN Gram Positive Cocci, probable Staphylococcus      Seen in gram stain of broth only      1 of 1 Pediatric bottle positive             Significant Imaging: I have reviewed all pertinent imaging results/findings within the past 24 hours.      Milo Campbell MD  Infectious Disease  [facility]

## 2024-10-17 LAB
ACB COMPLEX DNA BLD POS QL NAA+NON-PROBE: NOT DETECTED
ALBUMIN SERPL-MCNC: 2 G/DL (ref 3.4–4.8)
ALBUMIN/GLOB SERPL: 0.4 RATIO (ref 1.1–2)
ALP SERPL-CCNC: 135 UNIT/L (ref 40–150)
ALT SERPL-CCNC: 7 UNIT/L (ref 0–55)
ANION GAP SERPL CALC-SCNC: 25 MEQ/L
AST SERPL-CCNC: 10 UNIT/L (ref 5–34)
B FRAGILIS DNA BLD POS QL NAA+PROBE: NOT DETECTED
BACTERIA BLD CULT: ABNORMAL
BACTERIA BLD CULT: ABNORMAL
BASOPHILS # BLD AUTO: 0.06 X10(3)/MCL
BASOPHILS NFR BLD AUTO: 0.5 %
BILIRUB SERPL-MCNC: 0.2 MG/DL
BUN SERPL-MCNC: 73.9 MG/DL (ref 9.8–20.1)
C ALBICANS DNA BLD POS QL NAA+PROBE: NOT DETECTED
C AURIS DNA BLD POS QL NAA+NON-PROBE: NOT DETECTED
C GATTII+NEOFOR DNA CSF QL NAA+NON-PROBE: NOT DETECTED
C GLABRATA DNA BLD POS QL NAA+PROBE: NOT DETECTED
C KRUSEI DNA BLD POS QL NAA+PROBE: NOT DETECTED
C PARAP DNA BLD POS QL NAA+PROBE: NOT DETECTED
C TROPICLS DNA BLD POS QL NAA+PROBE: NOT DETECTED
CALCIUM SERPL-MCNC: 7.8 MG/DL (ref 8.4–10.2)
CHLORIDE SERPL-SCNC: 87 MMOL/L (ref 98–107)
CO2 SERPL-SCNC: 15 MMOL/L (ref 23–31)
COLISTIN RES MCR-1 ISLT/SPM QL: ABNORMAL
CREAT SERPL-MCNC: 5.95 MG/DL (ref 0.55–1.02)
CREAT/UREA NIT SERPL: 12
E CLOAC COMP DNA BLD POS QL NAA+PROBE: NOT DETECTED
E COLI DNA BLD POS QL NAA+PROBE: NOT DETECTED
E FAECALIS+OTHR E SP RRNA BLD POS FISH: NOT DETECTED
E FAECIUM HSP60 BLD POS QL PROBE: NOT DETECTED
ENTEROBACTERALES DNA BLD POS NAA+N-PRB: NOT DETECTED
EOSINOPHIL # BLD AUTO: 0.21 X10(3)/MCL (ref 0–0.9)
EOSINOPHIL NFR BLD AUTO: 1.8 %
ERYTHROCYTE [DISTWIDTH] IN BLOOD BY AUTOMATED COUNT: 15.8 % (ref 11.5–17)
ESBL CFT TO CFT-CLAV IC RTO BD POS IMP: ABNORMAL
GFR SERPLBLD CREATININE-BSD FMLA CKD-EPI: 7 ML/MIN/1.73/M2
GLOBULIN SER-MCNC: 4.7 GM/DL (ref 2.4–3.5)
GLUCOSE SERPL-MCNC: 93 MG/DL (ref 82–115)
GP B STREP DNA CSF QL NAA+NON-PROBE: NOT DETECTED
GRAM STN SPEC: ABNORMAL
GRAM STN SPEC: NORMAL
GRAM STN SPEC: NORMAL
HAEM INFLU DNA CSF QL NAA+NON-PROBE: NOT DETECTED
HCT VFR BLD AUTO: 28.6 % (ref 37–47)
HGB BLD-MCNC: 9.3 G/DL (ref 12–16)
IMM GRANULOCYTES # BLD AUTO: 0.22 X10(3)/MCL (ref 0–0.04)
IMM GRANULOCYTES NFR BLD AUTO: 1.9 %
IMP CARBAPENEMASE ISLT QL IA.RAPID: ABNORMAL
K OXYTOCA OMPA BLD POS QL PROBE: NOT DETECTED
KLEBSIELLA SP DNA BLD POS QL NAA+NON-PRB: NOT DETECTED
KLEBSIELLA SP DNA BLD POS QL NAA+NON-PRB: NOT DETECTED
KPC CARBAPENEMASE ISLT QL IA.RAPID: ABNORMAL
L MONOCYTOG DNA CSF QL NAA+NON-PROBE: NOT DETECTED
LYMPHOCYTES # BLD AUTO: 1.04 X10(3)/MCL (ref 0.6–4.6)
LYMPHOCYTES NFR BLD AUTO: 8.8 %
MAGNESIUM SERPL-MCNC: 1.9 MG/DL (ref 1.6–2.6)
MCH RBC QN AUTO: 29.7 PG (ref 27–31)
MCHC RBC AUTO-ENTMCNC: 32.5 G/DL (ref 33–36)
MCV RBC AUTO: 91.4 FL (ref 80–94)
MECA+MECC NOSE QL NAA+PROBE: ABNORMAL
MECA+MECC+MREJ ISLT/SPM QL: DETECTED
MONOCYTES # BLD AUTO: 1.31 X10(3)/MCL (ref 0.1–1.3)
MONOCYTES NFR BLD AUTO: 11.1 %
N MEN DNA CSF QL NAA+NON-PROBE: NOT DETECTED
NDM CARBAPENEMASE ISLT QL IA.RAPID: ABNORMAL
NEUTROPHILS # BLD AUTO: 8.98 X10(3)/MCL (ref 2.1–9.2)
NEUTROPHILS NFR BLD AUTO: 75.9 %
NRBC BLD AUTO-RTO: 0 %
OXA-48-LIKE CRBPNASE ISLT QL IA.RAPID: ABNORMAL
P AERUGINOSA DNA BLD POS QL NAA+PROBE: NOT DETECTED
PHOSPHATE SERPL-MCNC: 5.4 MG/DL (ref 2.3–4.7)
PLATELET # BLD AUTO: 309 X10(3)/MCL (ref 130–400)
PMV BLD AUTO: 11 FL (ref 7.4–10.4)
POTASSIUM SERPL-SCNC: 5.3 MMOL/L (ref 3.5–5.1)
PROT SERPL-MCNC: 6.7 GM/DL (ref 5.8–7.6)
PROTEUS SP DNA BLD POS QL NAA+PROBE: NOT DETECTED
RBC # BLD AUTO: 3.13 X10(6)/MCL (ref 4.2–5.4)
S AUREUS DNA BLD POS QL NAA+PROBE: DETECTED
S ENT+BONG DNA STL QL NAA+NON-PROBE: NOT DETECTED
S EPIDERMIDIS HSP60 BLD POS QL PROBE: NOT DETECTED
S LUGDUNENSIS SODA BLD POS QL PROBE: NOT DETECTED
S MALTOPH DNA BLD POS QL NAA+PROBE: NOT DETECTED
S MARCESCENS DNA BLD POS QL NAA+PROBE: NOT DETECTED
S PNEUM DNA CSF QL NAA+NON-PROBE: NOT DETECTED
S PYOGENES HSP60 BLD POS QL PROBE: NOT DETECTED
SODIUM SERPL-SCNC: 127 MMOL/L (ref 136–145)
STAPH SP TUF BLD POS QL PROBE: DETECTED
STREPTOCOCCUS SP TUF BLD POS QL PROBE: NOT DETECTED
VAN(A+B+C1+C2) GENES ISLT/SPM: ABNORMAL
VANCOMYCIN SERPL-MCNC: 15.3 UG/ML (ref 15–20)
VIM CARBAPENEMASE ISLT QL IA.RAPID: ABNORMAL
WBC # BLD AUTO: 11.82 X10(3)/MCL (ref 4.5–11.5)

## 2024-10-17 PROCEDURE — 85025 COMPLETE CBC W/AUTO DIFF WBC: CPT | Performed by: INTERNAL MEDICINE

## 2024-10-17 PROCEDURE — 83735 ASSAY OF MAGNESIUM: CPT | Performed by: INTERNAL MEDICINE

## 2024-10-17 PROCEDURE — 99233 SBSQ HOSP IP/OBS HIGH 50: CPT | Mod: ,,, | Performed by: HOSPITALIST

## 2024-10-17 PROCEDURE — 25000003 PHARM REV CODE 250: Performed by: INTERNAL MEDICINE

## 2024-10-17 PROCEDURE — 63600175 PHARM REV CODE 636 W HCPCS: Performed by: INTERNAL MEDICINE

## 2024-10-17 PROCEDURE — 25000003 PHARM REV CODE 250: Performed by: GENERAL PRACTICE

## 2024-10-17 PROCEDURE — 80202 ASSAY OF VANCOMYCIN: CPT | Performed by: INTERNAL MEDICINE

## 2024-10-17 PROCEDURE — 84100 ASSAY OF PHOSPHORUS: CPT | Performed by: INTERNAL MEDICINE

## 2024-10-17 PROCEDURE — 25000003 PHARM REV CODE 250: Performed by: UROLOGY

## 2024-10-17 PROCEDURE — 63600175 PHARM REV CODE 636 W HCPCS: Mod: JZ,JG | Performed by: GENERAL PRACTICE

## 2024-10-17 PROCEDURE — 36415 COLL VENOUS BLD VENIPUNCTURE: CPT | Performed by: INTERNAL MEDICINE

## 2024-10-17 PROCEDURE — 21400001 HC TELEMETRY ROOM

## 2024-10-17 PROCEDURE — 87040 BLOOD CULTURE FOR BACTERIA: CPT | Performed by: INTERNAL MEDICINE

## 2024-10-17 PROCEDURE — 80053 COMPREHEN METABOLIC PANEL: CPT | Performed by: INTERNAL MEDICINE

## 2024-10-17 PROCEDURE — 27000207 HC ISOLATION

## 2024-10-17 PROCEDURE — 80100016 HC MAINTENANCE HEMODIALYSIS

## 2024-10-17 RX ADMIN — VANCOMYCIN HYDROCHLORIDE 500 MG: 500 INJECTION, POWDER, LYOPHILIZED, FOR SOLUTION INTRAVENOUS at 06:10

## 2024-10-17 RX ADMIN — ENOXAPARIN SODIUM 30 MG: 30 INJECTION SUBCUTANEOUS at 06:10

## 2024-10-17 RX ADMIN — ATORVASTATIN CALCIUM 20 MG: 10 TABLET, FILM COATED ORAL at 08:10

## 2024-10-17 RX ADMIN — CEFTAROLINE FOSAMIL 200 MG: 600 POWDER, FOR SOLUTION INTRAVENOUS at 01:10

## 2024-10-17 RX ADMIN — CEFTAROLINE FOSAMIL 200 MG: 600 POWDER, FOR SOLUTION INTRAVENOUS at 09:10

## 2024-10-17 RX ADMIN — OXYCODONE AND ACETAMINOPHEN 1 TABLET: 10; 325 TABLET ORAL at 09:10

## 2024-10-17 RX ADMIN — OXYCODONE AND ACETAMINOPHEN 1 TABLET: 10; 325 TABLET ORAL at 01:10

## 2024-10-17 RX ADMIN — PANTOPRAZOLE SODIUM 40 MG: 40 TABLET, DELAYED RELEASE ORAL at 01:10

## 2024-10-17 RX ADMIN — OXYCODONE AND ACETAMINOPHEN 1 TABLET: 10; 325 TABLET ORAL at 06:10

## 2024-10-17 RX ADMIN — SERTRALINE HYDROCHLORIDE 25 MG: 25 TABLET ORAL at 01:10

## 2024-10-17 RX ADMIN — SODIUM BICARBONATE 650 MG TABLET 1300 MG: at 08:10

## 2024-10-17 RX ADMIN — SENNOSIDES AND DOCUSATE SODIUM 2 TABLET: 50; 8.6 TABLET ORAL at 04:10

## 2024-10-17 RX ADMIN — FOLIC ACID 1 MG: 1 TABLET ORAL at 01:10

## 2024-10-17 RX ADMIN — OXYCODONE AND ACETAMINOPHEN 1 TABLET: 10; 325 TABLET ORAL at 05:10

## 2024-10-17 RX ADMIN — SODIUM BICARBONATE 650 MG TABLET 1300 MG: at 01:10

## 2024-10-17 NOTE — NURSING
10/17/24 1237   Post-Hemodialysis Assessment   Blood Volume Processed (Liters) 52.9 L   Dialyzer Clearance Lightly streaked   Duration of Treatment 180 minutes   Additional Fluid Intake (mL) 700 mL   Total UF (mL) 2700 mL   Net Fluid Removal 2000   Patient Response to Treatment Tolerated well   Post-Hemodialysis Comments Treatment completed. NET fluid removed - 2 liters. UF goal lowered for SBP 90s. No other issues. No complaints

## 2024-10-17 NOTE — PROGRESS NOTES
Infectious Disease  Progress Note    Patient Name: Fior Joya   MRN: [id number]   Admission Date: 10/3/2024   Hospital Length of Stay: 13 days  Attending Physician: Uriel Fletcher MD   Primary Care Provider: Linda, Primary Doctor     Isolation Status: Contact     Assessment/Plan:   69 y.o. female with a history significant for ESRD on hemodialysis through right IJ permanent catheter, chronic hydronephrosis with left nephrostomy tube, hypertension who was admitted on 10/3/2024 with lower back pain. Patient said that her symptoms started approximately 1 week ago with some neck pain that moved down to her shoulder. She then developed lower back pain that moved to her right leg. Patient also reports having cloudy drainage from her nephrostomy tube for the last 1 month. Patient reports worsening back/right flank pain for the last few days. Vital signs on admission showed temperature 97.2 F, heart rate 79, blood pressure 110/75. Laboratory workup showed WBC 17877, creatinine 6.70, lactic acid 1.8. Urinalysis showed more than 100 WBC. She had 2 sets of blood culture obtained which are growing MRSA. 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  The upper pole moiety is decompressed with a ureteral stent but the lower pole demonstrating moderate severe hydronephrosis, left nephrostomy ureteral stent drain with resolution of hydronephrosis. Patient underwent nephrostomy tube placement by IR into the inferior pole of the right renal collecting system.  Patient was also noted to MRSA bacteremia, she was started on vancomycin, on 10/07/2024, she appears to had worsening mental status, she has persistent leukocytosis.  ID is consulted for assistance in management.    10/14 - afebrile. blood cx in process   10/15 - off floor. afebrile. Blood cx peristently +. NM scan with increased update in b/l distal femur and tibia is not likely due  to infection. CT obained shows no abnormality. Recommend b/l tube exchange  10/16 - off floor to IR. afebrile. Blood cx in process.   10/17 - afebrile. S/p left stent ecahnge and placement of n-tube on right with purulent fluid drainage    1) Bacteremia  - MRSA  - suspect possible seeding of PCN tubes  - NM scan 10/12 - . Nonspecific relatively increased radiotracer uptake at the bilateral distal femur and proximal tibia.  Correlation with dedicated plain radiographs of this region recommended. No abnormal radiotracer localization within the visualized chest, abdomen, or pelvis.  - CT LE 10/15 - No appreciable acute osseous abnormality. symmetric uptake at the lower extremities on nuclear medicine exam most suggestive of aseptic marrow expansion.  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 Debris/filling defects within the base the urinary bladder  - MADELYN 10/8 negative  - HD line removed 10/8   - BCx 10/10 - MRSA 3/4  - BCx 10/14 - MRSA 4/4  - repeat in AM  - currently on vanco, goal 15-20, Rx to dose with HD  - currently on ceftaroline dual therapy     2)  Cancer  - new dx  SCC bladder  - hydronephrosis s/p ureteral stents and nephrostomy tubes  - Oncology consulted     3) Leukocytosis  - in setting of acute infection     ID will continue following. Please contact us with any questions or concerns.  Subjective:     Principal Problem: <principal  problem not specified>     Interval History:   Appears improved today, no fevers, no chills, some issues with the PCN tube on the L being somewhat displaced     Review of Systems   Review of Systems   Constitutional:  Negative for chills, diaphoresis, fever and weight loss.   HENT:  Negative for congestion and nosebleeds.    Respiratory:  Negative for cough, sputum production and wheezing.    Cardiovascular:  Negative for chest pain, palpitations and leg swelling.   Gastrointestinal:  Negative for abdominal pain, diarrhea and vomiting.   Genitourinary:  Negative for dysuria, flank pain and frequency.   Musculoskeletal:  Negative for back pain, joint pain and neck pain.   Skin:  Negative for itching and rash.   Neurological:  Negative for sensory change, seizures, weakness and headaches.   Endo/Heme/Allergies:  Does not bruise/bleed easily.   All other systems reviewed and are negative.       Objective:     Vital Signs (Most Recent):  Temp: 98 °F (36.7 °C) (10/17/24 0804)  Pulse: 60 (10/17/24 0805)  Resp: 18 (10/17/24 0924)  BP: 124/67 (10/17/24 0805)  SpO2: 99 % (10/17/24 0804)  Vital Signs (24h Range):  Temp:  [97.6 °F (36.4 °C)-98.7 °F (37.1 °C)] 98 °F (36.7 °C)  Pulse:  [58-65] 60  Resp:  [16-18] 18  SpO2:  [96 %-100 %] 99 %  BP: (115-157)/(59-70) 124/67      Weight:   Wt Readings from Last 1 Encounters:   10/10/24 (!) 140.2 kg (309 lb 1.4 oz)      Body mass index is Body mass index is 54.75 kg/m².     Estimated Creatinine Clearance: Estimated Creatinine Clearance: 12.5 mL/min (A) (based on SCr of 5.95 mg/dL (H)).     Lines/Drains/Airways       Central Venous Catheter Line  Duration                  Hemodialysis Catheter 10/11/24 left internal jugular 6 days              Drain  Duration                  Nephrostomy 10/04/24 1506 Right 8 Fr. 12 days         Urethral Catheter 10/14/24 1250 16 Fr. 2 days         Nephrostomy 10/16/24 1150 Right 8 Fr. <1 day         Nephrostomy 10/16/24 1201 Left 8 Fr. <1 day               Peripheral Intravenous Line  Duration                  Peripheral IV - Single Lumen 10/13/24 1953 22 G Left;Distal Forearm 3 days                     Physical Exam  Physical Exam  Constitutional:       Appearance: Normal appearance. She is obese.   HENT:      Head: Normocephalic and atraumatic.      Mouth/Throat:      Pharynx: No oropharyngeal exudate or posterior oropharyngeal erythema.   Eyes:      Extraocular Movements: Extraocular movements intact.      Pupils: Pupils are equal, round, and reactive to light.   Cardiovascular:      Rate and Rhythm: Normal rate and regular rhythm.      Heart sounds: No murmur heard.     Comments: Site of removal of the right chest wall HD catheter is clean  Pulmonary:      Effort: No respiratory distress.      Breath sounds: No wheezing, rhonchi or rales.   Abdominal:      General: Bowel sounds are normal. There is no distension.      Palpations: Abdomen is soft.      Tenderness: There is no abdominal tenderness.      Comments: Leonard PCN tubes bloody output   Genitourinary:     Comments: B/l PCNT bloody drainage    Mario - gross hematuria   Musculoskeletal:         General: No swelling or tenderness.      Cervical back: Neck supple. No rigidity or tenderness.      Comments: Still tenderness at the r hip    Lymphadenopathy:      Cervical: No cervical adenopathy.   Skin:     Findings: No lesion or rash.   Neurological:      General: No focal deficit present.      Mental Status: She is alert. Mental status is at baseline.      Cranial Nerves: No cranial nerve deficit.      Motor: No weakness.   Psychiatric:         Mood and Affect: Mood normal.         Behavior: Behavior normal.          Significant Labs: CBC:   Recent Labs   Lab 10/17/24  0603   WBC 11.82*   HGB 9.3*   HCT 28.6*          CMP:   Recent Labs   Lab 10/17/24  0603   *   K 5.3*   CL 87*   CO2 15*   BUN 73.9*   CREATININE 5.95*   CALCIUM 7.8*   ALBUMIN 2.0*   BILITOT 0.2   ALKPHOS 135   AST 10   ALT 7          Microbiology Results (last 7 days)       Procedure Component Value Units Date/Time    Body Fluid Culture [[id number]] Collected: 10/16/24 1147    Order Status: Completed Specimen: Body Fluid from Kidney, Right Updated: 10/17/24 0720     Body Fluid Culture No Growth At 24 Hours    Gram Stain [[id number]] Collected: 10/16/24 1147    Order Status: Completed Specimen: Body Fluid from Kidney, Right Updated: 10/17/24 0719     GRAM STAIN Many WBC observed      Few Gram positive cocci    Blood Culture [[id number]]  (Abnormal)  (Susceptibility) Collected: 10/14/24 0529    Order Status: Completed Specimen: Blood from Arm, Right Updated: 10/17/24 0710     Blood Culture Methicillin resistant Staphylococcus aureus     GRAM STAIN Gram Positive Cocci, probable Staphylococcus      Seen in gram stain of broth only      2 of 2 bottles positive    Blood Culture [[id number]]  (Abnormal)  (Susceptibility) Collected: 10/14/24 0529    Order Status: Completed Specimen: Blood from Hand, Right Updated: 10/17/24 0710     Blood Culture Methicillin resistant Staphylococcus aureus     GRAM STAIN Gram Positive Cocci, probable Staphylococcus      Seen in gram stain of broth only      2 of 2 bottles positive    BCID2 Panel [[id number]]  (Abnormal) Collected: 10/15/24 2136    Order Status: Completed Specimen: Blood Updated: 10/17/24 0638     CTX-M (ESBL ) N/A     IMP (Cabapenemase ) N/A     KPC resistance gene (Carbapenemase ) N/A     mcr-1 N/A     mecA ID N/A     Comment: Note: Antimicrobial resistance can occur via multiple mechanisms. A Not Detected result for antimicrobial resistance gene(s) does not indicate antimicrobial susceptibility. Subculturing is required for species identification and susceptibility testing of   isolates.        mecA/C and MREJ (MRSA) gene Detected     NDM (Carbapenemase ) N/A     OXA-48-like (Carbapenemase ) N/A     Royal/B (VRE gene) N/A     VIM (Carbapenemase )  N/A     Enterococcus faecalis Not Detected     Enterococcus faecium Not Detected     Listeria monocytogenes Not Detected     Staphylococcus spp. Detected     Staphylococcus aureus Detected     Staphylococcus epidermidis Not Detected     Staphylococcus lugdunensis Not Detected     Streptococcus spp. Not Detected     Streptococcus agalactiae (Group B) Not Detected     Streptococcus pneumoniae Not Detected     Streptococcus pyogenes (Group A) Not Detected     Acinetobacter calcoaceticus/baumannii complex Not Detected     Bacteroides fragilis Not Detected     Enterobacterales Not Detected     Enterobacter cloacae complex Not Detected     Escherichia coli Not Detected     Klebsiella aerogenes Not Detected     Klebsiella oxytoca Not Detected     Klebsiella pneumoniae group Not Detected     Proteus spp. Not Detected     Salmonella spp. Not Detected     Serratia marcescens Not Detected     Haemophilus influenzae Not Detected     Neisseria meningitidis Not Detected     Pseudomonas aeruginosa Not Detected     Stenotrophomonas maltophilia Not Detected     Candida albicans Not Detected     Candida auris Not Detected     Candida glabrata Not Detected     Candida krusei Not Detected     Candida parapsilosis Not Detected     Candida tropicalis Not Detected     Cryptococcus neoformans/gattii Not Detected    Narrative:      The [employer] BCID2 Panel is a multiplexed nucleic acid test intended for the use with [employer]® 2.0 or [employer]® [employer] for the simultaneous qualitative detection and identification of multiple bacterial and yeast nucleic acids and select genetic determinants associated with antimicrobial resistance.  The [employer] BCID2 Panel test is performed directly on blood culture samples identified as positive by a continuous monitoring blood culture system.  Results are intended to be interpreted in conjunction with Gram stain results.    Blood Culture [[id number]]  (Abnormal) Collected: 10/15/24  2136    Order Status: Completed Specimen: Blood Updated: 10/17/24 0252     Blood Culture No Growth At 24 Hours     GRAM STAIN Gram positive cocci      Seen in gram stain of broth only      1 of 2 Aerobic bottles positive    Blood Culture [[id number]]  (Abnormal) Collected: 10/15/24 2136    Order Status: Completed Specimen: Blood Updated: 10/17/24 0252     Blood Culture No Growth At 24 Hours     GRAM STAIN Gram Positive Cocci, probable Staphylococcus      Seen in gram stain of broth only      1 of 2 Anaerobic bottles positive    Anaerobic Culture [[id number]] Collected: 10/16/24 1147    Order Status: Sent Specimen: Body Fluid from Kidney, Right Updated: 10/16/24 1201    Fungal Culture [[id number]] Collected: 10/16/24 1147    Order Status: Sent Specimen: Body Fluid from Kidney, Right Updated: 10/16/24 1201    Blood Culture [[id number]]  (Normal) Collected: 10/09/24 1728    Order Status: Completed Specimen: Blood Updated: 10/14/24 1900     Blood Culture No Growth at 5 days    Blood Culture [[id number]]  (Abnormal)  (Susceptibility) Collected: 10/10/24 1605    Order Status: Completed Specimen: Blood Updated: 10/13/24 0628     Blood Culture Methicillin resistant Staphylococcus aureus     GRAM STAIN Seen in gram stain of broth only      2 of 2 bottles positive      Gram Positive Cocci, probable Staphylococcus    Blood Culture [[id number]]  (Abnormal)  (Susceptibility) Collected: 10/10/24 1605    Order Status: Completed Specimen: Blood Updated: 10/13/24 0627     Blood Culture Methicillin resistant Staphylococcus aureus     GRAM STAIN 1 of 2 Anaerobic bottles positive      Gram Positive Cocci, probable Staphylococcus      Seen in gram stain of broth only    Blood Culture [[id number]]  (Abnormal)  (Susceptibility) Collected: 10/09/24 1728    Order Status: Completed Specimen: Blood Updated: 10/13/24 0625     Blood Culture Methicillin resistant Staphylococcus aureus     GRAM STAIN Gram Positive Cocci, probable Staphylococcus       Seen in gram stain of broth only      1 of 2 Aerobic bottles positive    Blood Culture [[id number]]  (Abnormal)  (Susceptibility) Collected: 10/07/24 2103    Order Status: Completed Specimen: Blood Updated: 10/12/24 0658     Blood Culture Methicillin resistant Staphylococcus aureus     GRAM STAIN 1 of 1 Pediatric bottle positive      Gram Positive Cocci, probable Staphylococcus      Seen in gram stain of broth only    BCID2 Panel [[id number]] Collected: 10/10/24 1605    Order Status: Canceled Specimen: Blood Updated: 10/11/24 2011    BCID2 Panel [[id number]]  (Abnormal) Collected: 10/09/24 1728    Order Status: Completed Specimen: Blood Updated: 10/10/24 1716     CTX-M (ESBL ) N/A     IMP (Cabapenemase ) N/A     KPC resistance gene (Carbapenemase ) N/A     mcr-1 N/A     mecA ID N/A     Comment: Note: Antimicrobial resistance can occur via multiple mechanisms. A Not Detected result for antimicrobial resistance gene(s) does not indicate antimicrobial susceptibility. Subculturing is required for species identification and susceptibility testing of   isolates.        mecA/C and MREJ (MRSA) gene Detected     NDM (Carbapenemase ) N/A     OXA-48-like (Carbapenemase ) N/A     Royal/B (VRE gene) N/A     VIM (Carbapenemase ) N/A     Enterococcus faecalis Not Detected     Enterococcus faecium Not Detected     Listeria monocytogenes Not Detected     Staphylococcus spp. Detected     Staphylococcus aureus Detected     Staphylococcus epidermidis Not Detected     Staphylococcus lugdunensis Not Detected     Streptococcus spp. Not Detected     Streptococcus agalactiae (Group B) Not Detected     Streptococcus pneumoniae Not Detected     Streptococcus pyogenes (Group A) Not Detected     Acinetobacter calcoaceticus/baumannii complex Not Detected     Bacteroides fragilis Not Detected     Enterobacterales Not Detected     Enterobacter cloacae complex Not Detected     Escherichia coli Not  Detected     Klebsiella aerogenes Not Detected     Klebsiella oxytoca Not Detected     Klebsiella pneumoniae group Not Detected     Proteus spp. Not Detected     Salmonella spp. Not Detected     Serratia marcescens Not Detected     Haemophilus influenzae Not Detected     Neisseria meningitidis Not Detected     Pseudomonas aeruginosa Not Detected     Stenotrophomonas maltophilia Not Detected     Candida albicans Not Detected     Candida auris Not Detected     Candida glabrata Not Detected     Candida krusei Not Detected     Candida parapsilosis Not Detected     Candida tropicalis Not Detected     Cryptococcus neoformans/gattii Not Detected    Narrative:      The [employer] BCID2 Panel is a multiplexed nucleic acid test intended for the use with [employer]® 2.0 or [employer]® [employer] for the simultaneous qualitative detection and identification of multiple bacterial and yeast nucleic acids and select genetic determinants associated with antimicrobial resistance.  The [employer] BCID2 Panel test is performed directly on blood culture samples identified as positive by a continuous monitoring blood culture system.  Results are intended to be interpreted in conjunction with Gram stain results.             Significant Imaging: I have reviewed all pertinent imaging results/findings within the past 24 hours.      Milo Campbell MD  Infectious Disease  [facility]

## 2024-10-17 NOTE — PROGRESS NOTES
Nephrology  Note    Patient Name: Fior Joya  Age: 69 y.o.  : 1955  MRN: [id number]  Admission Date: 10/3/2024        Fior Joya is a 69 y.o. female who presents with right flank pain.  Past medical history significant for ESRD on hemodialysis TTS at [facility] via right IJ PermCath, chronic hydronephrosis with left nephrostomy tube in place, and stents to right ureter, hypertension, anemia, hyperphosphatemia, and hyperlipidemia.  Patient presents with worsening right flank pain.  She states the pain started in her right neck, in his now in her right flank/hip area.  Patient was started on hemodialysis in 2024 after being found to have significant acute kidney injury and hydronephrosis.  She has been maintained on hemodialysis at [facility] on a TTS schedule.  Prior to hospitalization her last dialysis was on 10/01/2024.  Nephrology consulted for ESRD management.  In the emergency department she was noted to have low blood pressure and has been given IV fluids.  Urology has also been consulted for concern hydronephrosis needing stent exchange.  She has also been started on antibiotics for possible UTI.  She has had nausea, and vomiting.  No chest pain, shortness of breath, or lower extremity edema.    10/07/2024  Chart reviewed.  Weekend events noted.  Patient has been NPO at least since her admission to the hospital and also has no IV fluids.  Lying down in the bed.  Having jerky movements all over the body and uncomfortable.  Now she has nephrostomy tube on both sides.  Both draining.  Serosanguineous fluid noted.  Patient is very slow to answer questions but did recognize me by name and she knows her own name.     10/08/2024  No acute events overnight.  Seen on hemodialysis.  She was endorsing shortness of breath, and has some lower extremity edema.    10/09/2024   Mentally she has cleared up completely.  Complains of pains all over the body for which she is on p.r.n. pain medications.  Also  complains of being nervous and on some anxiety medication.  No shortness of breath.  Tunneled dialysis catheter was removed yesterday.    10/10/2024  No acute events overnight.  No chest pain, shortness of breath, abdominal pain, nausea, vomiting, or lower extremity edema.  Found to have bladder cancer by Urology.    10/11/2024  No acute events overnight.  No new complaints.  Blood cultures remain positive.  No chest pain, shortness of breath, nausea, vomiting.    10/12/2024  Patient had Vas-Cath placed yesterday, and she was re-initiated on hemodialysis.  Dialyzed yesterday with 1.5 L UF.  No new complaints today.  No chest pain, shortness of breath, nausea, vomiting.  She does have mild lower extremity edema.    10/14/2024   Complains of pain all over the body.  No fever or chills.  Does not like her diet.  No nausea vomiting.  No shortness of breath.  No chest pains.  No cough cold congestion.    10/15/2024   Now patient also has indwelling Mario catheter.  Urology have some tagged WBC  scan and cystogram planned.  Patient will also need dialysis today.  No new complaints.    10/16/2024   Patient is NPO as she is going for procedure on the nephrostomy tubes.  Patient's son present in the room and he is very angry at every body reporting that patient is not getting the right kind of food to eat, patient is not getting right kind of care, patient is not getting proper therapy and is being told to move around and get out of the bed.  He also reported that he plans to go to the administration and may go seek help from attorneys.  All the above was said by the patient's son when the patient herself is very calm and content.    10/17/2024  Seen on hemodialysis.    Current Facility-Administered Medications   Medication Dose Route Frequency Provider Last Rate Last Admin    acetaminophen tablet 650 mg  650 mg Oral Q4H PRN Tawana Loaiza MD   650 mg at 10/15/24 1429    albuterol-ipratropium 2.5 mg-0.5 mg/3 mL  nebulizer solution 3 mL  3 mL Nebulization Once PRN Mark Ying DO        aluminum-magnesium hydroxide-simethicone 200-200-20 mg/5 mL suspension 30 mL  30 mL Oral QID PRN Tawana Loaiza MD        atorvastatin tablet 20 mg  20 mg Oral QHS Tawana Loaiza MD   20 mg at 10/16/24 1913    ceftaroline fosamiL (TEFLARO) 200 mg in D5W 50 mL IVPB  200 mg Intravenous Q8H Milo Campbell MD   Stopped at 10/17/24 0210    diphenhydrAMINE injection 25 mg  25 mg Intravenous Q6H PRN Mark Ying DO   25 mg at 10/09/24 0256    enoxaparin injection 30 mg  30 mg Subcutaneous Daily Macarena Leggett MD   30 mg at 10/16/24 1738    folic acid tablet 1 mg  1 mg Oral Daily Tawana Loaiza MD   1 mg at 10/15/24 0814    heparin (porcine) injection 3,000 Units  3,000 Units Intravenous PRN Neno Mercado DO   3,000 Units at 10/15/24 1413    melatonin tablet 6 mg  6 mg Oral Nightly PRN Tawana Loaiza MD   6 mg at 10/09/24 2053    metoclopramide injection 10 mg  10 mg Intravenous Q10 Min PRN Mark Ying DO        ondansetron injection 4 mg  4 mg Intravenous Q4H PRN Tawana Loaiza MD        ondansetron injection 4 mg  4 mg Intravenous Once Mark Ying DO        oxyCODONE-acetaminophen  mg per tablet 1 tablet  1 tablet Oral Q4H PRN Uriel Fletcher MD   1 tablet at 10/17/24 0924    pantoprazole EC tablet 40 mg  40 mg Oral Daily Tawana Loaiza MD   40 mg at 10/15/24 0814    polyethylene glycol packet 17 g  17 g Oral BID PRN Tawana Loaiza MD        prochlorperazine injection Soln 5 mg  5 mg Intravenous Q6H PRN Tawana Loaiza MD        senna-docusate 8.6-50 mg per tablet 2 tablet  2 tablet Oral BID PRN Tawana Loaiza MD   2 tablet at 10/17/24 0400    sertraline tablet 25 mg  25 mg Oral Daily Shin Light MD   25 mg at 10/16/24 0950    sevelamer carbonate tablet 800 mg  800 mg Oral TID  Tawana Loaiza MD   800 mg at  10/16/24 1738    sodium bicarbonate tablet 1,300 mg  1,300 mg Oral TID Shin Light MD   1,300 mg at 10/16/24 1913    sodium chloride 0.9% flush 10 mL  10 mL Intravenous PRN Tawana Loaiza MD        vancomycin (VANCOCIN) 500 mg in D5W 100 mL IVPB (MB+)  500 mg Intravenous Once Uriel Fletcher MD        vancomycin - pharmacy to dose   Intravenous pharmacy to manage frequency Tawana Loaiza MD           No, Primary Doctor    History reviewed. No pertinent past medical history.   Past Surgical History:   Procedure Laterality Date    CYSTOSCOPY W/ URETERAL STENT PLACEMENT Right 2024    Procedure: CYSTOSCOPY, WITH URETERAL STENT INSERTION;  Surgeon: Otis Person MD;  Location: [facility] OR;  Service: Urology;  Laterality: Right;  CYSTOSCOPY, BILATERAL RETROGRADE PYELOGRAMS, POSSIBLE STENT PLACEMENT.    CYSTOSCOPY W/ URETERAL STENT PLACEMENT Bilateral 10/4/2024    Procedure: CYSTOSCOPY, WITH URETERAL STENT INSERTION;  Surgeon: Tawana Loaiza MD;  Location: [facility] OR;  Service: Urology;  Laterality: Bilateral;  CYSTO WITH BILAT URETERAL STENT EXCHANGE    INSERTION OF TUNNELED CENTRAL VENOUS HEMODIALYSIS CATHETER N/A 7/3/2024    Procedure: Insertion, Catheter, Central Venous, Hemodialysis;  Surgeon: Uche Barcenas MD;  Location: [facility] CATH LAB;  Service: Peripheral Vascular;  Laterality: N/A;      No family history on file.  Social History     Tobacco Use    Smoking status: Never    Smokeless tobacco: Never   Substance Use Topics    Alcohol use: Never     Medications Prior to Admission   Medication Sig Dispense Refill Last Dose/Taking    atorvastatin (LIPITOR) 20 MG tablet Take 20 mg by mouth every evening.   10/3/2024    calcium carbonate (TUMS) 200 mg calcium (500 mg) chewable tablet Take 2 tablets (1,000 mg total) by mouth 2 (two) times daily. 120 tablet 11 10/3/2024 Morning    [] ergocalciferol (ERGOCALCIFEROL) 50,000 unit Cap Take 1 capsule (50,000 Units total) by mouth  every 72 hours. 10 capsule 2 Past Week    FEROSUL 325 mg (65 mg iron) Tab tablet Take 325 mg by mouth once daily.   10/3/2024 Morning    folic acid (FOLVITE) 1 MG tablet Take 1 tablet (1 mg total) by mouth once daily. 30 tablet 0 10/3/2024 Morning    labetaloL (NORMODYNE) 200 MG tablet Take 1 tablet (200 mg total) by mouth every 12 (twelve) hours. 60 tablet 11 10/3/2024 Morning    loratadine (CLARITIN) 10 mg tablet Take 10 mg by mouth once daily.   10/3/2024    pantoprazole (PROTONIX) 40 MG tablet Take 1 tablet (40 mg total) by mouth once daily. 90 tablet 3 10/3/2024 Morning    senna-docusate 8.6-50 mg (SENNA WITH DOCUSATE SODIUM) 8.6-50 mg per tablet Take 1 tablet by mouth daily as needed for Constipation.   Past Week    sevelamer carbonate (RENVELA) 800 mg Tab Take 800 mg by mouth 3 (three) times daily with meals.   10/3/2024 Morning    polyethylene glycol (GLYCOLAX) 17 gram PwPk Take 17 g by mouth once daily. (Patient not taking: Reported on 10/4/2024)   Not Taking     Review of patient's allergies indicates:   Allergen Reactions    Asa [aspirin] Anaphylaxis    Penicillins      Received ceftriaxone from 6/27, no reactions             Review of Systems:       Objective:       VITAL SIGNS: 24 HR MIN & MAX LAST    Temp  Min: 97.6 °F (36.4 °C)  Max: 98.7 °F (37.1 °C)  98 °F (36.7 °C)        BP  Min: 115/70  Max: 157/69  124/67     Pulse  Min: 58  Max: 65  60     Resp  Min: 16  Max: 18  18    SpO2  Min: 96 %  Max: 100 %  99 %      GEN:  Chronically ill-appearing WF  CV: RRR without rub or gallop.  PULM: CTAB, unlabored  ABD: Soft, NT/ND abdomen with NABS  :  Nephrostomy tubes in both right and left kidneys.    EXT:  1+ lower extremity edema  SKIN: Warm and dry  PSYCH: Awake, alert and appropriately conversant  Dialysis access:  Left IJ Vas-Cath            Component Value Date/Time     (L) 10/17/2024 0603     (L) 10/14/2024 0529    K 5.3 (H) 10/17/2024 0603    K 5.3 (H) 10/14/2024 0529    CO2 15 (L)  10/17/2024 0603    CO2 16 (L) 10/14/2024 0529    BUN 73.9 (H) 10/17/2024 0603    BUN 51.2 (H) 10/14/2024 0529    CREATININE 5.95 (H) 10/17/2024 0603    CREATININE 5.28 (H) 10/14/2024 0529    CALCIUM 7.8 (L) 10/17/2024 0603    CALCIUM 9.0 10/14/2024 0529    PHOS 5.4 (H) 10/17/2024 0603            Component Value Date/Time    WBC 11.82 (H) 10/17/2024 0603    WBC 19.12 (H) 10/14/2024 0529    WBC 25.58 10/07/2024 0541    WBC 26.04 10/06/2024 0449    HGB 9.3 (L) 10/17/2024 0603    HGB 11.0 (L) 10/14/2024 0529    HCT 28.6 (L) 10/17/2024 0603    HCT 34.5 (L) 10/14/2024 0529     10/17/2024 0603     10/14/2024 0529             IR Nephrostomy Tube Placement   Final Result      1.  Technically successful Ureteral Stent placement in the inferior right renal moiety.      2.  Left nephroureteral stent exchange.         Electronically signed by: Andrew Buenrostro   Date:    10/16/2024   Time:    15:02      US Guided Needle Placement   Final Result      1.  Technically successful Ureteral Stent placement in the inferior right renal moiety.      2.  Left nephroureteral stent exchange.         Electronically signed by: Andrew Buenrostro   Date:    10/16/2024   Time:    15:02      CT Abdomen Pelvis  Without Contrast   Final Result      Reflux of contrast from the urinary bladder into the left ureter and left renal pelvis and calices.      Duplicated collecting system seen on the right with nephroureteral stent seen in the lower pole moiety with no hydronephrosis or hydroureter seen in the lower pole moiety and no reflux of contrast seen on the right side.      The right upper pole moiety has evidence of hydronephrosis and hydroureter with no reflux of contrast from the urinary bladder.      Bilateral percutaneous nephrostomy tubes as outlined above with the right-sided percutaneous nephrostomy tube in the renal pelvis of the lower pole moiety      Debris/filling defects within the base the urinary bladder         Electronically  signed by: Garland Raya   Date:    10/15/2024   Time:    12:56      CT Lower Extremity W Wo Contrast Bilat   Final Result      No appreciable acute osseous abnormality      Symmetric uptake at the lower extremities on nuclear medicine exam most suggestive of aseptic marrow expansion.         Electronically signed by: Deidra Dobson   Date:    10/15/2024   Time:    13:21      NM Inflammatory Whole Body   Final Result      US Retroperitoneal Limited   Final Result      Duplex right renal collecting system with unchanged hydronephrosis at the lower pole moiety      Stent is faintly visible in the region of the left renal pelvis.  Left kidney is moderately obscured by shadowing which obscures evaluation.         Electronically signed by: Deidra Dobson   Date:    10/13/2024   Time:    14:07      X-Ray Chest 1 View for Line/Tube Placement   Final Result      New central line terminates within right atrium.         Electronically signed by: Alvaro Watson   Date:    10/11/2024   Time:    11:53      CT Abdomen Pelvis With IV Contrast NO Oral Contrast   Final Result      1. No significant interval change compared to previous exam.  Ureteral stents in place.   2. Duplicated right kidney and right ureter with persistent hydroureteronephrosis of the lower pole moiety.         Electronically signed by: Deidra Dobson   Date:    10/08/2024   Time:    06:49      CT Head Without Contrast   Final Result      No acute intracranial findings.         Electronically signed by: Alexx Patricia   Date:    10/07/2024   Time:    10:46      MRI Lumbar Spine W WO Cont   Final Result      No acute abnormality of the lumbar spine.         Electronically signed by: Edmundo Vásquez MD   Date:    10/05/2024   Time:    16:55      MRI Thoracic Spine W WO Cont   Final Result      Very limited examination due to significant motion artifact      No evidence of osteomyelitis or discitis seen at no evidence of epidural abscess seen in thoracic spine.   Lumbar and cervical spine discussed on separate report         Electronically signed by: Garland Raya   Date:    10/05/2024   Time:    16:57      MRI Cervical Spine W WO Cont   Final Result      Very limited examination due to motion artifact.  Only gross findings are visible.  Fine details are not visible.  No obvious epidural abscess is seen.  There is some increased signal in the cord at C6-C7 which shows some enhancement.  Findings could represent intra cord lesion or possible myelomalacia.         Electronically signed by: Garland Raya   Date:    10/05/2024   Time:    16:55      IR Nephrostomy Tube Placement   Final Result      Technically successful Nephrostomy Tube placement.         Electronically signed by: Andrew Buenrostro   Date:    10/04/2024   Time:    16:37      US Guided Needle Placement   Final Result      Technically successful Nephrostomy Tube placement.         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  The upper pole moiety is decompressed with a ureteral stent with the lower pole more demonstrating moderate severe hydronephrosis which is persistent from the prior exam.      Left nephrostomy ureteral stent drain with resolution of hydronephrosis.      Enlarged right pelvic lymph node increased in size from the prior, malignancy suspected.      Mildly enlarged upper normal sized lymph nodes in the periaortic region and left pelvis, nonspecific, similar to the prior exam.         Electronically signed by: Tete Sun MD   Date:    10/04/2024   Time:    07:37      X-Ray Lumbar Spine Ap And Lateral   Final Result      Limited examination due to patient's body habitus but degenerative changes seen throughout the lower  lumbar spine         Electronically signed by: Garland Raya   Date:    10/03/2024   Time:    18:11          Assessment / Plan:     ESRD - normally TTS at [facility].  Tunneled dialysis catheter removed 10/08/2024 due to persistent MRSA bacteremia  Metabolic acidosis  MRSA bacteremia  Bilateral hydronephrosis - now with nephrostomy tube both sides    Plan:  Seen on hemodialysis at 9:55 a.m..  2-3 L UF as tolerated.  Tolerating well.  Continues to have bacteremia.  She does not see a tunneled dialysis catheter placed at some point in future once her bacteremia clears.

## 2024-10-17 NOTE — PROGRESS NOTES
Pharmacokinetic Assessment Follow Up: IV Vancomycin    Vancomycin serum concentration assessment(s):    The random level was drawn correctly and can be used to guide therapy at this time. The measurement is below the desired definitive target range of 15 to 20 mcg/mL.    Vancomycin Regimen Plan:    Re-dose when the random level is less than 25 mcg/mL, next level to be drawn at 0430 on 10/19    Scheduled Administration Times    10/17 - 500 mg scheduled post-HD @1700      Drug levels (last 3 results):  Recent Labs   Lab Result Units 10/15/24  0513 10/17/24  0603   Vancomycin Random ug/ml 18.2 15.3       Vancomycin Administrations:  vancomycin given in the last 96 hours                     vancomycin (VANCOCIN) 500 mg in D5W 100 mL IVPB (MB+) (mg) 500 mg New Bag 10/15/24 1927    vancomycin (VANCOCIN) 500 mg in D5W 100 mL IVPB (MB+) (mg) 500 mg New Bag 10/13/24 1426                    Pharmacy will continue to follow and monitor vancomycin.    Please contact pharmacy at extension [phone] for questions regarding this assessment.    Thank you for the consult,   Yovani Chavez       Patient brief summary:  Fior Joya is a 69 y.o. female initiated on antimicrobial therapy with IV Vancomycin for treatment of bacteremia      Drug Allergies:   Review of patient's allergies indicates:   Allergen Reactions    Asa [aspirin] Anaphylaxis    Penicillins      Received ceftriaxone from 6/27, no reactions        Actual Body Weight:  Wt Readings from Last 1 Encounters:   10/10/24 (!) 140.2 kg (309 lb 1.4 oz)       Renal Function:   Estimated Creatinine Clearance: 12.5 mL/min (A) (based on SCr of 5.95 mg/dL (H)).,     Dialysis Method (if applicable):  intermittent HD - TTS    CBC (last 72 hours):  Recent Labs   Lab Result Units 10/17/24  0603   WBC x10(3)/mcL 11.82*   Hgb g/dL 9.3*   Hct % 28.6*   Platelet x10(3)/mcL 309   Mono % % 11.1   Eos % % 1.8   Basophil % % 0.5       Metabolic Panel (last 72 hours):  Recent Labs   Lab Result Units  10/17/24  0603   Sodium mmol/L 127*   Potassium mmol/L 5.3*   Chloride mmol/L 87*   CO2 mmol/L 15*   Glucose mg/dL 93   Blood Urea Nitrogen mg/dL 73.9*   Creatinine mg/dL 5.95*   Albumin g/dL 2.0*   Bilirubin Total mg/dL 0.2   ALP unit/L 135   AST unit/L 10   ALT unit/L 7   Magnesium Level mg/dL 1.90   Phosphorus Level mg/dL 5.4*       Microbiologic Results:  Microbiology Results (last 7 days)       Procedure Component Value Units Date/Time    Body Fluid Culture [[id number]] Collected: 10/16/24 1147    Order Status: Completed Specimen: Body Fluid from Kidney, Right Updated: 10/17/24 0720     Body Fluid Culture No Growth At 24 Hours    Gram Stain [[id number]] Collected: 10/16/24 1147    Order Status: Completed Specimen: Body Fluid from Kidney, Right Updated: 10/17/24 0719     GRAM STAIN Many WBC observed      Few Gram positive cocci    Blood Culture [[id number]]  (Abnormal)  (Susceptibility) Collected: 10/14/24 0529    Order Status: Completed Specimen: Blood from Arm, Right Updated: 10/17/24 0710     Blood Culture Methicillin resistant Staphylococcus aureus     GRAM STAIN Gram Positive Cocci, probable Staphylococcus      Seen in gram stain of broth only      2 of 2 bottles positive    Blood Culture [[id number]]  (Abnormal)  (Susceptibility) Collected: 10/14/24 0529    Order Status: Completed Specimen: Blood from Hand, Right Updated: 10/17/24 0710     Blood Culture Methicillin resistant Staphylococcus aureus     GRAM STAIN Gram Positive Cocci, probable Staphylococcus      Seen in gram stain of broth only      2 of 2 bottles positive    BCID2 Panel [[id number]]  (Abnormal) Collected: 10/15/24 2136    Order Status: Completed Specimen: Blood Updated: 10/17/24 0638     CTX-M (ESBL ) N/A     IMP (Cabapenemase ) N/A     KPC resistance gene (Carbapenemase ) N/A     mcr-1 N/A     mecA ID N/A     Comment: Note: Antimicrobial resistance can occur via multiple mechanisms. A Not Detected result for  antimicrobial resistance gene(s) does not indicate antimicrobial susceptibility. Subculturing is required for species identification and susceptibility testing of   isolates.        mecA/C and MREJ (MRSA) gene Detected     NDM (Carbapenemase ) N/A     OXA-48-like (Carbapenemase ) N/A     Royal/B (VRE gene) N/A     VIM (Carbapenemase ) N/A     Enterococcus faecalis Not Detected     Enterococcus faecium Not Detected     Listeria monocytogenes Not Detected     Staphylococcus spp. Detected     Staphylococcus aureus Detected     Staphylococcus epidermidis Not Detected     Staphylococcus lugdunensis Not Detected     Streptococcus spp. Not Detected     Streptococcus agalactiae (Group B) Not Detected     Streptococcus pneumoniae Not Detected     Streptococcus pyogenes (Group A) Not Detected     Acinetobacter calcoaceticus/baumannii complex Not Detected     Bacteroides fragilis Not Detected     Enterobacterales Not Detected     Enterobacter cloacae complex Not Detected     Escherichia coli Not Detected     Klebsiella aerogenes Not Detected     Klebsiella oxytoca Not Detected     Klebsiella pneumoniae group Not Detected     Proteus spp. Not Detected     Salmonella spp. Not Detected     Serratia marcescens Not Detected     Haemophilus influenzae Not Detected     Neisseria meningitidis Not Detected     Pseudomonas aeruginosa Not Detected     Stenotrophomonas maltophilia Not Detected     Candida albicans Not Detected     Candida auris Not Detected     Candida glabrata Not Detected     Candida krusei Not Detected     Candida parapsilosis Not Detected     Candida tropicalis Not Detected     Cryptococcus neoformans/gattii Not Detected    Narrative:      The [employer] BCID2 Panel is a multiplexed nucleic acid test intended for the use with BioDerm® 2.0 or BioDerm® Digital [employer] for the simultaneous qualitative detection and identification of multiple bacterial and yeast nucleic acids and  select genetic determinants associated with antimicrobial resistance.  The [employer] BCID2 Panel test is performed directly on blood culture samples identified as positive by a continuous monitoring blood culture system.  Results are intended to be interpreted in conjunction with Gram stain results.    Blood Culture [[id number]]  (Abnormal) Collected: 10/15/24 2136    Order Status: Completed Specimen: Blood Updated: 10/17/24 0252     Blood Culture No Growth At 24 Hours     GRAM STAIN Gram positive cocci      Seen in gram stain of broth only      1 of 2 Aerobic bottles positive    Blood Culture [[id number]]  (Abnormal) Collected: 10/15/24 2136    Order Status: Completed Specimen: Blood Updated: 10/17/24 0252     Blood Culture No Growth At 24 Hours     GRAM STAIN Gram Positive Cocci, probable Staphylococcus      Seen in gram stain of broth only      1 of 2 Anaerobic bottles positive    Anaerobic Culture [[id number]] Collected: 10/16/24 1147    Order Status: Sent Specimen: Body Fluid from Kidney, Right Updated: 10/16/24 1201    Fungal Culture [[id number]] Collected: 10/16/24 1147    Order Status: Sent Specimen: Body Fluid from Kidney, Right Updated: 10/16/24 1201    Blood Culture [[id number]]  (Normal) Collected: 10/09/24 1728    Order Status: Completed Specimen: Blood Updated: 10/14/24 1900     Blood Culture No Growth at 5 days    Blood Culture [[id number]]  (Abnormal)  (Susceptibility) Collected: 10/10/24 1605    Order Status: Completed Specimen: Blood Updated: 10/13/24 0628     Blood Culture Methicillin resistant Staphylococcus aureus     GRAM STAIN Seen in gram stain of broth only      2 of 2 bottles positive      Gram Positive Cocci, probable Staphylococcus    Blood Culture [[id number]]  (Abnormal)  (Susceptibility) Collected: 10/10/24 1605    Order Status: Completed Specimen: Blood Updated: 10/13/24 0627     Blood Culture Methicillin resistant Staphylococcus aureus     GRAM STAIN 1 of 2 Anaerobic bottles positive       Gram Positive Cocci, probable Staphylococcus      Seen in gram stain of broth only    Blood Culture [[id number]]  (Abnormal)  (Susceptibility) Collected: 10/09/24 1728    Order Status: Completed Specimen: Blood Updated: 10/13/24 0625     Blood Culture Methicillin resistant Staphylococcus aureus     GRAM STAIN Gram Positive Cocci, probable Staphylococcus      Seen in gram stain of broth only      1 of 2 Aerobic bottles positive    Blood Culture [[id number]]  (Abnormal)  (Susceptibility) Collected: 10/07/24 2103    Order Status: Completed Specimen: Blood Updated: 10/12/24 0658     Blood Culture Methicillin resistant Staphylococcus aureus     GRAM STAIN 1 of 1 Pediatric bottle positive      Gram Positive Cocci, probable Staphylococcus      Seen in gram stain of broth only    BCID2 Panel [[id number]] Collected: 10/10/24 1605    Order Status: Canceled Specimen: Blood Updated: 10/11/24 2011    BCID2 Panel [[id number]]  (Abnormal) Collected: 10/09/24 1728    Order Status: Completed Specimen: Blood Updated: 10/10/24 1716     CTX-M (ESBL ) N/A     IMP (Cabapenemase ) N/A     KPC resistance gene (Carbapenemase ) N/A     mcr-1 N/A     mecA ID N/A     Comment: Note: Antimicrobial resistance can occur via multiple mechanisms. A Not Detected result for antimicrobial resistance gene(s) does not indicate antimicrobial susceptibility. Subculturing is required for species identification and susceptibility testing of   isolates.        mecA/C and MREJ (MRSA) gene Detected     NDM (Carbapenemase ) N/A     OXA-48-like (Carbapenemase ) N/A     Royal/B (VRE gene) N/A     VIM (Carbapenemase ) N/A     Enterococcus faecalis Not Detected     Enterococcus faecium Not Detected     Listeria monocytogenes Not Detected     Staphylococcus spp. Detected     Staphylococcus aureus Detected     Staphylococcus epidermidis Not Detected     Staphylococcus lugdunensis Not Detected     Streptococcus spp.  Not Detected     Streptococcus agalactiae (Group B) Not Detected     Streptococcus pneumoniae Not Detected     Streptococcus pyogenes (Group A) Not Detected     Acinetobacter calcoaceticus/baumannii complex Not Detected     Bacteroides fragilis Not Detected     Enterobacterales Not Detected     Enterobacter cloacae complex Not Detected     Escherichia coli Not Detected     Klebsiella aerogenes Not Detected     Klebsiella oxytoca Not Detected     Klebsiella pneumoniae group Not Detected     Proteus spp. Not Detected     Salmonella spp. Not Detected     Serratia marcescens Not Detected     Haemophilus influenzae Not Detected     Neisseria meningitidis Not Detected     Pseudomonas aeruginosa Not Detected     Stenotrophomonas maltophilia Not Detected     Candida albicans Not Detected     Candida auris Not Detected     Candida glabrata Not Detected     Candida krusei Not Detected     Candida parapsilosis Not Detected     Candida tropicalis Not Detected     Cryptococcus neoformans/gattii Not Detected    Narrative:      The [employer] BCID2 Panel is a multiplexed nucleic acid test intended for the use with [employer]® 2.0 or [employer]® [employer] for the simultaneous qualitative detection and identification of multiple bacterial and yeast nucleic acids and select genetic determinants associated with antimicrobial resistance.  The [employer] BCID2 Panel test is performed directly on blood culture samples identified as positive by a continuous monitoring blood culture system.  Results are intended to be interpreted in conjunction with Gram stain results.

## 2024-10-17 NOTE — PROGRESS NOTES
UROLOGY  PROGRESS  NOTE    Fior Joya 1955  [id number]  10/17/2024    Chart reviewed, patient off unit during rounds    POD 13 cystoscopy, right stent exchange upper pole moiety, bladder biopsy; right nephrostomy tube placement with IR    S/p right neph tube placement and left nephroureteral stent exchange 10/16      Patient is resting in bed   No acute events overnight     VSS, afebrile  0 mL urethral Mario  0 mL left nephrostomy   45 mL right nephrostomy  Labs reviewed      Exam:    Patient not physically assess due to being off of the floor during rounds    Recent Results (from the past 24 hours)   Body Fluid Culture    Collection Time: 10/16/24 11:47 AM    Specimen: Kidney, Right; Body Fluid   Result Value Ref Range    Body Fluid Culture No Growth At 24 Hours    Gram Stain    Collection Time: 10/16/24 11:47 AM    Specimen: Kidney, Right; Body Fluid   Result Value Ref Range    GRAM STAIN Many WBC observed     GRAM STAIN Few Gram positive cocci    Vancomycin, Random    Collection Time: 10/17/24  6:03 AM   Result Value Ref Range    Vancomycin Random 15.3 15.0 - 20.0 ug/ml   Comprehensive Metabolic Panel    Collection Time: 10/17/24  6:03 AM   Result Value Ref Range    Sodium 127 (L) 136 - 145 mmol/L    Potassium 5.3 (H) 3.5 - 5.1 mmol/L    Chloride 87 (L) 98 - 107 mmol/L    CO2 15 (L) 23 - 31 mmol/L    Glucose 93 82 - 115 mg/dL    Blood Urea Nitrogen 73.9 (H) 9.8 - 20.1 mg/dL    Creatinine 5.95 (H) 0.55 - 1.02 mg/dL    Calcium 7.8 (L) 8.4 - 10.2 mg/dL    Protein Total 6.7 5.8 - 7.6 gm/dL    Albumin 2.0 (L) 3.4 - 4.8 g/dL    Globulin 4.7 (H) 2.4 - 3.5 gm/dL    Albumin/Globulin Ratio 0.4 (L) 1.1 - 2.0 ratio    Bilirubin Total 0.2 <=1.5 mg/dL     40 - 150 unit/L    ALT 7 0 - 55 unit/L    AST 10 5 - 34 unit/L    eGFR 7 mL/min/1.73/m2    Anion Gap 25.0 mEq/L    BUN/Creatinine Ratio 12    Phosphorus    Collection Time: 10/17/24  6:03 AM   Result Value Ref Range    Phosphorus Level 5.4 (H) 2.3 - 4.7 mg/dL    Magnesium    Collection Time: 10/17/24  6:03 AM   Result Value Ref Range    Magnesium Level 1.90 1.60 - 2.60 mg/dL   CBC with Differential    Collection Time: 10/17/24  6:03 AM   Result Value Ref Range    WBC 11.82 (H) 4.50 - 11.50 x10(3)/mcL    RBC 3.13 (L) 4.20 - 5.40 x10(6)/mcL    Hgb 9.3 (L) 12.0 - 16.0 g/dL    Hct 28.6 (L) 37.0 - 47.0 %    MCV 91.4 80.0 - 94.0 fL    MCH 29.7 27.0 - 31.0 pg    MCHC 32.5 (L) 33.0 - 36.0 g/dL    RDW 15.8 11.5 - 17.0 %    Platelet 309 130 - 400 x10(3)/mcL    MPV 11.0 (H) 7.4 - 10.4 fL    Neut % 75.9 %    Lymph % 8.8 %    Mono % 11.1 %    Eos % 1.8 %    Basophil % 0.5 %    Lymph # 1.04 0.6 - 4.6 x10(3)/mcL    Neut # 8.98 2.1 - 9.2 x10(3)/mcL    Mono # 1.31 (H) 0.1 - 1.3 x10(3)/mcL    Eos # 0.21 0 - 0.9 x10(3)/mcL    Baso # 0.06 <=0.2 x10(3)/mcL    IG# 0.22 (H) 0 - 0.04 x10(3)/mcL    IG% 1.9 %    NRBC% 0.0 %       US Retroperitoneal Limited [[id number]] Resulted: 10/13/24 1407   Order Status: Completed Updated: 10/13/24 1410   Narrative:     EXAMINATION:  US RETROPERITONEAL LIMITED    CLINICAL HISTORY:  Check placement of left nephrostomy tube to make sure it is still within the collecting system;    TECHNIQUE:  Retroperitoneal ultrasound.  Limited.    COMPARISON:  CT abdomen pelvis 10/08/2024    FINDINGS:  LIMITATIONS: Exam limited due to poor acoustic window related to shadowing bowel gas and/or body habitus.    RIGHT KIDNEY: The right kidney measures 15 cm.  Duplex right renal collecting system.  Upper pole moiety decompressed.  Shadowing stent at the upper pole moiety partially visible.  Hydronephrosis at the lower pole moiety.  Similar to prior CT.    LEFT KIDNEY: The left kidney measures 11 cm. Moderate to severely obscured by shadowing.Faintly visible stent in the region of the left renal pelvis.  No yovany hydronephrosis.  Component of mild pelviectasis not excluded.    BLADDER: Not imaged   Impression:       Duplex right renal collecting system with unchanged hydronephrosis  at the lower pole moiety    Stent is faintly visible in the region of the left renal pelvis.  Left kidney is moderately obscured by shadowing which obscures evaluation.      Electronically signed by: Deidra Dobson  Date: 10/13/2024  Time: 14:07   CT Abdomen Pelvis With IV Contrast NO Oral Contrast [[id number]]Resulted: 10/08/24 0649Order Status: CompletedUpdated: 10/08/24 0651Narrative:  EXAMINATION:  CT ABDOMEN PELVIS WITH IV CONTRAST    CLINICAL HISTORY:  Abdominal abscess/infection suspected;    TECHNIQUE:  Helically acquired images with axial, sagittal and coronal reformations were obtained from the lung bases to the pubic symphysis after the IV administration of contrast.    Automated tube current modulation, weight-based exposure dosing, and/or iterative reconstruction technique utilized to reach lowest reasonably achievable exposure rate.    DLP: 1526 mGy*cm    COMPARISON:  CT abdomen pelvis 10/03/2024    FINDINGS:  HEART: Cardiomegaly.  There are calcifications in the region the aortic valve and mitral valve annulus.    LUNG BASES: Basilar atelectasis.    LIVER: Normal attenuation. No appreciable focal hepatic lesion.    BILIARY: Vicarious excretion of contrast at the gallbladder.    PANCREAS: Evaluation for pancreatitis limited given mild anasarca and body habitus.    SPLEEN: Normal in size    ADRENALS: No mass.    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  Right percutaneous nephrostomy at the upper pole moiety.  Right ureteral stent at the upper pole moiety.  Decompressed upper pole moiety.  Hydronephrotic lower pole moiety with AP diameter of the renal pelvis measuring approximately 3.5 cm, unchanged.  The right lower pole ureter is dilated to the level of the ureterovesical junction.  Left percutaneous nephroureteral stent.  No left hydronephrosis.    GI TRACT/MESENTERY:  No evidence of bowel obstruction or inflammation.    PERITONEUM: No free fluid.No free  air.    LYMPH NODES: Presumed reactive retroperitoneal lymph nodes.    VASCULATURE: Aortoiliac atherosclerosis.    BLADDER: Nondistended bladder limits CT evaluation    REPRODUCTIVE ORGANS: Normal as visualized.    SOFT TISSUES: Body wall edema.    BONES: No acute osseous abnormality.    LIMITATIONS: Exam limited by artifact related to patient body habitus, positioning, dose lowering technique and/or motion.  Impression:    1. No significant interval change compared to previous exam.  Ureteral stents in place.  2. Duplicated right kidney and right ureter with persistent hydroureteronephrosis of the lower pole moiety.      Electronically signed by: Deidra Dobson  Date: 10/08/2024  Time: 06:49      Assessment:  Bilateral hydronephrosis  -had left PCN placed in June of this year due to inability to place retrograde ureteral stent, also had right ureteral stent placed in one of the two collecting systems on 6/27  -s/p Cystoscopy with right stent exchange upper pole moiety, Retrograde pyelogram and Bladder biopsy 10/4  -unable to identify UO intra-op therefore IR placed a right PCN 10/4, CT from 10/8 reveals nephrostomy and stent both within upper pole moiety  -S/p right neph tube placement and left nephroureteral stent exchange 10/16    s/p bladder biopsy   -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   -oncology has evaluated patient - plans for f/u outpatient to discuss treatment options    Sepsis  -blood cultures +MRSA, on rocephin; repeat BC remain positive  -repeat urine cultures with no growth  -tagged WBC scan with nonspecific relatively increased radiotracer uptake at bilateral distal femur and proximal tibia; CT lower extremity without acute abnormality  -CT cystogram yesterday reveals right-sided ureteral stent and nephrostomy tube within one collecting system with remaining collecting system dilated without reflux noted; left nephrostomy  tube in appropriate position  -leukocytosis improving 10/17    ESRD   -on HD      Plan:  Continue Mario and nephrostomy tubes for now      Shabana Covarrubias, VICKIP

## 2024-10-17 NOTE — PT/OT/SLP PROGRESS
Physical Therapy      Patient Name:  Fior Joya   MRN:  [id number]    Attempted to see pt for PT session today. Pt off floor for dialysis in AM. On PM attempt, pt refused participation in therapy despite max encouragement and education on importance of mobility and getting OOB, stating her pain was too unbearable despite PT coordinating tx attempt with pain meds. Will f/u as schedule permits.

## 2024-10-17 NOTE — PROGRESS NOTES
[facility]        Chief Complaint: Inpatient Follow-up for     HPI: 69-year-old female with past medical history of hypertension, hyperlipidemia, chronic hydronephrosis with left nephrostomy tube, right ureteral stent, end-stage renal disease on hemodialysis, chronic anemia presented with right flank pain for the past 5 days and also reported cloudy urine output from the urostomy CT with IV contrast showed moderate to severe hydronephrosis with enlarged right pelvic lymph node increase in size from the prior with suspected malignancy also showed duplicated collecting system with dilation of the right lower pole urology was consulted patient is status post cystoscopy with right stent exchange and bladder biopsy and they were not able to identify the left ureteral orifice therefore had left percutaneous nephrostomy. Blood culture is growing Gram-positive cocci 2/2 bottles initially patient was started on cefepime but now we added vancomycin repeat blood cultures ordered  Id was consulted MRI of the C/T and L-spine was ordered that was negative for any abscess blood culture is growing MRSA TTE as such did not show any vegetation cardiology consulted for MADELYN  Bilateral hydronephrosis Status post right upper lobe ureteral stent exchange and right lower pole nephrostomy tube placement  Cultures remains persistently positive.  Patient was slightly confused on October 7 so we had ordered CT of the head without contrast that was negative, ammonia, TSH everything was normal we also ordered CT of the abdomen and pelvis with contrast that was negative for any abscess cardiology consulted for MADELYN.  The was negative for endocarditis.  Dialysis catheter was removed  Tagged WBC scan showing increased uptake at the bilateral distal femur and proximal tibia.    Interval Hx:   10-16-24-Saw her this morning she was calm and cooperative, reports back pain and right buttock  area pain.  She had CT abdomen pelvis as well as lower extremity done yesterday which did not reveal infectious source.  She remains bacteremic and Infectious Disease repeated cultures.  This morning I was telling the patient to make sure she is eating and drinking plenty of course whenever she is not NPO.  Son reports that she keeps getting disturbed when she tries to eat she is NPO a lot of times due to procedures.  Discussed to try her best.  After I left the room son came to the nurse's station yelling irate about my recommendations to eat but she keeps getting disturbed such as vitals someone coming to talk to her.         10/17/2024 Dr. Hinkle-patient mostly complains of pain to right hip and and leg.  She had a loose septic.  Refers improved compared to admission.  Blood cultures drawn on 10/15/2024 positive for Gram-positive cocci.  Patient remains on vancomycin and Teflaro.    Patient had a CT bilateral lower extremities with and without contrast showing no bone abnormalities.  There was symmetric uptake at the lower extremities on nuclear medicine exam .  -Nonspecific relatively increased radiotracer uptake at the bilateral distal femur and proximal tibia     10/18/24 dr hinkle - wbc count nl. Blood cx drawn yesterday at 2100 and in process. Moderate to max assistance. Continue teflaro and vanco       Objective/physical exam:  General: In no acute distress, afebrile  Chest: Clear to auscultation bilaterally.  Previous tunneled catheter site appears clean, no erythema  Heart: RRR, +S1, S2, no appreciable murmur  Abdomen: Soft, nontender, BS +, bilateral nephrostomy tubes with left having urine, right is still blood  MSK: Warm, 1+ pitting bilateral lower extremity edema, no clubbing or cyanosis  Neurologic:  Alert awake oriented x4    VITAL SIGNS: 24 HRS MIN & MAX LAST   Temp  Min: 97.6 °F (36.4 °C)  Max: 98.7 °F (37.1 °C) 98.2 °F (36.8 °C)   BP  Min: 109/54  Max: 157/69 118/66   Pulse  Min: 60  Max: 74  74    Resp  Min: 15  Max: 20 18   SpO2  Min: 96 %  Max: 99 % 97 %     I have reviewed the following labs:  Recent Labs   Lab 10/13/24  0526 10/14/24  0529 10/17/24  0603   WBC 20.63* 19.12* 11.82*   RBC 3.45* 3.75* 3.13*   HGB 10.1* 11.0* 9.3*   HCT 31.7* 34.5* 28.6*   MCV 91.9 92.0 91.4   MCH 29.3 29.3 29.7   MCHC 31.9* 31.9* 32.5*   RDW 15.8 15.4 15.8    326 309   MPV 11.5* 10.9* 11.0*     Recent Labs   Lab 10/11/24  0457 10/13/24  0526 10/14/24  0529 10/17/24  0603   * 127* 125* 127*   K 4.5 4.6 5.3* 5.3*   CL 88* 92* 89* 87*   CO2 21* 17* 16* 15*   BUN 55.1* 39.2* 51.2* 73.9*   CREATININE 5.32* 4.10* 5.28* 5.95*   CALCIUM 8.5 8.5 9.0 7.8*   MG 2.10 1.90  --  1.90   ALBUMIN 2.0* 2.0* 2.2* 2.0*   ALKPHOS  --  151*  --  135   ALT  --  12  --  7   AST  --  12  --  10   BILITOT  --  0.3  --  0.2     Microbiology Results (last 7 days)       Procedure Component Value Units Date/Time    Blood Culture [[id number]]  (Abnormal) Collected: 10/15/24 2136    Order Status: Completed Specimen: Blood Updated: 10/17/24 1156     GRAM STAIN Gram Positive Cocci, probable Staphylococcus      Seen in gram stain of broth only      2 of 2 bottles positive    Blood Culture [[id number]]  (Abnormal) Collected: 10/15/24 2136    Order Status: Completed Specimen: Blood Updated: 10/17/24 1156     GRAM STAIN Gram positive cocci      Seen in gram stain of broth only      2 of 2 bottles positive    Body Fluid Culture [[id number]] Collected: 10/16/24 1147    Order Status: Completed Specimen: Body Fluid from Kidney, Right Updated: 10/17/24 0720     Body Fluid Culture No Growth At 24 Hours    Gram Stain [[id number]] Collected: 10/16/24 1147    Order Status: Completed Specimen: Body Fluid from Kidney, Right Updated: 10/17/24 0719     GRAM STAIN Many WBC observed      Few Gram positive cocci    Blood Culture [[id number]]  (Abnormal)  (Susceptibility) Collected: 10/14/24 0529    Order Status: Completed Specimen: Blood from Arm, Right Updated:  10/17/24 0710     Blood Culture Methicillin resistant Staphylococcus aureus     GRAM STAIN Gram Positive Cocci, probable Staphylococcus      Seen in gram stain of broth only      2 of 2 bottles positive    Blood Culture [[id number]]  (Abnormal)  (Susceptibility) Collected: 10/14/24 0529    Order Status: Completed Specimen: Blood from Hand, Right Updated: 10/17/24 0710     Blood Culture Methicillin resistant Staphylococcus aureus     GRAM STAIN Gram Positive Cocci, probable Staphylococcus      Seen in gram stain of broth only      2 of 2 bottles positive    BCID2 Panel [[id number]]  (Abnormal) Collected: 10/15/24 2136    Order Status: Completed Specimen: Blood Updated: 10/17/24 0638     CTX-M (ESBL ) N/A     IMP (Cabapenemase ) N/A     KPC resistance gene (Carbapenemase ) N/A     mcr-1 N/A     mecA ID N/A     Comment: Note: Antimicrobial resistance can occur via multiple mechanisms. A Not Detected result for antimicrobial resistance gene(s) does not indicate antimicrobial susceptibility. Subculturing is required for species identification and susceptibility testing of   isolates.        mecA/C and MREJ (MRSA) gene Detected     NDM (Carbapenemase ) N/A     OXA-48-like (Carbapenemase ) N/A     Royal/B (VRE gene) N/A     VIM (Carbapenemase ) N/A     Enterococcus faecalis Not Detected     Enterococcus faecium Not Detected     Listeria monocytogenes Not Detected     Staphylococcus spp. Detected     Staphylococcus aureus Detected     Staphylococcus epidermidis Not Detected     Staphylococcus lugdunensis Not Detected     Streptococcus spp. Not Detected     Streptococcus agalactiae (Group B) Not Detected     Streptococcus pneumoniae Not Detected     Streptococcus pyogenes (Group A) Not Detected     Acinetobacter calcoaceticus/baumannii complex Not Detected     Bacteroides fragilis Not Detected     Enterobacterales Not Detected     Enterobacter cloacae complex Not Detected      Escherichia coli Not Detected     Klebsiella aerogenes Not Detected     Klebsiella oxytoca Not Detected     Klebsiella pneumoniae group Not Detected     Proteus spp. Not Detected     Salmonella spp. Not Detected     Serratia marcescens Not Detected     Haemophilus influenzae Not Detected     Neisseria meningitidis Not Detected     Pseudomonas aeruginosa Not Detected     Stenotrophomonas maltophilia Not Detected     Candida albicans Not Detected     Candida auris Not Detected     Candida glabrata Not Detected     Candida krusei Not Detected     Candida parapsilosis Not Detected     Candida tropicalis Not Detected     Cryptococcus neoformans/gattii Not Detected    Narrative:      The [employer] BCID2 Panel is a multiplexed nucleic acid test intended for the use with [employer]® 2.0 or [employer]iza® [employer] Systems for the simultaneous qualitative detection and identification of multiple bacterial and yeast nucleic acids and select genetic determinants associated with antimicrobial resistance.  The [employer] BCID2 Panel test is performed directly on blood culture samples identified as positive by a continuous monitoring blood culture system.  Results are intended to be interpreted in conjunction with Gram stain results.    Anaerobic Culture [[id number]] Collected: 10/16/24 1147    Order Status: Sent Specimen: Body Fluid from Kidney, Right Updated: 10/16/24 1201    Fungal Culture [[id number]] Collected: 10/16/24 1147    Order Status: Sent Specimen: Body Fluid from Kidney, Right Updated: 10/16/24 1201    Blood Culture [[id number]]  (Normal) Collected: 10/09/24 1728    Order Status: Completed Specimen: Blood Updated: 10/14/24 1900     Blood Culture No Growth at 5 days    Blood Culture [[id number]]  (Abnormal)  (Susceptibility) Collected: 10/10/24 1605    Order Status: Completed Specimen: Blood Updated: 10/13/24 0628     Blood Culture Methicillin resistant Staphylococcus aureus     GRAM STAIN Seen in gram stain of  broth only      2 of 2 bottles positive      Gram Positive Cocci, probable Staphylococcus    Blood Culture [[id number]]  (Abnormal)  (Susceptibility) Collected: 10/10/24 1605    Order Status: Completed Specimen: Blood Updated: 10/13/24 0627     Blood Culture Methicillin resistant Staphylococcus aureus     GRAM STAIN 1 of 2 Anaerobic bottles positive      Gram Positive Cocci, probable Staphylococcus      Seen in gram stain of broth only    Blood Culture [[id number]]  (Abnormal)  (Susceptibility) Collected: 10/09/24 1728    Order Status: Completed Specimen: Blood Updated: 10/13/24 0625     Blood Culture Methicillin resistant Staphylococcus aureus     GRAM STAIN Gram Positive Cocci, probable Staphylococcus      Seen in gram stain of broth only      1 of 2 Aerobic bottles positive    Blood Culture [[id number]]  (Abnormal)  (Susceptibility) Collected: 10/07/24 2103    Order Status: Completed Specimen: Blood Updated: 10/12/24 0658     Blood Culture Methicillin resistant Staphylococcus aureus     GRAM STAIN 1 of 1 Pediatric bottle positive      Gram Positive Cocci, probable Staphylococcus      Seen in gram stain of broth only    BCID2 Panel [[id number]] Collected: 10/10/24 1605    Order Status: Canceled Specimen: Blood Updated: 10/11/24 2011             See below for Radiology    Assessment/Plan:  Persistent right-sided hydronephrosis upper pole moiety  -double collecting system   - nephrostomy tube to right kidney  - nephrostomy to left kidney     Persistent MRSA bacteremia  -Teflaro/vancomycin  - blood cx 10-15 -24 again + for staph  - b/c 10-17-24 in process     Encephalopathy ,metabolic  -resolved    Bladder lesion-  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        End-stage renal disease on hemodialysis    History of hypertension, hyperlipidemia, bilateral chronic hydronephrosis with left nephrostomy tube in place and right ureter stent, ESRD on HD TTS, and  chronic anemia       Oncology following/Infectious Disease following.        vancomycin and Teflaro.    WBC scan showing increased uptake in the bilateral distal femur and proximal tibia,   -CT of the area notes no infectious etiology, possibly secondary to bone marrow expansion.    Temporary dialysis catheter placement-left IJ    Plan-follow blood cx's drawn 10-17-24  .Continue Teflaro/vancomycin as per ID    Cc time 35 minutes   Cc diagnosis persistent methicillin-resistant Staph aureus bacteremia on Teflaro/vancomycin    VTE prophylaxis:  Lovenox 30 mg subQ daily    Patient condition:  Stable/Fair/Guarded/ Serious/ Critical    Anticipated discharge and Disposition:   To be determined      All diagnosis and differential diagnosis have been reviewed; assessment and plan has been documented; I have personally reviewed the labs and test results that are presently available; I have reviewed the patients medication list; I have reviewed the consulting providers response and recommendations. I have reviewed or attempted to review medical records based upon their availability    All of the patient's questions have been  addressed and answered. Patient's is agreeable to the above stated plan. I will continue to monitor closely and make adjustments to medical management as needed.    Portions of this note dictated using EMR integrated voice recognition software, and may be subject to voice recognition errors not corrected at proofreading. Please contact writer for clarification if needed.   _____________________________________________________________________    Malnutrition Status:    Scheduled Med:   atorvastatin  20 mg Oral QHS    ceftaroline (Teflaro) IV (PEDS and ADULTS)  200 mg Intravenous Q8H    enoxaparin  30 mg Subcutaneous Daily    folic acid  1 mg Oral Daily    ondansetron  4 mg Intravenous Once    pantoprazole  40 mg Oral Daily    sertraline  25 mg Oral Daily    sevelamer carbonate  800 mg Oral TID WM    sodium  bicarbonate  1,300 mg Oral TID    vancomycin (VANCOCIN) IV (PEDS and ADULTS)  500 mg Intravenous Once      Continuous Infusions:       PRN Meds:    Current Facility-Administered Medications:     acetaminophen, 650 mg, Oral, Q4H PRN    albuterol-ipratropium, 3 mL, Nebulization, Once PRN    aluminum-magnesium hydroxide-simethicone, 30 mL, Oral, QID PRN    diphenhydrAMINE, 25 mg, Intravenous, Q6H PRN    heparin (porcine), 3,000 Units, Intravenous, PRN    melatonin, 6 mg, Oral, Nightly PRN    metoclopramide, 10 mg, Intravenous, Q10 Min PRN    ondansetron, 4 mg, Intravenous, Q4H PRN    oxyCODONE-acetaminophen, 1 tablet, Oral, Q4H PRN    polyethylene glycol, 17 g, Oral, BID PRN    prochlorperazine, 5 mg, Intravenous, Q6H PRN    senna-docusate 8.6-50 mg, 2 tablet, Oral, BID PRN    sodium chloride 0.9%, 10 mL, Intravenous, PRN    vancomycin - pharmacy to dose, , Intravenous, pharmacy to manage frequency     Radiology:  I have personally reviewed the following imaging and agree with the radiologist.     US Guided Needle Placement  Narrative: PROCEDURE:  US and Fluoroscopy Guided Nephrostomy Tube and Nephroureteral Stent Exchange    CLINICAL HISTORY:  69-year-old female with bilateral hydronephrosis with duplicated right collecting system.    ANESTHESIA:  Level of sedation: Moderate sedation    Medications: 2 mg Versed IV; 100 mg Fentanyl IV; 0 mg Ativan IV; other: None    Administration: Moderate sedation was administered during this procedure. Continuous monitoring of the patient's level of consciousness and physiological status was provided throughout the procedure by an independent trained observer under the direct supervision of the operating physician.    Duration of sedation: 38 minutes    Antibiotic prophylaxis: None.  Patient already being treated with    PHYSICIANS:  Andrew Buenrostro MD    RADIATION DOSE:  Fluoroscopy time: 10.8 minutes    Radiation exposure dose: 376.91 mGy    Exposure images: 0    CONTRAST:  35 cc of  Isovue 370    PROCEDURAL SUMMARY:  After informed consent was obtained, the patient was placed prone on the angiography table. The skin overlying the bilateral kidneys and existing bilateral nephrostomy tubes was then prepped and draped in the usual sterile fashion.    The skin and soft tissues were anesthetized using 1% lidocaine. Under real-time ultrasound guidance, a 25g needle was advanced into an inferior pole calyx of the superior moiety of the right kidney in an attempt to enter the inferior moiety..  Once ultrasound imaging demonstrated the tip of the needle within the calyx, the stylet was removed and return of urine was confirmed.  Contrast was injected to opacify the renal collecting system.  Positioning within the superior moiety was confirmed.  Using this access for localization purposes, fluoroscopic guidance was utilized to insert a 21g needle into the inferior pole moiety positioned anterior to the superior moiety.  Return of urine was noted through the needle after removal of the stylet.  Contrast was injected to confirm positioning.  A Mandril wire was then advanced through the needle into the collecting system.  The needle was removed and the coaxial dilator was advanced over the wire into the kidney.  The inner dilators and wire were removed and a Glidewire was advanced through the outer sheath of the dilator into kidney.  The sheath was then removed and an 8F nephrostomy drain was advanced over the wire and into the collecting system. The wire was removed and the [place] loop was formed.  Aspirated urine was sent for culture.  The catheter was attached to the skin and a sterile dressing was applied. The catheter was then attached to a gravity drainage bag.    Attention was then turned to the left kidney and existing nephroureteral stent. Contrast was injected through the existing catheter to confirm positioning. The external portion of the catheter was ligated to release the [place] loop. A stiff  Glidewire was then inserted through the catheter and the catheter was removed. An 8F x 22 cm nephroureteral stent was advanced over the wire and into the collecting system, down to the bladder.  The stiffener and wire were removed and the [place] loop was formed. The catheter was attached to the skin and a sterile dressing was applied. The catheter was then attached to a gravity drainage bag.    The patient tolerated the procedure well and experienced no immediate complications. The patient was then brought to the recovery room in good condition.  Impression: 1.  Technically successful Ureteral Stent placement in the inferior right renal moiety.    2.  Left nephroureteral stent exchange.    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    ANESTHESIA:  Level of sedation: Moderate sedation    Medications: 2 mg Versed IV; 100 mg Fentanyl IV; 0 mg Ativan IV; other: None    Administration: Moderate sedation was administered during this procedure. Continuous monitoring of the patient's level of consciousness and physiological status was provided throughout the procedure by an independent trained observer under the direct supervision of the operating physician.    Duration of sedation: 38 minutes    Antibiotic prophylaxis: None.  Patient already being treated with    PHYSICIANS:  Andrew Buenrostro MD    RADIATION DOSE:  Fluoroscopy time: 10.8 minutes    Radiation exposure dose: 376.91 mGy    Exposure images: 0    CONTRAST:  35 cc of Isovue 370    PROCEDURAL SUMMARY:  After informed consent was obtained, the patient was placed prone on the angiography table. The skin overlying the bilateral kidneys and existing bilateral nephrostomy tubes was then prepped and  draped in the usual sterile fashion.    The skin and soft tissues were anesthetized using 1% lidocaine. Under real-time ultrasound guidance, a 25g needle was advanced into an inferior pole calyx of the superior moiety of the right kidney in an attempt to enter the inferior moiety..  Once ultrasound imaging demonstrated the tip of the needle within the calyx, the stylet was removed and return of urine was confirmed.  Contrast was injected to opacify the renal collecting system.  Positioning within the superior moiety was confirmed.  Using this access for localization purposes, fluoroscopic guidance was utilized to insert a 21g needle into the inferior pole moiety positioned anterior to the superior moiety.  Return of urine was noted through the needle after removal of the stylet.  Contrast was injected to confirm positioning.  A Mandril wire was then advanced through the needle into the collecting system.  The needle was removed and the coaxial dilator was advanced over the wire into the kidney.  The inner dilators and wire were removed and a Glidewire was advanced through the outer sheath of the dilator into kidney.  The sheath was then removed and an 8F nephrostomy drain was advanced over the wire and into the collecting system. The wire was removed and the [place] loop was formed.  Aspirated urine was sent for culture.  The catheter was attached to the skin and a sterile dressing was applied. The catheter was then attached to a gravity drainage bag.    Attention was then turned to the left kidney and existing nephroureteral stent. Contrast was injected through the existing catheter to confirm positioning. The external portion of the catheter was ligated to release the [place] loop. A stiff Glidewire was then inserted through the catheter and the catheter was removed. An 8F x 22 cm nephroureteral stent was advanced over the wire and into the collecting system, down to the bladder.  The stiffener and wire were removed and  the [place] loop was formed. The catheter was attached to the skin and a sterile dressing was applied. The catheter was then attached to a gravity drainage bag.    The patient tolerated the procedure well and experienced no immediate complications. The patient was then brought to the recovery room in good condition.  Impression: 1.  Technically successful Ureteral Stent placement in the inferior right renal moiety.    2.  Left nephroureteral stent exchange.    Electronically signed by: Andrew Buenrostro  Date:    10/16/2024  Time:    15:02      Terrance Hinkle MD  Department of Hospital Medicine   [facility]   10/17/2024

## 2024-10-17 NOTE — CONSULTS
Inpatient Nutrition Assessment    Admit Date: 10/3/2024   Total duration of encounter: 14 days   Patient Age: 69 y.o.    Nutrition Recommendation/Prescription     Continue renal dialysis diet as tolerated. Consider liberalizing diet as medically appropriate if decreased intake continues  Continue Novasource Renal TID (provides 475 kcal and 22 gm protein per serving)  Honor food preferences  Continue antiemetic and bowel regimen  Monitor labs, intake and weight    Communication of Recommendations:  EMR    Nutrition Assessment     Malnutrition Assessment/Nutrition-Focused Physical Exam    Malnutrition Context: acute illness or injury (10/14/24 1330)  Malnutrition Level: other (see comments) (does not meet criteria) (10/14/24 1330)  Energy Intake (Malnutrition): less than 75% for greater than 7 days (10/14/24 1330)  Weight Loss (Malnutrition): other (see comments) (does not meet criteria) (10/14/24 1330)  Subcutaneous Fat (Malnutrition): other (see comments) (does not meet criteria) (10/14/24 1330)           Muscle Mass (Malnutrition): other (see comments) (does not meet criteria) (10/14/24 1330)                                   A minimum of two characteristics is recommended for diagnosis of either severe or non-severe malnutrition.    Chart Review    Reason Seen: physician consult for Please revisit patient for her dietary preferences and follow-up    Malnutrition Screening Tool Results   Have you recently lost weight without trying?: No  Have you been eating poorly because of a decreased appetite?: No   MST Score: 0   Diagnosis:  Persistent right-sided hydronephrosis upper pole moiety  Persistent MRSA bacteremia  Encephalopathy most likely metabolic-resolved  Bladder lesion-carcinoma with extensive squamous differentiation  Bilateral percutaneous nephrostomy  End-stage renal disease on hemodialysis    Relevant Medical History: HTN, HLD, chronic hydronephrosis with left nephrostomy tube, right ureteral stent, ESRD  on HD, chronic anemia     Scheduled Medications:  atorvastatin, 20 mg, QHS  ceftaroline (Teflaro) IV (PEDS and ADULTS), 200 mg, Q8H  enoxaparin, 30 mg, Daily  folic acid, 1 mg, Daily  ondansetron, 4 mg, Once  pantoprazole, 40 mg, Daily  sertraline, 25 mg, Daily  sevelamer carbonate, 800 mg, TID WM  sodium bicarbonate, 1,300 mg, TID  vancomycin (VANCOCIN) IV (PEDS and ADULTS), 500 mg, Once    Continuous Infusions:   PRN Medications:  acetaminophen, 650 mg, Q4H PRN  albuterol-ipratropium, 3 mL, Once PRN  aluminum-magnesium hydroxide-simethicone, 30 mL, QID PRN  diphenhydrAMINE, 25 mg, Q6H PRN  heparin (porcine), 3,000 Units, PRN  melatonin, 6 mg, Nightly PRN  metoclopramide, 10 mg, Q10 Min PRN  ondansetron, 4 mg, Q4H PRN  oxyCODONE-acetaminophen, 1 tablet, Q4H PRN  polyethylene glycol, 17 g, BID PRN  prochlorperazine, 5 mg, Q6H PRN  senna-docusate 8.6-50 mg, 2 tablet, BID PRN  sodium chloride 0.9%, 10 mL, PRN  vancomycin - pharmacy to dose, , pharmacy to manage frequency    Calorie Containing IV Medications: no significant kcals from medications at this time    Recent Labs   Lab 10/11/24  0457 10/13/24  0526 10/14/24  0529 10/17/24  0603   * 127* 125* 127*   K 4.5 4.6 5.3* 5.3*   CALCIUM 8.5 8.5 9.0 7.8*   PHOS 5.7* 4.9* 6.0* 5.4*   MG 2.10 1.90  --  1.90   CL 88* 92* 89* 87*   CO2 21* 17* 16* 15*   BUN 55.1* 39.2* 51.2* 73.9*   CREATININE 5.32* 4.10* 5.28* 5.95*   EGFRNORACEVR 8 11 8 7   GLUCOSE 86 79* 66* 93   BILITOT  --  0.3  --  0.2   ALKPHOS  --  151*  --  135   ALT  --  12  --  7   AST  --  12  --  10   ALBUMIN 2.0* 2.0* 2.2* 2.0*   WBC 18.83* 20.63* 19.12* 11.82*   HGB 9.9* 10.1* 11.0* 9.3*   HCT 30.7* 31.7* 34.5* 28.6*     Nutrition Orders:  Diet Renal On Dialysis  Dietary nutrition supplements TID; Novasource Renal - Vanilla    Appetite/Oral Intake: fair/50-75% of meals  Factors Affecting Nutritional Intake: constipation, decreased appetite, and nausea  Social Needs Impacting Access to Food: none  identified  Food/Hindu/Cultural Preferences:  no eggs or grits for breakfast, likes corn flakes, oatmeal and toast with jelly and butter  Food Allergies: no known food allergies  Last Bowel Movement: 10/12/24  Wound(s):  none noted    Comments    10/7/24: Pt off floor for stat CT at time of visit. Pt has been NPO since admit (x 4 days). Per MST, no reports of decreased appetite or unintentional weight loss PTA. No weight loss noted per EMR weight history. Per MD note, ST consulted; will monitor diet advancement and order ONS to ensure adequate nutrition.      10/8/24: Pt remains NPO per SLP, MBS pending when patient is more alert. Pt has been NPO since admit (>4 days). TF recs provided for if/when medically appropriate. RN notified.      10/11/24: Patient reports fair oral intake of meals served. Denies any nausea, vomiting, diarrhea and constipation.      10/14/24: RD consulted to discuss dietary options with patient. Patient complains of eating the same foods every day. Obtained breakfast food preferences and relayed to kitchen. Pt agreed to ONS with meals. Also provided patient with educational handout on renal diet. Discussed the lunch and dinner menu options with the patient as well. Pt reports continued poor-fair intake of meals, +nausea this AM and constipation. Reports good intake PTA and denies unintentional weight loss. No muscle or fat depletion noted per NFPE.    10/17/24: RD re-consulted to revisit patient for dietary preferences. Pt off floor for dialysis at time of rounds. RD previously obtained food preferences for breakfast and relayed to kitchen. Pt also receiving nutritional supplement with all meals, providing 475 kcal and 22 gm protein per serving (meets ~75% energy needs and ~63% protein needs). RD previously discussed lunch and dinner menu options and instructed how to order meals with . If intake remains decreased, consider liberalizing diet. No reports of n/v, last BM 10/12 noted.  "Will continue to current regimen and monitor.    Anthropometrics    Height: 5' 3" (160 cm), Height Method: Stated  Last Weight: (!) 140.2 kg (309 lb 1.4 oz) (10/10/24 1517), Weight Method: Standard Scale  BMI (Calculated): 54.8  BMI Classification: obese grade III (BMI >/=40)     Ideal Body Weight (IBW), Female: 115 lb     % Ideal Body Weight, Female (lb): 268.77 %                    Usual Body Weight (UBW), k.2 kg  % Usual Body Weight: 105.45     Usual Weight Provided By: EMR weight history and patient denies unintentional weight loss    Wt Readings from Last 5 Encounters:   10/10/24 (!) 140.2 kg (309 lb 1.4 oz)   07/10/24 (!) 140.2 kg (309 lb)   24 113.4 kg (250 lb)     Weight Change(s) Since Admission:   Wt Readings from Last 1 Encounters:   10/10/24 1517 (!) 140.2 kg (309 lb 1.4 oz)   10/03/24 1604 (!) 140.2 kg (309 lb)   Admit Weight: (!) 140.2 kg (309 lb) (10/03/24 1604), Weight Method: Stated    Estimated Needs    Weight Used For Calorie Calculations: (!) 140.2 kg (309 lb 1.4 oz)  Energy Calorie Requirements (kcal): 1901 kcal (1.0 SF)  Energy Need Method: [place] Jeor  Weight Used For Protein Calculations: 87.5 kg (192 lb 14.4 oz) (AdjustedBW used)  Protein Requirements: 105-114 gm (1.2-1.3 gm/kg AdjustedBW)  Fluid Requirements (mL): urine output + 1,000 mL        Enteral Nutrition     Patient not receiving enteral nutrition at this time.    Parenteral Nutrition     Patient not receiving parenteral nutrition support at this time.    Evaluation of Received Nutrient Intake    Calories: not meeting estimated needs  Protein: not meeting estimated needs    Patient Education     Not applicable.    Nutrition Diagnosis     PES: Inadequate oral intake related to acute illness as evidenced by <75% intake for > 7 days. (active)     Nutrition Interventions     Intervention(s): general/healthful diet, commercial beverage, prescription medication, and collaboration with other providers    Goal: Meet greater " than 80% of nutritional needs by follow-up. (goal progressing)  Goal: Consume % of oral supplements by follow-up. (goal progressing)    Nutrition Goals & Monitoring     Dietitian will monitor: food and beverage intake, weight, electrolyte/renal panel, glucose/endocrine profile, and gastrointestinal profile  Discharge planning: continue renal diet with Novasource Renal or similar  oral supplements  Nutrition Risk/Follow-Up: high (follow-up in 1-4 days)   Please consult if re-assessment needed sooner.

## 2024-10-18 LAB
ALBUMIN SERPL-MCNC: 2 G/DL (ref 3.4–4.8)
ALBUMIN/GLOB SERPL: 0.4 RATIO (ref 1.1–2)
ALP SERPL-CCNC: 126 UNIT/L (ref 40–150)
ALT SERPL-CCNC: 8 UNIT/L (ref 0–55)
ANION GAP SERPL CALC-SCNC: 18 MEQ/L
AST SERPL-CCNC: 11 UNIT/L (ref 5–34)
BASOPHILS # BLD AUTO: 0.05 X10(3)/MCL
BASOPHILS NFR BLD AUTO: 0.6 %
BILIRUB SERPL-MCNC: 0.3 MG/DL
BUN SERPL-MCNC: 49.5 MG/DL (ref 9.8–20.1)
CALCIUM SERPL-MCNC: 7.9 MG/DL (ref 8.4–10.2)
CHLORIDE SERPL-SCNC: 93 MMOL/L (ref 98–107)
CO2 SERPL-SCNC: 19 MMOL/L (ref 23–31)
CREAT SERPL-MCNC: 4.38 MG/DL (ref 0.55–1.02)
CREAT/UREA NIT SERPL: 11
EOSINOPHIL # BLD AUTO: 0.22 X10(3)/MCL (ref 0–0.9)
EOSINOPHIL NFR BLD AUTO: 2.4 %
ERYTHROCYTE [DISTWIDTH] IN BLOOD BY AUTOMATED COUNT: 16.1 % (ref 11.5–17)
GFR SERPLBLD CREATININE-BSD FMLA CKD-EPI: 10 ML/MIN/1.73/M2
GLOBULIN SER-MCNC: 4.6 GM/DL (ref 2.4–3.5)
GLUCOSE SERPL-MCNC: 85 MG/DL (ref 82–115)
HCT VFR BLD AUTO: 27.8 % (ref 37–47)
HGB BLD-MCNC: 8.8 G/DL (ref 12–16)
IMM GRANULOCYTES # BLD AUTO: 0.17 X10(3)/MCL (ref 0–0.04)
IMM GRANULOCYTES NFR BLD AUTO: 1.9 %
LYMPHOCYTES # BLD AUTO: 1.01 X10(3)/MCL (ref 0.6–4.6)
LYMPHOCYTES NFR BLD AUTO: 11.2 %
MAGNESIUM SERPL-MCNC: 1.9 MG/DL (ref 1.6–2.6)
MCH RBC QN AUTO: 28.9 PG (ref 27–31)
MCHC RBC AUTO-ENTMCNC: 31.7 G/DL (ref 33–36)
MCV RBC AUTO: 91.4 FL (ref 80–94)
MONOCYTES # BLD AUTO: 1.17 X10(3)/MCL (ref 0.1–1.3)
MONOCYTES NFR BLD AUTO: 13 %
NEUTROPHILS # BLD AUTO: 6.41 X10(3)/MCL (ref 2.1–9.2)
NEUTROPHILS NFR BLD AUTO: 70.9 %
NRBC BLD AUTO-RTO: 0 %
PLATELET # BLD AUTO: 246 X10(3)/MCL (ref 130–400)
PMV BLD AUTO: 10.6 FL (ref 7.4–10.4)
POTASSIUM SERPL-SCNC: 4.8 MMOL/L (ref 3.5–5.1)
PROT SERPL-MCNC: 6.6 GM/DL (ref 5.8–7.6)
RBC # BLD AUTO: 3.04 X10(6)/MCL (ref 4.2–5.4)
SODIUM SERPL-SCNC: 130 MMOL/L (ref 136–145)
WBC # BLD AUTO: 9.03 X10(3)/MCL (ref 4.5–11.5)

## 2024-10-18 PROCEDURE — 97535 SELF CARE MNGMENT TRAINING: CPT

## 2024-10-18 PROCEDURE — 25000003 PHARM REV CODE 250: Performed by: UROLOGY

## 2024-10-18 PROCEDURE — 80053 COMPREHEN METABOLIC PANEL: CPT | Performed by: INTERNAL MEDICINE

## 2024-10-18 PROCEDURE — 63600175 PHARM REV CODE 636 W HCPCS: Mod: JZ,JG | Performed by: GENERAL PRACTICE

## 2024-10-18 PROCEDURE — 25000003 PHARM REV CODE 250: Performed by: INTERNAL MEDICINE

## 2024-10-18 PROCEDURE — 36415 COLL VENOUS BLD VENIPUNCTURE: CPT | Performed by: INTERNAL MEDICINE

## 2024-10-18 PROCEDURE — 21400001 HC TELEMETRY ROOM

## 2024-10-18 PROCEDURE — 27000207 HC ISOLATION

## 2024-10-18 PROCEDURE — 83735 ASSAY OF MAGNESIUM: CPT | Performed by: INTERNAL MEDICINE

## 2024-10-18 PROCEDURE — 99233 SBSQ HOSP IP/OBS HIGH 50: CPT | Mod: ,,, | Performed by: HOSPITALIST

## 2024-10-18 PROCEDURE — 97110 THERAPEUTIC EXERCISES: CPT

## 2024-10-18 PROCEDURE — 85025 COMPLETE CBC W/AUTO DIFF WBC: CPT | Performed by: INTERNAL MEDICINE

## 2024-10-18 PROCEDURE — 63600175 PHARM REV CODE 636 W HCPCS: Performed by: INTERNAL MEDICINE

## 2024-10-18 PROCEDURE — 25000003 PHARM REV CODE 250: Performed by: GENERAL PRACTICE

## 2024-10-18 PROCEDURE — 97530 THERAPEUTIC ACTIVITIES: CPT

## 2024-10-18 RX ORDER — BISACODYL 10 MG/1
10 SUPPOSITORY RECTAL DAILY PRN
Status: DISCONTINUED | OUTPATIENT
Start: 2024-10-18 | End: 2024-10-28 | Stop reason: HOSPADM

## 2024-10-18 RX ADMIN — SODIUM BICARBONATE 650 MG TABLET 1300 MG: at 08:10

## 2024-10-18 RX ADMIN — SODIUM BICARBONATE 650 MG TABLET 1300 MG: at 09:10

## 2024-10-18 RX ADMIN — SODIUM BICARBONATE 650 MG TABLET 1300 MG: at 04:10

## 2024-10-18 RX ADMIN — SENNOSIDES AND DOCUSATE SODIUM 2 TABLET: 50; 8.6 TABLET ORAL at 08:10

## 2024-10-18 RX ADMIN — CEFTAROLINE FOSAMIL 200 MG: 600 POWDER, FOR SOLUTION INTRAVENOUS at 01:10

## 2024-10-18 RX ADMIN — Medication 6 MG: at 12:10

## 2024-10-18 RX ADMIN — ENOXAPARIN SODIUM 30 MG: 30 INJECTION SUBCUTANEOUS at 04:10

## 2024-10-18 RX ADMIN — FOLIC ACID 1 MG: 1 TABLET ORAL at 08:10

## 2024-10-18 RX ADMIN — OXYCODONE AND ACETAMINOPHEN 1 TABLET: 10; 325 TABLET ORAL at 07:10

## 2024-10-18 RX ADMIN — BISACODYL 10 MG: 10 SUPPOSITORY RECTAL at 04:10

## 2024-10-18 RX ADMIN — OXYCODONE AND ACETAMINOPHEN 1 TABLET: 10; 325 TABLET ORAL at 12:10

## 2024-10-18 RX ADMIN — Medication 6 MG: at 09:10

## 2024-10-18 RX ADMIN — PANTOPRAZOLE SODIUM 40 MG: 40 TABLET, DELAYED RELEASE ORAL at 08:10

## 2024-10-18 RX ADMIN — CEFTAROLINE FOSAMIL 200 MG: 600 POWDER, FOR SOLUTION INTRAVENOUS at 09:10

## 2024-10-18 RX ADMIN — SERTRALINE HYDROCHLORIDE 25 MG: 25 TABLET ORAL at 08:10

## 2024-10-18 RX ADMIN — CEFTAROLINE FOSAMIL 200 MG: 600 POWDER, FOR SOLUTION INTRAVENOUS at 05:10

## 2024-10-18 RX ADMIN — ATORVASTATIN CALCIUM 20 MG: 10 TABLET, FILM COATED ORAL at 09:10

## 2024-10-18 RX ADMIN — OXYCODONE AND ACETAMINOPHEN 1 TABLET: 10; 325 TABLET ORAL at 05:10

## 2024-10-18 NOTE — PT/OT/SLP PROGRESS
Occupational Therapy   Treatment    Name: Fior Joya  MRN: [id number]  Admitting Diagnosis:  <principal problem not specified>  14 Days Post-Op    Recommendations:     Recommended therapy intensity at discharge: Moderate Intensity Therapy   Discharge Equipment Recommendations:  to be determined by next level of care  Barriers to discharge:  None    Assessment:     Fior Joya is a 69 y.o. female with a medical diagnosis of <principal problem not specified>.  She presents with The primary encounter diagnosis was Sepsis. Diagnoses of Chest pain, Acute pyelonephritis, Hydronephrosis with ureteral stricture, not elsewhere classified, MRSA bacteremia, Urinary tract infection without hematuria, site unspecified, Hydronephrosis, unspecified hydronephrosis type, Retained ureteral stent, Metabolic acidosis, and ESRD on hemodialysis were also pertinent to this visit.  . Performance deficits affecting function are weakness, impaired endurance, impaired self care skills, impaired functional mobility, gait instability, impaired balance, pain, decreased safety awareness, decreased lower extremity function, decreased upper extremity function, impaired skin.       Pt agreeable to participate this date, but will only sit EOB; declines participation in further mobility/ADLs. Pt demo's self limiting behaviors. Will progress pt as able.     Rehab Prognosis:  Good; patient would benefit from acute skilled OT services to address these deficits and reach maximum level of function.       Plan:     Patient to be seen 4 x/week to address the above listed problems via self-care/home management, therapeutic activities, therapeutic exercises  Plan of Care Expires: 11/07/24  Plan of Care Reviewed with: patient    Subjective     Pain/Comfort:  Pain Rating 1:  (did not rate)  Location - Side 1: Right  Location - Orientation 1: generalized  Location 1:  (back and hip)  Pain Addressed 1: Reposition, Distraction, Cessation of Activity  Pain Rating  Post-Intervention 1:  (did not rate)    Objective:     Communicated with: nsg prior to session.  Patient found supine with nephrostomy, telemetry, pulse ox (continuous) upon OT entry to room.    General Precautions: Standard, fall    Orthopedic Precautions:N/A  Braces: N/A  Respiratory Status: Room air       Occupational Performance:     Bed Mobility:    Patient completed Scooting/Bridging with contact guard assistance  Patient completed Supine to Sit with stand by assistance  Patient completed Sit to Supine with moderate assistance and with leg lift     Functional Mobility/Transfers:  Declined participation     Activities of Daily Living:  Pt declined participation in ADLs at this time  Educated pt on imprt of mobility for participation in ADLs; discussed use of BSC, participating independently in G&H axs rather than having family assist, performance of bed level axs in order to maintain functional independence with ADLs and ROM of UEs     Therapeutic Activities:  Pt performed bed mobility as above   Good static sitting balance            [facility] 6 Click ADL: 15    Patient Education:  Patient provided with verbal education and demonstrations education regarding OT role/goals/POC, fall prevention, safety awareness, and home exercise program .  Additional teaching is warranted.      Patient left supine with all lines intact, call button in reach, and nsg notified.    GOALS:   Multidisciplinary Problems       Occupational Therapy Goals          Problem: Occupational Therapy    Goal Priority Disciplines Outcome Interventions   Occupational Therapy Goal     OT, PT/OT Progressing    Description: Goals to be met by: 11/7/24     Patient will increase functional independence with ADLs by performing:    LE Dressing with Modified [place].  Grooming while seated with Modified [place]. Progress to standing  Toileting from bedside commode with Modified [place] for hygiene and clothing management.   Toilet transfer  to bedside commode with Modified [place]. Progress to toilet as appropriate.                          Time Tracking:     OT Date of Treatment: 10/18/24  OT Start Time: 1144  OT Stop Time: 1210  OT Total Time (min): 26 min    Billable Minutes:Self Care/Home Management 16  Therapeutic Activity 10    OT/LEANN: OT     Number of LEANN visits since last OT visit: 1    10/18/2024

## 2024-10-18 NOTE — PROGRESS NOTES
.UROLOGY  PROGRESS  NOTE    Fior Joya 1955  [id number]  10/18/2024    POD 14 cystoscopy, right stent exchange upper pole moiety, bladder biopsy; right nephrostomy tube placement  with IR    S/p right neph tube placement and left nephroureteral stent exchange 10/16     Sitting up in bed  Reports feeling better today  VSS, afebrile  25 mL urethral Mario  25 mL left nephrostomy   25 mL right nephrostomy  Labs reviewed      Exam:    NAD  Resp unlabored  Abd obese, soft, NTND   bloody drainage from both right PCN, yellow urine in left PCN,  bag with light blood tined urine       Recent Results (from the past 24 hours)   Comprehensive Metabolic Panel    Collection Time: 10/18/24  6:13 AM   Result Value Ref Range    Sodium 130 (L) 136 - 145 mmol/L    Potassium 4.8 3.5 - 5.1 mmol/L    Chloride 93 (L) 98 - 107 mmol/L    CO2 19 (L) 23 - 31 mmol/L    Glucose 85 82 - 115 mg/dL    Blood Urea Nitrogen 49.5 (H) 9.8 - 20.1 mg/dL    Creatinine 4.38 (H) 0.55 - 1.02 mg/dL    Calcium 7.9 (L) 8.4 - 10.2 mg/dL    Protein Total 6.6 5.8 - 7.6 gm/dL    Albumin 2.0 (L) 3.4 - 4.8 g/dL    Globulin 4.6 (H) 2.4 - 3.5 gm/dL    Albumin/Globulin Ratio 0.4 (L) 1.1 - 2.0 ratio    Bilirubin Total 0.3 <=1.5 mg/dL     40 - 150 unit/L    ALT 8 0 - 55 unit/L    AST 11 5 - 34 unit/L    eGFR 10 mL/min/1.73/m2    Anion Gap 18.0 mEq/L    BUN/Creatinine Ratio 11    Magnesium    Collection Time: 10/18/24  6:13 AM   Result Value Ref Range    Magnesium Level 1.90 1.60 - 2.60 mg/dL   CBC with Differential    Collection Time: 10/18/24  6:13 AM   Result Value Ref Range    WBC 9.03 4.50 - 11.50 x10(3)/mcL    RBC 3.04 (L) 4.20 - 5.40 x10(6)/mcL    Hgb 8.8 (L) 12.0 - 16.0 g/dL    Hct 27.8 (L) 37.0 - 47.0 %    MCV 91.4 80.0 - 94.0 fL    MCH 28.9 27.0 - 31.0 pg    MCHC 31.7 (L) 33.0 - 36.0 g/dL    RDW 16.1 11.5 - 17.0 %    Platelet 246 130 - 400 x10(3)/mcL    MPV 10.6 (H) 7.4 - 10.4 fL    Neut % 70.9 %    Lymph % 11.2 %    Mono % 13.0 %    Eos % 2.4 %     Basophil % 0.6 %    Lymph # 1.01 0.6 - 4.6 x10(3)/mcL    Neut # 6.41 2.1 - 9.2 x10(3)/mcL    Mono # 1.17 0.1 - 1.3 x10(3)/mcL    Eos # 0.22 0 - 0.9 x10(3)/mcL    Baso # 0.05 <=0.2 x10(3)/mcL    IG# 0.17 (H) 0 - 0.04 x10(3)/mcL    IG% 1.9 %    NRBC% 0.0 %       Assessment:  Bilateral hydronephrosis  -had left PCN placed in June of this year due to inability to place retrograde ureteral stent, also had right ureteral stent placed in one of the two collecting systems on 6/27  -s/p Cystoscopy with right stent exchange upper pole moiety, Retrograde pyelogram and Bladder biopsy 10/4  -unable to identify UO intra-op therefore IR placed a right PCN 10/4, CT from 10/8 reveals nephrostomy and stent both within upper pole moiety    s/p bladder biopsy   -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   -oncology has evaluated patient - plans for f/u outpatient to discuss treatment options    Sepsis  -blood cultures +MRSA, on rocephin; repeat BC remain positive  -repeat urine cultures with no growth  -tagged WBC scan with nonspecific relatively increased radiotracer uptake at bilateral distal femur and proximal tibia; CT lower extremity without acute abnormality  -CT cystogram yesterday reveals right-sided ureteral stent and nephrostomy tube within one collecting system with remaining collecting system dilated without reflux noted; left nephrostomy tube in appropriate position    ESRD   -on HD      Plan:  Flush both right PCN tube with 10 cc NS Q shift to maintain patency. She had decent urine output from all drains until the last few days. I did irrigate some clots from both right PCN's today, hopefully this helps with output. Discussed with nurse and patient.       Shabana Covarrubias, REAL

## 2024-10-18 NOTE — PROGRESS NOTES
Nephrology  Note    Patient Name: Fior Joya  Age: 69 y.o.  : 1955  MRN: [id number]  Admission Date: 10/3/2024        Fior Joya is a 69 y.o. female who presents with right flank pain.  Past medical history significant for ESRD on hemodialysis TTS at [facility] via right IJ PermCath, chronic hydronephrosis with left nephrostomy tube in place, and stents to right ureter, hypertension, anemia, hyperphosphatemia, and hyperlipidemia.  Patient presents with worsening right flank pain.  She states the pain started in her right neck, in his now in her right flank/hip area.  Patient was started on hemodialysis in 2024 after being found to have significant acute kidney injury and hydronephrosis.  She has been maintained on hemodialysis at [facility] on a TTS schedule.  Prior to hospitalization her last dialysis was on 10/01/2024.  Nephrology consulted for ESRD management.  In the emergency department she was noted to have low blood pressure and has been given IV fluids.  Urology has also been consulted for concern hydronephrosis needing stent exchange.  She has also been started on antibiotics for possible UTI.  She has had nausea, and vomiting.  No chest pain, shortness of breath, or lower extremity edema.    10/07/2024  Chart reviewed.  Weekend events noted.  Patient has been NPO at least since her admission to the hospital and also has no IV fluids.  Lying down in the bed.  Having jerky movements all over the body and uncomfortable.  Now she has nephrostomy tube on both sides.  Both draining.  Serosanguineous fluid noted.  Patient is very slow to answer questions but did recognize me by name and she knows her own name.     10/08/2024  No acute events overnight.  Seen on hemodialysis.  She was endorsing shortness of breath, and has some lower extremity edema.    10/09/2024   Mentally she has cleared up completely.  Complains of pains all over the body for which she is on p.r.n. pain medications.  Also  complains of being nervous and on some anxiety medication.  No shortness of breath.  Tunneled dialysis catheter was removed yesterday.    10/10/2024  No acute events overnight.  No chest pain, shortness of breath, abdominal pain, nausea, vomiting, or lower extremity edema.  Found to have bladder cancer by Urology.    10/11/2024  No acute events overnight.  No new complaints.  Blood cultures remain positive.  No chest pain, shortness of breath, nausea, vomiting.    10/12/2024  Patient had Vas-Cath placed yesterday, and she was re-initiated on hemodialysis.  Dialyzed yesterday with 1.5 L UF.  No new complaints today.  No chest pain, shortness of breath, nausea, vomiting.  She does have mild lower extremity edema.    10/14/2024   Complains of pain all over the body.  No fever or chills.  Does not like her diet.  No nausea vomiting.  No shortness of breath.  No chest pains.  No cough cold congestion.    10/15/2024   Now patient also has indwelling Mario catheter.  Urology have some tagged WBC  scan and cystogram planned.  Patient will also need dialysis today.  No new complaints.    10/16/2024   Patient is NPO as she is going for procedure on the nephrostomy tubes.  Patient's son present in the room and he is very angry at every body reporting that patient is not getting the right kind of food to eat, patient is not getting right kind of care, patient is not getting proper therapy and is being told to move around and get out of the bed.  He also reported that he plans to go to the administration and may go seek help from attorneys.  All the above was said by the patient's son when the patient herself is very calm and content.    10/17/2024  Seen on hemodialysis.    10/18/2024  No acute events overnight.  Dialyzed yesterday with 2 L UF.  Tolerated well.  No cramping.  No chest pain, shortness of breath, nausea, vomiting.    Current Facility-Administered Medications   Medication Dose Route Frequency Provider Last Rate  Last Admin    acetaminophen tablet 650 mg  650 mg Oral Q4H PRN Tawana Loaiza MD   650 mg at 10/15/24 1429    albuterol-ipratropium 2.5 mg-0.5 mg/3 mL nebulizer solution 3 mL  3 mL Nebulization Once PRN Mark Ying DO        aluminum-magnesium hydroxide-simethicone 200-200-20 mg/5 mL suspension 30 mL  30 mL Oral QID PRN Tawana Loaiza MD        atorvastatin tablet 20 mg  20 mg Oral QHS Tawana Loaiza MD   20 mg at 10/17/24 2035    ceftaroline fosamiL (TEFLARO) 200 mg in D5W 50 mL IVPB  200 mg Intravenous Q8H Milo Campbell MD   Stopped at 10/18/24 0600    diphenhydrAMINE injection 25 mg  25 mg Intravenous Q6H PRN Mark Ying DO   25 mg at 10/09/24 0256    enoxaparin injection 30 mg  30 mg Subcutaneous Daily Macarena Leggett MD   30 mg at 10/17/24 1819    folic acid tablet 1 mg  1 mg Oral Daily Tawana Loaiza MD   1 mg at 10/18/24 0851    heparin (porcine) injection 3,000 Units  3,000 Units Intravenous PRN Neno Mercado DO   3,000 Units at 10/15/24 1413    melatonin tablet 6 mg  6 mg Oral Nightly PRN Tawana Loaiza MD   6 mg at 10/18/24 0004    metoclopramide injection 10 mg  10 mg Intravenous Q10 Min PRN Mark Ying DO        ondansetron injection 4 mg  4 mg Intravenous Q4H PRN Tawana Loaiza MD        ondansetron injection 4 mg  4 mg Intravenous Once Mark Ying DO        oxyCODONE-acetaminophen  mg per tablet 1 tablet  1 tablet Oral Q4H PRN Uriel Fletcher MD   1 tablet at 10/18/24 0751    pantoprazole EC tablet 40 mg  40 mg Oral Daily Tawana Loaiza MD   40 mg at 10/18/24 0851    polyethylene glycol packet 17 g  17 g Oral BID PRN Tawana Loaiza MD        prochlorperazine injection Soln 5 mg  5 mg Intravenous Q6H PRN Tawana Loaiza MD        senna-docusate 8.6-50 mg per tablet 2 tablet  2 tablet Oral BID PRN Tawana Loaiza MD   2 tablet at 10/18/24 0851    sertraline tablet 25 mg  25  mg Oral Daily Shin Light MD   25 mg at 10/18/24 0851    sevelamer carbonate tablet 800 mg  800 mg Oral TID  Tawana Loaiza MD   800 mg at 10/16/24 1738    sodium bicarbonate tablet 1,300 mg  1,300 mg Oral TID Shin Light MD   1,300 mg at 10/18/24 0851    sodium chloride 0.9% flush 10 mL  10 mL Intravenous PRN Tawana Loaiza MD        vancomycin - pharmacy to dose   Intravenous pharmacy to manage frequency Tawana Loaiza MD           No, Primary Doctor    History reviewed. No pertinent past medical history.   Past Surgical History:   Procedure Laterality Date    CYSTOSCOPY W/ URETERAL STENT PLACEMENT Right 2024    Procedure: CYSTOSCOPY, WITH URETERAL STENT INSERTION;  Surgeon: Otis Person MD;  Location: [facility];  Service: Urology;  Laterality: Right;  CYSTOSCOPY, BILATERAL RETROGRADE PYELOGRAMS, POSSIBLE STENT PLACEMENT.    CYSTOSCOPY W/ URETERAL STENT PLACEMENT Bilateral 10/4/2024    Procedure: CYSTOSCOPY, WITH URETERAL STENT INSERTION;  Surgeon: Tawana Loaiza MD;  Location: [facility] OR;  Service: Urology;  Laterality: Bilateral;  CYSTO WITH BILAT URETERAL STENT EXCHANGE    INSERTION OF TUNNELED CENTRAL VENOUS HEMODIALYSIS CATHETER N/A 7/3/2024    Procedure: Insertion, Catheter, Central Venous, Hemodialysis;  Surgeon: Uche Barcenas MD;  Location: [facility] CATH LAB;  Service: Peripheral Vascular;  Laterality: N/A;      No family history on file.  Social History     Tobacco Use    Smoking status: Never    Smokeless tobacco: Never   Substance Use Topics    Alcohol use: Never     Medications Prior to Admission   Medication Sig Dispense Refill Last Dose/Taking    atorvastatin (LIPITOR) 20 MG tablet Take 20 mg by mouth every evening.   10/3/2024    calcium carbonate (TUMS) 200 mg calcium (500 mg) chewable tablet Take 2 tablets (1,000 mg total) by mouth 2 (two) times daily. 120 tablet 11 10/3/2024 Morning    [] ergocalciferol (ERGOCALCIFEROL)  50,000 unit Cap Take 1 capsule (50,000 Units total) by mouth every 72 hours. 10 capsule 2 Past Week    FEROSUL 325 mg (65 mg iron) Tab tablet Take 325 mg by mouth once daily.   10/3/2024 Morning    folic acid (FOLVITE) 1 MG tablet Take 1 tablet (1 mg total) by mouth once daily. 30 tablet 0 10/3/2024 Morning    labetaloL (NORMODYNE) 200 MG tablet Take 1 tablet (200 mg total) by mouth every 12 (twelve) hours. 60 tablet 11 10/3/2024 Morning    loratadine (CLARITIN) 10 mg tablet Take 10 mg by mouth once daily.   10/3/2024    pantoprazole (PROTONIX) 40 MG tablet Take 1 tablet (40 mg total) by mouth once daily. 90 tablet 3 10/3/2024 Morning    senna-docusate 8.6-50 mg (SENNA WITH DOCUSATE SODIUM) 8.6-50 mg per tablet Take 1 tablet by mouth daily as needed for Constipation.   Past Week    sevelamer carbonate (RENVELA) 800 mg Tab Take 800 mg by mouth 3 (three) times daily with meals.   10/3/2024 Morning    polyethylene glycol (GLYCOLAX) 17 gram PwPk Take 17 g by mouth once daily. (Patient not taking: Reported on 10/4/2024)   Not Taking     Review of patient's allergies indicates:   Allergen Reactions    Asa [aspirin] Anaphylaxis    Penicillins      Received ceftriaxone from 6/27, no reactions             Review of Systems:       Objective:       VITAL SIGNS: 24 HR MIN & MAX LAST    Temp  Min: 98.2 °F (36.8 °C)  Max: 98.8 °F (37.1 °C)  98.4 °F (36.9 °C)        BP  Min: 109/54  Max: 159/68  (!) 139/52     Pulse  Min: 60  Max: 74  72     Resp  Min: 15  Max: 20  16    SpO2  Min: 96 %  Max: 97 %  96 %      GEN:  Chronically ill-appearing WF  CV: RRR without rub or gallop.  PULM: CTAB, unlabored  ABD: Soft, NT/ND abdomen with NABS  :  Nephrostomy tubes in both right and left kidneys.    EXT:  1+ lower extremity edema  SKIN: Warm and dry  PSYCH: Awake, alert and appropriately conversant  Dialysis access:  Left IJ Vas-Cath            Component Value Date/Time     (L) 10/18/2024 0613     (L) 10/17/2024 0603    K 4.8  10/18/2024 0613    K 5.3 (H) 10/17/2024 0603    CO2 19 (L) 10/18/2024 0613    CO2 15 (L) 10/17/2024 0603    BUN 49.5 (H) 10/18/2024 0613    BUN 73.9 (H) 10/17/2024 0603    CREATININE 4.38 (H) 10/18/2024 0613    CREATININE 5.95 (H) 10/17/2024 0603    CALCIUM 7.9 (L) 10/18/2024 0613    CALCIUM 7.8 (L) 10/17/2024 0603    PHOS 5.4 (H) 10/17/2024 0603            Component Value Date/Time    WBC 9.03 10/18/2024 0613    WBC 11.82 (H) 10/17/2024 0603    WBC 25.58 10/07/2024 0541    WBC 26.04 10/06/2024 0449    HGB 8.8 (L) 10/18/2024 0613    HGB 9.3 (L) 10/17/2024 0603    HCT 27.8 (L) 10/18/2024 0613    HCT 28.6 (L) 10/17/2024 0603     10/18/2024 0613     10/17/2024 0603             IR Nephrostomy Tube Placement   Final Result      1.  Technically successful Ureteral Stent placement in the inferior right renal moiety.      2.  Left nephroureteral stent exchange.         Electronically signed by: Andrew Buenrostro   Date:    10/16/2024   Time:    15:02      US Guided Needle Placement   Final Result      1.  Technically successful Ureteral Stent placement in the inferior right renal moiety.      2.  Left nephroureteral stent exchange.         Electronically signed by: Andrew Buenrostro   Date:    10/16/2024   Time:    15:02      CT Abdomen Pelvis  Without Contrast   Final Result      Reflux of contrast from the urinary bladder into the left ureter and left renal pelvis and calices.      Duplicated collecting system seen on the right with nephroureteral stent seen in the lower pole moiety with no hydronephrosis or hydroureter seen in the lower pole moiety and no reflux of contrast seen on the right side.      The right upper pole moiety has evidence of hydronephrosis and hydroureter with no reflux of contrast from the urinary bladder.      Bilateral percutaneous nephrostomy tubes as outlined above with the right-sided percutaneous nephrostomy tube in the renal pelvis of the lower pole moiety      Debris/filling defects  within the base the urinary bladder         Electronically signed by: Garland Raya   Date:    10/15/2024   Time:    12:56      CT Lower Extremity W Wo Contrast Bilat   Final Result      No appreciable acute osseous abnormality      Symmetric uptake at the lower extremities on nuclear medicine exam most suggestive of aseptic marrow expansion.         Electronically signed by: Deidra Dobson   Date:    10/15/2024   Time:    13:21      NM Inflammatory Whole Body   Final Result      US Retroperitoneal Limited   Final Result      Duplex right renal collecting system with unchanged hydronephrosis at the lower pole moiety      Stent is faintly visible in the region of the left renal pelvis.  Left kidney is moderately obscured by shadowing which obscures evaluation.         Electronically signed by: Deidra Dobson   Date:    10/13/2024   Time:    14:07      X-Ray Chest 1 View for Line/Tube Placement   Final Result      New central line terminates within right atrium.         Electronically signed by: Alvaro Watson   Date:    10/11/2024   Time:    11:53      CT Abdomen Pelvis With IV Contrast NO Oral Contrast   Final Result      1. No significant interval change compared to previous exam.  Ureteral stents in place.   2. Duplicated right kidney and right ureter with persistent hydroureteronephrosis of the lower pole moiety.         Electronically signed by: Deidra Dobson   Date:    10/08/2024   Time:    06:49      CT Head Without Contrast   Final Result      No acute intracranial findings.         Electronically signed by: Alexx Patricia   Date:    10/07/2024   Time:    10:46      MRI Lumbar Spine W WO Cont   Final Result      No acute abnormality of the lumbar spine.         Electronically signed by: Edmundo Vásquez MD   Date:    10/05/2024   Time:    16:55      MRI Thoracic Spine W WO Cont   Final Result      Very limited examination due to significant motion artifact      No evidence of osteomyelitis or discitis seen at no  evidence of epidural abscess seen in thoracic spine.  Lumbar and cervical spine discussed on separate report         Electronically signed by: Garland Raya   Date:    10/05/2024   Time:    16:57      MRI Cervical Spine W WO Cont   Final Result      Very limited examination due to motion artifact.  Only gross findings are visible.  Fine details are not visible.  No obvious epidural abscess is seen.  There is some increased signal in the cord at C6-C7 which shows some enhancement.  Findings could represent intra cord lesion or possible myelomalacia.         Electronically signed by: Garland Raya   Date:    10/05/2024   Time:    16:55      IR Nephrostomy Tube Placement   Final Result      Technically successful Nephrostomy Tube placement.         Electronically signed by: Andrew Buenrostro   Date:    10/04/2024   Time:    16:37      US Guided Needle Placement   Final Result      Technically successful Nephrostomy Tube placement.         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  The upper pole moiety is decompressed with a ureteral stent with the lower pole more demonstrating moderate severe hydronephrosis which is persistent from the prior exam.      Left nephrostomy ureteral stent drain with resolution of hydronephrosis.      Enlarged right pelvic lymph node increased in size from the prior, malignancy suspected.      Mildly enlarged upper normal sized lymph nodes in the periaortic region and left pelvis, nonspecific, similar to the prior exam.         Electronically signed by: Tete Sun MD   Date:    10/04/2024   Time:    07:37      X-Ray Lumbar Spine Ap And Lateral   Final Result      Limited examination due to patient's body  habitus but degenerative changes seen throughout the lower lumbar spine         Electronically signed by: Garland Raya   Date:    10/03/2024   Time:    18:11          Assessment / Plan:     ESRD - normally TTS at [facility].  Tunneled dialysis catheter removed 10/08/2024 due to persistent MRSA bacteremia  Metabolic acidosis  MRSA bacteremia  Bilateral hydronephrosis - now with nephrostomy tube both sides    Plan:  No acute indication for off schedule hemodialysis today.  Plan to dialyze tomorrow on regular TTS schedule.  She will need a tunneled dialysis catheter at some point in the future once her blood cultures have cleared.

## 2024-10-18 NOTE — PLAN OF CARE
Patient has not been able to participate with therapy due to pain and dialysis schedule. Last therapy note is from 10/10 recommending Moderate therapy. Spoke with patient regarding her dc plan. Patient does not wish for SNF placement, asked about her mobility limitations and who is able to help at home. Patient says she has a wheelchair and a walker, son assist with ADLs and transportation at home. SNF patient choice list was given and asked patient to have an open mind in regards to placement.

## 2024-10-18 NOTE — PROGRESS NOTES
Infectious Disease  Progress Note    Patient Name: Fior Joya   MRN: [id number]   Admission Date: 10/3/2024   Hospital Length of Stay: 14 days  Attending Physician: Uriel Fletcher MD   Primary Care Provider: No, Primary Doctor     Isolation Status: Contact     Assessment/Plan:   69 y.o. female with a history significant for ESRD on hemodialysis through right IJ permanent catheter, chronic hydronephrosis with left nephrostomy tube, hypertension who was admitted on 10/3/2024 with lower back pain. Patient said that her symptoms started approximately 1 week ago with some neck pain that moved down to her shoulder. She then developed lower back pain that moved to her right leg. Patient also reports having cloudy drainage from her nephrostomy tube for the last 1 month. Patient reports worsening back/right flank pain for the last few days. Vital signs on admission showed temperature 97.2 F, heart rate 79, blood pressure 110/75. Laboratory workup showed WBC 66200, creatinine 6.70, lactic acid 1.8. Urinalysis showed more than 100 WBC. She had 2 sets of blood culture obtained which are growing MRSA. 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  The upper pole moiety is decompressed with a ureteral stent but the lower pole demonstrating moderate severe hydronephrosis, left nephrostomy ureteral stent drain with resolution of hydronephrosis. Patient underwent nephrostomy tube placement by IR into the inferior pole of the right renal collecting system.  Patient was also noted to MRSA bacteremia, she was started on vancomycin, on 10/07/2024, she appears to had worsening mental status, she has persistent leukocytosis.  ID is consulted for assistance in management.    10/14 - afebrile. blood cx in process   10/15 - off floor. afebrile. Blood cx peristently +. NM scan with increased update in b/l distal femur and tibia is not likely due  to infection. CT obained shows no abnormality. Recommend b/l tube exchange  10/16 - off floor to IR. afebrile. Blood cx in process.   10/17 - afebrile. S/p left stent ecahnge and placement of n-tube on right with purulent fluid drainage  10/18- afebrile. BCx and body fluid cx in process.     1) Bacteremia  - MRSA  - suspect possible seeding of PCN tubes  - NM scan 10/12 - . Nonspecific relatively increased radiotracer uptake at the bilateral distal femur and proximal tibia.  Correlation with dedicated plain radiographs of this region recommended. No abnormal radiotracer localization within the visualized chest, abdomen, or pelvis.  - CT LE 10/15 - No appreciable acute osseous abnormality. symmetric uptake at the lower extremities on nuclear medicine exam most suggestive of aseptic marrow expansion.  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 Debris/filling defects within the base the urinary bladder  - MADELYN 10/8 negative  - HD line removed 10/8   - BCx 10/10 - MRSA 3/4  - BCx 10/14 - MRSA 4/4  - BCx 10/17 - in process  - HD cath left IJ in place 10/11 to present  - currently on vanco, goal 15-20, Rx to dose with HD  - currently on ceftaroline dual therapy     2)  Cancer  - new dx  SCC bladder  - hydronephrosis s/p ureteral stents and nephrostomy tubes  - Oncology consulted     3) Leukocytosis  - in setting of acute infection     ID will continue  following. Please contact us with any questions or concerns.  Subjective:     Principal Problem: <principal problem not specified>     Interval History:   Appears improved today, no fevers, no chills, some issues with the PCN tube on the L being somewhat displaced     Review of Systems   Review of Systems   Constitutional:  Negative for chills, diaphoresis, fever and weight loss.   HENT:  Negative for congestion and nosebleeds.    Respiratory:  Negative for cough, sputum production and wheezing.    Cardiovascular:  Negative for chest pain, palpitations and leg swelling.   Gastrointestinal:  Negative for abdominal pain, diarrhea and vomiting.   Genitourinary:  Negative for dysuria, flank pain and frequency.   Musculoskeletal:  Negative for back pain, joint pain and neck pain.   Skin:  Negative for itching and rash.   Neurological:  Negative for sensory change, seizures, weakness and headaches.   Endo/Heme/Allergies:  Does not bruise/bleed easily.   All other systems reviewed and are negative.       Objective:     Vital Signs (Most Recent):  Temp: 98.4 °F (36.9 °C) (10/18/24 0757)  Pulse: 72 (10/18/24 0757)  Resp: 16 (10/18/24 0751)  BP: (!) 139/52 (10/18/24 0757)  SpO2: 96 % (10/18/24 0757)  Vital Signs (24h Range):  Temp:  [98 °F (36.7 °C)-98.8 °F (37.1 °C)] 98.4 °F (36.9 °C)  Pulse:  [60-74] 72  Resp:  [15-20] 16  SpO2:  [96 %-99 %] 96 %  BP: (109-159)/(52-70) 139/52      Weight:   Wt Readings from Last 1 Encounters:   10/10/24 (!) 140.2 kg (309 lb 1.4 oz)      Body mass index is Body mass index is 54.75 kg/m².     Estimated Creatinine Clearance: Estimated Creatinine Clearance: 12.5 mL/min (A) (based on SCr of 5.95 mg/dL (H)).     Lines/Drains/Airways       Central Venous Catheter Line  Duration                  Hemodialysis Catheter 10/11/24 left internal jugular 7 days              Drain  Duration                  Nephrostomy 10/04/24 1506 Right 8 Fr. 13 days         Urethral Catheter 10/14/24 1250 16 Fr. 3 days          Nephrostomy 10/16/24 1150 Right 8 Fr. 1 day         Nephrostomy 10/16/24 1201 Left 8 Fr. 1 day              Peripheral Intravenous Line  Duration                  Peripheral IV - Single Lumen 10/13/24 1953 22 G Left;Distal Forearm 4 days                     Physical Exam  Physical Exam  Constitutional:       Appearance: Normal appearance. She is obese.   HENT:      Head: Normocephalic and atraumatic.      Mouth/Throat:      Pharynx: No oropharyngeal exudate or posterior oropharyngeal erythema.   Eyes:      Extraocular Movements: Extraocular movements intact.      Pupils: Pupils are equal, round, and reactive to light.   Cardiovascular:      Rate and Rhythm: Normal rate and regular rhythm.      Heart sounds: No murmur heard.     Comments: Site of removal of the right chest wall HD catheter is clean  Pulmonary:      Effort: No respiratory distress.      Breath sounds: No wheezing, rhonchi or rales.   Abdominal:      General: Bowel sounds are normal. There is no distension.      Palpations: Abdomen is soft.      Tenderness: There is no abdominal tenderness.      Comments: Leonard PCN tubes bloody output   Genitourinary:     Comments: B/l PCNT bloody drainage    Mario - gross hematuria   Musculoskeletal:         General: No swelling or tenderness.      Cervical back: Neck supple. No rigidity or tenderness.      Comments: Still tenderness at the r hip    Lymphadenopathy:      Cervical: No cervical adenopathy.   Skin:     Findings: No lesion or rash.   Neurological:      General: No focal deficit present.      Mental Status: She is alert. Mental status is at baseline.      Cranial Nerves: No cranial nerve deficit.      Motor: No weakness.   Psychiatric:         Mood and Affect: Mood normal.         Behavior: Behavior normal.          Significant Labs: CBC:   Recent Labs   Lab 10/17/24  0603 10/18/24  0613   WBC 11.82* 9.03   HGB 9.3* 8.8*   HCT 28.6* 27.8*    246       CMP:   Recent Labs   Lab 10/17/24  0603    *   K 5.3*   CL 87*   CO2 15*   BUN 73.9*   CREATININE 5.95*   CALCIUM 7.8*   ALBUMIN 2.0*   BILITOT 0.2   ALKPHOS 135   AST 10   ALT 7         Microbiology Results (last 7 days)       Procedure Component Value Units Date/Time    Anaerobic Culture [[id number]] Collected: 10/16/24 1147    Order Status: Completed Specimen: Body Fluid from Kidney, Right Updated: 10/18/24 0708     Anaerobe Culture No Anaerobes Isolated    Blood Culture [[id number]] Collected: 10/17/24 2114    Order Status: Resulted Specimen: Blood from Antecubital, Right Updated: 10/17/24 2159    Blood Culture [[id number]] Collected: 10/17/24 2114    Order Status: Resulted Specimen: Blood from Antecubital, Left Updated: 10/17/24 2159    Blood Culture [[id number]]  (Abnormal) Collected: 10/15/24 2136    Order Status: Completed Specimen: Blood Updated: 10/17/24 1156     GRAM STAIN Gram Positive Cocci, probable Staphylococcus      Seen in gram stain of broth only      2 of 2 bottles positive    Blood Culture [[id number]]  (Abnormal) Collected: 10/15/24 2136    Order Status: Completed Specimen: Blood Updated: 10/17/24 1156     GRAM STAIN Gram positive cocci      Seen in gram stain of broth only      2 of 2 bottles positive    Body Fluid Culture [[id number]] Collected: 10/16/24 1147    Order Status: Completed Specimen: Body Fluid from Kidney, Right Updated: 10/17/24 0720     Body Fluid Culture No Growth At 24 Hours    Gram Stain [[id number]] Collected: 10/16/24 1147    Order Status: Completed Specimen: Body Fluid from Kidney, Right Updated: 10/17/24 0719     GRAM STAIN Many WBC observed      Few Gram positive cocci    Blood Culture [[id number]]  (Abnormal)  (Susceptibility) Collected: 10/14/24 0529    Order Status: Completed Specimen: Blood from Arm, Right Updated: 10/17/24 0710     Blood Culture Methicillin resistant Staphylococcus aureus     GRAM STAIN Gram Positive Cocci, probable Staphylococcus      Seen in gram stain of broth only      2 of 2  bottles positive    Blood Culture [[id number]]  (Abnormal)  (Susceptibility) Collected: 10/14/24 0529    Order Status: Completed Specimen: Blood from Hand, Right Updated: 10/17/24 0710     Blood Culture Methicillin resistant Staphylococcus aureus     GRAM STAIN Gram Positive Cocci, probable Staphylococcus      Seen in gram stain of broth only      2 of 2 bottles positive    BCID2 Panel [[id number]]  (Abnormal) Collected: 10/15/24 2136    Order Status: Completed Specimen: Blood Updated: 10/17/24 0638     CTX-M (ESBL ) N/A     IMP (Cabapenemase ) N/A     KPC resistance gene (Carbapenemase ) N/A     mcr-1 N/A     mecA ID N/A     Comment: Note: Antimicrobial resistance can occur via multiple mechanisms. A Not Detected result for antimicrobial resistance gene(s) does not indicate antimicrobial susceptibility. Subculturing is required for species identification and susceptibility testing of   isolates.        mecA/C and MREJ (MRSA) gene Detected     NDM (Carbapenemase ) N/A     OXA-48-like (Carbapenemase ) N/A     Royal/B (VRE gene) N/A     VIM (Carbapenemase ) N/A     Enterococcus faecalis Not Detected     Enterococcus faecium Not Detected     Listeria monocytogenes Not Detected     Staphylococcus spp. Detected     Staphylococcus aureus Detected     Staphylococcus epidermidis Not Detected     Staphylococcus lugdunensis Not Detected     Streptococcus spp. Not Detected     Streptococcus agalactiae (Group B) Not Detected     Streptococcus pneumoniae Not Detected     Streptococcus pyogenes (Group A) Not Detected     Acinetobacter calcoaceticus/baumannii complex Not Detected     Bacteroides fragilis Not Detected     Enterobacterales Not Detected     Enterobacter cloacae complex Not Detected     Escherichia coli Not Detected     Klebsiella aerogenes Not Detected     Klebsiella oxytoca Not Detected     Klebsiella pneumoniae group Not Detected     Proteus spp. Not Detected      Salmonella spp. Not Detected     Serratia marcescens Not Detected     Haemophilus influenzae Not Detected     Neisseria meningitidis Not Detected     Pseudomonas aeruginosa Not Detected     Stenotrophomonas maltophilia Not Detected     Candida albicans Not Detected     Candida auris Not Detected     Candida glabrata Not Detected     Candida krusei Not Detected     Candida parapsilosis Not Detected     Candida tropicalis Not Detected     Cryptococcus neoformans/gattii Not Detected    Narrative:      The [employer] BCID2 Panel is a multiplexed nucleic acid test intended for the use with [employer] or [employer] for the simultaneous qualitative detection and identification of multiple bacterial and yeast nucleic acids and select genetic determinants associated with antimicrobial resistance.  The [employer] BCID2 Panel test is performed directly on blood culture samples identified as positive by a continuous monitoring blood culture system.  Results are intended to be interpreted in conjunction with Gram stain results.    Fungal Culture [[id number]] Collected: 10/16/24 1147    Order Status: Sent Specimen: Body Fluid from Kidney, Right Updated: 10/16/24 1201    Blood Culture [[id number]]  (Normal) Collected: 10/09/24 1728    Order Status: Completed Specimen: Blood Updated: 10/14/24 1900     Blood Culture No Growth at 5 days    Blood Culture [[id number]]  (Abnormal)  (Susceptibility) Collected: 10/10/24 1605    Order Status: Completed Specimen: Blood Updated: 10/13/24 0628     Blood Culture Methicillin resistant Staphylococcus aureus     GRAM STAIN Seen in gram stain of broth only      2 of 2 bottles positive      Gram Positive Cocci, probable Staphylococcus    Blood Culture [[id number]]  (Abnormal)  (Susceptibility) Collected: 10/10/24 1605    Order Status: Completed Specimen: Blood Updated: 10/13/24 0627     Blood Culture Methicillin resistant Staphylococcus aureus     GRAM STAIN 1 of 2  Anaerobic bottles positive      Gram Positive Cocci, probable Staphylococcus      Seen in gram stain of broth only    Blood Culture [[id number]]  (Abnormal)  (Susceptibility) Collected: 10/09/24 1728    Order Status: Completed Specimen: Blood Updated: 10/13/24 0625     Blood Culture Methicillin resistant Staphylococcus aureus     GRAM STAIN Gram Positive Cocci, probable Staphylococcus      Seen in gram stain of broth only      1 of 2 Aerobic bottles positive    Blood Culture [[id number]]  (Abnormal)  (Susceptibility) Collected: 10/07/24 2103    Order Status: Completed Specimen: Blood Updated: 10/12/24 0658     Blood Culture Methicillin resistant Staphylococcus aureus     GRAM STAIN 1 of 1 Pediatric bottle positive      Gram Positive Cocci, probable Staphylococcus      Seen in gram stain of broth only    BCID2 Panel [[id number]] Collected: 10/10/24 1605    Order Status: Canceled Specimen: Blood Updated: 10/11/24 2011             Significant Imaging: I have reviewed all pertinent imaging results/findings within the past 24 hours.      Milo Campbell MD  Infectious Disease  [facility]

## 2024-10-18 NOTE — PT/OT/SLP PROGRESS
"Physical Therapy Treatment    Patient Name:  Fior Joya   MRN:  [id number]    Recommendations:     Discharge therapy intensity: Moderate Intensity Therapy   Discharge Equipment Recommendations: to be determined by next level of care  Barriers to discharge: Impaired mobility and Ongoing medical needs    Assessment:     Fior Joya is a 69 y.o. female admitted with a medical diagnosis of bilateral hydronephrosis s/p (B) nephrostomy placement, persistent MRSA bacteremia, bladder carcinoma, ESRD on HD.  She presents with the following impairments/functional limitations: weakness, impaired endurance, impaired functional mobility, impaired self care skills, pain, impaired balance . Pt agreeable to participate this date, able to get to EOB with standby assist, which is an improvement, but declining further mobility/OOB. Pt with severe pain in lower back and hip however does also demonstrate some self limiting tendencies. Max education on importance of mobility in hospital. Cont to recommend moderate intensity therapy.    Rehab Prognosis: Good; patient would benefit from acute skilled PT services to address these deficits and reach maximum level of function.    Recent Surgery: Procedure(s) (LRB):  CYSTOSCOPY, WITH URETERAL STENT INSERTION (Bilateral) 14 Days Post-Op    Plan:     During this hospitalization, patient would benefit from acute PT services 5 x/week to address the identified rehab impairments via gait training, therapeutic activities, therapeutic exercises, neuromuscular re-education and progress toward the following goals:    Plan of Care Expires:  11/10/24    Subjective     Chief Complaint: pain  Patient/Family Comments/goals: PLOF  Pain/Comfort:  Pain Rating 1:  (c/o pain in lower back and right hip, no rating, "excrutiating")  Pain Addressed 1: Reposition, Distraction, Cessation of Activity      Objective:     Communicated with RN prior to session.  Patient found HOB elevated with nephrostomy, berkowitz catheter, " telemetry, pulse ox (continuous) upon PT entry to room.     General Precautions: Standard, fall, contact  Orthopedic Precautions:    Braces:    Respiratory Status: Room air  Blood Pressure: NT  Skin Integrity: Visible skin intact      Functional Mobility:  Bed Mobility:     Scooting: maxA in supine to HOB, assisting with BUE  Supine to Sit: standby assist with HOB elevated  Sit to Supine: moderate assistance and of 2 persons  Balance: Sitting balance = SBA initially but required CGA due to frequent posterior lean    Therapeutic Activities/Exercises:  Sat EOB ~10 min with SBA-CGA, occasional posterior lean. Cues for anterior lean. Able to scoot hips forward and back without physical assist  Seated (B) ankle pumps, LAQ - to fatigue  Declining standing despite max education due to pain    Education:  Patient provided with verbal education education regarding PT role/goals/POC, fall prevention, safety awareness, and discharge/DME recommendations.  Understanding was verbalized, however additional teaching warranted.     Patient left HOB elevated with all lines intact, call button in reach, and pressure relief boots    GOALS:   Multidisciplinary Problems       Physical Therapy Goals          Problem: Physical Therapy    Goal Priority Disciplines Outcome Interventions   Physical Therapy Goal     PT, PT/OT Progressing    Description: Goals to be met by: 11/10/24     Patient will increase functional independence with mobility by performin. Supine to sit with [place]  2. Sit to stand transfer with Modified [place]  3. Bed to chair transfer with Modified [place] using Rolling Walker  4. Gait  > or = 25 feet with Modified [place] using Rolling Walker.                          Time Tracking:     PT Received On: 10/18/24  PT Start Time: 1146     PT Stop Time: 1209  PT Total Time (min): 23 min     Billable Minutes: Therapeutic Exercise 23    Treatment Type: Treatment  PT/PTA: PT     Number of PTA visits  since last PT visit: 1     10/18/2024

## 2024-10-19 LAB
ALBUMIN SERPL-MCNC: 1.9 G/DL (ref 3.4–4.8)
BACTERIA BLD CULT: ABNORMAL
BACTERIA BLD CULT: ABNORMAL
BACTERIA FLD CULT: ABNORMAL
BACTERIA SPEC ANAEROBE CULT: NORMAL
BASOPHILS # BLD AUTO: 0.06 X10(3)/MCL
BASOPHILS NFR BLD AUTO: 0.6 %
BUN SERPL-MCNC: 67.3 MG/DL (ref 9.8–20.1)
CALCIUM SERPL-MCNC: 8.6 MG/DL (ref 8.4–10.2)
CHLORIDE SERPL-SCNC: 91 MMOL/L (ref 98–107)
CO2 SERPL-SCNC: 17 MMOL/L (ref 23–31)
CREAT SERPL-MCNC: 5.17 MG/DL (ref 0.55–1.02)
EOSINOPHIL # BLD AUTO: 0.25 X10(3)/MCL (ref 0–0.9)
EOSINOPHIL NFR BLD AUTO: 2.5 %
ERYTHROCYTE [DISTWIDTH] IN BLOOD BY AUTOMATED COUNT: 16 % (ref 11.5–17)
GFR SERPLBLD CREATININE-BSD FMLA CKD-EPI: 9 ML/MIN/1.73/M2
GLUCOSE SERPL-MCNC: 91 MG/DL (ref 82–115)
GRAM STN SPEC: ABNORMAL
HCT VFR BLD AUTO: 27.7 % (ref 37–47)
HGB BLD-MCNC: 9 G/DL (ref 12–16)
IMM GRANULOCYTES # BLD AUTO: 0.2 X10(3)/MCL (ref 0–0.04)
IMM GRANULOCYTES NFR BLD AUTO: 2 %
LYMPHOCYTES # BLD AUTO: 1.09 X10(3)/MCL (ref 0.6–4.6)
LYMPHOCYTES NFR BLD AUTO: 10.9 %
MCH RBC QN AUTO: 29.3 PG (ref 27–31)
MCHC RBC AUTO-ENTMCNC: 32.5 G/DL (ref 33–36)
MCV RBC AUTO: 90.2 FL (ref 80–94)
MONOCYTES # BLD AUTO: 1.06 X10(3)/MCL (ref 0.1–1.3)
MONOCYTES NFR BLD AUTO: 10.6 %
NEUTROPHILS # BLD AUTO: 7.31 X10(3)/MCL (ref 2.1–9.2)
NEUTROPHILS NFR BLD AUTO: 73.4 %
NRBC BLD AUTO-RTO: 0 %
PHOSPHATE SERPL-MCNC: 4.5 MG/DL (ref 2.3–4.7)
PLATELET # BLD AUTO: 322 X10(3)/MCL (ref 130–400)
PMV BLD AUTO: 10.5 FL (ref 7.4–10.4)
POTASSIUM SERPL-SCNC: 5 MMOL/L (ref 3.5–5.1)
RBC # BLD AUTO: 3.07 X10(6)/MCL (ref 4.2–5.4)
SODIUM SERPL-SCNC: 129 MMOL/L (ref 136–145)
VANCOMYCIN SERPL-MCNC: 17.4 UG/ML (ref 15–20)
WBC # BLD AUTO: 9.97 X10(3)/MCL (ref 4.5–11.5)

## 2024-10-19 PROCEDURE — 25000003 PHARM REV CODE 250: Performed by: INTERNAL MEDICINE

## 2024-10-19 PROCEDURE — 27000207 HC ISOLATION

## 2024-10-19 PROCEDURE — 25000003 PHARM REV CODE 250: Performed by: GENERAL PRACTICE

## 2024-10-19 PROCEDURE — 51702 INSERT TEMP BLADDER CATH: CPT

## 2024-10-19 PROCEDURE — 36415 COLL VENOUS BLD VENIPUNCTURE: CPT | Performed by: STUDENT IN AN ORGANIZED HEALTH CARE EDUCATION/TRAINING PROGRAM

## 2024-10-19 PROCEDURE — 25000003 PHARM REV CODE 250: Performed by: UROLOGY

## 2024-10-19 PROCEDURE — 80202 ASSAY OF VANCOMYCIN: CPT | Performed by: INTERNAL MEDICINE

## 2024-10-19 PROCEDURE — 80100016 HC MAINTENANCE HEMODIALYSIS

## 2024-10-19 PROCEDURE — 63600175 PHARM REV CODE 636 W HCPCS: Performed by: INTERNAL MEDICINE

## 2024-10-19 PROCEDURE — 85025 COMPLETE CBC W/AUTO DIFF WBC: CPT | Performed by: STUDENT IN AN ORGANIZED HEALTH CARE EDUCATION/TRAINING PROGRAM

## 2024-10-19 PROCEDURE — 80069 RENAL FUNCTION PANEL: CPT | Performed by: STUDENT IN AN ORGANIZED HEALTH CARE EDUCATION/TRAINING PROGRAM

## 2024-10-19 PROCEDURE — 21400001 HC TELEMETRY ROOM

## 2024-10-19 PROCEDURE — 63600175 PHARM REV CODE 636 W HCPCS: Mod: JZ,JG | Performed by: GENERAL PRACTICE

## 2024-10-19 RX ADMIN — SODIUM BICARBONATE 650 MG TABLET 1300 MG: at 08:10

## 2024-10-19 RX ADMIN — ATORVASTATIN CALCIUM 20 MG: 10 TABLET, FILM COATED ORAL at 08:10

## 2024-10-19 RX ADMIN — OXYCODONE AND ACETAMINOPHEN 1 TABLET: 10; 325 TABLET ORAL at 05:10

## 2024-10-19 RX ADMIN — VANCOMYCIN HYDROCHLORIDE 500 MG: 500 INJECTION, POWDER, LYOPHILIZED, FOR SOLUTION INTRAVENOUS at 08:10

## 2024-10-19 RX ADMIN — SODIUM BICARBONATE 650 MG TABLET 1300 MG: at 03:10

## 2024-10-19 RX ADMIN — OXYCODONE AND ACETAMINOPHEN 1 TABLET: 10; 325 TABLET ORAL at 12:10

## 2024-10-19 RX ADMIN — FOLIC ACID 1 MG: 1 TABLET ORAL at 08:10

## 2024-10-19 RX ADMIN — OXYCODONE AND ACETAMINOPHEN 1 TABLET: 10; 325 TABLET ORAL at 09:10

## 2024-10-19 RX ADMIN — PANTOPRAZOLE SODIUM 40 MG: 40 TABLET, DELAYED RELEASE ORAL at 08:10

## 2024-10-19 RX ADMIN — OXYCODONE AND ACETAMINOPHEN 1 TABLET: 10; 325 TABLET ORAL at 08:10

## 2024-10-19 RX ADMIN — ACETAMINOPHEN 650 MG: 325 TABLET ORAL at 04:10

## 2024-10-19 RX ADMIN — CEFTAROLINE FOSAMIL 200 MG: 600 POWDER, FOR SOLUTION INTRAVENOUS at 05:10

## 2024-10-19 RX ADMIN — CEFTAROLINE FOSAMIL 200 MG: 600 POWDER, FOR SOLUTION INTRAVENOUS at 10:10

## 2024-10-19 RX ADMIN — SERTRALINE HYDROCHLORIDE 25 MG: 25 TABLET ORAL at 08:10

## 2024-10-19 RX ADMIN — ENOXAPARIN SODIUM 30 MG: 30 INJECTION SUBCUTANEOUS at 04:10

## 2024-10-19 RX ADMIN — CEFTAROLINE FOSAMIL 200 MG: 600 POWDER, FOR SOLUTION INTRAVENOUS at 04:10

## 2024-10-19 NOTE — PLAN OF CARE
Problem: Adult Inpatient Plan of Care  Goal: Plan of Care Review  10/19/2024 0153 by Jossy Fernandes RN  Outcome: Progressing  10/18/2024 2242 by Jossy Fernandes RN  Outcome: Progressing  Goal: Patient-Specific Goal (Individualized)  10/19/2024 0153 by Jossy Fernandes RN  Outcome: Progressing  10/18/2024 2242 by Jossy Fernandes RN  Outcome: Progressing  Goal: Absence of Hospital-Acquired Illness or Injury  10/19/2024 0153 by Jossy Fernandes RN  Outcome: Progressing  10/18/2024 2242 by Jossy Fernandes RN  Outcome: Progressing  Goal: Optimal Comfort and Wellbeing  10/19/2024 0153 by Jossy Fernandes RN  Outcome: Progressing  10/18/2024 2242 by Jossy Fernandes RN  Outcome: Progressing  Goal: Readiness for Transition of Care  10/19/2024 0153 by Jossy Fernandes RN  Outcome: Progressing  10/18/2024 2242 by Jossy Fernandes RN  Outcome: Progressing     Problem: Bariatric Environmental Safety  Goal: Safety Maintained with Care  10/19/2024 0153 by Jossy Fernandes RN  Outcome: Progressing  10/18/2024 2242 by Jossy Fernandes RN  Outcome: Progressing     Problem: Skin Injury Risk Increased  Goal: Skin Health and Integrity  10/19/2024 0153 by Jossy Fernandes RN  Outcome: Progressing  10/18/2024 2242 by Jossy Fernandes RN  Outcome: Progressing     Problem: Hemodialysis  Goal: Safe, Effective Therapy Delivery  10/19/2024 0153 by Jossy Fernandes RN  Outcome: Progressing  10/18/2024 2242 by Jossy Fernandes RN  Outcome: Progressing  Goal: Effective Tissue Perfusion  10/19/2024 0153 by Jossy Fernandes RN  Outcome: Progressing  10/18/2024 2242 by Jossy Fernandes RN  Outcome: Progressing  Goal: Absence of Infection Signs and Symptoms  10/19/2024 0153 by Jossy Fernandes RN  Outcome: Progressing  10/18/2024 2242 by Jossy Fernandes RN  Outcome: Progressing     Problem: Infection  Goal: Absence of Infection Signs and Symptoms  10/19/2024 0153 by Jossy Fenrandes RN  Outcome: Progressing  10/18/2024  2242 by Jossy Fernandes RN  Outcome: Progressing     Problem: Wound  Goal: Optimal Coping  10/19/2024 0153 by Jossy Fernandes RN  Outcome: Progressing  10/18/2024 2242 by Jossy Fernandes RN  Outcome: Progressing  Goal: Optimal Functional Ability  10/19/2024 0153 by Jossy Fernandes RN  Outcome: Progressing  10/18/2024 2242 by Jossy Fernandes RN  Outcome: Progressing  Goal: Improved Oral Intake  10/19/2024 0153 by Jossy Fernandes RN  Outcome: Progressing  10/18/2024 2242 by Jossy Fernandes RN  Outcome: Progressing  Goal: Optimal Pain Control and Function  10/19/2024 0153 by Jossy Fernandes RN  Outcome: Progressing  10/18/2024 2242 by Jossy Fernandes RN  Outcome: Progressing  Goal: Skin Health and Integrity  10/19/2024 0153 by Jossy Fernandes RN  Outcome: Progressing  10/18/2024 2242 by Jossy Fernandes RN  Outcome: Progressing  Goal: Optimal Wound Healing  10/19/2024 0153 by Jossy Fernandes RN  Outcome: Progressing  10/18/2024 2242 by Jossy Fernandes RN  Outcome: Progressing     Problem: Fall Injury Risk  Goal: Absence of Fall and Fall-Related Injury  10/19/2024 0153 by Jossy Fernandes RN  Outcome: Progressing  10/18/2024 2242 by Jossy Fernandes RN  Outcome: Progressing

## 2024-10-19 NOTE — PROGRESS NOTES
[facility] Progress [facility]        Chief Complaint: Inpatient Follow-up for     HPI: 69-year-old female with past medical history of hypertension, hyperlipidemia, chronic hydronephrosis with left nephrostomy tube, right ureteral stent, end-stage renal disease on hemodialysis, chronic anemia presented with right flank pain for the past 5 days and also reported cloudy urine output from the urostomy CT with IV contrast showed moderate to severe hydronephrosis with enlarged right pelvic lymph node increase in size from the prior with suspected malignancy also showed duplicated collecting system with dilation of the right lower pole urology was consulted patient is status post cystoscopy with right stent exchange and bladder biopsy and they were not able to identify the left ureteral orifice therefore had left percutaneous nephrostomy. Blood culture is growing Gram-positive cocci 2/2 bottles initially patient was started on cefepime but now we added vancomycin repeat blood cultures ordered  Id was consulted MRI of the C/T and L-spine was ordered that was negative for any abscess blood culture is growing MRSA TTE as such did not show any vegetation cardiology consulted for MADELYN  Bilateral hydronephrosis Status post right upper lobe ureteral stent exchange and right lower pole nephrostomy tube placement. Patient was slightly confused on October 7 so we had ordered CT of the head without contrast that was negative, ammonia, TSH everything was normal we also ordered CT of the abdomen and pelvis with contrast that was negative for any abscess cardiology consulted for MADELYN- was negative for endocarditis.  Dialysis catheter was removed, pt now with L IJ central line. CT bilateral lower extremities with and without contrast showing no bone abnormalities.  There was symmetric uptake at the lower extremities on nuclear medicine exam .Nonspecific relatively increased radiotracer uptake at  the bilateral distal femur and proximal tibia     Patient remains on vancomycin and Teflaro. Cultures have remained persistently positive. Re-drawn 10/17, negative thus far.     Interval Hx:   Pt evaluated laying comfortably in bed today. Nephrostomy tubes emptied prior to my evaluation. Berkowitz in place with dark urine. She denied any complaints to.     Objective/physical exam:  General:morbidly obese  Chest: Clear to auscultation bilaterally.  Previous tunneled catheter site appears clean, no erythema, L IJ central line   Heart: RRR, +S1, S2, no appreciable murmur  Abdomen: Soft, nontender, BS +, bilateral nephrostomy tubes empty, berkowitz with dark urine   MSK: Warm, 1+ pitting bilateral lower extremity edema, no clubbing or cyanosis  Neurologic:  Alert awake oriented x4    VITAL SIGNS: 24 HRS MIN & MAX LAST   Temp  Min: 97.6 °F (36.4 °C)  Max: 98.8 °F (37.1 °C) 97.6 °F (36.4 °C)   BP  Min: 124/71  Max: 164/77 124/71   Pulse  Min: 58  Max: 61  (!) 58   Resp  Min: 16  Max: 18 18   SpO2  Min: 99 %  Max: 99 % 99 %     I have reviewed the following labs:  Recent Labs   Lab 10/17/24  0603 10/18/24  0613 10/19/24  0428   WBC 11.82* 9.03 9.97   RBC 3.13* 3.04* 3.07*   HGB 9.3* 8.8* 9.0*   HCT 28.6* 27.8* 27.7*   MCV 91.4 91.4 90.2   MCH 29.7 28.9 29.3   MCHC 32.5* 31.7* 32.5*   RDW 15.8 16.1 16.0    246 322   MPV 11.0* 10.6* 10.5*     Recent Labs   Lab 10/13/24  0526 10/14/24  0529 10/17/24  0603 10/18/24  0613 10/19/24  0428   *   < > 127* 130* 129*   K 4.6   < > 5.3* 4.8 5.0   CL 92*   < > 87* 93* 91*   CO2 17*   < > 15* 19* 17*   BUN 39.2*   < > 73.9* 49.5* 67.3*   CREATININE 4.10*   < > 5.95* 4.38* 5.17*   CALCIUM 8.5   < > 7.8* 7.9* 8.6   MG 1.90  --  1.90 1.90  --    ALBUMIN 2.0*   < > 2.0* 2.0* 1.9*   ALKPHOS 151*  --  135 126  --    ALT 12  --  7 8  --    AST 12  --  10 11  --    BILITOT 0.3  --  0.2 0.3  --     < > = values in this interval not displayed.     Microbiology Results (last 7 days)        Procedure Component Value Units Date/Time    Body Fluid Culture [[id number]]  (Abnormal)  (Susceptibility) Collected: 10/16/24 1147    Order Status: Completed Specimen: Body Fluid from Kidney, Right Updated: 10/19/24 1015     Body Fluid Culture Many Methicillin resistant Staphylococcus aureus    Anaerobic Culture [[id number]] Collected: 10/16/24 1147    Order Status: Completed Specimen: Body Fluid from Kidney, Right Updated: 10/19/24 0814     Anaerobe Culture No Anaerobes Isolated    Blood Culture [[id number]]  (Abnormal)  (Susceptibility) Collected: 10/15/24 2136    Order Status: Completed Specimen: Blood Updated: 10/19/24 0656     Blood Culture Methicillin resistant Staphylococcus aureus     GRAM STAIN Gram positive cocci      Seen in gram stain of broth only      2 of 2 bottles positive    Blood Culture [[id number]]  (Abnormal)  (Susceptibility) Collected: 10/15/24 2136    Order Status: Completed Specimen: Blood Updated: 10/19/24 0656     Blood Culture Methicillin resistant Staphylococcus aureus     GRAM STAIN Gram Positive Cocci, probable Staphylococcus      Seen in gram stain of broth only      2 of 2 bottles positive    Blood Culture [[id number]]  (Normal) Collected: 10/17/24 2114    Order Status: Completed Specimen: Blood from Antecubital, Right Updated: 10/18/24 2300     Blood Culture No Growth At 24 Hours    Blood Culture [[id number]]  (Normal) Collected: 10/17/24 2114    Order Status: Completed Specimen: Blood from Antecubital, Left Updated: 10/18/24 2300     Blood Culture No Growth At 24 Hours    Gram Stain [[id number]] Collected: 10/16/24 1147    Order Status: Completed Specimen: Body Fluid from Kidney, Right Updated: 10/17/24 0719     GRAM STAIN Many WBC observed      Few Gram positive cocci    Blood Culture [[id number]]  (Abnormal)  (Susceptibility) Collected: 10/14/24 0529    Order Status: Completed Specimen: Blood from Arm, Right Updated: 10/17/24 0710     Blood Culture Methicillin resistant  Staphylococcus aureus     GRAM STAIN Gram Positive Cocci, probable Staphylococcus      Seen in gram stain of broth only      2 of 2 bottles positive    Blood Culture [[id number]]  (Abnormal)  (Susceptibility) Collected: 10/14/24 0529    Order Status: Completed Specimen: Blood from Hand, Right Updated: 10/17/24 0710     Blood Culture Methicillin resistant Staphylococcus aureus     GRAM STAIN Gram Positive Cocci, probable Staphylococcus      Seen in gram stain of broth only      2 of 2 bottles positive    BCID2 Panel [[id number]]  (Abnormal) Collected: 10/15/24 2136    Order Status: Completed Specimen: Blood Updated: 10/17/24 0638     CTX-M (ESBL ) N/A     IMP (Cabapenemase ) N/A     KPC resistance gene (Carbapenemase ) N/A     mcr-1 N/A     mecA ID N/A     Comment: Note: Antimicrobial resistance can occur via multiple mechanisms. A Not Detected result for antimicrobial resistance gene(s) does not indicate antimicrobial susceptibility. Subculturing is required for species identification and susceptibility testing of   isolates.        mecA/C and MREJ (MRSA) gene Detected     NDM (Carbapenemase ) N/A     OXA-48-like (Carbapenemase ) N/A     Royal/B (VRE gene) N/A     VIM (Carbapenemase ) N/A     Enterococcus faecalis Not Detected     Enterococcus faecium Not Detected     Listeria monocytogenes Not Detected     Staphylococcus spp. Detected     Staphylococcus aureus Detected     Staphylococcus epidermidis Not Detected     Staphylococcus lugdunensis Not Detected     Streptococcus spp. Not Detected     Streptococcus agalactiae (Group B) Not Detected     Streptococcus pneumoniae Not Detected     Streptococcus pyogenes (Group A) Not Detected     Acinetobacter calcoaceticus/baumannii complex Not Detected     Bacteroides fragilis Not Detected     Enterobacterales Not Detected     Enterobacter cloacae complex Not Detected     Escherichia coli Not Detected     Klebsiella aerogenes  Not Detected     Klebsiella oxytoca Not Detected     Klebsiella pneumoniae group Not Detected     Proteus spp. Not Detected     Salmonella spp. Not Detected     Serratia marcescens Not Detected     Haemophilus influenzae Not Detected     Neisseria meningitidis Not Detected     Pseudomonas aeruginosa Not Detected     Stenotrophomonas maltophilia Not Detected     Candida albicans Not Detected     Candida auris Not Detected     Candida glabrata Not Detected     Candida krusei Not Detected     Candida parapsilosis Not Detected     Candida tropicalis Not Detected     Cryptococcus neoformans/gattii Not Detected    Narrative:      The [employer] BCID2 Panel is a multiplexed nucleic acid test intended for the use with [employer]® [employer] or [employer] for the simultaneous qualitative detection and identification of multiple bacterial and yeast nucleic acids and select genetic determinants associated with antimicrobial resistance.  The [employer] BCID2 Panel test is performed directly on blood culture samples identified as positive by a continuous monitoring blood culture system.  Results are intended to be interpreted in conjunction with Gram stain results.    Fungal Culture [[id number]] Collected: 10/16/24 1147    Order Status: Sent Specimen: Body Fluid from Kidney, Right Updated: 10/16/24 1201    Blood Culture [[id number]]  (Normal) Collected: 10/09/24 1728    Order Status: Completed Specimen: Blood Updated: 10/14/24 1900     Blood Culture No Growth at 5 days    Blood Culture [[id number]]  (Abnormal)  (Susceptibility) Collected: 10/10/24 1605    Order Status: Completed Specimen: Blood Updated: 10/13/24 0628     Blood Culture Methicillin resistant Staphylococcus aureus     GRAM STAIN Seen in gram stain of broth only      2 of 2 bottles positive      Gram Positive Cocci, probable Staphylococcus    Blood Culture [[id number]]  (Abnormal)  (Susceptibility) Collected: 10/10/24 1605    Order Status:  Completed Specimen: Blood Updated: 10/13/24 0627     Blood Culture Methicillin resistant Staphylococcus aureus     GRAM STAIN 1 of 2 Anaerobic bottles positive      Gram Positive Cocci, probable Staphylococcus      Seen in gram stain of broth only    Blood Culture [[id number]]  (Abnormal)  (Susceptibility) Collected: 10/09/24 1728    Order Status: Completed Specimen: Blood Updated: 10/13/24 0625     Blood Culture Methicillin resistant Staphylococcus aureus     GRAM STAIN Gram Positive Cocci, probable Staphylococcus      Seen in gram stain of broth only      1 of 2 Aerobic bottles positive             See below for Radiology    Assessment/Plan:  Persistent b/l hydronephrosis   Bladder Cancer   -double collecting system   -S/p right neph tube placement and left nephroureteral stent exchange 10/16   -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   -oncology has evaluated patient - plans for f/u outpatient to discuss treatment options    Persistent MRSA bacteremia  Leukocytosis- resolved   -Teflaro/vancomycin  - b/c 10-17-24 negative thus far   -MADELYN 10/8 negative     Encephalopathy ,metabolic  -resolved    End-stage renal disease on hemodialysis  Metabolic acidosis, Hyponatremia   -maintained on hemodialysis at [facility] on a TTS schedule  -Tunneled dialysis catheter removed 10/08/2024 due to persistent MRSA bacteremia   -Temporary dialysis catheter placement-left IJ  -sodium bicarb tabs TID    History of hypertension, hyperlipidemia, bilateral chronic hydronephrosis with left nephrostomy tube in place and right ureter stent, and chronic anemia     Cc time 35 minutes   Cc diagnosis persistent methicillin-resistant Staph aureus bacteremia on Teflaro/vancomycin    VTE prophylaxis:  Lovenox 30 mg subQ daily    Patient condition:  Fair    Anticipated discharge and Disposition:   To be determined      All diagnosis and differential diagnosis have been  reviewed; assessment and plan has been documented; I have personally reviewed the labs and test results that are presently available; I have reviewed the patients medication list; I have reviewed the consulting providers response and recommendations. I have reviewed or attempted to review medical records based upon their availability    All of the patient's questions have been  addressed and answered. Patient's is agreeable to the above stated plan. I will continue to monitor closely and make adjustments to medical management as needed.    Portions of this note dictated using EMR integrated voice recognition software, and may be subject to voice recognition errors not corrected at proofreading. Please contact writer for clarification if needed.   _____________________________________________________________________    Malnutrition Status:    Scheduled Med:   atorvastatin  20 mg Oral QHS    ceftaroline (Teflaro) IV (PEDS and ADULTS)  200 mg Intravenous Q8H    enoxaparin  30 mg Subcutaneous Daily    folic acid  1 mg Oral Daily    ondansetron  4 mg Intravenous Once    pantoprazole  40 mg Oral Daily    sertraline  25 mg Oral Daily    sevelamer carbonate  800 mg Oral TID WM    sodium bicarbonate  1,300 mg Oral TID    vancomycin (VANCOCIN) IV (PEDS and ADULTS)  500 mg Intravenous Once      Continuous Infusions:       PRN Meds:    Current Facility-Administered Medications:     acetaminophen, 650 mg, Oral, Q4H PRN    albuterol-ipratropium, 3 mL, Nebulization, Once PRN    aluminum-magnesium hydroxide-simethicone, 30 mL, Oral, QID PRN    bisacodyL, 10 mg, Rectal, Daily PRN    diphenhydrAMINE, 25 mg, Intravenous, Q6H PRN    heparin (porcine), 3,000 Units, Intravenous, PRN    melatonin, 6 mg, Oral, Nightly PRN    metoclopramide, 10 mg, Intravenous, Q10 Min PRN    ondansetron, 4 mg, Intravenous, Q4H PRN    oxyCODONE-acetaminophen, 1 tablet, Oral, Q4H PRN    polyethylene glycol, 17 g, Oral, BID PRN    prochlorperazine, 5 mg,  Intravenous, Q6H PRN    senna-docusate 8.6-50 mg, 2 tablet, Oral, BID PRN    sodium chloride 0.9%, 10 mL, Intravenous, PRN    vancomycin - pharmacy to dose, , Intravenous, pharmacy to manage frequency     Radiology:  I have personally reviewed the following imaging and agree with the radiologist.     US Guided Needle Placement  Narrative: PROCEDURE:  US and Fluoroscopy Guided Nephrostomy Tube and Nephroureteral Stent Exchange    CLINICAL HISTORY:  69-year-old female with bilateral hydronephrosis with duplicated right collecting system.    ANESTHESIA:  Level of sedation: Moderate sedation    Medications: 2 mg Versed IV; 100 mg Fentanyl IV; 0 mg Ativan IV; other: None    Administration: Moderate sedation was administered during this procedure. Continuous monitoring of the patient's level of consciousness and physiological status was provided throughout the procedure by an independent trained observer under the direct supervision of the operating physician.    Duration of sedation: 38 minutes    Antibiotic prophylaxis: None.  Patient already being treated with    PHYSICIANS:  Andrew Buenrostro MD    RADIATION DOSE:  Fluoroscopy time: 10.8 minutes    Radiation exposure dose: 376.91 mGy    Exposure images: 0    CONTRAST:  35 cc of Isovue 370    PROCEDURAL SUMMARY:  After informed consent was obtained, the patient was placed prone on the angiography table. The skin overlying the bilateral kidneys and existing bilateral nephrostomy tubes was then prepped and draped in the usual sterile fashion.    The skin and soft tissues were anesthetized using 1% lidocaine. Under real-time ultrasound guidance, a 25g needle was advanced into an inferior pole calyx of the superior moiety of the right kidney in an attempt to enter the inferior moiety..  Once ultrasound imaging demonstrated the tip of the needle within the calyx, the stylet was removed and return of urine was confirmed.  Contrast was injected to opacify the renal collecting  system.  Positioning within the superior moiety was confirmed.  Using this access for localization purposes, fluoroscopic guidance was utilized to insert a 21g needle into the inferior pole moiety positioned anterior to the superior moiety.  Return of urine was noted through the needle after removal of the stylet.  Contrast was injected to confirm positioning.  A Mandril wire was then advanced through the needle into the collecting system.  The needle was removed and the coaxial dilator was advanced over the wire into the kidney.  The inner dilators and wire were removed and a Glidewire was advanced through the outer sheath of the dilator into kidney.  The sheath was then removed and an 8F nephrostomy drain was advanced over the wire and into the collecting system. The wire was removed and the [place] loop was formed.  Aspirated urine was sent for culture.  The catheter was attached to the skin and a sterile dressing was applied. The catheter was then attached to a gravity drainage bag.    Attention was then turned to the left kidney and existing nephroureteral stent. Contrast was injected through the existing catheter to confirm positioning. The external portion of the catheter was ligated to release the [place] loop. A stiff Glidewire was then inserted through the catheter and the catheter was removed. An 8F x 22 cm nephroureteral stent was advanced over the wire and into the collecting system, down to the bladder.  The stiffener and wire were removed and the [place] loop was formed. The catheter was attached to the skin and a sterile dressing was applied. The catheter was then attached to a gravity drainage bag.    The patient tolerated the procedure well and experienced no immediate complications. The patient was then brought to the recovery room in good condition.  Impression: 1.  Technically successful Ureteral Stent placement in the inferior right renal moiety.    2.  Left nephroureteral stent  exchange.    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    ANESTHESIA:  Level of sedation: Moderate sedation    Medications: 2 mg Versed IV; 100 mg Fentanyl IV; 0 mg Ativan IV; other: None    Administration: Moderate sedation was administered during this procedure. Continuous monitoring of the patient's level of consciousness and physiological status was provided throughout the procedure by an independent trained observer under the direct supervision of the operating physician.    Duration of sedation: 38 minutes    Antibiotic prophylaxis: None.  Patient already being treated with    PHYSICIANS:  Andrew Buenrostro MD    RADIATION DOSE:  Fluoroscopy time: 10.8 minutes    Radiation exposure dose: 376.91 mGy    Exposure images: 0    CONTRAST:  35 cc of Isovue 370    PROCEDURAL SUMMARY:  After informed consent was obtained, the patient was placed prone on the angiography table. The skin overlying the bilateral kidneys and existing bilateral nephrostomy tubes was then prepped and draped in the usual sterile fashion.    The skin and soft tissues were anesthetized using 1% lidocaine. Under real-time ultrasound guidance, a 25g needle was advanced into an inferior pole calyx of the superior moiety of the right kidney in an attempt to enter the inferior moiety..  Once ultrasound imaging demonstrated the tip of the needle within the calyx, the stylet was removed and return of urine was confirmed.  Contrast was injected to opacify the renal collecting system.  Positioning within the superior moiety was confirmed.  Using this access for localization purposes, fluoroscopic guidance was utilized to insert a 21g needle into the inferior pole moiety positioned  anterior to the superior moiety.  Return of urine was noted through the needle after removal of the stylet.  Contrast was injected to confirm positioning.  A Mandril wire was then advanced through the needle into the collecting system.  The needle was removed and the coaxial dilator was advanced over the wire into the kidney.  The inner dilators and wire were removed and a Glidewire was advanced through the outer sheath of the dilator into kidney.  The sheath was then removed and an 8F nephrostomy drain was advanced over the wire and into the collecting system. The wire was removed and the [place] loop was formed.  Aspirated urine was sent for culture.  The catheter was attached to the skin and a sterile dressing was applied. The catheter was then attached to a gravity drainage bag.    Attention was then turned to the left kidney and existing nephroureteral stent. Contrast was injected through the existing catheter to confirm positioning. The external portion of the catheter was ligated to release the [place] loop. A stiff Glidewire was then inserted through the catheter and the catheter was removed. An 8F x 22 cm nephroureteral stent was advanced over the wire and into the collecting system, down to the bladder.  The stiffener and wire were removed and the [place] loop was formed. The catheter was attached to the skin and a sterile dressing was applied. The catheter was then attached to a gravity drainage bag.    The patient tolerated the procedure well and experienced no immediate complications. The patient was then brought to the recovery room in good condition.  Impression: 1.  Technically successful Ureteral Stent placement in the inferior right renal moiety.    2.  Left nephroureteral stent exchange.    Electronically signed by: Andrew Buenrostro  Date:    10/16/2024  Time:    15:02      Thairy G Reyes, MD  Department of Hospital Medicine   [facility]   10/19/2024

## 2024-10-19 NOTE — PROGRESS NOTES
Infectious Disease  Progress Note    Patient Name: Fior Joya   MRN: [id number]   Admission Date: 10/3/2024   Hospital Length of Stay: 15 days  Attending Physician: Terrance Hinkle MD   Primary Care Provider: No, Primary Doctor     Isolation Status: Contact     Assessment/Plan:   69 y.o. female with a history significant for ESRD on hemodialysis through right IJ permanent catheter, chronic hydronephrosis with left nephrostomy tube, hypertension who was admitted on 10/3/2024 with lower back pain. Patient said that her symptoms started approximately 1 week ago with some neck pain that moved down to her shoulder. She then developed lower back pain that moved to her right leg. Patient also reports having cloudy drainage from her nephrostomy tube for the last 1 month. Patient reports worsening back/right flank pain for the last few days. Vital signs on admission showed temperature 97.2 F, heart rate 79, blood pressure 110/75. Laboratory workup showed WBC 18003, creatinine 6.70, lactic acid 1.8. Urinalysis showed more than 100 WBC. She had 2 sets of blood culture obtained which are growing MRSA. 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  The upper pole moiety is decompressed with a ureteral stent but the lower pole demonstrating moderate severe hydronephrosis, left nephrostomy ureteral stent drain with resolution of hydronephrosis. Patient underwent nephrostomy tube placement by IR into the inferior pole of the right renal collecting system.  Patient was also noted to MRSA bacteremia, she was started on vancomycin, on 10/07/2024, she appears to had worsening mental status, she has persistent leukocytosis.  ID is consulted for assistance in management.    10/14 - afebrile. blood cx in process   10/15 - off floor. afebrile. Blood cx peristently +. NM scan with increased update in b/l distal femur and tibia is not likely  due to infection. CT obained shows no abnormality. Recommend b/l tube exchange  10/16 - off floor to IR. afebrile. Blood cx in process.   10/17 - afebrile. S/p left stent ecahnge and placement of n-tube on right with purulent fluid drainage  10/18- afebrile. BCx and body fluid cx in process.   10/19 - off floor for HD. afebrile. Blood cx negative 48 hrs. Continue abx    1) Bacteremia  - MRSA  - suspect possible seeding of PCN tubes  - NM scan 10/12 - . Nonspecific relatively increased radiotracer uptake at the bilateral distal femur and proximal tibia.  Correlation with dedicated plain radiographs of this region recommended. No abnormal radiotracer localization within the visualized chest, abdomen, or pelvis.  - CT LE 10/15 - No appreciable acute osseous abnormality. symmetric uptake at the lower extremities on nuclear medicine exam most suggestive of aseptic marrow expansion.  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 Debris/filling defects within the base the urinary bladder  - MADELYN 10/8 negative  - HD line removed 10/8   - BCx 10/10 - MRSA 3/4  - BCx 10/14 - MRSA 4/4  - BCx 10/17 - in process  - HD cath left IJ in place 10/11 to present  - currently on vanco, goal 15-20, Rx to dose with HD  - currently on ceftaroline dual therapy     2)  Cancer  - new dx  SCC bladder  - hydronephrosis s/p ureteral stents and nephrostomy tubes  - Oncology  consulted     3) Leukocytosis  - in setting of acute infection     ID will continue following. Please contact us with any questions or concerns.  Subjective:     Principal Problem: <principal problem not specified>     Interval History:   Appears improved today, no fevers, no chills, some issues with the PCN tube on the L being somewhat displaced     Review of Systems   Review of Systems   Constitutional:  Negative for chills, diaphoresis, fever and weight loss.   HENT:  Negative for congestion and nosebleeds.    Respiratory:  Negative for cough, sputum production and wheezing.    Cardiovascular:  Negative for chest pain, palpitations and leg swelling.   Gastrointestinal:  Negative for abdominal pain, diarrhea and vomiting.   Genitourinary:  Negative for dysuria, flank pain and frequency.   Musculoskeletal:  Negative for back pain, joint pain and neck pain.   Skin:  Negative for itching and rash.   Neurological:  Negative for sensory change, seizures, weakness and headaches.   Endo/Heme/Allergies:  Does not bruise/bleed easily.   All other systems reviewed and are negative.       Objective:     Vital Signs (Most Recent):  Temp: 97.6 °F (36.4 °C) (10/19/24 0826)  Pulse: (!) 58 (10/19/24 0826)  Resp: 18 (10/19/24 0021)  BP: 124/71 (10/19/24 0826)  SpO2: 99 % (10/19/24 0826)  Vital Signs (24h Range):  Temp:  [97.6 °F (36.4 °C)-98.8 °F (37.1 °C)] 97.6 °F (36.4 °C)  Pulse:  [58-61] 58  Resp:  [16-18] 18  SpO2:  [99 %] 99 %  BP: (124-164)/(67-77) 124/71      Weight:   Wt Readings from Last 1 Encounters:   10/10/24 (!) 140.2 kg (309 lb 1.4 oz)      Body mass index is Body mass index is 54.75 kg/m².     Estimated Creatinine Clearance: Estimated Creatinine Clearance: 14.4 mL/min (A) (based on SCr of 5.17 mg/dL (H)).     Lines/Drains/Airways       Central Venous Catheter Line  Duration                  Hemodialysis Catheter 10/11/24 left internal jugular 8 days              Drain  Duration                  Nephrostomy  10/04/24 1506 Right 8 Fr. 14 days         Urethral Catheter 10/14/24 1250 16 Fr. 4 days         Nephrostomy 10/16/24 1150 Right 8 Fr. 2 days         Nephrostomy 10/16/24 1201 Left 8 Fr. 2 days              Peripheral Intravenous Line  Duration                  Peripheral IV - Single Lumen 10/13/24 1953 22 G Left;Distal Forearm 5 days                     Physical Exam  Physical Exam  Constitutional:       Appearance: Normal appearance. She is obese.   HENT:      Head: Normocephalic and atraumatic.      Mouth/Throat:      Pharynx: No oropharyngeal exudate or posterior oropharyngeal erythema.   Eyes:      Extraocular Movements: Extraocular movements intact.      Pupils: Pupils are equal, round, and reactive to light.   Cardiovascular:      Rate and Rhythm: Normal rate and regular rhythm.      Heart sounds: No murmur heard.     Comments: Site of removal of the right chest wall HD catheter is clean  Pulmonary:      Effort: No respiratory distress.      Breath sounds: No wheezing, rhonchi or rales.   Abdominal:      General: Bowel sounds are normal. There is no distension.      Palpations: Abdomen is soft.      Tenderness: There is no abdominal tenderness.      Comments: Leonard PCN tubes bloody output   Genitourinary:     Comments: B/l PCNT bloody drainage    Mario - gross hematuria   Musculoskeletal:         General: No swelling or tenderness.      Cervical back: Neck supple. No rigidity or tenderness.      Comments: Still tenderness at the r hip    Lymphadenopathy:      Cervical: No cervical adenopathy.   Skin:     Findings: No lesion or rash.   Neurological:      General: No focal deficit present.      Mental Status: She is alert. Mental status is at baseline.      Cranial Nerves: No cranial nerve deficit.      Motor: No weakness.   Psychiatric:         Mood and Affect: Mood normal.         Behavior: Behavior normal.          Significant Labs: CBC:   Recent Labs   Lab 10/18/24  0613 10/19/24  0428   WBC 9.03 9.97   HGB  8.8* 9.0*   HCT 27.8* 27.7*    322       CMP:   Recent Labs   Lab 10/18/24  0613 10/19/24  0428   * 129*   K 4.8 5.0   CL 93* 91*   CO2 19* 17*   BUN 49.5* 67.3*   CREATININE 4.38* 5.17*   CALCIUM 7.9* 8.6   ALBUMIN 2.0* 1.9*   BILITOT 0.3  --    ALKPHOS 126  --    AST 11  --    ALT 8  --          Microbiology Results (last 7 days)       Procedure Component Value Units Date/Time    Anaerobic Culture [[id number]] Collected: 10/16/24 1147    Order Status: Completed Specimen: Body Fluid from Kidney, Right Updated: 10/19/24 0814     Anaerobe Culture No Anaerobes Isolated    Blood Culture [[id number]]  (Abnormal)  (Susceptibility) Collected: 10/15/24 2136    Order Status: Completed Specimen: Blood Updated: 10/19/24 0656     Blood Culture Methicillin resistant Staphylococcus aureus     GRAM STAIN Gram positive cocci      Seen in gram stain of broth only      2 of 2 bottles positive    Blood Culture [[id number]]  (Abnormal)  (Susceptibility) Collected: 10/15/24 2136    Order Status: Completed Specimen: Blood Updated: 10/19/24 0656     Blood Culture Methicillin resistant Staphylococcus aureus     GRAM STAIN Gram Positive Cocci, probable Staphylococcus      Seen in gram stain of broth only      2 of 2 bottles positive    Blood Culture [[id number]]  (Normal) Collected: 10/17/24 2114    Order Status: Completed Specimen: Blood from Antecubital, Right Updated: 10/18/24 2300     Blood Culture No Growth At 24 Hours    Blood Culture [[id number]]  (Normal) Collected: 10/17/24 2114    Order Status: Completed Specimen: Blood from Antecubital, Left Updated: 10/18/24 2300     Blood Culture No Growth At 24 Hours    Body Fluid Culture [[id number]]  (Abnormal) Collected: 10/16/24 1147    Order Status: Completed Specimen: Body Fluid from Kidney, Right Updated: 10/18/24 1213     Body Fluid Culture Many Staphylococcus aureus    Gram Stain [[id number]] Collected: 10/16/24 1147    Order Status: Completed Specimen: Body Fluid from  Kidney, Right Updated: 10/17/24 0719     GRAM STAIN Many WBC observed      Few Gram positive cocci    Blood Culture [[id number]]  (Abnormal)  (Susceptibility) Collected: 10/14/24 0529    Order Status: Completed Specimen: Blood from Arm, Right Updated: 10/17/24 0710     Blood Culture Methicillin resistant Staphylococcus aureus     GRAM STAIN Gram Positive Cocci, probable Staphylococcus      Seen in gram stain of broth only      2 of 2 bottles positive    Blood Culture [[id number]]  (Abnormal)  (Susceptibility) Collected: 10/14/24 0529    Order Status: Completed Specimen: Blood from Hand, Right Updated: 10/17/24 0710     Blood Culture Methicillin resistant Staphylococcus aureus     GRAM STAIN Gram Positive Cocci, probable Staphylococcus      Seen in gram stain of broth only      2 of 2 bottles positive    BCID2 Panel [[id number]]  (Abnormal) Collected: 10/15/24 2136    Order Status: Completed Specimen: Blood Updated: 10/17/24 0638     CTX-M (ESBL ) N/A     IMP (Cabapenemase ) N/A     KPC resistance gene (Carbapenemase ) N/A     mcr-1 N/A     mecA ID N/A     Comment: Note: Antimicrobial resistance can occur via multiple mechanisms. A Not Detected result for antimicrobial resistance gene(s) does not indicate antimicrobial susceptibility. Subculturing is required for species identification and susceptibility testing of   isolates.        mecA/C and MREJ (MRSA) gene Detected     NDM (Carbapenemase ) N/A     OXA-48-like (Carbapenemase ) N/A     Royal/B (VRE gene) N/A     VIM (Carbapenemase ) N/A     Enterococcus faecalis Not Detected     Enterococcus faecium Not Detected     Listeria monocytogenes Not Detected     Staphylococcus spp. Detected     Staphylococcus aureus Detected     Staphylococcus epidermidis Not Detected     Staphylococcus lugdunensis Not Detected     Streptococcus spp. Not Detected     Streptococcus agalactiae (Group B) Not Detected     Streptococcus  pneumoniae Not Detected     Streptococcus pyogenes (Group A) Not Detected     Acinetobacter calcoaceticus/baumannii complex Not Detected     Bacteroides fragilis Not Detected     Enterobacterales Not Detected     Enterobacter cloacae complex Not Detected     Escherichia coli Not Detected     Klebsiella aerogenes Not Detected     Klebsiella oxytoca Not Detected     Klebsiella pneumoniae group Not Detected     Proteus spp. Not Detected     Salmonella spp. Not Detected     Serratia marcescens Not Detected     Haemophilus influenzae Not Detected     Neisseria meningitidis Not Detected     Pseudomonas aeruginosa Not Detected     Stenotrophomonas maltophilia Not Detected     Candida albicans Not Detected     Candida auris Not Detected     Candida glabrata Not Detected     Candida krusei Not Detected     Candida parapsilosis Not Detected     Candida tropicalis Not Detected     Cryptococcus neoformans/gattii Not Detected    Narrative:      The [employer] BCID2 Panel is a multiplexed nucleic acid test intended for the use with [employer] or [employer] for the simultaneous qualitative detection and identification of multiple bacterial and yeast nucleic acids and select genetic determinants associated with antimicrobial resistance.  The [employer] BCID2 Panel test is performed directly on blood culture samples identified as positive by a continuous monitoring blood culture system.  Results are intended to be interpreted in conjunction with Gram stain results.    Fungal Culture [[id number]] Collected: 10/16/24 1147    Order Status: Sent Specimen: Body Fluid from Kidney, Right Updated: 10/16/24 1201    Blood Culture [[id number]]  (Normal) Collected: 10/09/24 1728    Order Status: Completed Specimen: Blood Updated: 10/14/24 1900     Blood Culture No Growth at 5 days    Blood Culture [[id number]]  (Abnormal)  (Susceptibility) Collected: 10/10/24 1605    Order Status: Completed Specimen: Blood Updated:  10/13/24 0628     Blood Culture Methicillin resistant Staphylococcus aureus     GRAM STAIN Seen in gram stain of broth only      2 of 2 bottles positive      Gram Positive Cocci, probable Staphylococcus    Blood Culture [[id number]]  (Abnormal)  (Susceptibility) Collected: 10/10/24 1605    Order Status: Completed Specimen: Blood Updated: 10/13/24 0627     Blood Culture Methicillin resistant Staphylococcus aureus     GRAM STAIN 1 of 2 Anaerobic bottles positive      Gram Positive Cocci, probable Staphylococcus      Seen in gram stain of broth only    Blood Culture [[id number]]  (Abnormal)  (Susceptibility) Collected: 10/09/24 1728    Order Status: Completed Specimen: Blood Updated: 10/13/24 0625     Blood Culture Methicillin resistant Staphylococcus aureus     GRAM STAIN Gram Positive Cocci, probable Staphylococcus      Seen in gram stain of broth only      1 of 2 Aerobic bottles positive             Significant Imaging: I have reviewed all pertinent imaging results/findings within the past 24 hours.      Milo Campbell MD  Infectious Disease  [facility]

## 2024-10-19 NOTE — NURSING
10/19/24 1525   Post-Hemodialysis Assessment   Rinseback Volume (mL) 250 mL   Blood Volume Processed (Liters) 54.1 L   Dialyzer Clearance Moderately streaked   Duration of Treatment 180 minutes   Total UF (mL) 2578 mL   Net Fluid Removal 2078   Patient Response to Treatment Tolerated well. Pt requested to end treatment at the 3hr radha, stated her usual HD tx time is 3hrs. Nephrology made aware.   Post-Hemodialysis Comments Blood rinsed back per P&P. Lines capped and secured.

## 2024-10-19 NOTE — PROGRESS NOTES
"[facility]    Nephrology Progress Note    Patient Name: Fior Joya  Age: 69 y.o.  : 1955  MRN: [id number]  Admission Date: 10/3/2024    Chief complaint: Back Pain (Pt wheeled into triage and presents via POV. Pt reports lower back pain and R hip pain that began 5 days PTA. Urostomy placed approx 2 months ago with minimal cloudy output that pt reports for approx 1 month. ESRD tues/th/Sat dialysis but did not present today d/t increasing pain. Denies SOB and CP at this time)      Hospital course  Fior Joya is a 69 y.o. White female with past medical history of end-stage renal disease, hypertension, dyslipidemia, chronic hydronephrosis anemia of chronic disease, and morbid obesity.  Patient presented to the emergency department on 10/03/2024 with complaints of right flank pain and cloudy urostomy output.  CT of the abdomen revealed moderate to severe hydronephrosis and pelvic lymphadenopathy.  Urology was consulted, patient underwent cystoscopy with right ureteral stent exchange and left percutaneous nephrostomy.  Blood culture was positive for MRSA, errol was negative for signs of endocarditis.  Tunneled dialysis catheter was removed and patient is now dialyzing by way of left IJ temporary dialysis catheter.  Nephrology continues to follow for assistance with management of ESRD.    Subjective  Patient is resting in bed, complains of right flank pain.  Denies fever or chills.    Review of Systems  Cardiovascular:  Negative for chest pain   Respiratory: Negative for shortness of breath     Objective  /71   Pulse (!) 58   Temp 97.6 °F (36.4 °C) (Oral)   Resp 18   Ht 5' 3" (1.6 m)   Wt (!) 140.2 kg (309 lb 1.4 oz)   SpO2 99%   BMI 54.75 kg/m²     Intake/Output Summary (Last 24 hours) at 10/19/2024 1106  Last data filed at 10/19/2024 0536  Gross per 24 hour   Intake --   Output 625 ml   Net -625 ml       Physical Exam  General appearance:  Chronically ill, morbidly obese female " in no acute distress.  HEENT: PERRLA, EOMI, no scleral icterus, no JVD. Neck is supple.  Left IJ temporary dialysis catheter is in place  Chest: Respirations are unlabored. Lungs sounds are clear.   Heart: S1, S2.   Abdomen:  Obese.  Right nephrostomy draining brownish urine.  : Deferred.  Extremities: No edema, peripheral pulses are palpable.   Neuro: No focal deficits.     Medications  Scheduled Meds:   atorvastatin  20 mg Oral QHS    ceftaroline (Teflaro) IV (PEDS and ADULTS)  200 mg Intravenous Q8H    enoxaparin  30 mg Subcutaneous Daily    folic acid  1 mg Oral Daily    ondansetron  4 mg Intravenous Once    pantoprazole  40 mg Oral Daily    sertraline  25 mg Oral Daily    sevelamer carbonate  800 mg Oral TID WM    sodium bicarbonate  1,300 mg Oral TID    vancomycin (VANCOCIN) IV (PEDS and ADULTS)  500 mg Intravenous Once     Continuous Infusions:  PRN Meds:.  Current Facility-Administered Medications:     acetaminophen, 650 mg, Oral, Q4H PRN    albuterol-ipratropium, 3 mL, Nebulization, Once PRN    aluminum-magnesium hydroxide-simethicone, 30 mL, Oral, QID PRN    bisacodyL, 10 mg, Rectal, Daily PRN    diphenhydrAMINE, 25 mg, Intravenous, Q6H PRN    heparin (porcine), 3,000 Units, Intravenous, PRN    melatonin, 6 mg, Oral, Nightly PRN    metoclopramide, 10 mg, Intravenous, Q10 Min PRN    ondansetron, 4 mg, Intravenous, Q4H PRN    oxyCODONE-acetaminophen, 1 tablet, Oral, Q4H PRN    polyethylene glycol, 17 g, Oral, BID PRN    prochlorperazine, 5 mg, Intravenous, Q6H PRN    senna-docusate 8.6-50 mg, 2 tablet, Oral, BID PRN    sodium chloride 0.9%, 10 mL, Intravenous, PRN    vancomycin - pharmacy to dose, , Intravenous, pharmacy to manage frequency     Imaging:    Reviewed    Laboratory Data:    Chemistry  Recent Labs     10/17/24  0603 10/18/24  0613 10/19/24  0428   * 130* 129*   K 5.3* 4.8 5.0   CO2 15* 19* 17*   BUN 73.9* 49.5* 67.3*   CREATININE 5.95* 4.38* 5.17*   EGFRNORACEVR 7 10 9   GLUCOSE 93 85 91    CALCIUM 7.8* 7.9* 8.6   MG 1.90 1.90  --    PHOS 5.4*  --  4.5      LFT  Lab Results   Component Value Date    ALKPHOS 126 10/18/2024    AST 11 10/18/2024    ALT 8 10/18/2024    BILITOT 0.3 10/18/2024    ALBUMIN 1.9 (L) 10/19/2024      CKD-MBD  Lab Results   Component Value Date    .7 (H) 06/23/2024    LEZJMZGB14MY 15 (L) 06/23/2024      Hematology  Recent Labs     10/17/24  0603 10/18/24  0613 10/19/24  0428   WBC 11.82* 9.03 9.97   HGB 9.3* 8.8* 9.0*   HCT 28.6* 27.8* 27.7*    246 322      Lab Results   Component Value Date    IRON 20 (L) 10/05/2024    TIBC 91 (L) 10/05/2024    FERRITIN 4,645.60 (H) 10/05/2024    FOLATE 6.1 (L) 06/22/2024    [id number] 681 06/22/2024      ID  Lab Results   Component Value Date    HIV Nonreactive 07/08/2024    HEPCAB Nonreactive 07/07/2024      Additional serology  Lab Results   Component Value Date    MSPIKEPCT  07/07/2024      Comment:      Not observed    ANAHS <1:80 (Negative) 06/24/2024    CANCA Negative 06/24/2024    PANCA Negative 06/24/2024    HGBA1C 4.7 06/22/2024       Urine studies  Lab Results   Component Value Date    APPEARANCEUA Turbid (A) 10/03/2024    SGUA 1.017 10/03/2024    PROTEINUA 2+ (A) 10/03/2024    KETONESUA Negative 10/03/2024    LEUKOCYTESUR 500 (A) 10/03/2024    RBCUA 50-99 (A) 10/03/2024    WBCUA >100 (A) 10/03/2024    BACTERIA Many (A) 10/03/2024    SQEPUA None Seen 10/03/2024    CREATRANDUR 29.5 (L) 06/21/2024    PROTEINURINE 241.3 06/21/2024        Impression  End-stage renal disease   Hyponatremia   Metabolic acidosis  Bilateral hydronephrosis   Bladder cancer  Persistent MRSA bacteremia   Hypertension   Anemia of chronic disease  History of dyslipidemia    Plan  Will continue hemodialysis per Tuesday, Thursday, Saturday schedule while hospitalized.  Metabolic acidosis and hyponatremia are expected to improve with HD. Continue current medication regimen.  DOLLY is on hold due to recently diagnosed malignancy.    Julisa Reese,  [facility]  10/19/2024

## 2024-10-19 NOTE — PROGRESS NOTES
Pharmacokinetic Assessment Follow Up: IV Vancomycin    Vancomycin serum concentration assessment(s):    The random level was drawn correctly and can be used to guide therapy at this time. The measurement is within the desired definitive target range of 15 to 20 mcg/mL.    Vancomycin Regimen Plan:    Plan for 500 mg post-dialysis today. A level is scheduled to be drawn on 10/22 prior to next scheduled dialysis session.    Drug levels (last 3 results):  Recent Labs   Lab Result Units 10/17/24  0603 10/19/24  0428   Vancomycin Random ug/ml 15.3 17.4       Vancomycin Administrations:  vancomycin given in the last 96 hours                     vancomycin (VANCOCIN) 500 mg in D5W 100 mL IVPB (MB+) (mg) 500 mg New Bag 10/17/24 1820    vancomycin (VANCOCIN) 500 mg in D5W 100 mL IVPB (MB+) (mg) 500 mg New Bag 10/15/24 1927                    Pharmacy will continue to follow and monitor vancomycin.    Please contact pharmacy at extension [phone] for questions regarding this assessment.    Thank you for the consult,   Uche Swift       Patient brief summary:  Fior Joya is a 69 y.o. female initiated on antimicrobial therapy with IV Vancomycin for treatment of bacteremia.    Drug Allergies:   Review of patient's allergies indicates:   Allergen Reactions    Asa [aspirin] Anaphylaxis    Penicillins      Received ceftriaxone from 6/27, no reactions        Actual Body Weight:  Wt Readings from Last 1 Encounters:   10/10/24 (!) 140.2 kg (309 lb 1.4 oz)       Renal Function:   Estimated Creatinine Clearance: 14.4 mL/min (A) (based on SCr of 5.17 mg/dL (H)).     Dialysis Method (if applicable):  intermittent HD TTS    CBC (last 72 hours):  Recent Labs   Lab Result Units 10/17/24  0603 10/18/24  0613 10/19/24  0428   WBC x10(3)/mcL 11.82* 9.03 9.97   Hgb g/dL 9.3* 8.8* 9.0*   Hct % 28.6* 27.8* 27.7*   Platelet x10(3)/mcL 309 246 322   Mono % % 11.1 13.0 10.6   Eos % % 1.8 2.4 2.5   Basophil % % 0.5 0.6 0.6       Metabolic Panel (last 72  hours):  Recent Labs   Lab Result Units 10/17/24  0603 10/18/24  0613 10/19/24  0428   Sodium mmol/L 127* 130* 129*   Potassium mmol/L 5.3* 4.8 5.0   Chloride mmol/L 87* 93* 91*   CO2 mmol/L 15* 19* 17*   Glucose mg/dL 93 85 91   Blood Urea Nitrogen mg/dL 73.9* 49.5* 67.3*   Creatinine mg/dL 5.95* 4.38* 5.17*   Albumin g/dL 2.0* 2.0* 1.9*   Bilirubin Total mg/dL 0.2 0.3  --    ALP unit/L 135 126  --    AST unit/L 10 11  --    ALT unit/L 7 8  --    Magnesium Level mg/dL 1.90 1.90  --    Phosphorus Level mg/dL 5.4*  --  4.5       Microbiologic Results:  Microbiology Results (last 7 days)       Procedure Component Value Units Date/Time    Blood Culture [[id number]]  (Abnormal)  (Susceptibility) Collected: 10/15/24 2136    Order Status: Completed Specimen: Blood Updated: 10/19/24 0656     Blood Culture Methicillin resistant Staphylococcus aureus     GRAM STAIN Gram positive cocci      Seen in gram stain of broth only      2 of 2 bottles positive    Blood Culture [[id number]]  (Abnormal)  (Susceptibility) Collected: 10/15/24 2136    Order Status: Completed Specimen: Blood Updated: 10/19/24 0656     Blood Culture Methicillin resistant Staphylococcus aureus     GRAM STAIN Gram Positive Cocci, probable Staphylococcus      Seen in gram stain of broth only      2 of 2 bottles positive    Blood Culture [[id number]]  (Normal) Collected: 10/17/24 2114    Order Status: Completed Specimen: Blood from Antecubital, Right Updated: 10/18/24 2300     Blood Culture No Growth At 24 Hours    Blood Culture [[id number]]  (Normal) Collected: 10/17/24 2114    Order Status: Completed Specimen: Blood from Antecubital, Left Updated: 10/18/24 2300     Blood Culture No Growth At 24 Hours    Body Fluid Culture [[id number]]  (Abnormal) Collected: 10/16/24 1147    Order Status: Completed Specimen: Body Fluid from Kidney, Right Updated: 10/18/24 1213     Body Fluid Culture Many Staphylococcus aureus    Anaerobic Culture [[id number]] Collected:  10/16/24 1147    Order Status: Completed Specimen: Body Fluid from Kidney, Right Updated: 10/18/24 0708     Anaerobe Culture No Anaerobes Isolated    Gram Stain [[id number]] Collected: 10/16/24 1147    Order Status: Completed Specimen: Body Fluid from Kidney, Right Updated: 10/17/24 0719     GRAM STAIN Many WBC observed      Few Gram positive cocci    Blood Culture [[id number]]  (Abnormal)  (Susceptibility) Collected: 10/14/24 0529    Order Status: Completed Specimen: Blood from Arm, Right Updated: 10/17/24 0710     Blood Culture Methicillin resistant Staphylococcus aureus     GRAM STAIN Gram Positive Cocci, probable Staphylococcus      Seen in gram stain of broth only      2 of 2 bottles positive    Blood Culture [[id number]]  (Abnormal)  (Susceptibility) Collected: 10/14/24 0529    Order Status: Completed Specimen: Blood from Hand, Right Updated: 10/17/24 0710     Blood Culture Methicillin resistant Staphylococcus aureus     GRAM STAIN Gram Positive Cocci, probable Staphylococcus      Seen in gram stain of broth only      2 of 2 bottles positive    BCID2 Panel [[id number]]  (Abnormal) Collected: 10/15/24 2136    Order Status: Completed Specimen: Blood Updated: 10/17/24 0638     CTX-M (ESBL ) N/A     IMP (Cabapenemase ) N/A     KPC resistance gene (Carbapenemase ) N/A     mcr-1 N/A     mecA ID N/A     Comment: Note: Antimicrobial resistance can occur via multiple mechanisms. A Not Detected result for antimicrobial resistance gene(s) does not indicate antimicrobial susceptibility. Subculturing is required for species identification and susceptibility testing of   isolates.        mecA/C and MREJ (MRSA) gene Detected     NDM (Carbapenemase ) N/A     OXA-48-like (Carbapenemase ) N/A     Royal/B (VRE gene) N/A     VIM (Carbapenemase ) N/A     Enterococcus faecalis Not Detected     Enterococcus faecium Not Detected     Listeria monocytogenes Not Detected     Staphylococcus  spp. Detected     Staphylococcus aureus Detected     Staphylococcus epidermidis Not Detected     Staphylococcus lugdunensis Not Detected     Streptococcus spp. Not Detected     Streptococcus agalactiae (Group B) Not Detected     Streptococcus pneumoniae Not Detected     Streptococcus pyogenes (Group A) Not Detected     Acinetobacter calcoaceticus/baumannii complex Not Detected     Bacteroides fragilis Not Detected     Enterobacterales Not Detected     Enterobacter cloacae complex Not Detected     Escherichia coli Not Detected     Klebsiella aerogenes Not Detected     Klebsiella oxytoca Not Detected     Klebsiella pneumoniae group Not Detected     Proteus spp. Not Detected     Salmonella spp. Not Detected     Serratia marcescens Not Detected     Haemophilus influenzae Not Detected     Neisseria meningitidis Not Detected     Pseudomonas aeruginosa Not Detected     Stenotrophomonas maltophilia Not Detected     Candida albicans Not Detected     Candida auris Not Detected     Candida glabrata Not Detected     Candida krusei Not Detected     Candida parapsilosis Not Detected     Candida tropicalis Not Detected     Cryptococcus neoformans/gattii Not Detected    Narrative:      The [employer] BCID2 Panel is a multiplexed nucleic acid test intended for the use with Nu-B-2B® 2.0 or Nu-B-2B® [employer] for the simultaneous qualitative detection and identification of multiple bacterial and yeast nucleic acids and select genetic determinants associated with antimicrobial resistance.  The [employer] BCID2 Panel test is performed directly on blood culture samples identified as positive by a continuous monitoring blood culture system.  Results are intended to be interpreted in conjunction with Gram stain results.    Fungal Culture [[id number]] Collected: 10/16/24 1147    Order Status: Sent Specimen: Body Fluid from Kidney, Right Updated: 10/16/24 1201    Blood Culture [[id number]]  (Normal) Collected: 10/09/24  1728    Order Status: Completed Specimen: Blood Updated: 10/14/24 1900     Blood Culture No Growth at 5 days    Blood Culture [[id number]]  (Abnormal)  (Susceptibility) Collected: 10/10/24 1605    Order Status: Completed Specimen: Blood Updated: 10/13/24 0628     Blood Culture Methicillin resistant Staphylococcus aureus     GRAM STAIN Seen in gram stain of broth only      2 of 2 bottles positive      Gram Positive Cocci, probable Staphylococcus    Blood Culture [[id number]]  (Abnormal)  (Susceptibility) Collected: 10/10/24 1605    Order Status: Completed Specimen: Blood Updated: 10/13/24 0627     Blood Culture Methicillin resistant Staphylococcus aureus     GRAM STAIN 1 of 2 Anaerobic bottles positive      Gram Positive Cocci, probable Staphylococcus      Seen in gram stain of broth only    Blood Culture [[id number]]  (Abnormal)  (Susceptibility) Collected: 10/09/24 1728    Order Status: Completed Specimen: Blood Updated: 10/13/24 0625     Blood Culture Methicillin resistant Staphylococcus aureus     GRAM STAIN Gram Positive Cocci, probable Staphylococcus      Seen in gram stain of broth only      1 of 2 Aerobic bottles positive

## 2024-10-20 PROCEDURE — 21400001 HC TELEMETRY ROOM

## 2024-10-20 PROCEDURE — 25000003 PHARM REV CODE 250: Performed by: UROLOGY

## 2024-10-20 PROCEDURE — 87040 BLOOD CULTURE FOR BACTERIA: CPT | Performed by: HOSPITALIST

## 2024-10-20 PROCEDURE — 36415 COLL VENOUS BLD VENIPUNCTURE: CPT | Performed by: HOSPITALIST

## 2024-10-20 PROCEDURE — 25000003 PHARM REV CODE 250: Performed by: GENERAL PRACTICE

## 2024-10-20 PROCEDURE — 25000003 PHARM REV CODE 250: Performed by: INTERNAL MEDICINE

## 2024-10-20 PROCEDURE — 63600175 PHARM REV CODE 636 W HCPCS: Performed by: INTERNAL MEDICINE

## 2024-10-20 PROCEDURE — 27000207 HC ISOLATION

## 2024-10-20 PROCEDURE — 63600175 PHARM REV CODE 636 W HCPCS: Mod: JZ,JG | Performed by: GENERAL PRACTICE

## 2024-10-20 PROCEDURE — 99233 SBSQ HOSP IP/OBS HIGH 50: CPT | Mod: ,,, | Performed by: HOSPITALIST

## 2024-10-20 RX ADMIN — SERTRALINE HYDROCHLORIDE 25 MG: 25 TABLET ORAL at 10:10

## 2024-10-20 RX ADMIN — PANTOPRAZOLE SODIUM 40 MG: 40 TABLET, DELAYED RELEASE ORAL at 10:10

## 2024-10-20 RX ADMIN — CEFTAROLINE FOSAMIL 200 MG: 600 POWDER, FOR SOLUTION INTRAVENOUS at 04:10

## 2024-10-20 RX ADMIN — ENOXAPARIN SODIUM 30 MG: 30 INJECTION SUBCUTANEOUS at 05:10

## 2024-10-20 RX ADMIN — OXYCODONE AND ACETAMINOPHEN 1 TABLET: 10; 325 TABLET ORAL at 01:10

## 2024-10-20 RX ADMIN — SEVELAMER CARBONATE 800 MG: 800 TABLET, FILM COATED ORAL at 05:10

## 2024-10-20 RX ADMIN — OXYCODONE AND ACETAMINOPHEN 1 TABLET: 10; 325 TABLET ORAL at 02:10

## 2024-10-20 RX ADMIN — CEFTAROLINE FOSAMIL 200 MG: 600 POWDER, FOR SOLUTION INTRAVENOUS at 07:10

## 2024-10-20 RX ADMIN — OXYCODONE AND ACETAMINOPHEN 1 TABLET: 10; 325 TABLET ORAL at 11:10

## 2024-10-20 RX ADMIN — SEVELAMER CARBONATE 800 MG: 800 TABLET, FILM COATED ORAL at 02:10

## 2024-10-20 RX ADMIN — CEFTAROLINE FOSAMIL 200 MG: 600 POWDER, FOR SOLUTION INTRAVENOUS at 02:10

## 2024-10-20 RX ADMIN — OXYCODONE AND ACETAMINOPHEN 1 TABLET: 10; 325 TABLET ORAL at 06:10

## 2024-10-20 RX ADMIN — SEVELAMER CARBONATE 800 MG: 800 TABLET, FILM COATED ORAL at 10:10

## 2024-10-20 RX ADMIN — FOLIC ACID 1 MG: 1 TABLET ORAL at 10:10

## 2024-10-20 RX ADMIN — OXYCODONE AND ACETAMINOPHEN 1 TABLET: 10; 325 TABLET ORAL at 10:10

## 2024-10-20 RX ADMIN — ATORVASTATIN CALCIUM 20 MG: 10 TABLET, FILM COATED ORAL at 07:10

## 2024-10-20 NOTE — PROGRESS NOTES
[facility]      Chief Complaint: Inpatient Follow-up for     HPI: 69-year-old female with past medical history of hypertension, hyperlipidemia, chronic hydronephrosis with left nephrostomy tube, right ureteral stent, end-stage renal disease on hemodialysis, chronic anemia presented with right flank pain for the past 5 days and also reported cloudy urine output from the urostomy CT with IV contrast showed moderate to severe hydronephrosis with enlarged right pelvic lymph node increase in size from the prior with suspected malignancy also showed duplicated collecting system with dilation of the right lower pole urology was consulted patient is status post cystoscopy with right stent exchange and bladder biopsy and they were not able to identify the left ureteral orifice therefore had left percutaneous nephrostomy. Blood culture is growing Gram-positive cocci 2/2 bottles initially patient was started on cefepime but now we added vancomycin repeat blood cultures ordered  Id was consulted MRI of the C/T and L-spine was ordered that was negative for any abscess blood culture is growing MRSA TTE as such did not show any vegetation cardiology consulted for MADELYN  Bilateral hydronephrosis Status post right upper lobe ureteral stent exchange and right lower pole nephrostomy tube placement. Patient was slightly confused on October 7 so we had ordered CT of the head without contrast that was negative, ammonia, TSH everything was normal we also ordered CT of the abdomen and pelvis with contrast that was negative for any abscess cardiology consulted for MADELYN- was negative for endocarditis.  Dialysis catheter was removed, pt now with L IJ central line. CT bilateral lower extremities with and without contrast showing no bone abnormalities.  There was symmetric uptake at the lower extremities on nuclear medicine exam .Nonspecific relatively increased radiotracer uptake at  the bilateral distal femur and proximal tibia     Patient remains on vancomycin and Teflaro. Cultures have remained persistently positive. Re-drawn 10/17, negative thus far.     Interval Hx:   Pt evaluated laying comfortably in bed today. Nephrostomy empty at the time of my evaluation. No events overnight    Objective/physical exam:  General:morbidly obese  Chest: Clear to auscultation bilaterally.  Previous tunneled catheter site appears clean, no erythema, L IJ central line   Heart: RRR, +S1, S2, no appreciable murmur  Abdomen: Soft, nontender, BS +, bilateral nephrostomy tubes empty, berkowitz with dark urine   MSK: Warm, 1+ pitting bilateral lower extremity edema, no clubbing or cyanosis  Neurologic:  Alert awake oriented x4    VITAL SIGNS: 24 HRS MIN & MAX LAST   Temp  Min: 97.5 °F (36.4 °C)  Max: 98 °F (36.7 °C) 97.5 °F (36.4 °C)   BP  Min: 122/71  Max: 145/73 134/73   Pulse  Min: 64  Max: 68  68   Resp  Min: 18  Max: 18 18   SpO2  Min: 96 %  Max: 100 % 100 %     I have reviewed the following labs:  Recent Labs   Lab 10/17/24  0603 10/18/24  0613 10/19/24  0428   WBC 11.82* 9.03 9.97   RBC 3.13* 3.04* 3.07*   HGB 9.3* 8.8* 9.0*   HCT 28.6* 27.8* 27.7*   MCV 91.4 91.4 90.2   MCH 29.7 28.9 29.3   MCHC 32.5* 31.7* 32.5*   RDW 15.8 16.1 16.0    246 322   MPV 11.0* 10.6* 10.5*     Recent Labs   Lab 10/17/24  0603 10/18/24  0613 10/19/24  0428   * 130* 129*   K 5.3* 4.8 5.0   CL 87* 93* 91*   CO2 15* 19* 17*   BUN 73.9* 49.5* 67.3*   CREATININE 5.95* 4.38* 5.17*   CALCIUM 7.8* 7.9* 8.6   MG 1.90 1.90  --    ALBUMIN 2.0* 2.0* 1.9*   ALKPHOS 135 126  --    ALT 7 8  --    AST 10 11  --    BILITOT 0.2 0.3  --      Microbiology Results (last 7 days)       Procedure Component Value Units Date/Time    Blood Culture [[id number]]  (Normal) Collected: 10/17/24 2114    Order Status: Completed Specimen: Blood from Antecubital, Right Updated: 10/19/24 2300     Blood Culture No Growth At 48 Hours    Blood Culture  [[id number]]  (Normal) Collected: 10/17/24 2114    Order Status: Completed Specimen: Blood from Antecubital, Left Updated: 10/19/24 2300     Blood Culture No Growth At 48 Hours    Body Fluid Culture [[id number]]  (Abnormal)  (Susceptibility) Collected: 10/16/24 1147    Order Status: Completed Specimen: Body Fluid from Kidney, Right Updated: 10/19/24 1015     Body Fluid Culture Many Methicillin resistant Staphylococcus aureus    Anaerobic Culture [[id number]] Collected: 10/16/24 1147    Order Status: Completed Specimen: Body Fluid from Kidney, Right Updated: 10/19/24 0814     Anaerobe Culture No Anaerobes Isolated    Blood Culture [[id number]]  (Abnormal)  (Susceptibility) Collected: 10/15/24 2136    Order Status: Completed Specimen: Blood Updated: 10/19/24 0656     Blood Culture Methicillin resistant Staphylococcus aureus     GRAM STAIN Gram positive cocci      Seen in gram stain of broth only      2 of 2 bottles positive    Blood Culture [[id number]]  (Abnormal)  (Susceptibility) Collected: 10/15/24 2136    Order Status: Completed Specimen: Blood Updated: 10/19/24 0656     Blood Culture Methicillin resistant Staphylococcus aureus     GRAM STAIN Gram Positive Cocci, probable Staphylococcus      Seen in gram stain of broth only      2 of 2 bottles positive    Gram Stain [[id number]] Collected: 10/16/24 1147    Order Status: Completed Specimen: Body Fluid from Kidney, Right Updated: 10/17/24 0719     GRAM STAIN Many WBC observed      Few Gram positive cocci    Blood Culture [[id number]]  (Abnormal)  (Susceptibility) Collected: 10/14/24 0529    Order Status: Completed Specimen: Blood from Arm, Right Updated: 10/17/24 0710     Blood Culture Methicillin resistant Staphylococcus aureus     GRAM STAIN Gram Positive Cocci, probable Staphylococcus      Seen in gram stain of broth only      2 of 2 bottles positive    Blood Culture [[id number]]  (Abnormal)  (Susceptibility) Collected: 10/14/24 0529    Order Status: Completed  Specimen: Blood from Hand, Right Updated: 10/17/24 0710     Blood Culture Methicillin resistant Staphylococcus aureus     GRAM STAIN Gram Positive Cocci, probable Staphylococcus      Seen in gram stain of broth only      2 of 2 bottles positive    BCID2 Panel [[id number]]  (Abnormal) Collected: 10/15/24 3529    Order Status: Completed Specimen: Blood Updated: 10/17/24 0638     CTX-M (ESBL ) N/A     IMP (Cabapenemase ) N/A     KPC resistance gene (Carbapenemase ) N/A     mcr-1 N/A     mecA ID N/A     Comment: Note: Antimicrobial resistance can occur via multiple mechanisms. A Not Detected result for antimicrobial resistance gene(s) does not indicate antimicrobial susceptibility. Subculturing is required for species identification and susceptibility testing of   isolates.        mecA/C and MREJ (MRSA) gene Detected     NDM (Carbapenemase ) N/A     OXA-48-like (Carbapenemase ) N/A     Royal/B (VRE gene) N/A     VIM (Carbapenemase ) N/A     Enterococcus faecalis Not Detected     Enterococcus faecium Not Detected     Listeria monocytogenes Not Detected     Staphylococcus spp. Detected     Staphylococcus aureus Detected     Staphylococcus epidermidis Not Detected     Staphylococcus lugdunensis Not Detected     Streptococcus spp. Not Detected     Streptococcus agalactiae (Group B) Not Detected     Streptococcus pneumoniae Not Detected     Streptococcus pyogenes (Group A) Not Detected     Acinetobacter calcoaceticus/baumannii complex Not Detected     Bacteroides fragilis Not Detected     Enterobacterales Not Detected     Enterobacter cloacae complex Not Detected     Escherichia coli Not Detected     Klebsiella aerogenes Not Detected     Klebsiella oxytoca Not Detected     Klebsiella pneumoniae group Not Detected     Proteus spp. Not Detected     Salmonella spp. Not Detected     Serratia marcescens Not Detected     Haemophilus influenzae Not Detected     Neisseria meningitidis  Not Detected     Pseudomonas aeruginosa Not Detected     Stenotrophomonas maltophilia Not Detected     Candida albicans Not Detected     Candida auris Not Detected     Candida glabrata Not Detected     Candida krusei Not Detected     Candida parapsilosis Not Detected     Candida tropicalis Not Detected     Cryptococcus neoformans/gattii Not Detected    Narrative:      The [employer] BCID2 Panel is a multiplexed nucleic acid test intended for the use with [employer] Towers® 2.0 or CTI Towers® Enjoyor Systems for the simultaneous qualitative detection and identification of multiple bacterial and yeast nucleic acids and select genetic determinants associated with antimicrobial resistance.  The [employer] 1e BCID2 Panel test is performed directly on blood culture samples identified as positive by a continuous monitoring blood culture system.  Results are intended to be interpreted in conjunction with Gram stain results.    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 on a TTS schedule  -Tunneled dialysis catheter removed 10/08/2024 due to persistent MRSA bacteremia   -Temporary dialysis catheter placement-left IJ  -sodium bicarb tabs TID    History of hypertension, hyperlipidemia, bilateral chronic hydronephrosis with left nephrostomy tube in place and right ureter stent, and chronic anemia     Cc time 35 minutes   Cc diagnosis persistent methicillin-resistant Staph aureus bacteremia on Teflaro/vancomycin    VTE prophylaxis:  Lovenox 30 mg subQ daily    Patient condition:  Fair    Anticipated discharge and Disposition:   To be determined      All diagnosis and differential diagnosis have been reviewed; assessment and plan has been documented; I have personally reviewed the labs and test results that are presently available; I have reviewed the patients medication list; I have reviewed the consulting providers response and recommendations. I have reviewed or attempted to review medical records based upon their availability    All of the patient's questions have been  addressed and answered. Patient's is agreeable to the above stated plan. I will continue to monitor closely and make adjustments to medical management as needed.    Portions of this note dictated using EMR integrated voice recognition software, and may be subject to voice recognition errors not corrected at proofreading. Please contact writer for clarification if needed.   _____________________________________________________________________    Malnutrition Status:    Scheduled Med:   atorvastatin  20 mg Oral QHS    ceftaroline (Teflaro) IV (PEDS and ADULTS)  200 mg Intravenous Q8H    enoxaparin  30 mg Subcutaneous Daily    folic acid  1 mg Oral Daily    pantoprazole  40 mg Oral Daily    sertraline  25 mg Oral Daily    sevelamer carbonate  800 mg Oral TID WM    sodium bicarbonate  1,300 mg Oral TID      Continuous Infusions:       PRN Meds:    Current Facility-Administered Medications:      acetaminophen, 650 mg, Oral, Q4H PRN    albuterol-ipratropium, 3 mL, Nebulization, Once PRN    aluminum-magnesium hydroxide-simethicone, 30 mL, Oral, QID PRN    bisacodyL, 10 mg, Rectal, Daily PRN    diphenhydrAMINE, 25 mg, Intravenous, Q6H PRN    heparin (porcine), 3,000 Units, Intravenous, PRN    melatonin, 6 mg, Oral, Nightly PRN    metoclopramide, 10 mg, Intravenous, Q10 Min PRN    ondansetron, 4 mg, Intravenous, Q4H PRN    oxyCODONE-acetaminophen, 1 tablet, Oral, Q4H PRN    polyethylene glycol, 17 g, Oral, BID PRN    prochlorperazine, 5 mg, Intravenous, Q6H PRN    senna-docusate 8.6-50 mg, 2 tablet, Oral, BID PRN    sodium chloride 0.9%, 10 mL, Intravenous, PRN    vancomycin - pharmacy to dose, , Intravenous, pharmacy to manage frequency     Radiology:  I have personally reviewed the following imaging and agree with the radiologist.     US Guided Needle Placement  Narrative: PROCEDURE:  US and Fluoroscopy Guided Nephrostomy Tube and Nephroureteral Stent Exchange    CLINICAL HISTORY:  69-year-old female with bilateral hydronephrosis with duplicated right collecting system.    ANESTHESIA:  Level of sedation: Moderate sedation    Medications: 2 mg Versed IV; 100 mg Fentanyl IV; 0 mg Ativan IV; other: None    Administration: Moderate sedation was administered during this procedure. Continuous monitoring of the patient's level of consciousness and physiological status was provided throughout the procedure by an independent trained observer under the direct supervision of the operating physician.    Duration of sedation: 38 minutes    Antibiotic prophylaxis: None.  Patient already being treated with    PHYSICIANS:  Andrew Buenrostro MD    RADIATION DOSE:  Fluoroscopy time: 10.8 minutes    Radiation exposure dose: 376.91 mGy    Exposure images: 0    CONTRAST:  35 cc of Isovue 370    PROCEDURAL SUMMARY:  After informed consent was obtained, the patient was placed prone on the angiography table. The skin overlying the  bilateral kidneys and existing bilateral nephrostomy tubes was then prepped and draped in the usual sterile fashion.    The skin and soft tissues were anesthetized using 1% lidocaine. Under real-time ultrasound guidance, a 25g needle was advanced into an inferior pole calyx of the superior moiety of the right kidney in an attempt to enter the inferior moiety..  Once ultrasound imaging demonstrated the tip of the needle within the calyx, the stylet was removed and return of urine was confirmed.  Contrast was injected to opacify the renal collecting system.  Positioning within the superior moiety was confirmed.  Using this access for localization purposes, fluoroscopic guidance was utilized to insert a 21g needle into the inferior pole moiety positioned anterior to the superior moiety.  Return of urine was noted through the needle after removal of the stylet.  Contrast was injected to confirm positioning.  A Mandril wire was then advanced through the needle into the collecting system.  The needle was removed and the coaxial dilator was advanced over the wire into the kidney.  The inner dilators and wire were removed and a Glidewire was advanced through the outer sheath of the dilator into kidney.  The sheath was then removed and an 8F nephrostomy drain was advanced over the wire and into the collecting system. The wire was removed and the [place] loop was formed.  Aspirated urine was sent for culture.  The catheter was attached to the skin and a sterile dressing was applied. The catheter was then attached to a gravity drainage bag.    Attention was then turned to the left kidney and existing nephroureteral stent. Contrast was injected through the existing catheter to confirm positioning. The external portion of the catheter was ligated to release the [place] loop. A stiff Glidewire was then inserted through the catheter and the catheter was removed. An 8F x 22 cm nephroureteral stent was advanced over the wire and into the  collecting system, down to the bladder.  The stiffener and wire were removed and the [place] loop was formed. The catheter was attached to the skin and a sterile dressing was applied. The catheter was then attached to a gravity drainage bag.    The patient tolerated the procedure well and experienced no immediate complications. The patient was then brought to the recovery room in good condition.  Impression: 1.  Technically successful Ureteral Stent placement in the inferior right renal moiety.    2.  Left nephroureteral stent exchange.    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    ANESTHESIA:  Level of sedation: Moderate sedation    Medications: 2 mg Versed IV; 100 mg Fentanyl IV; 0 mg Ativan IV; other: None    Administration: Moderate sedation was administered during this procedure. Continuous monitoring of the patient's level of consciousness and physiological status was provided throughout the procedure by an independent trained observer under the direct supervision of the operating physician.    Duration of sedation: 38 minutes    Antibiotic prophylaxis: None.  Patient already being treated with    PHYSICIANS:  Andrew Buenrostro MD    RADIATION DOSE:  Fluoroscopy time: 10.8 minutes    Radiation exposure dose: 376.91 mGy    Exposure images: 0    CONTRAST:  35 cc of Isovue 370    PROCEDURAL SUMMARY:  After informed consent was obtained, the patient was placed prone on the angiography table. The skin overlying the bilateral kidneys and existing bilateral nephrostomy tubes was then prepped and draped in the usual sterile fashion.    The skin and soft tissues were anesthetized using 1% lidocaine. Under real-time ultrasound guidance, a 25g needle  was advanced into an inferior pole calyx of the superior moiety of the right kidney in an attempt to enter the inferior moiety..  Once ultrasound imaging demonstrated the tip of the needle within the calyx, the stylet was removed and return of urine was confirmed.  Contrast was injected to opacify the renal collecting system.  Positioning within the superior moiety was confirmed.  Using this access for localization purposes, fluoroscopic guidance was utilized to insert a 21g needle into the inferior pole moiety positioned anterior to the superior moiety.  Return of urine was noted through the needle after removal of the stylet.  Contrast was injected to confirm positioning.  A Mandril wire was then advanced through the needle into the collecting system.  The needle was removed and the coaxial dilator was advanced over the wire into the kidney.  The inner dilators and wire were removed and a Glidewire was advanced through the outer sheath of the dilator into kidney.  The sheath was then removed and an 8F nephrostomy drain was advanced over the wire and into the collecting system. The wire was removed and the [place] loop was formed.  Aspirated urine was sent for culture.  The catheter was attached to the skin and a sterile dressing was applied. The catheter was then attached to a gravity drainage bag.    Attention was then turned to the left kidney and existing nephroureteral stent. Contrast was injected through the existing catheter to confirm positioning. The external portion of the catheter was ligated to release the [place] loop. A stiff Glidewire was then inserted through the catheter and the catheter was removed. An 8F x 22 cm nephroureteral stent was advanced over the wire and into the collecting system, down to the bladder.  The stiffener and wire were removed and the [place] loop was formed. The catheter was attached to the skin and a sterile dressing was applied. The catheter was then attached to a gravity drainage  bag.    The patient tolerated the procedure well and experienced no immediate complications. The patient was then brought to the recovery room in good condition.  Impression: 1.  Technically successful Ureteral Stent placement in the inferior right renal moiety.    2.  Left nephroureteral stent exchange.    Electronically signed by: Andrew Buenrostro  Date:    10/16/2024  Time:    15:02      Thairy G Reyes, MD  Department of Hospital Medicine   [facility]   10/20/2024

## 2024-10-20 NOTE — PROGRESS NOTES
Infectious Disease  Progress Note    Patient Name: Fior Joya   MRN: [id number]   Admission Date: 10/3/2024   Hospital Length of Stay: 16 days  Attending Physician: Terrance Hinkle MD   Primary Care Provider: No, Primary Doctor     Isolation Status: Contact     Assessment/Plan:   69 y.o. female with a history significant for ESRD on hemodialysis through right IJ permanent catheter, chronic hydronephrosis with left nephrostomy tube, hypertension who was admitted on 10/3/2024 with lower back pain. Patient said that her symptoms started approximately 1 week ago with some neck pain that moved down to her shoulder. She then developed lower back pain that moved to her right leg. Patient also reports having cloudy drainage from her nephrostomy tube for the last 1 month. Patient reports worsening back/right flank pain for the last few days. Vital signs on admission showed temperature 97.2 F, heart rate 79, blood pressure 110/75. Laboratory workup showed WBC 58801, creatinine 6.70, lactic acid 1.8. Urinalysis showed more than 100 WBC. She had 2 sets of blood culture obtained which are growing MRSA. 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  The upper pole moiety is decompressed with a ureteral stent but the lower pole demonstrating moderate severe hydronephrosis, left nephrostomy ureteral stent drain with resolution of hydronephrosis. Patient underwent nephrostomy tube placement by IR into the inferior pole of the right renal collecting system.  Patient was also noted to MRSA bacteremia, she was started on vancomycin, on 10/07/2024, she appears to had worsening mental status, she has persistent leukocytosis.  ID is consulted for assistance in management.    10/14 - afebrile. blood cx in process   10/15 - off floor. afebrile. Blood cx peristently +. NM scan with increased update in b/l distal femur and tibia is not likely  due to infection. CT obained shows no abnormality. Recommend b/l tube exchange  10/16 - off floor to IR. afebrile. Blood cx in process.   10/17 - afebrile. S/p left stent ecahnge and placement of n-tube on right with purulent fluid drainage  10/18- afebrile. BCx and body fluid cx in process.   10/19 - off floor for HD. afebrile. Blood cx negative 48 hrs. Continue abx  10/20 - afebrile. Blood cx are clear for 48 hrs. Draw another set today and monitor.     1) Bacteremia  - MRSA  - suspect possible seeding of PCN tubes  - NM scan 10/12 - . Nonspecific relatively increased radiotracer uptake at the bilateral distal femur and proximal tibia.  Correlation with dedicated plain radiographs of this region recommended. No abnormal radiotracer localization within the visualized chest, abdomen, or pelvis.  - CT LE 10/15 - No appreciable acute osseous abnormality. symmetric uptake at the lower extremities on nuclear medicine exam most suggestive of aseptic marrow expansion.  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 Debris/filling defects within the base the urinary bladder  - MADELYN 10/8 negative  - HD line removed 10/8   - BCx 10/10 - MRSA 3/4  - BCx 10/14 - MRSA 4/4  - BCx 10/17 - ngtd  - HD cath left IJ in place 10/11 to present  - s/p exchange of Left n-tube and placement of 2nd n-tube on right with drainage of pus 10/17  - currently on vanco, goal 15-20, Rx  to dose with HD  - currently on ceftaroline dual therapy     2)  Cancer  - new dx  SCC bladder  - hydronephrosis s/p ureteral stents and nephrostomy tubes  - Oncology consulted     3) Leukocytosis  - in setting of acute infection     ID will continue following. Please contact us with any questions or concerns.  Subjective:     Principal Problem: <principal problem not specified>     Interval History:   Appears improved today, no fevers, no chills, some issues with the PCN tube on the L being somewhat displaced     Review of Systems   Review of Systems   Constitutional:  Negative for chills, diaphoresis, fever and weight loss.   HENT:  Negative for congestion and nosebleeds.    Respiratory:  Negative for cough, sputum production and wheezing.    Cardiovascular:  Negative for chest pain, palpitations and leg swelling.   Gastrointestinal:  Negative for abdominal pain, diarrhea and vomiting.   Genitourinary:  Negative for dysuria, flank pain and frequency.   Musculoskeletal:  Negative for back pain, joint pain and neck pain.   Skin:  Negative for itching and rash.   Neurological:  Negative for sensory change, seizures, weakness and headaches.   Endo/Heme/Allergies:  Does not bruise/bleed easily.   All other systems reviewed and are negative.       Objective:     Vital Signs (Most Recent):  Temp: 97.5 °F (36.4 °C) (10/20/24 0415)  Pulse: 68 (10/20/24 0415)  Resp: 18 (10/20/24 0601)  BP: 134/73 (10/20/24 0415)  SpO2: 100 % (10/20/24 0415)  Vital Signs (24h Range):  Temp:  [97.5 °F (36.4 °C)-98 °F (36.7 °C)] 97.5 °F (36.4 °C)  Pulse:  [64-68] 68  Resp:  [18] 18  SpO2:  [96 %-100 %] 100 %  BP: (122-145)/(71-73) 134/73      Weight:   Wt Readings from Last 1 Encounters:   10/10/24 (!) 140.2 kg (309 lb 1.4 oz)      Body mass index is Body mass index is 54.75 kg/m².     Estimated Creatinine Clearance: Estimated Creatinine Clearance: 14.4 mL/min (A) (based on SCr of 5.17 mg/dL (H)).     Lines/Drains/Airways       Central Venous  Catheter Line  Duration                  Hemodialysis Catheter 10/11/24 left internal jugular 9 days              Drain  Duration                  Nephrostomy 10/04/24 1506 Right 8 Fr. 15 days         Urethral Catheter 10/14/24 1250 16 Fr. 5 days         Nephrostomy 10/16/24 1150 Right 8 Fr. 3 days         Nephrostomy 10/16/24 1201 Left 8 Fr. 3 days              Peripheral Intravenous Line  Duration                  Peripheral IV - Single Lumen 10/13/24 1953 22 G Left;Distal Forearm 6 days                     Physical Exam  Physical Exam  Constitutional:       Appearance: Normal appearance. She is obese.   HENT:      Head: Normocephalic and atraumatic.      Mouth/Throat:      Pharynx: No oropharyngeal exudate or posterior oropharyngeal erythema.   Eyes:      Extraocular Movements: Extraocular movements intact.      Pupils: Pupils are equal, round, and reactive to light.   Cardiovascular:      Rate and Rhythm: Normal rate and regular rhythm.      Heart sounds: No murmur heard.     Comments: Site of removal of the right chest wall HD catheter is clean  Pulmonary:      Effort: No respiratory distress.      Breath sounds: No wheezing, rhonchi or rales.   Abdominal:      General: Bowel sounds are normal. There is no distension.      Palpations: Abdomen is soft.      Tenderness: There is no abdominal tenderness.      Comments: Leonard PCN tubes bloody output   Genitourinary:     Comments: B/l PCNT bloody drainage    Mario - gross hematuria   Musculoskeletal:         General: No swelling or tenderness.      Cervical back: Neck supple. No rigidity or tenderness.      Comments: Still tenderness at the r hip    Lymphadenopathy:      Cervical: No cervical adenopathy.   Skin:     Findings: No lesion or rash.   Neurological:      General: No focal deficit present.      Mental Status: She is alert. Mental status is at baseline.      Cranial Nerves: No cranial nerve deficit.      Motor: No weakness.   Psychiatric:         Mood and  Affect: Mood normal.         Behavior: Behavior normal.          Significant Labs: CBC:   Recent Labs   Lab 10/19/24  0428   WBC 9.97   HGB 9.0*   HCT 27.7*          CMP:   Recent Labs   Lab 10/19/24  0428   *   K 5.0   CL 91*   CO2 17*   BUN 67.3*   CREATININE 5.17*   CALCIUM 8.6   ALBUMIN 1.9*         Microbiology Results (last 7 days)       Procedure Component Value Units Date/Time    Blood Culture [[id number]]  (Normal) Collected: 10/17/24 2114    Order Status: Completed Specimen: Blood from Antecubital, Right Updated: 10/19/24 2300     Blood Culture No Growth At 48 Hours    Blood Culture [[id number]]  (Normal) Collected: 10/17/24 2114    Order Status: Completed Specimen: Blood from Antecubital, Left Updated: 10/19/24 2300     Blood Culture No Growth At 48 Hours    Body Fluid Culture [[id number]]  (Abnormal)  (Susceptibility) Collected: 10/16/24 1147    Order Status: Completed Specimen: Body Fluid from Kidney, Right Updated: 10/19/24 1015     Body Fluid Culture Many Methicillin resistant Staphylococcus aureus    Anaerobic Culture [[id number]] Collected: 10/16/24 1147    Order Status: Completed Specimen: Body Fluid from Kidney, Right Updated: 10/19/24 0814     Anaerobe Culture No Anaerobes Isolated    Blood Culture [[id number]]  (Abnormal)  (Susceptibility) Collected: 10/15/24 2136    Order Status: Completed Specimen: Blood Updated: 10/19/24 0656     Blood Culture Methicillin resistant Staphylococcus aureus     GRAM STAIN Gram positive cocci      Seen in gram stain of broth only      2 of 2 bottles positive    Blood Culture [[id number]]  (Abnormal)  (Susceptibility) Collected: 10/15/24 2136    Order Status: Completed Specimen: Blood Updated: 10/19/24 0656     Blood Culture Methicillin resistant Staphylococcus aureus     GRAM STAIN Gram Positive Cocci, probable Staphylococcus      Seen in gram stain of broth only      2 of 2 bottles positive    Gram Stain [[id number]] Collected: 10/16/24 1147    Order  Status: Completed Specimen: Body Fluid from Kidney, Right Updated: 10/17/24 0719     GRAM STAIN Many WBC observed      Few Gram positive cocci    Blood Culture [[id number]]  (Abnormal)  (Susceptibility) Collected: 10/14/24 0529    Order Status: Completed Specimen: Blood from Arm, Right Updated: 10/17/24 0710     Blood Culture Methicillin resistant Staphylococcus aureus     GRAM STAIN Gram Positive Cocci, probable Staphylococcus      Seen in gram stain of broth only      2 of 2 bottles positive    Blood Culture [[id number]]  (Abnormal)  (Susceptibility) Collected: 10/14/24 0529    Order Status: Completed Specimen: Blood from Hand, Right Updated: 10/17/24 0710     Blood Culture Methicillin resistant Staphylococcus aureus     GRAM STAIN Gram Positive Cocci, probable Staphylococcus      Seen in gram stain of broth only      2 of 2 bottles positive    BCID2 Panel [[id number]]  (Abnormal) Collected: 10/15/24 2136    Order Status: Completed Specimen: Blood Updated: 10/17/24 0638     CTX-M (ESBL ) N/A     IMP (Cabapenemase ) N/A     KPC resistance gene (Carbapenemase ) N/A     mcr-1 N/A     mecA ID N/A     Comment: Note: Antimicrobial resistance can occur via multiple mechanisms. A Not Detected result for antimicrobial resistance gene(s) does not indicate antimicrobial susceptibility. Subculturing is required for species identification and susceptibility testing of   isolates.        mecA/C and MREJ (MRSA) gene Detected     NDM (Carbapenemase ) N/A     OXA-48-like (Carbapenemase ) N/A     Royal/B (VRE gene) N/A     VIM (Carbapenemase ) N/A     Enterococcus faecalis Not Detected     Enterococcus faecium Not Detected     Listeria monocytogenes Not Detected     Staphylococcus spp. Detected     Staphylococcus aureus Detected     Staphylococcus epidermidis Not Detected     Staphylococcus lugdunensis Not Detected     Streptococcus spp. Not Detected     Streptococcus agalactiae (Group  B) Not Detected     Streptococcus pneumoniae Not Detected     Streptococcus pyogenes (Group A) Not Detected     Acinetobacter calcoaceticus/baumannii complex Not Detected     Bacteroides fragilis Not Detected     Enterobacterales Not Detected     Enterobacter cloacae complex Not Detected     Escherichia coli Not Detected     Klebsiella aerogenes Not Detected     Klebsiella oxytoca Not Detected     Klebsiella pneumoniae group Not Detected     Proteus spp. Not Detected     Salmonella spp. Not Detected     Serratia marcescens Not Detected     Haemophilus influenzae Not Detected     Neisseria meningitidis Not Detected     Pseudomonas aeruginosa Not Detected     Stenotrophomonas maltophilia Not Detected     Candida albicans Not Detected     Candida auris Not Detected     Candida glabrata Not Detected     Candida krusei Not Detected     Candida parapsilosis Not Detected     Candida tropicalis Not Detected     Cryptococcus neoformans/gattii Not Detected    Narrative:      The [employer] BCID2 Panel is a multiplexed nucleic acid test intended for the use with [employer] or [employer] for the simultaneous qualitative detection and identification of multiple bacterial and yeast nucleic acids and select genetic determinants associated with antimicrobial resistance.  The [employer] BCID2 Panel test is performed directly on blood culture samples identified as positive by a continuous monitoring blood culture system.  Results are intended to be interpreted in conjunction with Gram stain results.    Fungal Culture [[id number]] Collected: 10/16/24 1147    Order Status: Sent Specimen: Body Fluid from Kidney, Right Updated: 10/16/24 1201    Blood Culture [[id number]]  (Normal) Collected: 10/09/24 1728    Order Status: Completed Specimen: Blood Updated: 10/14/24 1900     Blood Culture No Growth at 5 days             Significant Imaging: I have reviewed all pertinent imaging results/findings within the  past 24 hours.      Milo Campbell MD  Infectious Disease  [facility]

## 2024-10-20 NOTE — PLAN OF CARE
Problem: Adult Inpatient Plan of Care  Goal: Plan of Care Review  10/20/2024 1507 by Zara Lawson RN  Outcome: Progressing  10/20/2024 1507 by Zara Lawson RN  Outcome: Progressing  10/20/2024 1507 by Zara Lawson RN  Outcome: Progressing  Goal: Patient-Specific Goal (Individualized)  10/20/2024 1507 by Zara Lawson RN  Outcome: Progressing  10/20/2024 1507 by Zara Lawson RN  Outcome: Progressing  10/20/2024 1507 by Zara Lawson RN  Outcome: Progressing  Goal: Absence of Hospital-Acquired Illness or Injury  10/20/2024 1507 by Zara Lawson RN  Outcome: Progressing  10/20/2024 1507 by Zara Lawson RN  Outcome: Progressing  10/20/2024 1507 by Zara Lawson RN  Outcome: Progressing  Goal: Optimal Comfort and Wellbeing  10/20/2024 1507 by Zara Lawson RN  Outcome: Progressing  10/20/2024 1507 by Zara Lawson RN  Outcome: Progressing  10/20/2024 1507 by Zara Lawson RN  Outcome: Progressing  Goal: Readiness for Transition of Care  10/20/2024 1507 by Zara Lawson RN  Outcome: Progressing  10/20/2024 1507 by Zara Lawson RN  Outcome: Progressing  10/20/2024 1507 by Zara Lawson RN  Outcome: Progressing     Problem: Bariatric Environmental Safety  Goal: Safety Maintained with Care  10/20/2024 1507 by Zara Lawson RN  Outcome: Progressing  10/20/2024 1507 by Zara Lawson RN  Outcome: Progressing  10/20/2024 1507 by Zara Lawson RN  Outcome: Progressing     Problem: Skin Injury Risk Increased  Goal: Skin Health and Integrity  10/20/2024 1507 by Zara Lawson RN  Outcome: Progressing  10/20/2024 1507 by Zara Lawson RN  Outcome: Progressing  10/20/2024 1507 by Zara Lawson RN  Outcome: Progressing     Problem: Hemodialysis  Goal: Safe, Effective Therapy Delivery  10/20/2024 1507 by Zara Lawson RN  Outcome: Progressing  10/20/2024 1507 by Zara Lawson, RN  Outcome: Progressing  10/20/2024 1507 by Zara Lawson, RN  Outcome: Progressing  Goal: Effective Tissue Perfusion  10/20/2024  1507 by Renny, Zara, RN  Outcome: Progressing  10/20/2024 1507 by Zara Lawson RN  Outcome: Progressing  10/20/2024 1507 by Zara Lawson RN  Outcome: Progressing  Goal: Absence of Infection Signs and Symptoms  10/20/2024 1507 by Zara Lawson RN  Outcome: Progressing  10/20/2024 1507 by Zara Lawson RN  Outcome: Progressing  10/20/2024 1507 by Zara Lawson RN  Outcome: Progressing     Problem: Infection  Goal: Absence of Infection Signs and Symptoms  10/20/2024 1507 by Zara Lawson RN  Outcome: Progressing  10/20/2024 1507 by Zara Lawson RN  Outcome: Progressing  10/20/2024 1507 by Zara Lawson RN  Outcome: Progressing     Problem: Wound  Goal: Optimal Coping  10/20/2024 1507 by Zara Lawson RN  Outcome: Progressing  10/20/2024 1507 by Zara Lawson RN  Outcome: Progressing  10/20/2024 1507 by Zara Lawson RN  Outcome: Progressing  Goal: Optimal Functional Ability  10/20/2024 1507 by Zara Lawson RN  Outcome: Progressing  10/20/2024 1507 by Zara Lawson RN  Outcome: Progressing  10/20/2024 1507 by Zara Lawson RN  Outcome: Progressing  Goal: Improved Oral Intake  10/20/2024 1507 by Zara Lawson RN  Outcome: Progressing  10/20/2024 1507 by Zara Lawson RN  Outcome: Progressing  10/20/2024 1507 by Zara Lawson RN  Outcome: Progressing  Goal: Optimal Pain Control and Function  10/20/2024 1507 by Zara Lawson RN  Outcome: Progressing  10/20/2024 1507 by Zara Lawson RN  Outcome: Progressing  10/20/2024 1507 by Zara Lawson RN  Outcome: Progressing  Goal: Skin Health and Integrity  10/20/2024 1507 by Zara Lawson RN  Outcome: Progressing  10/20/2024 1507 by Zara Lawson RN  Outcome: Progressing  10/20/2024 1507 by Zara Lawson RN  Outcome: Progressing  Goal: Optimal Wound Healing  10/20/2024 1507 by Zara Lawson, RN  Outcome: Progressing  10/20/2024 1507 by Zara Lawson RN  Outcome: Progressing  10/20/2024 1507 by Zara Lawson, RN  Outcome: Progressing     Problem: Fall  Injury Risk  Goal: Absence of Fall and Fall-Related Injury  10/20/2024 1507 by Zara Lawson, RN  Outcome: Progressing  10/20/2024 1507 by Zara Lawson RN  Outcome: Progressing  10/20/2024 1507 by Zara Lawson, RN  Outcome: Progressing

## 2024-10-20 NOTE — PROGRESS NOTES
"[facility]    Nephrology Progress Note    Patient Name: Fior Joya  Age: 69 y.o.  : 1955  MRN: [id number]  Admission Date: 10/3/2024    Chief complaint: Back Pain (Pt wheeled into triage and presents via POV. Pt reports lower back pain and R hip pain that began 5 days PTA. Urostomy placed approx 2 months ago with minimal cloudy output that pt reports for approx 1 month. ESRD tues/thurs/Sat dialysis but did not present today d/t increasing pain. Denies SOB and CP at this time)      Hospital course  Fior Joya is a 69 y.o. White female with past medical history of end-stage renal disease, hypertension, dyslipidemia, chronic hydronephrosis anemia of chronic disease, and morbid obesity.  Patient presented to the emergency department on 10/03/2024 with complaints of right flank pain and cloudy urostomy output.  CT of the abdomen revealed moderate to severe hydronephrosis and pelvic lymphadenopathy.  Urology was consulted, patient underwent cystoscopy with right ureteral stent exchange and left percutaneous nephrostomy.  Blood culture was positive for MRSA, errol was negative for signs of endocarditis.  Tunneled dialysis catheter was removed and patient is now dialyzing by way of left IJ temporary dialysis catheter.  Nephrology continues to follow for assistance with management of ESRD.    Subjective  Patient is resting in bed, complains of right flank pain.     Review of Systems  Cardiovascular:  Negative for chest pain   Respiratory: Negative for shortness of breath     Objective  /73   Pulse 68   Temp 97.5 °F (36.4 °C) (Oral)   Resp 18   Ht 5' 3" (1.6 m)   Wt (!) 140.2 kg (309 lb 1.4 oz)   SpO2 100%   BMI 54.75 kg/m²     Intake/Output Summary (Last 24 hours) at 10/20/2024 0938  Last data filed at 10/20/2024 0600  Gross per 24 hour   Intake --   Output 3078 ml   Net -3078 ml       Physical Exam  General appearance:  Chronically ill, morbidly obese female in no acute " distress.  HEENT: PERRLA, EOMI, no scleral icterus, no JVD. Neck is supple.  Left IJ temporary dialysis catheter is in place  Chest: Respirations are unlabored. Lungs sounds are clear.   Heart: S1, S2.   Abdomen:  Obese.  Right nephrostomy draining brownish urine.  : Deferred.  Extremities: No edema, peripheral pulses are palpable.   Neuro: No focal deficits.     Medications  Scheduled Meds:   atorvastatin  20 mg Oral QHS    ceftaroline (Teflaro) IV (PEDS and ADULTS)  200 mg Intravenous Q8H    enoxaparin  30 mg Subcutaneous Daily    folic acid  1 mg Oral Daily    pantoprazole  40 mg Oral Daily    sertraline  25 mg Oral Daily    sevelamer carbonate  800 mg Oral TID WM    sodium bicarbonate  1,300 mg Oral TID     Continuous Infusions:  PRN Meds:.  Current Facility-Administered Medications:     acetaminophen, 650 mg, Oral, Q4H PRN    albuterol-ipratropium, 3 mL, Nebulization, Once PRN    aluminum-magnesium hydroxide-simethicone, 30 mL, Oral, QID PRN    bisacodyL, 10 mg, Rectal, Daily PRN    diphenhydrAMINE, 25 mg, Intravenous, Q6H PRN    heparin (porcine), 3,000 Units, Intravenous, PRN    melatonin, 6 mg, Oral, Nightly PRN    metoclopramide, 10 mg, Intravenous, Q10 Min PRN    ondansetron, 4 mg, Intravenous, Q4H PRN    oxyCODONE-acetaminophen, 1 tablet, Oral, Q4H PRN    polyethylene glycol, 17 g, Oral, BID PRN    prochlorperazine, 5 mg, Intravenous, Q6H PRN    senna-docusate 8.6-50 mg, 2 tablet, Oral, BID PRN    sodium chloride 0.9%, 10 mL, Intravenous, PRN    vancomycin - pharmacy to dose, , Intravenous, pharmacy to manage frequency     Imaging:    Reviewed    Laboratory Data:    Chemistry  Recent Labs     10/18/24  0613 10/19/24  0428   * 129*   K 4.8 5.0   CO2 19* 17*   BUN 49.5* 67.3*   CREATININE 4.38* 5.17*   EGFRNORACEVR 10 9   GLUCOSE 85 91   CALCIUM 7.9* 8.6   MG 1.90  --    PHOS  --  4.5      LFT  Lab Results   Component Value Date    ALKPHOS 126 10/18/2024    AST 11 10/18/2024    ALT 8 10/18/2024     BILITOT 0.3 10/18/2024    ALBUMIN 1.9 (L) 10/19/2024      CKD-MBD  Lab Results   Component Value Date    .7 (H) 06/23/2024    [id number] 15 (L) 06/23/2024      Hematology  Recent Labs     10/18/24  0613 10/19/24  0428   WBC 9.03 9.97   HGB 8.8* 9.0*   HCT 27.8* 27.7*    322      Lab Results   Component Value Date    IRON 20 (L) 10/05/2024    TIBC 91 (L) 10/05/2024    FERRITIN 4,645.60 (H) 10/05/2024    FOLATE 6.1 (L) 06/22/2024    [id number] 681 06/22/2024      ID  Lab Results   Component Value Date    HIV Nonreactive 07/08/2024    HEPCAB Nonreactive 07/07/2024      Additional serology  Lab Results   Component Value Date    MSPIKEPCT  07/07/2024      Comment:      Not observed    ANAHS <1:80 (Negative) 06/24/2024    CANCA Negative 06/24/2024    PANCA Negative 06/24/2024    HGBA1C 4.7 06/22/2024       Urine studies  Lab Results   Component Value Date    APPEARANCEUA Turbid (A) 10/03/2024    SGUA 1.017 10/03/2024    PROTEINUA 2+ (A) 10/03/2024    KETONESUA Negative 10/03/2024    LEUKOCYTESUR 500 (A) 10/03/2024    RBCUA 50-99 (A) 10/03/2024    WBCUA >100 (A) 10/03/2024    BACTERIA Many (A) 10/03/2024    SQEPUA None Seen 10/03/2024    CREATRANDUR 29.5 (L) 06/21/2024    PROTEINURINE 241.3 06/21/2024        Impression  End-stage renal disease   Hyponatremia   Metabolic acidosis  Bilateral hydronephrosis   Bladder cancer  Persistent MRSA bacteremia   Hypertension   Anemia of chronic disease  History of dyslipidemia    Plan  Will continue hemodialysis per Tuesday, Thursday, Saturday schedule while hospitalized.   Continue current medication regimen.  DOLLY is on hold due to recently diagnosed malignancy.    Julisa Reese NP  [facility] Nephrology  10/20/2024

## 2024-10-21 PROBLEM — R78.81 BACTEREMIA: Status: ACTIVE | Noted: 2024-10-21

## 2024-10-21 PROBLEM — N18.6 ESRD (END STAGE RENAL DISEASE): Status: ACTIVE | Noted: 2024-10-21

## 2024-10-21 PROBLEM — N13.39 OTHER HYDRONEPHROSIS: Status: ACTIVE | Noted: 2024-10-21

## 2024-10-21 LAB
ANION GAP SERPL CALC-SCNC: 20 MEQ/L
BASOPHILS # BLD AUTO: 0.07 X10(3)/MCL
BASOPHILS NFR BLD AUTO: 0.9 %
BUN SERPL-MCNC: 66.6 MG/DL (ref 9.8–20.1)
CALCIUM SERPL-MCNC: 8 MG/DL (ref 8.4–10.2)
CHLORIDE SERPL-SCNC: 89 MMOL/L (ref 98–107)
CO2 SERPL-SCNC: 18 MMOL/L (ref 23–31)
CREAT SERPL-MCNC: 5.03 MG/DL (ref 0.55–1.02)
CREAT/UREA NIT SERPL: 13
EOSINOPHIL # BLD AUTO: 0.31 X10(3)/MCL (ref 0–0.9)
EOSINOPHIL NFR BLD AUTO: 4.2 %
ERYTHROCYTE [DISTWIDTH] IN BLOOD BY AUTOMATED COUNT: 16.5 % (ref 11.5–17)
GFR SERPLBLD CREATININE-BSD FMLA CKD-EPI: 9 ML/MIN/1.73/M2
GLUCOSE SERPL-MCNC: 91 MG/DL (ref 82–115)
HCT VFR BLD AUTO: 27.9 % (ref 37–47)
HGB BLD-MCNC: 8.7 G/DL (ref 12–16)
IMM GRANULOCYTES # BLD AUTO: 0.15 X10(3)/MCL (ref 0–0.04)
IMM GRANULOCYTES NFR BLD AUTO: 2 %
LYMPHOCYTES # BLD AUTO: 1.16 X10(3)/MCL (ref 0.6–4.6)
LYMPHOCYTES NFR BLD AUTO: 15.6 %
MCH RBC QN AUTO: 29.2 PG (ref 27–31)
MCHC RBC AUTO-ENTMCNC: 31.2 G/DL (ref 33–36)
MCV RBC AUTO: 93.6 FL (ref 80–94)
MONOCYTES # BLD AUTO: 0.88 X10(3)/MCL (ref 0.1–1.3)
MONOCYTES NFR BLD AUTO: 11.8 %
NEUTROPHILS # BLD AUTO: 4.87 X10(3)/MCL (ref 2.1–9.2)
NEUTROPHILS NFR BLD AUTO: 65.5 %
NRBC BLD AUTO-RTO: 0 %
PLATELET # BLD AUTO: 370 X10(3)/MCL (ref 130–400)
PMV BLD AUTO: 10.1 FL (ref 7.4–10.4)
POTASSIUM SERPL-SCNC: 4.9 MMOL/L (ref 3.5–5.1)
RBC # BLD AUTO: 2.98 X10(6)/MCL (ref 4.2–5.4)
SODIUM SERPL-SCNC: 127 MMOL/L (ref 136–145)
WBC # BLD AUTO: 7.44 X10(3)/MCL (ref 4.5–11.5)

## 2024-10-21 PROCEDURE — 99233 SBSQ HOSP IP/OBS HIGH 50: CPT | Mod: GT,GC,, | Performed by: HOSPITALIST

## 2024-10-21 PROCEDURE — 25000003 PHARM REV CODE 250: Performed by: INTERNAL MEDICINE

## 2024-10-21 PROCEDURE — 21400001 HC TELEMETRY ROOM

## 2024-10-21 PROCEDURE — 25000003 PHARM REV CODE 250: Performed by: GENERAL PRACTICE

## 2024-10-21 PROCEDURE — 63600175 PHARM REV CODE 636 W HCPCS: Performed by: INTERNAL MEDICINE

## 2024-10-21 PROCEDURE — 25000003 PHARM REV CODE 250: Performed by: UROLOGY

## 2024-10-21 PROCEDURE — 63600175 PHARM REV CODE 636 W HCPCS: Performed by: UROLOGY

## 2024-10-21 PROCEDURE — 85025 COMPLETE CBC W/AUTO DIFF WBC: CPT | Performed by: STUDENT IN AN ORGANIZED HEALTH CARE EDUCATION/TRAINING PROGRAM

## 2024-10-21 PROCEDURE — 27000207 HC ISOLATION

## 2024-10-21 PROCEDURE — 97530 THERAPEUTIC ACTIVITIES: CPT

## 2024-10-21 PROCEDURE — 36415 COLL VENOUS BLD VENIPUNCTURE: CPT | Performed by: STUDENT IN AN ORGANIZED HEALTH CARE EDUCATION/TRAINING PROGRAM

## 2024-10-21 PROCEDURE — 63600175 PHARM REV CODE 636 W HCPCS: Mod: JZ,JG | Performed by: GENERAL PRACTICE

## 2024-10-21 PROCEDURE — 80048 BASIC METABOLIC PNL TOTAL CA: CPT | Performed by: STUDENT IN AN ORGANIZED HEALTH CARE EDUCATION/TRAINING PROGRAM

## 2024-10-21 RX ADMIN — ENOXAPARIN SODIUM 30 MG: 30 INJECTION SUBCUTANEOUS at 05:10

## 2024-10-21 RX ADMIN — FOLIC ACID 1 MG: 1 TABLET ORAL at 09:10

## 2024-10-21 RX ADMIN — SODIUM BICARBONATE 650 MG TABLET 1300 MG: at 10:10

## 2024-10-21 RX ADMIN — OXYCODONE AND ACETAMINOPHEN 1 TABLET: 10; 325 TABLET ORAL at 04:10

## 2024-10-21 RX ADMIN — ONDANSETRON 4 MG: 2 INJECTION INTRAMUSCULAR; INTRAVENOUS at 05:10

## 2024-10-21 RX ADMIN — ATORVASTATIN CALCIUM 20 MG: 10 TABLET, FILM COATED ORAL at 10:10

## 2024-10-21 RX ADMIN — PANTOPRAZOLE SODIUM 40 MG: 40 TABLET, DELAYED RELEASE ORAL at 09:10

## 2024-10-21 RX ADMIN — OXYCODONE AND ACETAMINOPHEN 1 TABLET: 10; 325 TABLET ORAL at 09:10

## 2024-10-21 RX ADMIN — OXYCODONE AND ACETAMINOPHEN 1 TABLET: 10; 325 TABLET ORAL at 02:10

## 2024-10-21 RX ADMIN — SEVELAMER CARBONATE 800 MG: 800 TABLET, FILM COATED ORAL at 12:10

## 2024-10-21 RX ADMIN — OXYCODONE AND ACETAMINOPHEN 1 TABLET: 10; 325 TABLET ORAL at 07:10

## 2024-10-21 RX ADMIN — CEFTAROLINE FOSAMIL 200 MG: 600 POWDER, FOR SOLUTION INTRAVENOUS at 04:10

## 2024-10-21 RX ADMIN — SERTRALINE HYDROCHLORIDE 25 MG: 25 TABLET ORAL at 09:10

## 2024-10-21 NOTE — SUBJECTIVE & OBJECTIVE
Interval History:     Review of Systems   Constitutional:  Positive for activity change and fatigue.   HENT: Negative.     Eyes: Negative.    Respiratory: Negative.     Cardiovascular: Negative.    Gastrointestinal:  Positive for abdominal pain.   Endocrine: Negative.    Genitourinary:  Positive for difficulty urinating.   Musculoskeletal:  Positive for back pain.   Skin:  Positive for wound.   Allergic/Immunologic: Negative.    Neurological:  Positive for weakness.   Hematological: Negative.    Psychiatric/Behavioral:  Positive for confusion.      Objective:     Vital Signs (Most Recent):  Temp: 98.1 °F (36.7 °C) (10/21/24 1154)  Pulse: 67 (10/21/24 1154)  Resp: 16 (10/21/24 0958)  BP: 122/68 (10/21/24 1154)  SpO2: 100 % (10/21/24 1154) Vital Signs (24h Range):  Temp:  [97.6 °F (36.4 °C)-98.2 °F (36.8 °C)] 98.1 °F (36.7 °C)  Pulse:  [60-67] 67  Resp:  [16-18] 16  SpO2:  [97 %-100 %] 100 %  BP: (113-147)/(62-89) 122/68     Weight: (!) 140.2 kg (309 lb 1.4 oz)  Body mass index is 54.75 kg/m².    Intake/Output Summary (Last 24 hours) at 10/21/2024 1358  Last data filed at 10/21/2024 0600  Gross per 24 hour   Intake --   Output 675 ml   Net -675 ml         Physical Exam  Constitutional:       General: She is awake.      Appearance: She is obese.   HENT:      Head: Normocephalic and atraumatic.      Nose: Nose normal.      Mouth/Throat:      Mouth: Mucous membranes are moist.      Pharynx: Oropharynx is clear.   Eyes:      Extraocular Movements: Extraocular movements intact.      Conjunctiva/sclera: Conjunctivae normal.      Pupils: Pupils are equal, round, and reactive to light.   Cardiovascular:      Rate and Rhythm: Normal rate and regular rhythm.      Pulses: Normal pulses.      Heart sounds: Normal heart sounds.   Pulmonary:      Effort: Pulmonary effort is normal.      Breath sounds: Normal breath sounds.   Abdominal:      General: Bowel sounds are normal.      Palpations: Abdomen is soft.   Musculoskeletal:        "  General: Normal range of motion.      Cervical back: Normal range of motion and neck supple.   Skin:     General: Skin is warm and dry.      Capillary Refill: Capillary refill takes 2 to 3 seconds.      Findings: Lesion present.   Neurological:      General: No focal deficit present.      Mental Status: Mental status is at baseline.   Psychiatric:         Speech: Speech normal.         Cognition and Memory: Cognition is impaired. Memory is impaired.             Significant Labs: All pertinent labs within the past 24 hours have been reviewed.  BMP:   Recent Labs   Lab 10/21/24  0609   *   K 4.9   CL 89*   CO2 18*   BUN 66.6*   CREATININE 5.03*   CALCIUM 8.0*     CBC:   Recent Labs   Lab 10/21/24  0609   WBC 7.44   HGB 8.7*   HCT 27.9*        CMP:   Recent Labs   Lab 10/21/24  0609   *   K 4.9   CL 89*   CO2 18*   BUN 66.6*   CREATININE 5.03*   CALCIUM 8.0*     Magnesium: No results for input(s): "MG" in the last 48 hours.    Significant Imaging: I have reviewed all pertinent imaging results/findings within the past 24 hours.  "

## 2024-10-21 NOTE — PROGRESS NOTES
.UROLOGY  PROGRESS  NOTE    Fior Joya 1955  [id number]  10/21/2024    POD 17 cystoscopy, right stent exchange upper pole moiety, bladder biopsy; right nephrostomy tube placement  with IR    S/p right neph tube placement into lower pole moiety and left nephroureteral stent exchange 10/16     Patient resting in bed  No complaints at time of rounds  No acute events overnight  Son at bedside    VSS, afebrile  95 mL right PCN  30 mL right PCN  150mL left PCN  WBC 7.44  H&H 8.7/27.9  BUN/Cr 66.6/5.03    Repeat BC yesterday pending  BC 10/17 with no growth at 72hrs      Exam:    NAD  Resp unlabored  Abd obese, soft, NTND   thin brownish/old blood-tinged drainage from upper pole right PCN; minimal drainage from lower pole moiety PCN, yellow urine in left PCN,  bag with minimal urine       Recent Results (from the past 24 hours)   Basic Metabolic Panel    Collection Time: 10/21/24  6:09 AM   Result Value Ref Range    Sodium 127 (L) 136 - 145 mmol/L    Potassium 4.9 3.5 - 5.1 mmol/L    Chloride 89 (L) 98 - 107 mmol/L    CO2 18 (L) 23 - 31 mmol/L    Glucose 91 82 - 115 mg/dL    Blood Urea Nitrogen 66.6 (H) 9.8 - 20.1 mg/dL    Creatinine 5.03 (H) 0.55 - 1.02 mg/dL    BUN/Creatinine Ratio 13     Calcium 8.0 (L) 8.4 - 10.2 mg/dL    Anion Gap 20.0 mEq/L    eGFR 9 mL/min/1.73/m2   CBC with Differential    Collection Time: 10/21/24  6:09 AM   Result Value Ref Range    WBC 7.44 4.50 - 11.50 x10(3)/mcL    RBC 2.98 (L) 4.20 - 5.40 x10(6)/mcL    Hgb 8.7 (L) 12.0 - 16.0 g/dL    Hct 27.9 (L) 37.0 - 47.0 %    MCV 93.6 80.0 - 94.0 fL    MCH 29.2 27.0 - 31.0 pg    MCHC 31.2 (L) 33.0 - 36.0 g/dL    RDW 16.5 11.5 - 17.0 %    Platelet 370 130 - 400 x10(3)/mcL    MPV 10.1 7.4 - 10.4 fL    Neut % 65.5 %    Lymph % 15.6 %    Mono % 11.8 %    Eos % 4.2 %    Basophil % 0.9 %    Lymph # 1.16 0.6 - 4.6 x10(3)/mcL    Neut # 4.87 2.1 - 9.2 x10(3)/mcL    Mono # 0.88 0.1 - 1.3 x10(3)/mcL    Eos # 0.31 0 - 0.9 x10(3)/mcL    Baso # 0.07 <=0.2  x10(3)/mcL    IG# 0.15 (H) 0 - 0.04 x10(3)/mcL    IG% 2.0 %    NRBC% 0.0 %       Assessment:  Bilateral hydronephrosis  -had left PCN placed in June of this year due to inability to place retrograde ureteral stent, also had right ureteral stent placed in one of the two collecting systems on 6/27  -s/p Cystoscopy with right stent exchange upper pole moiety, Retrograde pyelogram and Bladder biopsy 10/4  -unable to identify UO intra-op therefore IR placed a right PCN 10/4, CT from 10/8 reveals nephrostomy and stent both within upper pole moiety  -s/p right PCN into lower moiety and left nephroureteral stent exchange with IR 10/16    s/p bladder biopsy   -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 Please call as needed with any issues      Donna Varghese, JANIYAGREGORIA-BC

## 2024-10-21 NOTE — ASSESSMENT & PLAN NOTE
Creatine stable for now. BMP reviewed- noted Estimated Creatinine Clearance: 14.8 mL/min (A) (based on SCr of 5.03 mg/dL (H)). according to latest data. Based on current GFR, CKD stage is end stage.  Monitor UOP and serial BMP and adjust therapy as needed. Renally dose meds. Avoid nephrotoxic medications and procedures.

## 2024-10-21 NOTE — PROGRESS NOTES
[facility] [facility]  Progress Note    Patient Name: Fior Joya  MRN: [id number]  Patient Class: IP- Inpatient   Admission Date: 10/3/2024  Length of Stay: 17 days  Attending Physician: Terrance Hinkle MD  Primary Care Provider: Linda, Primary Doctor        Subjective:     Principal Problem:Other hydronephrosis        HPI:  69-year-old female with past medical history of hypertension, hyperlipidemia, chronic hydronephrosis with left nephrostomy tube, right ureteral stent, end-stage renal disease on hemodialysis, chronic anemia presented with right flank pain for the past 5 days and also reported cloudy urine output from the urostomy CT with IV contrast showed moderate to severe hydronephrosis with enlarged right pelvic lymph node increase in size from the prior with suspected malignancy also showed duplicated collecting system with dilation of the right lower pole urology was consulted patient is status post cystoscopy with right stent exchange and bladder biopsy and they were not able to identify the left ureteral orifice therefore had left percutaneous nephrostomy. Blood culture is growing Gram-positive cocci 2/2 bottles initially patient was started on cefepime but now we added vancomycin repeat blood cultures ordered  Id was consulted MRI of the C/T and L-spine was ordered that was negative for any abscess blood culture is growing MRSA TTE as such did not show any vegetation cardiology consulted for MADELYN  Bilateral hydronephrosis Status post right upper lobe ureteral stent exchange and right lower pole nephrostomy tube placement. Patient was slightly confused on October 7 so we had ordered CT of the head without contrast that was negative, ammonia, TSH everything was normal we also ordered CT of the abdomen and pelvis with contrast that was negative for any abscess cardiology consulted for MADELYN- was negative for endocarditis.  Dialysis catheter was removed, pt now with L IJ  central line. CT bilateral lower extremities with and without contrast showing no bone abnormalities.  There was symmetric uptake at the lower extremities on nuclear medicine exam .Nonspecific relatively increased radiotracer uptake at the bilateral distal femur and proximal tibia      Patient remains on vancomycin and Teflaro. Cultures have remained persistently positive. Re-drawn 10/17, negative thus far.     Overview/Hospital Course:  10/21/24-No changes today.  Patient is not wanting to go to any outside facility and wants to go home.  Seriously doubt in her current condition she can be cared for at home.    Interval History:     Review of Systems   Constitutional:  Positive for activity change and fatigue.   HENT: Negative.     Eyes: Negative.    Respiratory: Negative.     Cardiovascular: Negative.    Gastrointestinal:  Positive for abdominal pain.   Endocrine: Negative.    Genitourinary:  Positive for difficulty urinating.   Musculoskeletal:  Positive for back pain.   Skin:  Positive for wound.   Allergic/Immunologic: Negative.    Neurological:  Positive for weakness.   Hematological: Negative.    Psychiatric/Behavioral:  Positive for confusion.      Objective:     Vital Signs (Most Recent):  Temp: 98.1 °F (36.7 °C) (10/21/24 1154)  Pulse: 67 (10/21/24 1154)  Resp: 16 (10/21/24 0958)  BP: 122/68 (10/21/24 1154)  SpO2: 100 % (10/21/24 1154) Vital Signs (24h Range):  Temp:  [97.6 °F (36.4 °C)-98.2 °F (36.8 °C)] 98.1 °F (36.7 °C)  Pulse:  [60-67] 67  Resp:  [16-18] 16  SpO2:  [97 %-100 %] 100 %  BP: (113-147)/(62-89) 122/68     Weight: (!) 140.2 kg (309 lb 1.4 oz)  Body mass index is 54.75 kg/m².    Intake/Output Summary (Last 24 hours) at 10/21/2024 1358  Last data filed at 10/21/2024 0600  Gross per 24 hour   Intake --   Output 675 ml   Net -675 ml         Physical Exam  Constitutional:       General: She is awake.      Appearance: She is obese.   HENT:      Head: Normocephalic and atraumatic.      Nose: Nose  "normal.      Mouth/Throat:      Mouth: Mucous membranes are moist.      Pharynx: Oropharynx is clear.   Eyes:      Extraocular Movements: Extraocular movements intact.      Conjunctiva/sclera: Conjunctivae normal.      Pupils: Pupils are equal, round, and reactive to light.   Cardiovascular:      Rate and Rhythm: Normal rate and regular rhythm.      Pulses: Normal pulses.      Heart sounds: Normal heart sounds.   Pulmonary:      Effort: Pulmonary effort is normal.      Breath sounds: Normal breath sounds.   Abdominal:      General: Bowel sounds are normal.      Palpations: Abdomen is soft.   Musculoskeletal:         General: Normal range of motion.      Cervical back: Normal range of motion and neck supple.   Skin:     General: Skin is warm and dry.      Capillary Refill: Capillary refill takes 2 to 3 seconds.      Findings: Lesion present.   Neurological:      General: No focal deficit present.      Mental Status: Mental status is at baseline.   Psychiatric:         Speech: Speech normal.         Cognition and Memory: Cognition is impaired. Memory is impaired.             Significant Labs: All pertinent labs within the past 24 hours have been reviewed.  BMP:   Recent Labs   Lab 10/21/24  0609   *   K 4.9   CL 89*   CO2 18*   BUN 66.6*   CREATININE 5.03*   CALCIUM 8.0*     CBC:   Recent Labs   Lab 10/21/24  0609   WBC 7.44   HGB 8.7*   HCT 27.9*        CMP:   Recent Labs   Lab 10/21/24  0609   *   K 4.9   CL 89*   CO2 18*   BUN 66.6*   CREATININE 5.03*   CALCIUM 8.0*     Magnesium: No results for input(s): "MG" in the last 48 hours.    Significant Imaging: I have reviewed all pertinent imaging results/findings within the past 24 hours.    Assessment/Plan:      * Other hydronephrosis  Continue with current meds      ESRD (end stage renal disease)  Creatine stable for now. BMP reviewed- noted Estimated Creatinine Clearance: 14.8 mL/min (A) (based on SCr of 5.03 mg/dL (H)). according to latest data. " Based on current GFR, CKD stage is end stage.  Monitor UOP and serial BMP and adjust therapy as needed. Renally dose meds. Avoid nephrotoxic medications and procedures.    Bacteremia  IV antibiotics  Follow labs        VTE Risk Mitigation (From admission, onward)           Ordered     heparin (porcine) injection 3,000 Units  As needed (PRN)         10/11/24 1610     enoxaparin injection 30 mg  Daily         10/06/24 1324     IP VTE HIGH RISK PATIENT  Once         10/04/24 0148     Place sequential compression device  Until discontinued         10/04/24 0148                  DVT prophylaxis  IV antibiotics  Follow labs  HD as per nephrology  Needs placement but patient is saying no  Resume current treatment plan  Discharge Planning   [place]:      Code Status: Full Code   Is the patient medically ready for discharge?:     Reason for patient still in hospital (select all that apply): Patient trending condition, Laboratory test, Treatment, Consult recommendations, and PT / OT recommendations  Discharge Plan A: Home (TBD)                  Rishi Leonardo MD  Department of Hospital Medicine   [facility] - [address] [facility]

## 2024-10-21 NOTE — PT/OT/SLP PROGRESS
"Physical Therapy Treatment    Patient Name:  Fior Joya   MRN:  [id number]    Recommendations:     Discharge therapy intensity: Moderate Intensity Therapy   Discharge Equipment Recommendations: to be determined by next level of care  Barriers to discharge: Impaired mobility and Ongoing medical needs    Assessment:     Fior Joya is a 69 y.o. female admitted with a medical diagnosis of bilateral hydronephrosis s/p (B) nephrostomy placement, persistent MRSA bacteremia, bladder carcinoma, ESRD on HD.  She presents with the following impairments/functional limitations: weakness, impaired endurance, impaired functional mobility, impaired self care skills, pain, impaired balance . Pt with improved mobility this date, able to stand x2 attempts from EOB with minAx2. Requires max encouragement.    Rehab Prognosis: Good; patient would benefit from acute skilled PT services to address these deficits and reach maximum level of function.    Recent Surgery: Procedure(s) (LRB):  CYSTOSCOPY, WITH URETERAL STENT INSERTION (Bilateral) 17 Days Post-Op    Plan:     During this hospitalization, patient would benefit from acute PT services 5 x/week to address the identified rehab impairments via gait training, therapeutic activities, therapeutic exercises, neuromuscular re-education and progress toward the following goals:    Plan of Care Expires:  11/10/24    Subjective     Chief Complaint: "I can't do it"  Patient/Family Comments/goals: get stronger  Pain/Comfort:  Pain Rating 1: 0/10      Objective:     Communicated with RN prior to session.  Patient found HOB elevated with nephrostomy, berkowitz catheter, telemetry, pulse ox (continuous) upon PT entry to room.     General Precautions: Standard, fall, contact  Orthopedic Precautions:    Braces:    Respiratory Status: Room air  Blood Pressure: 140/60  Skin Integrity:  redness noted to perineal area; RN notified      Functional Mobility:  Bed Mobility:     Supine to Sit: stand by " assistance  Sit to Supine: moderate assistance and of 2 persons  Transfers:     Sit to Stand:  minimum assistance and of 2 persons with rolling walker  Balance: Sitting balance = SBA, standing balance = minAx2    Therapeutic Activities/Exercises:  Pt sat EOB x 10 mins with SBA - performed (B) ankle pumps and LAQ to fatigue  2 STS from EOB with RW / minAx2 max tolerance of 10s  Pt rolled L with Ana, totalA for pericare due to bowel incontinence. Notified RN of need for further cleaning/catheter care    Education:  Patient provided with verbal education education regarding PT role/goals/POC, fall prevention, safety awareness, and discharge/DME recommendations.  Understanding was verbalized, however additional teaching warranted.     Patient left HOB elevated with all lines intact, call button in reach, RN notified, and pt's son present    GOALS:   Multidisciplinary Problems       Physical Therapy Goals          Problem: Physical Therapy    Goal Priority Disciplines Outcome Interventions   Physical Therapy Goal     PT, PT/OT Progressing    Description: Goals to be met by: 11/10/24     Patient will increase functional independence with mobility by performin. Supine to sit with [place]  2. Sit to stand transfer with Modified [place]  3. Bed to chair transfer with Modified [place] using Rolling Walker  4. Gait  > or = 25 feet with Modified [place] using Rolling Walker.                          Time Tracking:     PT Received On: 10/21/24  PT Start Time: 1358     PT Stop Time: 1426  PT Total Time (min): 28 min     Billable Minutes: Therapeutic Activity 28 min    Treatment Type: Treatment  PT/PTA: PT     Number of PTA visits since last PT visit: 2     10/21/2024

## 2024-10-21 NOTE — PROGRESS NOTES
Inpatient Nutrition Assessment    Admit Date: 10/3/2024   Total duration of encounter: 18 days   Patient Age: 69 y.o.    Nutrition Recommendation/Prescription     Continue renal dialysis, easy to chew diet as tolerated  Continue Novasource Renal TID (provides 475 kcal and 22 gm protein per serving)  Honor food preferences   Continue bowel regimen as medically feasible  Monitor labs, intake and weight    Communication of Recommendations: reviewed with patient    Nutrition Assessment     Malnutrition Assessment/Nutrition-Focused Physical Exam    Malnutrition Context: acute illness or injury (10/14/24 1330)  Malnutrition Level: other (see comments) (does not meet criteria) (10/14/24 1330)  Energy Intake (Malnutrition): less than 75% for greater than 7 days (10/14/24 1330)  Weight Loss (Malnutrition): other (see comments) (does not meet criteria) (10/14/24 1330)  Subcutaneous Fat (Malnutrition): other (see comments) (does not meet criteria) (10/14/24 1330)           Muscle Mass (Malnutrition): other (see comments) (does not meet criteria) (10/14/24 1330)                                   A minimum of two characteristics is recommended for diagnosis of either severe or non-severe malnutrition.    Chart Review    Reason Seen: follow-up    Malnutrition Screening Tool Results   Have you recently lost weight without trying?: No  Have you been eating poorly because of a decreased appetite?: No   MST Score: 0   Diagnosis:  Persistent b/l hydronephrosis   S/p right neph tube placement and left nephroureteral stent exchange 10/16   Bladder Cancer   Persistent MRSA bacteremia  Leukocytosis- resolved   Encephalopathy ,metabolic- resolved  End-stage renal disease on hemodialysis  Metabolic acidosis, Hyponatremia     Relevant Medical History: HTN, HLD, chronic hydronephrosis with left nephrostomy tube, right ureteral stent, ESRD on HD, chronic anemia     Scheduled Medications:  atorvastatin, 20 mg, QHS  ceftaroline (Teflaro) IV  (PEDS and ADULTS), 200 mg, Q8H  enoxaparin, 30 mg, Daily  folic acid, 1 mg, Daily  pantoprazole, 40 mg, Daily  sertraline, 25 mg, Daily  sevelamer carbonate, 800 mg, TID WM  sodium bicarbonate, 1,300 mg, TID    Continuous Infusions:   PRN Medications:  acetaminophen, 650 mg, Q4H PRN  albuterol-ipratropium, 3 mL, Once PRN  aluminum-magnesium hydroxide-simethicone, 30 mL, QID PRN  bisacodyL, 10 mg, Daily PRN  diphenhydrAMINE, 25 mg, Q6H PRN  heparin (porcine), 3,000 Units, PRN  melatonin, 6 mg, Nightly PRN  metoclopramide, 10 mg, Q10 Min PRN  ondansetron, 4 mg, Q4H PRN  oxyCODONE-acetaminophen, 1 tablet, Q4H PRN  polyethylene glycol, 17 g, BID PRN  prochlorperazine, 5 mg, Q6H PRN  senna-docusate 8.6-50 mg, 2 tablet, BID PRN  sodium chloride 0.9%, 10 mL, PRN  vancomycin - pharmacy to dose, , pharmacy to manage frequency    Calorie Containing IV Medications: no significant kcals from medications at this time    Recent Labs   Lab 10/17/24  0603 10/18/24  0613 10/19/24  0428 10/21/24  0609   * 130* 129* 127*   K 5.3* 4.8 5.0 4.9   CALCIUM 7.8* 7.9* 8.6 8.0*   PHOS 5.4*  --  4.5  --    MG 1.90 1.90  --   --    CL 87* 93* 91* 89*   CO2 15* 19* 17* 18*   BUN 73.9* 49.5* 67.3* 66.6*   CREATININE 5.95* 4.38* 5.17* 5.03*   EGFRNORACEVR 7 10 9 9   GLUCOSE 93 85 91 91   BILITOT 0.2 0.3  --   --    ALKPHOS 135 126  --   --    ALT 7 8  --   --    AST 10 11  --   --    ALBUMIN 2.0* 2.0* 1.9*  --    WBC 11.82* 9.03 9.97 7.44   HGB 9.3* 8.8* 9.0* 8.7*   HCT 28.6* 27.8* 27.7* 27.9*     Nutrition Orders:  Diet Renal On Dialysis Easy to Chew (IDDSI Level 7)  Dietary nutrition supplements TID; [facility] Vanilla    Appetite/Oral Intake: fair/50-75% of meals  Factors Affecting Nutritional Intake: constipation and decreased appetite  Social Needs Impacting Access to Food: none identified  Food/Pent[employer]al/Cultural Preferences:  no eggs or grits for breakfast, likes pancakes, waffles, corn flakes, oatmeal and toast with jelly and  butter  Food Allergies: no known food allergies  Last Bowel Movement: 10/17/24  Wound(s):  none noted    Comments    10/7/24: Pt off floor for stat CT at time of visit. Pt has been NPO since admit (x 4 days). Per MST, no reports of decreased appetite or unintentional weight loss PTA. No weight loss noted per EMR weight history. Per MD note, ST consulted; will monitor diet advancement and order ONS to ensure adequate nutrition.      10/8/24: Pt remains NPO per SLP, MBS pending when patient is more alert. Pt has been NPO since admit (>4 days). TF recs provided for if/when medically appropriate. RN notified.      10/11/24: Patient reports fair oral intake of meals served. Denies any nausea, vomiting, diarrhea and constipation.      10/14/24: RD consulted to discuss dietary options with patient. Patient complains of eating the same foods every day. Obtained breakfast food preferences and relayed to kitchen. Pt agreed to ONS with meals. Also provided patient with educational handout on renal diet. Discussed the lunch and dinner menu options with the patient as well. Pt reports continued poor-fair intake of meals, +nausea this AM and constipation. Reports good intake PTA and denies unintentional weight loss. No muscle or fat depletion noted per NFPE.     10/17/24: RD re-consulted to revisit patient for dietary preferences. Pt off floor for dialysis at time of rounds. RD previously obtained food preferences for breakfast and relayed to kitchen. Pt also receiving nutritional supplement with all meals, providing 475 kcal and 22 gm protein per serving (meets ~75% energy needs and ~63% protein needs). RD previously discussed lunch and dinner menu options and instructed how to order meals with . If intake remains decreased, consider liberalizing diet. No reports of n/v, last BM 10/12 noted. Will continue to current regimen and monitor.    10/21/24: Pt reports continued fair intake, 50% intake documented in EMR. Drinking  "Novasource, will continue. Reports breakfast preferences are not being honored for breakfast, will notify kitchen. Denies n/v, +constipation; PRN bowel meds noted.    Anthropometrics    Height: 5' 3" (160 cm), Height Method: Stated  Last Weight: (!) 140.2 kg (309 lb 1.4 oz) (10/10/24 1517), Weight Method: Standard Scale  BMI (Calculated): 54.8  BMI Classification: obese grade III (BMI >/=40)     Ideal Body Weight (IBW), Female: 115 lb     % Ideal Body Weight, Female (lb): 268.77 %                    Usual Body Weight (UBW), k.2 kg  % Usual Body Weight: 105.45     Usual Weight Provided By: EMR weight history and patient denies unintentional weight loss    Wt Readings from Last 5 Encounters:   10/10/24 (!) 140.2 kg (309 lb 1.4 oz)   07/10/24 (!) 140.2 kg (309 lb)   24 113.4 kg (250 lb)     Weight Change(s) Since Admission:   Wt Readings from Last 1 Encounters:   10/10/24 1517 (!) 140.2 kg (309 lb 1.4 oz)   10/03/24 1604 (!) 140.2 kg (309 lb)   Admit Weight: (!) 140.2 kg (309 lb) (10/03/24 1604), Weight Method: Stated    Estimated Needs    Weight Used For Calorie Calculations: (!) 140.2 kg (309 lb 1.4 oz)  Energy Calorie Requirements (kcal): 1901 kcal (1.0 SF)  Energy Need Method: [facility]  Weight Used For Protein Calculations: 87.5 kg (192 lb 14.4 oz) (AdjustedBW used)  Protein Requirements: 105-114 gm (1.2-1.3 gm/kg AdjustedBW)  Fluid Requirements (mL): urine output + 1,000 mL        Enteral Nutrition     Patient not receiving enteral nutrition at this time.    Parenteral Nutrition     Patient not receiving parenteral nutrition support at this time.    Evaluation of Received Nutrient Intake    Calories: meeting estimated needs meeting needs with 50% intake of meals and ONS TID  Protein: meeting estimated needs meeting needs with 50% intake of meals and ONS TID    Patient Education     Not applicable.    Nutrition Diagnosis     PES: Inadequate oral intake related to acute illness as evidenced by " <75% intake of meals for >7 days. (active)     Nutrition Interventions     Intervention(s): general/healthful diet, commercial beverage, prescription medication, and collaboration with other providers    Goal: Meet greater than 80% of nutritional needs by follow-up. (goal met)  Goal: Consume % of oral supplements by follow-up. (goal met)    Nutrition Goals & Monitoring     Dietitian will monitor: food and beverage intake, weight, electrolyte/renal panel, glucose/endocrine profile, and gastrointestinal profile  Discharge planning: continue renal diet with Novasource Renal or similar oral supplements  Nutrition Risk/Follow-Up: moderate (follow-up in 3-5 days)   Please consult if re-assessment needed sooner.

## 2024-10-21 NOTE — PROGRESS NOTES
"[facility]    Nephrology Progress Note    Patient Name: Fior Joya  Age: 69 y.o.  : 1955  MRN: [id number]  Admission Date: 10/3/2024    Chief complaint: Back Pain (Pt wheeled into triage and presents via POV. Pt reports lower back pain and R hip pain that began 5 days PTA. Urostomy placed approx 2 months ago with minimal cloudy output that pt reports for approx 1 month. ESRD tues/th/Sat dialysis but did not present today d/t increasing pain. Denies SOB and CP at this time)      Hospital course  Fior Joya is a 69 y.o. White female with past medical history of end-stage renal disease, hypertension, dyslipidemia, chronic hydronephrosis anemia of chronic disease, and morbid obesity.  Patient presented to the emergency department on 10/03/2024 with complaints of right flank pain and cloudy urostomy output.  CT of the abdomen revealed moderate to severe hydronephrosis and pelvic lymphadenopathy.  Urology was consulted, patient underwent cystoscopy with right ureteral stent exchange and left percutaneous nephrostomy.  Blood culture was positive for MRSA, errol was negative for signs of endocarditis.  Tunneled dialysis catheter was removed and patient is now dialyzing by way of left IJ temporary dialysis catheter.  Nephrology continues to follow for assistance with management of ESRD.    Subjective  Patient is resting in bed, complains of right flank pain.     10/21/2024   Currently lying down in the bed and reports that she has been tolerating oral nutrition and has no shortness of breath.  Awake alert oriented.  She now has 2 nephrostomy tubes in the right side and 1 on the left side and 1 indwelling Mario catheter.    Review of Systems  Cardiovascular:  Negative for chest pain   Respiratory: Negative for shortness of breath     Objective  BP (!) 141/71   Pulse 65   Temp 98 °F (36.7 °C) (Oral)   Resp 16   Ht 5' 3" (1.6 m)   Wt (!) 140.2 kg (309 lb 1.4 oz)   SpO2 97%   BMI 54.75 kg/m² "     Intake/Output Summary (Last 24 hours) at 10/21/2024 1117  Last data filed at 10/21/2024 0600  Gross per 24 hour   Intake --   Output 675 ml   Net -675 ml       Physical Exam  General appearance:   morbidly obese female in no acute distress.  HEENT:  no scleral icterus, no JVD. Neck is supple.  Left IJ temporary dialysis catheter is in place  Chest: Respirations are unlabored. Lungs sounds are clear.   Heart: S1, S2.   Abdomen:  Obese.  Right nephrostomy draining brownish urine.  : Deferred.  Extremities: No edema, peripheral pulses are palpable.   Neuro: No focal deficits.     Medications  Scheduled Meds:   atorvastatin  20 mg Oral QHS    ceftaroline (Teflaro) IV (PEDS and ADULTS)  200 mg Intravenous Q8H    enoxaparin  30 mg Subcutaneous Daily    folic acid  1 mg Oral Daily    pantoprazole  40 mg Oral Daily    sertraline  25 mg Oral Daily    sevelamer carbonate  800 mg Oral TID WM    sodium bicarbonate  1,300 mg Oral TID     Continuous Infusions:  PRN Meds:.  Current Facility-Administered Medications:     acetaminophen, 650 mg, Oral, Q4H PRN    albuterol-ipratropium, 3 mL, Nebulization, Once PRN    aluminum-magnesium hydroxide-simethicone, 30 mL, Oral, QID PRN    bisacodyL, 10 mg, Rectal, Daily PRN    diphenhydrAMINE, 25 mg, Intravenous, Q6H PRN    heparin (porcine), 3,000 Units, Intravenous, PRN    melatonin, 6 mg, Oral, Nightly PRN    metoclopramide, 10 mg, Intravenous, Q10 Min PRN    ondansetron, 4 mg, Intravenous, Q4H PRN    oxyCODONE-acetaminophen, 1 tablet, Oral, Q4H PRN    polyethylene glycol, 17 g, Oral, BID PRN    prochlorperazine, 5 mg, Intravenous, Q6H PRN    senna-docusate 8.6-50 mg, 2 tablet, Oral, BID PRN    sodium chloride 0.9%, 10 mL, Intravenous, PRN    vancomycin - pharmacy to dose, , Intravenous, pharmacy to manage frequency     Imaging:    Reviewed    Laboratory Data:    Chemistry  Recent Labs     10/19/24  0428 10/21/24  0609   * 127*   K 5.0 4.9   CO2 17* 18*   BUN 67.3* 66.6*    CREATININE 5.17* 5.03*   EGFRNORACEVR 9 9   GLUCOSE 91 91   CALCIUM 8.6 8.0*   PHOS 4.5  --       LFT  Lab Results   Component Value Date    ALKPHOS 126 10/18/2024    AST 11 10/18/2024    ALT 8 10/18/2024    BILITOT 0.3 10/18/2024    ALBUMIN 1.9 (L) 10/19/2024      CKD-MBD  Lab Results   Component Value Date    .7 (H) 06/23/2024    [id number] 15 (L) 06/23/2024      Hematology  Recent Labs     10/19/24  0428 10/21/24  0609   WBC 9.97 7.44   HGB 9.0* 8.7*   HCT 27.7* 27.9*    370      Lab Results   Component Value Date    IRON 20 (L) 10/05/2024    TIBC 91 (L) 10/05/2024    FERRITIN 4,645.60 (H) 10/05/2024    FOLATE 6.1 (L) 06/22/2024    UECIDLXV24 681 06/22/2024      ID  Lab Results   Component Value Date    HIV Nonreactive 07/08/2024    HEPCAB Nonreactive 07/07/2024      Additional serology  Lab Results   Component Value Date    MSPIKEPCT  07/07/2024      Comment:      Not observed    ANAHS <1:80 (Negative) 06/24/2024    CANCA Negative 06/24/2024    PANCA Negative 06/24/2024    HGBA1C 4.7 06/22/2024       Urine studies  Lab Results   Component Value Date    APPEARANCEUA Turbid (A) 10/03/2024    SGUA 1.017 10/03/2024    PROTEINUA 2+ (A) 10/03/2024    KETONESUA Negative 10/03/2024    LEUKOCYTESUR 500 (A) 10/03/2024    RBCUA 50-99 (A) 10/03/2024    WBCUA >100 (A) 10/03/2024    BACTERIA Many (A) 10/03/2024    SQEPUA None Seen 10/03/2024    CREATRANDUR 29.5 (L) 06/21/2024    PROTEINURINE 241.3 06/21/2024        Impression  End-stage renal disease   Hyponatremia   Metabolic acidosis  Bilateral hydronephrosis   Bladder cancer  Persistent MRSA bacteremia   Hypertension   Anemia of chronic disease  History of dyslipidemia    Plan  Will continue hemodialysis per Tuesday, Thursday, Saturday schedule while hospitalized.     Continue current medication regimen.    DOLLY is on hold due to recently diagnosed malignancy.

## 2024-10-21 NOTE — HPI
69-year-old female with past medical history of hypertension, hyperlipidemia, chronic hydronephrosis with left nephrostomy tube, right ureteral stent, end-stage renal disease on hemodialysis, chronic anemia presented with right flank pain for the past 5 days and also reported cloudy urine output from the urostomy CT with IV contrast showed moderate to severe hydronephrosis with enlarged right pelvic lymph node increase in size from the prior with suspected malignancy also showed duplicated collecting system with dilation of the right lower pole urology was consulted patient is status post cystoscopy with right stent exchange and bladder biopsy and they were not able to identify the left ureteral orifice therefore had left percutaneous nephrostomy. Blood culture is growing Gram-positive cocci 2/2 bottles initially patient was started on cefepime but now we added vancomycin repeat blood cultures ordered  Id was consulted MRI of the C/T and L-spine was ordered that was negative for any abscess blood culture is growing MRSA TTE as such did not show any vegetation cardiology consulted for MADELYN  Bilateral hydronephrosis Status post right upper lobe ureteral stent exchange and right lower pole nephrostomy tube placement. Patient was slightly confused on October 7 so we had ordered CT of the head without contrast that was negative, ammonia, TSH everything was normal we also ordered CT of the abdomen and pelvis with contrast that was negative for any abscess cardiology consulted for MADELYN- was negative for endocarditis.  Dialysis catheter was removed, pt now with L IJ central line. CT bilateral lower extremities with and without contrast showing no bone abnormalities.  There was symmetric uptake at the lower extremities on nuclear medicine exam .Nonspecific relatively increased radiotracer uptake at the bilateral distal femur and proximal tibia      Patient remains on vancomycin and Teflaro. Cultures have remained persistently  positive. Re-drawn 10/17, negative thus far.

## 2024-10-21 NOTE — PROGRESS NOTES
Infectious Disease  Progress Note    Patient Name: Fior Joya   MRN: [id number]   Admission Date: 10/3/2024   Hospital Length of Stay: 17 days  Attending Physician: Terrance Hinkle MD   Primary Care Provider: No, Primary Doctor     Isolation Status: Contact     Assessment/Plan:   69 y.o. female with a history significant for ESRD on hemodialysis through right IJ permanent catheter, chronic hydronephrosis with left nephrostomy tube, hypertension who was admitted on 10/3/2024 with lower back pain. Patient said that her symptoms started approximately 1 week ago with some neck pain that moved down to her shoulder. She then developed lower back pain that moved to her right leg. Patient also reports having cloudy drainage from her nephrostomy tube for the last 1 month. Patient reports worsening back/right flank pain for the last few days. Vital signs on admission showed temperature 97.2 F, heart rate 79, blood pressure 110/75. Laboratory workup showed WBC 53344, creatinine 6.70, lactic acid 1.8. Urinalysis showed more than 100 WBC. She had 2 sets of blood culture obtained which are growing MRSA. 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  The upper pole moiety is decompressed with a ureteral stent but the lower pole demonstrating moderate severe hydronephrosis, left nephrostomy ureteral stent drain with resolution of hydronephrosis. Patient underwent nephrostomy tube placement by IR into the inferior pole of the right renal collecting system.  Patient was also noted to MRSA bacteremia, she was started on vancomycin, on 10/07/2024, she appears to had worsening mental status, she has persistent leukocytosis.  ID is consulted for assistance in management.    10/14 - afebrile. blood cx in process   10/15 - off floor. afebrile. Blood cx peristently +. NM scan with increased update in b/l distal femur and tibia is not likely  due to infection. CT obained shows no abnormality. Recommend b/l tube exchange  10/16 - off floor to IR. afebrile. Blood cx in process.   10/17 - afebrile. S/p left stent ecahnge and placement of n-tube on right with purulent fluid drainage  10/18- afebrile. BCx and body fluid cx in process.   10/19 - off floor for HD. afebrile. Blood cx negative 48 hrs. Continue abx  10/20 - afebrile. Blood cx are clear for 48 hrs. Draw another set today and monitor.   10/21 - afebrile. Blood remain negative. Seems she has improved with drainage for kidney. Would continue temp cath until ready for discharge. Plan as outline below. ID will sign off, call back if needed.     1) Bacteremia  - MRSA  - suspect possible seeding of PCN tubes  - NM scan 10/12 - . Nonspecific relatively increased radiotracer uptake at the bilateral distal femur and proximal tibia.  Correlation with dedicated plain radiographs of this region recommended. No abnormal radiotracer localization within the visualized chest, abdomen, or pelvis.  - CT LE 10/15 - No appreciable acute osseous abnormality. symmetric uptake at the lower extremities on nuclear medicine exam most suggestive of aseptic marrow expansion.  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 Debris/filling defects within the base the urinary bladder  - BCx 10/10 - MRSA 3/4  - BCx 10/14 - MRSA 4/4  - BCx 10/17 - ngtd  - BCx 10/20 -  ngtd   - MADELYN 10/8 negative  - HD line removed 10/8   - HD cath left IJ in place 10/11 to present  - okay to place perm cath when ready for discharge  - s/p exchange of Left n-tube and placement of 2nd n-tube on right with drainage of pus 10/17  - currently on vanco 500mg, goal 15-20, Rx to dose with HD only, after session, plan for #14 days 10/17 to 10/31  - currently on ceftaroline dual therapy 10/12 to 10/24    2)  Cancer  - new dx  SCC bladder  - hydronephrosis s/p ureteral stents and nephrostomy tubes [ Nephrostomy left 10/16, Nephrostomy right #1 10/4, nephrstomy #3 10/16]  - Oncology consulted     3) Leukocytosis  - in setting of acute infection     ID will continue following. Please contact us with any questions or concerns.  Subjective:     Principal Problem: <principal problem not specified>     Interval History:   Appears improved today, no fevers, no chills, some issues with the PCN tube on the L being somewhat displaced     Review of Systems   Review of Systems   Constitutional:  Negative for chills, diaphoresis, fever and weight loss.   HENT:  Negative for congestion and nosebleeds.    Respiratory:  Negative for cough, sputum production and wheezing.    Cardiovascular:  Negative for chest pain, palpitations and leg swelling.   Gastrointestinal:  Negative for abdominal pain, diarrhea and vomiting.   Genitourinary:  Negative for dysuria, flank pain and frequency.   Musculoskeletal:  Negative for back pain, joint pain and neck pain.   Skin:  Negative for itching and rash.   Neurological:  Negative for sensory change, seizures, weakness and headaches.   Endo/Heme/Allergies:  Does not bruise/bleed easily.   All other systems reviewed and are negative.       Objective:     Vital Signs (Most Recent):  Temp: 97.6 °F (36.4 °C) (10/21/24 0408)  Pulse: 61 (10/21/24 0408)  Resp: 16 (10/21/24 0451)  BP: 129/89 (10/21/24 0408)  SpO2: 99 % (10/21/24 0408)  Vital Signs (24h Range):  Temp:  [97.6 °F (36.4 °C)-98.2 °F  (36.8 °C)] 97.6 °F (36.4 °C)  Pulse:  [60-64] 61  Resp:  [16-18] 16  SpO2:  [99 %] 99 %  BP: (113-147)/(62-89) 129/89      Weight:   Wt Readings from Last 1 Encounters:   10/10/24 (!) 140.2 kg (309 lb 1.4 oz)      Body mass index is Body mass index is 54.75 kg/m².     Estimated Creatinine Clearance: Estimated Creatinine Clearance: 14.8 mL/min (A) (based on SCr of 5.03 mg/dL (H)).     Lines/Drains/Airways       Central Venous Catheter Line  Duration                  Hemodialysis Catheter 10/11/24 left internal jugular 10 days              Drain  Duration                  Nephrostomy 10/04/24 1506 Right 8 Fr. 16 days         Urethral Catheter 10/14/24 1250 16 Fr. 6 days         Nephrostomy 10/16/24 1150 Right 8 Fr. 4 days         Nephrostomy 10/16/24 1201 Left 8 Fr. 4 days              Peripheral Intravenous Line  Duration                  Peripheral IV - Single Lumen 10/13/24 1953 22 G Left;Distal Forearm 7 days                     Physical Exam  Physical Exam  Constitutional:       Appearance: Normal appearance. She is obese.   HENT:      Head: Normocephalic and atraumatic.      Mouth/Throat:      Pharynx: No oropharyngeal exudate or posterior oropharyngeal erythema.   Eyes:      Extraocular Movements: Extraocular movements intact.      Pupils: Pupils are equal, round, and reactive to light.   Cardiovascular:      Rate and Rhythm: Normal rate and regular rhythm.      Heart sounds: No murmur heard.     Comments: Site of removal of the right chest wall HD catheter is clean  Pulmonary:      Effort: No respiratory distress.      Breath sounds: No wheezing, rhonchi or rales.   Abdominal:      General: Bowel sounds are normal. There is no distension.      Palpations: Abdomen is soft.      Tenderness: There is no abdominal tenderness.      Comments: Leonard PCN tubes bloody output   Genitourinary:     Comments: B/l PCNT bloody drainage    Mario - gross hematuria   Musculoskeletal:         General: No swelling or tenderness.       Cervical back: Neck supple. No rigidity or tenderness.      Comments: Still tenderness at the r hip    Lymphadenopathy:      Cervical: No cervical adenopathy.   Skin:     Findings: No lesion or rash.   Neurological:      General: No focal deficit present.      Mental Status: She is alert. Mental status is at baseline.      Cranial Nerves: No cranial nerve deficit.      Motor: No weakness.   Psychiatric:         Mood and Affect: Mood normal.         Behavior: Behavior normal.          Significant Labs: CBC:   Recent Labs   Lab 10/21/24  0609   WBC 7.44   HGB 8.7*   HCT 27.9*          CMP:   Recent Labs   Lab 10/21/24  0609   *   K 4.9   CL 89*   CO2 18*   BUN 66.6*   CREATININE 5.03*   CALCIUM 8.0*         Microbiology Results (last 7 days)       Procedure Component Value Units Date/Time    Blood Culture [[id number]]  (Normal) Collected: 10/17/24 2114    Order Status: Completed Specimen: Blood from Antecubital, Right Updated: 10/20/24 2300     Blood Culture No Growth At 72 Hours    Blood Culture [[id number]]  (Normal) Collected: 10/17/24 2114    Order Status: Completed Specimen: Blood from Antecubital, Left Updated: 10/20/24 2300     Blood Culture No Growth At 72 Hours    Blood Culture [[id number]] Collected: 10/20/24 1019    Order Status: Resulted Specimen: Blood Updated: 10/20/24 1110    Blood Culture [[id number]] Collected: 10/20/24 1019    Order Status: Resulted Specimen: Blood Updated: 10/20/24 1110    Body Fluid Culture [[id number]]  (Abnormal)  (Susceptibility) Collected: 10/16/24 1147    Order Status: Completed Specimen: Body Fluid from Kidney, Right Updated: 10/19/24 1015     Body Fluid Culture Many Methicillin resistant Staphylococcus aureus    Anaerobic Culture [[id number]] Collected: 10/16/24 1147    Order Status: Completed Specimen: Body Fluid from Kidney, Right Updated: 10/19/24 0814     Anaerobe Culture No Anaerobes Isolated    Blood Culture [[id number]]  (Abnormal)  (Susceptibility)  Collected: 10/15/24 2136    Order Status: Completed Specimen: Blood Updated: 10/19/24 0656     Blood Culture Methicillin resistant Staphylococcus aureus     GRAM STAIN Gram positive cocci      Seen in gram stain of broth only      2 of 2 bottles positive    Blood Culture [[id number]]  (Abnormal)  (Susceptibility) Collected: 10/15/24 2136    Order Status: Completed Specimen: Blood Updated: 10/19/24 0656     Blood Culture Methicillin resistant Staphylococcus aureus     GRAM STAIN Gram Positive Cocci, probable Staphylococcus      Seen in gram stain of broth only      2 of 2 bottles positive    Gram Stain [[id number]] Collected: 10/16/24 1147    Order Status: Completed Specimen: Body Fluid from Kidney, Right Updated: 10/17/24 0719     GRAM STAIN Many WBC observed      Few Gram positive cocci    Blood Culture [[id number]]  (Abnormal)  (Susceptibility) Collected: 10/14/24 0529    Order Status: Completed Specimen: Blood from Arm, Right Updated: 10/17/24 0710     Blood Culture Methicillin resistant Staphylococcus aureus     GRAM STAIN Gram Positive Cocci, probable Staphylococcus      Seen in gram stain of broth only      2 of 2 bottles positive    Blood Culture [[id number]]  (Abnormal)  (Susceptibility) Collected: 10/14/24 0529    Order Status: Completed Specimen: Blood from Hand, Right Updated: 10/17/24 0710     Blood Culture Methicillin resistant Staphylococcus aureus     GRAM STAIN Gram Positive Cocci, probable Staphylococcus      Seen in gram stain of broth only      2 of 2 bottles positive    BCID2 Panel [[id number]]  (Abnormal) Collected: 10/15/24 2136    Order Status: Completed Specimen: Blood Updated: 10/17/24 0638     CTX-M (ESBL ) N/A     IMP (Cabapenemase ) N/A     KPC resistance gene (Carbapenemase ) N/A     mcr-1 N/A     mecA ID N/A     Comment: Note: Antimicrobial resistance can occur via multiple mechanisms. A Not Detected result for antimicrobial resistance gene(s) does not indicate  antimicrobial susceptibility. Subculturing is required for species identification and susceptibility testing of   isolates.        mecA/C and MREJ (MRSA) gene Detected     NDM (Carbapenemase ) N/A     OXA-48-like (Carbapenemase ) N/A     Royal/B (VRE gene) N/A     VIM (Carbapenemase ) N/A     Enterococcus faecalis Not Detected     Enterococcus faecium Not Detected     Listeria monocytogenes Not Detected     Staphylococcus spp. Detected     Staphylococcus aureus Detected     Staphylococcus epidermidis Not Detected     Staphylococcus lugdunensis Not Detected     Streptococcus spp. Not Detected     Streptococcus agalactiae (Group B) Not Detected     Streptococcus pneumoniae Not Detected     Streptococcus pyogenes (Group A) Not Detected     Acinetobacter calcoaceticus/baumannii complex Not Detected     Bacteroides fragilis Not Detected     Enterobacterales Not Detected     Enterobacter cloacae complex Not Detected     Escherichia coli Not Detected     Klebsiella aerogenes Not Detected     Klebsiella oxytoca Not Detected     Klebsiella pneumoniae group Not Detected     Proteus spp. Not Detected     Salmonella spp. Not Detected     Serratia marcescens Not Detected     Haemophilus influenzae Not Detected     Neisseria meningitidis Not Detected     Pseudomonas aeruginosa Not Detected     Stenotrophomonas maltophilia Not Detected     Candida albicans Not Detected     Candida auris Not Detected     Candida glabrata Not Detected     Candida krusei Not Detected     Candida parapsilosis Not Detected     Candida tropicalis Not Detected     Cryptococcus neoformans/gattii Not Detected    Narrative:      The [employer] BCID2 Panel is a multiplexed nucleic acid test intended for the use with [employer]® 2.0 or [employer]® [employer] for the simultaneous qualitative detection and identification of multiple bacterial and yeast nucleic acids and select genetic determinants associated with  antimicrobial resistance.  The [employer] BCID2 Panel test is performed directly on blood culture samples identified as positive by a continuous monitoring blood culture system.  Results are intended to be interpreted in conjunction with Gram stain results.    Fungal Culture [[id number]] Collected: 10/16/24 1147    Order Status: Sent Specimen: Body Fluid from Kidney, Right Updated: 10/16/24 1201    Blood Culture [[id number]]  (Normal) Collected: 10/09/24 1728    Order Status: Completed Specimen: Blood Updated: 10/14/24 1900     Blood Culture No Growth at 5 days             Significant Imaging: I have reviewed all pertinent imaging results/findings within the past 24 hours.      Milo Campbell MD  Infectious Disease  [facility]

## 2024-10-21 NOTE — HOSPITAL COURSE
10/21/24-No changes today.  Patient is not wanting to go to any outside facility and wants to go home.  Seriously doubt in her current condition she can be cared for at home.  10/22/24-Patient still states that her son will take care of her at home with the drains in place.  IR consulted for evaluation for nephrostomy tube.  10/23/24-Waiting for IR to evaluate nephrostomy tube.  Will also need tunneled cath placed prior to d/c.  The patient still wants to go home and have her son care for her.  He will need some training.

## 2024-10-22 LAB
ALBUMIN SERPL-MCNC: 2 G/DL (ref 3.4–4.8)
ALBUMIN/GLOB SERPL: 0.4 RATIO (ref 1.1–2)
ALP SERPL-CCNC: 110 UNIT/L (ref 40–150)
ALT SERPL-CCNC: 5 UNIT/L (ref 0–55)
ANION GAP SERPL CALC-SCNC: 23 MEQ/L
AST SERPL-CCNC: 12 UNIT/L (ref 5–34)
BACTERIA BLD CULT: NORMAL
BACTERIA BLD CULT: NORMAL
BASOPHILS # BLD AUTO: 0.07 X10(3)/MCL
BASOPHILS NFR BLD AUTO: 0.7 %
BILIRUB SERPL-MCNC: 0.3 MG/DL
BUN SERPL-MCNC: 77.9 MG/DL (ref 9.8–20.1)
CALCIUM SERPL-MCNC: 8.5 MG/DL (ref 8.4–10.2)
CHLORIDE SERPL-SCNC: 86 MMOL/L (ref 98–107)
CO2 SERPL-SCNC: 17 MMOL/L (ref 23–31)
CREAT SERPL-MCNC: 5.97 MG/DL (ref 0.55–1.02)
CREAT/UREA NIT SERPL: 13
EOSINOPHIL # BLD AUTO: 0.36 X10(3)/MCL (ref 0–0.9)
EOSINOPHIL NFR BLD AUTO: 3.8 %
ERYTHROCYTE [DISTWIDTH] IN BLOOD BY AUTOMATED COUNT: 16.7 % (ref 11.5–17)
GFR SERPLBLD CREATININE-BSD FMLA CKD-EPI: 7 ML/MIN/1.73/M2
GLOBULIN SER-MCNC: 5.4 GM/DL (ref 2.4–3.5)
GLUCOSE SERPL-MCNC: 73 MG/DL (ref 82–115)
HCT VFR BLD AUTO: 27.7 % (ref 37–47)
HGB BLD-MCNC: 8.8 G/DL (ref 12–16)
IMM GRANULOCYTES # BLD AUTO: 0.21 X10(3)/MCL (ref 0–0.04)
IMM GRANULOCYTES NFR BLD AUTO: 2.2 %
LYMPHOCYTES # BLD AUTO: 1.24 X10(3)/MCL (ref 0.6–4.6)
LYMPHOCYTES NFR BLD AUTO: 13.1 %
MAGNESIUM SERPL-MCNC: 1.7 MG/DL (ref 1.6–2.6)
MCH RBC QN AUTO: 29.2 PG (ref 27–31)
MCHC RBC AUTO-ENTMCNC: 31.8 G/DL (ref 33–36)
MCV RBC AUTO: 92 FL (ref 80–94)
MONOCYTES # BLD AUTO: 1.02 X10(3)/MCL (ref 0.1–1.3)
MONOCYTES NFR BLD AUTO: 10.8 %
NEUTROPHILS # BLD AUTO: 6.58 X10(3)/MCL (ref 2.1–9.2)
NEUTROPHILS NFR BLD AUTO: 69.4 %
NRBC BLD AUTO-RTO: 0 %
PLATELET # BLD AUTO: 404 X10(3)/MCL (ref 130–400)
PMV BLD AUTO: 10.4 FL (ref 7.4–10.4)
POTASSIUM SERPL-SCNC: 5 MMOL/L (ref 3.5–5.1)
PROT SERPL-MCNC: 7.4 GM/DL (ref 5.8–7.6)
RBC # BLD AUTO: 3.01 X10(6)/MCL (ref 4.2–5.4)
SODIUM SERPL-SCNC: 126 MMOL/L (ref 136–145)
VANCOMYCIN SERPL-MCNC: 16 UG/ML (ref 15–20)
WBC # BLD AUTO: 9.48 X10(3)/MCL (ref 4.5–11.5)

## 2024-10-22 PROCEDURE — 80053 COMPREHEN METABOLIC PANEL: CPT | Performed by: INTERNAL MEDICINE

## 2024-10-22 PROCEDURE — 36415 COLL VENOUS BLD VENIPUNCTURE: CPT | Performed by: INTERNAL MEDICINE

## 2024-10-22 PROCEDURE — 25000003 PHARM REV CODE 250: Performed by: GENERAL PRACTICE

## 2024-10-22 PROCEDURE — 83735 ASSAY OF MAGNESIUM: CPT | Performed by: INTERNAL MEDICINE

## 2024-10-22 PROCEDURE — 80100016 HC MAINTENANCE HEMODIALYSIS

## 2024-10-22 PROCEDURE — 27000207 HC ISOLATION

## 2024-10-22 PROCEDURE — 25000003 PHARM REV CODE 250: Performed by: HOSPITALIST

## 2024-10-22 PROCEDURE — 25000003 PHARM REV CODE 250: Performed by: INTERNAL MEDICINE

## 2024-10-22 PROCEDURE — 80202 ASSAY OF VANCOMYCIN: CPT | Performed by: INTERNAL MEDICINE

## 2024-10-22 PROCEDURE — 63600175 PHARM REV CODE 636 W HCPCS: Mod: JZ,JG | Performed by: GENERAL PRACTICE

## 2024-10-22 PROCEDURE — 25000003 PHARM REV CODE 250: Performed by: UROLOGY

## 2024-10-22 PROCEDURE — 85025 COMPLETE CBC W/AUTO DIFF WBC: CPT | Performed by: INTERNAL MEDICINE

## 2024-10-22 PROCEDURE — 63600175 PHARM REV CODE 636 W HCPCS: Performed by: STUDENT IN AN ORGANIZED HEALTH CARE EDUCATION/TRAINING PROGRAM

## 2024-10-22 PROCEDURE — 63600175 PHARM REV CODE 636 W HCPCS: Performed by: INTERNAL MEDICINE

## 2024-10-22 PROCEDURE — 21400001 HC TELEMETRY ROOM

## 2024-10-22 PROCEDURE — 63600175 PHARM REV CODE 636 W HCPCS: Performed by: HOSPITALIST

## 2024-10-22 RX ADMIN — PANTOPRAZOLE SODIUM 40 MG: 40 TABLET, DELAYED RELEASE ORAL at 12:10

## 2024-10-22 RX ADMIN — CEFTAROLINE FOSAMIL 200 MG: 600 POWDER, FOR SOLUTION INTRAVENOUS at 04:10

## 2024-10-22 RX ADMIN — SODIUM BICARBONATE 650 MG TABLET 1300 MG: at 08:10

## 2024-10-22 RX ADMIN — VANCOMYCIN HYDROCHLORIDE 500 MG: 500 INJECTION, POWDER, LYOPHILIZED, FOR SOLUTION INTRAVENOUS at 08:10

## 2024-10-22 RX ADMIN — FOLIC ACID 1 MG: 1 TABLET ORAL at 12:10

## 2024-10-22 RX ADMIN — ENOXAPARIN SODIUM 30 MG: 30 INJECTION SUBCUTANEOUS at 04:10

## 2024-10-22 RX ADMIN — OXYCODONE AND ACETAMINOPHEN 1 TABLET: 10; 325 TABLET ORAL at 12:10

## 2024-10-22 RX ADMIN — SEVELAMER CARBONATE 800 MG: 800 TABLET, FILM COATED ORAL at 04:10

## 2024-10-22 RX ADMIN — SERTRALINE HYDROCHLORIDE 25 MG: 25 TABLET ORAL at 12:10

## 2024-10-22 RX ADMIN — SODIUM BICARBONATE 650 MG TABLET 1300 MG: at 04:10

## 2024-10-22 RX ADMIN — CEFTAROLINE FOSAMIL 200 MG: 600 POWDER, FOR SOLUTION INTRAVENOUS at 06:10

## 2024-10-22 RX ADMIN — CEFTAROLINE FOSAMIL 200 MG: 600 POWDER, FOR SOLUTION INTRAVENOUS at 12:10

## 2024-10-22 RX ADMIN — HEPARIN SODIUM 2700 UNITS: 1000 INJECTION INTRAVENOUS; SUBCUTANEOUS at 11:10

## 2024-10-22 RX ADMIN — ATORVASTATIN CALCIUM 20 MG: 10 TABLET, FILM COATED ORAL at 08:10

## 2024-10-22 RX ADMIN — SODIUM BICARBONATE 650 MG TABLET 1300 MG: at 12:10

## 2024-10-22 RX ADMIN — OXYCODONE AND ACETAMINOPHEN 1 TABLET: 10; 325 TABLET ORAL at 08:10

## 2024-10-22 RX ADMIN — CEFTAROLINE FOSAMIL 200 MG: 600 POWDER, FOR SOLUTION INTRAVENOUS at 09:10

## 2024-10-22 RX ADMIN — SEVELAMER CARBONATE 800 MG: 800 TABLET, FILM COATED ORAL at 12:10

## 2024-10-22 NOTE — PROGRESS NOTES
Nephrology  Note    Patient Name: Fior Joya  Age: 69 y.o.  : 1955  MRN: [id number]  Admission Date: 10/3/2024        Fior Joya is a 69 y.o. female who presents with right flank pain.  Past medical history significant for ESRD on hemodialysis TTS at [facility] via right IJ PermCath, chronic hydronephrosis with left nephrostomy tube in place, and stents to right ureter, hypertension, anemia, hyperphosphatemia, and hyperlipidemia.  Patient presents with worsening right flank pain.  She states the pain started in her right neck, in his now in her right flank/hip area.  Patient was started on hemodialysis in 2024 after being found to have significant acute kidney injury and hydronephrosis.  She has been maintained on hemodialysis at [facility] on a TTS schedule.  Prior to hospitalization her last dialysis was on 10/01/2024.  Nephrology consulted for ESRD management.  In the emergency department she was noted to have low blood pressure and has been given IV fluids.  Urology has also been consulted for concern hydronephrosis needing stent exchange.  She has also been started on antibiotics for possible UTI.  She has had nausea, and vomiting.  No chest pain, shortness of breath, or lower extremity edema.    10/07/2024  Chart reviewed.  Weekend events noted.  Patient has been NPO at least since her admission to the hospital and also has no IV fluids.  Lying down in the bed.  Having jerky movements all over the body and uncomfortable.  Now she has nephrostomy tube on both sides.  Both draining.  Serosanguineous fluid noted.  Patient is very slow to answer questions but did recognize me by name and she knows her own name.     10/08/2024  No acute events overnight.  Seen on hemodialysis.  She was endorsing shortness of breath, and has some lower extremity edema.    10/09/2024   Mentally she has cleared up completely.  Complains of pains all over the body for which she is on p.r.n. pain medications.  Also  complains of being nervous and on some anxiety medication.  No shortness of breath.  Tunneled dialysis catheter was removed yesterday.    10/10/2024  No acute events overnight.  No chest pain, shortness of breath, abdominal pain, nausea, vomiting, or lower extremity edema.  Found to have bladder cancer by Urology.    10/11/2024  No acute events overnight.  No new complaints.  Blood cultures remain positive.  No chest pain, shortness of breath, nausea, vomiting.    10/12/2024  Patient had Vas-Cath placed yesterday, and she was re-initiated on hemodialysis.  Dialyzed yesterday with 1.5 L UF.  No new complaints today.  No chest pain, shortness of breath, nausea, vomiting.  She does have mild lower extremity edema.    10/14/2024   Complains of pain all over the body.  No fever or chills.  Does not like her diet.  No nausea vomiting.  No shortness of breath.  No chest pains.  No cough cold congestion.    10/15/2024   Now patient also has indwelling Mario catheter.  Urology have some tagged WBC  scan and cystogram planned.  Patient will also need dialysis today.  No new complaints.    10/16/2024   Patient is NPO as she is going for procedure on the nephrostomy tubes.  Patient's son present in the room and he is very angry at every body reporting that patient is not getting the right kind of food to eat, patient is not getting right kind of care, patient is not getting proper therapy and is being told to move around and get out of the bed.  He also reported that he plans to go to the administration and may go seek help from attorneys.  All the above was said by the patient's son when the patient herself is very calm and content.    10/17/2024  Seen on hemodialysis.    10/18/2024  No acute events overnight.  Dialyzed yesterday with 2 L UF.  Tolerated well.  No cramping.  No chest pain, shortness of breath, nausea, vomiting.    10/22/2024  Seen on hemodialysis.    Current Facility-Administered Medications   Medication Dose  Route Frequency Provider Last Rate Last Admin    acetaminophen tablet 650 mg  650 mg Oral Q4H PRN Tawana Loaiza MD   650 mg at 10/19/24 1606    albuterol-ipratropium 2.5 mg-0.5 mg/3 mL nebulizer solution 3 mL  3 mL Nebulization Once PRN Mark Ying DO        aluminum-magnesium hydroxide-simethicone 200-200-20 mg/5 mL suspension 30 mL  30 mL Oral QID PRN Tawana Loaiza MD        atorvastatin tablet 20 mg  20 mg Oral QHS Tawana Loaiza MD   20 mg at 10/21/24 2235    bisacodyL suppository 10 mg  10 mg Rectal Daily PRN Terrance Hinkle MD   10 mg at 10/18/24 1618    ceftaroline fosamiL (TEFLARO) 200 mg in D5W 50 mL IVPB  200 mg Intravenous Q8H Milo Campbell MD   Stopped at 10/22/24 0705    diphenhydrAMINE injection 25 mg  25 mg Intravenous Q6H PRN Mark Ynig DO   25 mg at 10/09/24 0256    enoxaparin injection 30 mg  30 mg Subcutaneous Daily Macarena Leggett MD   30 mg at 10/21/24 1720    folic acid tablet 1 mg  1 mg Oral Daily Tawana Loaiza MD   1 mg at 10/21/24 0959    heparin (porcine) injection 3,000 Units  3,000 Units Intravenous PRN Neno Mercado DO   3,000 Units at 10/15/24 1413    melatonin tablet 6 mg  6 mg Oral Nightly PRN Tawana Loaiza MD   6 mg at 10/18/24 2130    metoclopramide injection 10 mg  10 mg Intravenous Q10 Min PRN Mark Ying DO        ondansetron injection 4 mg  4 mg Intravenous Q4H PRN Tawana Loaiza MD   4 mg at 10/21/24 1720    oxyCODONE-acetaminophen  mg per tablet 1 tablet  1 tablet Oral Q4H PRN Uriel Fletcher MD   1 tablet at 10/21/24 1958    pantoprazole EC tablet 40 mg  40 mg Oral Daily Tawana Loaiza MD   40 mg at 10/21/24 0959    polyethylene glycol packet 17 g  17 g Oral BID PRN Tawana Loaiza MD        prochlorperazine injection Soln 5 mg  5 mg Intravenous Q6H PRN Tawana Loaiza MD        senna-docusate 8.6-50 mg per tablet 2 tablet  2 tablet Oral BID PRN  Tawana Loaiza MD   2 tablet at 10/18/24 0851    sertraline tablet 25 mg  25 mg Oral Daily Shin Light MD   25 mg at 10/21/24 0959    sevelamer carbonate tablet 800 mg  800 mg Oral TID  Tawana Loaiza MD   800 mg at 10/21/24 1251    sodium bicarbonate tablet 1,300 mg  1,300 mg Oral TID Shin Light MD   1,300 mg at 10/21/24 2235    sodium chloride 0.9% flush 10 mL  10 mL Intravenous PRN Tawana Loaiza MD        vancomycin (VANCOCIN) 500 mg in D5W 100 mL IVPB (MB+)  500 mg Intravenous Once Constanza Gray MD        vancomycin - pharmacy to dose   Intravenous pharmacy to manage frequency Tawana Loaiza MD                Review of Systems:       Objective:       VITAL SIGNS: 24 HR MIN & MAX LAST    Temp  Min: 97.5 °F (36.4 °C)  Max: 98.1 °F (36.7 °C)  97.9 °F (36.6 °C)        BP  Min: 109/63  Max: 122/68  (!) 110/52     Pulse  Min: 59  Max: 104  (!) 59     Resp  Min: 16  Max: 17  17    SpO2  Min: 98 %  Max: 100 %  99 %      GEN:  Chronically ill-appearing WF  CV: RRR without rub or gallop.  PULM: CTAB, unlabored  ABD: Soft, NT/ND abdomen with NABS  :  Nephrostomy tubes in both right and left kidneys.    EXT:  1+ lower extremity edema  SKIN: Warm and dry  PSYCH: Awake, alert and appropriately conversant  Dialysis access:  Left IJ Vas-Cath            Component Value Date/Time     (L) 10/22/2024 0516     (L) 10/21/2024 0609    K 5.0 10/22/2024 0516    K 4.9 10/21/2024 0609    CO2 17 (L) 10/22/2024 0516    CO2 18 (L) 10/21/2024 0609    BUN 77.9 (H) 10/22/2024 0516    BUN 66.6 (H) 10/21/2024 0609    CREATININE 5.97 (H) 10/22/2024 0516    CREATININE 5.03 (H) 10/21/2024 0609    CALCIUM 8.5 10/22/2024 0516    CALCIUM 8.0 (L) 10/21/2024 0609    PHOS 4.5 10/19/2024 0428            Component Value Date/Time    WBC 9.48 10/22/2024 0516    WBC 7.44 10/21/2024 0609    WBC 25.58 10/07/2024 0541    WBC 26.04 10/06/2024 0449    HGB 8.8 (L) 10/22/2024  0516    HGB 8.7 (L) 10/21/2024 0609    HCT 27.7 (L) 10/22/2024 0516    HCT 27.9 (L) 10/21/2024 0609     (H) 10/22/2024 0516     10/21/2024 0609         Assessment / Plan:     ESRD - normally TTS at [facility].  Tunneled dialysis catheter removed 10/08/2024 due to persistent MRSA bacteremia  Metabolic acidosis  MRSA bacteremia  Bilateral hydronephrosis - now with nephrostomy tube both sides    Plan:  Seen on hemodialysis.  Tolerating well.  2 L UF as tolerated.  Will reach out to vascular surgery since her blood cultures are now negative for them to replace her tunneled dialysis catheter.

## 2024-10-22 NOTE — NURSING
10/22/24 1150   Post-Hemodialysis Assessment   Dialyzer Clearance Lightly streaked   Duration of Treatment 180 minutes   Total UF (mL) 1626 mL   Post-Hemodialysis Comments 3 hr.  1626ml net.  Pt had cramps at end of tx.  Pt only wanted to run 3 hr today.  VSS.  Cramping resolved post tx.  vss.

## 2024-10-22 NOTE — PT/OT/SLP PROGRESS
"Physical Therapy      Patient Name:  Fior Joya   MRN:  [id number]    Attempted treatment session at 1425. Patient politely declined to participate with therapy today, stating "I'm too exhausted after dialysis" . Will follow-up tomorrow as scheduling allows.    10/22/2024    "

## 2024-10-22 NOTE — SUBJECTIVE & OBJECTIVE
Interval History:     Review of Systems   Constitutional:  Positive for activity change and fatigue.   HENT: Negative.     Eyes: Negative.    Respiratory: Negative.     Cardiovascular: Negative.    Gastrointestinal:  Positive for abdominal pain.   Endocrine: Negative.    Genitourinary:  Positive for difficulty urinating.   Musculoskeletal:  Positive for gait problem.   Skin:  Positive for wound.   Allergic/Immunologic: Negative.    Neurological:  Positive for weakness.   Hematological: Negative.    Psychiatric/Behavioral:  Positive for confusion.      Objective:     Vital Signs (Most Recent):  Temp: 97.4 °F (36.3 °C) (10/22/24 1234)  Pulse: 66 (10/22/24 1234)  Resp: 18 (10/22/24 1232)  BP: 132/67 (10/22/24 1234)  SpO2: 98 % (10/22/24 1234) Vital Signs (24h Range):  Temp:  [97.4 °F (36.3 °C)-97.9 °F (36.6 °C)] 97.4 °F (36.3 °C)  Pulse:  [] 66  Resp:  [16-18] 18  SpO2:  [98 %-99 %] 98 %  BP: (109-132)/(52-67) 132/67     Weight: (!) 140.2 kg (309 lb 1.4 oz)  Body mass index is 54.75 kg/m².    Intake/Output Summary (Last 24 hours) at 10/22/2024 1312  Last data filed at 10/22/2024 1150  Gross per 24 hour   Intake --   Output 2126 ml   Net -2126 ml         Physical Exam  Constitutional:       General: She is awake.      Appearance: She is obese.   HENT:      Head: Normocephalic and atraumatic.      Nose: Nose normal.      Mouth/Throat:      Mouth: Mucous membranes are moist.      Pharynx: Oropharynx is clear.   Eyes:      Extraocular Movements: Extraocular movements intact.      Conjunctiva/sclera: Conjunctivae normal.      Pupils: Pupils are equal, round, and reactive to light.   Cardiovascular:      Rate and Rhythm: Normal rate and regular rhythm.      Pulses: Normal pulses.      Heart sounds: Normal heart sounds.   Pulmonary:      Effort: Pulmonary effort is normal.      Breath sounds: Normal breath sounds.   Abdominal:      General: Bowel sounds are normal.      Palpations: Abdomen is soft.   Musculoskeletal:          General: Normal range of motion.      Cervical back: Normal range of motion and neck supple.      Right lower leg: Edema present.      Left lower leg: Edema present.   Skin:     General: Skin is warm and dry.      Capillary Refill: Capillary refill takes 2 to 3 seconds.      Findings: Lesion present.   Neurological:      Mental Status: She is disoriented.   Psychiatric:         Speech: Speech is delayed.         Cognition and Memory: Cognition is impaired. Memory is impaired. She exhibits impaired recent memory and impaired remote memory.             Significant Labs: All pertinent labs within the past 24 hours have been reviewed.  BMP:   Recent Labs   Lab 10/22/24  0516   *   K 5.0   CL 86*   CO2 17*   BUN 77.9*   CREATININE 5.97*   CALCIUM 8.5   MG 1.70     CBC:   Recent Labs   Lab 10/21/24  0609 10/22/24  0516   WBC 7.44 9.48   HGB 8.7* 8.8*   HCT 27.9* 27.7*    404*     CMP:   Recent Labs   Lab 10/21/24  0609 10/22/24  0516   * 126*   K 4.9 5.0   CL 89* 86*   CO2 18* 17*   BUN 66.6* 77.9*   CREATININE 5.03* 5.97*   CALCIUM 8.0* 8.5   ALBUMIN  --  2.0*   BILITOT  --  0.3   ALKPHOS  --  110   AST  --  12   ALT  --  5     Magnesium:   Recent Labs   Lab 10/22/24  0516   MG 1.70       Significant Imaging: I have reviewed all pertinent imaging results/findings within the past 24 hours.

## 2024-10-22 NOTE — PROGRESS NOTES
[facility] [facility]  Progress Note    Patient Name: Fior Joya  MRN: [id number]  Patient Class: IP- Inpatient   Admission Date: 10/3/2024  Length of Stay: 18 days  Attending Physician: Terrance Hinkle MD  Primary Care Provider: Linda, Primary Doctor        Subjective:     Principal Problem:Other hydronephrosis        HPI:  69-year-old female with past medical history of hypertension, hyperlipidemia, chronic hydronephrosis with left nephrostomy tube, right ureteral stent, end-stage renal disease on hemodialysis, chronic anemia presented with right flank pain for the past 5 days and also reported cloudy urine output from the urostomy CT with IV contrast showed moderate to severe hydronephrosis with enlarged right pelvic lymph node increase in size from the prior with suspected malignancy also showed duplicated collecting system with dilation of the right lower pole urology was consulted patient is status post cystoscopy with right stent exchange and bladder biopsy and they were not able to identify the left ureteral orifice therefore had left percutaneous nephrostomy. Blood culture is growing Gram-positive cocci 2/2 bottles initially patient was started on cefepime but now we added vancomycin repeat blood cultures ordered  Id was consulted MRI of the C/T and L-spine was ordered that was negative for any abscess blood culture is growing MRSA TTE as such did not show any vegetation cardiology consulted for MADELYN  Bilateral hydronephrosis Status post right upper lobe ureteral stent exchange and right lower pole nephrostomy tube placement. Patient was slightly confused on October 7 so we had ordered CT of the head without contrast that was negative, ammonia, TSH everything was normal we also ordered CT of the abdomen and pelvis with contrast that was negative for any abscess cardiology consulted for MADELYN- was negative for endocarditis.  Dialysis catheter was removed, pt now with L IJ  central line. CT bilateral lower extremities with and without contrast showing no bone abnormalities.  There was symmetric uptake at the lower extremities on nuclear medicine exam .Nonspecific relatively increased radiotracer uptake at the bilateral distal femur and proximal tibia      Patient remains on vancomycin and Teflaro. Cultures have remained persistently positive. Re-drawn 10/17, negative thus far.     Overview/Hospital Course:  10/21/24-No changes today.  Patient is not wanting to go to any outside facility and wants to go home.  Seriously doubt in her current condition she can be cared for at home.  10/22/24-Patient still states that her son will take care of her at home with the drains in place.  IR consulted for evaluation for nephrostomy tube.    Interval History:     Review of Systems   Constitutional:  Positive for activity change and fatigue.   HENT: Negative.     Eyes: Negative.    Respiratory: Negative.     Cardiovascular: Negative.    Gastrointestinal:  Positive for abdominal pain.   Endocrine: Negative.    Genitourinary:  Positive for difficulty urinating.   Musculoskeletal:  Positive for gait problem.   Skin:  Positive for wound.   Allergic/Immunologic: Negative.    Neurological:  Positive for weakness.   Hematological: Negative.    Psychiatric/Behavioral:  Positive for confusion.      Objective:     Vital Signs (Most Recent):  Temp: 97.4 °F (36.3 °C) (10/22/24 1234)  Pulse: 66 (10/22/24 1234)  Resp: 18 (10/22/24 1232)  BP: 132/67 (10/22/24 1234)  SpO2: 98 % (10/22/24 1234) Vital Signs (24h Range):  Temp:  [97.4 °F (36.3 °C)-97.9 °F (36.6 °C)] 97.4 °F (36.3 °C)  Pulse:  [] 66  Resp:  [16-18] 18  SpO2:  [98 %-99 %] 98 %  BP: (109-132)/(52-67) 132/67     Weight: (!) 140.2 kg (309 lb 1.4 oz)  Body mass index is 54.75 kg/m².    Intake/Output Summary (Last 24 hours) at 10/22/2024 1312  Last data filed at 10/22/2024 1150  Gross per 24 hour   Intake --   Output 2126 ml   Net -2126 ml          Physical Exam  Constitutional:       General: She is awake.      Appearance: She is obese.   HENT:      Head: Normocephalic and atraumatic.      Nose: Nose normal.      Mouth/Throat:      Mouth: Mucous membranes are moist.      Pharynx: Oropharynx is clear.   Eyes:      Extraocular Movements: Extraocular movements intact.      Conjunctiva/sclera: Conjunctivae normal.      Pupils: Pupils are equal, round, and reactive to light.   Cardiovascular:      Rate and Rhythm: Normal rate and regular rhythm.      Pulses: Normal pulses.      Heart sounds: Normal heart sounds.   Pulmonary:      Effort: Pulmonary effort is normal.      Breath sounds: Normal breath sounds.   Abdominal:      General: Bowel sounds are normal.      Palpations: Abdomen is soft.   Musculoskeletal:         General: Normal range of motion.      Cervical back: Normal range of motion and neck supple.      Right lower leg: Edema present.      Left lower leg: Edema present.   Skin:     General: Skin is warm and dry.      Capillary Refill: Capillary refill takes 2 to 3 seconds.      Findings: Lesion present.   Neurological:      Mental Status: She is disoriented.   Psychiatric:         Speech: Speech is delayed.         Cognition and Memory: Cognition is impaired. Memory is impaired. She exhibits impaired recent memory and impaired remote memory.             Significant Labs: All pertinent labs within the past 24 hours have been reviewed.  BMP:   Recent Labs   Lab 10/22/24  0516   *   K 5.0   CL 86*   CO2 17*   BUN 77.9*   CREATININE 5.97*   CALCIUM 8.5   MG 1.70     CBC:   Recent Labs   Lab 10/21/24  0609 10/22/24  0516   WBC 7.44 9.48   HGB 8.7* 8.8*   HCT 27.9* 27.7*    404*     CMP:   Recent Labs   Lab 10/21/24  0609 10/22/24  0516   * 126*   K 4.9 5.0   CL 89* 86*   CO2 18* 17*   BUN 66.6* 77.9*   CREATININE 5.03* 5.97*   CALCIUM 8.0* 8.5   ALBUMIN  --  2.0*   BILITOT  --  0.3   ALKPHOS  --  110   AST  --  12   ALT  --  5      Magnesium:   Recent Labs   Lab 10/22/24  0516   MG 1.70       Significant Imaging: I have reviewed all pertinent imaging results/findings within the past 24 hours.    Assessment/Plan:      * Other hydronephrosis  Continue with current meds      ESRD (end stage renal disease)  Creatine stable for now. BMP reviewed- noted Estimated Creatinine Clearance: 14.8 mL/min (A) (based on SCr of 5.03 mg/dL (H)). according to latest data. Based on current GFR, CKD stage is end stage.  Monitor UOP and serial BMP and adjust therapy as needed. Renally dose meds. Avoid nephrotoxic medications and procedures.    Bacteremia  IV antibiotics  Follow labs        VTE Risk Mitigation (From admission, onward)           Ordered     heparin (porcine) injection 3,000 Units  As needed (PRN)         10/11/24 1610     enoxaparin injection 30 mg  Daily         10/06/24 1324     IP VTE HIGH RISK PATIENT  Once         10/04/24 0148     Place sequential compression device  Until discontinued         10/04/24 0148                DVT prophylaxis  Follow labs  OOB  Therapy  HD as scheduled    Discharge Planning   [place]:      Code Status: Full Code   Is the patient medically ready for discharge?:     Reason for patient still in hospital (select all that apply): Patient trending condition, Laboratory test, Treatment, Consult recommendations, PT / OT recommendations, and Pending disposition  Discharge Plan A: Home (TBD)                  Rishi Leonardo MD  Department of Hospital Medicine   [facility] - [address]

## 2024-10-22 NOTE — PROGRESS NOTES
Pharmacokinetic Assessment Follow Up: IV Vancomycin    Vancomycin serum concentration assessment(s):  The random level was drawn correctly and can be used to guide therapy at this time. The measurement is within the desired definitive target range of 15 to 20 mcg/mL.    Vancomycin Regimen Plan:  Continue to pulse-dose  Administer vancomycin 500 mg IVPB x 1, scheduled to be given 10/22 at 2000 post-dialysis  Re-dose when the random level is less than 20 mcg/mL, next level to be drawn at 0430 on 10/23      Drug levels (last 3 results):  Recent Labs   Lab Result Units 10/22/24  0516   Vancomycin Random ug/ml 16.0       Vancomycin Administrations:  vancomycin given in the last 96 hours                     vancomycin (VANCOCIN) 500 mg in D5W 100 mL IVPB (MB+) (mg) 500 mg New Bag 10/19/24 2039                    Pharmacy will continue to follow and monitor vancomycin.    Please contact pharmacy at extension [phone] for questions regarding this assessment.    Thank you for the consult,   Mary Kate Whitley       Patient brief summary:  Fior Joya is a 69 y.o. female initiated on antimicrobial therapy with IV Vancomycin for treatment of MRSA bacteremia    The patient's current regimen is pulse-dosed    Drug Allergies:   Review of patient's allergies indicates:   Allergen Reactions    Asa [aspirin] Anaphylaxis    Penicillins      Received ceftriaxone from 6/27, no reactions        Actual Body Weight:  Wt Readings from Last 1 Encounters:   10/10/24 (!) 140.2 kg (309 lb 1.4 oz)       Renal Function:   Estimated Creatinine Clearance: 12.5 mL/min (A) (based on SCr of 5.97 mg/dL (H)).,     Dialysis Method (if applicable):  intermittent HD - TThSa    CBC (last 72 hours):  Recent Labs   Lab Result Units 10/21/24  0609 10/22/24  0516   WBC x10(3)/mcL 7.44 9.48   Hgb g/dL 8.7* 8.8*   Hct % 27.9* 27.7*   Platelet x10(3)/mcL 370 404*   Mono % % 11.8 10.8   Eos % % 4.2 3.8   Basophil % % 0.9 0.7       Metabolic Panel (last 72 hours):  Recent  Labs   Lab Result Units 10/21/24  0609 10/22/24  0516   Sodium mmol/L 127* 126*   Potassium mmol/L 4.9 5.0   Chloride mmol/L 89* 86*   CO2 mmol/L 18* 17*   Glucose mg/dL 91 73*   Blood Urea Nitrogen mg/dL 66.6* 77.9*   Creatinine mg/dL 5.03* 5.97*   Albumin g/dL  --  2.0*   Bilirubin Total mg/dL  --  0.3   ALP unit/L  --  110   AST unit/L  --  12   ALT unit/L  --  5   Magnesium Level mg/dL  --  1.70       Microbiologic Results:  Microbiology Results (last 7 days)       Procedure Component Value Units Date/Time    Blood Culture [[id number]]  (Normal) Collected: 10/17/24 2114    Order Status: Completed Specimen: Blood from Antecubital, Right Updated: 10/21/24 2300     Blood Culture No Growth At 96 Hours    Blood Culture [[id number]]  (Normal) Collected: 10/17/24 2114    Order Status: Completed Specimen: Blood from Antecubital, Left Updated: 10/21/24 2300     Blood Culture No Growth At 96 Hours    Blood Culture [[id number]]  (Normal) Collected: 10/20/24 1019    Order Status: Completed Specimen: Blood Updated: 10/21/24 1201     Blood Culture No Growth At 24 Hours    Blood Culture [[id number]]  (Normal) Collected: 10/20/24 1019    Order Status: Completed Specimen: Blood Updated: 10/21/24 1201     Blood Culture No Growth At 24 Hours    Body Fluid Culture [[id number]]  (Abnormal)  (Susceptibility) Collected: 10/16/24 1147    Order Status: Completed Specimen: Body Fluid from Kidney, Right Updated: 10/19/24 1015     Body Fluid Culture Many Methicillin resistant Staphylococcus aureus    Anaerobic Culture [[id number]] Collected: 10/16/24 1147    Order Status: Completed Specimen: Body Fluid from Kidney, Right Updated: 10/19/24 0814     Anaerobe Culture No Anaerobes Isolated    Blood Culture [[id number]]  (Abnormal)  (Susceptibility) Collected: 10/15/24 2136    Order Status: Completed Specimen: Blood Updated: 10/19/24 0656     Blood Culture Methicillin resistant Staphylococcus aureus     GRAM STAIN Gram positive cocci      Seen  in gram stain of broth only      2 of 2 bottles positive    Blood Culture [[id number]]  (Abnormal)  (Susceptibility) Collected: 10/15/24 2136    Order Status: Completed Specimen: Blood Updated: 10/19/24 0656     Blood Culture Methicillin resistant Staphylococcus aureus     GRAM STAIN Gram Positive Cocci, probable Staphylococcus      Seen in gram stain of broth only      2 of 2 bottles positive    Gram Stain [[id number]] Collected: 10/16/24 1147    Order Status: Completed Specimen: Body Fluid from Kidney, Right Updated: 10/17/24 0719     GRAM STAIN Many WBC observed      Few Gram positive cocci    Blood Culture [[id number]]  (Abnormal)  (Susceptibility) Collected: 10/14/24 0529    Order Status: Completed Specimen: Blood from Arm, Right Updated: 10/17/24 0710     Blood Culture Methicillin resistant Staphylococcus aureus     GRAM STAIN Gram Positive Cocci, probable Staphylococcus      Seen in gram stain of broth only      2 of 2 bottles positive    Blood Culture [[id number]]  (Abnormal)  (Susceptibility) Collected: 10/14/24 0529    Order Status: Completed Specimen: Blood from Hand, Right Updated: 10/17/24 0710     Blood Culture Methicillin resistant Staphylococcus aureus     GRAM STAIN Gram Positive Cocci, probable Staphylococcus      Seen in gram stain of broth only      2 of 2 bottles positive    BCID2 Panel [[id number]]  (Abnormal) Collected: 10/15/24 2136    Order Status: Completed Specimen: Blood Updated: 10/17/24 0638     CTX-M (ESBL ) N/A     IMP (Cabapenemase ) N/A     KPC resistance gene (Carbapenemase ) N/A     mcr-1 N/A     mecA ID N/A     Comment: Note: Antimicrobial resistance can occur via multiple mechanisms. A Not Detected result for antimicrobial resistance gene(s) does not indicate antimicrobial susceptibility. Subculturing is required for species identification and susceptibility testing of   isolates.        mecA/C and MREJ (MRSA) gene Detected     NDM (Carbapenemase  ) N/A     OXA-48-like (Carbapenemase ) N/A     Royal/B (VRE gene) N/A     VIM (Carbapenemase ) N/A     Enterococcus faecalis Not Detected     Enterococcus faecium Not Detected     Listeria monocytogenes Not Detected     Staphylococcus spp. Detected     Staphylococcus aureus Detected     Staphylococcus epidermidis Not Detected     Staphylococcus lugdunensis Not Detected     Streptococcus spp. Not Detected     Streptococcus agalactiae (Group B) Not Detected     Streptococcus pneumoniae Not Detected     Streptococcus pyogenes (Group A) Not Detected     Acinetobacter calcoaceticus/baumannii complex Not Detected     Bacteroides fragilis Not Detected     Enterobacterales Not Detected     Enterobacter cloacae complex Not Detected     Escherichia coli Not Detected     Klebsiella aerogenes Not Detected     Klebsiella oxytoca Not Detected     Klebsiella pneumoniae group Not Detected     Proteus spp. Not Detected     Salmonella spp. Not Detected     Serratia marcescens Not Detected     Haemophilus influenzae Not Detected     Neisseria meningitidis Not Detected     Pseudomonas aeruginosa Not Detected     Stenotrophomonas maltophilia Not Detected     Candida albicans Not Detected     Candida auris Not Detected     Candida glabrata Not Detected     Candida krusei Not Detected     Candida parapsilosis Not Detected     Candida tropicalis Not Detected     Cryptococcus neoformans/gattii Not Detected    Narrative:      The [employer] BCID2 Panel is a multiplexed nucleic acid test intended for the use with [employer]® 2.0 or [employer]® [employer] for the simultaneous qualitative detection and identification of multiple bacterial and yeast nucleic acids and select genetic determinants associated with antimicrobial resistance.  The [employer] BCID2 Panel test is performed directly on blood culture samples identified as positive by a continuous monitoring blood culture system.  Results are intended to  be interpreted in conjunction with Gram stain results.    Fungal Culture [[id number]] Collected: 10/16/24 1144    Order Status: Sent Specimen: Body Fluid from Kidney, Right Updated: 10/16/24 1204

## 2024-10-23 LAB
ALBUMIN SERPL-MCNC: 2 G/DL (ref 3.4–4.8)
ALBUMIN/GLOB SERPL: 0.4 RATIO (ref 1.1–2)
ALP SERPL-CCNC: 108 UNIT/L (ref 40–150)
ALT SERPL-CCNC: <5 UNIT/L (ref 0–55)
ANION GAP SERPL CALC-SCNC: 18 MEQ/L
AST SERPL-CCNC: 12 UNIT/L (ref 5–34)
BASOPHILS # BLD AUTO: 0.09 X10(3)/MCL
BASOPHILS NFR BLD AUTO: 1.1 %
BILIRUB SERPL-MCNC: 0.3 MG/DL
BUN SERPL-MCNC: 50.8 MG/DL (ref 9.8–20.1)
CALCIUM SERPL-MCNC: 8 MG/DL (ref 8.4–10.2)
CHLORIDE SERPL-SCNC: 90 MMOL/L (ref 98–107)
CO2 SERPL-SCNC: 22 MMOL/L (ref 23–31)
CREAT SERPL-MCNC: 4.78 MG/DL (ref 0.55–1.02)
CREAT/UREA NIT SERPL: 11
EOSINOPHIL # BLD AUTO: 0.39 X10(3)/MCL (ref 0–0.9)
EOSINOPHIL NFR BLD AUTO: 4.7 %
ERYTHROCYTE [DISTWIDTH] IN BLOOD BY AUTOMATED COUNT: 16.5 % (ref 11.5–17)
GFR SERPLBLD CREATININE-BSD FMLA CKD-EPI: 9 ML/MIN/1.73/M2
GLOBULIN SER-MCNC: 4.8 GM/DL (ref 2.4–3.5)
GLUCOSE SERPL-MCNC: 70 MG/DL (ref 82–115)
HCT VFR BLD AUTO: 27 % (ref 37–47)
HGB BLD-MCNC: 8.6 G/DL (ref 12–16)
IMM GRANULOCYTES # BLD AUTO: 0.14 X10(3)/MCL (ref 0–0.04)
IMM GRANULOCYTES NFR BLD AUTO: 1.7 %
LYMPHOCYTES # BLD AUTO: 1.24 X10(3)/MCL (ref 0.6–4.6)
LYMPHOCYTES NFR BLD AUTO: 15 %
MAGNESIUM SERPL-MCNC: 1.8 MG/DL (ref 1.6–2.6)
MCH RBC QN AUTO: 29.8 PG (ref 27–31)
MCHC RBC AUTO-ENTMCNC: 31.9 G/DL (ref 33–36)
MCV RBC AUTO: 93.4 FL (ref 80–94)
MONOCYTES # BLD AUTO: 1.06 X10(3)/MCL (ref 0.1–1.3)
MONOCYTES NFR BLD AUTO: 12.8 %
NEUTROPHILS # BLD AUTO: 5.37 X10(3)/MCL (ref 2.1–9.2)
NEUTROPHILS NFR BLD AUTO: 64.7 %
NRBC BLD AUTO-RTO: 0 %
PLATELET # BLD AUTO: 378 X10(3)/MCL (ref 130–400)
PMV BLD AUTO: 10.1 FL (ref 7.4–10.4)
POTASSIUM SERPL-SCNC: 4.9 MMOL/L (ref 3.5–5.1)
PROT SERPL-MCNC: 6.8 GM/DL (ref 5.8–7.6)
RBC # BLD AUTO: 2.89 X10(6)/MCL (ref 4.2–5.4)
SODIUM SERPL-SCNC: 130 MMOL/L (ref 136–145)
WBC # BLD AUTO: 8.29 X10(3)/MCL (ref 4.5–11.5)

## 2024-10-23 PROCEDURE — C1751 CATH, INF, PER/CENT/MIDLINE: HCPCS

## 2024-10-23 PROCEDURE — 21400001 HC TELEMETRY ROOM

## 2024-10-23 PROCEDURE — 36410 VNPNXR 3YR/> PHY/QHP DX/THER: CPT

## 2024-10-23 PROCEDURE — 63600175 PHARM REV CODE 636 W HCPCS: Mod: JZ,JG | Performed by: GENERAL PRACTICE

## 2024-10-23 PROCEDURE — 85025 COMPLETE CBC W/AUTO DIFF WBC: CPT | Performed by: INTERNAL MEDICINE

## 2024-10-23 PROCEDURE — 27000207 HC ISOLATION

## 2024-10-23 PROCEDURE — 25000003 PHARM REV CODE 250: Performed by: UROLOGY

## 2024-10-23 PROCEDURE — C1752 CATH,HEMODIALYSIS,SHORT-TERM: HCPCS

## 2024-10-23 PROCEDURE — 99232 SBSQ HOSP IP/OBS MODERATE 35: CPT | Mod: ,,, | Performed by: STUDENT IN AN ORGANIZED HEALTH CARE EDUCATION/TRAINING PROGRAM

## 2024-10-23 PROCEDURE — 25000003 PHARM REV CODE 250: Performed by: INTERNAL MEDICINE

## 2024-10-23 PROCEDURE — 80053 COMPREHEN METABOLIC PANEL: CPT | Performed by: INTERNAL MEDICINE

## 2024-10-23 PROCEDURE — 36415 COLL VENOUS BLD VENIPUNCTURE: CPT | Performed by: INTERNAL MEDICINE

## 2024-10-23 PROCEDURE — 83735 ASSAY OF MAGNESIUM: CPT | Performed by: INTERNAL MEDICINE

## 2024-10-23 PROCEDURE — 97530 THERAPEUTIC ACTIVITIES: CPT

## 2024-10-23 PROCEDURE — 97535 SELF CARE MNGMENT TRAINING: CPT | Mod: CO

## 2024-10-23 PROCEDURE — 76937 US GUIDE VASCULAR ACCESS: CPT

## 2024-10-23 PROCEDURE — 25000003 PHARM REV CODE 250: Performed by: GENERAL PRACTICE

## 2024-10-23 RX ORDER — SODIUM CHLORIDE 0.9 % (FLUSH) 0.9 %
10 SYRINGE (ML) INJECTION EVERY 12 HOURS PRN
Status: DISCONTINUED | OUTPATIENT
Start: 2024-10-23 | End: 2024-10-28 | Stop reason: HOSPADM

## 2024-10-23 RX ORDER — MICONAZOLE NITRATE 2 G/100G
POWDER TOPICAL 2 TIMES DAILY PRN
Status: DISCONTINUED | OUTPATIENT
Start: 2024-10-23 | End: 2024-10-28 | Stop reason: HOSPADM

## 2024-10-23 RX ADMIN — FOLIC ACID 1 MG: 1 TABLET ORAL at 09:10

## 2024-10-23 RX ADMIN — CEFTAROLINE FOSAMIL 200 MG: 600 POWDER, FOR SOLUTION INTRAVENOUS at 09:10

## 2024-10-23 RX ADMIN — OXYCODONE AND ACETAMINOPHEN 1 TABLET: 10; 325 TABLET ORAL at 09:10

## 2024-10-23 RX ADMIN — ATORVASTATIN CALCIUM 20 MG: 10 TABLET, FILM COATED ORAL at 09:10

## 2024-10-23 RX ADMIN — PANTOPRAZOLE SODIUM 40 MG: 40 TABLET, DELAYED RELEASE ORAL at 09:10

## 2024-10-23 RX ADMIN — Medication 6 MG: at 09:10

## 2024-10-23 RX ADMIN — SODIUM BICARBONATE 650 MG TABLET 1300 MG: at 09:10

## 2024-10-23 RX ADMIN — SODIUM BICARBONATE 650 MG TABLET 1300 MG: at 02:10

## 2024-10-23 RX ADMIN — SERTRALINE HYDROCHLORIDE 25 MG: 25 TABLET ORAL at 09:10

## 2024-10-23 RX ADMIN — CEFTAROLINE FOSAMIL 200 MG: 600 POWDER, FOR SOLUTION INTRAVENOUS at 05:10

## 2024-10-23 RX ADMIN — OXYCODONE AND ACETAMINOPHEN 1 TABLET: 10; 325 TABLET ORAL at 02:10

## 2024-10-23 NOTE — PROGRESS NOTES
Nephrology  Note    Patient Name: Fior Joya  Age: 69 y.o.  : 1955  MRN: [id number]  Admission Date: 10/3/2024        Fior Joya is a 69 y.o. female who presents with right flank pain.  Past medical history significant for ESRD on hemodialysis TTS at [facility] via right IJ PermCath, chronic hydronephrosis with left nephrostomy tube in place, and stents to right ureter, hypertension, anemia, hyperphosphatemia, and hyperlipidemia.  Patient presents with worsening right flank pain.  She states the pain started in her right neck, in his now in her right flank/hip area.  Patient was started on hemodialysis in 2024 after being found to have significant acute kidney injury and hydronephrosis.  She has been maintained on hemodialysis at [facility] on a TTS schedule.  Prior to hospitalization her last dialysis was on 10/01/2024.  Nephrology consulted for ESRD management.  In the emergency department she was noted to have low blood pressure and has been given IV fluids.  Urology has also been consulted for concern hydronephrosis needing stent exchange.  She has also been started on antibiotics for possible UTI.  She has had nausea, and vomiting.  No chest pain, shortness of breath, or lower extremity edema.    10/07/2024  Chart reviewed.  Weekend events noted.  Patient has been NPO at least since her admission to the hospital and also has no IV fluids.  Lying down in the bed.  Having jerky movements all over the body and uncomfortable.  Now she has nephrostomy tube on both sides.  Both draining.  Serosanguineous fluid noted.  Patient is very slow to answer questions but did recognize me by name and she knows her own name.     10/08/2024  No acute events overnight.  Seen on hemodialysis.  She was endorsing shortness of breath, and has some lower extremity edema.    10/09/2024   Mentally she has cleared up completely.  Complains of pains all over the body for which she is on p.r.n. pain medications.  Also  complains of being nervous and on some anxiety medication.  No shortness of breath.  Tunneled dialysis catheter was removed yesterday.    10/10/2024  No acute events overnight.  No chest pain, shortness of breath, abdominal pain, nausea, vomiting, or lower extremity edema.  Found to have bladder cancer by Urology.    10/11/2024  No acute events overnight.  No new complaints.  Blood cultures remain positive.  No chest pain, shortness of breath, nausea, vomiting.    10/12/2024  Patient had Vas-Cath placed yesterday, and she was re-initiated on hemodialysis.  Dialyzed yesterday with 1.5 L UF.  No new complaints today.  No chest pain, shortness of breath, nausea, vomiting.  She does have mild lower extremity edema.    10/14/2024   Complains of pain all over the body.  No fever or chills.  Does not like her diet.  No nausea vomiting.  No shortness of breath.  No chest pains.  No cough cold congestion.    10/15/2024   Now patient also has indwelling Mario catheter.  Urology have some tagged WBC  scan and cystogram planned.  Patient will also need dialysis today.  No new complaints.    10/16/2024   Patient is NPO as she is going for procedure on the nephrostomy tubes.  Patient's son present in the room and he is very angry at every body reporting that patient is not getting the right kind of food to eat, patient is not getting right kind of care, patient is not getting proper therapy and is being told to move around and get out of the bed.  He also reported that he plans to go to the administration and may go seek help from attorneys.  All the above was said by the patient's son when the patient herself is very calm and content.    10/17/2024  Seen on hemodialysis.    10/18/2024  No acute events overnight.  Dialyzed yesterday with 2 L UF.  Tolerated well.  No cramping.  No chest pain, shortness of breath, nausea, vomiting.    10/22/2024  Seen on hemodialysis.    10/23/2024  No acute events overnight.  Patient dialyzed  yesterday with 1.6 L UF.  She ended dialysis early due to pain.    Current Facility-Administered Medications   Medication Dose Route Frequency Provider Last Rate Last Admin    acetaminophen tablet 650 mg  650 mg Oral Q4H PRN Tawana Loaiza MD   650 mg at 10/19/24 1606    albuterol-ipratropium 2.5 mg-0.5 mg/3 mL nebulizer solution 3 mL  3 mL Nebulization Once PRN Mark Ying DO        aluminum-magnesium hydroxide-simethicone 200-200-20 mg/5 mL suspension 30 mL  30 mL Oral QID PRN Tawana Loaiza MD        atorvastatin tablet 20 mg  20 mg Oral QHS Tawana Loaiza MD   20 mg at 10/22/24 2021    bisacodyL suppository 10 mg  10 mg Rectal Daily PRN Terrance Hinkle MD   10 mg at 10/18/24 1618    ceftaroline fosamiL (TEFLARO) 200 mg in D5W 50 mL IVPB  200 mg Intravenous Q8H Milo Campbell MD   Stopped at 10/23/24 0609    diphenhydrAMINE injection 25 mg  25 mg Intravenous Q6H PRN Mark Ying DO   25 mg at 10/09/24 0256    enoxaparin injection 30 mg  30 mg Subcutaneous Daily Macarena Leggett MD   30 mg at 10/22/24 1610    folic acid tablet 1 mg  1 mg Oral Daily Tawana Loaiza MD   1 mg at 10/23/24 0912    heparin (porcine) injection 3,000 Units  3,000 Units Intravenous PRN Neno Mercado DO   2,700 Units at 10/22/24 1139    melatonin tablet 6 mg  6 mg Oral Nightly PRN Tawana Loaiza MD   6 mg at 10/18/24 2130    metoclopramide injection 10 mg  10 mg Intravenous Q10 Min PRN Mark Ying DO        ondansetron injection 4 mg  4 mg Intravenous Q4H PRN Tawana Loaiza MD   4 mg at 10/21/24 1720    oxyCODONE-acetaminophen  mg per tablet 1 tablet  1 tablet Oral Q4H PRN Uriel Fletcher MD   1 tablet at 10/22/24 2021    pantoprazole EC tablet 40 mg  40 mg Oral Daily Tawana Loaiza MD   40 mg at 10/23/24 0912    polyethylene glycol packet 17 g  17 g Oral BID PRN Tawana Loaiza MD        prochlorperazine injection Soln 5 mg  5 mg  Intravenous Q6H PRN Tawana Loaiza MD        senna-docusate 8.6-50 mg per tablet 2 tablet  2 tablet Oral BID PRN Tawana Loaiza MD   2 tablet at 10/18/24 0851    sertraline tablet 25 mg  25 mg Oral Daily Shin Light MD   25 mg at 10/23/24 0912    sevelamer carbonate tablet 800 mg  800 mg Oral TID WM Tawana Loaiza MD   800 mg at 10/22/24 1610    sodium bicarbonate tablet 1,300 mg  1,300 mg Oral TID Shin Light MD   1,300 mg at 10/23/24 0912    sodium chloride 0.9% flush 10 mL  10 mL Intravenous PRN Tawana Loaiza MD        vancomycin - pharmacy to dose   Intravenous pharmacy to manage frequency Tawana Loaiza MD                Review of Systems:       Objective:       VITAL SIGNS: 24 HR MIN & MAX LAST    Temp  Min: 97.4 °F (36.3 °C)  Max: 98.2 °F (36.8 °C)  97.7 °F (36.5 °C)        BP  Min: 111/63  Max: 132/67  (!) 114/58     Pulse  Min: 58  Max: 70  66     Resp  Min: 18  Max: 18  18    SpO2  Min: 96 %  Max: 98 %  97 %      GEN:  Chronically ill-appearing WF  CV: RRR without rub or gallop.  PULM: CTAB, unlabored  ABD: Soft, NT/ND abdomen with NABS  :  Nephrostomy tubes in both right and left kidneys.    EXT:  1+ lower extremity edema  SKIN: Warm and dry  PSYCH: Awake, alert and appropriately conversant  Dialysis access:  Left IJ Vas-Cath            Component Value Date/Time     (L) 10/23/2024 0441     (L) 10/22/2024 0516    K 4.9 10/23/2024 0441    K 5.0 10/22/2024 0516    CO2 22 (L) 10/23/2024 0441    CO2 17 (L) 10/22/2024 0516    BUN 50.8 (H) 10/23/2024 0441    BUN 77.9 (H) 10/22/2024 0516    CREATININE 4.78 (H) 10/23/2024 0441    CREATININE 5.97 (H) 10/22/2024 0516    CALCIUM 8.0 (L) 10/23/2024 0441    CALCIUM 8.5 10/22/2024 0516    PHOS 4.5 10/19/2024 0428            Component Value Date/Time    WBC 8.29 10/23/2024 0441    WBC 9.48 10/22/2024 0516    WBC 25.58 10/07/2024 0541    WBC 26.04 10/06/2024 0449    HGB 8.6 (L) 10/23/2024  0441    HGB 8.8 (L) 10/22/2024 0516    HCT 27.0 (L) 10/23/2024 0441    HCT 27.7 (L) 10/22/2024 0516     10/23/2024 0441     (H) 10/22/2024 0516         Assessment / Plan:     ESRD - normally TTS at [facility].  Tunneled dialysis catheter removed 10/08/2024 due to persistent MRSA bacteremia  Metabolic acidosis  MRSA bacteremia  Bilateral hydronephrosis - now with nephrostomy tube both sides    Plan:  No acute indication for off schedule hemodialysis today.  Plan to dialyze tomorrow on regular TTS schedule.  Vascular surgery following for placement of tunneled dialysis catheter.

## 2024-10-23 NOTE — PROGRESS NOTES
INTERVENTIONAL NEPHROLOGY PROGRESS NOTE       Patient Name: Fior Joya  HARINDER 1955    Date: 10/23/2024  Time: 11:53 AM      Reason for consult: Need for tunneled catheter insertion.       HPI: In brief, this is a 69-year-old female with ESRD typically on HD via TDC who was admitted to the hospital with cloudy urine in her nephrostomy tube. During hospital course, pt was found to have persistent MRSA bacteremia with concern for line infections. At the time, her RIJ TDC was removed and a line holiday was initiated. Despite this, the pt was was persistently bacteremic. She require re-initiation of hemodialysis, and therefore had a non-tunneled HD catheter placed in the LIJ vein. Current, bacteremia has been cleared. ID team recommended placement of a tunneled catheter towards the time of expected discharge. The pt is expected to complete her IV antibiotics soon and be discharge. Interventional nephrology service was re-consulted for insertion of a tunneled dialysis catheter.    Interval History: Pt seen and examined at bedside this AM. No family present. Pt denies complaints. Risks and benefits of tunneled dialysis catheter insertion and intravenous conscious sedation was reviewed with the patient. The patient agrees to proceed with the intended procedure. Consents for both intravenous conscious sedation and procedure were signed and placed within the chart.     Review of Systems:  General:  No fatigue  Skin: No rashes  HEENT: No vision changes  CVS: No CP  RS: No SOB  GIT: No abdominal pain  Extremities: No swelling  Neurological:  No focal weakness  Psych: No depression      Current Facility-Administered Medications:     acetaminophen tablet 650 mg, 650 mg, Oral, Q4H PRN, Tawana Loaiza MD, 650 mg at 10/19/24 1606    albuterol-ipratropium 2.5 mg-0.5 mg/3 mL nebulizer solution 3 mL, 3 mL, Nebulization, Once PRN, Mark Ying,     aluminum-magnesium hydroxide-simethicone 200-200-20 mg/5 mL  suspension 30 mL, 30 mL, Oral, QID PRN, Tawana Loaiza MD    atorvastatin tablet 20 mg, 20 mg, Oral, QHS, Tawana Loaiza MD, 20 mg at 10/22/24 2021    bisacodyL suppository 10 mg, 10 mg, Rectal, Daily PRN, Terrance Hinkle MD, 10 mg at 10/18/24 1618    ceftaroline fosamiL (TEFLARO) 200 mg in D5W 50 mL IVPB, 200 mg, Intravenous, Q8H, Milo Campbell MD, Stopped at 10/23/24 0609    diphenhydrAMINE injection 25 mg, 25 mg, Intravenous, Q6H PRN, Mark Ying DO, 25 mg at 10/09/24 0256    enoxaparin injection 30 mg, 30 mg, Subcutaneous, Daily, Macarena Leggett MD, 30 mg at 10/22/24 1610    folic acid tablet 1 mg, 1 mg, Oral, Daily, Tawana Loaiza MD, 1 mg at 10/23/24 0912    heparin (porcine) injection 3,000 Units, 3,000 Units, Intravenous, PRN, Neno Mercado DO, 2,700 Units at 10/22/24 1139    melatonin tablet 6 mg, 6 mg, Oral, Nightly PRN, Tawana Loaiza MD, 6 mg at 10/18/24 2130    metoclopramide injection 10 mg, 10 mg, Intravenous, Q10 Min PRN, Mark Ying DO    ondansetron injection 4 mg, 4 mg, Intravenous, Q4H PRN, Tawana Loaiza MD, 4 mg at 10/21/24 1720    oxyCODONE-acetaminophen  mg per tablet 1 tablet, 1 tablet, Oral, Q4H PRN, Uriel Fletcher MD, 1 tablet at 10/22/24 2021    pantoprazole EC tablet 40 mg, 40 mg, Oral, Daily, Tawana Loaiza MD, 40 mg at 10/23/24 0912    polyethylene glycol packet 17 g, 17 g, Oral, BID PRN, Tawana Loaiza MD    prochlorperazine injection Soln 5 mg, 5 mg, Intravenous, Q6H PRN, Tawana Loaiza MD    senna-docusate 8.6-50 mg per tablet 2 tablet, 2 tablet, Oral, BID PRN, Tawana Loaiza MD, 2 tablet at 10/18/24 0851    sertraline tablet 25 mg, 25 mg, Oral, Daily, Shin Light MD, 25 mg at 10/23/24 0912    sevelamer carbonate tablet 800 mg, 800 mg, Oral, TID WM, Tawana Loaiza MD, 800 mg at 10/22/24 1610    sodium bicarbonate tablet 1,300 mg, 1,300 mg, Oral, TID,  Shin Light MD, 1,300 mg at 10/23/24 0912    sodium chloride 0.9% flush 10 mL, 10 mL, Intravenous, PRN, Tawana Loaiza MD    vancomycin - pharmacy to dose, , Intravenous, pharmacy to manage frequency, Tawana Loaiza MD    Vital Signs (24 h):  Temp:  [97.4 °F (36.3 °C)-98.2 °F (36.8 °C)] 97.7 °F (36.5 °C)  Pulse:  [58-70] 70  Resp:  [18] 18  SpO2:  [96 %-99 %] 99 %  BP: (111-135)/(58-73) 135/58   I/O last 3 completed shifts:  In: -   Out: 2686 [Urine:1060; Other:1626]  No intake/output data recorded.        Physical Exam:  General: NAD  HEENT: NC/AT, EOMI  CVS: RRR   RS: breathing easily  Abdominal: Soft, obese, b/l nephrostomy tubes.  Extremities: trace to pitting edema b/l LE  Skin: No rash, no lesions.  Neurological: No focal deficits.  Psych: Normal affect  Dialysis Access: left internal jugular (LIJ) temporary, non-tunneled HD catheter without signs of bleeding/infection.    Results:    Lab Results   Component Value Date     (L) 10/23/2024    K 4.9 10/23/2024    CL 90 (L) 10/23/2024    CO2 22 (L) 10/23/2024    BUN 50.8 (H) 10/23/2024    CREATININE 4.78 (H) 10/23/2024    CALCIUM 8.0 (L) 10/23/2024    PHOS 4.5 10/19/2024    WBC 8.29 10/23/2024    HGB 8.6 (L) 10/23/2024    HCT 27.0 (L) 10/23/2024     10/23/2024       Assessment and Plan:      ESRD on HD.  Need for tunneled dialysis catheter.  Pt with ESRD on HD who was admitted to the hospital and found to have MRSA bacteremia. MRSA bacteremia has been cleared. Pt currently has LIJ vein non-tunneled HD catheter for dialysis and will need a tunneled dialysis catheter to continue hemodialysis, hence interventional nephrology service was consulted.  - Consent obtained and placed in chart.  - Plan on procedure in cath lab setting tomorrow AM.  - NPO after midnight.  - Will hold Lovenox 30 mg today. May be resumed after procedure.    Thank you for your consult. Please feel free to reach me with any questions.  Plan for follow-up  tomorrow.    Nikolai Manzo DO  Interventional Nephrology  Cell: [phone]

## 2024-10-23 NOTE — PT/OT/SLP PROGRESS
Occupational Therapy   Treatment    Name: Fior Joya  MRN: [id number]    Recommendations:     Recommended therapy intensity at discharge: Moderate Intensity Therapy   Discharge Equipment Recommendations:  to be determined by next level of care  Barriers to discharge:       Assessment:     Fior Joya is a 69 y.o. female with a medical diagnosis of bilateral hydronephrosis s/p (B) nephrostomy placement, persistent MRSA bacteremia, bladder carcinoma, ESRD on HD. Performance deficits affecting function are weakness, impaired endurance, impaired self care skills, impaired functional mobility, gait instability, impaired balance, pain, decreased safety awareness, decreased lower extremity function, decreased upper extremity function, impaired skin. Pt initially declined to participate requiring max encouragement and education on importance of OOB mobility. Limited tolerance to activity 2/2 pain and fatigue.     Rehab Prognosis:  Good; patient would benefit from acute skilled OT services to address these deficits and reach maximum level of function.       Plan:     Patient to be seen 4 x/week to address the above listed problems via self-care/home management, therapeutic activities, therapeutic exercises  Plan of Care Expires: 11/07/24  Plan of Care Reviewed with: patient    Subjective     Pain/Comfort:  Location - Orientation 1: generalized  Location 1: back  Pain Addressed 1: Reposition, Distraction    Objective:     Communicated with: RN prior to session.  Patient found supine upon OT entry to room.    General Precautions: Standard, fall    Orthopedic Precautions:N/A  Braces: N/A  Respiratory Status: Room air     Occupational Performance:     Bed Mobility:    Patient completed Rolling/Turning to Left with  minimum assistance  Patient completed Rolling/Turning to Right with minimum assistance  Patient completed Scooting/Bridging with minimum assistance  Patient completed Supine to Sit with minimum assistance  Patient  completed Sit to Supine with minimum assistance     Functional Mobility/Transfers:  Patient completed Sit <> Stand Transfer with minimum assistance  with  hand-held assist  x3 trials from bed for pericare 2/2 BM. Limited standing tolerance.   Functional Mobility: declined attempting steps or t/f to chair.     Activities of Daily Living:  Toileting: total A for pericare after +BM.   Grooming: combed hair seated EOB with independence.     Therapeutic Positioning    OT interventions performed during the course of today's session in an effort to prevent and/or reduce acquired pressure injuries:   Education was provided on benefits of and recommendations for therapeutic positioning    [facility] 6 Click ADL: 16    Patient Education:  Patient provided with verbal education education regarding OT role/goals/POC, fall prevention, and safety awareness.  Understanding was verbalized.      Patient left HOB elevated with all lines intact and call button in reach.    GOALS:   Multidisciplinary Problems       Occupational Therapy Goals          Problem: Occupational Therapy    Goal Priority Disciplines Outcome Interventions   Occupational Therapy Goal     OT, PT/OT Progressing    Description: Goals to be met by: 11/7/24     Patient will increase functional independence with ADLs by performing:    LE Dressing with Modified [place].  Grooming while seated with Modified [place]. Progress to standing  Toileting from bedside commode with Modified [place] for hygiene and clothing management.   Toilet transfer to bedside commode with Modified [place]. Progress to toilet as appropriate.                          Time Tracking:     OT Date of Treatment: 10/23/24  OT Start Time: 1313  OT Stop Time: 1338  OT Total Time (min): 25 min    Billable Minutes:Self Care/Home Management 25    OT/LEANN: LEANN     Number of LEANN visits since last OT visit: 2    10/23/2024

## 2024-10-23 NOTE — PROGRESS NOTES
[address] Medicine  Progress Note    Patient Name: Fior Joya  MRN: [id number]  Patient Class: IP- Inpatient   Admission Date: 10/3/2024  Length of Stay: 19 days  Attending Physician: Terrance Hinkle MD  Primary Care Provider: Linda, Primary Doctor        Subjective:     Principal Problem:Other hydronephrosis        HPI:  69-year-old female with past medical history of hypertension, hyperlipidemia, chronic hydronephrosis with left nephrostomy tube, right ureteral stent, end-stage renal disease on hemodialysis, chronic anemia presented with right flank pain for the past 5 days and also reported cloudy urine output from the urostomy CT with IV contrast showed moderate to severe hydronephrosis with enlarged right pelvic lymph node increase in size from the prior with suspected malignancy also showed duplicated collecting system with dilation of the right lower pole urology was consulted patient is status post cystoscopy with right stent exchange and bladder biopsy and they were not able to identify the left ureteral orifice therefore had left percutaneous nephrostomy. Blood culture is growing Gram-positive cocci 2/2 bottles initially patient was started on cefepime but now we added vancomycin repeat blood cultures ordered  Id was consulted MRI of the C/T and L-spine was ordered that was negative for any abscess blood culture is growing MRSA TTE as such did not show any vegetation cardiology consulted for MADELYN  Bilateral hydronephrosis Status post right upper lobe ureteral stent exchange and right lower pole nephrostomy tube placement. Patient was slightly confused on October 7 so we had ordered CT of the head without contrast that was negative, ammonia, TSH everything was normal we also ordered CT of the abdomen and pelvis with contrast that was negative for any abscess cardiology consulted for MADELYN- was negative for endocarditis.  Dialysis catheter was removed, pt now with L IJ  central line. CT bilateral lower extremities with and without contrast showing no bone abnormalities.  There was symmetric uptake at the lower extremities on nuclear medicine exam .Nonspecific relatively increased radiotracer uptake at the bilateral distal femur and proximal tibia      Patient remains on vancomycin and Teflaro. Cultures have remained persistently positive. Re-drawn 10/17, negative thus far.     Overview/Hospital Course:  10/21/24-No changes today.  Patient is not wanting to go to any outside facility and wants to go home.  Seriously doubt in her current condition she can be cared for at home.  10/22/24-Patient still states that her son will take care of her at home with the drains in place.  IR consulted for evaluation for nephrostomy tube.  10/23/24-Waiting for IR to evaluate nephrostomy tube.  Will also need tunneled cath placed prior to d/c.  The patient still wants to go home and have her son care for her.  He will need some training.    Interval History:     Review of Systems   Constitutional:  Positive for activity change and fatigue.   HENT: Negative.     Eyes: Negative.    Respiratory: Negative.     Cardiovascular: Negative.    Gastrointestinal: Negative.    Endocrine: Negative.    Genitourinary:  Positive for difficulty urinating.   Musculoskeletal:  Positive for gait problem.   Skin:  Positive for wound.   Allergic/Immunologic: Negative.    Neurological:  Positive for weakness.   Hematological: Negative.    Psychiatric/Behavioral: Negative.       Objective:     Vital Signs (Most Recent):  Temp: 97.7 °F (36.5 °C) (10/23/24 1125)  Pulse: 70 (10/23/24 1125)  Resp: 18 (10/22/24 2021)  BP: (!) 135/58 (10/23/24 1125)  SpO2: 99 % (10/23/24 1125) Vital Signs (24h Range):  Temp:  [97.4 °F (36.3 °C)-98.2 °F (36.8 °C)] 97.7 °F (36.5 °C)  Pulse:  [58-70] 70  Resp:  [18] 18  SpO2:  [96 %-99 %] 99 %  BP: (111-135)/(58-73) 135/58     Weight: (!) 140.2 kg (309 lb 1.4 oz)  Body mass index is 54.75  kg/m².    Intake/Output Summary (Last 24 hours) at 10/23/2024 1256  Last data filed at 10/23/2024 0620  Gross per 24 hour   Intake --   Output 560 ml   Net -560 ml         Physical Exam  Constitutional:       Appearance: Normal appearance. She is obese.   HENT:      Head: Normocephalic and atraumatic.      Nose: Nose normal.      Mouth/Throat:      Mouth: Mucous membranes are moist.      Pharynx: Oropharynx is clear.   Eyes:      Extraocular Movements: Extraocular movements intact.      Conjunctiva/sclera: Conjunctivae normal.      Pupils: Pupils are equal, round, and reactive to light.   Cardiovascular:      Rate and Rhythm: Normal rate and regular rhythm.      Pulses: Normal pulses.      Heart sounds: Normal heart sounds.   Pulmonary:      Effort: Pulmonary effort is normal.      Breath sounds: Normal breath sounds.   Abdominal:      General: Bowel sounds are normal.      Palpations: Abdomen is soft.   Musculoskeletal:         General: Normal range of motion.      Cervical back: Normal range of motion and neck supple.   Skin:     General: Skin is warm and dry.      Capillary Refill: Capillary refill takes more than 3 seconds.   Neurological:      Mental Status: She is alert. Mental status is at baseline.   Psychiatric:         Cognition and Memory: Cognition is impaired. Memory is impaired. She exhibits impaired recent memory and impaired remote memory.             Significant Labs: All pertinent labs within the past 24 hours have been reviewed.  BMP:   Recent Labs   Lab 10/23/24  0441   *   K 4.9   CL 90*   CO2 22*   BUN 50.8*   CREATININE 4.78*   CALCIUM 8.0*   MG 1.80     CBC:   Recent Labs   Lab 10/22/24  0516 10/23/24  0441   WBC 9.48 8.29   HGB 8.8* 8.6*   HCT 27.7* 27.0*   * 378     CMP:   Recent Labs   Lab 10/22/24  0516 10/23/24  0441   * 130*   K 5.0 4.9   CL 86* 90*   CO2 17* 22*   BUN 77.9* 50.8*   CREATININE 5.97* 4.78*   CALCIUM 8.5 8.0*   ALBUMIN 2.0* 2.0*   BILITOT 0.3 0.3    ALKPHOS 110 108   AST 12 12   ALT 5 <5     Magnesium:   Recent Labs   Lab 10/22/24  0516 10/23/24  0441   MG 1.70 1.80       Significant Imaging: I have reviewed all pertinent imaging results/findings within the past 24 hours.    Assessment/Plan:      * Other hydronephrosis  Continue with current meds      ESRD (end stage renal disease)  Creatine stable for now. BMP reviewed- noted Estimated Creatinine Clearance: 14.8 mL/min (A) (based on SCr of 5.03 mg/dL (H)). according to latest data. Based on current GFR, CKD stage is end stage.  Monitor UOP and serial BMP and adjust therapy as needed. Renally dose meds. Avoid nephrotoxic medications and procedures.    Bacteremia  IV antibiotics  Follow labs        VTE Risk Mitigation (From admission, onward)           Ordered     heparin (porcine) injection 3,000 Units  As needed (PRN)         10/11/24 1610     IP VTE HIGH RISK PATIENT  Once         10/04/24 0148     Place sequential compression device  Until discontinued         10/04/24 0148                  DVT prophylaxis  Follow labs  Therapy  HD as scheduled  Discharge Planning   [place]:      Code Status: Full Code   Is the patient medically ready for discharge?:     Reason for patient still in hospital (select all that apply): Patient trending condition, Laboratory test, Treatment, Consult recommendations, PT / OT recommendations, and Pending disposition  Discharge Plan A: Home (TBD)                  Rishi Leonardo MD  Department of Hospital Medicine   [facility] - [address] [facility]

## 2024-10-23 NOTE — PT/OT/SLP PROGRESS
Physical Therapy Treatment    Patient Name:  Fior Joya   MRN:  [id number]    Recommendations:     Discharge therapy intensity: Moderate Intensity Therapy   Discharge Equipment Recommendations: to be determined by next level of care  Barriers to discharge: Impaired mobility and Ongoing medical needs    Assessment:     Fior Joya is a 69 y.o. female admitted with a medical diagnosis of bilateral hydronephrosis s/p (B) nephrostomy placement, persistent MRSA bacteremia, bladder carcinoma, ESRD on HD.  She presents with the following impairments/functional limitations: weakness, impaired endurance, impaired functional mobility, impaired self care skills, pain, impaired balance.    Patient agreeable to treatment session with max encouragement and education on importance of participating with therapy. She required min A to min A x 2 for functional mobility today, but remains limited 2/2 fatigue.     Rehab Prognosis: Good; patient would benefit from acute skilled PT services to address these deficits and reach maximum level of function.    Recent Surgery: Procedure(s) (LRB):  CYSTOSCOPY, WITH URETERAL STENT INSERTION (Bilateral) 19 Days Post-Op    Plan:     During this hospitalization, patient would benefit from acute PT services 5 x/week to address the identified rehab impairments via gait training, therapeutic activities, therapeutic exercises, neuromuscular re-education and progress toward the following goals:    Plan of Care Expires:  11/10/24    Subjective     Chief Complaint: fatigue  Patient/Family Comments/goals: agreeable to treatment session  Pain/Comfort:  Location - Orientation 1: generalized  Location 1: back  Pain Addressed 1: Reposition, Distraction    Objective:     Communicated with RN prior to session.  Patient found HOB elevated with nephrostomy, berkowitz catheter, telemetry, pulse ox (continuous) upon PT entry to room.     General Precautions: Standard, fall, contact  Orthopedic Precautions: N/A  Braces:  N/A  Respiratory Status: Room air      Functional Mobility:  Bed Mobility:     Rolling Left:  minimum assistance  Rolling Right: minimum assistance  Supine to Sit: minimum assistance  Sit to Supine: minimum assistance  Transfers:     Sit to Stand: x3 reps, minimum assistance of 2 persons with hand-held assist; tolerated standing for ~30 sec with each stand  Balance: SBA for seated balance  Patient declined to attempt steps or t/f to chair    Therapeutic Activities/Exercises:  Patient with (+) BM during session, requiring total A for pericare while standing up with min A x 2 and handheld assist. 3 sit<>stands performed.     Education:  Patient provided with verbal education education regarding PT role/goals/POC, fall prevention, and safety awareness.  Understanding was verbalized.     Patient left HOB elevated with all lines intact, call button in reach, and RN notified    GOALS:   Multidisciplinary Problems       Physical Therapy Goals          Problem: Physical Therapy    Goal Priority Disciplines Outcome Interventions   Physical Therapy Goal     PT, PT/OT Progressing    Description: Goals to be met by: 11/10/24     Patient will increase functional independence with mobility by performin. Supine to sit with [place]  2. Sit to stand transfer with Modified [place]  3. Bed to chair transfer with Modified [place] using Rolling Walker  4. Gait  > or = 25 feet with Modified [place] using Rolling Walker.                          Time Tracking:     PT Received On:    PT Start Time: 1313     PT Stop Time: 1338  PT Total Time (min): 25 min     Billable Minutes: Therapeutic Activity 25    Treatment Type: Treatment  PT/PTA: PT     Number of PTA visits since last PT visit: 3     10/23/2024

## 2024-10-23 NOTE — NURSING
Pt IV site occluded. Peripheral and long term IV attempted, but not successful. Midline requested. MD notified.

## 2024-10-23 NOTE — SUBJECTIVE & OBJECTIVE
Interval History:     Review of Systems   Constitutional:  Positive for activity change and fatigue.   HENT: Negative.     Eyes: Negative.    Respiratory: Negative.     Cardiovascular: Negative.    Gastrointestinal: Negative.    Endocrine: Negative.    Genitourinary:  Positive for difficulty urinating.   Musculoskeletal:  Positive for gait problem.   Skin:  Positive for wound.   Allergic/Immunologic: Negative.    Neurological:  Positive for weakness.   Hematological: Negative.    Psychiatric/Behavioral: Negative.       Objective:     Vital Signs (Most Recent):  Temp: 97.7 °F (36.5 °C) (10/23/24 1125)  Pulse: 70 (10/23/24 1125)  Resp: 18 (10/22/24 2021)  BP: (!) 135/58 (10/23/24 1125)  SpO2: 99 % (10/23/24 1125) Vital Signs (24h Range):  Temp:  [97.4 °F (36.3 °C)-98.2 °F (36.8 °C)] 97.7 °F (36.5 °C)  Pulse:  [58-70] 70  Resp:  [18] 18  SpO2:  [96 %-99 %] 99 %  BP: (111-135)/(58-73) 135/58     Weight: (!) 140.2 kg (309 lb 1.4 oz)  Body mass index is 54.75 kg/m².    Intake/Output Summary (Last 24 hours) at 10/23/2024 1256  Last data filed at 10/23/2024 0620  Gross per 24 hour   Intake --   Output 560 ml   Net -560 ml         Physical Exam  Constitutional:       Appearance: Normal appearance. She is obese.   HENT:      Head: Normocephalic and atraumatic.      Nose: Nose normal.      Mouth/Throat:      Mouth: Mucous membranes are moist.      Pharynx: Oropharynx is clear.   Eyes:      Extraocular Movements: Extraocular movements intact.      Conjunctiva/sclera: Conjunctivae normal.      Pupils: Pupils are equal, round, and reactive to light.   Cardiovascular:      Rate and Rhythm: Normal rate and regular rhythm.      Pulses: Normal pulses.      Heart sounds: Normal heart sounds.   Pulmonary:      Effort: Pulmonary effort is normal.      Breath sounds: Normal breath sounds.   Abdominal:      General: Bowel sounds are normal.      Palpations: Abdomen is soft.   Musculoskeletal:         General: Normal range of motion.       Cervical back: Normal range of motion and neck supple.   Skin:     General: Skin is warm and dry.      Capillary Refill: Capillary refill takes more than 3 seconds.   Neurological:      Mental Status: She is alert. Mental status is at baseline.   Psychiatric:         Cognition and Memory: Cognition is impaired. Memory is impaired. She exhibits impaired recent memory and impaired remote memory.             Significant Labs: All pertinent labs within the past 24 hours have been reviewed.  BMP:   Recent Labs   Lab 10/23/24  0441   *   K 4.9   CL 90*   CO2 22*   BUN 50.8*   CREATININE 4.78*   CALCIUM 8.0*   MG 1.80     CBC:   Recent Labs   Lab 10/22/24  0516 10/23/24  0441   WBC 9.48 8.29   HGB 8.8* 8.6*   HCT 27.7* 27.0*   * 378     CMP:   Recent Labs   Lab 10/22/24  0516 10/23/24  0441   * 130*   K 5.0 4.9   CL 86* 90*   CO2 17* 22*   BUN 77.9* 50.8*   CREATININE 5.97* 4.78*   CALCIUM 8.5 8.0*   ALBUMIN 2.0* 2.0*   BILITOT 0.3 0.3   ALKPHOS 110 108   AST 12 12   ALT 5 <5     Magnesium:   Recent Labs   Lab 10/22/24  0516 10/23/24  0441   MG 1.70 1.80       Significant Imaging: I have reviewed all pertinent imaging results/findings within the past 24 hours.

## 2024-10-24 LAB
ALBUMIN SERPL-MCNC: 2.1 G/DL (ref 3.4–4.8)
ALBUMIN/GLOB SERPL: 0.4 RATIO (ref 1.1–2)
ALP SERPL-CCNC: 116 UNIT/L (ref 40–150)
ALT SERPL-CCNC: 6 UNIT/L (ref 0–55)
ANION GAP SERPL CALC-SCNC: 17 MEQ/L
AST SERPL-CCNC: 13 UNIT/L (ref 5–34)
BASOPHILS # BLD AUTO: 0.07 X10(3)/MCL
BASOPHILS NFR BLD AUTO: 0.9 %
BILIRUB SERPL-MCNC: 0.3 MG/DL
BUN SERPL-MCNC: 60.9 MG/DL (ref 9.8–20.1)
CALCIUM SERPL-MCNC: 8.1 MG/DL (ref 8.4–10.2)
CHLORIDE SERPL-SCNC: 91 MMOL/L (ref 98–107)
CO2 SERPL-SCNC: 22 MMOL/L (ref 23–31)
CREAT SERPL-MCNC: 5.38 MG/DL (ref 0.55–1.02)
CREAT/UREA NIT SERPL: 11
EOSINOPHIL # BLD AUTO: 0.34 X10(3)/MCL (ref 0–0.9)
EOSINOPHIL NFR BLD AUTO: 4.2 %
ERYTHROCYTE [DISTWIDTH] IN BLOOD BY AUTOMATED COUNT: 16.9 % (ref 11.5–17)
GFR SERPLBLD CREATININE-BSD FMLA CKD-EPI: 8 ML/MIN/1.73/M2
GLOBULIN SER-MCNC: 4.9 GM/DL (ref 2.4–3.5)
GLUCOSE SERPL-MCNC: 68 MG/DL (ref 82–115)
HCT VFR BLD AUTO: 28.3 % (ref 37–47)
HGB BLD-MCNC: 8.8 G/DL (ref 12–16)
IMM GRANULOCYTES # BLD AUTO: 0.18 X10(3)/MCL (ref 0–0.04)
IMM GRANULOCYTES NFR BLD AUTO: 2.2 %
LYMPHOCYTES # BLD AUTO: 1.03 X10(3)/MCL (ref 0.6–4.6)
LYMPHOCYTES NFR BLD AUTO: 12.7 %
MAGNESIUM SERPL-MCNC: 1.7 MG/DL (ref 1.6–2.6)
MCH RBC QN AUTO: 29.4 PG (ref 27–31)
MCHC RBC AUTO-ENTMCNC: 31.1 G/DL (ref 33–36)
MCV RBC AUTO: 94.6 FL (ref 80–94)
MONOCYTES # BLD AUTO: 0.98 X10(3)/MCL (ref 0.1–1.3)
MONOCYTES NFR BLD AUTO: 12.1 %
NEUTROPHILS # BLD AUTO: 5.53 X10(3)/MCL (ref 2.1–9.2)
NEUTROPHILS NFR BLD AUTO: 67.9 %
NRBC BLD AUTO-RTO: 0 %
PLATELET # BLD AUTO: 396 X10(3)/MCL (ref 130–400)
PMV BLD AUTO: 9.8 FL (ref 7.4–10.4)
POCT GLUCOSE: 88 MG/DL (ref 70–110)
POTASSIUM SERPL-SCNC: 4.6 MMOL/L (ref 3.5–5.1)
PROT SERPL-MCNC: 7 GM/DL (ref 5.8–7.6)
RBC # BLD AUTO: 2.99 X10(6)/MCL (ref 4.2–5.4)
SODIUM SERPL-SCNC: 130 MMOL/L (ref 136–145)
VANCOMYCIN SERPL-MCNC: 16.1 UG/ML (ref 15–20)
WBC # BLD AUTO: 8.13 X10(3)/MCL (ref 4.5–11.5)

## 2024-10-24 PROCEDURE — 80053 COMPREHEN METABOLIC PANEL: CPT | Performed by: INTERNAL MEDICINE

## 2024-10-24 PROCEDURE — 21400001 HC TELEMETRY ROOM

## 2024-10-24 PROCEDURE — 25000003 PHARM REV CODE 250: Performed by: INTERNAL MEDICINE

## 2024-10-24 PROCEDURE — 02PYX3Z REMOVAL OF INFUSION DEVICE FROM GREAT VESSEL, EXTERNAL APPROACH: ICD-10-PCS | Performed by: STUDENT IN AN ORGANIZED HEALTH CARE EDUCATION/TRAINING PROGRAM

## 2024-10-24 PROCEDURE — 36558 INSERT TUNNELED CV CATH: CPT | Mod: LT | Performed by: STUDENT IN AN ORGANIZED HEALTH CARE EDUCATION/TRAINING PROGRAM

## 2024-10-24 PROCEDURE — 63600175 PHARM REV CODE 636 W HCPCS: Performed by: STUDENT IN AN ORGANIZED HEALTH CARE EDUCATION/TRAINING PROGRAM

## 2024-10-24 PROCEDURE — 02HV33Z INSERTION OF INFUSION DEVICE INTO SUPERIOR VENA CAVA, PERCUTANEOUS APPROACH: ICD-10-PCS | Performed by: STUDENT IN AN ORGANIZED HEALTH CARE EDUCATION/TRAINING PROGRAM

## 2024-10-24 PROCEDURE — 85025 COMPLETE CBC W/AUTO DIFF WBC: CPT | Performed by: INTERNAL MEDICINE

## 2024-10-24 PROCEDURE — 80202 ASSAY OF VANCOMYCIN: CPT | Performed by: HOSPITALIST

## 2024-10-24 PROCEDURE — 63600175 PHARM REV CODE 636 W HCPCS: Mod: JZ,JG | Performed by: GENERAL PRACTICE

## 2024-10-24 PROCEDURE — 25000003 PHARM REV CODE 250: Performed by: GENERAL PRACTICE

## 2024-10-24 PROCEDURE — 99152 MOD SED SAME PHYS/QHP 5/>YRS: CPT | Performed by: STUDENT IN AN ORGANIZED HEALTH CARE EDUCATION/TRAINING PROGRAM

## 2024-10-24 PROCEDURE — 83735 ASSAY OF MAGNESIUM: CPT | Performed by: INTERNAL MEDICINE

## 2024-10-24 PROCEDURE — C1750 CATH, HEMODIALYSIS,LONG-TERM: HCPCS | Performed by: STUDENT IN AN ORGANIZED HEALTH CARE EDUCATION/TRAINING PROGRAM

## 2024-10-24 PROCEDURE — 25000003 PHARM REV CODE 250: Performed by: UROLOGY

## 2024-10-24 PROCEDURE — 27000207 HC ISOLATION

## 2024-10-24 PROCEDURE — 0JH60XZ INSERTION OF TUNNELED VASCULAR ACCESS DEVICE INTO CHEST SUBCUTANEOUS TISSUE AND FASCIA, OPEN APPROACH: ICD-10-PCS | Performed by: STUDENT IN AN ORGANIZED HEALTH CARE EDUCATION/TRAINING PROGRAM

## 2024-10-24 PROCEDURE — 77001 FLUOROGUIDE FOR VEIN DEVICE: CPT | Mod: 26,,, | Performed by: STUDENT IN AN ORGANIZED HEALTH CARE EDUCATION/TRAINING PROGRAM

## 2024-10-24 PROCEDURE — 99152 MOD SED SAME PHYS/QHP 5/>YRS: CPT | Mod: ,,, | Performed by: STUDENT IN AN ORGANIZED HEALTH CARE EDUCATION/TRAINING PROGRAM

## 2024-10-24 PROCEDURE — 77001 FLUOROGUIDE FOR VEIN DEVICE: CPT | Performed by: STUDENT IN AN ORGANIZED HEALTH CARE EDUCATION/TRAINING PROGRAM

## 2024-10-24 PROCEDURE — 63600175 PHARM REV CODE 636 W HCPCS: Performed by: INTERNAL MEDICINE

## 2024-10-24 PROCEDURE — 36415 COLL VENOUS BLD VENIPUNCTURE: CPT | Performed by: INTERNAL MEDICINE

## 2024-10-24 PROCEDURE — 80100016 HC MAINTENANCE HEMODIALYSIS

## 2024-10-24 PROCEDURE — 36558 INSERT TUNNELED CV CATH: CPT | Mod: LT,,, | Performed by: STUDENT IN AN ORGANIZED HEALTH CARE EDUCATION/TRAINING PROGRAM

## 2024-10-24 DEVICE — GLIDEPATH HEMODIALYSIS CATH, ST, DL, 14.5 FR. 23CM
Type: IMPLANTABLE DEVICE | Site: JUGULAR | Status: FUNCTIONAL
Brand: GLIDEPATH LONG-TERM HEMODIALYSIS CATHETER WITH PRELOADED STYLET

## 2024-10-24 RX ORDER — MIDAZOLAM HYDROCHLORIDE 1 MG/ML
INJECTION INTRAMUSCULAR; INTRAVENOUS
Status: DISCONTINUED | OUTPATIENT
Start: 2024-10-24 | End: 2024-10-24 | Stop reason: HOSPADM

## 2024-10-24 RX ORDER — LIDOCAINE HYDROCHLORIDE 10 MG/ML
INJECTION, SOLUTION EPIDURAL; INFILTRATION; INTRACAUDAL; PERINEURAL
Status: DISCONTINUED | OUTPATIENT
Start: 2024-10-24 | End: 2024-10-24 | Stop reason: HOSPADM

## 2024-10-24 RX ORDER — FENTANYL CITRATE 50 UG/ML
INJECTION, SOLUTION INTRAMUSCULAR; INTRAVENOUS
Status: DISCONTINUED | OUTPATIENT
Start: 2024-10-24 | End: 2024-10-24 | Stop reason: HOSPADM

## 2024-10-24 RX ADMIN — HEPARIN SODIUM 3600 UNITS: 1000 INJECTION INTRAVENOUS; SUBCUTANEOUS at 03:10

## 2024-10-24 RX ADMIN — OXYCODONE AND ACETAMINOPHEN 1 TABLET: 10; 325 TABLET ORAL at 07:10

## 2024-10-24 RX ADMIN — FOLIC ACID 1 MG: 1 TABLET ORAL at 09:10

## 2024-10-24 RX ADMIN — PANTOPRAZOLE SODIUM 40 MG: 40 TABLET, DELAYED RELEASE ORAL at 09:10

## 2024-10-24 RX ADMIN — CEFTAROLINE FOSAMIL 200 MG: 600 POWDER, FOR SOLUTION INTRAVENOUS at 05:10

## 2024-10-24 RX ADMIN — SODIUM BICARBONATE 650 MG TABLET 1300 MG: at 04:10

## 2024-10-24 RX ADMIN — CEFTAROLINE FOSAMIL 200 MG: 600 POWDER, FOR SOLUTION INTRAVENOUS at 04:10

## 2024-10-24 RX ADMIN — SODIUM BICARBONATE 650 MG TABLET 1300 MG: at 09:10

## 2024-10-24 RX ADMIN — ATORVASTATIN CALCIUM 20 MG: 10 TABLET, FILM COATED ORAL at 09:10

## 2024-10-24 RX ADMIN — VANCOMYCIN HYDROCHLORIDE 500 MG: 500 INJECTION, POWDER, LYOPHILIZED, FOR SOLUTION INTRAVENOUS at 06:10

## 2024-10-24 RX ADMIN — OXYCODONE AND ACETAMINOPHEN 1 TABLET: 10; 325 TABLET ORAL at 04:10

## 2024-10-24 RX ADMIN — SERTRALINE HYDROCHLORIDE 25 MG: 25 TABLET ORAL at 09:10

## 2024-10-24 RX ADMIN — CEFTAROLINE FOSAMIL 200 MG: 600 POWDER, FOR SOLUTION INTRAVENOUS at 11:10

## 2024-10-24 NOTE — PROGRESS NOTES
Nephrology  Note    Patient Name: Fior Joya  Age: 69 y.o.  : 1955  MRN: [id number]  Admission Date: 10/3/2024        Fior Joya is a 69 y.o. female who presents with right flank pain.  Past medical history significant for ESRD on hemodialysis TTS at [facility] via right IJ PermCath, chronic hydronephrosis with left nephrostomy tube in place, and stents to right ureter, hypertension, anemia, hyperphosphatemia, and hyperlipidemia.  Patient presents with worsening right flank pain.  She states the pain started in her right neck, in his now in her right flank/hip area.  Patient was started on hemodialysis in 2024 after being found to have significant acute kidney injury and hydronephrosis.  She has been maintained on hemodialysis at [facility] on a TTS schedule.  Prior to hospitalization her last dialysis was on 10/01/2024.  Nephrology consulted for ESRD management.  In the emergency department she was noted to have low blood pressure and has been given IV fluids.  Urology has also been consulted for concern hydronephrosis needing stent exchange.  She has also been started on antibiotics for possible UTI.  She has had nausea, and vomiting.  No chest pain, shortness of breath, or lower extremity edema.    10/07/2024  Chart reviewed.  Weekend events noted.  Patient has been NPO at least since her admission to the hospital and also has no IV fluids.  Lying down in the bed.  Having jerky movements all over the body and uncomfortable.  Now she has nephrostomy tube on both sides.  Both draining.  Serosanguineous fluid noted.  Patient is very slow to answer questions but did recognize me by name and she knows her own name.     10/08/2024  No acute events overnight.  Seen on hemodialysis.  She was endorsing shortness of breath, and has some lower extremity edema.    10/09/2024   Mentally she has cleared up completely.  Complains of pains all over the body for which she is on p.r.n. pain medications.  Also  complains of being nervous and on some anxiety medication.  No shortness of breath.  Tunneled dialysis catheter was removed yesterday.    10/10/2024  No acute events overnight.  No chest pain, shortness of breath, abdominal pain, nausea, vomiting, or lower extremity edema.  Found to have bladder cancer by Urology.    10/11/2024  No acute events overnight.  No new complaints.  Blood cultures remain positive.  No chest pain, shortness of breath, nausea, vomiting.    10/12/2024  Patient had Vas-Cath placed yesterday, and she was re-initiated on hemodialysis.  Dialyzed yesterday with 1.5 L UF.  No new complaints today.  No chest pain, shortness of breath, nausea, vomiting.  She does have mild lower extremity edema.    10/14/2024   Complains of pain all over the body.  No fever or chills.  Does not like her diet.  No nausea vomiting.  No shortness of breath.  No chest pains.  No cough cold congestion.    10/15/2024   Now patient also has indwelling Mario catheter.  Urology have some tagged WBC  scan and cystogram planned.  Patient will also need dialysis today.  No new complaints.    10/16/2024   Patient is NPO as she is going for procedure on the nephrostomy tubes.  Patient's son present in the room and he is very angry at every body reporting that patient is not getting the right kind of food to eat, patient is not getting right kind of care, patient is not getting proper therapy and is being told to move around and get out of the bed.  He also reported that he plans to go to the administration and may go seek help from attorneys.  All the above was said by the patient's son when the patient herself is very calm and content.    10/17/2024  Seen on hemodialysis.    10/18/2024  No acute events overnight.  Dialyzed yesterday with 2 L UF.  Tolerated well.  No cramping.  No chest pain, shortness of breath, nausea, vomiting.    10/22/2024  Seen on hemodialysis.    10/23/2024  No acute events overnight.  Patient dialyzed  yesterday with 1.6 L UF.  She ended dialysis early due to pain.    10/24/2024  Patient had left IJ PermCath placed by Dr. Manzo this morning.  Overall doing fairly well.  No chest pain, shortness of breath, abdominal pain, nausea, vomiting, or lower extremity edema.    Current Facility-Administered Medications   Medication Dose Route Frequency Provider Last Rate Last Admin    acetaminophen tablet 650 mg  650 mg Oral Q4H PRN Tawana Loaiza MD   650 mg at 10/19/24 1606    aluminum-magnesium hydroxide-simethicone 200-200-20 mg/5 mL suspension 30 mL  30 mL Oral QID PRN Tawana Loaiza MD        atorvastatin tablet 20 mg  20 mg Oral QHS Tawana Loaiza MD   20 mg at 10/23/24 2103    bisacodyL suppository 10 mg  10 mg Rectal Daily PRN Terrance Hinkle MD   10 mg at 10/18/24 1618    ceftaroline fosamiL (TEFLARO) 200 mg in D5W 50 mL IVPB  200 mg Intravenous Q8H Milo Campbell MD   Stopped at 10/24/24 0604    folic acid tablet 1 mg  1 mg Oral Daily Tawana Loaiza MD   1 mg at 10/24/24 0959    heparin (porcine) injection 3,000 Units  3,000 Units Intravenous PRN Neno Mercado DO   2,700 Units at 10/22/24 1139    melatonin tablet 6 mg  6 mg Oral Nightly PRN Tawana Loaiza MD   6 mg at 10/23/24 2104    miconazole NITRATE 2 % top powder   Topical (Top) BID PRN Rishi Leonardo MD        ondansetron injection 4 mg  4 mg Intravenous Q4H PRN Tawana Loaiza MD   4 mg at 10/21/24 1720    oxyCODONE-acetaminophen  mg per tablet 1 tablet  1 tablet Oral Q4H PRN Uriel Fletcher MD   1 tablet at 10/24/24 0724    pantoprazole EC tablet 40 mg  40 mg Oral Daily Tawana Loaiza MD   40 mg at 10/24/24 0959    polyethylene glycol packet 17 g  17 g Oral BID PRN Tawana Loaiza MD        prochlorperazine injection Soln 5 mg  5 mg Intravenous Q6H PRN Tawana Loaiza MD        senna-docusate 8.6-50 mg per tablet 2 tablet  2 tablet Oral BID PRN  Tawana Loaiza MD   2 tablet at 10/18/24 0851    sertraline tablet 25 mg  25 mg Oral Daily Shin Light MD   25 mg at 10/24/24 0959    sevelamer carbonate tablet 800 mg  800 mg Oral TID  Tawana Loaiza MD   800 mg at 10/22/24 1610    sodium bicarbonate tablet 1,300 mg  1,300 mg Oral TID Shin Light MD   1,300 mg at 10/24/24 0959    sodium chloride 0.9% flush 10 mL  10 mL Intravenous PRN Tawana Loaiza MD        sodium chloride 0.9% flush 10 mL  10 mL Intravenous Q12H PRN Glenda Hughes FNP        sodium chloride 0.9% flush 10 mL  10 mL Intravenous Q12H PRN Rishi Leonardo MD        vancomycin (VANCOCIN) 500 mg in D5W 100 mL IVPB (MB+)  500 mg Intravenous Once Rishi Leonardo MD        vancomycin - pharmacy to dose   Intravenous pharmacy to manage frequency Tawana Loaiza MD                Review of Systems:       Objective:       VITAL SIGNS: 24 HR MIN & MAX LAST    Temp  Min: 97.4 °F (36.3 °C)  Max: 98.4 °F (36.9 °C)  97.4 °F (36.3 °C)        BP  Min: 95/59  Max: 124/67  124/67     Pulse  Min: 57  Max: 73  (!) 58     Resp  Min: 18  Max: 20  20    SpO2  Min: 96 %  Max: 100 %  98 %      GEN:  Chronically ill-appearing WF  CV: RRR without rub or gallop.  PULM: CTAB, unlabored  ABD: Soft, NT/ND abdomen with NABS  :  Nephrostomy tubes in both right and left kidneys.    EXT:  1+ lower extremity edema  SKIN: Warm and dry  PSYCH: Awake, alert and appropriately conversant  Dialysis access:  Left IJ Vas-Cath            Component Value Date/Time     (L) 10/24/2024 0448     (L) 10/23/2024 0441    K 4.6 10/24/2024 0448    K 4.9 10/23/2024 0441    CO2 22 (L) 10/24/2024 0448    CO2 22 (L) 10/23/2024 0441    BUN 60.9 (H) 10/24/2024 0448    BUN 50.8 (H) 10/23/2024 0441    CREATININE 5.38 (H) 10/24/2024 0448    CREATININE 4.78 (H) 10/23/2024 0441    CALCIUM 8.1 (L) 10/24/2024 0448    CALCIUM 8.0 (L) 10/23/2024 0441    PHOS 4.5 10/19/2024 0428             Component Value Date/Time    WBC 8.13 10/24/2024 0448    WBC 8.29 10/23/2024 0441    WBC 25.58 10/07/2024 0541    WBC 26.04 10/06/2024 0449    HGB 8.8 (L) 10/24/2024 0448    HGB 8.6 (L) 10/23/2024 0441    HCT 28.3 (L) 10/24/2024 0448    HCT 27.0 (L) 10/23/2024 0441     10/24/2024 0448     10/23/2024 0441         Assessment / Plan:     ESRD - normally TTS at [facility].  Tunneled dialysis catheter removed 10/08/2024 due to persistent MRSA bacteremia. TDC repaced 10/24/24 by Dr. Manzo.   Metabolic acidosis  MRSA bacteremia  Bilateral hydronephrosis - now with nephrostomy tube both sides    Plan:  Tunneled dialysis catheter was placed this morning.  Plan for hemodialysis today on regular TTS schedule.  2 L UF as tolerated.  She is okay to discharge from Nephrology standpoint whenever clinically ready.

## 2024-10-24 NOTE — NURSING
10/24/24 1544   Post-Hemodialysis Assessment   Duration of Treatment 120 minutes   Total UF (mL) 725 mL   Post-Hemodialysis Comments 2 hr.  725 ml net .  vss.  Pt requested to come off early.  Pt was having leg cramps.  Leg cramps resolved post tx.  Floor nurse made aware of pt request tylenol also.

## 2024-10-24 NOTE — PT/OT/SLP PROGRESS
Physical Therapy Treatment    Patient Name:  Fior Joya   MRN:  [id number]    Pt just returned from surgery for dialysis graph and will be going to dialysis. She declined to participate with me. /63      10/24/2024

## 2024-10-24 NOTE — PROGRESS NOTES
Pharmacokinetic Assessment Follow Up: IV Vancomycin    Vancomycin serum concentration assessment(s):    The random level was drawn correctly and can be used to guide therapy at this time. The measurement is within the desired definitive target range of 15 to 20 mcg/mL.    Vancomycin Regimen Plan:    Re-dose when the random level is less than 20 mcg/mL, next level to be drawn at 0430 on 10/26    Scheduled Administration Times    Pulse dosing due to poor renal function  Give 500 mg IV vancomycin x 1 after HD today  Next random level scheduled for 10/26 at 0430     Drug levels (last 3 results):  Recent Labs   Lab Result Units 10/22/24  0516 10/24/24  0448   Vancomycin Random ug/ml 16.0 16.1       Vancomycin Administrations:  vancomycin given in the last 96 hours                     vancomycin (VANCOCIN) 500 mg in D5W 100 mL IVPB (MB+) (mg) 500 mg New Bag 10/22/24 2028                    Pharmacy will continue to follow and monitor vancomycin.    Please contact pharmacy at extension [phone] for questions regarding this assessment.    Thank you for the consult,   Jet Mitchell       Patient brief summary:  Fior Joya is a 69 y.o. female initiated on antimicrobial therapy with IV Vancomycin for treatment of bacteremia    The patient's current regimen is pulse dose    Drug Allergies:   Review of patient's allergies indicates:   Allergen Reactions    Asa [aspirin] Anaphylaxis    Penicillins      Received ceftriaxone from 6/27, no reactions        Actual Body Weight:  Wt Readings from Last 1 Encounters:   10/10/24 (!) 140.2 kg (309 lb 1.4 oz)       Renal Function:   Estimated Creatinine Clearance: 13.8 mL/min (A) (based on SCr of 5.38 mg/dL (H)).,     Dialysis Method (if applicable):  intermittent HD TTS    CBC (last 72 hours):  Recent Labs   Lab Result Units 10/22/24  0516 10/23/24  0441 10/24/24  0448   WBC x10(3)/mcL 9.48 8.29 8.13   Hgb g/dL 8.8* 8.6* 8.8*   Hct % 27.7* 27.0* 28.3*   Platelet x10(3)/mcL 404* 378 396    Mono % % 10.8 12.8 12.1   Eos % % 3.8 4.7 4.2   Basophil % % 0.7 1.1 0.9       Metabolic Panel (last 72 hours):  Recent Labs   Lab Result Units 10/22/24  0516 10/23/24  0441 10/24/24  0448   Sodium mmol/L 126* 130* 130*   Potassium mmol/L 5.0 4.9 4.6   Chloride mmol/L 86* 90* 91*   CO2 mmol/L 17* 22* 22*   Glucose mg/dL 73* 70* 68*   Blood Urea Nitrogen mg/dL 77.9* 50.8* 60.9*   Creatinine mg/dL 5.97* 4.78* 5.38*   Albumin g/dL 2.0* 2.0* 2.1*   Bilirubin Total mg/dL 0.3 0.3 0.3   ALP unit/L 110 108 116   AST unit/L 12 12 13   ALT unit/L 5 <5 6   Magnesium Level mg/dL 1.70 1.80 1.70       Microbiologic Results:  Microbiology Results (last 7 days)       Procedure Component Value Units Date/Time    Blood Culture [[id number]]  (Normal) Collected: 10/20/24 1019    Order Status: Completed Specimen: Blood Updated: 10/23/24 1201     Blood Culture No Growth At 72 Hours    Blood Culture [[id number]]  (Normal) Collected: 10/20/24 1019    Order Status: Completed Specimen: Blood Updated: 10/23/24 1201     Blood Culture No Growth At 72 Hours    Blood Culture [[id number]]  (Normal) Collected: 10/17/24 2114    Order Status: Completed Specimen: Blood from Antecubital, Right Updated: 10/22/24 2300     Blood Culture No Growth at 5 days    Blood Culture [[id number]]  (Normal) Collected: 10/17/24 2114    Order Status: Completed Specimen: Blood from Antecubital, Left Updated: 10/22/24 2300     Blood Culture No Growth at 5 days    Body Fluid Culture [[id number]]  (Abnormal)  (Susceptibility) Collected: 10/16/24 1147    Order Status: Completed Specimen: Body Fluid from Kidney, Right Updated: 10/19/24 1015     Body Fluid Culture Many Methicillin resistant Staphylococcus aureus    Anaerobic Culture [[id number]] Collected: 10/16/24 1147    Order Status: Completed Specimen: Body Fluid from Kidney, Right Updated: 10/19/24 0814     Anaerobe Culture No Anaerobes Isolated    Blood Culture [[id number]]  (Abnormal)  (Susceptibility) Collected:  10/15/24 2136    Order Status: Completed Specimen: Blood Updated: 10/19/24 0656     Blood Culture Methicillin resistant Staphylococcus aureus     GRAM STAIN Gram positive cocci      Seen in gram stain of broth only      2 of 2 bottles positive    Blood Culture [[id number]]  (Abnormal)  (Susceptibility) Collected: 10/15/24 2136    Order Status: Completed Specimen: Blood Updated: 10/19/24 0656     Blood Culture Methicillin resistant Staphylococcus aureus     GRAM STAIN Gram Positive Cocci, probable Staphylococcus      Seen in gram stain of broth only      2 of 2 bottles positive

## 2024-10-24 NOTE — PROCEDURES
"Fior Joya is a 69 y.o. female patient.    Temp: 98.4 °F (36.9 °C) (10/23/24 1915)  Pulse: 65 (10/23/24 1915)  Resp: 18 (10/23/24 1402)  BP: 104/62 (10/23/24 1915)  SpO2: 97 % (10/23/24 1915)  Weight: (!) 140.2 kg (309 lb 1.4 oz) (10/10/24 1517)  Height: 5' 3" (160 cm) (10/10/24 1517)    PICC  Date/Time: 10/23/2024 8:24 PM  Performed by: Eliza Lawrence RN  Consent Done: Yes  Time out: Immediately prior to procedure a time out was called to verify the correct patient, procedure, equipment, support staff and site/side marked as required  Indications: med administration and vascular access  Anesthesia: local infiltration  Local anesthetic: lidocaine 1% without epinephrine    Preparation: skin prepped with ChloraPrep  Skin prep agent dried: skin prep agent completely dried prior to procedure  Sterile barriers: all five maximum sterile barriers used - cap, mask, sterile gown, sterile gloves, and large sterile sheet  Hand hygiene: hand hygiene performed prior to central venous catheter insertion  Location details: left basilic  Catheter type: single lumen  Catheter size: 4 Fr  Catheter Length: 17cm    Ultrasound guidance: yes  Vessel Caliber: patent, compressibility normal  Needle advanced into vessel with real time Ultrasound guidance.  Guidewire confirmed in vessel.  Sterile sheath used.  Number of attempts: 1  Post-procedure: blood return through all ports, chlorhexidine patch and sterile dressing applied            Name WENDY Lawrence RN   10/23/2024    "

## 2024-10-24 NOTE — PROCEDURES
INTERVENTIONAL NEPHROLOGY PROCEDURE NOTE:   TEMPORARY TO TUNNELED DIALYSIS CATHETER CONVERSION       Patient Name: Fior Joya  HARINDER 1955    Procedure Date:    10/24/2024    Performing Physician:   Dr. Manzo    Access History: Pt is with ESRD requiring HD.    Pre-Op Diagnosis: N18.6 End Stage Renal Disease (ESRD).  Post-Op Diagnosis: Same    Procedure: Conversion of temporary to tunneled dialysis catheter.    Indication: ESRD requiring HD.    Anatomical Site: left internal jugular (LIJ)/left chest wall (LCW)    Informed Consent:  The patient was evaluated in the pre-operative area with assessment including the American Society of Anesthesia risk classification. The procedure is discussed in detail including risks, benefits alternatives and options and the patient agrees to proceed. Informed consent was obtained from the patient.     Maximum sterile barrier technique: The patient was prepped and draped using chlorhexidine prep and maximum sterile barrier technique.    Sedation Note:  Risks and benefits of sedation were reviewed with the patient or surrogate, including bleeding, infection, nausea, vomiting, dizziness, instability, damage to a nerve, damage to a blood vessel, cellulitis, reaction to medications, amnesia, loss of consciousness, respiratory arrest, cardiac arrest.     The patient received the following medications: Versed 1 mg IV and Fentanyl 50 mcg IV; patient did remain alert, responsive, and conversational throughout the procedure. I was personally responsible for supervising the administration of moderate sedation services during the procedure performed and I affirm all the guidelines and requirements described in the CPT 2024 section on moderate sedation were followed, including the use of an independent trained observer who had no other duties during the procedure. Start sedation time was 0757 and stop time was 0815. The total face-to-face time was 18 minutes.    Procedure Steps:   The  patient was prepped and draped in sterile fashion. The left internal jugular (LIJ) temporary HD catheter was found to have ideal point of insertion on the vessel. Local anesthesia was administered by injecting 1% lidocaine at the entry site. A 150 cm guide wire was passed into the old catheter, with the tip of the wire parked in the inferior vena cava as verified with fluoroscopy. At this time the old catheter was removed over the guide wire. The guide wire was carefully cleansed with betadine x 3 then saline x 3, the  then re-gloved.    The catheter exit site and tunnel was approximated and infiltrated with lidocaine. A stab incision was made at the exit site. The 23 cm (cuff-to-tip) tunneled dialysis catheter assembly with tunneler was then tunneled up through the exit site to the lateral aspect of the venotomy and the catheter pulled through the tunnel. The cuff was placed approximately 1.5 centimeters inside the tunnel. The 12 Fr dilator was passed freely over the wire; it was removed and replaced sequentially with 14 Fr and 16 Fr dilators. The permacath was then inserted via pull-away sheath method. The placement of the catheter tip (at the junction of the SVC and right atrium) and the top of the permacath was confirmed with fluoroscopy.     Catheter appeared to function well with various tests utilizing a 10 cc syringe. The catheter limbs were then flushed with saline, followed by instillation of appropriate amounts of heparin as marked by the catheter hub. Venotomy site and tunnel exit site was closed with monoderm suture, being careful to not sherman the catheter. Catheter hub was fixed to the chest wall using silk suture.      ASSESSMENT/PLAN:  - Successfully converted temporary dialysis catheter to left internal jugular (LIJ)/left chest wall (LCW) 23 cm (cuff-to-tip) TDC (BD Glide[employer]).    EBL: 2 cc    Complications: None    Orders to the dialysis unit: OK to use dialysis catheter for HD.    Thank  you for allowing me the opportunity in taking care of this patient. Please reach me with any questions.    Nikolai Manzo DO  Interventional Nephrology  [facility]  Cell: [phone]  Office: [phone]

## 2024-10-24 NOTE — PROGRESS NOTES
[facility] Hospital Medicine Progress Note      Chief Complaint:  Flank pain       HPI: (personally reviewed by me and is documented from initial H&P)     69-year-old female with past medical history of hypertension, hyperlipidemia, chronic hydronephrosis with left nephrostomy tube, right ureteral stent, end-stage renal disease on hemodialysis, chronic anemia presented with right flank pain for the past 5 days and also reported cloudy urine output from the urostomy CT with IV contrast showed moderate to severe hydronephrosis with enlarged right pelvic lymph node increase in size from the prior with suspected malignancy also showed duplicated collecting system with dilation of the right lower pole urology was consulted patient is status post cystoscopy with right stent exchange and bladder biopsy and they were not able to identify the left ureteral orifice therefore had left percutaneous nephrostomy.     Blood culture is growing Gram-positive cocci 2/2 bottles initially patient was started on cefepime but now we added vancomycin repeat blood cultures ordered.    ID was consulted MRI of the C/T and L-spine was ordered that was negative for any abscess blood culture is growing MRSA TTE as such did not show any vegetation cardiology consulted for MADELYN.    Bilateral hydronephrosis Status post right upper lobe ureteral stent exchange and right lower pole nephrostomy tube placement.   Patient was slightly confused on October 7 so we had ordered CT of the head without contrast that was negative, ammonia, TSH everything was normal we also ordered CT of the abdomen and pelvis with contrast that was negative for any abscess cardiology consulted for MADELYN- was negative for endocarditis.    Dialysis catheter was removed, pt now with L IJ central line. CT bilateral lower extremities with and without contrast showing no bone abnormalities.  There was symmetric uptake at the lower extremities on nuclear  medicine exam .Nonspecific relatively increased radiotracer uptake at the bilateral distal femur and proximal tibia       Interval History:        Just returned from dialysis; all needs are currently met; nurse present at bedside       Objective Assessment:  Physical Exam      Vital signs have been personally reviewed by me   General: Appears comfortable, no acute distress.  Neuro: awake, alert, oriented     Integumentary: Warm, dry, intact.  Musculoskeletal: Purposeful movement noted.     Respiratory: No accessory muscle use. Breath sounds are equal.  Cardiovascular: Regular rate.       VITAL SIGNS: 24 HRS MIN & MAX LAST   Temp  Min: 97.4 °F (36.3 °C)  Max: 98.4 °F (36.9 °C) 97.4 °F (36.3 °C)   BP  Min: 95/59  Max: 124/67 124/67   Pulse  Min: 57  Max: 73  (!) 58   Resp  Min: 18  Max: 20 20   SpO2  Min: 96 %  Max: 100 % 98 %     Cardiac catheterization  Procedure performed in the Invasive Lab    - See Procedure Log link below for nursing documentation    - See OpNote on Surgeries Tab for physician findings    - See Imaging Tab for radiologist dictation    Recent Labs   Lab 10/22/24  0516 10/23/24  0441 10/24/24  0448   WBC 9.48 8.29 8.13   RBC 3.01* 2.89* 2.99*   HGB 8.8* 8.6* 8.8*   HCT 27.7* 27.0* 28.3*   MCV 92.0 93.4 94.6*   MCH 29.2 29.8 29.4   MCHC 31.8* 31.9* 31.1*   RDW 16.7 16.5 16.9   * 378 396   MPV 10.4 10.1 9.8       Recent Labs   Lab 10/22/24  0516 10/23/24  0441 10/24/24  0448   * 130* 130*   K 5.0 4.9 4.6   CL 86* 90* 91*   CO2 17* 22* 22*   BUN 77.9* 50.8* 60.9*   CREATININE 5.97* 4.78* 5.38*   CALCIUM 8.5 8.0* 8.1*   MG 1.70 1.80 1.70   ALBUMIN 2.0* 2.0* 2.1*   ALKPHOS 110 108 116   ALT 5 <5 6   AST 12 12 13   BILITOT 0.3 0.3 0.3     Microbiology Results (last 7 days)       Procedure Component Value Units Date/Time    Blood Culture [[id number]]  (Normal) Collected: 10/20/24 1019    Order Status: Completed Specimen: Blood Updated: 10/24/24 1201     Blood Culture No Growth At 96 Hours     Blood Culture [[id number]]  (Normal) Collected: 10/20/24 1019    Order Status: Completed Specimen: Blood Updated: 10/24/24 1201     Blood Culture No Growth At 96 Hours    Blood Culture [[id number]]  (Normal) Collected: 10/17/24 2114    Order Status: Completed Specimen: Blood from Antecubital, Right Updated: 10/22/24 2300     Blood Culture No Growth at 5 days    Blood Culture [[id number]]  (Normal) Collected: 10/17/24 2114    Order Status: Completed Specimen: Blood from Antecubital, Left Updated: 10/22/24 2300     Blood Culture No Growth at 5 days    Body Fluid Culture [[id number]]  (Abnormal)  (Susceptibility) Collected: 10/16/24 1147    Order Status: Completed Specimen: Body Fluid from Kidney, Right Updated: 10/19/24 1015     Body Fluid Culture Many Methicillin resistant Staphylococcus aureus    Anaerobic Culture [[id number]] Collected: 10/16/24 1147    Order Status: Completed Specimen: Body Fluid from Kidney, Right Updated: 10/19/24 0814     Anaerobe Culture No Anaerobes Isolated    Blood Culture [[id number]]  (Abnormal)  (Susceptibility) Collected: 10/15/24 2136    Order Status: Completed Specimen: Blood Updated: 10/19/24 0656     Blood Culture Methicillin resistant Staphylococcus aureus     GRAM STAIN Gram positive cocci      Seen in gram stain of broth only      2 of 2 bottles positive    Blood Culture [[id number]]  (Abnormal)  (Susceptibility) Collected: 10/15/24 2136    Order Status: Completed Specimen: Blood Updated: 10/19/24 0656     Blood Culture Methicillin resistant Staphylococcus aureus     GRAM STAIN Gram Positive Cocci, probable Staphylococcus      Seen in gram stain of broth only      2 of 2 bottles positive               Medications for Hospital Course         Scheduled Med:   atorvastatin  20 mg Oral QHS    ceftaroline (Teflaro) IV (PEDS and ADULTS)  200 mg Intravenous Q8H    folic acid  1 mg Oral Daily    pantoprazole  40 mg Oral Daily    sertraline  25 mg Oral Daily    sevelamer carbonate  800 mg  Oral TID WM    sodium bicarbonate  1,300 mg Oral TID    vancomycin (VANCOCIN) IV (PEDS and ADULTS)  500 mg Intravenous Once        PRN Meds:    Current Facility-Administered Medications:     acetaminophen, 650 mg, Oral, Q4H PRN    aluminum-magnesium hydroxide-simethicone, 30 mL, Oral, QID PRN    bisacodyL, 10 mg, Rectal, Daily PRN    heparin (porcine), 3,000 Units, Intravenous, PRN    melatonin, 6 mg, Oral, Nightly PRN    miconazole NITRATE 2 %, , Topical (Top), BID PRN    ondansetron, 4 mg, Intravenous, Q4H PRN    oxyCODONE-acetaminophen, 1 tablet, Oral, Q4H PRN    polyethylene glycol, 17 g, Oral, BID PRN    prochlorperazine, 5 mg, Intravenous, Q6H PRN    senna-docusate 8.6-50 mg, 2 tablet, Oral, BID PRN    sodium chloride 0.9%, 10 mL, Intravenous, PRN    Flushing PICC/Midline Protocol, , , Until Discontinued **AND** sodium chloride 0.9%, 10 mL, Intravenous, Q12H PRN    Flushing PICC/Midline Protocol, , , Until Discontinued **AND** sodium chloride 0.9%, 10 mL, Intravenous, Q12H PRN    vancomycin - pharmacy to dose, , Intravenous, pharmacy to manage frequency     Assessment and Plan        --bilateral hydronephrosis,  moderate to severe hydronephrosis with enlarged right pelvic lymph node increase in size from the prior with suspected malignancy also showed duplicated collecting system with dilation of the right lower pole urology was consulted patient is status post cystoscopy with right stent exchange and bladder biopsy      ---Blood culture is growing Gram-positive cocci 2/2 bottles initially patient was started on cefepime but now we added vancomycin repeat blood cultures ordered    ---ESRD with metabolic acidosis; continue dialysis at discretion of nephrologist     Continue supportive care  Continue checking vital signs q4hrs.  Reviewed and restarted appropriate home medications.     DVT prophylaxis initiated   Nurse notified to page me if any changes occur     Consults: nephrologist.   I have personally  reviewed the specialist documentation and/or have spoken to the specialist with regard to the care of this patient; recommendations are noted above.     _______________________________________________________________________________________________________________________________      I have spent 40 minutes on the day of the visit; time spent includes face to face time and non-face to face time preparing to see the patient (eg, review of tests), independently reviewing and interpreting medical records, both past and current; documenting clinical information in the electronic or other health record, and communicating results to the patient/family/caregiver and care coordinator and nursing team.      All diagnosis and differential diagnosis have been reviewed,  interpreted and communicated appropriately to care team. assessment and plan has been documented; I have personally reviewed the labs and test results that are presently available and pertinent to this hospital course; I have reviewed medical records based upon their availability.    All of the patient's questions have been  addressed and answered. Patient's is agreeable to the above stated plan.   I will continue to monitor closely and make adjustments to medical management as needed.    Yuki Higgins,    10/24/2024     This note was created with the assistance of Dragon voice recognition software. There may be transcription errors as a result of using this technology however minimal. Effort has been made to assure accuracy of transcription but any obvious errors or omissions should be clarified with the author of the document.

## 2024-10-25 LAB
BACTERIA BLD CULT: NORMAL
BACTERIA BLD CULT: NORMAL

## 2024-10-25 PROCEDURE — 25000003 PHARM REV CODE 250: Performed by: GENERAL PRACTICE

## 2024-10-25 PROCEDURE — 25000003 PHARM REV CODE 250: Performed by: INTERNAL MEDICINE

## 2024-10-25 PROCEDURE — 21400001 HC TELEMETRY ROOM

## 2024-10-25 PROCEDURE — 25000003 PHARM REV CODE 250: Performed by: UROLOGY

## 2024-10-25 PROCEDURE — 63600175 PHARM REV CODE 636 W HCPCS: Mod: JZ,JG | Performed by: GENERAL PRACTICE

## 2024-10-25 PROCEDURE — 27000207 HC ISOLATION

## 2024-10-25 RX ADMIN — SODIUM BICARBONATE 650 MG TABLET 1300 MG: at 08:10

## 2024-10-25 RX ADMIN — SERTRALINE HYDROCHLORIDE 25 MG: 25 TABLET ORAL at 08:10

## 2024-10-25 RX ADMIN — SENNOSIDES AND DOCUSATE SODIUM 2 TABLET: 50; 8.6 TABLET ORAL at 10:10

## 2024-10-25 RX ADMIN — OXYCODONE AND ACETAMINOPHEN 1 TABLET: 10; 325 TABLET ORAL at 10:10

## 2024-10-25 RX ADMIN — SODIUM BICARBONATE 650 MG TABLET 1300 MG: at 04:10

## 2024-10-25 RX ADMIN — PANTOPRAZOLE SODIUM 40 MG: 40 TABLET, DELAYED RELEASE ORAL at 08:10

## 2024-10-25 RX ADMIN — CEFTAROLINE FOSAMIL 200 MG: 600 POWDER, FOR SOLUTION INTRAVENOUS at 04:10

## 2024-10-25 RX ADMIN — OXYCODONE AND ACETAMINOPHEN 1 TABLET: 10; 325 TABLET ORAL at 07:10

## 2024-10-25 RX ADMIN — FOLIC ACID 1 MG: 1 TABLET ORAL at 08:10

## 2024-10-25 RX ADMIN — ATORVASTATIN CALCIUM 20 MG: 10 TABLET, FILM COATED ORAL at 07:10

## 2024-10-25 RX ADMIN — POLYETHYLENE GLYCOL 3350 17 G: 17 POWDER, FOR SOLUTION ORAL at 03:10

## 2024-10-25 RX ADMIN — CEFTAROLINE FOSAMIL 200 MG: 600 POWDER, FOR SOLUTION INTRAVENOUS at 08:10

## 2024-10-25 RX ADMIN — CEFTAROLINE FOSAMIL 200 MG: 600 POWDER, FOR SOLUTION INTRAVENOUS at 11:10

## 2024-10-25 NOTE — PROGRESS NOTES
Nephrology  Note    Patient Name: Fior Joya  Age: 69 y.o.  : 1955  MRN: [id number]  Admission Date: 10/3/2024        Fior Joya is a 69 y.o. female who presents with right flank pain.  Past medical history significant for ESRD on hemodialysis TTS at [facility] via right IJ PermCath, chronic hydronephrosis with left nephrostomy tube in place, and stents to right ureter, hypertension, anemia, hyperphosphatemia, and hyperlipidemia.  Patient presents with worsening right flank pain.  She states the pain started in her right neck, in his now in her right flank/hip area.  Patient was started on hemodialysis in 2024 after being found to have significant acute kidney injury and hydronephrosis.  She has been maintained on hemodialysis at [facility] on a TTS schedule.  Prior to hospitalization her last dialysis was on 10/01/2024.  Nephrology consulted for ESRD management.  In the emergency department she was noted to have low blood pressure and has been given IV fluids.  Urology has also been consulted for concern hydronephrosis needing stent exchange.  She has also been started on antibiotics for possible UTI.  She has had nausea, and vomiting.  No chest pain, shortness of breath, or lower extremity edema.    10/07/2024  Chart reviewed.  Weekend events noted.  Patient has been NPO at least since her admission to the hospital and also has no IV fluids.  Lying down in the bed.  Having jerky movements all over the body and uncomfortable.  Now she has nephrostomy tube on both sides.  Both draining.  Serosanguineous fluid noted.  Patient is very slow to answer questions but did recognize me by name and she knows her own name.     10/08/2024  No acute events overnight.  Seen on hemodialysis.  She was endorsing shortness of breath, and has some lower extremity edema.    10/09/2024   Mentally she has cleared up completely.  Complains of pains all over the body for which she is on p.r.n. pain medications.  Also  complains of being nervous and on some anxiety medication.  No shortness of breath.  Tunneled dialysis catheter was removed yesterday.    10/10/2024  No acute events overnight.  No chest pain, shortness of breath, abdominal pain, nausea, vomiting, or lower extremity edema.  Found to have bladder cancer by Urology.    10/11/2024  No acute events overnight.  No new complaints.  Blood cultures remain positive.  No chest pain, shortness of breath, nausea, vomiting.    10/12/2024  Patient had Vas-Cath placed yesterday, and she was re-initiated on hemodialysis.  Dialyzed yesterday with 1.5 L UF.  No new complaints today.  No chest pain, shortness of breath, nausea, vomiting.  She does have mild lower extremity edema.    10/14/2024   Complains of pain all over the body.  No fever or chills.  Does not like her diet.  No nausea vomiting.  No shortness of breath.  No chest pains.  No cough cold congestion.    10/15/2024   Now patient also has indwelling Mario catheter.  Urology have some tagged WBC  scan and cystogram planned.  Patient will also need dialysis today.  No new complaints.    10/16/2024   Patient is NPO as she is going for procedure on the nephrostomy tubes.  Patient's son present in the room and he is very angry at every body reporting that patient is not getting the right kind of food to eat, patient is not getting right kind of care, patient is not getting proper therapy and is being told to move around and get out of the bed.  He also reported that he plans to go to the administration and may go seek help from attorneys.  All the above was said by the patient's son when the patient herself is very calm and content.    10/17/2024  Seen on hemodialysis.    10/18/2024  No acute events overnight.  Dialyzed yesterday with 2 L UF.  Tolerated well.  No cramping.  No chest pain, shortness of breath, nausea, vomiting.    10/22/2024  Seen on hemodialysis.    10/23/2024  No acute events overnight.  Patient dialyzed  yesterday with 1.6 L UF.  She ended dialysis early due to pain.    10/24/2024  Patient had left IJ PermCath placed by Dr. Manzo this morning.  Overall doing fairly well.  No chest pain, shortness of breath, abdominal pain, nausea, vomiting, or lower extremity edema.    10/25/2024  No acute events overnight.  Patient dialyzed yesterday, but came off early due to cramps.  No chest pain, shortness of breath, nausea, vomiting, or lower extremity edema.    Current Facility-Administered Medications   Medication Dose Route Frequency Provider Last Rate Last Admin    acetaminophen tablet 650 mg  650 mg Oral Q4H PRN Tawana Loaiza MD   650 mg at 10/19/24 1606    aluminum-magnesium hydroxide-simethicone 200-200-20 mg/5 mL suspension 30 mL  30 mL Oral QID PRN Tawana Loaiza MD        atorvastatin tablet 20 mg  20 mg Oral QHS Tawana Loaiza MD   20 mg at 10/24/24 2158    bisacodyL suppository 10 mg  10 mg Rectal Daily PRN Terrance Hinkle MD   10 mg at 10/18/24 1618    ceftaroline fosamiL (TEFLARO) 200 mg in D5W 50 mL IVPB  200 mg Intravenous Q8H Milo Campbell MD 50 mL/hr at 10/25/24 0854 200 mg at 10/25/24 0854    folic acid tablet 1 mg  1 mg Oral Daily Tawana Loaiza MD   1 mg at 10/25/24 0843    heparin (porcine) injection 3,000 Units  3,000 Units Intravenous PRN Neno Mercado DO   3,600 Units at 10/24/24 1528    melatonin tablet 6 mg  6 mg Oral Nightly PRN Tawana Loaiza MD   6 mg at 10/23/24 2104    miconazole NITRATE 2 % top powder   Topical (Top) BID PRN Rishi Leonardo MD        ondansetron injection 4 mg  4 mg Intravenous Q4H PRN Tawana Loaiza MD   4 mg at 10/21/24 1720    oxyCODONE-acetaminophen  mg per tablet 1 tablet  1 tablet Oral Q4H PRN Uriel Fletcher MD   1 tablet at 10/24/24 1613    pantoprazole EC tablet 40 mg  40 mg Oral Daily Tawana Loaiza MD   40 mg at 10/25/24 4169    polyethylene glycol packet 17 g  17 g Oral BID  PRN Tawana Loaiza MD   17 g at 10/25/24 0330    prochlorperazine injection Soln 5 mg  5 mg Intravenous Q6H PRN Tawana Loaiza MD        senna-docusate 8.6-50 mg per tablet 2 tablet  2 tablet Oral BID PRN Tawana Loaiza MD   2 tablet at 10/18/24 0851    sertraline tablet 25 mg  25 mg Oral Daily Shin Light MD   25 mg at 10/25/24 0843    sevelamer carbonate tablet 800 mg  800 mg Oral TID  Tawana Loaiza MD   800 mg at 10/22/24 1610    sodium bicarbonate tablet 1,300 mg  1,300 mg Oral TID Shin Light MD   1,300 mg at 10/25/24 0843    sodium chloride 0.9% flush 10 mL  10 mL Intravenous PRN Tawana Loaiza MD        sodium chloride 0.9% flush 10 mL  10 mL Intravenous Q12H PRN Glenda Hughes FNP        sodium chloride 0.9% flush 10 mL  10 mL Intravenous Q12H PRN Rishi Leonardo MD        vancomycin - pharmacy to dose   Intravenous pharmacy to manage frequency Tawana Loaiza MD                Review of Systems:       Objective:       VITAL SIGNS: 24 HR MIN & MAX LAST    Temp  Min: 97.4 °F (36.3 °C)  Max: 98.2 °F (36.8 °C)  97.9 °F (36.6 °C)        BP  Min: 95/59  Max: 124/67  108/61     Pulse  Min: 58  Max: 73  72     Resp  Min: 20  Max: 21  20    SpO2  Min: 94 %  Max: 98 %  (!) 94 %      GEN:  Chronically ill-appearing WF  CV: RRR without rub or gallop.  PULM: CTAB, unlabored  ABD: Soft, NT/ND abdomen with NABS  :  Nephrostomy tubes in both right and left kidneys.    EXT:  1+ lower extremity edema  SKIN: Warm and dry  PSYCH: Awake, alert and appropriately conversant  Dialysis access:  Left IJ Vas-Cath            Component Value Date/Time     (L) 10/24/2024 0448     (L) 10/23/2024 0441    K 4.6 10/24/2024 0448    K 4.9 10/23/2024 0441    CO2 22 (L) 10/24/2024 0448    CO2 22 (L) 10/23/2024 0441    BUN 60.9 (H) 10/24/2024 0448    BUN 50.8 (H) 10/23/2024 0441    CREATININE 5.38 (H) 10/24/2024 0448    CREATININE 4.78 (H)  10/23/2024 0441    CALCIUM 8.1 (L) 10/24/2024 0448    CALCIUM 8.0 (L) 10/23/2024 0441    PHOS 4.5 10/19/2024 0428            Component Value Date/Time    WBC 8.13 10/24/2024 0448    WBC 8.29 10/23/2024 0441    WBC 25.58 10/07/2024 0541    WBC 26.04 10/06/2024 0449    HGB 8.8 (L) 10/24/2024 0448    HGB 8.6 (L) 10/23/2024 0441    HCT 28.3 (L) 10/24/2024 0448    HCT 27.0 (L) 10/23/2024 0441     10/24/2024 0448     10/23/2024 0441         Assessment / Plan:     ESRD - normally TTS at [facility].  Tunneled dialysis catheter removed 10/08/2024 due to persistent MRSA bacteremia. TDC repaced 10/24/24 by Dr. Manzo.   Metabolic acidosis  MRSA bacteremia  Bilateral hydronephrosis - now with nephrostomy tube both sides    Plan:  No acute indication for off schedule hemodialysis today.  Plan to dialyze tomorrow on TTS schedule.

## 2024-10-25 NOTE — PROGRESS NOTES
[facility] Hospital Medicine Progress Note      Chief Complaint:  Flank pain       HPI: (personally reviewed by me and is documented from initial H&P)     69-year-old female with past medical history of hypertension, hyperlipidemia, chronic hydronephrosis with left nephrostomy tube, right ureteral stent, end-stage renal disease on hemodialysis, chronic anemia presented with right flank pain for the past 5 days and also reported cloudy urine output from the urostomy CT with IV contrast showed moderate to severe hydronephrosis with enlarged right pelvic lymph node increase in size from the prior with suspected malignancy also showed duplicated collecting system with dilation of the right lower pole urology was consulted patient is status post cystoscopy with right stent exchange and bladder biopsy and they were not able to identify the left ureteral orifice therefore had left percutaneous nephrostomy.     Blood culture is growing Gram-positive cocci 2/2 bottles initially patient was started on cefepime but now we added vancomycin repeat blood cultures ordered.    ID was consulted MRI of the C/T and L-spine was ordered that was negative for any abscess blood culture is growing MRSA TTE as such did not show any vegetation cardiology consulted for MADELYN.    Bilateral hydronephrosis Status post right upper lobe ureteral stent exchange and right lower pole nephrostomy tube placement.   Patient was slightly confused on October 7 so we had ordered CT of the head without contrast that was negative, ammonia, TSH everything was normal we also ordered CT of the abdomen and pelvis with contrast that was negative for any abscess cardiology consulted for MADELYN- was negative for endocarditis.    Dialysis catheter was removed, pt now with L IJ central line. CT bilateral lower extremities with and without contrast showing no bone abnormalities.  There was symmetric uptake at the lower extremities on nuclear  medicine exam .Nonspecific relatively increased radiotracer uptake at the bilateral distal femur and proximal tibia       Interval History:        No overnight events reported.  No new complaints reported.    At time of discharge patient anticipates going back home.  She does have home health services.    She mobilizes with a wheelchair.    She lives home with her son, and does ADLs and I ADLs with assistance    Objective Assessment:  Physical Exam      Vital signs have been personally reviewed by me   General: Appears comfortable, no acute distress.  Neuro: awake, alert, oriented     Integumentary: Warm, dry, intact.  Musculoskeletal: Purposeful movement noted.     Respiratory: No accessory muscle use. Breath sounds are equal.  Cardiovascular: Regular rate.       VITAL SIGNS: 24 HRS MIN & MAX LAST   Temp  Min: 97.8 °F (36.6 °C)  Max: 98.2 °F (36.8 °C) 97.9 °F (36.6 °C)   BP  Min: 108/61  Max: 121/64 108/61   Pulse  Min: 63  Max: 73  72   Resp  Min: 20  Max: 21 20   SpO2  Min: 94 %  Max: 96 % (!) 94 %     Cardiac catheterization  Procedure performed in the Invasive Lab    - See Procedure Log link below for nursing documentation    - See OpNote on Surgeries Tab for physician findings    - See Imaging Tab for radiologist dictation    Recent Labs   Lab 10/22/24  0516 10/23/24  0441 10/24/24  0448   WBC 9.48 8.29 8.13   RBC 3.01* 2.89* 2.99*   HGB 8.8* 8.6* 8.8*   HCT 27.7* 27.0* 28.3*   MCV 92.0 93.4 94.6*   MCH 29.2 29.8 29.4   MCHC 31.8* 31.9* 31.1*   RDW 16.7 16.5 16.9   * 378 396   MPV 10.4 10.1 9.8       Recent Labs   Lab 10/22/24  0516 10/23/24  0441 10/24/24  0448   * 130* 130*   K 5.0 4.9 4.6   CL 86* 90* 91*   CO2 17* 22* 22*   BUN 77.9* 50.8* 60.9*   CREATININE 5.97* 4.78* 5.38*   CALCIUM 8.5 8.0* 8.1*   MG 1.70 1.80 1.70   ALBUMIN 2.0* 2.0* 2.1*   ALKPHOS 110 108 116   ALT 5 <5 6   AST 12 12 13   BILITOT 0.3 0.3 0.3     Microbiology Results (last 7 days)       Procedure Component Value Units  Date/Time    Blood Culture [[id number]]  (Normal) Collected: 10/20/24 1019    Order Status: Completed Specimen: Blood Updated: 10/25/24 1201     Blood Culture No Growth at 5 days    Blood Culture [[id number]]  (Normal) Collected: 10/20/24 1019    Order Status: Completed Specimen: Blood Updated: 10/25/24 1201     Blood Culture No Growth at 5 days    Blood Culture [[id number]]  (Normal) Collected: 10/17/24 2114    Order Status: Completed Specimen: Blood from Antecubital, Right Updated: 10/22/24 2300     Blood Culture No Growth at 5 days    Blood Culture [[id number]]  (Normal) Collected: 10/17/24 2114    Order Status: Completed Specimen: Blood from Antecubital, Left Updated: 10/22/24 2300     Blood Culture No Growth at 5 days    Body Fluid Culture [[id number]]  (Abnormal)  (Susceptibility) Collected: 10/16/24 1147    Order Status: Completed Specimen: Body Fluid from Kidney, Right Updated: 10/19/24 1015     Body Fluid Culture Many Methicillin resistant Staphylococcus aureus    Anaerobic Culture [[id number]] Collected: 10/16/24 1147    Order Status: Completed Specimen: Body Fluid from Kidney, Right Updated: 10/19/24 0814     Anaerobe Culture No Anaerobes Isolated    Blood Culture [[id number]]  (Abnormal)  (Susceptibility) Collected: 10/15/24 2136    Order Status: Completed Specimen: Blood Updated: 10/19/24 0656     Blood Culture Methicillin resistant Staphylococcus aureus     GRAM STAIN Gram positive cocci      Seen in gram stain of broth only      2 of 2 bottles positive    Blood Culture [[id number]]  (Abnormal)  (Susceptibility) Collected: 10/15/24 2136    Order Status: Completed Specimen: Blood Updated: 10/19/24 0656     Blood Culture Methicillin resistant Staphylococcus aureus     GRAM STAIN Gram Positive Cocci, probable Staphylococcus      Seen in gram stain of broth only      2 of 2 bottles positive               Medications for Hospital Course         Scheduled Med:   atorvastatin  20 mg Oral QHS    ceftaroline  (Teflaro) IV (PEDS and ADULTS)  200 mg Intravenous Q8H    folic acid  1 mg Oral Daily    pantoprazole  40 mg Oral Daily    sertraline  25 mg Oral Daily    sevelamer carbonate  800 mg Oral TID WM    sodium bicarbonate  1,300 mg Oral TID        PRN Meds:    Current Facility-Administered Medications:     acetaminophen, 650 mg, Oral, Q4H PRN    aluminum-magnesium hydroxide-simethicone, 30 mL, Oral, QID PRN    bisacodyL, 10 mg, Rectal, Daily PRN    heparin (porcine), 3,000 Units, Intravenous, PRN    melatonin, 6 mg, Oral, Nightly PRN    miconazole NITRATE 2 %, , Topical (Top), BID PRN    ondansetron, 4 mg, Intravenous, Q4H PRN    oxyCODONE-acetaminophen, 1 tablet, Oral, Q4H PRN    polyethylene glycol, 17 g, Oral, BID PRN    prochlorperazine, 5 mg, Intravenous, Q6H PRN    senna-docusate 8.6-50 mg, 2 tablet, Oral, BID PRN    sodium chloride 0.9%, 10 mL, Intravenous, PRN    Flushing PICC/Midline Protocol, , , Until Discontinued **AND** sodium chloride 0.9%, 10 mL, Intravenous, Q12H PRN    Flushing PICC/Midline Protocol, , , Until Discontinued **AND** sodium chloride 0.9%, 10 mL, Intravenous, Q12H PRN    vancomycin - pharmacy to dose, , Intravenous, pharmacy to manage frequency     Assessment and Plan        --bilateral hydronephrosis,  moderate to severe hydronephrosis with enlarged right pelvic lymph node increase in size from the prior with suspected malignancy  S/p cystoscopy with right stent exchange and bladder biopsy    ---Blood culture is growing Gram-positive cocci 2/2 bottles  Currently on vancomycin and cefepime.  To continue for now    ---ESRD with metabolic acidosis--continue Tuesday Thursday Saturday schedule, continue sodium bicarbonate tablets and sevelamer   continue dialysis at discretion of nephrologist     Continue supportive care  Continue checking vital signs q4hrs.  Reviewed and restarted appropriate home medications.     DVT prophylaxis initiated   Nurse notified to page me if any changes occur      Consults: nephrologist.   I have personally reviewed the specialist documentation and/or have spoken to the specialist with regard to the care of this patient; recommendations are noted above.     _______________________________________________________________________________________________________________________________      I have spent 40 minutes on the day of the visit; time spent includes face to face time and non-face to face time preparing to see the patient (eg, review of tests), independently reviewing and interpreting medical records, both past and current; documenting clinical information in the electronic or other health record, and communicating results to the patient/family/caregiver and care coordinator and nursing team.      All diagnosis and differential diagnosis have been reviewed,  interpreted and communicated appropriately to care team. assessment and plan has been documented; I have personally reviewed the labs and test results that are presently available and pertinent to this hospital course; I have reviewed medical records based upon their availability.    All of the patient's questions have been  addressed and answered. Patient's is agreeable to the above stated plan.   I will continue to monitor closely and make adjustments to medical management as needed.    Yuki Higgins, DO   10/25/2024     This note was created with the assistance of Dragon voice recognition software. There may be transcription errors as a result of using this technology however minimal. Effort has been made to assure accuracy of transcription but any obvious errors or omissions should be clarified with the author of the document.

## 2024-10-25 NOTE — PROGRESS NOTES
Inpatient Nutrition Assessment    Admit Date: 10/3/2024   Total duration of encounter: 22 days   Patient Age: 69 y.o.    Nutrition Recommendation/Prescription     Continue renal dialysis diet as tolerated  Continue Novasource Renal TID (provides 475 kcal and 22 gm protein per serving)  Honor good preferences  Continue bowel regimen  If intake remains decreased, consider appetite stimulant as medically appropriate  Monitor labs, intake and weight    Communication of Recommendations:  EMR    Nutrition Assessment     Malnutrition Assessment/Nutrition-Focused Physical Exam    Malnutrition Context: acute illness or injury (10/14/24 1330)  Malnutrition Level: other (see comments) (does not meet criteria) (10/14/24 1330)  Energy Intake (Malnutrition): less than 75% for greater than 7 days (10/14/24 1330)  Weight Loss (Malnutrition): other (see comments) (does not meet criteria) (10/14/24 1330)  Subcutaneous Fat (Malnutrition): other (see comments) (does not meet criteria) (10/14/24 1330)           Muscle Mass (Malnutrition): other (see comments) (does not meet criteria) (10/14/24 1330)                                   A minimum of two characteristics is recommended for diagnosis of either severe or non-severe malnutrition.    Chart Review    Reason Seen: follow-up    Malnutrition Screening Tool Results   Have you recently lost weight without trying?: No  Have you been eating poorly because of a decreased appetite?: No   MST Score: 0   Diagnosis:  Persistent b/l hydronephrosis   S/p right neph tube placement and left nephroureteral stent exchange 10/16   Bladder Cancer   Persistent MRSA bacteremia  Leukocytosis- resolved   Encephalopathy ,metabolic- resolved  End-stage renal disease on hemodialysis  Metabolic acidosis, Hyponatremia     Relevant Medical History:  HTN, HLD, chronic hydronephrosis with left nephrostomy tube, right ureteral stent, ESRD on HD, chronic anemia     Scheduled Medications:  atorvastatin, 20 mg,  QHS  ceftaroline (Teflaro) IV (PEDS and ADULTS), 200 mg, Q8H  folic acid, 1 mg, Daily  pantoprazole, 40 mg, Daily  sertraline, 25 mg, Daily  sevelamer carbonate, 800 mg, TID WM  sodium bicarbonate, 1,300 mg, TID    Continuous Infusions:   PRN Medications:  acetaminophen, 650 mg, Q4H PRN  aluminum-magnesium hydroxide-simethicone, 30 mL, QID PRN  bisacodyL, 10 mg, Daily PRN  heparin (porcine), 3,000 Units, PRN  melatonin, 6 mg, Nightly PRN  miconazole NITRATE 2 %, , BID PRN  ondansetron, 4 mg, Q4H PRN  oxyCODONE-acetaminophen, 1 tablet, Q4H PRN  polyethylene glycol, 17 g, BID PRN  prochlorperazine, 5 mg, Q6H PRN  senna-docusate 8.6-50 mg, 2 tablet, BID PRN  sodium chloride 0.9%, 10 mL, PRN  sodium chloride 0.9%, 10 mL, Q12H PRN  sodium chloride 0.9%, 10 mL, Q12H PRN  vancomycin - pharmacy to dose, , pharmacy to manage frequency    Calorie Containing IV Medications: no significant kcals from medications at this time    Recent Labs   Lab 10/19/24  0428 10/21/24  0609 10/22/24  0516 10/23/24  0441 10/24/24  0448   * 127* 126* 130* 130*   K 5.0 4.9 5.0 4.9 4.6   CALCIUM 8.6 8.0* 8.5 8.0* 8.1*   PHOS 4.5  --   --   --   --    MG  --   --  1.70 1.80 1.70   CL 91* 89* 86* 90* 91*   CO2 17* 18* 17* 22* 22*   BUN 67.3* 66.6* 77.9* 50.8* 60.9*   CREATININE 5.17* 5.03* 5.97* 4.78* 5.38*   EGFRNORACEVR 9 9 7 9 8   GLUCOSE 91 91 73* 70* 68*   BILITOT  --   --  0.3 0.3 0.3   ALKPHOS  --   --  110 108 116   ALT  --   --  5 <5 6   AST  --   --  12 12 13   ALBUMIN 1.9*  --  2.0* 2.0* 2.1*   WBC 9.97 7.44 9.48 8.29 8.13   HGB 9.0* 8.7* 8.8* 8.6* 8.8*   HCT 27.7* 27.9* 27.7* 27.0* 28.3*     Nutrition Orders:  Diet Renal On Dialysis  Dietary nutrition supplements TID; Novasource Renal - Vanilla    Appetite/Oral Intake: fair/50-75% of meals  Factors Affecting Nutritional Intake: constipation and decreased appetite  Social Needs Impacting Access to Food: none identified  Food/Anabaptism/Cultural Preferences:  no eggs or grits for  breakfast, instead likes corn flakes, oatmeal, toast with butter and jelly, pancakes and waffles  Food Allergies: no known food allergies  Last Bowel Movement: 10/20/24  Wound(s):  none noted    Comments    10/7/24: Pt off floor for stat CT at time of visit. Pt has been NPO since admit (x 4 days). Per MST, no reports of decreased appetite or unintentional weight loss PTA. No weight loss noted per EMR weight history. Per MD note, ST consulted; will monitor diet advancement and order ONS to ensure adequate nutrition.      10/8/24: Pt remains NPO per SLP, MBS pending when patient is more alert. Pt has been NPO since admit (>4 days). TF recs provided for if/when medically appropriate. RN notified.      10/11/24: Patient reports fair oral intake of meals served. Denies any nausea, vomiting, diarrhea and constipation.      10/14/24: RD consulted to discuss dietary options with patient. Patient complains of eating the same foods every day. Obtained breakfast food preferences and relayed to kitchen. Pt agreed to ONS with meals. Also provided patient with educational handout on renal diet. Discussed the lunch and dinner menu options with the patient as well. Pt reports continued poor-fair intake of meals, +nausea this AM and constipation. Reports good intake PTA and denies unintentional weight loss. No muscle or fat depletion noted per NFPE.     10/17/24: RD re-consulted to revisit patient for dietary preferences. Pt off floor for dialysis at time of rounds. RD previously obtained food preferences for breakfast and relayed to kitchen. Pt also receiving nutritional supplement with all meals, providing 475 kcal and 22 gm protein per serving (meets ~75% energy needs and ~63% protein needs). RD previously discussed lunch and dinner menu options and instructed how to order meals with . If intake remains decreased, consider liberalizing diet. No reports of n/v, last BM 10/12 noted. Will continue to current regimen and  "monitor.     10/21/24: Pt reports continued fair intake, 50% intake documented in EMR. Drinking Novasource, will continue. Reports breakfast preferences are not being honored for breakfast, will notify kitchen. Denies n/v, +constipation; PRN bowel meds noted.    10/25/24: Pt busy with patient care. No reports of n/v per notes, last BM 10/20 documented. No new complaints per MD notes. Will continue current regimen, will continue to monitor.    Anthropometrics    Height: 5' 3" (160 cm), Height Method: Stated  Last Weight: (!) 140.2 kg (309 lb 1.4 oz) (10/10/24 1517), Weight Method: Standard Scale  BMI (Calculated): 54.8  BMI Classification: obese grade III (BMI >/=40)     Ideal Body Weight (IBW), Female: 115 lb     % Ideal Body Weight, Female (lb): 268.77 %                    Usual Body Weight (UBW), k.2 kg  % Usual Body Weight: 105.45     Usual Weight Provided By: EMR weight history and patient denies unintentional weight loss    Wt Readings from Last 5 Encounters:   10/10/24 (!) 140.2 kg (309 lb 1.4 oz)   07/10/24 (!) 140.2 kg (309 lb)   24 113.4 kg (250 lb)     Weight Change(s) Since Admission:   Wt Readings from Last 1 Encounters:   10/10/24 1517 (!) 140.2 kg (309 lb 1.4 oz)   10/03/24 1604 (!) 140.2 kg (309 lb)   Admit Weight: (!) 140.2 kg (309 lb) (10/03/24 1604), Weight Method: Stated    Estimated Needs    Weight Used For Calorie Calculations: (!) 140.2 kg (309 lb 1.4 oz)  Energy Calorie Requirements (kcal): 1901 kcal (1.0 SF)  Energy Need Method: [facility]  Weight Used For Protein Calculations: 87.5 kg (192 lb 14.4 oz) (AdjustedBW used)  Protein Requirements: 105-114 gm (1.2-1.3 gm/kg AdjustedBW)  Fluid Requirements (mL): urine output + 1,000 mL        Enteral Nutrition     Patient not receiving enteral nutrition at this time.    Parenteral Nutrition     Patient not receiving parenteral nutrition support at this time.    Evaluation of Received Nutrient Intake    Calories: meeting estimated " needs with intake of meals and ONS TID  Protein: meeting estimated needs with intake of meals and ONS TID    Patient Education     Not applicable.    Nutrition Diagnosis     PES: Inadequate oral intake related to acute illness as evidenced by <75% intake of meals for >7 days. (active)     Nutrition Interventions     Intervention(s): general/healthful diet, commercial beverage, prescription medication, and collaboration with other providers    Goal: Meet greater than 80% of nutritional needs by follow-up. (goal met)  Goal: Consume % of oral supplements by follow-up. (goal met)    Nutrition Goals & Monitoring     Dietitian will monitor: food and beverage intake, weight, electrolyte/renal panel, glucose/endocrine profile, and gastrointestinal profile  Discharge planning: continue renal diet with Novasource Renal or similar oral supplements  Nutrition Risk/Follow-Up: moderate (follow-up in 3-5 days)   Please consult if re-assessment needed sooner.

## 2024-10-26 LAB
ALBUMIN SERPL-MCNC: 1.9 G/DL (ref 3.4–4.8)
BASOPHILS # BLD AUTO: 0.08 X10(3)/MCL
BASOPHILS NFR BLD AUTO: 0.9 %
BUN SERPL-MCNC: 52.9 MG/DL (ref 9.8–20.1)
CALCIUM SERPL-MCNC: 8.2 MG/DL (ref 8.4–10.2)
CHLORIDE SERPL-SCNC: 93 MMOL/L (ref 98–107)
CO2 SERPL-SCNC: 26 MMOL/L (ref 23–31)
CREAT SERPL-MCNC: 5.06 MG/DL (ref 0.55–1.02)
EOSINOPHIL # BLD AUTO: 0.35 X10(3)/MCL (ref 0–0.9)
EOSINOPHIL NFR BLD AUTO: 4.1 %
ERYTHROCYTE [DISTWIDTH] IN BLOOD BY AUTOMATED COUNT: 16.5 % (ref 11.5–17)
GFR SERPLBLD CREATININE-BSD FMLA CKD-EPI: 9 ML/MIN/1.73/M2
GLUCOSE SERPL-MCNC: 83 MG/DL (ref 82–115)
HCT VFR BLD AUTO: 23.7 % (ref 37–47)
HGB BLD-MCNC: 7.9 G/DL (ref 12–16)
IMM GRANULOCYTES # BLD AUTO: 0.09 X10(3)/MCL (ref 0–0.04)
IMM GRANULOCYTES NFR BLD AUTO: 1.1 %
LYMPHOCYTES # BLD AUTO: 0.97 X10(3)/MCL (ref 0.6–4.6)
LYMPHOCYTES NFR BLD AUTO: 11.4 %
MCH RBC QN AUTO: 29.7 PG (ref 27–31)
MCHC RBC AUTO-ENTMCNC: 33.3 G/DL (ref 33–36)
MCV RBC AUTO: 89.1 FL (ref 80–94)
MONOCYTES # BLD AUTO: 1.14 X10(3)/MCL (ref 0.1–1.3)
MONOCYTES NFR BLD AUTO: 13.3 %
NEUTROPHILS # BLD AUTO: 5.91 X10(3)/MCL (ref 2.1–9.2)
NEUTROPHILS NFR BLD AUTO: 69.2 %
NRBC BLD AUTO-RTO: 0 %
PHOSPHATE SERPL-MCNC: 4 MG/DL (ref 2.3–4.7)
PLATELET # BLD AUTO: 340 X10(3)/MCL (ref 130–400)
PMV BLD AUTO: 9.8 FL (ref 7.4–10.4)
POTASSIUM SERPL-SCNC: 4.5 MMOL/L (ref 3.5–5.1)
RBC # BLD AUTO: 2.66 X10(6)/MCL (ref 4.2–5.4)
SODIUM SERPL-SCNC: 136 MMOL/L (ref 136–145)
VANCOMYCIN SERPL-MCNC: 18 UG/ML (ref 15–20)
WBC # BLD AUTO: 8.54 X10(3)/MCL (ref 4.5–11.5)

## 2024-10-26 PROCEDURE — 36415 COLL VENOUS BLD VENIPUNCTURE: CPT | Performed by: STUDENT IN AN ORGANIZED HEALTH CARE EDUCATION/TRAINING PROGRAM

## 2024-10-26 PROCEDURE — 80202 ASSAY OF VANCOMYCIN: CPT | Performed by: INTERNAL MEDICINE

## 2024-10-26 PROCEDURE — 85025 COMPLETE CBC W/AUTO DIFF WBC: CPT | Performed by: STUDENT IN AN ORGANIZED HEALTH CARE EDUCATION/TRAINING PROGRAM

## 2024-10-26 PROCEDURE — 63600175 PHARM REV CODE 636 W HCPCS: Mod: JZ,JG | Performed by: GENERAL PRACTICE

## 2024-10-26 PROCEDURE — 63600175 PHARM REV CODE 636 W HCPCS: Performed by: STUDENT IN AN ORGANIZED HEALTH CARE EDUCATION/TRAINING PROGRAM

## 2024-10-26 PROCEDURE — 25000003 PHARM REV CODE 250: Performed by: STUDENT IN AN ORGANIZED HEALTH CARE EDUCATION/TRAINING PROGRAM

## 2024-10-26 PROCEDURE — 25000003 PHARM REV CODE 250: Performed by: INTERNAL MEDICINE

## 2024-10-26 PROCEDURE — 27000207 HC ISOLATION

## 2024-10-26 PROCEDURE — 21400001 HC TELEMETRY ROOM

## 2024-10-26 PROCEDURE — 80069 RENAL FUNCTION PANEL: CPT | Performed by: STUDENT IN AN ORGANIZED HEALTH CARE EDUCATION/TRAINING PROGRAM

## 2024-10-26 PROCEDURE — 25000003 PHARM REV CODE 250: Performed by: GENERAL PRACTICE

## 2024-10-26 PROCEDURE — 25000003 PHARM REV CODE 250: Performed by: UROLOGY

## 2024-10-26 RX ORDER — LACTULOSE 10 G/15ML
15 SOLUTION ORAL EVERY 6 HOURS PRN
Status: DISCONTINUED | OUTPATIENT
Start: 2024-10-26 | End: 2024-10-27

## 2024-10-26 RX ADMIN — ATORVASTATIN CALCIUM 20 MG: 10 TABLET, FILM COATED ORAL at 07:10

## 2024-10-26 RX ADMIN — CEFTAROLINE FOSAMIL 200 MG: 600 POWDER, FOR SOLUTION INTRAVENOUS at 02:10

## 2024-10-26 RX ADMIN — SERTRALINE HYDROCHLORIDE 25 MG: 25 TABLET ORAL at 10:10

## 2024-10-26 RX ADMIN — FOLIC ACID 1 MG: 1 TABLET ORAL at 10:10

## 2024-10-26 RX ADMIN — CEFTAROLINE FOSAMIL 200 MG: 600 POWDER, FOR SOLUTION INTRAVENOUS at 04:10

## 2024-10-26 RX ADMIN — SEVELAMER CARBONATE 800 MG: 800 TABLET, FILM COATED ORAL at 12:10

## 2024-10-26 RX ADMIN — PANTOPRAZOLE SODIUM 40 MG: 40 TABLET, DELAYED RELEASE ORAL at 10:10

## 2024-10-26 RX ADMIN — VANCOMYCIN HYDROCHLORIDE 500 MG: 500 INJECTION, POWDER, LYOPHILIZED, FOR SOLUTION INTRAVENOUS at 07:10

## 2024-10-26 RX ADMIN — SEVELAMER CARBONATE 800 MG: 800 TABLET, FILM COATED ORAL at 05:10

## 2024-10-26 RX ADMIN — BISACODYL 10 MG: 10 SUPPOSITORY RECTAL at 11:10

## 2024-10-26 RX ADMIN — CEFTAROLINE FOSAMIL 200 MG: 600 POWDER, FOR SOLUTION INTRAVENOUS at 09:10

## 2024-10-26 RX ADMIN — OXYCODONE AND ACETAMINOPHEN 1 TABLET: 10; 325 TABLET ORAL at 12:10

## 2024-10-26 RX ADMIN — OXYCODONE AND ACETAMINOPHEN 1 TABLET: 10; 325 TABLET ORAL at 05:10

## 2024-10-26 RX ADMIN — SEVELAMER CARBONATE 800 MG: 800 TABLET, FILM COATED ORAL at 10:10

## 2024-10-26 NOTE — PROGRESS NOTES
Pharmacokinetic Assessment Follow Up: IV Vancomycin    Vancomycin serum concentration assessment(s):    The random level was drawn correctly and can be used to guide therapy at this time. The measurement is within the desired definitive target range of 15 to 20 mcg/mL.    Vancomycin Regimen Plan:  500 mg x 1 after HD today  Re-dose when the random level is less than 20 mcg/mL, next level to be drawn at 0430 on 10/29    Scheduled Administration Times        Drug levels (last 3 results):  Recent Labs   Lab Result Units 10/24/24  0448 10/26/24  0439   Vancomycin Random ug/ml 16.1 18.0       Vancomycin Administrations:  vancomycin given in the last 96 hours                     vancomycin (VANCOCIN) 500 mg in D5W 100 mL IVPB (MB+) (mg) 500 mg New Bag 10/24/24 1846    vancomycin (VANCOCIN) 500 mg in D5W 100 mL IVPB (MB+) (mg) 500 mg New Bag 10/22/24 2028                    Pharmacy will continue to follow and monitor vancomycin.    Please contact pharmacy at extension [phone] for questions regarding this assessment.    Thank you for the consult,   Yayo Sweet       Patient brief summary:  Fior Joya is a 69 y.o. female initiated on antimicrobial therapy with IV Vancomycin for treatment of bacteremia    The patient's current regimen is dosing with HD TTS    Drug Allergies:   Review of patient's allergies indicates:   Allergen Reactions    Asa [aspirin] Anaphylaxis    Penicillins      Received ceftriaxone from 6/27, no reactions        Actual Body Weight:  Wt Readings from Last 1 Encounters:   10/10/24 (!) 140.2 kg (309 lb 1.4 oz)       Renal Function:   Estimated Creatinine Clearance: 14.7 mL/min (A) (based on SCr of 5.06 mg/dL (H)).,     Dialysis Method (if applicable):  intermittent HD    CBC (last 72 hours):  Recent Labs   Lab Result Units 10/24/24  0448 10/26/24  0439   WBC x10(3)/mcL 8.13 8.54   Hgb g/dL 8.8* 7.9*   Hct % 28.3* 23.7*   Platelet x10(3)/mcL 396 340   Mono % % 12.1 13.3   Eos % % 4.2 4.1   Basophil % %  0.9 0.9       Metabolic Panel (last 72 hours):  Recent Labs   Lab Result Units 10/24/24  0448 10/26/24  0439   Sodium mmol/L 130* 136   Potassium mmol/L 4.6 4.5   Chloride mmol/L 91* 93*   CO2 mmol/L 22* 26   Glucose mg/dL 68* 83   Blood Urea Nitrogen mg/dL 60.9* 52.9*   Creatinine mg/dL 5.38* 5.06*   Albumin g/dL 2.1* 1.9*   Bilirubin Total mg/dL 0.3  --    ALP unit/L 116  --    AST unit/L 13  --    ALT unit/L 6  --    Magnesium Level mg/dL 1.70  --    Phosphorus Level mg/dL  --  4.0       Microbiologic Results:  Microbiology Results (last 7 days)       Procedure Component Value Units Date/Time    Blood Culture [[id number]]  (Normal) Collected: 10/20/24 1019    Order Status: Completed Specimen: Blood Updated: 10/25/24 1201     Blood Culture No Growth at 5 days    Blood Culture [[id number]]  (Normal) Collected: 10/20/24 1019    Order Status: Completed Specimen: Blood Updated: 10/25/24 1201     Blood Culture No Growth at 5 days    Blood Culture [[id number]]  (Normal) Collected: 10/17/24 2114    Order Status: Completed Specimen: Blood from Antecubital, Right Updated: 10/22/24 2300     Blood Culture No Growth at 5 days    Blood Culture [[id number]]  (Normal) Collected: 10/17/24 2114    Order Status: Completed Specimen: Blood from Antecubital, Left Updated: 10/22/24 2300     Blood Culture No Growth at 5 days    Body Fluid Culture [[id number]]  (Abnormal)  (Susceptibility) Collected: 10/16/24 1147    Order Status: Completed Specimen: Body Fluid from Kidney, Right Updated: 10/19/24 1015     Body Fluid Culture Many Methicillin resistant Staphylococcus aureus    Anaerobic Culture [[id number]] Collected: 10/16/24 1147    Order Status: Completed Specimen: Body Fluid from Kidney, Right Updated: 10/19/24 0814     Anaerobe Culture No Anaerobes Isolated

## 2024-10-26 NOTE — PROGRESS NOTES
Patient Name: Fior Joya  Age: 69 y.o.  : 1955  MRN: [id number]  Admission Date: 10/3/2024           Fior Joya is a 69 y.o. female who presents with right flank pain.  Past medical history significant for ESRD on hemodialysis TTS at [facility] via right IJ PermCath, chronic hydronephrosis with left nephrostomy tube in place, and stents to right ureter, hypertension, anemia, hyperphosphatemia, and hyperlipidemia.  Patient presents with worsening right flank pain.  She states the pain started in her right neck, in his now in her right flank/hip area.  Patient was started on hemodialysis in 2024 after being found to have significant acute kidney injury and hydronephrosis.  She has been maintained on hemodialysis at [facility] on a TTS schedule.  Prior to hospitalization her last dialysis was on 10/01/2024.  Nephrology consulted for ESRD management.  In the emergency department she was noted to have low blood pressure and has been given IV fluids.  Urology has also been consulted for concern hydronephrosis needing stent exchange.  She has also been started on antibiotics for possible UTI.  She has had nausea, and vomiting.  No chest pain, shortness of breath, or lower extremity edema.     10/07/2024  Chart reviewed.  Weekend events noted.  Patient has been NPO at least since her admission to the hospital and also has no IV fluids.  Lying down in the bed.  Having jerky movements all over the body and uncomfortable.  Now she has nephrostomy tube on both sides.  Both draining.  Serosanguineous fluid noted.  Patient is very slow to answer questions but did recognize me by name and she knows her own name.      10/08/2024  No acute events overnight.  Seen on hemodialysis.  She was endorsing shortness of breath, and has some lower extremity edema.     10/09/2024   Mentally she has cleared up completely.  Complains of pains all over the body for which she is on p.r.n. pain medications.  Also complains of  being nervous and on some anxiety medication.  No shortness of breath.  Tunneled dialysis catheter was removed yesterday.     10/10/2024  No acute events overnight.  No chest pain, shortness of breath, abdominal pain, nausea, vomiting, or lower extremity edema.  Found to have bladder cancer by Urology.     10/11/2024  No acute events overnight.  No new complaints.  Blood cultures remain positive.  No chest pain, shortness of breath, nausea, vomiting.     10/12/2024  Patient had Vas-Cath placed yesterday, and she was re-initiated on hemodialysis.  Dialyzed yesterday with 1.5 L UF.  No new complaints today.  No chest pain, shortness of breath, nausea, vomiting.  She does have mild lower extremity edema.     10/14/2024   Complains of pain all over the body.  No fever or chills.  Does not like her diet.  No nausea vomiting.  No shortness of breath.  No chest pains.  No cough cold congestion.     10/15/2024   Now patient also has indwelling Mario catheter.  Urology have some tagged WBC  scan and cystogram planned.  Patient will also need dialysis today.  No new complaints.     10/16/2024   Patient is NPO as she is going for procedure on the nephrostomy tubes.  Patient's son present in the room and he is very angry at every body reporting that patient is not getting the right kind of food to eat, patient is not getting right kind of care, patient is not getting proper therapy and is being told to move around and get out of the bed.  He also reported that he plans to go to the administration and may go seek help from attorneys.  All the above was said by the patient's son when the patient herself is very calm and content.     10/17/2024  Seen on hemodialysis.     10/18/2024  No acute events overnight.  Dialyzed yesterday with 2 L UF.  Tolerated well.  No cramping.  No chest pain, shortness of breath, nausea, vomiting.     10/22/2024  Seen on hemodialysis.     10/23/2024  No acute events overnight.  Patient dialyzed  yesterday with 1.6 L UF.  She ended dialysis early due to pain.     10/24/2024  Patient had left IJ PermCath placed by Dr. Manzo this morning.  Overall doing fairly well.  No chest pain, shortness of breath, abdominal pain, nausea, vomiting, or lower extremity edema.     10/25/2024  No acute events overnight.  Patient dialyzed yesterday, but came off early due to cramps.  No chest pain, shortness of breath, nausea, vomiting, or lower extremity edema.    10/26/24  No acute events overnight. Pt refusing HD today saying her stomach hurts. Pt says she is constipated because she has not had a bowel movement today but does report having a large soft bm today. Told her I will order lactulose. Still refusing HD tx- educated on risk of missing HD. Notes understanding and I informed the HD nurses. Last tx on Thursday pt got off early due to cramping. No chest pain, shortness of breath, nausea, vomiting or edema.      Current Medications             Current Facility-Administered Medications   Medication Dose Route Frequency Provider Last Rate Last Admin    acetaminophen tablet 650 mg  650 mg Oral Q4H PRN Tawana Loaiza MD   650 mg at 10/19/24 1606    aluminum-magnesium hydroxide-simethicone 200-200-20 mg/5 mL suspension 30 mL  30 mL Oral QID PRN Tawana Loaiza MD        atorvastatin tablet 20 mg  20 mg Oral QHS Tawana Loaiza MD   20 mg at 10/24/24 2158    bisacodyL suppository 10 mg  10 mg Rectal Daily PRN Terrance Hinkle MD   10 mg at 10/18/24 1618    ceftaroline fosamiL (TEFLARO) 200 mg in D5W 50 mL IVPB  200 mg Intravenous Q8H Milo Campbell MD 50 mL/hr at 10/25/24 0854 200 mg at 10/25/24 0854    folic acid tablet 1 mg  1 mg Oral Daily Tawana Loaiza MD   1 mg at 10/25/24 0843    heparin (porcine) injection 3,000 Units  3,000 Units Intravenous PRN Neno Mercado DO   3,600 Units at 10/24/24 1528    melatonin tablet 6 mg  6 mg Oral Nightly PRN Tawana Loaiza, MD   6 mg  at 10/23/24 2104    miconazole NITRATE 2 % top powder   Topical (Top) BID PRN Rishi Leonardo MD        ondansetron injection 4 mg  4 mg Intravenous Q4H PRN Tawana Loaiza MD   4 mg at 10/21/24 1720    oxyCODONE-acetaminophen  mg per tablet 1 tablet  1 tablet Oral Q4H PRN Uriel Fletcher MD   1 tablet at 10/24/24 1613    pantoprazole EC tablet 40 mg  40 mg Oral Daily Tawana Loaiza MD   40 mg at 10/25/24 0843    polyethylene glycol packet 17 g  17 g Oral BID PRN Tawana Loaiza MD   17 g at 10/25/24 0330    prochlorperazine injection Soln 5 mg  5 mg Intravenous Q6H PRN Tawana Loaiza MD        senna-docusate 8.6-50 mg per tablet 2 tablet  2 tablet Oral BID PRN Tawana Loaiza MD   2 tablet at 10/18/24 0851    sertraline tablet 25 mg  25 mg Oral Daily Shin Light MD   25 mg at 10/25/24 0843    sevelamer carbonate tablet 800 mg  800 mg Oral TID  Tawana Loaiza MD   800 mg at 10/22/24 1610    sodium bicarbonate tablet 1,300 mg  1,300 mg Oral TID Shin Light MD   1,300 mg at 10/25/24 0843    sodium chloride 0.9% flush 10 mL  10 mL Intravenous PRN Tawana Loaiza MD        sodium chloride 0.9% flush 10 mL  10 mL Intravenous Q12H PRN Glenda Hughes, REAL        sodium chloride 0.9% flush 10 mL  10 mL Intravenous Q12H PRN Rishi Leonardo MD        vancomycin - pharmacy to dose   Intravenous pharmacy to manage frequency Tawana Loaiza MD                      Review of Systems:         Objective:         Vitals:    10/26/24 0803   BP: 109/64   Pulse: 66   Resp:    Temp: 97.8 °F (36.6 °C)      GEN:  Chronically ill-appearing WF  CV: RRR without rub or gallop.  PULM: CTAB, unlabored  ABD: Soft, NT/ND abdomen with NABS  :  Nephrostomy tubes in both right and left kidneys.    EXT:  No  extremity edema  SKIN: Warm and dry  PSYCH: Awake, alert and appropriately conversant  Dialysis access:  Left IJ Vas-Cath          Latest Reference Range & Units 10/26/24 04:39   WBC 4.50 - 11.50 x10(3)/mcL 8.54   RBC 4.20 - 5.40 x10(6)/mcL 2.66 (L)   Hemoglobin 12.0 - 16.0 g/dL 7.9 (L)   Hematocrit 37.0 - 47.0 % 23.7 (L)   MCV 80.0 - 94.0 fL 89.1   MCH 27.0 - 31.0 pg 29.7   MCHC 33.0 - 36.0 g/dL 33.3   RDW 11.5 - 17.0 % 16.5   Platelet Count 130 - 400 x10(3)/mcL 340   MPV 7.4 - 10.4 fL 9.8   Neut % % 69.2   LYMPH % % 11.4   Mono % % 13.3   Eos % % 4.1   Basophil % % 0.9   Immature Granulocytes % 1.1   Neut # 2.1 - 9.2 x10(3)/mcL 5.91   Lymph # 0.6 - 4.6 x10(3)/mcL 0.97   Mono # 0.1 - 1.3 x10(3)/mcL 1.14   Eos # 0 - 0.9 x10(3)/mcL 0.35   Baso # <=0.2 x10(3)/mcL 0.08   Immature Grans (Abs) 0 - 0.04 x10(3)/mcL 0.09 (H)   nRBC % 0.0   Sodium 136 - 145 mmol/L 136   Potassium 3.5 - 5.1 mmol/L 4.5   Chloride 98 - 107 mmol/L 93 (L)   CO2 23 - 31 mmol/L 26   BUN 9.8 - 20.1 mg/dL 52.9 (H)   Creatinine 0.55 - 1.02 mg/dL 5.06 (H)   eGFR mL/min/1.73/m2 9   Glucose 82 - 115 mg/dL 83   Calcium 8.4 - 10.2 mg/dL 8.2 (L)   Phosphorus Level 2.3 - 4.7 mg/dL 4.0   Albumin 3.4 - 4.8 g/dL 1.9 (L)   Vancomycin, Random 15.0 - 20.0 ug/ml 18.0   (L): Data is abnormally low  (H): Data is abnormally high     Assessment / Plan:      ESRD - normally TTS at [facility].  Tunneled dialysis catheter removed 10/08/2024 due to persistent MRSA bacteremia. TDC repaced 10/24/24 by Dr. Manzo. Refusing HD today.   Metabolic acidosis-Resolved co2 26 10/26.  Anemia- HH 7.9 23.7 10/26.  MRSA bacteremia-Per primary  Bilateral hydronephrosis - now with nephrostomy tube both sides. Per urology.  Constipation- Lactulose prn     Plan:  HD today- patient refused despite education of risk.  Laculose 15ml po bid prn constipation.  CBC/CMP in am

## 2024-10-26 NOTE — PROGRESS NOTES
[facility] Hospital Medicine Progress Note      Chief Complaint:  Flank pain       HPI: (personally reviewed by me and is documented from initial H&P)     69-year-old female with past medical history of hypertension, hyperlipidemia, chronic hydronephrosis with left nephrostomy tube, right ureteral stent, end-stage renal disease on hemodialysis, chronic anemia presented with right flank pain for the past 5 days and also reported cloudy urine output from the urostomy CT with IV contrast showed moderate to severe hydronephrosis with enlarged right pelvic lymph node increase in size from the prior with suspected malignancy also showed duplicated collecting system with dilation of the right lower pole urology was consulted patient is status post cystoscopy with right stent exchange and bladder biopsy and they were not able to identify the left ureteral orifice therefore had left percutaneous nephrostomy.     Blood culture is growing Gram-positive cocci 2/2 bottles initially patient was started on cefepime but now we added vancomycin repeat blood cultures ordered.    ID was consulted MRI of the C/T and L-spine was ordered that was negative for any abscess blood culture is growing MRSA TTE as such did not show any vegetation cardiology consulted for MADELYN.    Bilateral hydronephrosis Status post right upper lobe ureteral stent exchange and right lower pole nephrostomy tube placement.   Patient was slightly confused on October 7 so we had ordered CT of the head without contrast that was negative, ammonia, TSH everything was normal we also ordered CT of the abdomen and pelvis with contrast that was negative for any abscess cardiology consulted for MADELYN- was negative for endocarditis.    Dialysis catheter was removed, pt now with L IJ central line. CT bilateral lower extremities with and without contrast showing no bone abnormalities.  There was symmetric uptake at the lower extremities on nuclear  medicine exam .Nonspecific relatively increased radiotracer uptake at the bilateral distal femur and proximal tibia       Interval History:        No overnight events reported.  No new complaints reported.    At time of discharge patient anticipates going back home.  She does have home health services.    She mobilizes with a wheelchair.    She lives home with her son, and does ADLs and I ADLs with assistance    Today, 10/26/2024, patient refused dialysis.    Objective Assessment:  Physical Exam      Vital signs have been personally reviewed by me   General: Appears comfortable, no acute distress.  Neuro: awake, alert, oriented     Integumentary: Warm, dry, intact.  Musculoskeletal: Purposeful movement noted.     Respiratory: No accessory muscle use. Breath sounds are equal.  Cardiovascular: Regular rate.       VITAL SIGNS: 24 HRS MIN & MAX LAST   Temp  Min: 97.4 °F (36.3 °C)  Max: 98.4 °F (36.9 °C) 98.2 °F (36.8 °C)   BP  Min: 107/62  Max: 128/64 117/64   Pulse  Min: 65  Max: 79  79   Resp  Min: 17  Max: 18 17   SpO2  Min: 94 %  Max: 97 % (!) 94 %     Cardiac catheterization  Procedure performed in the Invasive Lab    - See Procedure Log link below for nursing documentation    - See OpNote on Surgeries Tab for physician findings    - See Imaging Tab for radiologist dictation    Recent Labs   Lab 10/23/24  0441 10/24/24  0448 10/26/24  0439   WBC 8.29 8.13 8.54   RBC 2.89* 2.99* 2.66*   HGB 8.6* 8.8* 7.9*   HCT 27.0* 28.3* 23.7*   MCV 93.4 94.6* 89.1   MCH 29.8 29.4 29.7   MCHC 31.9* 31.1* 33.3   RDW 16.5 16.9 16.5    396 340   MPV 10.1 9.8 9.8       Recent Labs   Lab 10/22/24  0516 10/23/24  0441 10/24/24  0448 10/26/24  0439   * 130* 130* 136   K 5.0 4.9 4.6 4.5   CL 86* 90* 91* 93*   CO2 17* 22* 22* 26   BUN 77.9* 50.8* 60.9* 52.9*   CREATININE 5.97* 4.78* 5.38* 5.06*   CALCIUM 8.5 8.0* 8.1* 8.2*   MG 1.70 1.80 1.70  --    ALBUMIN 2.0* 2.0* 2.1* 1.9*   ALKPHOS 110 108 116  --    ALT 5 <5 6  --    AST  12 12 13  --    BILITOT 0.3 0.3 0.3  --      Microbiology Results (last 7 days)       Procedure Component Value Units Date/Time    Blood Culture [[id number]]  (Normal) Collected: 10/20/24 1019    Order Status: Completed Specimen: Blood Updated: 10/25/24 1201     Blood Culture No Growth at 5 days    Blood Culture [[id number]]  (Normal) Collected: 10/20/24 1019    Order Status: Completed Specimen: Blood Updated: 10/25/24 1201     Blood Culture No Growth at 5 days    Blood Culture [[id number]]  (Normal) Collected: 10/17/24 2114    Order Status: Completed Specimen: Blood from Antecubital, Right Updated: 10/22/24 2300     Blood Culture No Growth at 5 days    Blood Culture [[id number]]  (Normal) Collected: 10/17/24 2114    Order Status: Completed Specimen: Blood from Antecubital, Left Updated: 10/22/24 2300     Blood Culture No Growth at 5 days               Medications for Hospital Course         Scheduled Med:   atorvastatin  20 mg Oral QHS    ceftaroline (Teflaro) IV (PEDS and ADULTS)  200 mg Intravenous Q8H    folic acid  1 mg Oral Daily    pantoprazole  40 mg Oral Daily    sertraline  25 mg Oral Daily    sevelamer carbonate  800 mg Oral TID WM    sodium bicarbonate  1,300 mg Oral TID    vancomycin (VANCOCIN) IV (PEDS and ADULTS)  500 mg Intravenous Once        PRN Meds:    Current Facility-Administered Medications:     acetaminophen, 650 mg, Oral, Q4H PRN    aluminum-magnesium hydroxide-simethicone, 30 mL, Oral, QID PRN    bisacodyL, 10 mg, Rectal, Daily PRN    heparin (porcine), 3,000 Units, Intravenous, PRN    lactulose 10 gram/15 ml, 15 g, Oral, Q6H PRN    melatonin, 6 mg, Oral, Nightly PRN    miconazole NITRATE 2 %, , Topical (Top), BID PRN    ondansetron, 4 mg, Intravenous, Q4H PRN    oxyCODONE-acetaminophen, 1 tablet, Oral, Q4H PRN    polyethylene glycol, 17 g, Oral, BID PRN    prochlorperazine, 5 mg, Intravenous, Q6H PRN    senna-docusate 8.6-50 mg, 2 tablet, Oral, BID PRN    sodium chloride 0.9%, 10 mL,  Intravenous, PRN    Flushing PICC/Midline Protocol, , , Until Discontinued **AND** sodium chloride 0.9%, 10 mL, Intravenous, Q12H PRN    Flushing PICC/Midline Protocol, , , Until Discontinued **AND** sodium chloride 0.9%, 10 mL, Intravenous, Q12H PRN    vancomycin - pharmacy to dose, , Intravenous, pharmacy to manage frequency     Assessment and Plan        --bilateral hydronephrosis,  moderate to severe hydronephrosis with enlarged right pelvic lymph node increase in size from the prior with suspected malignancy  S/p cystoscopy with right stent exchange and bladder biopsy    ---Blood culture is growing Gram-positive cocci 2/2 bottles  Currently on vancomycin and cefepime.  To continue for now    ---ESRD with metabolic acidosis  continue Tuesday Thursday Saturday schedule,  continue sodium bicarbonate tablets and sevelamer  Patient refused dialysis.     Continue supportive care  Continue checking vital signs q4hrs.  Reviewed and restarted appropriate home medications.     DVT prophylaxis initiated   Nurse notified to page me if any changes occur     Consults: nephrologist.   I have personally reviewed the specialist documentation and/or have spoken to the specialist with regard to the care of this patient; recommendations are noted above.     _______________________________________________________________________________________________________________________________      I have spent 40 minutes on the day of the visit; time spent includes face to face time and non-face to face time preparing to see the patient (eg, review of tests), independently reviewing and interpreting medical records, both past and current; documenting clinical information in the electronic or other health record, and communicating results to the patient/family/caregiver and care coordinator and nursing team.      All diagnosis and differential diagnosis have been reviewed,  interpreted and communicated appropriately to care team. assessment  and plan has been documented; I have personally reviewed the labs and test results that are presently available and pertinent to this hospital course; I have reviewed medical records based upon their availability.    All of the patient's questions have been  addressed and answered. Patient's is agreeable to the above stated plan.   I will continue to monitor closely and make adjustments to medical management as needed.    Yuki Higgins, DO   10/26/2024     This note was created with the assistance of Dragon voice recognition software. There may be transcription errors as a result of using this technology however minimal. Effort has been made to assure accuracy of transcription but any obvious errors or omissions should be clarified with the author of the document.

## 2024-10-27 LAB
ALBUMIN SERPL-MCNC: 1.9 G/DL (ref 3.4–4.8)
ALBUMIN/GLOB SERPL: 0.4 RATIO (ref 1.1–2)
ALP SERPL-CCNC: 96 UNIT/L (ref 40–150)
ALT SERPL-CCNC: 6 UNIT/L (ref 0–55)
ANION GAP SERPL CALC-SCNC: 18 MEQ/L
AST SERPL-CCNC: 13 UNIT/L (ref 5–34)
BASOPHILS # BLD AUTO: 0.11 X10(3)/MCL
BASOPHILS NFR BLD AUTO: 1.2 %
BILIRUB SERPL-MCNC: 0.3 MG/DL
BUN SERPL-MCNC: 60.8 MG/DL (ref 9.8–20.1)
CALCIUM SERPL-MCNC: 8.1 MG/DL (ref 8.4–10.2)
CHLORIDE SERPL-SCNC: 91 MMOL/L (ref 98–107)
CO2 SERPL-SCNC: 24 MMOL/L (ref 23–31)
CREAT SERPL-MCNC: 5.86 MG/DL (ref 0.55–1.02)
CREAT/UREA NIT SERPL: 10
EOSINOPHIL # BLD AUTO: 0.31 X10(3)/MCL (ref 0–0.9)
EOSINOPHIL NFR BLD AUTO: 3.4 %
ERYTHROCYTE [DISTWIDTH] IN BLOOD BY AUTOMATED COUNT: 16.9 % (ref 11.5–17)
GFR SERPLBLD CREATININE-BSD FMLA CKD-EPI: 7 ML/MIN/1.73/M2
GLOBULIN SER-MCNC: 4.5 GM/DL (ref 2.4–3.5)
GLUCOSE SERPL-MCNC: 73 MG/DL (ref 82–115)
HCT VFR BLD AUTO: 23.2 % (ref 37–47)
HGB BLD-MCNC: 7.4 G/DL (ref 12–16)
IMM GRANULOCYTES # BLD AUTO: 0.08 X10(3)/MCL (ref 0–0.04)
IMM GRANULOCYTES NFR BLD AUTO: 0.9 %
LYMPHOCYTES # BLD AUTO: 0.92 X10(3)/MCL (ref 0.6–4.6)
LYMPHOCYTES NFR BLD AUTO: 10.1 %
MAGNESIUM SERPL-MCNC: 2.3 MG/DL (ref 1.6–2.6)
MCH RBC QN AUTO: 29.2 PG (ref 27–31)
MCHC RBC AUTO-ENTMCNC: 31.9 G/DL (ref 33–36)
MCV RBC AUTO: 91.7 FL (ref 80–94)
MONOCYTES # BLD AUTO: 1.13 X10(3)/MCL (ref 0.1–1.3)
MONOCYTES NFR BLD AUTO: 12.5 %
NEUTROPHILS # BLD AUTO: 6.52 X10(3)/MCL (ref 2.1–9.2)
NEUTROPHILS NFR BLD AUTO: 71.9 %
NRBC BLD AUTO-RTO: 0 %
PHOSPHATE SERPL-MCNC: 4.1 MG/DL (ref 2.3–4.7)
PLATELET # BLD AUTO: 259 X10(3)/MCL (ref 130–400)
PMV BLD AUTO: 10.9 FL (ref 7.4–10.4)
POTASSIUM SERPL-SCNC: 5.2 MMOL/L (ref 3.5–5.1)
PROT SERPL-MCNC: 6.4 GM/DL (ref 5.8–7.6)
RBC # BLD AUTO: 2.53 X10(6)/MCL (ref 4.2–5.4)
SODIUM SERPL-SCNC: 133 MMOL/L (ref 136–145)
WBC # BLD AUTO: 9.07 X10(3)/MCL (ref 4.5–11.5)

## 2024-10-27 PROCEDURE — 25000003 PHARM REV CODE 250: Performed by: UROLOGY

## 2024-10-27 PROCEDURE — 36415 COLL VENOUS BLD VENIPUNCTURE: CPT | Performed by: NURSE PRACTITIONER

## 2024-10-27 PROCEDURE — 85025 COMPLETE CBC W/AUTO DIFF WBC: CPT | Performed by: NURSE PRACTITIONER

## 2024-10-27 PROCEDURE — 83735 ASSAY OF MAGNESIUM: CPT | Performed by: STUDENT IN AN ORGANIZED HEALTH CARE EDUCATION/TRAINING PROGRAM

## 2024-10-27 PROCEDURE — 80053 COMPREHEN METABOLIC PANEL: CPT | Performed by: NURSE PRACTITIONER

## 2024-10-27 PROCEDURE — 27000207 HC ISOLATION

## 2024-10-27 PROCEDURE — 36410 VNPNXR 3YR/> PHY/QHP DX/THER: CPT

## 2024-10-27 PROCEDURE — 25000003 PHARM REV CODE 250: Performed by: INTERNAL MEDICINE

## 2024-10-27 PROCEDURE — 25000003 PHARM REV CODE 250: Performed by: GENERAL PRACTICE

## 2024-10-27 PROCEDURE — 63600175 PHARM REV CODE 636 W HCPCS: Performed by: UROLOGY

## 2024-10-27 PROCEDURE — 21400001 HC TELEMETRY ROOM

## 2024-10-27 PROCEDURE — 84100 ASSAY OF PHOSPHORUS: CPT | Performed by: STUDENT IN AN ORGANIZED HEALTH CARE EDUCATION/TRAINING PROGRAM

## 2024-10-27 PROCEDURE — 63600175 PHARM REV CODE 636 W HCPCS: Mod: JZ,JG | Performed by: GENERAL PRACTICE

## 2024-10-27 RX ORDER — SODIUM CHLORIDE 9 MG/ML
INJECTION, SOLUTION INTRAVENOUS ONCE
Status: CANCELLED | OUTPATIENT
Start: 2024-10-27 | End: 2024-10-27

## 2024-10-27 RX ORDER — LACTULOSE 10 G/15ML
30 SOLUTION ORAL DAILY
Status: DISCONTINUED | OUTPATIENT
Start: 2024-10-27 | End: 2024-10-28 | Stop reason: HOSPADM

## 2024-10-27 RX ORDER — NYSTATIN AND TRIAMCINOLONE ACETONIDE 100000; 1 [USP'U]/G; MG/G
CREAM TOPICAL 2 TIMES DAILY
Status: DISCONTINUED | OUTPATIENT
Start: 2024-10-27 | End: 2024-10-28 | Stop reason: HOSPADM

## 2024-10-27 RX ADMIN — SERTRALINE HYDROCHLORIDE 25 MG: 25 TABLET ORAL at 09:10

## 2024-10-27 RX ADMIN — LACTULOSE 30 G: 10 SOLUTION ORAL at 02:10

## 2024-10-27 RX ADMIN — MICONAZOLE NITRATE: 20 POWDER TOPICAL at 02:10

## 2024-10-27 RX ADMIN — FOLIC ACID 1 MG: 1 TABLET ORAL at 09:10

## 2024-10-27 RX ADMIN — OXYCODONE AND ACETAMINOPHEN 1 TABLET: 10; 325 TABLET ORAL at 01:10

## 2024-10-27 RX ADMIN — OXYCODONE AND ACETAMINOPHEN 1 TABLET: 10; 325 TABLET ORAL at 06:10

## 2024-10-27 RX ADMIN — ATORVASTATIN CALCIUM 20 MG: 10 TABLET, FILM COATED ORAL at 08:10

## 2024-10-27 RX ADMIN — ONDANSETRON 4 MG: 2 INJECTION INTRAMUSCULAR; INTRAVENOUS at 11:10

## 2024-10-27 RX ADMIN — PANTOPRAZOLE SODIUM 40 MG: 40 TABLET, DELAYED RELEASE ORAL at 09:10

## 2024-10-27 RX ADMIN — NYSTATIN, TRIAMCINOLONE ACETONIDE: 100000; 1 CREAM TOPICAL at 02:10

## 2024-10-27 RX ADMIN — NYSTATIN, TRIAMCINOLONE ACETONIDE: 100000; 1 CREAM TOPICAL at 08:10

## 2024-10-27 RX ADMIN — CEFTAROLINE FOSAMIL 200 MG: 600 POWDER, FOR SOLUTION INTRAVENOUS at 05:10

## 2024-10-27 NOTE — PROGRESS NOTES
Patient Name: Fior Joya  Age: 69 y.o.  : 1955  MRN: [id number]  Admission Date: 10/3/2024           Fior Joya is a 69 y.o. female who presents with right flank pain.  Past medical history significant for ESRD on hemodialysis TTS at [facility] via right IJ PermCath, chronic hydronephrosis with left nephrostomy tube in place, and stents to right ureter, hypertension, anemia, hyperphosphatemia, and hyperlipidemia.  Patient presents with worsening right flank pain.  She states the pain started in her right neck, in his now in her right flank/hip area.  Patient was started on hemodialysis in 2024 after being found to have significant acute kidney injury and hydronephrosis.  She has been maintained on hemodialysis at [facility] on a TTS schedule.  Prior to hospitalization her last dialysis was on 10/01/2024.  Nephrology consulted for ESRD management.  In the emergency department she was noted to have low blood pressure and has been given IV fluids.  Urology has also been consulted for concern hydronephrosis needing stent exchange.  She has also been started on antibiotics for possible UTI.  She has had nausea, and vomiting.  No chest pain, shortness of breath, or lower extremity edema.     10/07/2024  Chart reviewed.  Weekend events noted.  Patient has been NPO at least since her admission to the hospital and also has no IV fluids.  Lying down in the bed.  Having jerky movements all over the body and uncomfortable.  Now she has nephrostomy tube on both sides.  Both draining.  Serosanguineous fluid noted.  Patient is very slow to answer questions but did recognize me by name and she knows her own name.      10/08/2024  No acute events overnight.  Seen on hemodialysis.  She was endorsing shortness of breath, and has some lower extremity edema.     10/09/2024   Mentally she has cleared up completely.  Complains of pains all over the body for which she is on p.r.n. pain medications.  Also complains of  being nervous and on some anxiety medication.  No shortness of breath.  Tunneled dialysis catheter was removed yesterday.     10/10/2024  No acute events overnight.  No chest pain, shortness of breath, abdominal pain, nausea, vomiting, or lower extremity edema.  Found to have bladder cancer by Urology.     10/11/2024  No acute events overnight.  No new complaints.  Blood cultures remain positive.  No chest pain, shortness of breath, nausea, vomiting.     10/12/2024  Patient had Vas-Cath placed yesterday, and she was re-initiated on hemodialysis.  Dialyzed yesterday with 1.5 L UF.  No new complaints today.  No chest pain, shortness of breath, nausea, vomiting.  She does have mild lower extremity edema.     10/14/2024   Complains of pain all over the body.  No fever or chills.  Does not like her diet.  No nausea vomiting.  No shortness of breath.  No chest pains.  No cough cold congestion.     10/15/2024   Now patient also has indwelling Mario catheter.  Urology have some tagged WBC  scan and cystogram planned.  Patient will also need dialysis today.  No new complaints.     10/16/2024   Patient is NPO as she is going for procedure on the nephrostomy tubes.  Patient's son present in the room and he is very angry at every body reporting that patient is not getting the right kind of food to eat, patient is not getting right kind of care, patient is not getting proper therapy and is being told to move around and get out of the bed.  He also reported that he plans to go to the administration and may go seek help from attorneys.  All the above was said by the patient's son when the patient herself is very calm and content.     10/17/2024  Seen on hemodialysis.     10/18/2024  No acute events overnight.  Dialyzed yesterday with 2 L UF.  Tolerated well.  No cramping.  No chest pain, shortness of breath, nausea, vomiting.     10/22/2024  Seen on hemodialysis.     10/23/2024  No acute events overnight.  Patient dialyzed  yesterday with 1.6 L UF.  She ended dialysis early due to pain.     10/24/2024  Patient had left IJ PermCath placed by Dr. Manzo this morning.  Overall doing fairly well.  No chest pain, shortness of breath, abdominal pain, nausea, vomiting, or lower extremity edema.     10/25/2024  No acute events overnight.  Patient dialyzed yesterday, but came off early due to cramps.  No chest pain, shortness of breath, nausea, vomiting, or lower extremity edema.    10/26/24  No acute events overnight. Pt refusing HD today saying her stomach hurts. Pt says she is constipated because she has not had a bowel movement today but does report having a large soft bm today. Told her I will order lactulose. Still refusing HD tx- educated on risk of missing HD. Notes understanding and I informed the HD nurses. Last tx on Thursday pt got off early due to cramping. No chest pain, shortness of breath, nausea, vomiting or edema.     10/27/24  No acute events overnight. Pt refused HD 10/26/24 despite multiple attempts by both myself and Dr Light to educate patient on risk associated with missing HD including death. Patient understands risk and continues to refuse saying she will try again next week.  Pt reports improvement in constipation with lactulose. No chest pain, sob, increased edema, or vomiting.   Patient complains itching at anal verge.  Also complains of constipation.  Took milk of magnesium without much results.  Also she wants to remove the Mario catheter, and she would like to use some pads.     Current Medications             Current Facility-Administered Medications   Medication Dose Route Frequency Provider Last Rate Last Admin    acetaminophen tablet 650 mg  650 mg Oral Q4H PRN Tawana Loaiza MD   650 mg at 10/19/24 1606    aluminum-magnesium hydroxide-simethicone 200-200-20 mg/5 mL suspension 30 mL  30 mL Oral QID PRN Tawana Loaiza MD        atorvastatin tablet 20 mg  20 mg Oral QHS Josse,  Tawana OKEEFE MD   20 mg at 10/24/24 2158    bisacodyL suppository 10 mg  10 mg Rectal Daily PRN Terrance Hinkle MD   10 mg at 10/18/24 1618    ceftaroline fosamiL (TEFLARO) 200 mg in D5W 50 mL IVPB  200 mg Intravenous Q8H Milo Campbell MD 50 mL/hr at 10/25/24 0854 200 mg at 10/25/24 0854    folic acid tablet 1 mg  1 mg Oral Daily Tawana Loaiza MD   1 mg at 10/25/24 0843    heparin (porcine) injection 3,000 Units  3,000 Units Intravenous PRN Neno Mercado DO   3,600 Units at 10/24/24 1528    melatonin tablet 6 mg  6 mg Oral Nightly PRN Tawana Loaiza MD   6 mg at 10/23/24 2104    miconazole NITRATE 2 % top powder   Topical (Top) BID PRN Rishi Leonardo MD        ondansetron injection 4 mg  4 mg Intravenous Q4H PRN Tawana Loaiza MD   4 mg at 10/21/24 1720    oxyCODONE-acetaminophen  mg per tablet 1 tablet  1 tablet Oral Q4H PRN Uriel Fletcher MD   1 tablet at 10/24/24 1613    pantoprazole EC tablet 40 mg  40 mg Oral Daily Tawana Loaiza MD   40 mg at 10/25/24 0843    polyethylene glycol packet 17 g  17 g Oral BID PRN Tawana Loaiza MD   17 g at 10/25/24 0330    prochlorperazine injection Soln 5 mg  5 mg Intravenous Q6H PRN Tawana Loaiza MD        senna-docusate 8.6-50 mg per tablet 2 tablet  2 tablet Oral BID PRN Tawana Loaiza MD   2 tablet at 10/18/24 0851    sertraline tablet 25 mg  25 mg Oral Daily Shin Light MD   25 mg at 10/25/24 0843    sevelamer carbonate tablet 800 mg  800 mg Oral TID WM Tawana Loaiza MD   800 mg at 10/22/24 1610    sodium bicarbonate tablet 1,300 mg  1,300 mg Oral TID Shin Light MD   1,300 mg at 10/25/24 0843    sodium chloride 0.9% flush 10 mL  10 mL Intravenous PRN Tawana Loaiza MD        sodium chloride 0.9% flush 10 mL  10 mL Intravenous Q12H PRN Glenda Hughes, REAL        sodium chloride 0.9% flush 10 mL  10 mL Intravenous Q12H PRN Rishi Leonardo  MD        vancomycin - pharmacy to dose   Intravenous pharmacy to manage frequency Tawana Loaiza MD                      Review of Systems:         Objective:         Vitals:    10/27/24 0731   BP: (!) 96/56   Pulse: (!) 59   Resp: 18   Temp: 97.9 °F (36.6 °C)      GEN:  Chronically ill-appearing WF  CV: RRR without rub or gallop.  PULM: CTAB, unlabored  ABD: Soft, NT/ND abdomen with NABS  :  Nephrostomy tubes in both right and left kidneys.    EXT:  No  extremity edema  SKIN: Warm and dry  PSYCH: Awake, alert and appropriately conversant  Dialysis access:  Left IJ Vas-Cath         Latest Reference Range & Units 10/27/24 03:54   WBC 4.50 - 11.50 x10(3)/mcL 9.07   RBC 4.20 - 5.40 x10(6)/mcL 2.53 (L)   Hemoglobin 12.0 - 16.0 g/dL 7.4 (L)   Hematocrit 37.0 - 47.0 % 23.2 (L)   MCV 80.0 - 94.0 fL 91.7   MCH 27.0 - 31.0 pg 29.2   MCHC 33.0 - 36.0 g/dL 31.9 (L)   RDW 11.5 - 17.0 % 16.9   Platelet Count 130 - 400 x10(3)/mcL 259   MPV 7.4 - 10.4 fL 10.9 (H)   Neut % % 71.9   LYMPH % % 10.1   Mono % % 12.5   Eos % % 3.4   Basophil % % 1.2   Immature Granulocytes % 0.9   Neut # 2.1 - 9.2 x10(3)/mcL 6.52   Lymph # 0.6 - 4.6 x10(3)/mcL 0.92   Mono # 0.1 - 1.3 x10(3)/mcL 1.13   Eos # 0 - 0.9 x10(3)/mcL 0.31   Baso # <=0.2 x10(3)/mcL 0.11   Immature Grans (Abs) 0 - 0.04 x10(3)/mcL 0.08 (H)   nRBC % 0.0   Sodium 136 - 145 mmol/L 133 (L)   Potassium 3.5 - 5.1 mmol/L 5.2 (H)   Chloride 98 - 107 mmol/L 91 (L)   CO2 23 - 31 mmol/L 24   Anion Gap mEq/L 18.0   BUN 9.8 - 20.1 mg/dL 60.8 (H)   Creatinine 0.55 - 1.02 mg/dL 5.86 (H)   BUN/CREAT RATIO  10   eGFR mL/min/1.73/m2 7   Glucose 82 - 115 mg/dL 73 (L)   Calcium 8.4 - 10.2 mg/dL 8.1 (L)   Phosphorus Level 2.3 - 4.7 mg/dL 4.1   Magnesium  1.60 - 2.60 mg/dL 2.30   ALP 40 - 150 unit/L 96   PROTEIN TOTAL 5.8 - 7.6 gm/dL 6.4   Albumin 3.4 - 4.8 g/dL 1.9 (L)   Albumin/Globulin Ratio 1.1 - 2.0 ratio 0.4 (L)   BILIRUBIN TOTAL <=1.5 mg/dL 0.3   AST 5 - 34 unit/L 13   ALT 0 - 55  unit/L 6   Globulin, Total 2.4 - 3.5 gm/dL 4.5 (H)   (L): Data is abnormally low  (H): Data is abnormally high       Assessment / Plan:      ESRD - normally TTS at [facility].  Tunneled dialysis catheter removed 10/08/2024 due to persistent MRSA bacteremia. TDC repaced 10/24/24 by Dr. Manzo. Will attempt HD on Tuesday. No need for emergent HD today but patient is refusing any tx today.   Metabolic acidosis-Resolved co2 24 10/27/24. Will stop po bicarb. Monitor level. CMP in am.   Anemia- HH 7.4 23.2 10/27/24. CBC in am.   MRSA bacteremia-Per primary  Bilateral hydronephrosis - now with nephrostomy tube both sides. Per urology.  Constipation- Lactulose prn. Resolving.     Plan:  Stop po sodium bicarb.   CBC/CMP in am  Again educated patient on risk of refusing HD. Aware of all risk associated with noncompliance.    Will order Mycolog cream to used around anal verge twice a day  Will ask attending MD or Urology about Mario catheter removal  Will give her lactulose 30 cc p.o. today and every day for the constipation

## 2024-10-27 NOTE — PROGRESS NOTES
[facility] Hospital Medicine Progress Note      Chief Complaint:  Flank pain       HPI: (personally reviewed by me and is documented from initial H&P)     69-year-old female with past medical history of hypertension, hyperlipidemia, chronic hydronephrosis with left nephrostomy tube, right ureteral stent, end-stage renal disease on hemodialysis, chronic anemia presented with right flank pain for the past 5 days and also reported cloudy urine output from the urostomy CT with IV contrast showed moderate to severe hydronephrosis with enlarged right pelvic lymph node increase in size from the prior with suspected malignancy also showed duplicated collecting system with dilation of the right lower pole urology was consulted patient is status post cystoscopy with right stent exchange and bladder biopsy and they were not able to identify the left ureteral orifice therefore had left percutaneous nephrostomy.     Blood culture is growing Gram-positive cocci 2/2 bottles initially patient was started on cefepime but now we added vancomycin repeat blood cultures ordered.    ID was consulted MRI of the C/T and L-spine was ordered that was negative for any abscess blood culture is growing MRSA TTE as such did not show any vegetation cardiology consulted for MADELYN.    Bilateral hydronephrosis Status post right upper lobe ureteral stent exchange and right lower pole nephrostomy tube placement.   Patient was slightly confused on October 7 so we had ordered CT of the head without contrast that was negative, ammonia, TSH everything was normal we also ordered CT of the abdomen and pelvis with contrast that was negative for any abscess cardiology consulted for MADELYN- was negative for endocarditis.    Dialysis catheter was removed, pt now with L IJ central line. CT bilateral lower extremities with and without contrast showing no bone abnormalities.  There was symmetric uptake at the lower extremities on nuclear  medicine exam .Nonspecific relatively increased radiotracer uptake at the bilateral distal femur and proximal tibia       Interval History:        No overnight events reported.  No new complaints reported.    At time of discharge patient anticipates going back home.  She does have home health services.    She mobilizes with a wheelchair.    She lives home with her son, and does ADLs and I ADLs with assistance    Patient has refused dialysis.    She has refused treatment.    Anticipate discharge in the a.m..  Patient will go home with dialysis to be continued.    Objective Assessment:  Physical Exam      Vital signs have been personally reviewed by me   General: Appears comfortable, no acute distress.  Neuro: awake, alert, oriented, comprehension intact.     Integumentary: Warm, dry, intact.  Musculoskeletal: Purposeful movement noted.     Respiratory: No accessory muscle use. Breath sounds are equal.  Cardiovascular: Regular rate.       VITAL SIGNS: 24 HRS MIN & MAX LAST   Temp  Min: 97.9 °F (36.6 °C)  Max: 98.3 °F (36.8 °C) 97.9 °F (36.6 °C)   BP  Min: 88/50  Max: 117/64 (!) 96/56   Pulse  Min: 59  Max: 79  (!) 59   Resp  Min: 16  Max: 18 18   SpO2  Min: 85 %  Max: 97 % 97 %     Cardiac catheterization  Procedure performed in the Invasive Lab    - See Procedure Log link below for nursing documentation    - See OpNote on Surgeries Tab for physician findings    - See Imaging Tab for radiologist dictation    Recent Labs   Lab 10/24/24  0448 10/26/24  0439 10/27/24  0354   WBC 8.13 8.54 9.07   RBC 2.99* 2.66* 2.53*   HGB 8.8* 7.9* 7.4*   HCT 28.3* 23.7* 23.2*   MCV 94.6* 89.1 91.7   MCH 29.4 29.7 29.2   MCHC 31.1* 33.3 31.9*   RDW 16.9 16.5 16.9    340 259   MPV 9.8 9.8 10.9*       Recent Labs   Lab 10/23/24  0441 10/24/24  0448 10/26/24  0439 10/27/24  0354   * 130* 136 133*   K 4.9 4.6 4.5 5.2*   CL 90* 91* 93* 91*   CO2 22* 22* 26 24   BUN 50.8* 60.9* 52.9* 60.8*   CREATININE 4.78* 5.38* 5.06* 5.86*    CALCIUM 8.0* 8.1* 8.2* 8.1*   MG 1.80 1.70  --  2.30   ALBUMIN 2.0* 2.1* 1.9* 1.9*   ALKPHOS 108 116  --  96   ALT <5 6  --  6   AST 12 13  --  13   BILITOT 0.3 0.3  --  0.3     Microbiology Results (last 7 days)       Procedure Component Value Units Date/Time    Blood Culture [[id number]]  (Normal) Collected: 10/20/24 1019    Order Status: Completed Specimen: Blood Updated: 10/25/24 1201     Blood Culture No Growth at 5 days    Blood Culture [[id number]]  (Normal) Collected: 10/20/24 1019    Order Status: Completed Specimen: Blood Updated: 10/25/24 1201     Blood Culture No Growth at 5 days    Blood Culture [[id number]]  (Normal) Collected: 10/17/24 2114    Order Status: Completed Specimen: Blood from Antecubital, Right Updated: 10/22/24 2300     Blood Culture No Growth at 5 days    Blood Culture [[id number]]  (Normal) Collected: 10/17/24 2114    Order Status: Completed Specimen: Blood from Antecubital, Left Updated: 10/22/24 2300     Blood Culture No Growth at 5 days               Medications for Hospital Course         Scheduled Med:   atorvastatin  20 mg Oral QHS    folic acid  1 mg Oral Daily    pantoprazole  40 mg Oral Daily    sertraline  25 mg Oral Daily    sevelamer carbonate  800 mg Oral TID WM        PRN Meds:    Current Facility-Administered Medications:     acetaminophen, 650 mg, Oral, Q4H PRN    aluminum-magnesium hydroxide-simethicone, 30 mL, Oral, QID PRN    bisacodyL, 10 mg, Rectal, Daily PRN    heparin (porcine), 3,000 Units, Intravenous, PRN    lactulose 10 gram/15 ml, 15 g, Oral, Q6H PRN    melatonin, 6 mg, Oral, Nightly PRN    miconazole NITRATE 2 %, , Topical (Top), BID PRN    ondansetron, 4 mg, Intravenous, Q4H PRN    oxyCODONE-acetaminophen, 1 tablet, Oral, Q4H PRN    polyethylene glycol, 17 g, Oral, BID PRN    prochlorperazine, 5 mg, Intravenous, Q6H PRN    senna-docusate 8.6-50 mg, 2 tablet, Oral, BID PRN    sodium chloride 0.9%, 10 mL, Intravenous, PRN    Flushing PICC/Midline Protocol,  , , Until Discontinued **AND** sodium chloride 0.9%, 10 mL, Intravenous, Q12H PRN    Flushing PICC/Midline Protocol, , , Until Discontinued **AND** sodium chloride 0.9%, 10 mL, Intravenous, Q12H PRN    vancomycin - pharmacy to dose, , Intravenous, pharmacy to manage frequency     Assessment and Plan        --bilateral hydronephrosis,  moderate to severe hydronephrosis with enlarged right pelvic lymph node increase in size from the prior with suspected malignancy  S/p cystoscopy with right stent exchange and bladder biopsy    ---Blood culture is growing Gram-positive cocci 2/2 bottles   Suspected to be secondary to hemodialysis line which was removed 10/08/2024  Currently on vancomycin with anticipated end date of 10/31/2024 can be done on hemodialysis days  ceftaroline dual therapy 10/12 discontinued 10/27/2024   10/24/2024 Perm catheter has been placed.     ---ESRD with metabolic acidosis    continue Tuesday Thursday Saturday schedule,  continue sodium bicarbonate tablets and sevelamer  Patient refused dialysis.     Patient will discharge home in the a.m.   Dialysis as she so chooses. Permcatheter placed, vancomycin to be given on dialysis days for continued treatment of MRSA bacteremia     Continue supportive care  Continue checking vital signs q4hrs.  Reviewed and restarted appropriate home medications.     DVT prophylaxis initiated   Nurse notified to page me if any changes occur     Consults: nephrologist.   I have personally reviewed the specialist documentation and/or have spoken to the specialist with regard to the care of this patient; recommendations are noted above.     _______________________________________________________________________________________________________________________________      I have spent 40 minutes on the day of the visit; time spent includes face to face time and non-face to face time preparing to see the patient (eg, review of tests), independently reviewing and interpreting  medical records, both past and current; documenting clinical information in the electronic or other health record, and communicating results to the patient/family/caregiver and care coordinator and nursing team.      All diagnosis and differential diagnosis have been reviewed,  interpreted and communicated appropriately to care team. assessment and plan has been documented; I have personally reviewed the labs and test results that are presently available and pertinent to this hospital course; I have reviewed medical records based upon their availability.    All of the patient's questions have been  addressed and answered. Patient's is agreeable to the above stated plan.   I will continue to monitor closely and make adjustments to medical management as needed.    Yuki Higgins, DO   10/27/2024     This note was created with the assistance of Dragon voice recognition software. There may be transcription errors as a result of using this technology however minimal. Effort has been made to assure accuracy of transcription but any obvious errors or omissions should be clarified with the author of the document.

## 2024-10-28 VITALS
HEART RATE: 75 BPM | SYSTOLIC BLOOD PRESSURE: 144 MMHG | HEIGHT: 63 IN | OXYGEN SATURATION: 98 % | TEMPERATURE: 98 F | RESPIRATION RATE: 20 BRPM | BODY MASS INDEX: 51.91 KG/M2 | WEIGHT: 293 LBS | DIASTOLIC BLOOD PRESSURE: 78 MMHG

## 2024-10-28 LAB
ALBUMIN SERPL-MCNC: 1.9 G/DL (ref 3.4–4.8)
ALBUMIN/GLOB SERPL: 0.4 RATIO (ref 1.1–2)
ALP SERPL-CCNC: 97 UNIT/L (ref 40–150)
ALT SERPL-CCNC: 7 UNIT/L (ref 0–55)
ANION GAP SERPL CALC-SCNC: 17 MEQ/L
AST SERPL-CCNC: 12 UNIT/L (ref 5–34)
BASOPHILS # BLD AUTO: 0.08 X10(3)/MCL
BASOPHILS NFR BLD AUTO: 0.8 %
BILIRUB SERPL-MCNC: 0.3 MG/DL
BUN SERPL-MCNC: 68 MG/DL (ref 9.8–20.1)
CALCIUM SERPL-MCNC: 8.2 MG/DL (ref 8.4–10.2)
CHLORIDE SERPL-SCNC: 91 MMOL/L (ref 98–107)
CO2 SERPL-SCNC: 24 MMOL/L (ref 23–31)
CREAT SERPL-MCNC: 6.86 MG/DL (ref 0.55–1.02)
CREAT/UREA NIT SERPL: 10
EOSINOPHIL # BLD AUTO: 0.41 X10(3)/MCL (ref 0–0.9)
EOSINOPHIL NFR BLD AUTO: 4 %
ERYTHROCYTE [DISTWIDTH] IN BLOOD BY AUTOMATED COUNT: 17.1 % (ref 11.5–17)
GFR SERPLBLD CREATININE-BSD FMLA CKD-EPI: 6 ML/MIN/1.73/M2
GLOBULIN SER-MCNC: 4.6 GM/DL (ref 2.4–3.5)
GLUCOSE SERPL-MCNC: 90 MG/DL (ref 82–115)
HCT VFR BLD AUTO: 24.1 % (ref 37–47)
HGB BLD-MCNC: 7.5 G/DL (ref 12–16)
IMM GRANULOCYTES # BLD AUTO: 0.07 X10(3)/MCL (ref 0–0.04)
IMM GRANULOCYTES NFR BLD AUTO: 0.7 %
LYMPHOCYTES # BLD AUTO: 0.98 X10(3)/MCL (ref 0.6–4.6)
LYMPHOCYTES NFR BLD AUTO: 9.6 %
MCH RBC QN AUTO: 29.3 PG (ref 27–31)
MCHC RBC AUTO-ENTMCNC: 31.1 G/DL (ref 33–36)
MCV RBC AUTO: 94.1 FL (ref 80–94)
MONOCYTES # BLD AUTO: 1.04 X10(3)/MCL (ref 0.1–1.3)
MONOCYTES NFR BLD AUTO: 10.1 %
NEUTROPHILS # BLD AUTO: 7.68 X10(3)/MCL (ref 2.1–9.2)
NEUTROPHILS NFR BLD AUTO: 74.8 %
NRBC BLD AUTO-RTO: 0 %
PLATELET # BLD AUTO: 243 X10(3)/MCL (ref 130–400)
PMV BLD AUTO: 10.6 FL (ref 7.4–10.4)
POTASSIUM SERPL-SCNC: 5.2 MMOL/L (ref 3.5–5.1)
PROT SERPL-MCNC: 6.5 GM/DL (ref 5.8–7.6)
RBC # BLD AUTO: 2.56 X10(6)/MCL (ref 4.2–5.4)
SODIUM SERPL-SCNC: 132 MMOL/L (ref 136–145)
WBC # BLD AUTO: 10.26 X10(3)/MCL (ref 4.5–11.5)

## 2024-10-28 PROCEDURE — 25000003 PHARM REV CODE 250: Performed by: UROLOGY

## 2024-10-28 PROCEDURE — 25000003 PHARM REV CODE 250: Performed by: INTERNAL MEDICINE

## 2024-10-28 PROCEDURE — 36415 COLL VENOUS BLD VENIPUNCTURE: CPT | Performed by: NURSE PRACTITIONER

## 2024-10-28 PROCEDURE — 80053 COMPREHEN METABOLIC PANEL: CPT | Performed by: NURSE PRACTITIONER

## 2024-10-28 PROCEDURE — 90935 HEMODIALYSIS ONE EVALUATION: CPT

## 2024-10-28 PROCEDURE — 85025 COMPLETE CBC W/AUTO DIFF WBC: CPT | Performed by: NURSE PRACTITIONER

## 2024-10-28 RX ORDER — ERGOCALCIFEROL 1.25 MG/1
50000 CAPSULE ORAL
Qty: 10 CAPSULE | Refills: 2 | Status: SHIPPED | OUTPATIENT
Start: 2024-10-28 | End: 2025-01-26

## 2024-10-28 RX ORDER — SERTRALINE HYDROCHLORIDE 25 MG/1
25 TABLET, FILM COATED ORAL DAILY
Qty: 30 TABLET | Refills: 0 | Status: SHIPPED | OUTPATIENT
Start: 2024-10-28

## 2024-10-28 RX ORDER — LACTULOSE 10 G/15ML
30 SOLUTION ORAL DAILY
Qty: 450 ML | Refills: 0 | Status: SHIPPED | OUTPATIENT
Start: 2024-10-28 | End: 2024-11-07

## 2024-10-28 RX ADMIN — FOLIC ACID 1 MG: 1 TABLET ORAL at 12:10

## 2024-10-28 RX ADMIN — SEVELAMER CARBONATE 800 MG: 800 TABLET, FILM COATED ORAL at 12:10

## 2024-10-28 RX ADMIN — SERTRALINE HYDROCHLORIDE 25 MG: 25 TABLET ORAL at 12:10

## 2024-10-28 RX ADMIN — NYSTATIN, TRIAMCINOLONE ACETONIDE: 100000; 1 CREAM TOPICAL at 09:10

## 2024-10-28 RX ADMIN — OXYCODONE AND ACETAMINOPHEN 1 TABLET: 10; 325 TABLET ORAL at 01:10

## 2024-10-28 RX ADMIN — PANTOPRAZOLE SODIUM 40 MG: 40 TABLET, DELAYED RELEASE ORAL at 12:10

## 2024-10-28 NOTE — NURSING
Pt discharged home with home health in stable condition. Dc instructions, follow up appointments given. Medications sent to pharmacy of choice. Pt verbalized understanding. All questions answered.  IV removed. Pt wheeled down to personal vehicle in personal wheel chair.

## 2024-10-28 NOTE — PROGRESS NOTES
Patient Name: Fior Joya  Age: 69 y.o.  : 1955  MRN: [id number]  Admission Date: 10/3/2024           Fior Joya is a 69 y.o. female who presents with right flank pain.  Past medical history significant for ESRD on hemodialysis TTS at [facility] via right IJ PermCath, chronic hydronephrosis with left nephrostomy tube in place, and stents to right ureter, hypertension, anemia, hyperphosphatemia, and hyperlipidemia.  Patient presents with worsening right flank pain.  She states the pain started in her right neck, in his now in her right flank/hip area.  Patient was started on hemodialysis in 2024 after being found to have significant acute kidney injury and hydronephrosis.  She has been maintained on hemodialysis at [facility] on a TTS schedule.  Prior to hospitalization her last dialysis was on 10/01/2024.  Nephrology consulted for ESRD management.  In the emergency department she was noted to have low blood pressure and has been given IV fluids.  Urology has also been consulted for concern hydronephrosis needing stent exchange.  She has also been started on antibiotics for possible UTI.  She has had nausea, and vomiting.  No chest pain, shortness of breath, or lower extremity edema.     10/07/2024  Chart reviewed.  Weekend events noted.  Patient has been NPO at least since her admission to the hospital and also has no IV fluids.  Lying down in the bed.  Having jerky movements all over the body and uncomfortable.  Now she has nephrostomy tube on both sides.  Both draining.  Serosanguineous fluid noted.  Patient is very slow to answer questions but did recognize me by name and she knows her own name.      10/08/2024  No acute events overnight.  Seen on hemodialysis.  She was endorsing shortness of breath, and has some lower extremity edema.     10/09/2024   Mentally she has cleared up completely.  Complains of pains all over the body for which she is on p.r.n. pain medications.  Also complains of  being nervous and on some anxiety medication.  No shortness of breath.  Tunneled dialysis catheter was removed yesterday.     10/10/2024  No acute events overnight.  No chest pain, shortness of breath, abdominal pain, nausea, vomiting, or lower extremity edema.  Found to have bladder cancer by Urology.     10/11/2024  No acute events overnight.  No new complaints.  Blood cultures remain positive.  No chest pain, shortness of breath, nausea, vomiting.     10/12/2024  Patient had Vas-Cath placed yesterday, and she was re-initiated on hemodialysis.  Dialyzed yesterday with 1.5 L UF.  No new complaints today.  No chest pain, shortness of breath, nausea, vomiting.  She does have mild lower extremity edema.     10/14/2024   Complains of pain all over the body.  No fever or chills.  Does not like her diet.  No nausea vomiting.  No shortness of breath.  No chest pains.  No cough cold congestion.     10/15/2024   Now patient also has indwelling Mario catheter.  Urology have some tagged WBC  scan and cystogram planned.  Patient will also need dialysis today.  No new complaints.     10/16/2024   Patient is NPO as she is going for procedure on the nephrostomy tubes.  Patient's son present in the room and he is very angry at every body reporting that patient is not getting the right kind of food to eat, patient is not getting right kind of care, patient is not getting proper therapy and is being told to move around and get out of the bed.  He also reported that he plans to go to the administration and may go seek help from attorneys.  All the above was said by the patient's son when the patient herself is very calm and content.     10/17/2024  Seen on hemodialysis.     10/18/2024  No acute events overnight.  Dialyzed yesterday with 2 L UF.  Tolerated well.  No cramping.  No chest pain, shortness of breath, nausea, vomiting.     10/22/2024  Seen on hemodialysis.     10/23/2024  No acute events overnight.  Patient dialyzed  yesterday with 1.6 L UF.  She ended dialysis early due to pain.     10/24/2024  Patient had left IJ PermCath placed by Dr. Manzo this morning.  Overall doing fairly well.  No chest pain, shortness of breath, abdominal pain, nausea, vomiting, or lower extremity edema.     10/25/2024  No acute events overnight.  Patient dialyzed yesterday, but came off early due to cramps.  No chest pain, shortness of breath, nausea, vomiting, or lower extremity edema.    10/26/24  No acute events overnight. Pt refusing HD today saying her stomach hurts. Pt says she is constipated because she has not had a bowel movement today but does report having a large soft bm today. Told her I will order lactulose. Still refusing HD tx- educated on risk of missing HD. Notes understanding and I informed the HD nurses. Last tx on Thursday pt got off early due to cramping. No chest pain, shortness of breath, nausea, vomiting or edema.     10/27/24  No acute events overnight. Pt refused HD 10/26/24 despite multiple attempts by both myself and Dr Light to educate patient on risk associated with missing HD including death. Patient understands risk and continues to refuse saying she will try again next week.  Pt reports improvement in constipation with lactulose. No chest pain, sob, increased edema, or vomiting.   Patient complains itching at anal verge.  Also complains of constipation.  Took milk of magnesium without much results.  Also she wants to remove the Mario catheter, and she would like to use some pads.     10/28/2024   Mario catheter has been removed.    Currently tolerating dialysis.  Anal  verge area irritation is improving with Mycolog.    Current Medications             Current Facility-Administered Medications   Medication Dose Route Frequency Provider Last Rate Last Admin    acetaminophen tablet 650 mg  650 mg Oral Q4H PRN Tawana Loaiza MD   650 mg at 10/19/24 1606    aluminum-magnesium hydroxide-simethicone 200-200-20 mg/5  mL suspension 30 mL  30 mL Oral QID PRN Tawana Loaiaz MD        atorvastatin tablet 20 mg  20 mg Oral QHS Tawana Loaiza MD   20 mg at 10/24/24 2158    bisacodyL suppository 10 mg  10 mg Rectal Daily PRN Terrance Hinkle MD   10 mg at 10/18/24 1618    ceftaroline fosamiL (TEFLARO) 200 mg in D5W 50 mL IVPB  200 mg Intravenous Q8H Milo Campbell MD 50 mL/hr at 10/25/24 0854 200 mg at 10/25/24 0854    folic acid tablet 1 mg  1 mg Oral Daily Tawana Loaiza MD   1 mg at 10/25/24 0843    heparin (porcine) injection 3,000 Units  3,000 Units Intravenous PRN Neno Mercado DO   3,600 Units at 10/24/24 1528    melatonin tablet 6 mg  6 mg Oral Nightly PRN Tawana Loaiza MD   6 mg at 10/23/24 2104    miconazole NITRATE 2 % top powder   Topical (Top) BID PRN Rishi Leonardo MD        ondansetron injection 4 mg  4 mg Intravenous Q4H PRN Tawana Loaiza MD   4 mg at 10/21/24 1720    oxyCODONE-acetaminophen  mg per tablet 1 tablet  1 tablet Oral Q4H PRN Uriel Fletcher MD   1 tablet at 10/24/24 1613    pantoprazole EC tablet 40 mg  40 mg Oral Daily Tawana Loaiza MD   40 mg at 10/25/24 0843    polyethylene glycol packet 17 g  17 g Oral BID PRN Tawana Loaiza MD   17 g at 10/25/24 0330    prochlorperazine injection Soln 5 mg  5 mg Intravenous Q6H PRN Tawana Loaiza MD        senna-docusate 8.6-50 mg per tablet 2 tablet  2 tablet Oral BID PRN Tawana Loaiza MD   2 tablet at 10/18/24 0851    sertraline tablet 25 mg  25 mg Oral Daily Shin Light MD   25 mg at 10/25/24 0843    sevelamer carbonate tablet 800 mg  800 mg Oral TID  Tawana Loaiza MD   800 mg at 10/22/24 1610    sodium bicarbonate tablet 1,300 mg  1,300 mg Oral TID Shin Light MD   1,300 mg at 10/25/24 0843    sodium chloride 0.9% flush 10 mL  10 mL Intravenous PRN Tawana Loaiza MD        sodium chloride 0.9% flush 10 mL  10 mL  Intravenous Q12H PRN Glenda Hughes., FNP        sodium chloride 0.9% flush 10 mL  10 mL Intravenous Q12H PRN Rishi Leonardo MD        vancomycin - pharmacy to dose   Intravenous pharmacy to manage frequency Tawana Loaiza MD                      Review of Systems:         Objective:         Vitals:    10/28/24 0402   BP: 107/61   Pulse: 70   Resp:    Temp: 97.8 °F (36.6 °C)      GEN:  Well developed and nourished.  Awake alert oriented time person place.  Currently tolerating dialysis.  No acute distress noted.  HEENT unremarkable.  CV: RRR without rub or gallop.  PULM: CTAB, unlabored  ABD: Soft, NT/ND abdomen with NABS  :  Nephrostomy tubes in both right and left kidneys.    EXT:  No  extremity edema  SKIN: Warm and dry  PSYCH: Awake, alert and appropriately conversant  Dialysis access:  Left IJ Vas-Cath         Latest Reference Range & Units 10/27/24 03:54   WBC 4.50 - 11.50 x10(3)/mcL 9.07   RBC 4.20 - 5.40 x10(6)/mcL 2.53 (L)   Hemoglobin 12.0 - 16.0 g/dL 7.4 (L)   Hematocrit 37.0 - 47.0 % 23.2 (L)   MCV 80.0 - 94.0 fL 91.7   MCH 27.0 - 31.0 pg 29.2   MCHC 33.0 - 36.0 g/dL 31.9 (L)   RDW 11.5 - 17.0 % 16.9   Platelet Count 130 - 400 x10(3)/mcL 259   MPV 7.4 - 10.4 fL 10.9 (H)   Neut % % 71.9   LYMPH % % 10.1   Mono % % 12.5   Eos % % 3.4   Basophil % % 1.2   Immature Granulocytes % 0.9   Neut # 2.1 - 9.2 x10(3)/mcL 6.52   Lymph # 0.6 - 4.6 x10(3)/mcL 0.92   Mono # 0.1 - 1.3 x10(3)/mcL 1.13   Eos # 0 - 0.9 x10(3)/mcL 0.31   Baso # <=0.2 x10(3)/mcL 0.11   Immature Grans (Abs) 0 - 0.04 x10(3)/mcL 0.08 (H)   nRBC % 0.0   Sodium 136 - 145 mmol/L 133 (L)   Potassium 3.5 - 5.1 mmol/L 5.2 (H)   Chloride 98 - 107 mmol/L 91 (L)   CO2 23 - 31 mmol/L 24   Anion Gap mEq/L 18.0   BUN 9.8 - 20.1 mg/dL 60.8 (H)   Creatinine 0.55 - 1.02 mg/dL 5.86 (H)   BUN/CREAT RATIO  10   eGFR mL/min/1.73/m2 7   Glucose 82 - 115 mg/dL 73 (L)   Calcium 8.4 - 10.2 mg/dL 8.1 (L)   Phosphorus Level 2.3 - 4.7 mg/dL 4.1    Magnesium  1.60 - 2.60 mg/dL 2.30   ALP 40 - 150 unit/L 96   PROTEIN TOTAL 5.8 - 7.6 gm/dL 6.4   Albumin 3.4 - 4.8 g/dL 1.9 (L)   Albumin/Globulin Ratio 1.1 - 2.0 ratio 0.4 (L)   BILIRUBIN TOTAL <=1.5 mg/dL 0.3   AST 5 - 34 unit/L 13   ALT 0 - 55 unit/L 6   Globulin, Total 2.4 - 3.5 gm/dL 4.5 (H)   (L): Data is abnormally low  (H): Data is abnormally high       Assessment / Plan:      ESRD - normally TTS at [facility].  Tunneled dialysis catheter removed 10/08/2024 due to persistent MRSA bacteremia. TDC repaced 10/24/24 by Dr. Manzo. Will attempt HD on Tuesday. No need for emergent HD today but patient is refusing any tx today.   Metabolic acidosis-Resolved co2 24 10/27/24. Will stop po bicarb. Monitor level. CMP in am.   Anemia- HH 7.4 23.2 10/27/24. CBC in am.   MRSA bacteremia-Per primary  Bilateral hydronephrosis - now with nephrostomy tube both sides. Per urology.  Constipation- Lactulose prn. Resolving.     Plan:  Her regular dialysis on TTS schedule.  Continue nutritional support and other medical management.  Waiting for Dr. Az Sweet to assess patient's  bilateral hydronephrosis.

## 2024-10-28 NOTE — PROGRESS NOTES
10/28/24 1132   Tunneled Central Line - Double Lumen  10/24/24 0805 Internal Jugular Left   Placement Date/Time: 10/24/24 0805   Inserted by: MD  Hand Hygiene: Performed  Barrier Precautions: Performed  Skin Antisepsis: betadine  Location: Internal Jugular Left  Insertion attempts (enter comment if more than 2 attempts): 1  Guidewire Removed...   Line Securement Device Secured with sutureless device   Dressing Type CHG impregnated dressing/sponge   Dressing Status Clean;Dry;Intact   Dressing Intervention Integrity maintained   Dressing Due to be Changed 10/31/24   Distal Patency/Care flushed w/o difficulty;blood return present;normal saline locked   Proximal 1 Patency/Care flushed w/o difficulty;blood return present;normal saline locked   Post-Hemodialysis Assessment   Blood Volume Processed (Liters) 56 L   Dialyzer Clearance Clear   Duration of Treatment 180 minutes   Total UF (mL) 2000 mL   Patient Response to Treatment nad   Post-Hemodialysis Comments vss

## 2024-10-28 NOTE — PLAN OF CARE
Spoke to [place] with [facility], patient is set to run Thursday due to running today in hospital.

## 2024-10-28 NOTE — DISCHARGE SUMMARY
[facility] Medicine Discharge Summary    Admit Date: 10/3/2024  Discharge Date and Time: 10/28/46294:39 PM    Admitting Physician: JENNIE Team  Discharging Physician: Yuki Higgins DO.  Primary Care Physician: Linda, Primary Doctor    Discharge Diagnoses:    bilateral hydronephrosis  Blood culture is growing Gram-positive cocci 2/2 bottles  ESRD with metabolic acidosis    Hospital Course:     Chief Complaint:  Flank pain   HPI: (personally reviewed by me and is documented from initial H&P)      69-year-old female with past medical history of hypertension, hyperlipidemia, chronic hydronephrosis with left nephrostomy tube, right ureteral stent, end-stage renal disease on hemodialysis, chronic anemia presented with right flank pain for the past 5 days and also reported cloudy urine output from the urostomy CT with IV contrast showed moderate to severe hydronephrosis with enlarged right pelvic lymph node increase in size from the prior with suspected malignancy also showed duplicated collecting system with dilation of the right lower pole urology was consulted patient is status post cystoscopy with right stent exchange and bladder biopsy and they were not able to identify the left ureteral orifice therefore had left percutaneous nephrostomy.      Blood culture is growing Gram-positive cocci 2/2 bottles initially patient was started on cefepime but now we added vancomycin repeat blood cultures ordered.     ID was consulted MRI of the C/T and L-spine was ordered that was negative for any abscess blood culture is growing MRSA TTE as such did not show any vegetation cardiology consulted for MADELYN.     Bilateral hydronephrosis Status post right upper lobe ureteral stent exchange and right lower pole nephrostomy tube placement.   Patient was slightly confused on October 7 so we had ordered CT of the head without contrast that was negative, ammonia, TSH everything was normal we also ordered  CT of the abdomen and pelvis with contrast that was negative for any abscess cardiology consulted for MADELYN- was negative for endocarditis.    Dialysis catheter was removed, pt now with L IJ central line. CT bilateral lower extremities with and without contrast showing no bone abnormalities.  There was symmetric uptake at the lower extremities on nuclear medicine exam .Nonspecific relatively increased radiotracer uptake at the bilateral distal femur and proximal tibia         Interval History:         No overnight events reported.  No new complaints reported.    At time of discharge patient anticipates going back home.  She does have home health services.    She mobilizes with a wheelchair.    She lives home with her son, and does ADLs and I ADLs with assistance     Patient has refused dialysis.    She has refused treatment.       Hospital course and discharge care plan has been discussed with patient/and or family , patient/ and or family  voices understanding and agreement with plan. All questions have been answered to the best of my ability. Patient is advised to return to ED or call 911 in case of emergency and or if symptoms worsen.    Vitals:  VITAL SIGNS: 24 HRS MIN & MAX LAST   Temp  Min: 97.7 °F (36.5 °C)  Max: 98.1 °F (36.7 °C) 98.1 °F (36.7 °C)   BP  Min: 107/61  Max: 144/78 (!) 144/78   Pulse  Min: 62  Max: 75  75   Resp  Min: 18  Max: 20 20   SpO2  Min: 96 %  Max: 98 % 98 %     Physical Exam:    General: Appears comfortable, no acute distress.  Integumentary: Warm, dry, intact.  Musculoskeletal: Purposeful movement noted.   Respiratory: No accessory muscle use. Breath sounds are equal.  Cardiovascular: Regular rate. No peripheral edema.      _______________________________________________________________________________________________________________________________________________________    Explained in detail to the patient about the discharge plan, medications, and follow-up visits. Pt understands  and agrees with the treatment plan  Discharge Disposition: Home-Health Care [facility] [place] [facility]   Discharged Condition: stable  Diet-   Dietary Orders (From admission, onward)       Start     Ordered    10/24/24 1531  Diet Renal On Dialysis  Diet effective now         10/24/24 1530    10/14/24 1150  Dietary nutrition supplements TID; Novasource Renal - Vanilla  Continuous        Question Answer Comment   Frequency: TID    Select PO Supplement: Novasource Renal - Vanilla        10/14/24 1150                   Medications Per DC med rec  Activities as tolerated   Contact information for follow-up providers       Otis Person MD Follow up.    Specialty: Urology  Why: Office will contact patient with appointment scheduling information for stent management  Contact information:  [address] [facility] [address]  [facility] [id number] [phone]                       Contact information for after-discharge care       [facility] Medical Care       [facility]    Service: Home Health Services  Contact information:  [address] [facility] [address]  [phone]                                 For further questions contact hospitalist office    Discharge time 33 minutes    For worsening symptoms, chest pain, shortness of breath, increased abdominal pain, high grade fever, stroke or stroke like symptoms, immediately go to the nearest Emergency Room or call 911 as soon as possible.      Yuki Herrera M.D, on 10/28/2024. at 3:39 PM.    Please see below for significant diagnostic studies and test.   _______________________________________________________________________________________________________________________________________    Significant Diagnostic Studies: See Full reports for all details  Procedures Performed: No admission procedures for hospital encounter.         Recent Labs   Lab 10/26/24  0439 10/27/24  0354 10/28/24  0538   WBC 8.54 9.07 10.26   RBC 2.66* 2.53* 2.56*   HGB 7.9* 7.4* 7.5*   HCT  23.7* 23.2* 24.1*   MCV 89.1 91.7 94.1*   MCH 29.7 29.2 29.3   MCHC 33.3 31.9* 31.1*   RDW 16.5 16.9 17.1*    259 243   MPV 9.8 10.9* 10.6*       Recent Labs   Lab 10/23/24  0441 10/24/24  0448 10/26/24  0439 10/27/24  0354 10/28/24  0537   * 130* 136 133* 132*   K 4.9 4.6 4.5 5.2* 5.2*   CL 90* 91* 93* 91* 91*   CO2 22* 22* 26 24 24   BUN 50.8* 60.9* 52.9* 60.8* 68.0*   CREATININE 4.78* 5.38* 5.06* 5.86* 6.86*   CALCIUM 8.0* 8.1* 8.2* 8.1* 8.2*   MG 1.80 1.70  --  2.30  --    ALBUMIN 2.0* 2.1* 1.9* 1.9* 1.9*   ALKPHOS 108 116  --  96 97   ALT <5 6  --  6 7   AST 12 13  --  13 12   BILITOT 0.3 0.3  --  0.3 0.3        Microbiology Results (last 7 days)       Procedure Component Value Units Date/Time    Blood Culture [[id number]]  (Normal) Collected: 10/20/24 1019    Order Status: Completed Specimen: Blood Updated: 10/25/24 1201     Blood Culture No Growth at 5 days    Blood Culture [[id number]]  (Normal) Collected: 10/20/24 1019    Order Status: Completed Specimen: Blood Updated: 10/25/24 1201     Blood Culture No Growth at 5 days    Blood Culture [[id number]]  (Normal) Collected: 10/17/24 2114    Order Status: Completed Specimen: Blood from Antecubital, Right Updated: 10/22/24 2300     Blood Culture No Growth at 5 days    Blood Culture [[id number]]  (Normal) Collected: 10/17/24 2114    Order Status: Completed Specimen: Blood from Antecubital, Left Updated: 10/22/24 2300     Blood Culture No Growth at 5 days             Cardiac catheterization  Procedure performed in the Invasive Lab    - See Procedure Log link below for nursing documentation    - See OpNote on Surgeries Tab for physician findings    - See Imaging Tab for radiologist dictation         Medication List        START taking these medications      lactulose 10 gram/15 ml 10 gram/15 mL (15 mL) solution  Commonly known as: CHRONULAC  Take 45 mLs (30 g total) by mouth once daily. for 10 days     polyethylene glycol 17 gram Pwpk  Commonly known  as: GLYCOLAX  Take 17 g by mouth once daily.     sertraline 25 MG tablet  Commonly known as: ZOLOFT  Take 1 tablet (25 mg total) by mouth once daily.            CONTINUE taking these medications      atorvastatin 20 MG tablet  Commonly known as: LIPITOR     ergocalciferol 50,000 unit Cap  Commonly known as: ERGOCALCIFEROL  Take 1 capsule (50,000 Units total) by mouth every 72 hours.     FeroSuL 325 mg (65 mg iron) Tab tablet  Generic drug: ferrous sulfate     folic acid 1 MG tablet  Commonly known as: FOLVITE  Take 1 tablet (1 mg total) by mouth once daily.     pantoprazole 40 MG tablet  Commonly known as: PROTONIX  Take 1 tablet (40 mg total) by mouth once daily.     SENNA WITH DOCUSATE SODIUM 8.6-50 mg per tablet  Generic drug: senna-docusate 8.6-50 mg     sevelamer carbonate 800 mg Tab  Commonly known as: RENVELA            STOP taking these medications      calcium carbonate 200 mg calcium (500 mg) chewable tablet  Commonly known as: TUMS     labetaloL 200 MG tablet  Commonly known as: NORMODYNE     loratadine 10 mg tablet  Commonly known as: CLARITIN               Where to Get Your Medications        These medications were sent to [facility] [address] [address]      Phone: [phone]   ergocalciferol 50,000 unit Cap  lactulose 10 gram/15 ml 10 gram/15 mL (15 mL) solution  sertraline 25 MG tablet

## 2024-10-28 NOTE — PLAN OF CARE
Problem: Hemodialysis  Goal: Safe, Effective Therapy Delivery  10/28/2024 1702 by Cyndie Nice RN  Outcome: Met  10/28/2024 1702 by Cyndie Nice RN  Outcome: Progressing

## 2024-10-28 NOTE — PLAN OF CARE
10/28/24 1138   Final Note   Assessment Type Final Discharge Note   Anticipated Discharge Disposition Home-Health   Hospital Resources/Appts/Education Provided Post-Acute resouces added to AVS   Post-Acute Status   Post-Acute Authorization Home Health   Home Health Status Set-up Complete/Auth obtained  (Resume with [facility])

## 2024-10-28 NOTE — PT/OT/SLP PROGRESS
Physical Therapy      Patient Name:  Fior Joya   MRN:  [id number]    Patient not seen today secondary to Dialysis. Will follow-up as schedule allows.

## 2024-10-30 ENCOUNTER — PATIENT OUTREACH (OUTPATIENT)
Dept: ADMINISTRATIVE | Facility: CLINIC | Age: 69
End: 2024-10-30
Payer: MEDICARE

## 2024-10-30 LAB — FUNGUS SPEC CULT: NORMAL

## 2024-11-06 NOTE — PHYSICIAN QUERY
Please clarify if the UTI diagnosis identified in the query has been:       Clinically undetermined

## 2024-11-14 ENCOUNTER — HOSPITAL ENCOUNTER (OUTPATIENT)
Dept: RADIOLOGY | Facility: HOSPITAL | Age: 69
Discharge: HOME | End: 2024-11-14
Attending: INTERNAL MEDICINE
Payer: MEDICARE

## 2024-11-14 DIAGNOSIS — C67.9 MALIGNANT NEOPLASM OF URINARY BLADDER, UNSPECIFIED SITE: ICD-10-CM

## 2024-11-14 PROCEDURE — A9552 F18 FDG: HCPCS | Performed by: INTERNAL MEDICINE

## 2024-11-14 PROCEDURE — 78815 PET IMAGE W/CT SKULL-THIGH: CPT | Mod: TC

## 2024-11-14 RX ORDER — FLUDEOXYGLUCOSE F18 500 MCI/ML
10 INJECTION INTRAVENOUS
Status: COMPLETED | OUTPATIENT
Start: 2024-11-14 | End: 2024-11-14

## 2024-11-14 RX ADMIN — FLUDEOXYGLUCOSE F-18 10.5 MILLICURIE: 500 INJECTION INTRAVENOUS at 07:11

## 2024-11-25 ENCOUNTER — DOCUMENTATION ONLY (OUTPATIENT)
Dept: HEMATOLOGY/ONCOLOGY | Facility: CLINIC | Age: 69
End: 2024-11-25
Payer: MEDICARE

## 2024-11-25 DIAGNOSIS — R94.6 NONSPECIFIC ABNORMAL RESULTS OF THYROID FUNCTION STUDY: Primary | ICD-10-CM

## 2024-11-25 NOTE — PROGRESS NOTES
"Patient completed her PET scan on 11/14/24. I called the patient in an attempt to schedule an appointment with Dr. LeJeune for results. Patient's family member answered the phone and stated, "She is busy right now. I don't know nothing about what you are talking about so you got to call back later." I attempted to called patient 2 more times throughout the day, but was unsuccessful. Voicemail placed. Provider notified.   "

## 2024-12-10 ENCOUNTER — HOSPITAL ENCOUNTER (OUTPATIENT)
Dept: RADIOLOGY | Facility: HOSPITAL | Age: 69
Discharge: HOME | End: 2024-12-10
Attending: NURSE PRACTITIONER
Payer: MEDICARE

## 2024-12-10 DIAGNOSIS — R94.6 NONSPECIFIC ABNORMAL RESULTS OF THYROID FUNCTION STUDY: ICD-10-CM

## 2024-12-10 PROCEDURE — 76536 US EXAM OF HEAD AND NECK: CPT | Mod: TC

## 2024-12-23 DIAGNOSIS — C67.9 BLADDER CANCER: Primary | ICD-10-CM

## 2024-12-30 DIAGNOSIS — R79.89 TSH ELEVATION: Primary | ICD-10-CM

## 2024-12-30 DIAGNOSIS — E04.1 THYROID NODULE: ICD-10-CM

## 2025-01-01 ENCOUNTER — HOSPITAL ENCOUNTER (INPATIENT)
Facility: HOSPITAL | Age: 70
LOS: 4 days | DRG: 871 | End: 2025-03-17
Attending: STUDENT IN AN ORGANIZED HEALTH CARE EDUCATION/TRAINING PROGRAM | Admitting: HOSPITALIST
Payer: MEDICARE

## 2025-01-01 VITALS
HEIGHT: 62 IN | TEMPERATURE: 96 F | DIASTOLIC BLOOD PRESSURE: 60 MMHG | BODY MASS INDEX: 40.65 KG/M2 | SYSTOLIC BLOOD PRESSURE: 130 MMHG | WEIGHT: 220.88 LBS

## 2025-01-01 DIAGNOSIS — R53.83 FATIGUE: ICD-10-CM

## 2025-01-01 DIAGNOSIS — E16.2 HYPOGLYCEMIA: ICD-10-CM

## 2025-01-01 DIAGNOSIS — R53.1 WEAKNESS: Primary | ICD-10-CM

## 2025-01-01 DIAGNOSIS — R00.0 TACHYCARDIA: ICD-10-CM

## 2025-01-01 DIAGNOSIS — R60.9 EDEMA: ICD-10-CM

## 2025-01-01 DIAGNOSIS — A41.9 SEPSIS, DUE TO UNSPECIFIED ORGANISM, UNSPECIFIED WHETHER ACUTE ORGAN DYSFUNCTION PRESENT: ICD-10-CM

## 2025-01-01 DIAGNOSIS — R79.89 ELEVATED TROPONIN: ICD-10-CM

## 2025-01-01 DIAGNOSIS — R57.9 SHOCK: ICD-10-CM

## 2025-01-01 LAB
ABS NEUT (OLG): 17.04 X10(3)/MCL (ref 2.1–9.2)
ABS NEUT (OLG): 8.14 X10(3)/MCL (ref 2.1–9.2)
ACANTHOCYTES (OLG): ABNORMAL
ADV 40+41 DNA STL QL NAA+NON-PROBE: NOT DETECTED
ALBUMIN SERPL-MCNC: 1 G/DL (ref 3.4–4.8)
ALBUMIN SERPL-MCNC: 1.1 G/DL (ref 3.4–4.8)
ALBUMIN SERPL-MCNC: 1.3 G/DL (ref 3.4–4.8)
ALBUMIN SERPL-MCNC: 1.3 G/DL (ref 3.4–4.8)
ALBUMIN SERPL-MCNC: 1.4 G/DL (ref 3.4–4.8)
ALBUMIN SERPL-MCNC: 1.4 G/DL (ref 3.4–4.8)
ALBUMIN SERPL-MCNC: 1.5 G/DL (ref 3.4–4.8)
ALBUMIN SERPL-MCNC: 1.5 G/DL (ref 3.4–4.8)
ALBUMIN SERPL-MCNC: 1.6 G/DL (ref 3.4–4.8)
ALBUMIN/GLOB SERPL: 0.3 RATIO (ref 1.1–2)
ALBUMIN/GLOB SERPL: 0.4 RATIO (ref 1.1–2)
ALLENS TEST BLOOD GAS (OHS): ABNORMAL
ALLENS TEST BLOOD GAS (OHS): YES
ALP SERPL-CCNC: 110 UNIT/L (ref 40–150)
ALP SERPL-CCNC: 112 UNIT/L (ref 40–150)
ALP SERPL-CCNC: 181 UNIT/L (ref 40–150)
ALP SERPL-CCNC: 88 UNIT/L (ref 40–150)
ALP SERPL-CCNC: 89 UNIT/L (ref 40–150)
ALP SERPL-CCNC: 97 UNIT/L (ref 40–150)
ALP SERPL-CCNC: 98 UNIT/L (ref 40–150)
ALT SERPL-CCNC: 30 UNIT/L (ref 0–55)
ALT SERPL-CCNC: 33 UNIT/L (ref 0–55)
ALT SERPL-CCNC: 35 UNIT/L (ref 0–55)
ALT SERPL-CCNC: 38 UNIT/L (ref 0–55)
ALT SERPL-CCNC: 48 UNIT/L (ref 0–55)
ALT SERPL-CCNC: 62 UNIT/L (ref 0–55)
ALT SERPL-CCNC: 76 UNIT/L (ref 0–55)
AMYLASE SERPL-CCNC: 17 UNIT/L (ref 25–125)
ANION GAP SERPL CALC-SCNC: 12 MEQ/L
ANION GAP SERPL CALC-SCNC: 16 MEQ/L
ANION GAP SERPL CALC-SCNC: 16 MEQ/L
ANION GAP SERPL CALC-SCNC: 17 MEQ/L
ANION GAP SERPL CALC-SCNC: 19 MEQ/L
ANION GAP SERPL CALC-SCNC: 20 MEQ/L
ANION GAP SERPL CALC-SCNC: 20 MEQ/L
ANION GAP SERPL CALC-SCNC: 24 MEQ/L
ANISOCYTOSIS BLD QL SMEAR: ABNORMAL
ANISOCYTOSIS BLD QL SMEAR: ABNORMAL
APICAL FOUR CHAMBER EJECTION FRACTION: 61 %
APICAL TWO CHAMBER EJECTION FRACTION: 56 %
APTT PPP: 41.7 SECONDS (ref 23.2–33.7)
AST SERPL-CCNC: 165 UNIT/L (ref 5–34)
AST SERPL-CCNC: 40 UNIT/L (ref 5–34)
AST SERPL-CCNC: 41 UNIT/L (ref 5–34)
AST SERPL-CCNC: 42 UNIT/L (ref 5–34)
AST SERPL-CCNC: 51 UNIT/L (ref 5–34)
AST SERPL-CCNC: 72 UNIT/L (ref 5–34)
AST SERPL-CCNC: 82 UNIT/L (ref 5–34)
ASTRO TYP 1-8 RNA STL QL NAA+NON-PROBE: NOT DETECTED
AV INDEX (PROSTH): 0.61
AV MEAN GRADIENT: 14 MMHG
AV PEAK GRADIENT: 27 MMHG
AV VALVE AREA BY VELOCITY RATIO: 1.6 CM²
AV VALVE AREA: 1.7 CM²
AV VELOCITY RATIO: 0.58
BACTERIA FLD CULT: ABNORMAL
BASE EXCESS BLD CALC-SCNC: -13.5 MMOL/L
BASE EXCESS BLD CALC-SCNC: -21.9 MMOL/L (ref -2–2)
BASE EXCESS BLD CALC-SCNC: -5.9 MMOL/L (ref -2–2)
BASE EXCESS BLD CALC-SCNC: -6.4 MMOL/L (ref -2–2)
BASE EXCESS BLD CALC-SCNC: -6.8 MMOL/L
BASE EXCESS BLD CALC-SCNC: -7.9 MMOL/L (ref -2–2)
BASE EXCESS BLD CALC-SCNC: -9.7 MMOL/L (ref -2–2)
BASOPHILS # BLD AUTO: 0.03 X10(3)/MCL
BASOPHILS # BLD AUTO: 0.03 X10(3)/MCL
BASOPHILS # BLD AUTO: 0.04 X10(3)/MCL
BASOPHILS # BLD AUTO: 0.04 X10(3)/MCL
BASOPHILS NFR BLD AUTO: 0.3 %
BASOPHILS NFR BLD AUTO: 0.3 %
BASOPHILS NFR BLD AUTO: 0.4 %
BASOPHILS NFR BLD AUTO: 0.4 %
BILIRUB SERPL-MCNC: 0.4 MG/DL
BILIRUB SERPL-MCNC: 0.5 MG/DL
BLOOD GAS SAMPLE TYPE (OHS): ABNORMAL
BSA FOR ECHO PROCEDURE: 2.09 M2
BUN SERPL-MCNC: 11.8 MG/DL (ref 9.8–20.1)
BUN SERPL-MCNC: 12.1 MG/DL (ref 9.8–20.1)
BUN SERPL-MCNC: 20.8 MG/DL (ref 9.8–20.1)
BUN SERPL-MCNC: 22.1 MG/DL (ref 9.8–20.1)
BUN SERPL-MCNC: 22.1 MG/DL (ref 9.8–20.1)
BUN SERPL-MCNC: 3.5 MG/DL (ref 9.8–20.1)
BUN SERPL-MCNC: 5.6 MG/DL (ref 9.8–20.1)
BUN SERPL-MCNC: 6 MG/DL (ref 9.8–20.1)
BUN SERPL-MCNC: 6 MG/DL (ref 9.8–20.1)
BUN SERPL-MCNC: 6.1 MG/DL (ref 9.8–20.1)
BURR CELLS (OLG): ABNORMAL
C CAYETANENSIS DNA STL QL NAA+NON-PROBE: NOT DETECTED
C COLI+JEJ+UPSA DNA STL QL NAA+NON-PROBE: NOT DETECTED
CA-I BLD-SCNC: 0.75 MMOL/L (ref 1.12–1.23)
CA-I BLD-SCNC: 0.77 MMOL/L (ref 1.12–1.23)
CA-I BLD-SCNC: 0.85 MMOL/L (ref 1.12–1.23)
CA-I BLD-SCNC: 0.89 MMOL/L (ref 1.12–1.23)
CA-I BLD-SCNC: 0.91 MMOL/L (ref 1.12–1.23)
CA-I BLD-SCNC: 0.92 MMOL/L (ref 1.12–1.23)
CA-I BLD-SCNC: 0.96 MMOL/L (ref 1.12–1.23)
CALCIUM SERPL-MCNC: 4.6 MG/DL (ref 8.4–10.2)
CALCIUM SERPL-MCNC: 5.6 MG/DL (ref 8.4–10.2)
CALCIUM SERPL-MCNC: 5.7 MG/DL (ref 8.4–10.2)
CALCIUM SERPL-MCNC: 5.8 MG/DL (ref 8.4–10.2)
CALCIUM SERPL-MCNC: 6.2 MG/DL (ref 8.4–10.2)
CALCIUM SERPL-MCNC: 6.4 MG/DL (ref 8.4–10.2)
CALCIUM SERPL-MCNC: 6.5 MG/DL (ref 8.4–10.2)
CALCIUM SERPL-MCNC: 6.9 MG/DL (ref 8.4–10.2)
CALCIUM SERPL-MCNC: 6.9 MG/DL (ref 8.4–10.2)
CALCIUM SERPL-MCNC: 7.3 MG/DL (ref 8.4–10.2)
CHLORIDE SERPL-SCNC: 100 MMOL/L (ref 98–107)
CHLORIDE SERPL-SCNC: 100 MMOL/L (ref 98–107)
CHLORIDE SERPL-SCNC: 101 MMOL/L (ref 98–107)
CHLORIDE SERPL-SCNC: 102 MMOL/L (ref 98–107)
CHLORIDE SERPL-SCNC: 103 MMOL/L (ref 98–107)
CHLORIDE SERPL-SCNC: 104 MMOL/L (ref 98–107)
CHLORIDE SERPL-SCNC: 104 MMOL/L (ref 98–107)
CHLORIDE SERPL-SCNC: 106 MMOL/L (ref 98–107)
CHLORIDE SERPL-SCNC: 116 MMOL/L (ref 98–107)
CHLORIDE SERPL-SCNC: 98 MMOL/L (ref 98–107)
CK SERPL-CCNC: 219 U/L (ref 29–168)
CO2 BLDA-SCNC: 13.2 MMOL/L
CO2 BLDA-SCNC: 14.4 MMOL/L
CO2 BLDA-SCNC: 16.3 MMOL/L
CO2 BLDA-SCNC: 16.9 MMOL/L
CO2 BLDA-SCNC: 17.1 MMOL/L
CO2 BLDA-SCNC: 17.4 MMOL/L
CO2 BLDA-SCNC: 9 MMOL/L
CO2 SERPL-SCNC: 11 MMOL/L (ref 23–31)
CO2 SERPL-SCNC: 12 MMOL/L (ref 23–31)
CO2 SERPL-SCNC: 15 MMOL/L (ref 23–31)
CO2 SERPL-SCNC: 15 MMOL/L (ref 23–31)
CO2 SERPL-SCNC: 20 MMOL/L (ref 23–31)
CO2 SERPL-SCNC: 23 MMOL/L (ref 23–31)
CO2 SERPL-SCNC: 8 MMOL/L (ref 23–31)
CO2 SERPL-SCNC: 8 MMOL/L (ref 23–31)
COHGB MFR BLDA: 0.8 % (ref 0.5–1.5)
COHGB MFR BLDA: 0.9 % (ref 0.5–1.5)
COHGB MFR BLDA: 0.9 % (ref 0.5–1.5)
COHGB MFR BLDA: 1 % (ref 0.5–1.5)
COHGB MFR BLDA: 1.1 % (ref 0.5–1.5)
COLOR STL: ABNORMAL
CONSISTENCY STL: ABNORMAL
CREAT SERPL-MCNC: 1.03 MG/DL (ref 0.55–1.02)
CREAT SERPL-MCNC: 1.53 MG/DL (ref 0.55–1.02)
CREAT SERPL-MCNC: 1.55 MG/DL (ref 0.55–1.02)
CREAT SERPL-MCNC: 1.76 MG/DL (ref 0.55–1.02)
CREAT SERPL-MCNC: 1.91 MG/DL (ref 0.55–1.02)
CREAT SERPL-MCNC: 3.41 MG/DL (ref 0.55–1.02)
CREAT SERPL-MCNC: 3.49 MG/DL (ref 0.55–1.02)
CREAT SERPL-MCNC: 5 MG/DL (ref 0.55–1.02)
CREAT SERPL-MCNC: 5.38 MG/DL (ref 0.55–1.02)
CREAT SERPL-MCNC: 5.46 MG/DL (ref 0.55–1.02)
CREAT/UREA NIT SERPL: 3
CREAT/UREA NIT SERPL: 4
CRYPTOSP DNA STL QL NAA+NON-PROBE: NOT DETECTED
CV ECHO LV RWT: 0.43 CM
DOP CALC AO PEAK VEL: 2.6 M/S
DOP CALC AO VTI: 33.9 CM
DOP CALC LVOT AREA: 2.8 CM2
DOP CALC LVOT DIAMETER: 1.9 CM
DOP CALC LVOT PEAK VEL: 1.5 M/S
DOP CALC LVOT STROKE VOLUME: 58.7 CM3
DOP CALC MV VTI: 26.6 CM
DOP CALCLVOT PEAK VEL VTI: 20.7 CM
DRAWN BY BLOOD GAS (OHS): ABNORMAL
E HISTOLYT DNA STL QL NAA+NON-PROBE: NOT DETECTED
E WAVE DECELERATION TIME: 156 MSEC
E/A RATIO: 0.61
E/E' RATIO: 12 M/S
EAEC PAA PLAS AGGR+AATA ST NAA+NON-PRB: NOT DETECTED
EC STX1+STX2 GENES STL QL NAA+NON-PROBE: NOT DETECTED
ECHO LV POSTERIOR WALL: 1 CM (ref 0.6–1.1)
EOSINOPHIL # BLD AUTO: 0.03 X10(3)/MCL (ref 0–0.9)
EOSINOPHIL # BLD AUTO: 0.03 X10(3)/MCL (ref 0–0.9)
EOSINOPHIL # BLD AUTO: 0.06 X10(3)/MCL (ref 0–0.9)
EOSINOPHIL # BLD AUTO: 0.17 X10(3)/MCL (ref 0–0.9)
EOSINOPHIL NFR BLD AUTO: 0.3 %
EOSINOPHIL NFR BLD AUTO: 0.4 %
EOSINOPHIL NFR BLD AUTO: 0.4 %
EOSINOPHIL NFR BLD AUTO: 1.7 %
EPEC EAE GENE STL QL NAA+NON-PROBE: NOT DETECTED
ERYTHROCYTE [DISTWIDTH] IN BLOOD BY AUTOMATED COUNT: 16.7 % (ref 11.5–17)
ERYTHROCYTE [DISTWIDTH] IN BLOOD BY AUTOMATED COUNT: 16.7 % (ref 11.5–17)
ERYTHROCYTE [DISTWIDTH] IN BLOOD BY AUTOMATED COUNT: 16.8 % (ref 11.5–17)
ERYTHROCYTE [DISTWIDTH] IN BLOOD BY AUTOMATED COUNT: 17.2 % (ref 11.5–17)
ERYTHROCYTE [DISTWIDTH] IN BLOOD BY AUTOMATED COUNT: 18.2 % (ref 11.5–17)
ERYTHROCYTE [DISTWIDTH] IN BLOOD BY AUTOMATED COUNT: 19 % (ref 11.5–17)
ETEC LTA+ST1A+ST1B TOX ST NAA+NON-PROBE: NOT DETECTED
FERRITIN SERPL-MCNC: 2967.51 NG/ML (ref 4.63–204)
FIBRINOGEN PPP-MCNC: 190 MG/DL (ref 210–463)
FLOW (OHS): 50 LPM
FLOW (OHS): 50 LPM
FLUAV AG UPPER RESP QL IA.RAPID: NOT DETECTED
FLUBV AG UPPER RESP QL IA.RAPID: NOT DETECTED
FRACTIONAL SHORTENING: 27.7 % (ref 28–44)
G LAMBLIA DNA STL QL NAA+NON-PROBE: NOT DETECTED
GFR SERPLBLD CREATININE-BSD FMLA CKD-EPI: 14 ML/MIN/1.73/M2
GFR SERPLBLD CREATININE-BSD FMLA CKD-EPI: 14 ML/MIN/1.73/M2
GFR SERPLBLD CREATININE-BSD FMLA CKD-EPI: 28 ML/MIN/1.73/M2
GFR SERPLBLD CREATININE-BSD FMLA CKD-EPI: 31 ML/MIN/1.73/M2
GFR SERPLBLD CREATININE-BSD FMLA CKD-EPI: 36 ML/MIN/1.73/M2
GFR SERPLBLD CREATININE-BSD FMLA CKD-EPI: 37 ML/MIN/1.73/M2
GFR SERPLBLD CREATININE-BSD FMLA CKD-EPI: 59 ML/MIN/1.73/M2
GFR SERPLBLD CREATININE-BSD FMLA CKD-EPI: 8 ML/MIN/1.73/M2
GFR SERPLBLD CREATININE-BSD FMLA CKD-EPI: 8 ML/MIN/1.73/M2
GFR SERPLBLD CREATININE-BSD FMLA CKD-EPI: 9 ML/MIN/1.73/M2
GLOBULIN SER-MCNC: 3 GM/DL (ref 2.4–3.5)
GLOBULIN SER-MCNC: 3.9 GM/DL (ref 2.4–3.5)
GLOBULIN SER-MCNC: 4 GM/DL (ref 2.4–3.5)
GLOBULIN SER-MCNC: 4.1 GM/DL (ref 2.4–3.5)
GLOBULIN SER-MCNC: 4.3 GM/DL (ref 2.4–3.5)
GLOBULIN SER-MCNC: 4.3 GM/DL (ref 2.4–3.5)
GLOBULIN SER-MCNC: 4.9 GM/DL (ref 2.4–3.5)
GLUCOSE SERPL-MCNC: 46 MG/DL (ref 82–115)
GLUCOSE SERPL-MCNC: 69 MG/DL (ref 82–115)
GLUCOSE SERPL-MCNC: 69 MG/DL (ref 82–115)
GLUCOSE SERPL-MCNC: 71 MG/DL (ref 82–115)
GLUCOSE SERPL-MCNC: 78 MG/DL (ref 82–115)
GLUCOSE SERPL-MCNC: 78 MG/DL (ref 82–115)
GLUCOSE SERPL-MCNC: 86 MG/DL (ref 82–115)
GLUCOSE SERPL-MCNC: 94 MG/DL (ref 82–115)
GLUCOSE SERPL-MCNC: 95 MG/DL (ref 82–115)
GLUCOSE SERPL-MCNC: 97 MG/DL (ref 82–115)
HBV SURFACE AG SERPL QL IA: NONREACTIVE
HBV SURFACE AG SERPL QL IA: NONREACTIVE
HCO3 BLDA-SCNC: 12.3 MMOL/L (ref 22–26)
HCO3 BLDA-SCNC: 13.8 MMOL/L (ref 22–26)
HCO3 BLDA-SCNC: 15.6 MMOL/L (ref 22–26)
HCO3 BLDA-SCNC: 15.9 MMOL/L (ref 22–26)
HCO3 BLDA-SCNC: 16.4 MMOL/L (ref 22–26)
HCO3 BLDA-SCNC: 16.6 MMOL/L (ref 22–26)
HCO3 BLDA-SCNC: 8 MMOL/L (ref 22–26)
HCT VFR BLD AUTO: 23.3 % (ref 37–47)
HCT VFR BLD AUTO: 31.6 % (ref 37–47)
HCT VFR BLD AUTO: 32.1 % (ref 37–47)
HCT VFR BLD AUTO: 33.9 % (ref 37–47)
HCT VFR BLD AUTO: 34.6 % (ref 37–47)
HCT VFR BLD AUTO: 41.3 % (ref 37–47)
HEMOCCULT SP1 STL QL: POSITIVE
HGB BLD-MCNC: 10.5 G/DL (ref 12–16)
HGB BLD-MCNC: 10.7 G/DL (ref 12–16)
HGB BLD-MCNC: 11.1 G/DL (ref 12–16)
HGB BLD-MCNC: 11.6 G/DL (ref 12–16)
HGB BLD-MCNC: 12.1 G/DL (ref 12–16)
HGB BLD-MCNC: 7.5 G/DL (ref 12–16)
HR MV ECHO: 109 BPM
IMM GRANULOCYTES # BLD AUTO: 0.04 X10(3)/MCL (ref 0–0.04)
IMM GRANULOCYTES # BLD AUTO: 0.07 X10(3)/MCL (ref 0–0.04)
IMM GRANULOCYTES # BLD AUTO: 0.11 X10(3)/MCL (ref 0–0.04)
IMM GRANULOCYTES # BLD AUTO: 0.25 X10(3)/MCL (ref 0–0.04)
IMM GRANULOCYTES NFR BLD AUTO: 0.5 %
IMM GRANULOCYTES NFR BLD AUTO: 0.7 %
IMM GRANULOCYTES NFR BLD AUTO: 1.1 %
IMM GRANULOCYTES NFR BLD AUTO: 1.7 %
INHALED O2 CONCENTRATION: 100 %
INHALED O2 CONCENTRATION: 30 %
INHALED O2 CONCENTRATION: 50 %
INHALED O2 CONCENTRATION: 50 %
INHALED O2 CONCENTRATION: 60 %
INR PPP: 1.3
INR PPP: 2.2
INSTRUMENT WBC (OLG): 17.57 X10(3)/MCL
INSTRUMENT WBC (OLG): 8.95 X10(3)/MCL
INTERVENTRICULAR SEPTUM: 0.8 CM (ref 0.6–1.1)
ITIME (SEC) (OHS): 0.7 SEC
LACTATE SERPL-SCNC: 10.1 MMOL/L (ref 0.5–2.2)
LACTATE SERPL-SCNC: 10.8 MMOL/L (ref 0.5–2.2)
LACTATE SERPL-SCNC: 11 MMOL/L (ref 0.5–2.2)
LACTATE SERPL-SCNC: 11.4 MMOL/L (ref 0.5–2.2)
LACTATE SERPL-SCNC: 11.5 MMOL/L (ref 0.5–2.2)
LACTATE SERPL-SCNC: 13.9 MMOL/L (ref 0.5–2.2)
LACTATE SERPL-SCNC: 3.2 MMOL/L (ref 0.5–2.2)
LACTATE SERPL-SCNC: 3.3 MMOL/L (ref 0.5–2.2)
LACTATE SERPL-SCNC: 3.4 MMOL/L (ref 0.5–2.2)
LACTATE SERPL-SCNC: 3.5 MMOL/L (ref 0.5–2.2)
LACTATE SERPL-SCNC: 3.8 MMOL/L (ref 0.5–2.2)
LACTATE SERPL-SCNC: 4 MMOL/L (ref 0.5–2.2)
LACTATE SERPL-SCNC: 4.5 MMOL/L (ref 0.5–2.2)
LACTATE SERPL-SCNC: 4.6 MMOL/L (ref 0.5–2.2)
LACTATE SERPL-SCNC: 5 MMOL/L (ref 0.5–2.2)
LACTATE SERPL-SCNC: 5.3 MMOL/L (ref 0.5–2.2)
LACTATE SERPL-SCNC: 5.9 MMOL/L (ref 0.5–2.2)
LACTATE SERPL-SCNC: 5.9 MMOL/L (ref 0.5–2.2)
LACTATE SERPL-SCNC: 6.8 MMOL/L (ref 0.5–2.2)
LACTATE SERPL-SCNC: 7.4 MMOL/L (ref 0.5–2.2)
LACTATE SERPL-SCNC: 9.5 MMOL/L (ref 0.5–2.2)
LDH SERPL-CCNC: 514 U/L (ref 125–220)
LDH SERPL-CCNC: 552 U/L (ref 125–220)
LEFT ATRIUM AREA SYSTOLIC (APICAL 2 CHAMBER): 16.5 CM2
LEFT ATRIUM AREA SYSTOLIC (APICAL 4 CHAMBER): 20.2 CM2
LEFT ATRIUM SIZE: 3.6 CM
LEFT ATRIUM VOLUME INDEX MOD: 26 ML/M2
LEFT ATRIUM VOLUME MOD: 52 ML
LEFT INTERNAL DIMENSION IN SYSTOLE: 3.4 CM (ref 2.1–4)
LEFT VENTRICLE DIASTOLIC VOLUME INDEX: 51.26 ML/M2
LEFT VENTRICLE DIASTOLIC VOLUME: 102 ML
LEFT VENTRICLE END DIASTOLIC VOLUME APICAL 2 CHAMBER: 54.4 ML
LEFT VENTRICLE END DIASTOLIC VOLUME APICAL 4 CHAMBER: 91 ML
LEFT VENTRICLE END SYSTOLIC VOLUME APICAL 2 CHAMBER: 43.8 ML
LEFT VENTRICLE END SYSTOLIC VOLUME APICAL 4 CHAMBER: 51.6 ML
LEFT VENTRICLE MASS INDEX: 71.7 G/M2
LEFT VENTRICLE SYSTOLIC VOLUME INDEX: 23.6 ML/M2
LEFT VENTRICLE SYSTOLIC VOLUME: 47 ML
LEFT VENTRICULAR INTERNAL DIMENSION IN DIASTOLE: 4.7 CM (ref 3.5–6)
LEFT VENTRICULAR MASS: 142.7 G
LIPASE SERPL-CCNC: 5 U/L
LIPASE SERPL-CCNC: <4 U/L
LV LATERAL E/E' RATIO: 10.9 M/S
LV SEPTAL E/E' RATIO: 12.4 M/S
LVED V (TEICH): 102 ML
LVES V (TEICH): 47.4 ML
LVOT MG: 5 MMHG
LVOT MV: 1 CM/S
LYMPHOCYTES # BLD AUTO: 1.21 X10(3)/MCL (ref 0.6–4.6)
LYMPHOCYTES # BLD AUTO: 1.46 X10(3)/MCL (ref 0.6–4.6)
LYMPHOCYTES # BLD AUTO: 1.47 X10(3)/MCL (ref 0.6–4.6)
LYMPHOCYTES # BLD AUTO: 1.9 X10(3)/MCL (ref 0.6–4.6)
LYMPHOCYTES NFR BLD AUTO: 12.1 %
LYMPHOCYTES NFR BLD AUTO: 12.7 %
LYMPHOCYTES NFR BLD AUTO: 14.4 %
LYMPHOCYTES NFR BLD AUTO: 17.4 %
LYMPHOCYTES NFR BLD MANUAL: 0.53 X10(3)/MCL (ref 0.6–4.6)
LYMPHOCYTES NFR BLD MANUAL: 0.72 X10(3)/MCL (ref 0.6–4.6)
LYMPHOCYTES NFR BLD MANUAL: 3 %
LYMPHOCYTES NFR BLD MANUAL: 8 %
MACROCYTES BLD QL SMEAR: ABNORMAL
MAGNESIUM SERPL-MCNC: 1.4 MG/DL (ref 1.6–2.6)
MAGNESIUM SERPL-MCNC: 1.7 MG/DL (ref 1.6–2.6)
MAGNESIUM SERPL-MCNC: 1.8 MG/DL (ref 1.6–2.6)
MAGNESIUM SERPL-MCNC: 2 MG/DL (ref 1.6–2.6)
MAGNESIUM SERPL-MCNC: 2.2 MG/DL (ref 1.6–2.6)
MCH RBC QN AUTO: 32.5 PG (ref 27–31)
MCH RBC QN AUTO: 32.8 PG (ref 27–31)
MCH RBC QN AUTO: 32.9 PG (ref 27–31)
MCH RBC QN AUTO: 33.7 PG (ref 27–31)
MCH RBC QN AUTO: 34.4 PG (ref 27–31)
MCH RBC QN AUTO: 34.6 PG (ref 27–31)
MCHC RBC AUTO-ENTMCNC: 29.3 G/DL (ref 33–36)
MCHC RBC AUTO-ENTMCNC: 32.2 G/DL (ref 33–36)
MCHC RBC AUTO-ENTMCNC: 32.7 G/DL (ref 33–36)
MCHC RBC AUTO-ENTMCNC: 33.2 G/DL (ref 33–36)
MCHC RBC AUTO-ENTMCNC: 33.3 G/DL (ref 33–36)
MCHC RBC AUTO-ENTMCNC: 33.5 G/DL (ref 33–36)
MCV RBC AUTO: 105.6 FL (ref 80–94)
MCV RBC AUTO: 106.9 FL (ref 80–94)
MCV RBC AUTO: 115 FL (ref 80–94)
MCV RBC AUTO: 97.6 FL (ref 80–94)
MCV RBC AUTO: 97.7 FL (ref 80–94)
MCV RBC AUTO: 99.1 FL (ref 80–94)
MECH RR (OHS): 20 B/MIN
MECH RR (OHS): 20 B/MIN
MECH RR (OHS): 22 B/MIN
MECH RR (OHS): 22 B/MIN
METHGB MFR BLDA: 0.9 % (ref 0.4–1.5)
METHGB MFR BLDA: 1 % (ref 0.4–1.5)
METHGB MFR BLDA: 1.2 % (ref 0.4–1.5)
MICROCYTES BLD QL SMEAR: ABNORMAL
MODE (OHS): AC
MONOCYTES # BLD AUTO: 0.29 X10(3)/MCL (ref 0.1–1.3)
MONOCYTES # BLD AUTO: 0.61 X10(3)/MCL (ref 0.1–1.3)
MONOCYTES # BLD AUTO: 0.8 X10(3)/MCL (ref 0.1–1.3)
MONOCYTES # BLD AUTO: 0.95 X10(3)/MCL (ref 0.1–1.3)
MONOCYTES NFR BLD AUTO: 1.9 %
MONOCYTES NFR BLD AUTO: 7.3 %
MONOCYTES NFR BLD AUTO: 8 %
MONOCYTES NFR BLD AUTO: 9.3 %
MONOCYTES NFR BLD MANUAL: 0.09 X10(3)/MCL (ref 0.1–1.3)
MONOCYTES NFR BLD MANUAL: 1 %
MRSA PCR SCRN (OHS): DETECTED
MV MEAN GRADIENT: 5 MMHG
MV PEAK A VEL: 1.43 M/S
MV PEAK E VEL: 0.87 M/S
MV PEAK GRADIENT: 9 MMHG
MV STENOSIS PRESSURE HALF TIME: 57 MS
MV VALVE AREA BY CONTINUITY EQUATION: 2.21 CM2
MV VALVE AREA P 1/2 METHOD: 3.86 CM2
NEUTROPHILS # BLD AUTO: 12.43 X10(3)/MCL (ref 2.1–9.2)
NEUTROPHILS # BLD AUTO: 6.2 X10(3)/MCL (ref 2.1–9.2)
NEUTROPHILS # BLD AUTO: 7.62 X10(3)/MCL (ref 2.1–9.2)
NEUTROPHILS # BLD AUTO: 7.68 X10(3)/MCL (ref 2.1–9.2)
NEUTROPHILS NFR BLD AUTO: 74 %
NEUTROPHILS NFR BLD AUTO: 74.5 %
NEUTROPHILS NFR BLD AUTO: 77.2 %
NEUTROPHILS NFR BLD AUTO: 83 %
NEUTROPHILS NFR BLD MANUAL: 91 %
NEUTROPHILS NFR BLD MANUAL: 97 %
NOROVIRUS GI+II RNA STL QL NAA+NON-PROBE: NOT DETECTED
NRBC BLD AUTO-RTO: 0 %
O2 HB BLOOD GAS (OHS): 97 % (ref 94–97)
O2 HB BLOOD GAS (OHS): 97.4 % (ref 94–97)
O2 HB BLOOD GAS (OHS): 97.5 % (ref 94–97)
O2 HB BLOOD GAS (OHS): 97.8 % (ref 94–97)
O2 HB BLOOD GAS (OHS): 98.4 % (ref 94–97)
OHS LV EJECTION FRACTION SIMPSONS BIPLANE MOD: 60 %
OHS QRS DURATION: 66 MS
OHS QRS DURATION: 68 MS
OHS QTC CALCULATION: 430 MS
OHS QTC CALCULATION: 456 MS
OHS QTC CALCULATION: 462 MS
OHS QTC CALCULATION: 467 MS
OXYGEN DEVICE BLOOD GAS (OHS): ABNORMAL
OXYHGB MFR BLDA: 10.7 G/DL (ref 12–16)
OXYHGB MFR BLDA: 11 G/DL (ref 12–16)
OXYHGB MFR BLDA: 11.3 G/DL (ref 12–16)
OXYHGB MFR BLDA: 12.5 G/DL (ref 12–16)
OXYHGB MFR BLDA: 12.5 G/DL (ref 12–16)
P SHIGELLOIDES DNA STL QL NAA+NON-PROBE: NOT DETECTED
PCO2 BLDA: 19 MMHG (ref 35–45)
PCO2 BLDA: 23 MMHG (ref 35–45)
PCO2 BLDA: 23 MMHG (ref 35–45)
PCO2 BLDA: 25 MMHG (ref 35–45)
PCO2 BLDA: 28 MMHG (ref 35–45)
PCO2 BLDA: 31 MMHG (ref 35–45)
PCO2 BLDA: 33 MMHG (ref 35–45)
PEEP RESPIRATORY: 5 CMH2O
PEEP RESPIRATORY: 5 CMH2O
PEEP RESPIRATORY: 8 CMH2O
PH BLDA: 7.02 [PH] (ref 7.35–7.45)
PH BLDA: 7.25 [PH] (ref 7.35–7.45)
PH BLDA: 7.29 [PH] (ref 7.35–7.45)
PH BLDA: 7.43 [PH] (ref 7.35–7.45)
PH BLDA: 7.44 [PH] (ref 7.35–7.45)
PH BLDA: 7.46 [PH] (ref 7.35–7.45)
PH BLDA: 7.47 [PH] (ref 7.35–7.45)
PHOSPHATE SERPL-MCNC: 1.6 MG/DL (ref 2.3–4.7)
PHOSPHATE SERPL-MCNC: 1.9 MG/DL (ref 2.3–4.7)
PHOSPHATE SERPL-MCNC: 2 MG/DL (ref 2.3–4.7)
PHOSPHATE SERPL-MCNC: 3.2 MG/DL (ref 2.3–4.7)
PHOSPHATE SERPL-MCNC: 3.2 MG/DL (ref 2.3–4.7)
PHOSPHATE SERPL-MCNC: 4.7 MG/DL (ref 2.3–4.7)
PISA TR MAX VEL: 2.2 M/S
PLATELET # BLD AUTO: 116 X10(3)/MCL (ref 130–400)
PLATELET # BLD AUTO: 159 X10(3)/MCL (ref 130–400)
PLATELET # BLD AUTO: 187 X10(3)/MCL (ref 130–400)
PLATELET # BLD AUTO: 195 X10(3)/MCL (ref 130–400)
PLATELET # BLD AUTO: 232 X10(3)/MCL (ref 130–400)
PLATELET # BLD AUTO: 248 X10(3)/MCL (ref 130–400)
PLATELET # BLD EST: ABNORMAL 10*3/UL
PLATELET # BLD EST: NORMAL 10*3/UL
PMV BLD AUTO: 10.2 FL (ref 7.4–10.4)
PMV BLD AUTO: 10.5 FL (ref 7.4–10.4)
PMV BLD AUTO: 10.8 FL (ref 7.4–10.4)
PMV BLD AUTO: 10.9 FL (ref 7.4–10.4)
PMV BLD AUTO: 11.1 FL (ref 7.4–10.4)
PMV BLD AUTO: 11.3 FL (ref 7.4–10.4)
PO2 BLDA: 103 MMHG (ref 80–100)
PO2 BLDA: 127 MMHG (ref 80–100)
PO2 BLDA: 186 MMHG (ref 80–100)
PO2 BLDA: 195 MMHG (ref 80–100)
PO2 BLDA: 220 MMHG (ref 80–100)
PO2 BLDA: 321 MMHG (ref 80–100)
PO2 BLDA: 88 MMHG (ref 80–100)
POCT GLUCOSE: 105 MG/DL (ref 70–110)
POCT GLUCOSE: 105 MG/DL (ref 70–110)
POCT GLUCOSE: 142 MG/DL (ref 70–110)
POCT GLUCOSE: 151 MG/DL (ref 70–110)
POCT GLUCOSE: 253 MG/DL (ref 70–110)
POCT GLUCOSE: 66 MG/DL (ref 70–110)
POCT GLUCOSE: 73 MG/DL (ref 70–110)
POIKILOCYTOSIS BLD QL SMEAR: ABNORMAL
POTASSIUM BLOOD GAS (OHS): 3.2 MMOL/L (ref 3.5–5)
POTASSIUM BLOOD GAS (OHS): 4.3 MMOL/L (ref 3.5–5)
POTASSIUM BLOOD GAS (OHS): 4.4 MMOL/L (ref 3.5–5)
POTASSIUM BLOOD GAS (OHS): 4.4 MMOL/L (ref 3.5–5)
POTASSIUM BLOOD GAS (OHS): 5.1 MMOL/L (ref 3.5–5)
POTASSIUM BLOOD GAS (OHS): 5.7 MMOL/L (ref 3.5–5)
POTASSIUM BLOOD GAS (OHS): 5.9 MMOL/L (ref 3.5–5)
POTASSIUM SERPL-SCNC: 3.2 MMOL/L (ref 3.5–5.1)
POTASSIUM SERPL-SCNC: 3.2 MMOL/L (ref 3.5–5.1)
POTASSIUM SERPL-SCNC: 3.3 MMOL/L (ref 3.5–5.1)
POTASSIUM SERPL-SCNC: 3.8 MMOL/L (ref 3.5–5.1)
POTASSIUM SERPL-SCNC: 4 MMOL/L (ref 3.5–5.1)
POTASSIUM SERPL-SCNC: 4.1 MMOL/L (ref 3.5–5.1)
POTASSIUM SERPL-SCNC: 4.8 MMOL/L (ref 3.5–5.1)
POTASSIUM SERPL-SCNC: 5.3 MMOL/L (ref 3.5–5.1)
POTASSIUM SERPL-SCNC: 6.1 MMOL/L (ref 3.5–5.1)
POTASSIUM SERPL-SCNC: 6.2 MMOL/L (ref 3.5–5.1)
PROT SERPL-MCNC: 4 GM/DL (ref 5.8–7.6)
PROT SERPL-MCNC: 5 GM/DL (ref 5.8–7.6)
PROT SERPL-MCNC: 5.5 GM/DL (ref 5.8–7.6)
PROT SERPL-MCNC: 5.5 GM/DL (ref 5.8–7.6)
PROT SERPL-MCNC: 5.6 GM/DL (ref 5.8–7.6)
PROT SERPL-MCNC: 5.8 GM/DL (ref 5.8–7.6)
PROT SERPL-MCNC: 6.5 GM/DL (ref 5.8–7.6)
PROTHROMBIN TIME: 16.1 SECONDS (ref 12.5–14.5)
PROTHROMBIN TIME: 23.8 SECONDS (ref 12.5–14.5)
PV PEAK GRADIENT: 14 MMHG
PV PEAK VELOCITY: 1.9 M/S
RBC # BLD AUTO: 2.18 X10(6)/MCL (ref 4.2–5.4)
RBC # BLD AUTO: 3.19 X10(6)/MCL (ref 4.2–5.4)
RBC # BLD AUTO: 3.21 X10(6)/MCL (ref 4.2–5.4)
RBC # BLD AUTO: 3.29 X10(6)/MCL (ref 4.2–5.4)
RBC # BLD AUTO: 3.54 X10(6)/MCL (ref 4.2–5.4)
RBC # BLD AUTO: 3.59 X10(6)/MCL (ref 4.2–5.4)
RBC MORPH BLD: ABNORMAL
RBC MORPH BLD: ABNORMAL
RVA RNA STL QL NAA+NON-PROBE: NOT DETECTED
S ENT+BONG DNA STL QL NAA+NON-PROBE: NOT DETECTED
SAMPLE SITE BLOOD GAS (OHS): ABNORMAL
SAO2 % BLDA: 100 %
SAO2 % BLDA: 97 %
SAO2 % BLDA: 98.3 %
SAO2 % BLDA: 99 %
SAO2 % BLDA: 99.6 %
SAO2 % BLDA: 99.7 %
SAO2 % BLDA: 99.8 %
SAPO I+II+IV+V RNA STL QL NAA+NON-PROBE: NOT DETECTED
SARS-COV-2 RNA RESP QL NAA+PROBE: NOT DETECTED
SHIGELLA SP+EIEC IPAH ST NAA+NON-PROBE: NOT DETECTED
SODIUM BLOOD GAS (OHS): 128 MMOL/L (ref 137–145)
SODIUM BLOOD GAS (OHS): 128 MMOL/L (ref 137–145)
SODIUM BLOOD GAS (OHS): 130 MMOL/L (ref 137–145)
SODIUM BLOOD GAS (OHS): 132 MMOL/L (ref 137–145)
SODIUM BLOOD GAS (OHS): 133 MMOL/L (ref 137–145)
SODIUM SERPL-SCNC: 132 MMOL/L (ref 136–145)
SODIUM SERPL-SCNC: 133 MMOL/L (ref 136–145)
SODIUM SERPL-SCNC: 134 MMOL/L (ref 136–145)
SODIUM SERPL-SCNC: 135 MMOL/L (ref 136–145)
SODIUM SERPL-SCNC: 136 MMOL/L (ref 136–145)
SODIUM SERPL-SCNC: 138 MMOL/L (ref 136–145)
SODIUM SERPL-SCNC: 138 MMOL/L (ref 136–145)
SODIUM SERPL-SCNC: 139 MMOL/L (ref 136–145)
SPONT+MECH VT ON VENT: 450 ML
SPONT+MECH VT ON VENT: 470 ML
SPONT+MECH VT ON VENT: 470 ML
TDI LATERAL: 0.08 M/S
TDI SEPTAL: 0.07 M/S
TDI: 0.08 M/S
TR MAX PG: 19 MMHG
TRICUSPID ANNULAR PLANE SYSTOLIC EXCURSION: 2.38 CM
TRIGL SERPL-MCNC: 565 MG/DL (ref 37–140)
TRIGL SERPL-MCNC: 767 MG/DL (ref 37–140)
TROPONIN I SERPL-MCNC: 0.14 NG/ML (ref 0–0.04)
TROPONIN I SERPL-MCNC: 0.17 NG/ML (ref 0–0.04)
TROPONIN I SERPL-MCNC: 0.19 NG/ML (ref 0–0.04)
TROPONIN I SERPL-MCNC: 0.2 NG/ML (ref 0–0.04)
TROPONIN I SERPL-MCNC: 0.21 NG/ML (ref 0–0.04)
TROPONIN I SERPL-MCNC: 0.23 NG/ML (ref 0–0.04)
TROPONIN I SERPL-MCNC: 0.26 NG/ML (ref 0–0.04)
TROPONIN I SERPL-MCNC: 0.39 NG/ML (ref 0–0.04)
V CHOL+PARA+VUL DNA STL QL NAA+NON-PROBE: NOT DETECTED
V CHOLERAE DNA STL QL NAA+NON-PROBE: NOT DETECTED
VANCOMYCIN SERPL-MCNC: 22.3 UG/ML (ref 15–20)
WBC # BLD AUTO: 10.22 X10(3)/MCL (ref 4.5–11.5)
WBC # BLD AUTO: 14.97 X10(3)/MCL (ref 4.5–11.5)
WBC # BLD AUTO: 17.57 X10(3)/MCL (ref 4.5–11.5)
WBC # BLD AUTO: 8.37 X10(3)/MCL (ref 4.5–11.5)
WBC # BLD AUTO: 8.95 X10(3)/MCL (ref 4.5–11.5)
WBC # BLD AUTO: 9.96 X10(3)/MCL (ref 4.5–11.5)
Y ENTEROCOL DNA STL QL NAA+NON-PROBE: NOT DETECTED
Z-SCORE OF LEFT VENTRICULAR DIMENSION IN END DIASTOLE: -2.05
Z-SCORE OF LEFT VENTRICULAR DIMENSION IN END SYSTOLE: -0.33

## 2025-01-01 PROCEDURE — 84484 ASSAY OF TROPONIN QUANT: CPT

## 2025-01-01 PROCEDURE — 84100 ASSAY OF PHOSPHORUS: CPT

## 2025-01-01 PROCEDURE — 5A1945Z RESPIRATORY VENTILATION, 24-96 CONSECUTIVE HOURS: ICD-10-PCS | Performed by: INTERNAL MEDICINE

## 2025-01-01 PROCEDURE — 80100008 HC CRRT DAILY MAINTENANCE

## 2025-01-01 PROCEDURE — 63600175 PHARM REV CODE 636 W HCPCS: Performed by: INTERNAL MEDICINE

## 2025-01-01 PROCEDURE — 96375 TX/PRO/DX INJ NEW DRUG ADDON: CPT

## 2025-01-01 PROCEDURE — 25000003 PHARM REV CODE 250: Performed by: INTERNAL MEDICINE

## 2025-01-01 PROCEDURE — 87641 MR-STAPH DNA AMP PROBE: CPT | Performed by: STUDENT IN AN ORGANIZED HEALTH CARE EDUCATION/TRAINING PROGRAM

## 2025-01-01 PROCEDURE — 99900031 HC PATIENT EDUCATION (STAT)

## 2025-01-01 PROCEDURE — 25000003 PHARM REV CODE 250

## 2025-01-01 PROCEDURE — 83605 ASSAY OF LACTIC ACID: CPT | Performed by: INTERNAL MEDICINE

## 2025-01-01 PROCEDURE — 85025 COMPLETE CBC W/AUTO DIFF WBC: CPT

## 2025-01-01 PROCEDURE — 94760 N-INVAS EAR/PLS OXIMETRY 1: CPT | Mod: XB

## 2025-01-01 PROCEDURE — 83615 LACTATE (LD) (LDH) ENZYME: CPT

## 2025-01-01 PROCEDURE — 87040 BLOOD CULTURE FOR BACTERIA: CPT

## 2025-01-01 PROCEDURE — 80053 COMPREHEN METABOLIC PANEL: CPT

## 2025-01-01 PROCEDURE — 83605 ASSAY OF LACTIC ACID: CPT

## 2025-01-01 PROCEDURE — 87184 SC STD DISK METHOD PER PLATE: CPT | Performed by: INTERNAL MEDICINE

## 2025-01-01 PROCEDURE — 80100014 HC HEMODIALYSIS 1:1

## 2025-01-01 PROCEDURE — 83735 ASSAY OF MAGNESIUM: CPT | Performed by: NURSE PRACTITIONER

## 2025-01-01 PROCEDURE — 36415 COLL VENOUS BLD VENIPUNCTURE: CPT

## 2025-01-01 PROCEDURE — 63600175 PHARM REV CODE 636 W HCPCS: Performed by: NURSE PRACTITIONER

## 2025-01-01 PROCEDURE — 82550 ASSAY OF CK (CPK): CPT

## 2025-01-01 PROCEDURE — 36415 COLL VENOUS BLD VENIPUNCTURE: CPT | Performed by: INTERNAL MEDICINE

## 2025-01-01 PROCEDURE — 93010 ELECTROCARDIOGRAM REPORT: CPT | Mod: ,,, | Performed by: INTERNAL MEDICINE

## 2025-01-01 PROCEDURE — 25500020 PHARM REV CODE 255: Performed by: INTERNAL MEDICINE

## 2025-01-01 PROCEDURE — 80100016 HC MAINTENANCE HEMODIALYSIS

## 2025-01-01 PROCEDURE — 80069 RENAL FUNCTION PANEL: CPT | Performed by: NURSE PRACTITIONER

## 2025-01-01 PROCEDURE — 93005 ELECTROCARDIOGRAM TRACING: CPT

## 2025-01-01 PROCEDURE — 27100171 HC OXYGEN HIGH FLOW UP TO 24 HOURS

## 2025-01-01 PROCEDURE — 80202 ASSAY OF VANCOMYCIN: CPT | Performed by: INTERNAL MEDICINE

## 2025-01-01 PROCEDURE — 87340 HEPATITIS B SURFACE AG IA: CPT | Performed by: NURSE PRACTITIONER

## 2025-01-01 PROCEDURE — 25000003 PHARM REV CODE 250: Performed by: NURSE PRACTITIONER

## 2025-01-01 PROCEDURE — C1751 CATH, INF, PER/CENT/MIDLINE: HCPCS

## 2025-01-01 PROCEDURE — 0240U COVID/FLU A&B PCR: CPT | Performed by: STUDENT IN AN ORGANIZED HEALTH CARE EDUCATION/TRAINING PROGRAM

## 2025-01-01 PROCEDURE — 80053 COMPREHEN METABOLIC PANEL: CPT | Performed by: NURSE PRACTITIONER

## 2025-01-01 PROCEDURE — 20000000 HC ICU ROOM

## 2025-01-01 PROCEDURE — 84484 ASSAY OF TROPONIN QUANT: CPT | Performed by: INTERNAL MEDICINE

## 2025-01-01 PROCEDURE — 90945 DIALYSIS ONE EVALUATION: CPT

## 2025-01-01 PROCEDURE — 37799 UNLISTED PX VASCULAR SURGERY: CPT

## 2025-01-01 PROCEDURE — 82803 BLOOD GASES ANY COMBINATION: CPT

## 2025-01-01 PROCEDURE — 96376 TX/PRO/DX INJ SAME DRUG ADON: CPT

## 2025-01-01 PROCEDURE — 83735 ASSAY OF MAGNESIUM: CPT

## 2025-01-01 PROCEDURE — 63600175 PHARM REV CODE 636 W HCPCS

## 2025-01-01 PROCEDURE — 84484 ASSAY OF TROPONIN QUANT: CPT | Performed by: STUDENT IN AN ORGANIZED HEALTH CARE EDUCATION/TRAINING PROGRAM

## 2025-01-01 PROCEDURE — 80053 COMPREHEN METABOLIC PANEL: CPT | Performed by: STUDENT IN AN ORGANIZED HEALTH CARE EDUCATION/TRAINING PROGRAM

## 2025-01-01 PROCEDURE — 31500 INSERT EMERGENCY AIRWAY: CPT

## 2025-01-01 PROCEDURE — 36600 WITHDRAWAL OF ARTERIAL BLOOD: CPT

## 2025-01-01 PROCEDURE — 84478 ASSAY OF TRIGLYCERIDES: CPT | Performed by: INTERNAL MEDICINE

## 2025-01-01 PROCEDURE — 96361 HYDRATE IV INFUSION ADD-ON: CPT

## 2025-01-01 PROCEDURE — 82962 GLUCOSE BLOOD TEST: CPT

## 2025-01-01 PROCEDURE — 83605 ASSAY OF LACTIC ACID: CPT | Performed by: STUDENT IN AN ORGANIZED HEALTH CARE EDUCATION/TRAINING PROGRAM

## 2025-01-01 PROCEDURE — 87507 IADNA-DNA/RNA PROBE TQ 12-25: CPT

## 2025-01-01 PROCEDURE — 5A1D70Z PERFORMANCE OF URINARY FILTRATION, INTERMITTENT, LESS THAN 6 HOURS PER DAY: ICD-10-PCS | Performed by: INTERNAL MEDICINE

## 2025-01-01 PROCEDURE — 25000003 PHARM REV CODE 250: Performed by: STUDENT IN AN ORGANIZED HEALTH CARE EDUCATION/TRAINING PROGRAM

## 2025-01-01 PROCEDURE — 94002 VENT MGMT INPAT INIT DAY: CPT

## 2025-01-01 PROCEDURE — 96365 THER/PROPH/DIAG IV INF INIT: CPT

## 2025-01-01 PROCEDURE — 94761 N-INVAS EAR/PLS OXIMETRY MLT: CPT | Mod: XB

## 2025-01-01 PROCEDURE — 85384 FIBRINOGEN ACTIVITY: CPT

## 2025-01-01 PROCEDURE — 5A1D90Z PERFORMANCE OF URINARY FILTRATION, CONTINUOUS, GREATER THAN 18 HOURS PER DAY: ICD-10-PCS | Performed by: INTERNAL MEDICINE

## 2025-01-01 PROCEDURE — 99900035 HC TECH TIME PER 15 MIN (STAT)

## 2025-01-01 PROCEDURE — 94003 VENT MGMT INPAT SUBQ DAY: CPT

## 2025-01-01 PROCEDURE — 87040 BLOOD CULTURE FOR BACTERIA: CPT | Performed by: STUDENT IN AN ORGANIZED HEALTH CARE EDUCATION/TRAINING PROGRAM

## 2025-01-01 PROCEDURE — 63600175 PHARM REV CODE 636 W HCPCS: Performed by: STUDENT IN AN ORGANIZED HEALTH CARE EDUCATION/TRAINING PROGRAM

## 2025-01-01 PROCEDURE — 96368 THER/DIAG CONCURRENT INF: CPT

## 2025-01-01 PROCEDURE — 0BH17EZ INSERTION OF ENDOTRACHEAL AIRWAY INTO TRACHEA, VIA NATURAL OR ARTIFICIAL OPENING: ICD-10-PCS | Performed by: INTERNAL MEDICINE

## 2025-01-01 PROCEDURE — 27200966 HC CLOSED SUCTION SYSTEM

## 2025-01-01 PROCEDURE — 85025 COMPLETE CBC W/AUTO DIFF WBC: CPT | Performed by: STUDENT IN AN ORGANIZED HEALTH CARE EDUCATION/TRAINING PROGRAM

## 2025-01-01 PROCEDURE — 83615 LACTATE (LD) (LDH) ENZYME: CPT | Performed by: INTERNAL MEDICINE

## 2025-01-01 PROCEDURE — 99222 1ST HOSP IP/OBS MODERATE 55: CPT | Mod: ,,, | Performed by: NURSE PRACTITIONER

## 2025-01-01 PROCEDURE — 82728 ASSAY OF FERRITIN: CPT

## 2025-01-01 PROCEDURE — 85610 PROTHROMBIN TIME: CPT | Performed by: STUDENT IN AN ORGANIZED HEALTH CARE EDUCATION/TRAINING PROGRAM

## 2025-01-01 PROCEDURE — 82272 OCCULT BLD FECES 1-3 TESTS: CPT

## 2025-01-01 PROCEDURE — 63600175 PHARM REV CODE 636 W HCPCS: Mod: JZ,TB

## 2025-01-01 PROCEDURE — 83690 ASSAY OF LIPASE: CPT

## 2025-01-01 PROCEDURE — 82150 ASSAY OF AMYLASE: CPT

## 2025-01-01 PROCEDURE — 85730 THROMBOPLASTIN TIME PARTIAL: CPT | Performed by: STUDENT IN AN ORGANIZED HEALTH CARE EDUCATION/TRAINING PROGRAM

## 2025-01-01 PROCEDURE — 84100 ASSAY OF PHOSPHORUS: CPT | Performed by: NURSE PRACTITIONER

## 2025-01-01 PROCEDURE — 85610 PROTHROMBIN TIME: CPT

## 2025-01-01 PROCEDURE — 84478 ASSAY OF TRIGLYCERIDES: CPT

## 2025-01-01 PROCEDURE — 27000221 HC OXYGEN, UP TO 24 HOURS

## 2025-01-01 PROCEDURE — 94761 N-INVAS EAR/PLS OXIMETRY MLT: CPT

## 2025-01-01 PROCEDURE — 99291 CRITICAL CARE FIRST HOUR: CPT

## 2025-01-01 PROCEDURE — 96374 THER/PROPH/DIAG INJ IV PUSH: CPT | Mod: 59

## 2025-01-01 PROCEDURE — 36556 INSERT NON-TUNNEL CV CATH: CPT

## 2025-01-01 PROCEDURE — 83690 ASSAY OF LIPASE: CPT | Performed by: STUDENT IN AN ORGANIZED HEALTH CARE EDUCATION/TRAINING PROGRAM

## 2025-01-01 RX ORDER — FLUMAZENIL 0.1 MG/ML
0.2 INJECTION INTRAVENOUS ONCE
Status: COMPLETED | OUTPATIENT
Start: 2025-01-01 | End: 2025-01-01

## 2025-01-01 RX ORDER — OXYCODONE AND ACETAMINOPHEN 5; 325 MG/1; MG/1
1 TABLET ORAL
Refills: 0 | Status: COMPLETED | OUTPATIENT
Start: 2025-01-01 | End: 2025-01-01

## 2025-01-01 RX ORDER — MICONAZOLE NITRATE 2 G/100G
POWDER TOPICAL 2 TIMES DAILY
Status: CANCELLED | OUTPATIENT
Start: 2025-01-01

## 2025-01-01 RX ORDER — POTASSIUM CHLORIDE 14.9 MG/ML
20 INJECTION INTRAVENOUS ONCE
Status: DISCONTINUED | OUTPATIENT
Start: 2025-01-01 | End: 2025-01-01

## 2025-01-01 RX ORDER — MUPIROCIN 20 MG/G
OINTMENT TOPICAL 2 TIMES DAILY
Status: DISCONTINUED | OUTPATIENT
Start: 2025-01-01 | End: 2025-01-01 | Stop reason: HOSPADM

## 2025-01-01 RX ORDER — LIDOCAINE HYDROCHLORIDE 10 MG/ML
INJECTION, SOLUTION INFILTRATION; PERINEURAL
Status: COMPLETED
Start: 2025-01-01 | End: 2025-01-01

## 2025-01-01 RX ORDER — SODIUM BICARBONATE 1 MEQ/ML
100 SYRINGE (ML) INTRAVENOUS ONCE
Status: COMPLETED | OUTPATIENT
Start: 2025-01-01 | End: 2025-01-01

## 2025-01-01 RX ORDER — LORAZEPAM 2 MG/ML
1 INJECTION INTRAMUSCULAR EVERY 4 HOURS PRN
Status: DISCONTINUED | OUTPATIENT
Start: 2025-01-01 | End: 2025-01-01 | Stop reason: HOSPADM

## 2025-01-01 RX ORDER — CALCIUM GLUCONATE 20 MG/ML
1 INJECTION, SOLUTION INTRAVENOUS
Status: COMPLETED | OUTPATIENT
Start: 2025-01-01 | End: 2025-01-01

## 2025-01-01 RX ORDER — FOLIC ACID 1 MG/1
1 TABLET ORAL DAILY
Status: DISCONTINUED | OUTPATIENT
Start: 2025-01-01 | End: 2025-01-01 | Stop reason: HOSPADM

## 2025-01-01 RX ORDER — CALCIUM GLUCONATE 20 MG/ML
INJECTION, SOLUTION INTRAVENOUS
Status: DISCONTINUED
Start: 2025-01-01 | End: 2025-01-01 | Stop reason: WASHOUT

## 2025-01-01 RX ORDER — CEFTRIAXONE 2 G/1
2 INJECTION, POWDER, FOR SOLUTION INTRAMUSCULAR; INTRAVENOUS
Status: DISCONTINUED | OUTPATIENT
Start: 2025-01-01 | End: 2025-01-01

## 2025-01-01 RX ORDER — PROPOFOL 10 MG/ML
INJECTION, EMULSION INTRAVENOUS
Status: COMPLETED
Start: 2025-01-01 | End: 2025-01-01

## 2025-01-01 RX ORDER — MIDAZOLAM HYDROCHLORIDE 2 MG/2ML
2 INJECTION, SOLUTION INTRAMUSCULAR; INTRAVENOUS ONCE
Status: COMPLETED | OUTPATIENT
Start: 2025-01-01 | End: 2025-01-01

## 2025-01-01 RX ORDER — PANTOPRAZOLE SODIUM 40 MG/10ML
80 INJECTION, POWDER, LYOPHILIZED, FOR SOLUTION INTRAVENOUS ONCE
Status: COMPLETED | OUTPATIENT
Start: 2025-01-01 | End: 2025-01-01

## 2025-01-01 RX ORDER — PROPOFOL 10 MG/ML
0-50 INJECTION, EMULSION INTRAVENOUS CONTINUOUS
Status: DISCONTINUED | OUTPATIENT
Start: 2025-01-01 | End: 2025-01-01

## 2025-01-01 RX ORDER — LORAZEPAM 2 MG/ML
1 INJECTION INTRAMUSCULAR EVERY 4 HOURS PRN
Status: DISCONTINUED | OUTPATIENT
Start: 2025-01-01 | End: 2025-01-01

## 2025-01-01 RX ORDER — CALCIUM GLUCONATE 20 MG/ML
1 INJECTION, SOLUTION INTRAVENOUS ONCE
Status: COMPLETED | OUTPATIENT
Start: 2025-01-01 | End: 2025-01-01

## 2025-01-01 RX ORDER — FENTANYL CITRATE 50 UG/ML
50 INJECTION, SOLUTION INTRAMUSCULAR; INTRAVENOUS
Refills: 0 | Status: ACTIVE | OUTPATIENT
Start: 2025-01-01 | End: 2025-01-01

## 2025-01-01 RX ORDER — CALCIUM GLUCONATE 20 MG/ML
1 INJECTION, SOLUTION INTRAVENOUS
Status: DISPENSED | OUTPATIENT
Start: 2025-01-01 | End: 2025-01-01

## 2025-01-01 RX ORDER — SODIUM BICARBONATE 1 MEQ/ML
SYRINGE (ML) INTRAVENOUS
Status: COMPLETED
Start: 2025-01-01 | End: 2025-01-01

## 2025-01-01 RX ORDER — FENTANYL CITRATE 50 UG/ML
50 INJECTION, SOLUTION INTRAMUSCULAR; INTRAVENOUS
Refills: 0 | Status: DISCONTINUED | OUTPATIENT
Start: 2025-01-01 | End: 2025-01-01 | Stop reason: HOSPADM

## 2025-01-01 RX ORDER — MIDODRINE HYDROCHLORIDE 5 MG/1
5 TABLET ORAL 3 TIMES DAILY
COMMUNITY
Start: 2025-01-01

## 2025-01-01 RX ORDER — FENTANYL CITRATE 50 UG/ML
INJECTION, SOLUTION INTRAMUSCULAR; INTRAVENOUS
Status: DISPENSED
Start: 2025-01-01 | End: 2025-01-01

## 2025-01-01 RX ORDER — MIDAZOLAM HYDROCHLORIDE 2 MG/2ML
INJECTION, SOLUTION INTRAMUSCULAR; INTRAVENOUS
Status: COMPLETED
Start: 2025-01-01 | End: 2025-01-01

## 2025-01-01 RX ORDER — ATORVASTATIN CALCIUM 10 MG/1
20 TABLET, FILM COATED ORAL NIGHTLY
Status: DISCONTINUED | OUTPATIENT
Start: 2025-01-01 | End: 2025-01-01 | Stop reason: HOSPADM

## 2025-01-01 RX ORDER — GLYCOPYRROLATE 0.2 MG/ML
0.2 INJECTION INTRAMUSCULAR; INTRAVENOUS EVERY 6 HOURS PRN
Status: DISCONTINUED | OUTPATIENT
Start: 2025-01-01 | End: 2025-01-01 | Stop reason: HOSPADM

## 2025-01-01 RX ORDER — MAGNESIUM SULFATE HEPTAHYDRATE 40 MG/ML
2 INJECTION, SOLUTION INTRAVENOUS ONCE
Status: DISCONTINUED | OUTPATIENT
Start: 2025-01-01 | End: 2025-01-01 | Stop reason: HOSPADM

## 2025-01-01 RX ORDER — FENTANYL CITRATE 50 UG/ML
25 INJECTION, SOLUTION INTRAMUSCULAR; INTRAVENOUS ONCE
Refills: 0 | Status: DISCONTINUED | OUTPATIENT
Start: 2025-01-01 | End: 2025-01-01

## 2025-01-01 RX ORDER — CEFEPIME HYDROCHLORIDE 1 G/1
1 INJECTION, POWDER, FOR SOLUTION INTRAMUSCULAR; INTRAVENOUS
Status: DISCONTINUED | OUTPATIENT
Start: 2025-01-01 | End: 2025-01-01

## 2025-01-01 RX ORDER — MAGNESIUM SULFATE HEPTAHYDRATE 40 MG/ML
2 INJECTION, SOLUTION INTRAVENOUS
Status: DISCONTINUED | OUTPATIENT
Start: 2025-01-01 | End: 2025-01-01

## 2025-01-01 RX ORDER — CEFEPIME HYDROCHLORIDE 2 G/1
2 INJECTION, POWDER, FOR SOLUTION INTRAVENOUS
Status: DISCONTINUED | OUTPATIENT
Start: 2025-01-01 | End: 2025-01-01

## 2025-01-01 RX ORDER — MAGNESIUM SULFATE HEPTAHYDRATE 40 MG/ML
2 INJECTION, SOLUTION INTRAVENOUS ONCE
Status: COMPLETED | OUTPATIENT
Start: 2025-01-01 | End: 2025-01-01

## 2025-01-01 RX ORDER — SODIUM CHLORIDE 9 MG/ML
INJECTION, SOLUTION INTRAVENOUS CONTINUOUS
Status: ACTIVE | OUTPATIENT
Start: 2025-01-01 | End: 2025-01-01

## 2025-01-01 RX ORDER — FENTANYL CITRATE-0.9 % NACL/PF 10 MCG/ML
0-250 PLASTIC BAG, INJECTION (ML) INTRAVENOUS CONTINUOUS
Refills: 0 | Status: DISCONTINUED | OUTPATIENT
Start: 2025-01-01 | End: 2025-01-01 | Stop reason: HOSPADM

## 2025-01-01 RX ORDER — SEVELAMER CARBONATE 800 MG/1
800 TABLET, FILM COATED ORAL
Status: DISCONTINUED | OUTPATIENT
Start: 2025-01-01 | End: 2025-01-01

## 2025-01-01 RX ORDER — POTASSIUM CHLORIDE 14.9 MG/ML
20 INJECTION INTRAVENOUS
Status: COMPLETED | OUTPATIENT
Start: 2025-01-01 | End: 2025-01-01

## 2025-01-01 RX ORDER — NOREPINEPHRINE BITARTRATE/D5W 8 MG/250ML
0-3 PLASTIC BAG, INJECTION (ML) INTRAVENOUS CONTINUOUS
Status: DISCONTINUED | OUTPATIENT
Start: 2025-01-01 | End: 2025-01-01

## 2025-01-01 RX ORDER — LORAZEPAM 2 MG/ML
INJECTION INTRAMUSCULAR
Status: COMPLETED
Start: 2025-01-01 | End: 2025-01-01

## 2025-01-01 RX ORDER — SODIUM BICARBONATE 650 MG/1
650 TABLET ORAL 4 TIMES DAILY
COMMUNITY

## 2025-01-01 RX ORDER — ACETAMINOPHEN 325 MG/1
650 TABLET ORAL EVERY 4 HOURS PRN
Status: DISCONTINUED | OUTPATIENT
Start: 2025-01-01 | End: 2025-01-01 | Stop reason: HOSPADM

## 2025-01-01 RX ORDER — MORPHINE SULFATE 4 MG/ML
1 INJECTION, SOLUTION INTRAMUSCULAR; INTRAVENOUS EVERY 6 HOURS PRN
Status: DISCONTINUED | OUTPATIENT
Start: 2025-01-01 | End: 2025-01-01

## 2025-01-01 RX ORDER — ETOMIDATE 2 MG/ML
INJECTION INTRAVENOUS CODE/TRAUMA/SEDATION MEDICATION
Status: DISCONTINUED | OUTPATIENT
Start: 2025-01-01 | End: 2025-01-01 | Stop reason: HOSPADM

## 2025-01-01 RX ORDER — HYDROMORPHONE HYDROCHLORIDE 2 MG/ML
0.2 INJECTION, SOLUTION INTRAMUSCULAR; INTRAVENOUS; SUBCUTANEOUS EVERY 6 HOURS PRN
Status: DISCONTINUED | OUTPATIENT
Start: 2025-01-01 | End: 2025-01-01 | Stop reason: HOSPADM

## 2025-01-01 RX ORDER — DEXMEDETOMIDINE HYDROCHLORIDE 4 UG/ML
0-1.4 INJECTION, SOLUTION INTRAVENOUS CONTINUOUS
Status: DISCONTINUED | OUTPATIENT
Start: 2025-01-01 | End: 2025-01-01 | Stop reason: HOSPADM

## 2025-01-01 RX ORDER — HEPARIN SODIUM 5000 [USP'U]/ML
5000 INJECTION, SOLUTION INTRAVENOUS; SUBCUTANEOUS EVERY 12 HOURS
Status: DISCONTINUED | OUTPATIENT
Start: 2025-01-01 | End: 2025-01-01 | Stop reason: HOSPADM

## 2025-01-01 RX ORDER — MICONAZOLE NITRATE 2 G/100G
POWDER TOPICAL 2 TIMES DAILY
Status: DISCONTINUED | OUTPATIENT
Start: 2025-01-01 | End: 2025-01-01 | Stop reason: HOSPADM

## 2025-01-01 RX ORDER — ONDANSETRON HYDROCHLORIDE 2 MG/ML
4 INJECTION, SOLUTION INTRAVENOUS EVERY 8 HOURS PRN
Status: DISCONTINUED | OUTPATIENT
Start: 2025-01-01 | End: 2025-01-01 | Stop reason: HOSPADM

## 2025-01-01 RX ORDER — CEFEPIME HYDROCHLORIDE 2 G/1
1 INJECTION, POWDER, FOR SOLUTION INTRAVENOUS
Status: COMPLETED | OUTPATIENT
Start: 2025-01-01 | End: 2025-01-01

## 2025-01-01 RX ORDER — ROCURONIUM BROMIDE 10 MG/ML
INJECTION, SOLUTION INTRAVENOUS CODE/TRAUMA/SEDATION MEDICATION
Status: DISCONTINUED | OUTPATIENT
Start: 2025-01-01 | End: 2025-01-01 | Stop reason: HOSPADM

## 2025-01-01 RX ORDER — MEROPENEM 1 G/1
1 INJECTION, POWDER, FOR SOLUTION INTRAVENOUS
Status: DISCONTINUED | OUTPATIENT
Start: 2025-01-01 | End: 2025-01-01 | Stop reason: HOSPADM

## 2025-01-01 RX ORDER — SODIUM CHLORIDE 0.9 % (FLUSH) 0.9 %
10 SYRINGE (ML) INJECTION
Status: DISCONTINUED | OUTPATIENT
Start: 2025-01-01 | End: 2025-01-01 | Stop reason: HOSPADM

## 2025-01-01 RX ADMIN — NOREPINEPHRINE BITARTRATE 0.2 MCG/KG/MIN: 8 INJECTION, SOLUTION INTRAVENOUS at 12:03

## 2025-01-01 RX ADMIN — PHENYLEPHRINE HYDROCHLORIDE 5 MCG/KG/MIN: 10 INJECTION INTRAVENOUS at 04:03

## 2025-01-01 RX ADMIN — PHENYLEPHRINE HYDROCHLORIDE 0.5 MCG/KG/MIN: 10 INJECTION INTRAVENOUS at 05:03

## 2025-01-01 RX ADMIN — SODIUM CHLORIDE 25 MCG/HR: 9 INJECTION, SOLUTION INTRAVENOUS at 06:03

## 2025-01-01 RX ADMIN — NOREPINEPHRINE BITARTRATE 0.26 MCG/KG/MIN: 1 INJECTION, SOLUTION, CONCENTRATE INTRAVENOUS at 08:03

## 2025-01-01 RX ADMIN — VASOPRESSIN 0.04 UNITS/MIN: 20 INJECTION INTRAVENOUS at 10:03

## 2025-01-01 RX ADMIN — HYDROMORPHONE HYDROCHLORIDE 0.2 MG: 2 INJECTION INTRAMUSCULAR; INTRAVENOUS; SUBCUTANEOUS at 03:03

## 2025-01-01 RX ADMIN — MIDAZOLAM HYDROCHLORIDE 2 MG: 2 INJECTION, SOLUTION INTRAMUSCULAR; INTRAVENOUS at 02:03

## 2025-01-01 RX ADMIN — MIDAZOLAM HYDROCHLORIDE 2 MG: 1 INJECTION, SOLUTION INTRAMUSCULAR; INTRAVENOUS at 02:03

## 2025-01-01 RX ADMIN — POTASSIUM CHLORIDE 20 MEQ: 14.9 INJECTION, SOLUTION INTRAVENOUS at 10:03

## 2025-01-01 RX ADMIN — MICONAZOLE NITRATE: 20 POWDER TOPICAL at 09:03

## 2025-01-01 RX ADMIN — LORAZEPAM 1 MG: 2 INJECTION INTRAMUSCULAR; INTRAVENOUS at 06:03

## 2025-01-01 RX ADMIN — MEROPENEM 1 G: 1 INJECTION, POWDER, FOR SOLUTION INTRAVENOUS at 01:03

## 2025-01-01 RX ADMIN — NOREPINEPHRINE BITARTRATE 0.02 MCG/KG/MIN: 8 INJECTION, SOLUTION INTRAVENOUS at 06:03

## 2025-01-01 RX ADMIN — HEPARIN SODIUM 5000 UNITS: 5000 INJECTION, SOLUTION INTRAVENOUS; SUBCUTANEOUS at 09:03

## 2025-01-01 RX ADMIN — NOREPINEPHRINE BITARTRATE 1.2 MCG/KG/MIN: 1 INJECTION, SOLUTION, CONCENTRATE INTRAVENOUS at 12:03

## 2025-01-01 RX ADMIN — OXYCODONE HYDROCHLORIDE AND ACETAMINOPHEN 1 TABLET: 5; 325 TABLET ORAL at 04:03

## 2025-01-01 RX ADMIN — CALCIUM GLUCONATE 1 G: 20 INJECTION, SOLUTION INTRAVENOUS at 06:03

## 2025-01-01 RX ADMIN — FENTANYL CITRATE 50 MCG: 50 INJECTION, SOLUTION INTRAMUSCULAR; INTRAVENOUS at 08:03

## 2025-01-01 RX ADMIN — PROPOFOL: 10 INJECTION, EMULSION INTRAVENOUS at 10:03

## 2025-01-01 RX ADMIN — VASOPRESSIN 0.1 UNITS/MIN: 20 INJECTION INTRAVENOUS at 11:03

## 2025-01-01 RX ADMIN — VASOPRESSIN 0.1 UNITS/MIN: 20 INJECTION INTRAVENOUS at 06:03

## 2025-01-01 RX ADMIN — CEFEPIME 1 G: 1 INJECTION, POWDER, FOR SOLUTION INTRAMUSCULAR; INTRAVENOUS at 08:03

## 2025-01-01 RX ADMIN — CALCIUM GLUCONATE 1 G: 20 INJECTION, SOLUTION INTRAVENOUS at 05:03

## 2025-01-01 RX ADMIN — MICONAZOLE NITRATE: 20 POWDER TOPICAL at 12:03

## 2025-01-01 RX ADMIN — MUPIROCIN: 20 OINTMENT TOPICAL at 08:03

## 2025-01-01 RX ADMIN — ROCURONIUM BROMIDE 100 MG: 10 INJECTION, SOLUTION INTRAVENOUS at 08:03

## 2025-01-01 RX ADMIN — CEFEPIME 1 G: 2 INJECTION, POWDER, FOR SOLUTION INTRAVENOUS at 03:03

## 2025-01-01 RX ADMIN — VANCOMYCIN HYDROCHLORIDE 500 MG: 500 INJECTION, POWDER, LYOPHILIZED, FOR SOLUTION INTRAVENOUS at 08:03

## 2025-01-01 RX ADMIN — SODIUM CHLORIDE 1000 ML: 9 INJECTION, SOLUTION INTRAVENOUS at 02:03

## 2025-01-01 RX ADMIN — SODIUM BICARBONATE: 84 INJECTION, SOLUTION INTRAVENOUS at 06:03

## 2025-01-01 RX ADMIN — DEXTROSE MONOHYDRATE 25 G: 25 INJECTION, SOLUTION INTRAVENOUS at 03:03

## 2025-01-01 RX ADMIN — SODIUM BICARBONATE 100 MEQ: 84 INJECTION INTRAVENOUS at 04:03

## 2025-01-01 RX ADMIN — DEXMEDETOMIDINE HYDROCHLORIDE 0.2 MCG/KG/HR: 400 INJECTION INTRAVENOUS at 02:03

## 2025-01-01 RX ADMIN — PHENYLEPHRINE HYDROCHLORIDE 3 MCG/KG/MIN: 10 INJECTION INTRAVENOUS at 11:03

## 2025-01-01 RX ADMIN — MAGNESIUM SULFATE HEPTAHYDRATE 2 G: 40 INJECTION, SOLUTION INTRAVENOUS at 04:03

## 2025-01-01 RX ADMIN — MUPIROCIN: 20 OINTMENT TOPICAL at 09:03

## 2025-01-01 RX ADMIN — NOREPINEPHRINE BITARTRATE 0.04 MCG/KG/MIN: 1 INJECTION, SOLUTION, CONCENTRATE INTRAVENOUS at 01:03

## 2025-01-01 RX ADMIN — ETOMIDATE 20 MG: 2 INJECTION INTRAVENOUS at 08:03

## 2025-01-01 RX ADMIN — PANTOPRAZOLE SODIUM 80 MG: 40 INJECTION, POWDER, LYOPHILIZED, FOR SOLUTION INTRAVENOUS at 02:03

## 2025-01-01 RX ADMIN — CEFTRIAXONE SODIUM 2 G: 2 INJECTION, POWDER, FOR SOLUTION INTRAMUSCULAR; INTRAVENOUS at 12:03

## 2025-01-01 RX ADMIN — VANCOMYCIN HYDROCHLORIDE 1000 MG: 1 INJECTION, POWDER, LYOPHILIZED, FOR SOLUTION INTRAVENOUS at 06:03

## 2025-01-01 RX ADMIN — MICONAZOLE NITRATE: 20 POWDER TOPICAL at 08:03

## 2025-01-01 RX ADMIN — DEXMEDETOMIDINE HYDROCHLORIDE 0.2 MCG/KG/HR: 400 INJECTION INTRAVENOUS at 05:03

## 2025-01-01 RX ADMIN — NOREPINEPHRINE BITARTRATE 0.12 MCG/KG/MIN: 8 INJECTION, SOLUTION INTRAVENOUS at 09:03

## 2025-01-01 RX ADMIN — SODIUM CHLORIDE, POTASSIUM CHLORIDE, SODIUM LACTATE AND CALCIUM CHLORIDE 1000 ML: 600; 310; 30; 20 INJECTION, SOLUTION INTRAVENOUS at 02:03

## 2025-01-01 RX ADMIN — ATORVASTATIN CALCIUM 20 MG: 10 TABLET, FILM COATED ORAL at 08:03

## 2025-01-01 RX ADMIN — NOREPINEPHRINE BITARTRATE 2.4 MCG/KG/MIN: 1 INJECTION, SOLUTION, CONCENTRATE INTRAVENOUS at 03:03

## 2025-01-01 RX ADMIN — DEXTROSE MONOHYDRATE 50 G: 25 INJECTION, SOLUTION INTRAVENOUS at 07:03

## 2025-01-01 RX ADMIN — CALCIUM GLUCONATE 1 G: 20 INJECTION, SOLUTION INTRAVENOUS at 01:03

## 2025-01-01 RX ADMIN — SODIUM CHLORIDE, POTASSIUM CHLORIDE, SODIUM LACTATE AND CALCIUM CHLORIDE 1000 ML: 600; 310; 30; 20 INJECTION, SOLUTION INTRAVENOUS at 04:03

## 2025-01-01 RX ADMIN — HYDROMORPHONE HYDROCHLORIDE 0.2 MG: 2 INJECTION INTRAMUSCULAR; INTRAVENOUS; SUBCUTANEOUS at 09:03

## 2025-01-01 RX ADMIN — CEFEPIME 1 G: 1 INJECTION, POWDER, FOR SOLUTION INTRAMUSCULAR; INTRAVENOUS at 09:03

## 2025-01-01 RX ADMIN — SODIUM CHLORIDE 1000 ML: 9 INJECTION, SOLUTION INTRAVENOUS at 05:03

## 2025-01-01 RX ADMIN — HEPARIN SODIUM 5000 UNITS: 5000 INJECTION, SOLUTION INTRAVENOUS; SUBCUTANEOUS at 01:03

## 2025-01-01 RX ADMIN — SODIUM BICARBONATE: 84 INJECTION, SOLUTION INTRAVENOUS at 05:03

## 2025-01-01 RX ADMIN — HYDROCORTISONE SODIUM SUCCINATE 100 MG: 100 INJECTION, POWDER, FOR SOLUTION INTRAMUSCULAR; INTRAVENOUS at 09:03

## 2025-01-01 RX ADMIN — SODIUM BICARBONATE 100 MEQ: 84 INJECTION INTRAVENOUS at 05:03

## 2025-01-01 RX ADMIN — NOREPINEPHRINE BITARTRATE 0.6 MCG/KG/MIN: 1 INJECTION, SOLUTION, CONCENTRATE INTRAVENOUS at 05:03

## 2025-01-01 RX ADMIN — NOREPINEPHRINE BITARTRATE 0.08 MCG/KG/MIN: 8 INJECTION, SOLUTION INTRAVENOUS at 12:03

## 2025-01-01 RX ADMIN — FOLIC ACID 1 MG: 1 TABLET ORAL at 02:03

## 2025-01-01 RX ADMIN — LIDOCAINE HYDROCHLORIDE: 10 INJECTION, SOLUTION INFILTRATION; PERINEURAL at 01:03

## 2025-01-01 RX ADMIN — NOREPINEPHRINE BITARTRATE 0.05 MCG/KG/MIN: 8 INJECTION, SOLUTION INTRAVENOUS at 06:03

## 2025-01-01 RX ADMIN — IOHEXOL 63 ML: 350 INJECTION, SOLUTION INTRAVENOUS at 09:03

## 2025-01-01 RX ADMIN — ATORVASTATIN CALCIUM 20 MG: 10 TABLET, FILM COATED ORAL at 09:03

## 2025-01-01 RX ADMIN — HYDROMORPHONE HYDROCHLORIDE 0.2 MG: 2 INJECTION INTRAMUSCULAR; INTRAVENOUS; SUBCUTANEOUS at 08:03

## 2025-01-01 RX ADMIN — NOREPINEPHRINE BITARTRATE 3 MCG/KG/MIN: 1 INJECTION, SOLUTION, CONCENTRATE INTRAVENOUS at 04:03

## 2025-01-01 RX ADMIN — NOREPINEPHRINE BITARTRATE 0.2 MCG/KG/MIN: 8 INJECTION, SOLUTION INTRAVENOUS at 05:03

## 2025-01-01 RX ADMIN — IOHEXOL 100 ML: 350 INJECTION, SOLUTION INTRAVENOUS at 08:03

## 2025-01-01 RX ADMIN — NOREPINEPHRINE BITARTRATE 0.18 MCG/KG/MIN: 8 INJECTION, SOLUTION INTRAVENOUS at 06:03

## 2025-01-01 RX ADMIN — VASOPRESSIN 0.1 UNITS/MIN: 20 INJECTION INTRAVENOUS at 03:03

## 2025-01-01 RX ADMIN — SODIUM CHLORIDE, POTASSIUM CHLORIDE, SODIUM LACTATE AND CALCIUM CHLORIDE 500 ML: 600; 310; 30; 20 INJECTION, SOLUTION INTRAVENOUS at 03:03

## 2025-01-01 RX ADMIN — VANCOMYCIN HYDROCHLORIDE 1000 MG: 1 INJECTION, POWDER, LYOPHILIZED, FOR SOLUTION INTRAVENOUS at 07:03

## 2025-01-01 RX ADMIN — MUPIROCIN: 20 OINTMENT TOPICAL at 01:03

## 2025-01-01 RX ADMIN — PROPOFOL 1000 MG: 10 INJECTION, EMULSION INTRAVENOUS at 08:03

## 2025-01-01 RX ADMIN — POTASSIUM PHOSPHATE, MONOBASIC AND POTASSIUM PHOSPHATE, DIBASIC 20 MMOL: 224; 236 INJECTION, SOLUTION, CONCENTRATE INTRAVENOUS at 07:03

## 2025-01-01 RX ADMIN — FLUMAZENIL 0.2 MG: 0.1 INJECTION, SOLUTION INTRAVENOUS at 08:03

## 2025-01-01 RX ADMIN — NOREPINEPHRINE BITARTRATE 0.4 MCG/KG/MIN: 8 INJECTION, SOLUTION INTRAVENOUS at 07:03

## 2025-01-01 RX ADMIN — POTASSIUM CHLORIDE 20 MEQ: 14.9 INJECTION, SOLUTION INTRAVENOUS at 08:03

## 2025-01-01 RX ADMIN — CALCIUM GLUCONATE 1 G: 20 INJECTION, SOLUTION INTRAVENOUS at 07:03

## 2025-01-01 RX ADMIN — HYDROCORTISONE SODIUM SUCCINATE 100 MG: 100 INJECTION, POWDER, FOR SOLUTION INTRAMUSCULAR; INTRAVENOUS at 04:03

## 2025-01-01 RX ADMIN — NOREPINEPHRINE BITARTRATE 0.42 MCG/KG/MIN: 8 INJECTION, SOLUTION INTRAVENOUS at 10:03

## 2025-01-01 RX ADMIN — PHENYLEPHRINE HYDROCHLORIDE 2 MCG/KG/MIN: 10 INJECTION INTRAVENOUS at 07:03

## 2025-01-01 RX ADMIN — DEXMEDETOMIDINE HYDROCHLORIDE 1 MCG/KG/HR: 400 INJECTION INTRAVENOUS at 11:03

## 2025-01-01 RX ADMIN — MICONAZOLE NITRATE: 20 POWDER TOPICAL at 01:03

## 2025-01-01 RX ADMIN — SODIUM BICARBONATE 100 MEQ: 84 INJECTION INTRAVENOUS at 12:03

## 2025-01-01 RX ADMIN — FOLIC ACID 1 MG: 1 TABLET ORAL at 08:03

## 2025-01-01 RX ADMIN — HYDROMORPHONE HYDROCHLORIDE 0.2 MG: 2 INJECTION INTRAMUSCULAR; INTRAVENOUS; SUBCUTANEOUS at 12:03

## 2025-01-01 RX ADMIN — SODIUM CHLORIDE 1000 ML: 9 INJECTION, SOLUTION INTRAVENOUS at 08:03

## 2025-01-01 RX ADMIN — VASOPRESSIN 0.1 UNITS/MIN: 20 INJECTION INTRAVENOUS at 05:03

## 2025-01-01 RX ADMIN — CALCIUM GLUCONATE 1 G: 20 INJECTION, SOLUTION INTRAVENOUS at 04:03

## 2025-01-01 RX ADMIN — Medication 100 MEQ: at 04:03

## 2025-01-01 RX ADMIN — HEPARIN SODIUM 5000 UNITS: 5000 INJECTION, SOLUTION INTRAVENOUS; SUBCUTANEOUS at 08:03

## 2025-01-01 RX ADMIN — HYDROCORTISONE SODIUM SUCCINATE 100 MG: 100 INJECTION, POWDER, FOR SOLUTION INTRAMUSCULAR; INTRAVENOUS at 05:03

## 2025-01-08 ENCOUNTER — TELEPHONE (OUTPATIENT)
Dept: CARDIOLOGY | Facility: HOSPITAL | Age: 70
End: 2025-01-08
Payer: MEDICARE

## 2025-01-08 NOTE — TELEPHONE ENCOUNTER
HD center called the office to report catheter dysfunction. Most recently, they had to administer Cathflo yesterday and despite this, still had to run her on a 175 BFR. Called patient to schedule and the earliest she could come is Monday. Scheduled her for then and written instructions sent to center. Will draw labs prior to procedure.     Of note, she has no permanent access creation plans at the moment. She was just recently diagnosed with ESRD from [facility]. Also, she was just diagnosed with bladder cancer as well. -ZT

## 2025-01-13 ENCOUNTER — HOSPITAL ENCOUNTER (OUTPATIENT)
Facility: HOSPITAL | Age: 70
Discharge: HOME | End: 2025-01-13
Attending: STUDENT IN AN ORGANIZED HEALTH CARE EDUCATION/TRAINING PROGRAM | Admitting: STUDENT IN AN ORGANIZED HEALTH CARE EDUCATION/TRAINING PROGRAM
Payer: MEDICARE

## 2025-01-13 VITALS
WEIGHT: 229.25 LBS | HEART RATE: 74 BPM | TEMPERATURE: 98 F | RESPIRATION RATE: 18 BRPM | HEIGHT: 62 IN | OXYGEN SATURATION: 100 % | SYSTOLIC BLOOD PRESSURE: 125 MMHG | DIASTOLIC BLOOD PRESSURE: 54 MMHG | BODY MASS INDEX: 42.19 KG/M2

## 2025-01-13 DIAGNOSIS — T82.41XA MECHANICAL BREAKDOWN OF VASCULAR DIALYSIS CATHETER, INITIAL ENCOUNTER: Primary | ICD-10-CM

## 2025-01-13 LAB
ANION GAP SERPL CALC-SCNC: 21 MEQ/L
BUN SERPL-MCNC: 61.4 MG/DL (ref 9.8–20.1)
CALCIUM SERPL-MCNC: 8.6 MG/DL (ref 8.4–10.2)
CHLORIDE SERPL-SCNC: 106 MMOL/L (ref 98–107)
CO2 SERPL-SCNC: 8 MMOL/L (ref 23–31)
CREAT SERPL-MCNC: 8.27 MG/DL (ref 0.55–1.02)
CREAT/UREA NIT SERPL: 7
GFR SERPLBLD CREATININE-BSD FMLA CKD-EPI: 5 ML/MIN/1.73/M2
GLUCOSE SERPL-MCNC: 74 MG/DL (ref 82–115)
HBV SURFACE AG SERPL QL IA: NONREACTIVE
POTASSIUM SERPL-SCNC: 5.6 MMOL/L (ref 3.5–5.1)
SODIUM SERPL-SCNC: 135 MMOL/L (ref 136–145)

## 2025-01-13 PROCEDURE — 77001 FLUOROGUIDE FOR VEIN DEVICE: CPT | Performed by: STUDENT IN AN ORGANIZED HEALTH CARE EDUCATION/TRAINING PROGRAM

## 2025-01-13 PROCEDURE — 99152 MOD SED SAME PHYS/QHP 5/>YRS: CPT | Mod: ,,, | Performed by: STUDENT IN AN ORGANIZED HEALTH CARE EDUCATION/TRAINING PROGRAM

## 2025-01-13 PROCEDURE — 25500020 PHARM REV CODE 255: Performed by: STUDENT IN AN ORGANIZED HEALTH CARE EDUCATION/TRAINING PROGRAM

## 2025-01-13 PROCEDURE — 36415 COLL VENOUS BLD VENIPUNCTURE: CPT | Performed by: STUDENT IN AN ORGANIZED HEALTH CARE EDUCATION/TRAINING PROGRAM

## 2025-01-13 PROCEDURE — 63600175 PHARM REV CODE 636 W HCPCS: Performed by: STUDENT IN AN ORGANIZED HEALTH CARE EDUCATION/TRAINING PROGRAM

## 2025-01-13 PROCEDURE — 37799 UNLISTED PX VASCULAR SURGERY: CPT | Performed by: STUDENT IN AN ORGANIZED HEALTH CARE EDUCATION/TRAINING PROGRAM

## 2025-01-13 PROCEDURE — 36581 REPLACE TUNNELED CV CATH: CPT | Mod: LT,,, | Performed by: STUDENT IN AN ORGANIZED HEALTH CARE EDUCATION/TRAINING PROGRAM

## 2025-01-13 PROCEDURE — 87340 HEPATITIS B SURFACE AG IA: CPT | Performed by: STUDENT IN AN ORGANIZED HEALTH CARE EDUCATION/TRAINING PROGRAM

## 2025-01-13 PROCEDURE — 77001 FLUOROGUIDE FOR VEIN DEVICE: CPT | Mod: 26,,, | Performed by: STUDENT IN AN ORGANIZED HEALTH CARE EDUCATION/TRAINING PROGRAM

## 2025-01-13 PROCEDURE — C1769 GUIDE WIRE: HCPCS | Performed by: STUDENT IN AN ORGANIZED HEALTH CARE EDUCATION/TRAINING PROGRAM

## 2025-01-13 PROCEDURE — 25000003 PHARM REV CODE 250: Performed by: INTERNAL MEDICINE

## 2025-01-13 PROCEDURE — 25000003 PHARM REV CODE 250: Performed by: STUDENT IN AN ORGANIZED HEALTH CARE EDUCATION/TRAINING PROGRAM

## 2025-01-13 PROCEDURE — G0257 UNSCHED DIALYSIS ESRD PT HOS: HCPCS

## 2025-01-13 PROCEDURE — 90935 HEMODIALYSIS ONE EVALUATION: CPT

## 2025-01-13 PROCEDURE — C1725 CATH, TRANSLUMIN NON-LASER: HCPCS | Performed by: STUDENT IN AN ORGANIZED HEALTH CARE EDUCATION/TRAINING PROGRAM

## 2025-01-13 PROCEDURE — 80048 BASIC METABOLIC PNL TOTAL CA: CPT | Performed by: STUDENT IN AN ORGANIZED HEALTH CARE EDUCATION/TRAINING PROGRAM

## 2025-01-13 PROCEDURE — C1750 CATH, HEMODIALYSIS,LONG-TERM: HCPCS | Performed by: STUDENT IN AN ORGANIZED HEALTH CARE EDUCATION/TRAINING PROGRAM

## 2025-01-13 PROCEDURE — 36581 REPLACE TUNNELED CV CATH: CPT | Mod: LT | Performed by: STUDENT IN AN ORGANIZED HEALTH CARE EDUCATION/TRAINING PROGRAM

## 2025-01-13 PROCEDURE — 99152 MOD SED SAME PHYS/QHP 5/>YRS: CPT | Performed by: STUDENT IN AN ORGANIZED HEALTH CARE EDUCATION/TRAINING PROGRAM

## 2025-01-13 PROCEDURE — 37799 UNLISTED PX VASCULAR SURGERY: CPT | Mod: ,,, | Performed by: STUDENT IN AN ORGANIZED HEALTH CARE EDUCATION/TRAINING PROGRAM

## 2025-01-13 PROCEDURE — 99213 OFFICE O/P EST LOW 20 MIN: CPT | Mod: 25,,, | Performed by: STUDENT IN AN ORGANIZED HEALTH CARE EDUCATION/TRAINING PROGRAM

## 2025-01-13 DEVICE — GLIDEPATH HEMODIALYSIS CATH, ST, DL, 14.5 FR. 23CM
Type: IMPLANTABLE DEVICE | Site: CHEST  WALL | Status: FUNCTIONAL
Brand: GLIDEPATH LONG-TERM HEMODIALYSIS CATHETER WITH PRELOADED STYLET

## 2025-01-13 RX ORDER — IOPAMIDOL 755 MG/ML
INJECTION, SOLUTION INTRAVASCULAR
Status: DISCONTINUED | OUTPATIENT
Start: 2025-01-13 | End: 2025-01-13 | Stop reason: HOSPADM

## 2025-01-13 RX ORDER — LABETALOL 200 MG/1
200 TABLET, FILM COATED ORAL 2 TIMES DAILY
COMMUNITY

## 2025-01-13 RX ORDER — LIDOCAINE HYDROCHLORIDE 10 MG/ML
INJECTION, SOLUTION INFILTRATION; PERINEURAL
Status: DISCONTINUED | OUTPATIENT
Start: 2025-01-13 | End: 2025-01-13 | Stop reason: HOSPADM

## 2025-01-13 RX ORDER — LEVOCETIRIZINE DIHYDROCHLORIDE 5 MG/1
5 TABLET, FILM COATED ORAL NIGHTLY
COMMUNITY

## 2025-01-13 RX ORDER — FENTANYL CITRATE 50 UG/ML
INJECTION, SOLUTION INTRAMUSCULAR; INTRAVENOUS
Status: DISCONTINUED | OUTPATIENT
Start: 2025-01-13 | End: 2025-01-13 | Stop reason: HOSPADM

## 2025-01-13 RX ORDER — OXYCODONE AND ACETAMINOPHEN 5; 325 MG/1; MG/1
1 TABLET ORAL
Status: DISCONTINUED | OUTPATIENT
Start: 2025-01-13 | End: 2025-01-13 | Stop reason: HOSPADM

## 2025-01-13 RX ORDER — ACETAMINOPHEN 500 MG
1000 TABLET ORAL ONCE
Status: COMPLETED | OUTPATIENT
Start: 2025-01-13 | End: 2025-01-13

## 2025-01-13 RX ORDER — SODIUM CHLORIDE 0.9 % (FLUSH) 0.9 %
10 SYRINGE (ML) INJECTION
Status: DISCONTINUED | OUTPATIENT
Start: 2025-01-13 | End: 2025-01-13 | Stop reason: HOSPADM

## 2025-01-13 RX ORDER — OXYCODONE AND ACETAMINOPHEN 5; 325 MG/1; MG/1
1 TABLET ORAL
Qty: 1 TABLET | Refills: 0 | Status: SHIPPED | OUTPATIENT
Start: 2025-01-13

## 2025-01-13 RX ORDER — MIDAZOLAM HYDROCHLORIDE 1 MG/ML
INJECTION INTRAMUSCULAR; INTRAVENOUS
Status: DISCONTINUED | OUTPATIENT
Start: 2025-01-13 | End: 2025-01-13 | Stop reason: HOSPADM

## 2025-01-13 RX ADMIN — ACETAMINOPHEN 1000 MG: 500 TABLET ORAL at 02:01

## 2025-01-13 RX ADMIN — OXYCODONE HYDROCHLORIDE AND ACETAMINOPHEN 1 TABLET: 5; 325 TABLET ORAL at 08:01

## 2025-01-13 NOTE — H&P
INTERVENTIONAL NEPHROLOGY HISTORY & PHYSICAL       Patient Name: Fior Joya  HARINDER 1955    Date: 2025  Time: 8:10 AM         HPI: 69 y.o. female with ESRD on HD via left internal jugular (LIJ)/left chest wall (LCW) tunneled dialysis catheter (TDC) who presents with dysfunctional catheter. Pt had not problems with the catheter until recently. Pt is being prepared for tunneled dialysis catheter exchange today.    Pt seen and examined at bedside in [facility] this AM. Family is present. Risks and benefits of tunneled dialysis catheter exchange and intravenous conscious sedation was reviewed with the patient. The patient agrees to proceed with the intended procedure. Consents for both intravenous conscious sedation and procedure were signed and placed within the chart.     Pt complains of severe right hip/side pain.       Review of Systems:  General:  No fatigue  Skin: No rashes  HEENT: No vision changes  CVS: No CP  RS: No SOB  GIT: No abdominal pain  Extremities: R hip pain.  Neurological:  No focal weakness  Psych: No depression    No past medical history on file.   Past Surgical History:   Procedure Laterality Date    CYSTOSCOPY W/ URETERAL STENT PLACEMENT Right 2024    Procedure: CYSTOSCOPY, WITH URETERAL STENT INSERTION;  Surgeon: Otis Person MD;  Location: [facility];  Service: Urology;  Laterality: Right;  CYSTOSCOPY, BILATERAL RETROGRADE PYELOGRAMS, POSSIBLE STENT PLACEMENT.    CYSTOSCOPY W/ URETERAL STENT PLACEMENT Bilateral 10/4/2024    Procedure: CYSTOSCOPY, WITH URETERAL STENT INSERTION;  Surgeon: Tawana Loaiza MD;  Location: [facility] OR;  Service: Urology;  Laterality: Bilateral;  CYSTO WITH BILAT URETERAL STENT EXCHANGE    INSERTION OF TUNNELED CENTRAL VENOUS HEMODIALYSIS CATHETER N/A 7/3/2024    Procedure: Insertion, Catheter, Central Venous, Hemodialysis;  Surgeon: Uche Barcenas MD;  Location: [facility] CATH LAB;  Service: Peripheral Vascular;  Laterality: N/A;    INSERTION  OF TUNNELED CENTRAL VENOUS HEMODIALYSIS CATHETER N/A 10/24/2024    Procedure: Insertion, Catheter, Central Venous, Hemodialysis;  Surgeon: Nikolai Manzo DO;  Location: [facility] CATH LAB;  Service: Nephrology;  Laterality: N/A;      Review of patient's allergies indicates:   Allergen Reactions    Asa [aspirin] Anaphylaxis    Penicillins      Received ceftriaxone from 6/27, no reactions       Social History     Tobacco Use    Smoking status: Never    Smokeless tobacco: Never   Substance Use Topics    Alcohol use: Never    Drug use: Never      No family history on file.      Current Facility-Administered Medications:     sodium chloride 0.9% flush 10 mL, 10 mL, Intravenous, PRN, Nikolai Manzo DO    Vital Signs:  Temp:  [97.8 °F (36.6 °C)] 97.8 °F (36.6 °C)  Pulse:  [91] 91  SpO2:  [98 %] 98 %  BP: (128)/(71) 128/71     Physical Exam:  General: in pain.  HEENT: NC/AT, EOMI  CVS: RRR.  RS: breathing easily.  Abdominal: obese, soft, NT/ND.  Extremities: No edema b/l LE  Skin: No rash, no lesions.  Neurological: No focal deficits.  Psych: Normal affect  Dialysis Access: left internal jugular (LIJ)/left chest wall (LCW) tunneled dialysis catheter (TDC) without signs of bleeding/infection.    Results:    Lab Results   Component Value Date     (L) 10/28/2024    K 5.2 (H) 10/28/2024    CL 91 (L) 10/28/2024    CO2 24 10/28/2024    BUN 68.0 (H) 10/28/2024    CREATININE 6.86 (H) 10/28/2024     Lab Results   Component Value Date    WBC 10.26 10/28/2024    WBC 25.58 10/07/2024    HGB 7.5 (L) 10/28/2024     10/28/2024    MCV 94.1 (H) 10/28/2024       Assessment and Plan:      ESRD on HD via left internal jugular (LIJ)/left chest wall (LCW) tunneled dialysis catheter (TDC).  Mechanical complication of TDC.  Pt with ESRD on HD via left internal jugular (LIJ)/left chest wall (LCW) tunneled dialysis catheter (TDC) who presents today with poor catheter function. The pt is being prepared for tunneled dialysis catheter exchange  today.  - Consents obtained and placed within chart.  - Will proceed in cath lab setting today.  - Will treat pain with Percocet 5/325 x 1 PO pre-procedure.    Please feel free to reach me with any questions.    Nikolai Manzo,   Interventional Nephrology  Cell: [phone]

## 2025-01-13 NOTE — PLAN OF CARE
PT in no acute distress and vital signs stable; pt is complaining of hip and leg pain lasting x2 weeks.  At bedside with Pt son (Quirino [phone]) on speaker phone and charge nurse Tiffanie YO at bedside as well; pt states that she wants to be rolled to the ER and not picked up yet s/t to hip/leg pain.  I aggreed to bring pt to ER in her home wheelchair; IV removed; medication bag attached to wheelchair and d/c folder in pt lap- pt cell phone placed in pt medication bag- did use  to get pt into home wheelchair.  New HD cath looking great.  Pt wheeled to ER by myself and left in triage room - was next pt to be seen At 1710.      Pt did complete HD after cath placement - last HD being 1/9/25 before today.  Did not take any fluid off today.  Pt does still produce urine.

## 2025-01-13 NOTE — DISCHARGE SUMMARY
INTERVENTIONAL NEPHROLOGY DISCHARGE SUMMARY         Patient Name: Fior Joya   1955    Procedure Date: 2025      In brief, Ms. Joya underwent tunneled dialysis catheter exchange at the left chest wall location due to poor functioning catheter.  film revealed mildly displaced catheter tip (regressed to the SVC/left innominate vein junction). Angiogram revealed an associated fibrin sheath. The fibrin sheath was disrupted and a new tunneled catheter was placed.    I expect the catheter to function well for HD.    Pre-procedure labs revealed a mild hyperkalemia but severe metabolic acidosis. We will consult nephrology service for HD treatment before discharge.    Pre-Op Diagnosis: T82.4 Mechanical complication of vascular dialysis catheter, initial encounter, N18.6 End Stage Renal Disease (ESRD)  Post-Op Diagnosis: Same.    Discharge Instructions/Recommendations:  - Continue use of catheter for HD.  - Pt may be discharged after successful monitoring in post-op area.  - Pt may resume home medications.    Thank you for allowing me the opportunity in taking care of this patient. Please reach me with any questions.    Nikolai Manzo DO  Interventional Nephrology  Cell: [phone]

## 2025-01-13 NOTE — NURSING
01/13/25 1559   Post-Hemodialysis Assessment   Total UF (mL) 50 mL   Post-Hemodialysis Comments pt bp dec with uf. once uf held bp improved.. cvc fx well ad able to maintain ordered bfr.

## 2025-01-13 NOTE — PROCEDURES
INTERVENTIONAL NEPHROLOGY PROCEDURE NOTE:   TUNNELED DIALYSIS CATHETER (TDC) EXCHANGE /W FIBRIN SHEATH DISRUPTION       Patient Name: Fior Joya  HARINDER 1955    Procedure Date:    2025    Performing Physician:   Dr. Manzo    Access History: Pt is with ESRD requiring HD.    Pre-Op Diagnosis: T82.4 Mechanical complication of vascular dialysis catheter, initial encounter, N18.6 End Stage Renal Disease (ESRD).  Post-Op Diagnosis: Same    Procedure: Tunneled dialysis catheter exchange.    Indication: Nonfunctional/dysfunctional dialysis catheter.    Anatomical Site: left internal jugular (LIJ)/left chest wall (LCW)    Informed Consent:  The patient was evaluated in the pre-operative area with assessment including the American Society of Anesthesia risk classification. The procedure is discussed in detail including risks, benefits alternatives and options and the patient agrees to proceed. Informed consent was obtained from the patient.     Maximum sterile barrier technique: The patient was prepped and draped using chlorhexidine prep and maximum sterile barrier technique.    Sedation Note:  Risks and benefits of sedation were reviewed with the patient or surrogate, including bleeding, infection, nausea, vomiting, dizziness, instability, damage to a nerve, damage to a blood vessel, cellulitis, reaction to medications, amnesia, loss of consciousness, respiratory arrest, cardiac arrest.     The patient received the following medications: Versed 2 mg IV and Fentanyl 100 mcg IV; patient did remain alert, responsive, and conversational throughout the procedure. I was personally responsible for supervising the administration of moderate sedation services during the procedure performed and I affirm all the guidelines and requirements described in the CPT 2024 section on moderate sedation were followed, including the use of an independent trained observer who had no other duties during the procedure. Start  sedation time was 0931 and stop time was 0950. The total face-to-face time was 19 minutes.    Procedure Steps:   After the patient was placed on the imaging table, the patient's left internal jugular (LIJ)/left chest wall (LCW) was scrubbed and the patient was draped in the usual sterile fashion.    The old catheter was interrogated for function:  - The venous port functioned adequately.  - The arterial port functioned sluggishly.     fluoroscopy image revealed the old catheter tip was superiorly in the SVC.    A 150 cm glidewire was passed through the old catheter with it's position verified with fluoroscopy. The exit site/cuff was infiltrated with lidocaine. The cuff was freed with blunt dissection. The catheter was then partially retracted over the glidewire.    Angiogram showed fibrin sheath in the proximal superior vena cava.    At this time, the old catheter was removed over glidewire. The glidewire was carefully cleansed with betadine x 3 then saline x 3, then the  re-gloved.    Given the presence of a fibrin sheath, angioplasty was completed at  proximal superior vena cava. using a BD Ultraverse 12 mm x 20 mm balloon. Approximately 100% effacement was obtained at 5-10 GILMA and was held for 1-5 seconds. Post-angioplasty angiogram revealed adequate disruption of fibrin sheath.    The new permacath was then passed through the existing tunnel. Catheter was flushed easily with a 10 cc syringe. The placement of the tip (at the junction of the SVC/right atrium) and the curve of the catheter was confirmed with fluoroscopy. The cuff was placed approximately 1 1/2 centimeters inside the tunnel.    Catheter hub was sutured to the skin with silk. Each port of the TDC catheter was filled with heparin as appropriate for the catheter length. A pressure dressing was applied.    ASSESSMENT/PLAN:  - Successful fibrin sheath disruption at the SVC/left innominate vein.  - Successful exchange of tunneled dialysis  catheter: left internal jugular (LIJ)/left chest wall (LCW) 23 cm (cuff-to-tip) TDC (BD Glidepath).    EBL:5 cc    Complications: None    Post-op Instructions: The patient was given both verbal and written post-op instructions. If excessive bleeding at the site, they have been instructed to call their physician or proceed to a local emergency room.    Orders to the dialysis unit: OK to use tunneled dialysis catheter for dialysis.    Thank you for allowing me the opportunity in taking care of this patient. Please reach me with any questions.    Nikolai Manzo DO  Interventional Nephrology  Cell: [phone]

## 2025-01-13 NOTE — Clinical Note
A venogram was performed of the right upper arm. The venogram syringe was removed and the venogram is complete.

## 2025-01-15 ENCOUNTER — HOSPITAL ENCOUNTER (OUTPATIENT)
Dept: RADIOLOGY | Facility: HOSPITAL | Age: 70
Discharge: HOME | End: 2025-01-15
Attending: UROLOGY
Payer: MEDICARE

## 2025-01-15 DIAGNOSIS — C67.9 BLADDER CANCER: ICD-10-CM

## 2025-01-15 PROCEDURE — A9552 F18 FDG: HCPCS | Performed by: UROLOGY

## 2025-01-15 PROCEDURE — 78815 PET IMAGE W/CT SKULL-THIGH: CPT | Mod: TC

## 2025-01-15 RX ORDER — FLUDEOXYGLUCOSE F18 500 MCI/ML
10 INJECTION INTRAVENOUS
Status: COMPLETED | OUTPATIENT
Start: 2025-01-15 | End: 2025-01-15

## 2025-01-15 RX ADMIN — FLUDEOXYGLUCOSE F-18 10.5 MILLICURIE: 500 INJECTION INTRAVENOUS at 07:01

## 2025-01-31 ENCOUNTER — OFFICE VISIT (OUTPATIENT)
Dept: ORTHOPEDICS | Facility: CLINIC | Age: 70
End: 2025-01-31
Payer: MEDICARE

## 2025-01-31 VITALS — WEIGHT: 254 LBS | BODY MASS INDEX: 46.74 KG/M2 | HEIGHT: 62 IN

## 2025-01-31 DIAGNOSIS — M54.9 BACK PAIN, UNSPECIFIED BACK LOCATION, UNSPECIFIED BACK PAIN LATERALITY, UNSPECIFIED CHRONICITY: Primary | ICD-10-CM

## 2025-01-31 DIAGNOSIS — M25.559 HIP PAIN, UNSPECIFIED LATERALITY: ICD-10-CM

## 2025-01-31 DIAGNOSIS — M54.16 LUMBAR RADICULOPATHY: ICD-10-CM

## 2025-01-31 DIAGNOSIS — M25.551 RIGHT HIP PAIN: ICD-10-CM

## 2025-01-31 PROCEDURE — 3008F BODY MASS INDEX DOCD: CPT | Mod: CPTII,,, | Performed by: PHYSICIAN ASSISTANT

## 2025-01-31 PROCEDURE — 1159F MED LIST DOCD IN RCRD: CPT | Mod: CPTII,,, | Performed by: PHYSICIAN ASSISTANT

## 2025-01-31 PROCEDURE — 1160F RVW MEDS BY RX/DR IN RCRD: CPT | Mod: CPTII,,, | Performed by: PHYSICIAN ASSISTANT

## 2025-01-31 PROCEDURE — 3288F FALL RISK ASSESSMENT DOCD: CPT | Mod: CPTII,,, | Performed by: PHYSICIAN ASSISTANT

## 2025-01-31 PROCEDURE — 1101F PT FALLS ASSESS-DOCD LE1/YR: CPT | Mod: CPTII,,, | Performed by: PHYSICIAN ASSISTANT

## 2025-01-31 PROCEDURE — 99203 OFFICE O/P NEW LOW 30 MIN: CPT | Mod: ,,, | Performed by: PHYSICIAN ASSISTANT

## 2025-01-31 RX ORDER — LABETALOL 100 MG/1
100 TABLET, FILM COATED ORAL 2 TIMES DAILY
COMMUNITY
Start: 2024-11-11

## 2025-01-31 RX ORDER — CIPROFLOXACIN 500 MG/1
500 TABLET ORAL 2 TIMES DAILY
COMMUNITY

## 2025-01-31 RX ORDER — LORATADINE 10 MG/1
10 TABLET ORAL DAILY
COMMUNITY

## 2025-01-31 RX ORDER — ONDANSETRON HYDROCHLORIDE 8 MG/1
TABLET, FILM COATED ORAL
COMMUNITY
Start: 2024-11-05

## 2025-01-31 RX ORDER — MEGESTROL ACETATE 40 MG/1
40 TABLET ORAL DAILY
COMMUNITY

## 2025-01-31 NOTE — PROGRESS NOTES
Chief Complaint:   Chief Complaint   Patient presents with    Hip Pain     R hip - ongoing for a while. Has progressively been getting worse. Radiating pain from the lower back down the leg. NWB. Presents with a wheelchair.        History of present illness:    This is a 69 y.o. year old female who complains of right lateral hip and lower back pain radiating down the leg.    Patient states he has been having pain for a couple of months now mainly started increasing after she has some procedures done for her kidney dysfunction and some dialysis.    She reports the pain is on the lower back goes down the lateral to the posterior side of the buttock in the hip and down the leg   She states it feels like pins and needles at times.    She is sent here for orthopedic evaluation of her hips to make sure there was no issues with her hip joints themselves.          Current Outpatient Medications   Medication Sig    atorvastatin (LIPITOR) 20 MG tablet Take 20 mg by mouth every evening.    ciprofloxacin HCl (CIPRO) 500 MG tablet Take 500 mg by mouth 2 (two) times daily.    ergocalciferol, vitamin D2, (VITAMIN D2 ORAL) Take by mouth.    FEROSUL 325 mg (65 mg iron) Tab tablet Take 325 mg by mouth once daily.    folic acid (FOLVITE) 1 MG tablet Take 1 tablet (1 mg total) by mouth once daily.    labetaloL (NORMODYNE) 100 MG tablet Take 100 mg by mouth 2 (two) times daily.    levocetirizine (XYZAL) 5 MG tablet Take 5 mg by mouth every evening.    loratadine (CLARITIN) 10 mg tablet Take 10 mg by mouth once daily.    megestroL (MEGACE) 40 MG Tab Take 40 mg by mouth once daily.    NITROFURANTOIN ORAL Take by mouth.    ondansetron (ZOFRAN) 8 MG tablet TAKE 1 TABLET BY MOUTH EVERY 8 HOURS AS NEEDED FOR NAUSEA/ VOMITING    oxyCODONE-acetaminophen (PERCOCET) 5-325 mg per tablet Take 1 tablet by mouth On call Procedure for Pain.    pantoprazole (PROTONIX) 40 MG tablet Take 1 tablet (40 mg total) by mouth once daily.    sevelamer carbonate  "(RENVELA) 800 mg Tab Take 800 mg by mouth 3 (three) times daily with meals.    labetaloL (NORMODYNE) 200 MG tablet Take 200 mg by mouth 2 (two) times daily.     No current facility-administered medications for this visit.       Review of Systems:    Constitution:   Denies chills, fever, and sweats.  HENT:   Denies headaches or blurry vision.  Cardiovascular:  Denies chest pain or irregular heart beat.  Respiratory:   Denies cough or shortness of breath.  Gastrointestinal:  Denies abdominal pain, nausea, or vomiting.  Musculoskeletal:   Denies muscle cramps.  Neurological:   Denies dizziness or focal weakness.  Psychiatric/Behavior: Normal mental status.  Hematology/Lymph:  Denies bleeding problem or easy bruising/bleeding.  Skin:    Denies rash or suspicious lesions.    Examination:    Vital Signs:    Vitals:    01/31/25 0850   Weight: 115.2 kg (253 lb 15.5 oz)   Height: 5' 2" (1.575 m)       Body mass index is 46.45 kg/m².    Constitution:   Well-developed, well nourished patient in no acute distress.  Neurological:   Alert and oriented x 3 and cooperative to examination.     Psychiatric/Behavior: Normal mental status.  Respiratory:   No shortness of breath.  Eyes:    Extraoccular muscles intact  Skin:    No scars, rash or suspicious lesions.    Physical Exam:   Hip Exam:  Right and left  No obvious deformity.   Flexion to 120 degrees and internal and external rotation to 40 degrees.   Abduction to 45 degree and adduction to 30 degrees.   Negative LATRICE test.   Negative Stinchfield test.   No flexion contracture.   Negative  greater trochanteric tenderness.   Negative AKUA test.   normal skin appearance and palpable pulses distally.     Imaging: X-rays of her hips reviewed from outside sources   AP view of the pelvis and AP and frog leg lateral view of the right hip were performed.     COMPARISON:  None.     FINDINGS:  BONE: No fracture. No dislocation.     SOFT TISSUES: Partially imaged ureteral stents.   "   Impression:     No acute osseous abnormality.      On my assessment the patient does have well-maintained joint spaces of both of her hips          Assessment: Back pain, unspecified back location, unspecified back pain laterality, unspecified chronicity  -     Ambulatory referral/consult to Pain Clinic; Future; Expected date: 02/07/2025    Hip pain, unspecified laterality  -     Ambulatory referral/consult to Orthopedics  -     Ambulatory referral/consult to Pain Clinic; Future; Expected date: 02/07/2025    Right hip pain  -     Ambulatory referral/consult to Orthopedics    Lumbar radiculopathy         Plan:  This point the patient is told that her hips are not really the source of her pain and it more than likely is lumbar in origin.    She has had previous MRIs and workup of the lower back.    We will refer her to pain management to see if there is any type of interventional treatments they can offer her.    She did also have a recent PET scan for her workup for cancer and they also showed sacroiliitis right greater than left which he would be consistent with her symptoms as well.              DISCLAIMER: This note may have been dictated using voice recognition software and may contain grammatical errors.     NOTE: Consult report sent to referring provider via [employer].

## 2025-02-14 DIAGNOSIS — N18.6 END STAGE RENAL DISEASE: ICD-10-CM

## 2025-02-14 DIAGNOSIS — Z01.818 OTHER SPECIFIED PRE-OPERATIVE EXAMINATION: Primary | ICD-10-CM

## 2025-02-14 DIAGNOSIS — N18.6 END STAGE RENAL DISEASE: Primary | ICD-10-CM

## 2025-02-18 ENCOUNTER — DOCUMENTATION ONLY (OUTPATIENT)
Dept: HEMATOLOGY/ONCOLOGY | Facility: CLINIC | Age: 70
End: 2025-02-18
Payer: MEDICARE

## 2025-02-18 NOTE — PROGRESS NOTES
I tried calling the patient on 3 separate occasions beginning on 02/17/25, to schedule an appointment with Dr. LeJeune but was unsuccessful. Unable to leave voicemail.

## 2025-02-21 RX ORDER — FLUCONAZOLE 150 MG/1
150 TABLET ORAL ONCE
Status: ON HOLD | COMMUNITY
Start: 2024-09-10

## 2025-02-21 RX ORDER — FLUTICASONE PROPIONATE 50 MCG
2 SPRAY, SUSPENSION (ML) NASAL
Status: ON HOLD | COMMUNITY
Start: 2025-01-16

## 2025-02-22 ENCOUNTER — HOSPITAL ENCOUNTER (EMERGENCY)
Facility: HOSPITAL | Age: 70
Discharge: HOME | End: 2025-02-22
Attending: EMERGENCY MEDICINE
Payer: MEDICARE

## 2025-02-22 VITALS
RESPIRATION RATE: 22 BRPM | BODY MASS INDEX: 42.51 KG/M2 | TEMPERATURE: 97 F | HEART RATE: 85 BPM | OXYGEN SATURATION: 100 % | HEIGHT: 62 IN | WEIGHT: 231 LBS | DIASTOLIC BLOOD PRESSURE: 74 MMHG | SYSTOLIC BLOOD PRESSURE: 148 MMHG

## 2025-02-22 DIAGNOSIS — N18.6 ESRD (END STAGE RENAL DISEASE): Primary | ICD-10-CM

## 2025-02-22 DIAGNOSIS — R06.00 DYSPNEA: ICD-10-CM

## 2025-02-22 LAB
FLUAV AG UPPER RESP QL IA.RAPID: NOT DETECTED
FLUBV AG UPPER RESP QL IA.RAPID: NOT DETECTED
RSV A 5' UTR RNA NPH QL NAA+PROBE: NOT DETECTED
SARS-COV-2 RNA RESP QL NAA+PROBE: NOT DETECTED

## 2025-02-22 PROCEDURE — 93005 ELECTROCARDIOGRAM TRACING: CPT

## 2025-02-22 PROCEDURE — 93010 ELECTROCARDIOGRAM REPORT: CPT | Mod: ,,, | Performed by: INTERNAL MEDICINE

## 2025-02-22 PROCEDURE — 0241U COVID/RSV/FLU A&B PCR: CPT | Performed by: EMERGENCY MEDICINE

## 2025-02-22 PROCEDURE — 99283 EMERGENCY DEPT VISIT LOW MDM: CPT | Mod: 25

## 2025-02-22 NOTE — ED PROVIDER NOTES
Encounter Date: 2/22/2025       History     Chief Complaint   Patient presents with    Shortness of Breath     C/o SOB this morning while rushing to get to dialysis, pt missed dialysis last week due to not feeling well. Hx of bladder cancer. RA sats with EMS were mid 80's, 98% on 2L     HPI  61-year-old female history of ESRD, bladder cancer, dialysis Tuesday Thursday Saturday brought in by EMS for shortness of breath which patient states occurred while she was getting out of her wheelchair to go to dialysis.  EMS states her O2 sat was mid 80s and increased to 98% on 2 L.  On arrival to ED patient was taken off oxygen and her O2 sat has remained 100% on room air and her shortness of breath has resolved.  Patient states she did miss her dialysis times one-week because it was cold outside.  Patient with no other complaints of substernal chest pain, fever, chills, headache, abdominal pain, increasing leg edema or calf pain.  Review of patient's allergies indicates:   Allergen Reactions    Asa [aspirin] Anaphylaxis    Penicillins      Received ceftriaxone from 6/27, no reactions      Past Medical History:   Diagnosis Date    Bladder cancer     ESRD (end stage renal disease)      Past Surgical History:   Procedure Laterality Date    CYSTOSCOPY W/ URETERAL STENT PLACEMENT Right 6/27/2024    Procedure: CYSTOSCOPY, WITH URETERAL STENT INSERTION;  Surgeon: Otis Person MD;  Location: [facility];  Service: Urology;  Laterality: Right;  CYSTOSCOPY, BILATERAL RETROGRADE PYELOGRAMS, POSSIBLE STENT PLACEMENT.    CYSTOSCOPY W/ URETERAL STENT PLACEMENT Bilateral 10/4/2024    Procedure: CYSTOSCOPY, WITH URETERAL STENT INSERTION;  Surgeon: Tawana Loaiza MD;  Location: [facility];  Service: Urology;  Laterality: Bilateral;  CYSTO WITH BILAT URETERAL STENT EXCHANGE    INSERTION OF TUNNELED CENTRAL VENOUS HEMODIALYSIS CATHETER N/A 7/3/2024    Procedure: Insertion, Catheter, Central Venous, Hemodialysis;  Surgeon: Swapna  Uche TOUSSAINT MD;  Location: [facility] CATH LAB;  Service: Peripheral Vascular;  Laterality: N/A;    INSERTION OF TUNNELED CENTRAL VENOUS HEMODIALYSIS CATHETER N/A 10/24/2024    Procedure: Insertion, Catheter, Central Venous, Hemodialysis;  Surgeon: Nikolai Manzo DO;  Location: [facility] CATH LAB;  Service: Nephrology;  Laterality: N/A;    REPLACEMENT OF DIALYSIS CATHETER OVER GUIDEWIRE THROUGH EXISTING VENOUS ACCESS N/A 1/13/2025    Procedure: REPLACEMENT, CATHETER, DIALYSIS, OVER GUIDEWIRE, USING EXISTING VENOUS ACCESS;  Surgeon: Nikolai Manzo DO;  Location: [facility] CATH LAB;  Service: Nephrology;  Laterality: N/A;     No family history on file.  Social History[1]  Review of Systems   All other systems reviewed and are negative.      Physical Exam     Initial Vitals [02/22/25 1223]   BP Pulse Resp Temp SpO2   (!) 139/49 83 (!) 22 96.9 °F (36.1 °C) 98 %      MAP       --         Physical Exam    Nursing note and vitals reviewed.  Constitutional: She appears well-developed and well-nourished. She is cooperative.   HENT:   Head: Normocephalic and atraumatic.   Eyes: Conjunctivae and lids are normal.   Neck: Trachea normal. Neck supple.   Normal range of motion.  Cardiovascular:  Normal rate, regular rhythm, normal heart sounds and intact distal pulses.           Pulmonary/Chest: No respiratory distress. She has no wheezes. She has no rhonchi. She has no rales. She exhibits no tenderness.   Abdominal: Abdomen is soft. Bowel sounds are normal. There is no abdominal tenderness.   Musculoskeletal:         General: No tenderness. Normal range of motion.      Cervical back: Normal range of motion and neck supple.     Neurological: She is alert and oriented to person, place, and time. She has normal strength.   Skin: Skin is warm and dry. No rash noted.   Psychiatric: She has a normal mood and affect. Her speech is normal and behavior is normal. Judgment and thought content normal.         ED Course   Procedures  Labs Reviewed    COVID/RSV/FLU A&B PCR - Normal       Result Value    Influenza A PCR Not Detected      Influenza B PCR Not Detected      Respiratory Syncytial Virus PCR Not Detected      SARS-CoV-2 PCR Not Detected      Narrative:     The Xpert Xpress SARS-CoV-2/FLU/RSV plus is a rapid, multiplexed real-time PCR test intended for the simultaneous qualitative detection and differentiation of SARS-CoV-2, Influenza A, Influenza B, and respiratory syncytial virus (RSV) viral RNA in either nasopharyngeal swab or nasal swab specimens.           EKG Readings: (Independently Interpreted)   Normal sinus rhythm 72, no acute ST elevation or ST depression, Q-waves inferiorly, no acute ST elevation or ST depression.     ECG Results              EKG 12-lead (In process)        Collection Time Result Time QRS Duration OHS QTC Calculation    02/22/25 12:31:14 02/22/25 15:34:20 62 405                     In process by Interface, Lab In [facility] (02/22/25 15:34:27)                   Narrative:    Test Reason : R06.00,    Vent. Rate :  72 BPM     Atrial Rate :  72 BPM     P-R Int : 164 ms          QRS Dur :  62 ms      QT Int : 370 ms       P-R-T Axes :  42 -23  32 degrees    QTcB Int : 405 ms    Normal sinus rhythm with sinus arrhythmia  Possible Lateral infarct ,age undetermined  Inferior infarct ,age undetermined  Abnormal ECG  When compared with ECG of 22-Jun-2024 13:19,  Premature ventricular complexes are no longer Present  Vent. rate has decreased by  48 bpm  Inferior infarct is now Present  ST no longer depressed in Anterior-lateral leads  T wave inversion no longer evident in Lateral leads    Referred By: AAAREFERRAL SELF           Confirmed By:                                   Imaging Results    None          Medications - No data to display  Medical Decision Making  Amount and/or Complexity of Data Reviewed  Labs: ordered.  Radiology: ordered.    61-year-old female history of ESRD, bladder CA recently missed dialysis times one-week  brought in by ambulance after he became short of breath while transitioning from a wheelchair getting ready for dialysis.  On arrival to ED patient's O2 sats are 100% on room air patient is not tachypneic and requesting to be discharged now so she can go to her dialysis before her appointment is canceled.  EKG was done which showed no acute ischemic changes.  COVID, RSV, influenza negative.  Patient will be discharged to go to dialysis and advised if she has any other symptoms or worsening shortness of breath to return to the ER.  Otherwise patient should follow up with the primary care physician in 1-2 days for recheck.                                  Clinical Impression:  Final diagnoses:  [R06.00] Dyspnea  [N18.6] ESRD (end stage renal disease) (Primary)          ED Disposition Condition    Discharge Stable          ED Prescriptions    None       Follow-up Information       Follow up With Specialties Details Why Contact Info    Chriss Mckeon NP Family Medicine Schedule an appointment as soon as possible for a visit in 1 day  [address] [place] [address]  [phone]                   [1]   Social History  Tobacco Use    Smoking status: Never    Smokeless tobacco: Never   Substance Use Topics    Alcohol use: Never    Drug use: Never        Stephane Brothers MD  02/22/25 [id number]

## 2025-02-23 LAB
OHS QRS DURATION: 62 MS
OHS QTC CALCULATION: 405 MS

## 2025-02-28 NOTE — PROGRESS NOTES
"      [facility] Vascular - Clinic Note  Uche Barcenas MD      Patient Name: Fior Joya                   : 1955     MRN: [id number]   Visit Date: 3/5/2025          History Present Illness     Reason for Visit: Dialysis Access Evaluation    Ms. Joya presents to the clinic in need of permanent hemodialysis access creation. She is being referred by her nephrologist, Dr. Light. She is on hemodialysis using a left chest wall tunneled catheter. Vein mapping of her both arms obtained today. She is right handed but has slight tremors to it. She denies pacemaker/ICD or history of mediport. She does have a history of bladder cancer. Recent PET scan shows possible metastasis. She is scheduled to see Dr. Lejeune at the end oft his month to discuss this. States that she has not had much of an appetite lately and lost significant amount of weight.      REVIEW OF SYSTEMS:  12 point review of systems conducted, negative except as stated in the history of present illness. See HPI for details.        Physical Exam      Vitals:    25 1013 25 1015   BP: 131/73 127/74   BP Location: Left arm Right arm   Patient Position: Sitting Sitting   Pulse: 71 67   Weight: 100.2 kg (221 lb)    Height: 5' 2" (1.575 m)         General: no acute distress and obese, alert, pleasant, conversant, and oriented  Neurologic: cranial nerves are grossly intact, no neurologic deficits, no motor deficits, and no sensory deficits  Neck/Chest: normal , soft without lymphadenopathy, and no carotid bruits noted  Respiratory: breathing easily, without respiratory distress, and normal breath sounds  Abdomen: normal and soft  Cardiology: regular rate and rhythm and no audible murmur    Upper Extremity Arterial Exam:   Right - radial is palpable and brachial is palpable  Left - radial is palpable and brachial is palpable    Dialysis Access:  left chest wall tunneled catheter  Assessment: dressed appropriately, no signs of " infection    Musculoskeletal:   Upper Extremity: normal left hand function,             Assessment and Plan     Ms. Joya is a 69 y.o. with end stage renal failure who is in need of permanent access creation.  This point she is using a left chest wall dialysis catheter.  I did discuss with her under hold off increase in of a new access at this point.  She is scheduled to see Oncology later a couple of weeks.  I discussed with her holding off on any type of access creation at this point until she sees Oncology for further evaluation.  We will have her follow up in about a month.  Based off of oncologist plans for her treatment, we will determine the course of creation of a new access          1. End stage renal disease  - Ambulatory referral/consult to Vascular Surgery           Imaging Obtained/Reviewed   Study: bilateral upper extremity vein mapping  Date:   3/5/2025           Medical History     Past Medical History:   Diagnosis Date    Bladder cancer     ESRD (end stage renal disease)      Past Surgical History:   Procedure Laterality Date    CYSTOSCOPY W/ URETERAL STENT PLACEMENT Right 6/27/2024    Procedure: CYSTOSCOPY, WITH URETERAL STENT INSERTION;  Surgeon: Otis Person MD;  Location: [facility];  Service: Urology;  Laterality: Right;  CYSTOSCOPY, BILATERAL RETROGRADE PYELOGRAMS, POSSIBLE STENT PLACEMENT.    CYSTOSCOPY W/ URETERAL STENT PLACEMENT Bilateral 10/4/2024    Procedure: CYSTOSCOPY, WITH URETERAL STENT INSERTION;  Surgeon: Tawana Loaiza MD;  Location: [facility] OR;  Service: Urology;  Laterality: Bilateral;  CYSTO WITH BILAT URETERAL STENT EXCHANGE    INSERTION OF TUNNELED CENTRAL VENOUS HEMODIALYSIS CATHETER N/A 7/3/2024    Procedure: Insertion, Catheter, Central Venous, Hemodialysis;  Surgeon: Uche Barcenas MD;  Location: [facility] CATH LAB;  Service: Peripheral Vascular;  Laterality: N/A;    INSERTION OF TUNNELED CENTRAL VENOUS HEMODIALYSIS CATHETER N/A 10/24/2024    Procedure:  Insertion, Catheter, Central Venous, Hemodialysis;  Surgeon: Nikolai Manzo DO;  Location: [facility] CATH LAB;  Service: Nephrology;  Laterality: N/A;    REPLACEMENT OF DIALYSIS CATHETER OVER GUIDEWIRE THROUGH EXISTING VENOUS ACCESS N/A 1/13/2025    Procedure: REPLACEMENT, CATHETER, DIALYSIS, OVER GUIDEWIRE, USING EXISTING VENOUS ACCESS;  Surgeon: Nikolai Manzo DO;  Location: [facility] CATH LAB;  Service: Nephrology;  Laterality: N/A;     No family history on file.  Social History[1]  Current Outpatient Medications   Medication Instructions    atorvastatin (LIPITOR) 20 mg, Nightly    ergocalciferol, vitamin D2, (VITAMIN D2 ORAL) Take by mouth.    FeroSuL 325 mg, Daily    fluconazole (DIFLUCAN) 150 mg, Once    fluticasone propionate (FLONASE) 50 mcg/actuation nasal spray 2 sprays    folic acid (FOLVITE) 1 mg, Oral, Daily    labetaloL (NORMODYNE) 100 mg, 2 times daily    levocetirizine (XYZAL) 5 mg, Nightly    megestroL (MEGACE) 40 mg, Daily    nitrofurantoin, macrocrystal-monohydrate, (MACROBID) 100 MG capsule 100 mg, Every 12 hours    ondansetron (ZOFRAN) 8 MG tablet TAKE 1 TABLET BY MOUTH EVERY 8 HOURS AS NEEDED FOR NAUSEA/ VOMITING    oxyCODONE-acetaminophen (PERCOCET) 5-325 mg per tablet 1 tablet, Oral, On Call Procedure    pantoprazole (PROTONIX) 40 mg, Oral, Daily    sevelamer carbonate (RENVELA) 800 mg, 3 times daily with meals     Review of patient's allergies indicates:   Allergen Reactions    Asa [aspirin] Anaphylaxis    Penicillins      Received ceftriaxone from 6/27, no reactions        Patient Care Team:  Chriss Mckeon NP as PCP - General (Family Medicine)  AXEL Hedrick (Dialysis Center)  Shin Light MD as Consulting Physician (Nephrology)  Chao Woody II, MD as Consulting Physician (Oncology)  Uche Barcenas MD as Vascular Surgery (Vascular Surgery)      No follow-ups on file. In addition to their scheduled follow up, the patient has also been instructed to follow up on as needed basis.      Future Appointments   Date Time Provider Department Center   3/20/2025  2:30 PM Lejeune, Elizabeth Kennedy, MD [facility]             [1]   Social History  Socioeconomic History    Marital status:    Tobacco Use    Smoking status: Never    Smokeless tobacco: Never   Substance and Sexual Activity    Alcohol use: Never    Drug use: Never    Sexual activity: Not Currently     Social Drivers of Health     Financial Resource Strain: Low Risk  (10/4/2024)    Overall Financial Resource Strain (CARDIA)     Difficulty of Paying Living Expenses: Not hard at all   Food Insecurity: No Food Insecurity (10/4/2024)    Hunger Vital Sign     Worried About Running Out of Food in the Last Year: Never true     Ran Out of Food in the Last Year: Never true   Transportation Needs: No Transportation Needs (10/4/2024)    TRANSPORTATION NEEDS     Transportation : No   Physical Activity: Unknown (10/4/2024)    Exercise Vital Sign     Days of Exercise per Week: 0 days   Stress: No Stress Concern Present (10/4/2024)    [employer] - Occupational Stress Questionnaire     Feeling of Stress : Not at all   Housing Stability: Unknown (10/4/2024)    Housing Stability Vital Sign     Unable to Pay for Housing in the Last Year: No     Homeless in the Last Year: No

## 2025-03-05 ENCOUNTER — OFFICE VISIT (OUTPATIENT)
Dept: VASCULAR SURGERY | Facility: CLINIC | Age: 70
End: 2025-03-05
Payer: MEDICARE

## 2025-03-05 VITALS
HEIGHT: 62 IN | SYSTOLIC BLOOD PRESSURE: 127 MMHG | HEART RATE: 67 BPM | DIASTOLIC BLOOD PRESSURE: 74 MMHG | WEIGHT: 221 LBS | BODY MASS INDEX: 40.67 KG/M2

## 2025-03-05 DIAGNOSIS — N18.6 END STAGE RENAL DISEASE: Primary | ICD-10-CM

## 2025-03-05 PROCEDURE — 99203 OFFICE O/P NEW LOW 30 MIN: CPT | Mod: ,,, | Performed by: SURGERY

## 2025-03-05 PROCEDURE — 1159F MED LIST DOCD IN RCRD: CPT | Mod: CPTII,,, | Performed by: SURGERY

## 2025-03-05 PROCEDURE — 1160F RVW MEDS BY RX/DR IN RCRD: CPT | Mod: CPTII,,, | Performed by: SURGERY

## 2025-03-05 PROCEDURE — 1101F PT FALLS ASSESS-DOCD LE1/YR: CPT | Mod: CPTII,,, | Performed by: SURGERY

## 2025-03-05 PROCEDURE — 3288F FALL RISK ASSESSMENT DOCD: CPT | Mod: CPTII,,, | Performed by: SURGERY

## 2025-03-05 PROCEDURE — 3078F DIAST BP <80 MM HG: CPT | Mod: CPTII,,, | Performed by: SURGERY

## 2025-03-05 PROCEDURE — 3008F BODY MASS INDEX DOCD: CPT | Mod: CPTII,,, | Performed by: SURGERY

## 2025-03-05 PROCEDURE — 3074F SYST BP LT 130 MM HG: CPT | Mod: CPTII,,, | Performed by: SURGERY

## 2025-03-05 RX ORDER — NITROFURANTOIN 25; 75 MG/1; MG/1
100 CAPSULE ORAL EVERY 12 HOURS
Status: ON HOLD | COMMUNITY
Start: 2024-12-18

## 2025-03-10 ENCOUNTER — ANESTHESIA EVENT (OUTPATIENT)
Dept: SURGERY | Facility: HOSPITAL | Age: 70
End: 2025-03-10
Payer: MEDICARE

## 2025-03-10 ENCOUNTER — ANESTHESIA (OUTPATIENT)
Dept: SURGERY | Facility: HOSPITAL | Age: 70
End: 2025-03-10
Payer: MEDICARE

## 2025-03-10 ENCOUNTER — HOSPITAL ENCOUNTER (OUTPATIENT)
Facility: HOSPITAL | Age: 70
Discharge: HOME | End: 2025-03-10
Attending: SURGERY | Admitting: SURGERY
Payer: MEDICARE

## 2025-03-10 VITALS
OXYGEN SATURATION: 99 % | WEIGHT: 220.88 LBS | RESPIRATION RATE: 18 BRPM | HEIGHT: 62 IN | HEART RATE: 77 BPM | BODY MASS INDEX: 40.65 KG/M2 | SYSTOLIC BLOOD PRESSURE: 142 MMHG | DIASTOLIC BLOOD PRESSURE: 68 MMHG | TEMPERATURE: 98 F

## 2025-03-10 DIAGNOSIS — T82.9XXA COMPLICATION ASSOCIATED WITH DIALYSIS CATHETER: ICD-10-CM

## 2025-03-10 LAB
ALBUMIN SERPL-MCNC: 1.6 G/DL (ref 3.4–4.8)
ALBUMIN/GLOB SERPL: 0.3 RATIO (ref 1.1–2)
ALP SERPL-CCNC: 86 UNIT/L (ref 40–150)
ALT SERPL-CCNC: 11 UNIT/L (ref 0–55)
ANION GAP SERPL CALC-SCNC: 15 MEQ/L
APTT PPP: 30.2 SECONDS (ref 24.2–35.9)
AST SERPL-CCNC: 12 UNIT/L (ref 5–34)
BASOPHILS # BLD AUTO: 0.05 X10(3)/MCL
BASOPHILS NFR BLD AUTO: 0.5 %
BILIRUB SERPL-MCNC: 0.4 MG/DL
BUN SERPL-MCNC: 54 MG/DL (ref 9.8–20.1)
CALCIUM SERPL-MCNC: 7.9 MG/DL (ref 8.4–10.2)
CHLORIDE SERPL-SCNC: 104 MMOL/L (ref 98–107)
CO2 SERPL-SCNC: 18 MMOL/L (ref 23–31)
CREAT SERPL-MCNC: 6.92 MG/DL (ref 0.55–1.02)
CREAT/UREA NIT SERPL: 8
EOSINOPHIL # BLD AUTO: 0.14 X10(3)/MCL (ref 0–0.9)
EOSINOPHIL NFR BLD AUTO: 1.4 %
ERYTHROCYTE [DISTWIDTH] IN BLOOD BY AUTOMATED COUNT: 16.6 % (ref 11.5–17)
GFR SERPLBLD CREATININE-BSD FMLA CKD-EPI: 6 ML/MIN/1.73/M2
GLOBULIN SER-MCNC: 5.2 GM/DL (ref 2.4–3.5)
GLUCOSE SERPL-MCNC: 73 MG/DL (ref 82–115)
HCT VFR BLD AUTO: 38.4 % (ref 37–47)
HGB BLD-MCNC: 12.3 G/DL (ref 12–16)
IMM GRANULOCYTES # BLD AUTO: 0.03 X10(3)/MCL (ref 0–0.04)
IMM GRANULOCYTES NFR BLD AUTO: 0.3 %
INR PPP: 1.2
LYMPHOCYTES # BLD AUTO: 1.43 X10(3)/MCL (ref 0.6–4.6)
LYMPHOCYTES NFR BLD AUTO: 14.3 %
MCH RBC QN AUTO: 32.5 PG (ref 27–31)
MCHC RBC AUTO-ENTMCNC: 32 G/DL (ref 33–36)
MCV RBC AUTO: 101.3 FL (ref 80–94)
MONOCYTES # BLD AUTO: 1.08 X10(3)/MCL (ref 0.1–1.3)
MONOCYTES NFR BLD AUTO: 10.8 %
NEUTROPHILS # BLD AUTO: 7.24 X10(3)/MCL (ref 2.1–9.2)
NEUTROPHILS NFR BLD AUTO: 72.7 %
NRBC BLD AUTO-RTO: 0 %
PLATELET # BLD AUTO: 291 X10(3)/MCL (ref 130–400)
PMV BLD AUTO: 10.1 FL (ref 7.4–10.4)
POTASSIUM SERPL-SCNC: 4.1 MMOL/L (ref 3.5–5.1)
PROT SERPL-MCNC: 6.8 GM/DL (ref 5.8–7.6)
PROTHROMBIN TIME: 15.3 SECONDS (ref 12.4–14.9)
RBC # BLD AUTO: 3.79 X10(6)/MCL (ref 4.2–5.4)
SODIUM SERPL-SCNC: 137 MMOL/L (ref 136–145)
WBC # BLD AUTO: 9.97 X10(3)/MCL (ref 4.5–11.5)

## 2025-03-10 PROCEDURE — 63600175 PHARM REV CODE 636 W HCPCS: Performed by: SURGERY

## 2025-03-10 PROCEDURE — 36000707: Performed by: SURGERY

## 2025-03-10 PROCEDURE — 25000003 PHARM REV CODE 250: Performed by: ANESTHESIOLOGY

## 2025-03-10 PROCEDURE — 36000706: Performed by: SURGERY

## 2025-03-10 PROCEDURE — 85730 THROMBOPLASTIN TIME PARTIAL: CPT | Performed by: SURGERY

## 2025-03-10 PROCEDURE — 37000009 HC ANESTHESIA EA ADD 15 MINS: Performed by: SURGERY

## 2025-03-10 PROCEDURE — 80053 COMPREHEN METABOLIC PANEL: CPT | Performed by: SURGERY

## 2025-03-10 PROCEDURE — 37000008 HC ANESTHESIA 1ST 15 MINUTES: Performed by: SURGERY

## 2025-03-10 PROCEDURE — 36415 COLL VENOUS BLD VENIPUNCTURE: CPT | Performed by: SURGERY

## 2025-03-10 PROCEDURE — 63600175 PHARM REV CODE 636 W HCPCS: Performed by: NURSE ANESTHETIST, CERTIFIED REGISTERED

## 2025-03-10 PROCEDURE — 85025 COMPLETE CBC W/AUTO DIFF WBC: CPT | Performed by: SURGERY

## 2025-03-10 PROCEDURE — 85610 PROTHROMBIN TIME: CPT | Performed by: SURGERY

## 2025-03-10 PROCEDURE — 71000016 HC POSTOP RECOV ADDL HR: Performed by: SURGERY

## 2025-03-10 PROCEDURE — 71000015 HC POSTOP RECOV 1ST HR: Performed by: SURGERY

## 2025-03-10 PROCEDURE — D9220A PRA ANESTHESIA: Mod: ,,, | Performed by: NURSE ANESTHETIST, CERTIFIED REGISTERED

## 2025-03-10 PROCEDURE — 25000003 PHARM REV CODE 250: Performed by: SURGERY

## 2025-03-10 RX ORDER — FENTANYL CITRATE 50 UG/ML
INJECTION, SOLUTION INTRAMUSCULAR; INTRAVENOUS
Status: DISCONTINUED | OUTPATIENT
Start: 2025-03-10 | End: 2025-03-10

## 2025-03-10 RX ORDER — LIDOCAINE HYDROCHLORIDE 20 MG/ML
INJECTION, SOLUTION EPIDURAL; INFILTRATION; INTRACAUDAL; PERINEURAL
Status: DISCONTINUED | OUTPATIENT
Start: 2025-03-10 | End: 2025-03-10

## 2025-03-10 RX ORDER — HEPARIN SODIUM 5000 [USP'U]/ML
INJECTION, SOLUTION INTRAVENOUS; SUBCUTANEOUS
Status: DISCONTINUED | OUTPATIENT
Start: 2025-03-10 | End: 2025-03-10 | Stop reason: HOSPADM

## 2025-03-10 RX ORDER — MUPIROCIN 20 MG/G
OINTMENT TOPICAL 2 TIMES DAILY
Status: DISCONTINUED | OUTPATIENT
Start: 2025-03-10 | End: 2025-03-10 | Stop reason: HOSPADM

## 2025-03-10 RX ORDER — PHENYLEPHRINE HYDROCHLORIDE 10 MG/ML
INJECTION INTRAVENOUS
Status: DISCONTINUED | OUTPATIENT
Start: 2025-03-10 | End: 2025-03-10

## 2025-03-10 RX ORDER — CLINDAMYCIN PHOSPHATE 900 MG/50ML
900 INJECTION, SOLUTION INTRAVENOUS
Status: COMPLETED | OUTPATIENT
Start: 2025-03-10 | End: 2025-03-10

## 2025-03-10 RX ORDER — ONDANSETRON HYDROCHLORIDE 2 MG/ML
INJECTION, SOLUTION INTRAVENOUS
Status: DISCONTINUED | OUTPATIENT
Start: 2025-03-10 | End: 2025-03-10

## 2025-03-10 RX ORDER — HYDROCODONE BITARTRATE AND ACETAMINOPHEN 5; 325 MG/1; MG/1
1 TABLET ORAL EVERY 4 HOURS PRN
Status: DISCONTINUED | OUTPATIENT
Start: 2025-03-10 | End: 2025-03-10 | Stop reason: HOSPADM

## 2025-03-10 RX ORDER — PROPOFOL 10 MG/ML
VIAL (ML) INTRAVENOUS
Status: DISCONTINUED | OUTPATIENT
Start: 2025-03-10 | End: 2025-03-10

## 2025-03-10 RX ORDER — LIDOCAINE HYDROCHLORIDE 10 MG/ML
INJECTION, SOLUTION INFILTRATION; PERINEURAL
Status: DISCONTINUED | OUTPATIENT
Start: 2025-03-10 | End: 2025-03-10 | Stop reason: HOSPADM

## 2025-03-10 RX ORDER — MUPIROCIN 20 MG/G
1 OINTMENT TOPICAL 2 TIMES DAILY
Status: DISCONTINUED | OUTPATIENT
Start: 2025-03-10 | End: 2025-03-10

## 2025-03-10 RX ORDER — SODIUM CHLORIDE 9 MG/ML
INJECTION, SOLUTION INTRAVENOUS CONTINUOUS
Status: DISCONTINUED | OUTPATIENT
Start: 2025-03-10 | End: 2025-03-10 | Stop reason: HOSPADM

## 2025-03-10 RX ORDER — BUPIVACAINE HYDROCHLORIDE 5 MG/ML
INJECTION, SOLUTION EPIDURAL; INTRACAUDAL; PERINEURAL
Status: DISCONTINUED | OUTPATIENT
Start: 2025-03-10 | End: 2025-03-10 | Stop reason: HOSPADM

## 2025-03-10 RX ADMIN — PROPOFOL 50 MG: 10 INJECTION, EMULSION INTRAVENOUS at 12:03

## 2025-03-10 RX ADMIN — LIDOCAINE HYDROCHLORIDE 100 MG: 20 INJECTION, SOLUTION EPIDURAL; INFILTRATION; INTRACAUDAL; PERINEURAL at 11:03

## 2025-03-10 RX ADMIN — SODIUM CHLORIDE: 9 INJECTION, SOLUTION INTRAVENOUS at 11:03

## 2025-03-10 RX ADMIN — PROPOFOL 50 MG: 10 INJECTION, EMULSION INTRAVENOUS at 11:03

## 2025-03-10 RX ADMIN — FENTANYL CITRATE 25 MCG: 50 INJECTION, SOLUTION INTRAMUSCULAR; INTRAVENOUS at 11:03

## 2025-03-10 RX ADMIN — PHENYLEPHRINE HYDROCHLORIDE 200 MCG: 10 INJECTION INTRAVENOUS at 12:03

## 2025-03-10 RX ADMIN — FENTANYL CITRATE 25 MCG: 50 INJECTION, SOLUTION INTRAMUSCULAR; INTRAVENOUS at 12:03

## 2025-03-10 RX ADMIN — FENTANYL CITRATE 50 MCG: 50 INJECTION, SOLUTION INTRAMUSCULAR; INTRAVENOUS at 11:03

## 2025-03-10 RX ADMIN — CLINDAMYCIN PHOSPHATE 900 MG: 900 INJECTION, SOLUTION INTRAVENOUS at 11:03

## 2025-03-10 RX ADMIN — ONDANSETRON 4 MG: 2 INJECTION INTRAMUSCULAR; INTRAVENOUS at 11:03

## 2025-03-10 RX ADMIN — MUPIROCIN 1 G: 20 OINTMENT TOPICAL at 11:03

## 2025-03-10 NOTE — INTERVAL H&P NOTE
The patient has been examined and the H&P has been reviewed:    I concur with the findings and no changes have occurred since H&P was written.    Surgery risks, benefits and alternative options discussed and understood by patient/family.        Staff present during examination : Alison Hammonds RN      Active Hospital Problems    Diagnosis  POA    *ESRD (end stage renal disease) [N18.6]  Yes      Resolved Hospital Problems   No resolved problems to display.

## 2025-03-10 NOTE — DISCHARGE SUMMARY
[facility] - Periop Services  Discharge Note  Short Stay    Procedure(s) (LRB):  REMOVAL, CATHETER, CENTRAL VENOUS, TUNNELED (N/A)  INSERTION, CATHETER, TUNNELED (N/A)      OUTCOME: Patient tolerated treatment/procedure well without complication and is now ready for discharge.    DISPOSITION: Home or Self Care      FINAL DIAGNOSIS:  ESRD (end stage renal disease)  Post-Op Diagnosis Codes:     * Mechanical breakdown of vascular dialysis catheter, subsequent encounter [T82.41XD]  REMOVAL, CATHETER, CENTRAL VENOUS, TUNNELED (N/A)  INSERTION, CATHETER, TUNNELED (N/A) right IJ with fluoroscopy  FOLLOWUP: In clinic 1 week    DISCHARGE INSTRUCTIONS:  No discharge procedures on file.     TIME SPENT ON DISCHARGE:  5 minutes

## 2025-03-10 NOTE — OP NOTE
[facility] - Periop Services  Operative Note      Date of Procedure: 3/10/2025     Procedure: Procedure(s) (LRB):  REMOVAL, CATHETER, CENTRAL VENOUS, TUNNELED (N/A)  INSERTION, CATHETER, TUNNELED (N/A) right IJ with fluoroscopy    Surgeons and Role:     * Donovan Becerra MD - Primary    Assisting Surgeon: None    Anesthesia Staff Present in Procedure:   Anesthesiologist: Jorge Bangura DO  CRNA: Bogdan Mckenna CRNA; Tony Garduno CRNA    OR Staff Present in Procedure:  Circulator: Guerda An RN  Scrub Person: Cyndie Pepper ST; Sherly Bentley STFA    Pre-Operative Diagnosis: Mechanical breakdown of vascular dialysis catheter, subsequent encounter [T82.41XD]    Post-Operative Diagnosis: Post-Op Diagnosis Codes:     * Mechanical breakdown of vascular dialysis catheter, subsequent encounter [T82.41XD]  REMOVAL, CATHETER, CENTRAL VENOUS, TUNNELED (N/A)  INSERTION, CATHETER, TUNNELED (N/A) right IJ with fluoroscopy  Anesthesia: General    Operative Findings (including complications, if any):  Patient is a 69-year-old  female morbidly obese at 5 ft 2230 lb who had bladder cancer status post nephrostomy tube to on the right side and 1 on the left.  Patient has metastatic stage IV bladder cancer to the bone.  Patient needed a Medcomp core tunneled catheter placed for dialysis.  She has a dysfunctional dialysis catheter on the left side.  Patient was brought to the OR supine position IV sedation given anesthetic prepped and draped in a sterile fashion.  Patient had the left IJ tunneled catheter removed using a hemostat under local anesthetic prepped and draped in a sterile fashion the tract was closed with a 2-0 plain gut suture.    Patient then had right IJ tunneled catheter placed 23 cm using a Finders needle vascular access needle and a guidewire.  Fluoroscopy was used to position of the guidewire from superior to inferior vena cava.  Patient then had twenty-three [place]  dialysis catheter placed over the guidewire into the inferior vena cava from the superior vena cava.  With the guidewire guidance.  Patient had good blood return no pulsatile flow bilateral breath sounds were noted coloration of blood was appropriate chest x-ray was also appropriate.  Catheter was sutured with 0 silk the insertion site of the IJ was sutured with a 2-0 plain gut suture the insertion site of the chest wall was sutured with 2-0 plain gut suture.  Sterile dressings were applied no problems were encountered patient tolerated procedure well.  Patient will undergo dialysis while here in the hospital today.        Description of Technical Procedures:  Noted above       Significant Surgical Tasks Conducted by the Assistant(s), if Applicable:  None       Estimated Blood Loss (EBL): * No values recorded between 3/10/2025 11:40 AM and 3/10/2025 12:14 PM *           Implants:   Implant Name Type Inv. Item Serial No.  Lot No. LRB No. Used Action   CATH GLIDEPATH 14.5F 23CM 28CM - [id number] Dialysis Catheter CATH GLIDEPATH 14.5F 23CM 28CM  [employer] [id number] Left 1 Explanted       Specimens:   Specimen (24h ago, onward)      None                    Condition: Good    Disposition: PACU - hemodynamically stable.    Attestation: I was present and scrubbed for the entire procedure.        Discharge Note    OUTCOME: Patient tolerated treatment/procedure well without complication and is now ready for discharge.        DISPOSITION: Home or Self Care    FINAL DIAGNOSIS:  ESRD (end stage renal disease)  Post-Op Diagnosis Codes:     * Mechanical breakdown of vascular dialysis catheter, subsequent encounter [T82.41XD]  REMOVAL, CATHETER, CENTRAL VENOUS, TUNNELED (N/A)  INSERTION, CATHETER, TUNNELED (N/A) right IJ with fluoroscopy  FOLLOWUP: In clinic 1 week    DISCHARGE INSTRUCTIONS:  No discharge procedures on file.

## 2025-03-10 NOTE — DISCHARGE INSTRUCTIONS
DR. ALEMAN POST OP INSTRUCTIONS       STARTING TOMORROW, SHOWER NO BATHS. CLEAN INCISIONS DAILY   WITH SOAP AND WATER. KEEP CLEAN AND DRY. NO HEAVY LIFTING OR DRIVING   UNTIL RELEASED BY DR ALEMAN. NOTIFY YOUR DOCTOR WITH ANY FEVER   GREATER THAN 101, REDNESS OR DRAINAGE AT INCISION SITE, EXCESSIVE PAIN,                                  OR  EXCESSIVE BLEEDING .

## 2025-03-10 NOTE — ANESTHESIA POSTPROCEDURE EVALUATION
Anesthesia Post Evaluation    Patient: Fior Joya    Procedure(s) Performed: Procedure(s) (LRB):  REMOVAL, CATHETER, CENTRAL VENOUS, TUNNELED (N/A)  INSERTION, CATHETER, TUNNELED (N/A)    Final Anesthesia Type: general      Patient participation: Yes- Able to Participate  Level of consciousness: awake and alert and oriented  Post-procedure vital signs: reviewed and stable  Pain management: adequate  Airway patency: patent    PONV status at discharge: No PONV  Anesthetic complications: no      Cardiovascular status: stable  Respiratory status: unassisted, spontaneous ventilation and room air  Hydration status: euvolemic  Follow-up not needed.              Vitals Value Taken Time   /51 03/10/25 10:44   Temp 36.8 °C (98.3 °F) 03/10/25 10:44   Pulse 70 03/10/25 10:44   Resp 20 03/10/25 10:44   SpO2 100 % 03/10/25 10:44         No case tracking events are documented in the log.      Pain/Tino Score: No data recorded

## 2025-03-10 NOTE — ANESTHESIA PREPROCEDURE EVALUATION
03/10/2025  Fior Joya is a 69 y.o., female.      Pre-op Assessment    I have reviewed the Patient Summary Reports.     I have reviewed the Nursing Notes. I have reviewed the NPO Status.   I have reviewed the Medications.     Review of Systems  Anesthesia Hx:             Denies Family Hx of Anesthesia complications.    Denies Personal Hx of Anesthesia complications.                    Social:  No Alcohol Use, Non-Smoker       Hematology/Oncology:  Hematology Normal   Oncology Normal                                   EENT/Dental:  EENT/Dental Normal           Cardiovascular:     Hypertension              ECG has been reviewed.                            Pulmonary:  Pulmonary Normal                       Renal/:  Chronic Renal Disease, ESRD                Hepatic/GI:  Hepatic/GI Normal                    Musculoskeletal:  Musculoskeletal Normal                Neurological:  Neurology Normal                                      Endocrine:  Endocrine Normal            Dermatological:  Skin Normal    Psych:  Psychiatric Normal                    Physical Exam  General: Cooperative, Alert and Oriented    Airway:  Mallampati: II   Mouth Opening: Normal  TM Distance: Normal  Tongue: Normal  Neck ROM: Normal ROM    Dental:  Intact        Anesthesia Plan  Type of Anesthesia, risks & benefits discussed:    Anesthesia Type: Gen Natural Airway  Intra-op Monitoring Plan: Standard ASA Monitors  Post Op Pain Control Plan:   (medical reason for not using multimodal pain management)  Induction:  IV  Informed Consent: Informed consent signed with the Patient and all parties understand the risks and agree with anesthesia plan.  All questions answered. Patient consented to blood products? Yes  ASA Score: 3    Ready For Surgery From Anesthesia Perspective.     .

## 2025-03-11 ENCOUNTER — PATIENT OUTREACH (OUTPATIENT)
Dept: ADMINISTRATIVE | Facility: CLINIC | Age: 70
End: 2025-03-11
Payer: MEDICARE

## 2025-03-11 NOTE — PROGRESS NOTES
C3 nurse attempted to contact Fior Joya for a TCC post hospital discharge follow up call. No answer. No voicemail available. The patient has a scheduled HOS appointment with Donovan Becerra MD (General Surgery); FOLLOWUP ON 3-17-25 AT 6:30am.

## 2025-03-12 NOTE — PROGRESS NOTES
C3 nurse spoke with Fior Joya and jonathan Allison for a TCC post hospital discharge follow up call. The patient has a scheduled [facility] appointment with Donovan Becerra MD (General Surgery); FOLLOWUP ON 3-17-25 AT 6:30am.

## 2025-03-13 NOTE — PROGRESS NOTES
Pharmacist Renal Dose Adjustment Note    Fior Joya is a 69 y.o. female being treated with the medication cefepime    Patient Data:    Vital Signs (Most Recent):  Temp: 97.7 °F (36.5 °C) (03/13/25 1401)  Pulse: 77 (03/13/25 1401)  Resp: 16 (03/13/25 1401)  BP: (!) 74/48 (03/13/25 1401)  SpO2: 97 % (03/13/25 1401) Vital Signs (72h Range):  Temp:  [97.7 °F (36.5 °C)]   Pulse:  [77]   Resp:  [16]   BP: (74)/(48)   SpO2:  [97 %]      Recent Labs   Lab 03/10/25  1027   CREATININE 6.92*     Serum creatinine: 6.92 mg/dL (H) 03/10/25 1027  Estimated creatinine clearance: 8.6 mL/min (A)    Medication: cefepime dose: 2 g frequency x 1 will be changed to medication: cefepime dose: 1 g frequency: x 1    Pharmacist's Name: Yudelka Dan  Pharmacist's Extension: [phone]

## 2025-03-13 NOTE — ED PROVIDER NOTES
Encounter Date: 3/13/2025    SCRIBE #1 NOTE: I, Tenzin Hollis, am scribing for, and in the presence of,  Juni Fletcher MD. I have scribed the entire note.       History     Chief Complaint   Patient presents with    Fatigue     Patient reports fatigue. Sent from dialysis due to weakness, shivering, and lethargy. Family also reports intermittent AMS. Last dialysis run on Tuesday. Patient with nephrostomy tubes in place.      Patient is a 69 year old female with a hx of ESRD presents to the ED for generalized weakness. Pt was at dialysis this morning whenever she felt increasingly weak. Along with the weakness, pt reports diffuse back pain and bilateral lower extremity pain. Pt was recently diagnosed with bladder cancer with resulting bladder outlet obstruction resulting in renal failure, a requiring nephrostomy tube placement. Pt is not on any treatment for her cancer. Pt has 3 nephrostomy tubes in place. Pt's last run of dialysis was on Tuesday. Pt typically dialyzes on Tuesday, Thursday, and Saturday.     The history is provided by the patient. No  was used.     Review of patient's allergies indicates:   Allergen Reactions    Asa [aspirin] Anaphylaxis    Penicillins      Received ceftriaxone from 6/27, no reactions      Past Medical History:   Diagnosis Date    Bladder cancer     ESRD (end stage renal disease)      Past Surgical History:   Procedure Laterality Date    CYSTOSCOPY W/ URETERAL STENT PLACEMENT Right 6/27/2024    Procedure: CYSTOSCOPY, WITH URETERAL STENT INSERTION;  Surgeon: Otis Person MD;  Location: [facility];  Service: Urology;  Laterality: Right;  CYSTOSCOPY, BILATERAL RETROGRADE PYELOGRAMS, POSSIBLE STENT PLACEMENT.    CYSTOSCOPY W/ URETERAL STENT PLACEMENT Bilateral 10/4/2024    Procedure: CYSTOSCOPY, WITH URETERAL STENT INSERTION;  Surgeon: Tawana Loaiza MD;  Location: [facility];  Service: Urology;  Laterality: Bilateral;  CYSTO WITH BILAT URETERAL STENT EXCHANGE     INSERTION OF TUNNELED CENTRAL VENOUS HEMODIALYSIS CATHETER N/A 7/3/2024    Procedure: Insertion, Catheter, Central Venous, Hemodialysis;  Surgeon: Uche Barcenas MD;  Location: [facility] CATH LAB;  Service: Peripheral Vascular;  Laterality: N/A;    INSERTION OF TUNNELED CENTRAL VENOUS HEMODIALYSIS CATHETER N/A 10/24/2024    Procedure: Insertion, Catheter, Central Venous, Hemodialysis;  Surgeon: Nikolai Manzo DO;  Location: [facility] CATH LAB;  Service: Nephrology;  Laterality: N/A;    INSERTION, CATHETER, TUNNELED Right 3/10/2025    Procedure: INSERTION, CATHETER, TUNNELED;  Surgeon: Donovan Becerra MD;  Location: [facility] OR;  Service: General;  Laterality: Right;    REMOVAL OF TUNNELED CENTRAL VENOUS CATHETER (CVC) Left 3/10/2025    Procedure: REMOVAL, CATHETER, CENTRAL VENOUS, TUNNELED;  Surgeon: Donovan Becerra MD;  Location: [facility] OR;  Service: General;  Laterality: Left;    REPLACEMENT OF DIALYSIS CATHETER OVER GUIDEWIRE THROUGH EXISTING VENOUS ACCESS N/A 1/13/2025    Procedure: REPLACEMENT, CATHETER, DIALYSIS, OVER GUIDEWIRE, USING EXISTING VENOUS ACCESS;  Surgeon: Nikolai Manzo DO;  Location: [facility] CATH LAB;  Service: Nephrology;  Laterality: N/A;     No family history on file.  Social History[1]  Review of Systems   Constitutional:  Negative for fever.   HENT:  Negative for sore throat.    Eyes:  Negative for visual disturbance.   Respiratory:  Negative for shortness of breath.    Cardiovascular:  Negative for chest pain.   Gastrointestinal:  Negative for abdominal pain.   Genitourinary:  Negative for dysuria.   Musculoskeletal:  Positive for back pain. Negative for joint swelling.        Bilateral lower extremity pain    Skin:  Negative for rash.   Neurological:  Negative for weakness.   Psychiatric/Behavioral:  Negative for confusion.        Physical Exam     Initial Vitals [03/13/25 1401]   BP Pulse Resp Temp SpO2   (!) 74/48 77 16 97.7 °F (36.5 °C) 97 %      MAP       --         Physical  Exam    Nursing note and vitals reviewed.  Constitutional: She appears well-developed and well-nourished.   HENT:   Head: Normocephalic and atraumatic.   Eyes: EOM are normal. Pupils are equal, round, and reactive to light.   Neck:   Normal range of motion.  Cardiovascular:  Normal rate, regular rhythm, normal heart sounds and intact distal pulses.           No murmur heard.  Pulmonary/Chest: Breath sounds normal. No respiratory distress. She has no wheezes. She has no rales.   Abdominal: Abdomen is soft. She exhibits no distension. There is no abdominal tenderness.   2 right sided nephrostomy tubes. 1 left sided nephrostomy tube There is no rebound.   Musculoskeletal:         General: No tenderness or edema. Normal range of motion.      Cervical back: Normal range of motion.     Neurological: She is alert. She has normal strength. No cranial nerve deficit. GCS score is 15. GCS eye subscore is 4. GCS verbal subscore is 5. GCS motor subscore is 6.   Skin: Skin is warm and dry. Capillary refill takes less than 2 seconds. No rash noted. No erythema.   Psychiatric: She has a normal mood and affect.         ED Course   Critical Care    Date/Time: 3/13/2025 4:35 PM    Performed by: Juni Fletcher MD  Authorized by: Juni Fletcher MD  Direct patient critical care time: 15 minutes  Additional history critical care time: 8 minutes  Ordering / reviewing critical care time: 6 minutes  Documentation critical care time: 5 minutes  Consulting other physicians critical care time: 5 minutes  Total critical care time (exclusive of procedural time) : 39 minutes  Critical care time was exclusive of separately billable procedures and treating other patients and teaching time.  Critical care was necessary to treat or prevent imminent or life-threatening deterioration of the following conditions: shock, metabolic crisis, renal failure, cardiac failure, circulatory failure and sepsis.  Critical care was time spent personally by me on  the following activities: development of treatment plan with patient or surrogate, discussions with consultants, interpretation of cardiac output measurements, evaluation of patient's response to treatment, examination of patient, obtaining history from patient or surrogate, ordering and performing treatments and interventions, ordering and review of laboratory studies, ordering and review of radiographic studies, pulse oximetry, re-evaluation of patient's condition and review of old charts.        Labs Reviewed   COMPREHENSIVE METABOLIC PANEL - Abnormal       Result Value    Sodium 138      Potassium 3.8      Chloride 98      CO2 23      Glucose 46 (*)     Blood Urea Nitrogen 20.8 (*)     Creatinine 5.00 (*)     Calcium 7.3 (*)     Protein Total 6.5      Albumin 1.6 (*)     Globulin 4.9 (*)     Albumin/Globulin Ratio 0.3 (*)     Bilirubin Total 0.4      ALP 98      ALT 30      AST 82 (*)     eGFR 9      Anion Gap 17.0      BUN/Creatinine Ratio 4     TROPONIN I - Abnormal    Troponin-I 0.187 (*)    APTT - Abnormal    PTT 41.7 (*)    PROTIME-INR - Abnormal    PT 16.1 (*)     INR 1.3      Narrative:     Protimes are used to monitor anticoagulant agents such as warfarin. PT INR values are based on the current patient normal mean and the ENEIDA value for the specific instrument reagent used.  **Routine theraputic target values for the INR are 2.0-3.0**   LACTIC ACID, PLASMA - Abnormal    Lactic Acid Level 3.2 (*)    CBC WITH DIFFERENTIAL - Abnormal    WBC 8.37      RBC 3.54 (*)     Hgb 11.6 (*)     Hct 34.6 (*)     MCV 97.7 (*)     MCH 32.8 (*)     MCHC 33.5      RDW 16.7      Platelet 248      MPV 11.3 (*)     Neut % 74.0      Lymph % 17.4      Mono % 7.3      Eos % 0.4      Basophil % 0.4      Imm Grans % 0.5      Neut # 6.20      Lymph # 1.46      Mono # 0.61      Eos # 0.03      Baso # 0.03      Imm Gran # 0.04      NRBC% 0.0     LACTIC ACID, PLASMA - Abnormal    Lactic Acid Level 3.4 (*)    MRSA PCR - Abnormal    MRSA  PCR Screen Detected (*)     Narrative:     The Xpert MRSA Assay utilizes automated real-time polymerase chain reaction (PCR) to detect MRSA DNA.  A positive test result does not necessarily indicate the presence of viable organism.  It is however, presumptive for the presence of MRSA.   TROPONIN I - Abnormal    Troponin-I 0.141 (*)    LACTIC ACID, PLASMA - Abnormal    Lactic Acid Level 4.0 (*)    POCT GLUCOSE - Abnormal    POCT Glucose 66 (*)    POCT GLUCOSE - Abnormal    POCT Glucose 253 (*)    POCT GLUCOSE - Abnormal    POCT Glucose 142 (*)    POCT GLUCOSE - Abnormal    POCT Glucose 151 (*)    COVID/FLU A&B PCR - Normal    Influenza A PCR Not Detected      Influenza B PCR Not Detected      SARS-CoV-2 PCR Not Detected      Narrative:     The Xpert Xpress SARS-CoV-2/FLU/RSV plus is a rapid, multiplexed real-time PCR test intended for the simultaneous qualitative detection and differentiation of SARS-CoV-2, Influenza A, Influenza B, and respiratory syncytial virus (RSV) viral RNA in either nasopharyngeal swab or nasal swab specimens.         LIPASE - Normal    Lipase Level 5     CBC W/ AUTO DIFFERENTIAL    Narrative:     The following orders were created for panel order CBC auto differential.  Procedure                               Abnormality         Status                     ---------                               -----------         ------                     CBC with Differential[[id number]]       Abnormal            Final result                 Please view results for these tests on the individual orders.   POCT GLUCOSE    POCT Glucose 105     POCT GLUCOSE    POCT Glucose 73       EKG Readings: (Independently Interpreted)   Initial Reading: No STEMI. Rhythm: Normal Sinus Rhythm. Heart Rate: 75. Ectopy: No Ectopy. Conduction: Normal. ST Segments: Normal ST Segments. T Waves: Normal. Axis: Normal.   Done on 3/13/25 at 1032.      ECG Results              EKG 12-lead (Final result)        Collection Time Result  Time QRS Duration OHS QTC Calculation    03/13/25 14:55:57 03/14/25 09:52:33 68 462                     Final result by Interface, Lab In [facility] (03/14/25 09:52:35)                   Narrative:    Test Reason : R53.1,    Vent. Rate :  75 BPM     Atrial Rate :  75 BPM     P-R Int : 156 ms          QRS Dur :  68 ms      QT Int : 414 ms       P-R-T Axes :  73  -1  43 degrees    QTcB Int : 462 ms    Normal sinus rhythm  Cannot rule out Anterior infarct (cited on or before 22-Feb-2025)  Abnormal ECG  When compared with ECG of 22-Feb-2025 12:31,  Criteria for Inferior infarct are no longer Present  QT has lengthened  Confirmed by Jayro Meza ([id number]) on 3/14/2025 9:52:27 AM    Referred By: LIA BERNARDO           Confirmed By: Jayro Meza                                  Imaging Results              CT Abdomen Pelvis  Without Contrast (Final result)  Result time 03/13/25 16:59:02      Final result by Pastor Gordon MD (03/13/25 16:59:02)                   Impression:      No acute process identified in the abdomen or pelvis.      Electronically signed by: Pastor Gordon  Date:    03/13/2025  Time:    16:59               Narrative:    EXAMINATION:  CT ABDOMEN PELVIS WITHOUT CONTRAST    CLINICAL HISTORY:  Abdominal pain, acute, nonlocalized;Nephrostomy tubes;    TECHNIQUE:  CT imaging of the abdomen and pelvis without intravenous contrast. Axial, coronal and sagittal reformatted images reviewed. Dose length product is 1110 mGycm. Automatic exposure control, adjustment of mA/kV or iterative reconstruction technique used to limit radiation dose.    COMPARISON:  CT 10/15/2024    PET-CT 01/15/2025    FINDINGS:  Assessment of the visceral organs and vasculature is limited by the lack of IV contrast.    Liver/biliary: Small portion of the hepatic dome excluded from the field of view.  Mild generalized hepatic steatosis.  No radiodense gallstone or biliary dilatation appreciated.    Pancreas: Normal.    Spleen:  Normal.    Adrenals: Normal.    Genitourinary: Bilateral nephrostomy tubes and ureteral stents.  No significant collecting system dilatation.  Bladder relatively decompressed and not well evaluated.    Stomach/bowel: No bowel obstruction. Appendix not confidently seen.  No discernible bowel inflammation.    Lymph nodes and peritoneum: 2 stable enlarged iliac lymph nodes.  No ascites or free air.    Vasculature: Aortoiliac atherosclerosis.    Abdominal wall: Normal.    Lung bases: No consolidation or pleural effusion.    Bones: Similar right sacroiliitis.  Small lytic area in the left iliac wing corresponds with hypermetabolic metastasis on previous PET-CT.                                       X-Ray Chest AP Portable (Final result)  Result time 03/13/25 14:52:21      Final result by Deidra Dobson MD (03/13/25 14:52:21)                   Impression:      Enlarged cardiac silhouette without edema      Electronically signed by: Deidra Dobson  Date:    03/13/2025  Time:    14:52               Narrative:    EXAMINATION:  XR CHEST AP PORTABLE    CLINICAL HISTORY:  Other fatigue    TECHNIQUE:  Single frontal view of the chest was performed.    COMPARISON:  03/10/2025    FINDINGS:  LINES AND TUBES: Right IJ CVC tip projects over the SVC.    MEDIASTINUM AND JUSTYN: Cardiac silhouette is enlarged. Aortic atherosclerosis.    LUNGS: No lobar consolidation. No edema.    PLEURA:No pleural effusion. No pneumothorax.    BONES: No acute osseous abnormality.                                       Medications   mupirocin 2 % ointment ( Nasal Given 3/16/25 2112)   sodium chloride 0.9% flush 10 mL (has no administration in time range)   acetaminophen tablet 650 mg (has no administration in time range)   ondansetron injection 4 mg (has no administration in time range)   vancomycin - pharmacy to dose (has no administration in time range)   miconazole NITRATE 2 % top powder ( Topical (Top) Given 3/16/25 2115)   atorvastatin tablet  20 mg (20 mg Oral Given 3/16/25 2112)   folic acid tablet 1 mg (1 mg Oral Given 3/16/25 1409)   heparin (porcine) injection 5,000 Units (5,000 Units Subcutaneous Given 3/16/25 2112)   magnesium sulfate 2g in water 50mL IVPB (premix) (0 g Intravenous Stopped 3/14/25 0655)     Followed by   magnesium sulfate 2g in water 50mL IVPB (premix) (0 g Intravenous Stopped 3/14/25 1059)   0.9% NaCl infusion ( Intravenous Back Association 3/14/25 1115)   HYDROmorphone (PF) injection 0.2 mg (0.2 mg Intravenous Given 3/16/25 0310)   lactated ringers bolus 500 mL (500 mLs Intravenous Back Association 3/15/25 2030)   vasopressin (PITRESSIN) 0.2 Units/mL in 0.9% NaCl 100 mL infusion (0.1 Units/min Intravenous Verify Only 3/17/25 0500)   LORazepam (ATIVAN) injection 1 mg (1 mg Intravenous Not Given 3/16/25 0400)   calcium gluconate 1 g in NS IVPB (premixed) (1 g Intravenous Not Given 3/16/25 0800)   NORepinephrine 32 mg in 0.9% NaCl 250 mL infusion (3 mcg/kg/min × 100.2 kg Intravenous Verify Only 3/17/25 0500)   etomidate injection (20 mg Intravenous Given 3/16/25 0805)   rocuronium injection (100 mg Intravenous Given 3/16/25 0806)   fentaNYL 2500 mcg in 0.9% sodium chloride 250 mL infusion premix (25 mcg/hr Intravenous Verify Only 3/17/25 0500)   fentaNYL injection 50 mcg (50 mcg Intravenous Given 3/16/25 0834)     Followed by   fentaNYL injection 50 mcg (has no administration in time range)   fentaNYL (SUBLIMAZE) 50 mcg/mL injection (  Not Given 3/16/25 0845)   meropenem injection 1 g (1 g Intravenous Given 3/16/25 1338)   dexmedetomidine (PRECEDEX) 400mcg/100mL 0.9% NaCL infusion (0.08 mcg/kg/hr × 100.2 kg Intravenous Verify Only 3/17/25 0500)   hydrocortisone sodium succinate injection 100 mg (100 mg Intravenous Given 3/17/25 0519)   sodium phosphate 20.1 mmol in D5W 250 mL IVPB (has no administration in time range)   sodium phosphate 30 mmol in D5W 250 mL IVPB (has no administration in time range)   sodium phosphate 39.9 mmol in  D5W 250 mL IVPB (has no administration in time range)   magnesium sulfate 2g in water 50mL IVPB (premix) (has no administration in time range)   PHENYLephrine 100 mg in 0.9% NaCl 250 mL infusion (5 mcg/kg/min × 100.2 kg Intravenous Verify Only 3/17/25 0500)   sodium bicarbonate 150 mEq in 0.45% NaCl 1,000 mL infusion (has no administration in time range)   calcium gluconate 1 g in NS IVPB (premixed) (1 g Intravenous New Bag 3/17/25 0520)   calcium gluconate 1 g in NS IVPB (premixed) (has no administration in time range)   sodium chloride 0.9% bolus 1,000 mL 1,000 mL (0 mLs Intravenous Stopped 3/13/25 1533)   ceFEPIme injection 1 g (1 g Intravenous Given 3/13/25 1533)   dextrose 50% injection 25 g (25 g Intravenous Given 3/13/25 1552)   oxyCODONE-acetaminophen 5-325 mg per tablet 1 tablet (1 tablet Oral Given 3/13/25 1637)   sodium chloride 0.9% bolus 1,000 mL 1,000 mL (0 mLs Intravenous Stopped 3/13/25 1800)   vancomycin (VANCOCIN) 1,000 mg in D5W 250 mL IVPB (admixture device) (0 mg Intravenous Stopped 3/13/25 1951)     Followed by   vancomycin (VANCOCIN) 1,000 mg in D5W 250 mL IVPB (admixture device) ( Intravenous Canceled Entry 3/13/25 2125)   dextrose 50% injection 50 g (50 g Intravenous Given 3/13/25 1926)   LIDOcaine HCL 10 mg/ml (1%) 10 mg/mL (1 %) injection (  Given by Other 3/14/25 1330)   midazolam (PF) (VERSED) 1 mg/mL injection 2 mg (2 mg Intravenous Given 3/14/25 1402)   sodium chloride 0.9% bolus 1,000 mL 1,000 mL (0 mLs Intravenous Stopped 3/14/25 2100)   potassium chloride 20 mEq in 100 mL IVPB (FOR CENTRAL LINE ADMINISTRATION ONLY) ( Intravenous Verify Only 3/15/25 1057)   calcium gluconate 1 g in NS IVPB (premixed) (0 g Intravenous Stopped 3/15/25 0737)   vancomycin (VANCOCIN) 500 mg in D5W 100 mL IVPB (MB+) (0 mg Intravenous Stopped 3/15/25 0216)   flumazeniL injection 0.2 mg (0.2 mg Intravenous Given 3/15/25 9670)   iohexoL (OMNIPAQUE 350) injection 100 mL (100 mLs Intravenous Given 3/15/25 9406)    lactated ringers bolus 500 mL (0 mLs Intravenous Stopped 3/15/25 1715)   LORazepam (ATIVAN) 2 mg/mL injection (  Override Pull 3/16/25 0245)   potassium phosphate 20 mmol in D5W 500 mL infusion (0 mmol Intravenous Stopped 3/16/25 1122)   propofoL (DIPRIVAN) 10 mg/mL infusion (1,000 mg  New Bag 3/16/25 0823)   iohexoL (OMNIPAQUE 350) injection 63 mL (63 mLs Intravenous Given 3/16/25 0946)   calcium gluconate 1 g in NS IVPB (premixed) (0 g Intravenous Stopped 3/16/25 1448)   propofoL (DIPRIVAN) 10 mg/mL infusion (  New Bag 3/16/25 1030)   lactated ringers bolus 1,000 mL (0 mLs Intravenous Stopped 3/16/25 1509)   pantoprazole injection 80 mg (80 mg Intravenous Given 3/16/25 1407)   calcium gluconate 1 g in NS IVPB (premixed) (0 g Intravenous Stopped 3/16/25 1951)   lactated ringers bolus 1,000 mL ( Intravenous Stopped 3/16/25 1723)   sodium bicarbonate 8.4 % (1 mEq/mL) injection 100 mEq (100 mEq Intravenous Given 3/16/25 1626)   sodium bicarbonate 8.4 % (1 mEq/mL) injection 100 mEq (100 mEq Intravenous Given 3/17/25 0022)   sodium bicarbonate 8.4 % (1 mEq/mL) injection 100 mEq (100 mEq Intravenous Given 3/17/25 0518)     Medical Decision Making  Judging by the patient's chief complaint and pertinent history, the patient has the following possible differential diagnoses, including but not limited to the following.  Some of these are deemed to be lower likelihood and some more likely based on my physical exam and history combined with possible lab work and/or imaging studies.   Please see the pertinent studies, and refer to the HPI.  Some of these diagnoses will take further evaluation to fully rule out, perhaps as an outpatient and the patient was encouraged to follow up when discharged for more comprehensive evaluation.    Generalized weakness: anemia, dehydration, electrolyte derangement, hypoglycemia, infection, respiratory failure,  ACS, thyroid disease, medications, psychiatric, autoimmune, rhabdomyolysis,  "myositis, peripheral neuropathy     Patient is a 69-year-old female presents to emergency department for fatigue, back pain, generalized weakness, sent from dialysis for hypotension.  See HPI.  See physical exam.  Given 2 L of IV fluids.  She is ESRD at risk for volume overload.  Unable to receive 30 cc/kg of IV fluids due to ESRD and concern for respiratory status.  She is started on broad-spectrum antibiotics.  Blood cultures have been obtained.  She was started on vasopressors.  Nephrostomy tubes appear to be draining very little.  CT abdomen pelvis as noted.  Discussed with ICU, Dr. Levy.  Patient will be admitted to ICU for shock.  Troponin minimally elevated but likely due to hypotension setting of ESRD.  EKG without ST elevation.    Problems Addressed:  Elevated troponin: acute illness or injury that poses a threat to life or bodily functions  Fatigue: acute illness or injury that poses a threat to life or bodily functions  Hypoglycemia: acute illness or injury that poses a threat to life or bodily functions  Shock: acute illness or injury that poses a threat to life or bodily functions  Weakness: acute illness or injury that poses a threat to life or bodily functions    Amount and/or Complexity of Data Reviewed  Independent Historian: caregiver     Details: Collateral from family at bedside  External Data Reviewed: ECG and notes.  Labs: ordered.  Radiology: ordered and independent interpretation performed.  ECG/medicine tests: ordered and independent interpretation performed.  Discussion of management or test interpretation with external provider(s): Discussed with ICU who will admit the patient.    Risk  OTC drugs.  Prescription drug management.  Parenteral controlled substances.  Decision regarding hospitalization.  Diagnosis or treatment significantly limited by social determinants of health.    Clinical Tests:   Sepsis Perfusion Assessment: "I attest a sepsis perfusion exam was performed within 6 hours " "of sepsis, severe sepsis, or septic shock presentation, following fluid resuscitation."    Sepsis Perfusion Assessment Complete: 3/13/2025 18:35 PM        Scribe Attestation:   Scribe #1: I performed the above scribed service and the documentation accurately describes the services I performed. I attest to the accuracy of the note.    Attending Attestation:           Physician Attestation for Scribe:  Physician Attestation Statement for Scribe #1: I, Juni Fletcher MD, reviewed documentation, as scribed by Tenzin Hollis in my presence, and it is both accurate and complete.             ED Course as of 03/17/25 0542   Thu Mar 13, 2025   1500 Normal sinus rhythm.  Rate of 75.  Normal intervals.  No STEMI.  Time of 1455 [RP]      ED Course User Index  [RP] Juni Fletcher MD               Medical Decision Making:   Clinical Tests:   Sepsis Perfusion Assessment: "I attest a sepsis perfusion exam was performed within 6 hours of sepsis, severe sepsis, or septic shock presentation, following fluid resuscitation."    Sepsis Perfusion Assessment Complete: 3/13/2025 6:35 PM               Clinical Impression:  Final diagnoses:  [R53.1] Weakness (Primary)  [R53.83] Fatigue  [R57.9] Shock  [R79.89] Elevated troponin  [E16.2] Hypoglycemia  [A41.9] Sepsis, due to unspecified organism, unspecified whether acute organ dysfunction present          ED Disposition Condition    Admit                   Juni Fletcher MD  03/17/25 0541         [1]   Social History  Tobacco Use    Smoking status: Never    Smokeless tobacco: Never   Substance Use Topics    Alcohol use: Never    Drug use: Never        Juni Fletcher MD  03/17/25 0542    "

## 2025-03-13 NOTE — Clinical Note
Diagnosis: Shock [[id number]]   Future Attending Provider: LEVI CHUN [[id number]]   Admit to which facility:: [facility] [[id number]]   Reason for IP Medical Treatment  (Clinical interventions that can only be accomplished in the IP setting? ) :: Shock, on levophed, ESRD

## 2025-03-14 NOTE — PLAN OF CARE
03/14/25 1817   Discharge Assessment   Assessment Type Discharge Planning Assessment   Confirmed/corrected address, phone number and insurance Yes   Confirmed Demographics Correct on Facesheet   Source of Information family   If unable to respond/provide information was family/caregiver contacted? Yes   Contact Name/Number Berenice Gusman [phone]   Does patient/caregiver understand observation status   (inpatient)   Communicated [place] with patient/caregiver Date not available/Unable to determine   Reason For Admission shock, weakness, fatigue   People in Home child(santana), adult   Do you expect to return to your current living situation? Yes   Do you have help at home or someone to help you manage your care at home? Yes   Who are your caregiver(s) and their phone number(s)? jonathan Ng   Prior to hospitilization cognitive status: Unable to Assess   Current cognitive status: Unable to Assess   Walking or Climbing Stairs Difficulty yes   Walking or Climbing Stairs transferring difficulty, dependent   Mobility Management dependent  use w/c   Dressing/Bathing Difficulty yes   Dressing/Bathing dressing difficulty, dependent   Home Accessibility wheelchair accessible   Equipment Currently Used at Home bedside commode;wheelchair   Readmission within 30 days? No   Patient currently being followed by outpatient case management? No   Do you currently have service(s) that help you manage your care at home? Yes   How Many hours does patient receive services 2   Name and Contact number of agency [facility]   Is the pt/caregiver preference to resume services with current agency Yes   Do you take prescription medications? Yes   Do you have any problems affording any of your prescribed medications? No   Is the patient taking medications as prescribed? yes   Who is going to help you get home at discharge? Berenice castillo   How do you get to doctors appointments? other (see comments)   Discharge Plan A Home with family;Home  Health   Discharge Plan B Home with family;Home Health   DME Needed Upon Discharge  other (see comments)  (to be determined)   Discharge Plan discussed with: Adult children   Transition of Care Barriers None   Physical Activity   On average, how many days per week do you engage in moderate to strenuous exercise (like a brisk walk)? 0 days   Financial Resource Strain   How hard is it for you to pay for the very basics like food, housing, medical care, and heating? Not hard   Housing Stability   In the last 12 months, was there a time when you were not able to pay the mortgage or rent on time? N   At any time in the past 12 months, were you homeless or living in a shelter (including now)? N   Transportation Needs   Has the lack of transportation kept you from medical appointments, meetings, work or from getting things needed for daily living? No   Food Insecurity   Within the past 12 months, you worried that your food would run out before you got the money to buy more. Never true   Within the past 12 months, the food you bought just didn't last and you didn't have money to get more. Never true   Alcohol Use   Q1: How often do you have a drink containing alcohol? Never   Q2: How many drinks containing alcohol do you have on a typical day when you are drinking? None   Q3: How often do you have six or more drinks on one occasion? Never   Utilities   In the past 12 months has the electric, gas, oil, or water company threatened to shut off services in your home? No   Health Literacy   How often do you need to have someone help you when you read instructions, pamphlets, or other written material from your doctor or pharmacy? Never   OTHER   Name(s) of People in Home pt and sonBerenice     Earlier today spoke to son Berenice, who resides with her, he is her caregiver.  She has a RW, cane, w/c and BSC [facility] home.  She is current with [facility] rec.  Only SN.  Pt. Can read and write and cont. To pay her own bills.   Joe Burgos is her PCP.  .  Pallative Team is on board , refer to their notes.

## 2025-03-14 NOTE — PLAN OF CARE
Problem: Adult Inpatient Plan of Care  Goal: Plan of Care Review  Outcome: Progressing  Goal: Patient-Specific Goal (Individualized)  Outcome: Progressing  Goal: Absence of Hospital-Acquired Illness or Injury  Outcome: Progressing  Goal: Optimal Comfort and Wellbeing  Outcome: Progressing  Goal: Readiness for Transition of Care  Outcome: Progressing     Problem: Bariatric Environmental Safety  Goal: Safety Maintained with Care  Outcome: Progressing     Problem: Skin Injury Risk Increased  Goal: Skin Health and Integrity  Outcome: Progressing     Problem: Infection  Goal: Absence of Infection Signs and Symptoms  Outcome: Progressing     Problem: Wound  Goal: Optimal Coping  Outcome: Progressing  Goal: Optimal Functional Ability  Outcome: Progressing  Goal: Absence of Infection Signs and Symptoms  Outcome: Progressing  Goal: Improved Oral Intake  Outcome: Progressing  Goal: Optimal Pain Control and Function  Outcome: Progressing  Goal: Skin Health and Integrity  Outcome: Progressing  Goal: Optimal Wound Healing  Outcome: Progressing

## 2025-03-14 NOTE — H&P
[facility] - Emergency Dept  Pulmonary Critical Care Note    Patient Name: Fior Joya  MRN: [id number]  Admission Date: 3/13/2025  Hospital Length of Stay: 0 days  Code Status: Full Code  Attending Provider: Regis Levy MD  Resident: Nilson  Primary Care Provider: Chriss Mckeon NP     Subjective:     HPI:   Fior Joya is a 68 yo F w PMHx of ESRD on HD TTS, recent removal of L tunneled cath and R tunneled calth placement on 3/10/25, MRSA bacteremia in 10/2024, right nephrostomy tube x 2, left nephrostomy tube x 1, and bladder ca s/p bilateral ureteral stent placement who presented [facility] ED on 3/13/25 with 1-day history of generalized weakness. Patient reports generalized weakness, increased diffuse back pain from baseline and bilateral lower extremity pain when undergoing dialysis last on Tuesday, 3/12/25. Denies fevers, sick contacts, open wounds, and drainage from her recent tunneled cath site. Denies falls and LOC. Patient's son is her caretaker and says nephrostomy tubes intermittently put out cloudy output every 4-5 days. Patient reports tunneled cath was replaced recently due to blockage. Upon ED arrival, systolic BP in 70s and tachypneic RR 25, however all other VS stable. Found to be hypoglycemic 46 with elevated troponin 0.187 and lactic acid 3.2 on admission. EKG negative for acute ischemic change from previous. +MRSA PCR. Covid/flu negative. Patient was placed on Levophed and admitted to ICU.     Hospital Course/Significant events:  Transferred from     24 Hour Interval History:  NA    Past Medical History:   Diagnosis Date    Bladder cancer     ESRD (end stage renal disease)        Past Surgical History:   Procedure Laterality Date    CYSTOSCOPY W/ URETERAL STENT PLACEMENT Right 6/27/2024    Procedure: CYSTOSCOPY, WITH URETERAL STENT INSERTION;  Surgeon: Otis Person MD;  Location: [facility];  Service: Urology;  Laterality: Right;  CYSTOSCOPY, BILATERAL RETROGRADE  PYELOGRAMS, POSSIBLE STENT PLACEMENT.    CYSTOSCOPY W/ URETERAL STENT PLACEMENT Bilateral 10/4/2024    Procedure: CYSTOSCOPY, WITH URETERAL STENT INSERTION;  Surgeon: Tawana Loaiza MD;  Location: [facility];  Service: Urology;  Laterality: Bilateral;  CYSTO WITH BILAT URETERAL STENT EXCHANGE    INSERTION OF TUNNELED CENTRAL VENOUS HEMODIALYSIS CATHETER N/A 7/3/2024    Procedure: Insertion, Catheter, Central Venous, Hemodialysis;  Surgeon: Uche Barcenas MD;  Location: [facility] CATH LAB;  Service: Peripheral Vascular;  Laterality: N/A;    INSERTION OF TUNNELED CENTRAL VENOUS HEMODIALYSIS CATHETER N/A 10/24/2024    Procedure: Insertion, Catheter, Central Venous, Hemodialysis;  Surgeon: Nikolai Manzo DO;  Location: [facility] CATH LAB;  Service: Nephrology;  Laterality: N/A;    INSERTION, CATHETER, TUNNELED Right 3/10/2025    Procedure: INSERTION, CATHETER, TUNNELED;  Surgeon: Donovan Becerra MD;  Location: [facility] OR;  Service: General;  Laterality: Right;    REMOVAL OF TUNNELED CENTRAL VENOUS CATHETER (CVC) Left 3/10/2025    Procedure: REMOVAL, CATHETER, CENTRAL VENOUS, TUNNELED;  Surgeon: Donovan Becerra MD;  Location: [facility] OR;  Service: General;  Laterality: Left;    REPLACEMENT OF DIALYSIS CATHETER OVER GUIDEWIRE THROUGH EXISTING VENOUS ACCESS N/A 1/13/2025    Procedure: REPLACEMENT, CATHETER, DIALYSIS, OVER GUIDEWIRE, USING EXISTING VENOUS ACCESS;  Surgeon: Nikolai Manzo DO;  Location: [facility] CATH LAB;  Service: Nephrology;  Laterality: N/A;       Social History[1]        Current Outpatient Medications   Medication Instructions    atorvastatin (LIPITOR) 20 mg, Nightly    ergocalciferol, vitamin D2, (VITAMIN D2 ORAL) Take by mouth.    FeroSuL 325 mg, Daily    fluconazole (DIFLUCAN) 150 mg, Once    fluticasone propionate (FLONASE) 50 mcg/actuation nasal spray 2 sprays    folic acid (FOLVITE) 1 mg, Oral, Daily    labetaloL (NORMODYNE) 100 mg, 2 times daily    levocetirizine (XYZAL) 5 mg, Nightly     megestroL (MEGACE) 40 mg, Daily    nitrofurantoin, macrocrystal-monohydrate, (MACROBID) 100 MG capsule 100 mg, Every 12 hours    ondansetron (ZOFRAN) 8 MG tablet TAKE 1 TABLET BY MOUTH EVERY 8 HOURS AS NEEDED FOR NAUSEA/ VOMITING    oxyCODONE-acetaminophen (PERCOCET) 5-325 mg per tablet 1 tablet, Oral, On Call Procedure    pantoprazole (PROTONIX) 40 mg, Oral, Daily    sevelamer carbonate (RENVELA) 800 mg, 3 times daily with meals    sodium bicarbonate 650 mg, 4 times daily       Review of patient's allergies indicates:   Allergen Reactions    Asa [aspirin] Anaphylaxis    Penicillins      Received ceftriaxone from 6/27, no reactions         Current Inpatient Medications   [START ON 3/14/2025] mupirocin   Nasal BID       Current Intravenous Infusions   NORepinephrine bitartrate-D5W  0-3 mcg/kg/min Intravenous Continuous 11.3 mL/hr at 03/13/25 2125 0.06 mcg/kg/min at 03/13/25 2125         ROS   As stated above     Objective:       Intake/Output Summary (Last 24 hours) at 3/13/2025 2158  Last data filed at 3/13/2025 2125  Gross per 24 hour   Intake 220.65 ml   Output --   Net 220.65 ml         Vital Signs (Most Recent):  Temp: 98.4 °F (36.9 °C) (03/13/25 2045)  Pulse: 102 (03/13/25 2045)  Resp: 20 (03/13/25 2045)  BP: (!) 93/58 (03/13/25 2130)  SpO2: 97 % (03/13/25 2045)  Body mass index is 40.42 kg/m².  Weight: 100.2 kg (221 lb) Vital Signs (24h Range):  Temp:  [97.7 °F (36.5 °C)-99 °F (37.2 °C)] 98.4 °F (36.9 °C)  Pulse:  [] 102  Resp:  [16-25] 20  SpO2:  [85 %-100 %] 97 %  BP: ()/(37-86) 93/58     Physical Exam  Vitals and nursing note reviewed.   Constitutional:       General: She is awake. She is not in acute distress.     Appearance: She is obese. She is ill-appearing. She is not diaphoretic.      Comments: On room air    HENT:      Head: Normocephalic and atraumatic.      Nose: Nose normal. No congestion or rhinorrhea.      Mouth/Throat:      Mouth: Mucous membranes are moist.      Pharynx: Oropharynx  is clear. No oropharyngeal exudate or posterior oropharyngeal erythema.   Eyes:      Pupils: Pupils are equal, round, and reactive to light.   Cardiovascular:      Rate and Rhythm: Normal rate and regular rhythm.      Pulses: Normal pulses.      Heart sounds: No murmur heard.  Pulmonary:      Effort: Pulmonary effort is normal. No respiratory distress.      Breath sounds: No wheezing, rhonchi or rales.      Comments: Decreased breath sounds bilaterally 2/2 body habitus   Abdominal:      General: Abdomen is flat. Bowel sounds are normal. There is no distension.      Palpations: Abdomen is soft.      Tenderness: There is no abdominal tenderness. There is no right CVA tenderness or left CVA tenderness.      Comments: Nephrostomy tubes in place, cloudy output    Musculoskeletal:      Cervical back: Normal range of motion.      Right lower leg: Edema present.      Left lower leg: Edema present.   Skin:     General: Skin is warm and dry.      Findings: Erythema and rash present.      Comments: Erythematous and weeping skin folds   Warmth to palpation an erythema of lower back and buttock region bilaterally   No open wounds, active drainage, or bleeding.    Neurological:      General: No focal deficit present.      Mental Status: She is alert and oriented to person, place, and time. Mental status is at baseline.      GCS: GCS eye subscore is 4. GCS verbal subscore is 5. GCS motor subscore is 6.   Psychiatric:         Mood and Affect: Mood normal.         Behavior: Behavior normal. Behavior is cooperative.         Thought Content: Thought content normal.         Judgment: Judgment normal.           Lines/Drains/Airways       Central Venous Catheter Line  Duration             Tunneled Central Line - Double Lumen  10/24/24 0805 Internal Jugular Left 140 days         Hemodialysis Catheter 03/10/25 1217 right internal jugular 3 days              Drain  Duration                  Nephrostomy 10/04/24 1506 Right 8 Fr. 160 days      "    Nephrostomy 10/16/24 1150 Right 8 Fr. 148 days         Nephrostomy 10/16/24 1201 Left 8 Fr. 148 days              Peripheral Intravenous Line  Duration                  Peripheral IV - Single Lumen 03/13/25 1419 20 G Left;Posterior Hand <1 day         Peripheral IV - Single Lumen 03/13/25 1419 20 G Posterior;Right Hand <1 day         Peripheral IV - Single Lumen 03/13/25 1910 20 G Left Forearm <1 day                    Significant Labs:    Lab Results   Component Value Date    WBC 8.37 03/13/2025    HGB 11.6 (L) 03/13/2025    HCT 34.6 (L) 03/13/2025    MCV 97.7 (H) 03/13/2025     03/13/2025           BMP  Lab Results   Component Value Date     03/13/2025    K 3.8 03/13/2025    CO2 23 03/13/2025    BUN 20.8 (H) 03/13/2025    CREATININE 5.00 (H) 03/13/2025    CALCIUM 7.3 (L) 03/13/2025    AGAP 17.0 03/13/2025         ABG  No results for input(s): "PH", "PO2", "PCO2", "HCO3", "POCBASEDEF" in the last 168 hours.    Mechanical Ventilation Support:         Significant Imaging:  I have reviewed the pertinent imaging within the past 24 hours.    Imaging Results              CT Abdomen Pelvis  Without Contrast (Final result)  Result time 03/13/25 16:59:02      Final result by Pastor Gordon MD (03/13/25 16:59:02)                   Impression:      No acute process identified in the abdomen or pelvis.      Electronically signed by: Pastor Gordon  Date:    03/13/2025  Time:    16:59               Narrative:    EXAMINATION:  CT ABDOMEN PELVIS WITHOUT CONTRAST    CLINICAL HISTORY:  Abdominal pain, acute, nonlocalized;Nephrostomy tubes;    TECHNIQUE:  CT imaging of the abdomen and pelvis without intravenous contrast. Axial, coronal and sagittal reformatted images reviewed. Dose length product is 1110 mGycm. Automatic exposure control, adjustment of mA/kV or iterative reconstruction technique used to limit radiation dose.    COMPARISON:  CT 10/15/2024    PET-CT 01/15/2025    FINDINGS:  Assessment of the " visceral organs and vasculature is limited by the lack of IV contrast.    Liver/biliary: Small portion of the hepatic dome excluded from the field of view.  Mild generalized hepatic steatosis.  No radiodense gallstone or biliary dilatation appreciated.    Pancreas: Normal.    Spleen: Normal.    Adrenals: Normal.    Genitourinary: Bilateral nephrostomy tubes and ureteral stents.  No significant collecting system dilatation.  Bladder relatively decompressed and not well evaluated.    Stomach/bowel: No bowel obstruction. Appendix not confidently seen.  No discernible bowel inflammation.    Lymph nodes and peritoneum: 2 stable enlarged iliac lymph nodes.  No ascites or free air.    Vasculature: Aortoiliac atherosclerosis.    Abdominal wall: Normal.    Lung bases: No consolidation or pleural effusion.    Bones: Similar right sacroiliitis.  Small lytic area in the left iliac wing corresponds with hypermetabolic metastasis on previous PET-CT.                                       X-Ray Chest AP Portable (Final result)  Result time 03/13/25 14:52:21      Final result by Deidra Dobson MD (03/13/25 14:52:21)                   Impression:      Enlarged cardiac silhouette without edema      Electronically signed by: Deidra Dobson  Date:    03/13/2025  Time:    14:52               Narrative:    EXAMINATION:  XR CHEST AP PORTABLE    CLINICAL HISTORY:  Other fatigue    TECHNIQUE:  Single frontal view of the chest was performed.    COMPARISON:  03/10/2025    FINDINGS:  LINES AND TUBES: Right IJ CVC tip projects over the SVC.    MEDIASTINUM AND JUSTYN: Cardiac silhouette is enlarged. Aortic atherosclerosis.    LUNGS: No lobar consolidation. No edema.    PLEURA:No pleural effusion. No pneumothorax.    BONES: No acute osseous abnormality.                                     Results for orders placed or performed during the hospital encounter of 02/22/25   EKG 12-lead    Collection Time: 02/22/25 12:31 PM   Result Value Ref  Range    QRS Duration 62 ms    [facility] QTC Calculation 405 ms    Narrative    Test Reason : R06.00,    Vent. Rate :  72 BPM     Atrial Rate :  72 BPM     P-R Int : 164 ms          QRS Dur :  62 ms      QT Int : 370 ms       P-R-T Axes :  42 -23  32 degrees    QTcB Int : 405 ms    Normal sinus rhythm with sinus arrhythmia  Inferior infarct ,age undetermined  Abnormal ECG  Confirmed by Augustin Nolasco ([id number]) on 2/23/2025 1:42:32 AM    Referred By: AAAREFERRAL SELF           Confirmed By: Augustin Nolasco        Assessment/Plan:     Assessment  Septic shock requiring vasopressors; SIRS 2/4   Hx of ESRD on HD TTS, recent removal of L tunneled cath and R tunneled calth placement on 3/10/25, MRSA bacteremia in 10/2024, right nephrostomy tube x 2, left nephrostomy tube x 1, bladder ca s/p bilateral ureteral stent placement and iron deficiency anemia       Plan  Admit to ICU for close monitoring   SpO2 goal 94-96%, does not require supplemental O2 at this time    Maintain MAP >65, wean vasopressors as tolerated   Initiate broad spectrum abx Vancomycin and Rocephin   Following fever curve and blood cultures x 2, will need Echo if BC x 2 consistent with staph   Trend lactic acid q 4hr till normalized, trend troponin q 6hr to peak   Consult Nephrology in AM for ESRD on HD TTS schedule   Retroperitoneal U/S to rule out acute renal abnormalities as source of sepsis, consider urology consult if abnormal  Consider MRI imaging of lumbosacral spine to rule out abscess   Daily AM labs  Continuous cardiac monitoring, daily weights and strict I&Os     Code Status: FULL   Abx: Vancomycin and Rocephin   IVF: None   DVT Prophylaxis: Heparin   GI Prophylaxis: None      32 minutes of critical care was time spent personally by me on the following activities: development of treatment plan with patient or surrogate and bedside caregivers, discussions with consultants, evaluation of patient's response to treatment, examination of patient, ordering  and performing treatments and interventions, ordering and review of laboratory studies, ordering and review of radiographic studies, pulse oximetry, re-evaluation of patient's condition.  This critical care time did not overlap with that of any other provider or involve time for any procedures.     Edin Devine MD  Pulmonary Critical Care Medicine  [facility] - Emergency Dept  DOS: 03/13/2025           [1]   Social History  Socioeconomic History    Marital status:    Tobacco Use    Smoking status: Never    Smokeless tobacco: Never   Substance and Sexual Activity    Alcohol use: Never    Drug use: Never    Sexual activity: Not Currently     Social Drivers of Health     Financial Resource Strain: Low Risk  (10/4/2024)    Overall Financial Resource Strain (CARDIA)     Difficulty of Paying Living Expenses: Not hard at all   Food Insecurity: No Food Insecurity (10/4/2024)    Hunger Vital Sign     Worried About Running Out of Food in the Last Year: Never true     Ran Out of Food in the Last Year: Never true   Transportation Needs: No Transportation Needs (10/4/2024)    TRANSPORTATION NEEDS     Transportation : No   Physical Activity: Unknown (10/4/2024)    Exercise Vital Sign     Days of Exercise per Week: 0 days   Stress: No Stress Concern Present (10/4/2024)    [employer] [place] [employer] - Occupational Stress Questionnaire     Feeling of Stress : Not at all   Housing Stability: Unknown (10/4/2024)    Housing Stability Vital Sign     Unable to Pay for Housing in the Last Year: No     Homeless in the Last Year: No

## 2025-03-14 NOTE — CONSULTS
Inpatient consult to Palliative Care  Consult performed by: Nichelle Garcia FNP  Consult ordered by: Casper Hayward Jr., MD, [facility]      Patient Name: Fior Joya   MRN: [id number]   Admission Date: 3/13/2025   Hospital Length of Stay: 1   Attending Provider: Casper Hayward Jr., MD,*   Consulting Provider: Nichelle NOBLE  Reason for Consult: Goals of Care  Primary Care Physician: Chriss Mckeon NP     Principal Problem: <principal problem not specified>     Patient information was obtained from relative(s) and ER records.      Final diagnoses:  [R53.1] Weakness  [R53.83] Fatigue  [R57.9] Shock     Assessment/Plan:     I reviewed the patient and family's understanding of the seriousness of the illness and its expected prognosis. We discussed the patient's goals of care and treatment preferences.  I clarified current code status. I identified the surrogate decision maker or health care POA.  I answered all questions and we formulated a plan including recommendations for symptom management and how to best achieve goals of care.  Advance Care Planning     Date: 03/14/2025    [facility]  I engaged the family in a voluntary conversation about advance care planning and we specifically addressed what the goals of care would be moving forward, in light of the patient's change in clinical status, specifically current condition.  We did specifically address the patient's likely prognosis, which is poor.  We explored the patient's values and preferences for future care.  The family endorses that what is most important right now is to focus on curative/life-prolongation (regardless of treatment burdens)    Accordingly, we have decided that the best plan to meet the patient's goals includes continuing with treatment     This discussion occurred on a fully voluntary basis with the verbal consent of the patient and/or family.          Spoke to jonathan Rodriguez--introduced service and discussed patient's history and current  condition.  Son reports that patient lives with his oldest brother who is her paid caregiver.  States that patient has been very ill lately and is wheelchair/bed-bound at baseline.    Discuss advanced directives to which son states that he believes patient has completed will but is not sure.  Discussed code status and the likely poor outcomes that would result from intubation or cardiac resuscitation.  Son informed that nurse spoke with patient and family last night and asked about code status.  He informed that patient did not know what to say and looked for guidance from her children to which he states that his oldest brother was adamant that patient remain with full code status.    Discussed plan of care and diagnosis of bladder cancer for which patient has not received treatment yet.  Son tearful and states that he understands the severity of patient's condition.  Discussed that she is high-risk for decompensation and decline with the medical recommendations will depend upon her improvement or lack of.  He verbalized understanding.  Offered support informed the palliative Medicine will continue to follow.        History of Present Illness:      Patient is a 69-year-old female with PMH of ESRD on HD, recent removal of left total cath and right total cath on 03/10/2025, MRSA bacteremia in 10/2024, right nephrostomy tube x2, left nephrostomy tube x1, bladder CA s/p bilateral ureteral stent placement who presented to [facility] ED on 03/13/2025 with 1 day history of generalized weakness.  Patient reported generalized weakness increased diffuse back pain and bilateral lower extremity pain when undergoing dialysis on Tuesday 3/12.  Patient's son is her caretaker and says nephrostomy tubes intermittently put out cloudy output every 4-5 days.  Patient reported that tunnel cath was replaced recently due to blockage.  Upon ED arrival, systolic BP in the 70s and tachypneic with respiratory rate 25 and found to be hypoglycemic  with CBG of 46 with elevated troponin and lactic acid 3.2.  Patient was placed on Levophed and admitted to the ICU.  Palliative Medicine consult for discussion of goals of care.      Active Ambulatory Problems     Diagnosis Date Noted    Volume depletion 06/21/2024    Anemia requiring transfusions 06/21/2024    Hyperkalemia 06/21/2024    Metabolic acidosis 06/21/2024    Other hydronephrosis 10/21/2024    Bacteremia 10/21/2024    ESRD (end stage renal disease) 10/21/2024    Mechanical breakdown of vascular dialysis catheter 01/13/2025     Resolved Ambulatory Problems     Diagnosis Date Noted    ESTRELLITA (acute kidney injury) 06/21/2024    UTI (urinary tract infection) 06/21/2024     Past Medical History:   Diagnosis Date    Bladder cancer         Past Surgical History:   Procedure Laterality Date    CYSTOSCOPY W/ URETERAL STENT PLACEMENT Right 6/27/2024    Procedure: CYSTOSCOPY, WITH URETERAL STENT INSERTION;  Surgeon: Otis Person MD;  Location: [facility];  Service: Urology;  Laterality: Right;  CYSTOSCOPY, BILATERAL RETROGRADE PYELOGRAMS, POSSIBLE STENT PLACEMENT.    CYSTOSCOPY W/ URETERAL STENT PLACEMENT Bilateral 10/4/2024    Procedure: CYSTOSCOPY, WITH URETERAL STENT INSERTION;  Surgeon: Tawana Loaiza MD;  Location: [facility] OR;  Service: Urology;  Laterality: Bilateral;  CYSTO WITH BILAT URETERAL STENT EXCHANGE    INSERTION OF TUNNELED CENTRAL VENOUS HEMODIALYSIS CATHETER N/A 7/3/2024    Procedure: Insertion, Catheter, Central Venous, Hemodialysis;  Surgeon: Uche Barcenas MD;  Location: [facility] CATH LAB;  Service: Peripheral Vascular;  Laterality: N/A;    INSERTION OF TUNNELED CENTRAL VENOUS HEMODIALYSIS CATHETER N/A 10/24/2024    Procedure: Insertion, Catheter, Central Venous, Hemodialysis;  Surgeon: Nikolai Manzo DO;  Location: [facility] CATH LAB;  Service: Nephrology;  Laterality: N/A;    INSERTION, CATHETER, TUNNELED Right 3/10/2025    Procedure: INSERTION, CATHETER, TUNNELED;  Surgeon: Hernan  "MD Donovan;  Location: [facility] OR;  Service: General;  Laterality: Right;    REMOVAL OF TUNNELED CENTRAL VENOUS CATHETER (CVC) Left 3/10/2025    Procedure: REMOVAL, CATHETER, CENTRAL VENOUS, TUNNELED;  Surgeon: Donovan Becerra MD;  Location: [facility] OR;  Service: General;  Laterality: Left;    REPLACEMENT OF DIALYSIS CATHETER OVER GUIDEWIRE THROUGH EXISTING VENOUS ACCESS N/A 1/13/2025    Procedure: REPLACEMENT, CATHETER, DIALYSIS, OVER GUIDEWIRE, USING EXISTING VENOUS ACCESS;  Surgeon: Nikolai Manzo DO;  Location: [facility] CATH LAB;  Service: Nephrology;  Laterality: N/A;        Review of patient's allergies indicates:   Allergen Reactions    Asa [aspirin] Anaphylaxis    Penicillins      Received ceftriaxone from 6/27, no reactions         Current Medications[1]       Current Facility-Administered Medications:     acetaminophen, 650 mg, Oral, Q4H PRN    ondansetron, 4 mg, Intravenous, Q8H PRN    sodium chloride 0.9%, 10 mL, Intravenous, PRN    Pharmacy to dose Vancomycin consult, , , Once **AND** vancomycin - pharmacy to dose, , Intravenous, pharmacy to manage frequency     No family history on file.     Review of Systems   Unable to perform ROS: Acuity of condition            Objective:   /66   Pulse 96   Temp 98.3 °F (36.8 °C) (Oral)   Resp (!) 25   Ht 5' 2.01" (1.575 m)   Wt 100.2 kg (220 lb 14.4 oz)   LMP  (LMP Unknown)   SpO2 (!) 94%   BMI 40.39 kg/m²      Physical Exam  Constitutional:       Appearance: She is ill-appearing.   HENT:      Head: Normocephalic.   Eyes:      Pupils: Pupils are equal, round, and reactive to light.   Cardiovascular:      Rate and Rhythm: Normal rate.   Pulmonary:      Effort: Pulmonary effort is normal.   Abdominal:      Palpations: Abdomen is soft.   Skin:     Coloration: Skin is pale.   Neurological:      Mental Status: She is disoriented.             Review of Symptoms      Symptom Assessment (ESAS 0-10 Scale)  Pain:  0  Dyspnea:  0  Anxiety:  0  Nausea:  0  Depression:  " 0  Anorexia:  0  Fatigue:  0  Insomnia:  0  Restlessness:  0  Agitation:  0         Bowel Management Plan (BMP):  Yes      Psychosocial/Cultural:   See Palliative Psychosocial Note: Yes  Patient is  with 3 adult sons, one of whom she lives with.  She worked with  in their  business.   **Primary  to Follow**  Palliative Care  Consult: No      Advance Care Planning   Advance Directives:     Decision Making:  Family answered questions  Goals of Care: The family endorses that what is most important right now is to focus on curative/life-prolongation (regardless of treatment burdens)    Accordingly, we have decided that the best plan to meet the patient's goals includes continuing with treatment          PAINAD: NA    Caregiver burden formerly assessed: Yes        > 50% of 60 min of encounter was spent in chart review, face to face discussion of goals of care, symptom assessment, coordination of care and emotional support.         Nichelle Garcia FNP, [facility]  Palliative Medicine  [facility]           [1]   Current Facility-Administered Medications:     0.9% NaCl infusion, , Intravenous, Continuous, Shin Light MD    acetaminophen tablet 650 mg, 650 mg, Oral, Q4H PRN, Juni Fletcher MD    atorvastatin tablet 20 mg, 20 mg, Oral, QHS, Edin Devine MD    ceFEPIme injection 1 g, 1 g, Intravenous, Q12H, Casper Hayward Jr., MD, FCCP, 1 g at 03/14/25 0934    fentaNYL injection 25 mcg, 25 mcg, Intravenous, Once, Yadiel Grijalva MD    folic acid tablet 1 mg, 1 mg, Oral, Daily, Edin Devine MD    [START ON 3/15/2025] heparin (porcine) injection 5,000 Units, 5,000 Units, Subcutaneous, Q12H, Edin Devine MD    [COMPLETED] magnesium sulfate 2g in water 50mL IVPB (premix), 2 g, Intravenous, Once, Stopped at 03/14/25 0655 **FOLLOWED BY** magnesium sulfate 2g in water 50mL IVPB (premix), 2 g, Intravenous, Once, Edin Devine MD     miconazole NITRATE 2 % top powder, , Topical (Top), BID, Edin Devine MD, Given at 03/14/25 0007    mupirocin 2 % ointment, , Nasal, BID, Juni Fletcher MD, Given at 03/14/25 0935    NORepinephrine 8 mg in dextrose 5% 250 mL infusion, 0-3 mcg/kg/min, Intravenous, Continuous, Juni Fletcher MD, Last Rate: 11.3 mL/hr at 03/14/25 0555, 0.06 mcg/kg/min at 03/14/25 0555    ondansetron injection 4 mg, 4 mg, Intravenous, Q8H PRN, Juni Fletcher MD    potassium chloride 20 mEq in 100 mL IVPB (FOR CENTRAL LINE ADMINISTRATION ONLY), 20 mEq, Intravenous, Once, Casper Hayward Jr., MD, FCCP    potassium phosphate 30 mmol in D5W 500 mL infusion, 30 mmol, Intravenous, Once, Edin Devine MD    sodium chloride 0.9% flush 10 mL, 10 mL, Intravenous, PRN, Juni Fletcher MD    Pharmacy to dose Vancomycin consult, , , Once **AND** vancomycin - pharmacy to dose, , Intravenous, pharmacy to manage frequency, Edin Devine MD

## 2025-03-14 NOTE — CONSULTS
[address] ICU  Wound Care    Patient Name:  Fior Joya   MRN:  [id number]  Date: 3/14/2025  Diagnosis: <principal problem not specified>    History:     Past Medical History:   Diagnosis Date    Bladder cancer     ESRD (end stage renal disease)        Social History[1]    Precautions:     Allergies as of 03/13/2025 - Reviewed 03/13/2025   Allergen Reaction Noted    Asa [aspirin] Anaphylaxis 06/27/2024    Penicillins  06/27/2024       WO Assessment Details/Treatment      03/14/25 0956   WOCN Assessment   Visit Date 03/14/25   Visit Time 0956   Consult Type New   WO Speciality Wound   Intervention chart review;assessed;applied;orders   Teaching on-going        Wound 03/13/25 2000 Pressure Injury Sacral spine   Date First Assessed/Time First Assessed: 03/13/25 2000   Present on Original Admission: Yes  Primary Wound Type: Pressure Injury  Location: Sacral spine   Wound Image    Dressing Appearance Open to air   Drainage Amount None   Drainage Characteristics/Odor No odor   Appearance Pink;Red;Moist   Tissue loss description Partial thickness   Black (%), Wound Tissue Color 0 %   Red (%), Wound Tissue Color 100 %   Yellow (%), Wound Tissue Color 0 %   Periwound Area Dry;Pink;Redness   Wound Edges Undefined   Care Cleansed with:;Soap and water   Dressing Applied  (zinc paste)        Wound 06/22/24 0600 Rash Right anterior Groin   Date First Assessed/Time First Assessed: 06/22/24 0600   Present on Original Admission: Yes  Primary Wound Type: Rash  Side: Right  Orientation: anterior  Location: Groin   Wound Image    Distribution localized   Characteristics redness/erythema   Color red   WO consulted for folds and butt. Discussed plan of care with nurse Sanchez prior to visiting the patient. Introduced self and explained reason for visit, patient agreeable to visit. Treatment recommendations to be put in place. Educated staff on the importance of maintaining good hygiene regimenLaura  verbalized understanding. Nursing to continue with treatment recommendations and other preventative measures. Patient on ICU CATERINA mattress and mobilizes in bed without assistance. Answered all questions to the satisfaction of the staff. Will follow up.   03/14/2025         [1]   Social History  Socioeconomic History    Marital status:    Tobacco Use    Smoking status: Never    Smokeless tobacco: Never   Substance and Sexual Activity    Alcohol use: Never    Drug use: Never    Sexual activity: Not Currently     Social Drivers of Health     Financial Resource Strain: Patient Declined (3/13/2025)    Overall Financial Resource Strain (CARDIA)     Difficulty of Paying Living Expenses: Patient declined   Food Insecurity: Patient Declined (3/13/2025)    Hunger Vital Sign     Worried About Running Out of Food in the Last Year: Patient declined     Ran Out of Food in the Last Year: Patient declined   Transportation Needs: No Transportation Needs (10/4/2024)    TRANSPORTATION NEEDS     Transportation : No   Physical Activity: Unknown (10/4/2024)    Exercise Vital Sign     Days of Exercise per Week: 0 days   Stress: Patient Declined (3/13/2025)    [employer] [place] - Occupational Stress Questionnaire     Feeling of Stress : Patient declined   Housing Stability: Patient Declined (3/13/2025)    Housing Stability Vital Sign     Unable to Pay for Housing in the Last Year: Patient declined     Homeless in the Last Year: Patient declined

## 2025-03-14 NOTE — CONSULTS
Nephrology Initial Consult Note    Patient Name: Fior Joya  Age: 69 y.o.  : 1955  MRN: [id number]  Admission Date: 3/13/2025    Reason for Consult:      ESRD    HPI:     Fior Joya is a 69 y.o. female is known to me for her ESRD and being on dialysis on TTS schedule at [facility] in [place].  I saw her this Tuesday and she was doing very well.  She had dialysis done yesterday.  I was notified that her nephrostomy tubes have not been draining and was sent to the hospital for evaluation now patient is admitted to the hospital for altered mental status and possible sepsis.  Patient is very restless and agitated.  She has NG tube.  Gagging.  Nauseous.  No vomiting.  NG is to the suction and there is nothing coming out.  Patient also has mittens on to prevent her from pulling the NG tube.  Patient's main problem has been nausea.  Nurses reported that she is confused but I found patient only agitated but not confused.  She was hypoglycemic yesterday.  She also has elevated lactic acids.  Patient is on Levophed for low blood pressure.  Her other significant past medical history is positive for that she has nephrostomy tubes because of her bladder cancer.  She has 2 nephrostomy is in the right side and both are not draining much.  Her only other complaint is weakness.  Also complains of back pain.          Current Medications[1]    Chriss Mckeon NP    Past Medical History:   Diagnosis Date    Bladder cancer     ESRD (end stage renal disease)       Past Surgical History:   Procedure Laterality Date    CYSTOSCOPY W/ URETERAL STENT PLACEMENT Right 2024    Procedure: CYSTOSCOPY, WITH URETERAL STENT INSERTION;  Surgeon: Otis Person MD;  Location: [facility];  Service: Urology;  Laterality: Right;  CYSTOSCOPY, BILATERAL RETROGRADE PYELOGRAMS, POSSIBLE STENT PLACEMENT.    CYSTOSCOPY W/ URETERAL STENT PLACEMENT Bilateral 10/4/2024    Procedure: CYSTOSCOPY, WITH URETERAL STENT INSERTION;  Surgeon: Josse  Tawana OKEEFE MD;  Location: [facility];  Service: Urology;  Laterality: Bilateral;  CYSTO WITH BILAT URETERAL STENT EXCHANGE    INSERTION OF TUNNELED CENTRAL VENOUS HEMODIALYSIS CATHETER N/A 7/3/2024    Procedure: Insertion, Catheter, Central Venous, Hemodialysis;  Surgeon: Uche Barcenas MD;  Location: [facility] CATH LAB;  Service: Peripheral Vascular;  Laterality: N/A;    INSERTION OF TUNNELED CENTRAL VENOUS HEMODIALYSIS CATHETER N/A 10/24/2024    Procedure: Insertion, Catheter, Central Venous, Hemodialysis;  Surgeon: Nikolai Manzo DO;  Location: [facility] CATH LAB;  Service: Nephrology;  Laterality: N/A;    INSERTION, CATHETER, TUNNELED Right 3/10/2025    Procedure: INSERTION, CATHETER, TUNNELED;  Surgeon: Donovan Becerra MD;  Location: [facility];  Service: General;  Laterality: Right;    REMOVAL OF TUNNELED CENTRAL VENOUS CATHETER (CVC) Left 3/10/2025    Procedure: REMOVAL, CATHETER, CENTRAL VENOUS, TUNNELED;  Surgeon: Donovan Becerra MD;  Location: [facility];  Service: General;  Laterality: Left;    REPLACEMENT OF DIALYSIS CATHETER OVER GUIDEWIRE THROUGH EXISTING VENOUS ACCESS N/A 1/13/2025    Procedure: REPLACEMENT, CATHETER, DIALYSIS, OVER GUIDEWIRE, USING EXISTING VENOUS ACCESS;  Surgeon: Nikolai Manzo DO;  Location: [facility] CATH LAB;  Service: Nephrology;  Laterality: N/A;      No family history on file.  Social History     Tobacco Use    Smoking status: Never    Smokeless tobacco: Never   Substance Use Topics    Alcohol use: Never     Prescriptions Prior to Admission[2]  Review of patient's allergies indicates:   Allergen Reactions    Asa [aspirin] Anaphylaxis    Penicillins      Received ceftriaxone from 6/27, no reactions             Review of Systems:     All 12 point review of system done negative except 1 history of present illness.    Objective:       VITAL SIGNS: 24 HR MIN & MAX LAST    Temp  Min: 97.7 °F (36.5 °C)  Max: 99 °F (37.2 °C)  98.3 °F (36.8 °C)        BP  Min: 73/45  Max: 139/81  102/66      Pulse  Min: 73  Max: 107  96     Resp  Min: 16  Max: 25  (!) 25    SpO2  Min: 85 %  Max: 100 %  (!) 94 %      GEN:  Well developed and nourished.  Awake alert oriented to time person place.  Agitated.  Nauseous.  HEENT: Conjunctiva anicteric, pupils equal, MMM, OP benign  CV: RRR +S1,S2 without murmur  PULM: CTAB, unlabored  ABD: Soft, NT/ND abdomen with NABS  EXT: No cyanosis or edema  SKIN: Warm and dry  PSYCH: Awake, alert, and appropriately conversant  Vascular access:  Right IJ tunneled dialysis catheter.          Component Value Date/Time     03/14/2025 0208     03/13/2025 1425    K 3.2 (L) 03/14/2025 0208    K 3.8 03/13/2025 1425    CO2 20 (L) 03/14/2025 0208    CO2 23 03/13/2025 1425    BUN 22.1 (H) 03/14/2025 0208    BUN 20.8 (H) 03/13/2025 1425    CREATININE 5.38 (H) 03/14/2025 0208    CREATININE 5.00 (H) 03/13/2025 1425    CALCIUM 6.9 (LL) 03/14/2025 0208    CALCIUM 7.3 (L) 03/13/2025 1425    PHOS 1.9 (L) 03/14/2025 0208            Component Value Date/Time    WBC 9.96 03/14/2025 0208    WBC 8.37 03/13/2025 1425    WBC 25.58 10/07/2024 0541    WBC 26.04 10/06/2024 0449    HGB 10.5 (L) 03/14/2025 0208    HGB 11.6 (L) 03/13/2025 1425    HCT 31.6 (L) 03/14/2025 0208    HCT 34.6 (L) 03/13/2025 1425     03/14/2025 0208     03/13/2025 1425         US Retroperitoneal Limited   Final Result      No abnormalities of the right kidney         Electronically signed by: Jim Valadez MD   Date:    03/14/2025   Time:    09:25      CT Abdomen Pelvis  Without Contrast   Final Result      No acute process identified in the abdomen or pelvis.         Electronically signed by: Pastor Gordon   Date:    03/13/2025   Time:    16:59      X-Ray Chest AP Portable   Final Result      Enlarged cardiac silhouette without edema         Electronically signed by: Deidra Dobson   Date:    03/13/2025   Time:    14:52      XR Gastric tube check, non-radiologist performed    (Results Pending)       Assessment /  Plan:   ESRD  Possible septic shock and patient is on antibiotics  Bladder cancer obstructive uropathy for which patient had nephrostomy tubes on the right side x2 and 1 tube on the left side.  Anemia  Patient also has hypokalemia, hypophosphatemia, hypomagnesemia.  It is being replaced.    Recommendations  Normal saline 1 L bolus over 4 hours  Chloraseptic throat spray to help prevent gagging with the NG tube.  Nutritional support in the form of tube feedings  Will order dialysis tomorrow.  And on TTS.    Thank you for this consultation.         [1]   Current Facility-Administered Medications   Medication Dose Route Frequency Provider Last Rate Last Admin    acetaminophen tablet 650 mg  650 mg Oral Q4H PRN Juni Fletcher MD        atorvastatin tablet 20 mg  20 mg Oral QHS Edin Devine MD        ceFEPIme injection 1 g  1 g Intravenous Q12H Casper Hayward Jr., MD, FCCP   1 g at 03/14/25 0934    folic acid tablet 1 mg  1 mg Oral Daily Edin Devine MD        [START ON 3/15/2025] heparin (porcine) injection 5,000 Units  5,000 Units Subcutaneous Q12H Edin Devine MD        magnesium sulfate 2g in water 50mL IVPB (premix)  2 g Intravenous Once Edin Devine MD        miconazole NITRATE 2 % top powder   Topical (Top) BID Edin Devine MD   Given at 03/14/25 0007    mupirocin 2 % ointment   Nasal BID Juni Fletcher MD   Given at 03/14/25 0935    NORepinephrine 8 mg in dextrose 5% 250 mL infusion  0-3 mcg/kg/min Intravenous Continuous Juni Fletcher MD 11.3 mL/hr at 03/14/25 0555 0.06 mcg/kg/min at 03/14/25 0555    ondansetron injection 4 mg  4 mg Intravenous Q8H PRN Juni Fletcher MD        potassium chloride 20 mEq in 100 mL IVPB (FOR CENTRAL LINE ADMINISTRATION ONLY)  20 mEq Intravenous Once Casper Hayward Jr., MD, FCCP        potassium phosphate 30 mmol in D5W 500 mL infusion  30 mmol Intravenous Once Edin Devine MD        sevelamer carbonate tablet 800 mg  800 mg  Oral TID  Edin Devine MD        sodium chloride 0.9% flush 10 mL  10 mL Intravenous PRN Juni Fletcher MD        vancomycin - pharmacy to dose   Intravenous pharmacy to manage frequency Edin Devine MD       [2]   Medications Prior to Admission   Medication Sig Dispense Refill Last Dose/Taking    atorvastatin (LIPITOR) 20 MG tablet Take 20 mg by mouth every evening.   Taking    ergocalciferol, vitamin D2, (VITAMIN D2 ORAL) Take by mouth.   Taking    FEROSUL 325 mg (65 mg iron) Tab tablet Take 325 mg by mouth once daily.   Taking    fluticasone propionate (FLONASE) 50 mcg/actuation nasal spray 2 sprays by Each Nostril route.   Taking    folic acid (FOLVITE) 1 MG tablet Take 1 tablet (1 mg total) by mouth once daily. 30 tablet 0 Taking    labetaloL (NORMODYNE) 100 MG tablet Take 100 mg by mouth 2 (two) times daily.   Taking    levocetirizine (XYZAL) 5 MG tablet Take 5 mg by mouth every evening.   Taking    megestroL (MEGACE) 40 MG Tab Take 40 mg by mouth once daily.   Taking    oxyCODONE-acetaminophen (PERCOCET) 5-325 mg per tablet Take 1 tablet by mouth On call Procedure for Pain. 1 tablet 0 3/12/2025    pantoprazole (PROTONIX) 40 MG tablet Take 1 tablet (40 mg total) by mouth once daily. 90 tablet 3 Taking    sevelamer carbonate (RENVELA) 800 mg Tab Take 800 mg by mouth 3 (three) times daily with meals.   Taking    sodium bicarbonate 650 MG tablet Take 650 mg by mouth 4 (four) times daily.   Taking    fluconazole (DIFLUCAN) 150 MG Tab Take 150 mg by mouth once. (Patient not taking: Reported on 3/12/2025)       nitrofurantoin, macrocrystal-monohydrate, (MACROBID) 100 MG capsule Take 100 mg by mouth every 12 (twelve) hours.       ondansetron (ZOFRAN) 8 MG tablet TAKE 1 TABLET BY MOUTH EVERY 8 HOURS AS NEEDED FOR NAUSEA/ VOMITING

## 2025-03-14 NOTE — PROGRESS NOTES
Inpatient Nutrition Assessment    Admit Date: 3/13/2025   Total duration of encounter: 1 day   Patient Age: 69 y.o.    Nutrition Recommendation/Prescription     Advance diet when appropriate. Goal diet: renal.    If not able to advance diet, consider starting TF. (NG in place per MD notes.)     Impact Peptide 1.5 goal rate 65 ml/hr to provide  1950 kcal/d  (100% est needs)  122 g protein/d (100% est needs)  1001 ml free water/d  (calculations based on estimated 20 hr/d run time)     If no IV fluids running, can give 50ml q 2hr water flushes. Total water provided: 1500ml.     Start @ 20ml/hr, increase as tolerated 20ml/hr q4hr until goal rate reached.      Communication of Recommendations: reviewed with nurse    Nutrition Assessment     Malnutrition Assessment/Nutrition-Focused Physical Exam       Malnutrition Level: other (see comments) (Does not meet criteria) (03/14/25 1249)  Energy Intake (Malnutrition): other (see comments) (Unable to assess) (03/14/25 1249)  Weight Loss (Malnutrition): greater than 10% in 6 months (03/14/25 1249)                                         Fluid Accumulation (Malnutrition): other (see comments) (Not present) (03/14/25 1249)        A minimum of two characteristics is recommended for diagnosis of either severe or non-severe malnutrition.    Chart Review    Reason Seen: continuous nutrition monitoring and malnutrition screening tool (MST)    Malnutrition Screening Tool Results   Have you recently lost weight without trying?: Yes: 24-33 lbs  Have you been eating poorly because of a decreased appetite?: Yes   MST Score: 4   Diagnosis:  End-stage kidney disease  Bilateral renal obstruction/hydronephrosis  Chronic home bedridden state  Hypotensive shock   Acute encephalopathy, likely metabolic related to above    Relevant Medical History: ESRD on HD, right nephrostomy tube, left nephrostomy tube, bladder ca s/p bilateral ureteral stent placement     Scheduled Medications:  atorvastatin,  20 mg, QHS  ceFEPime IV (PEDS and ADULTS), 1 g, Q12H  folic acid, 1 mg, Daily  [START ON 3/15/2025] heparin (porcine), 5,000 Units, Q12H  magnesium sulfate 2 g IVPB, 2 g, Once  miconazole NITRATE 2 %, , BID  mupirocin, , BID  potassium chloride in water, 20 mEq, Once  potassium phosphate IVPB, 30 mmol, Once    Continuous Infusions:  0.9% NaCl  NORepinephrine bitartrate-D5W, Last Rate: 0.06 mcg/kg/min (03/14/25 0555)    PRN Medications:  acetaminophen, 650 mg, Q4H PRN  ondansetron, 4 mg, Q8H PRN  sodium chloride 0.9%, 10 mL, PRN  vancomycin - pharmacy to dose, , pharmacy to manage frequency    Calorie Containing IV Medications: no significant kcals from medications at this time    Recent Labs   Lab 03/10/25  1027 03/13/25  1425 03/14/25  0208    138 136   K 4.1 3.8 3.2*   CALCIUM 7.9* 7.3* 6.9*   PHOS  --   --  1.9*   MG  --   --  1.40*    98 100   CO2 18* 23 20*   BUN 54.0* 20.8* 22.1*   CREATININE 6.92* 5.00* 5.38*   EGFRNORACEVR 6 9 8   GLUCOSE 73* 46* 97   BILITOT 0.4 0.4 0.4   ALKPHOS 86 98 89   ALT 11 30 33   AST 12 82* 72*   ALBUMIN 1.6* 1.6* 1.5*   LIPASE  --  5  --    WBC 9.97 8.37 9.96   HGB 12.3 11.6* 10.5*   HCT 38.4 34.6* 31.6*     Nutrition Orders:  No diet orders on file      Appetite/Oral Intake: NPO/NPO  Factors Affecting Nutritional Intake: NPO  Social Needs Impacting Access to Food: unable to assess at this time; will attempt on follow-up  Food/Spiritism/Cultural Preferences: unable to obtain  Food Allergies: no known food allergies  Last Bowel Movement: 03/13/25  Wound(s):  stage 1 pressure ulcer documentation noted    Comments    3/14/25: Noted MST indicates 24-33# wt loss PTA along with decreased appetite. Pt unable to verify with RD at time of RD visit due to mental status. Noted previous EMR wts confirm wt loss since October 2024. Some wt changes expected with HD, but does indicate significant amount of wt loss. Unable to complete physical assessment, will attempt upon F/U. NG to  "be placed per RN, will provide TF recommendations.    Anthropometrics    Height: 5' 2.01" (157.5 cm),    Last Weight: 100.2 kg (220 lb 14.4 oz) (03/14/25 1242), Weight Method: Stated  BMI (Calculated): 40.4  BMI Classification: obese grade III (BMI >/=40)     Ideal Body Weight (IBW), Female: 110.05 lb     % Ideal Body Weight, Female (lb): 200.73 %                    Usual Body Weight (UBW), kg: (!) 140.2 kg per EMR 10/10/24  % Usual Body Weight: 71.62  % Weight Change From Usual Weight: -28.53 %  Usual Weight Provided By: unable to obtain usual weight    Wt Readings from Last 5 Encounters:   03/14/25 100.2 kg (220 lb 14.4 oz)   03/10/25 100.2 kg (220 lb 14.4 oz)   03/05/25 100.2 kg (221 lb)   02/22/25 104.8 kg (231 lb)   01/31/25 115.2 kg (253 lb 15.5 oz)     Weight Change(s) Since Admission:   Wt Readings from Last 1 Encounters:   03/14/25 1242 100.2 kg (220 lb 14.4 oz)   03/13/25 2356 100.2 kg (220 lb 14.4 oz)   03/13/25 1401 100.2 kg (221 lb)   Admit Weight: 100.2 kg (221 lb) (03/13/25 1401), Weight Method: Stated    Estimated Needs    Weight Used For Calorie Calculations: 100.2 kg (220 lb 14.4 oz)  Energy Calorie Requirements (kcal): 1930kcal (1.3 stress factor)  Energy Need Method: [facility]  Weight Used For Protein Calculations: 100.2 kg (220 lb 14.4 oz)  Protein Requirements: 100-120gm (1-1.2g/kg)  Fluid Requirements (mL): 1000ml + urinary output  CHO Requirement: 215gm (45% est kcal needs)     Enteral Nutrition     Patient not receiving enteral nutrition at this time.    Parenteral Nutrition     Patient not receiving parenteral nutrition support at this time.    Evaluation of Received Nutrient Intake    Calories: not meeting estimated needs  Protein: not meeting estimated needs    Patient Education     Not applicable.    Nutrition Diagnosis     PES: Inadequate oral intake related to acute illness as evidenced by NPO since admit. (new)     PES:            Nutrition Interventions     Intervention(s): " modified composition of enteral nutrition, modified rate of enteral nutrition, and collaboration with other providers  Intervention(s):      Goal: Meet greater than 80% of nutritional needs by follow-up. (new)  Goal: Meet greater than 80% of nutritional needs with nutrition support by follow-up. (new)    Nutrition Goals & Monitoring     Dietitian will monitor: energy intake and enteral nutrition intake  Discharge planning: too early to determine; pending clinical course  Nutrition Risk/Follow-Up: moderate (follow-up in 3-5 days)   Please consult if re-assessment needed sooner.

## 2025-03-15 NOTE — PROGRESS NOTES
[facility] - Emergency Dept  Pulmonary Critical Care Note    Patient Name: Fior Joya  MRN: [id number]  Admission Date: 3/13/2025  Hospital Length of Stay: 2 days  Code Status: Full Code  Attending Provider: Casper Hayward Jr., MD,*  Resident: Nilson  Primary Care Provider: Chriss Mckeon NP     Subjective:     HPI:   Fior Joya is a 68 yo F w PMHx of ESRD on HD TTS, recent removal of L tunneled cath and R tunneled calth placement on 3/10/25, MRSA bacteremia in 10/2024, right nephrostomy tube x 2, left nephrostomy tube x 1, and bladder ca s/p bilateral ureteral stent placement who presented to [facility] ED on 3/13/25 with 1-day history of generalized weakness. Patient reports generalized weakness, increased diffuse back pain from baseline and bilateral lower extremity pain when undergoing dialysis last on Tuesday, 3/12/25. Denies fevers, sick contacts, open wounds, and drainage from her recent tunneled cath site. Denies falls and LOC. Patient's son is her caretaker and says nephrostomy tubes intermittently put out cloudy output every 4-5 days. Patient reports tunneled cath was replaced recently due to blockage. Upon ED arrival, systolic BP in 70s and tachypneic RR 25, however all other VS stable. Found to be hypoglycemic 46 with elevated troponin 0.187 and lactic acid 3.2 on admission. EKG negative for acute ischemic change from previous. +MRSA PCR. Covid/flu negative. Patient was placed on Levophed and admitted to ICU.     Hospital Course/Significant events:  3/13.  Admission    24 Hour Interval History:  No change overnight.  Remains on low-dose Levophed.  Body fluid culture positive for Gram-negative rods    Past Medical History:   Diagnosis Date    Bladder cancer     ESRD (end stage renal disease)        Past Surgical History:   Procedure Laterality Date    CYSTOSCOPY W/ URETERAL STENT PLACEMENT Right 6/27/2024    Procedure: CYSTOSCOPY, WITH URETERAL STENT INSERTION;  Surgeon: Otis Person,  MD;  Location: [facility];  Service: Urology;  Laterality: Right;  CYSTOSCOPY, BILATERAL RETROGRADE PYELOGRAMS, POSSIBLE STENT PLACEMENT.    CYSTOSCOPY W/ URETERAL STENT PLACEMENT Bilateral 10/4/2024    Procedure: CYSTOSCOPY, WITH URETERAL STENT INSERTION;  Surgeon: Tawana Loaiza MD;  Location: [facility] OR;  Service: Urology;  Laterality: Bilateral;  CYSTO WITH BILAT URETERAL STENT EXCHANGE    INSERTION OF TUNNELED CENTRAL VENOUS HEMODIALYSIS CATHETER N/A 7/3/2024    Procedure: Insertion, Catheter, Central Venous, Hemodialysis;  Surgeon: Uche Barcenas MD;  Location: [facility] CATH LAB;  Service: Peripheral Vascular;  Laterality: N/A;    INSERTION OF TUNNELED CENTRAL VENOUS HEMODIALYSIS CATHETER N/A 10/24/2024    Procedure: Insertion, Catheter, Central Venous, Hemodialysis;  Surgeon: Nikolai Manzo DO;  Location: [facility] CATH LAB;  Service: Nephrology;  Laterality: N/A;    INSERTION, CATHETER, TUNNELED Right 3/10/2025    Procedure: INSERTION, CATHETER, TUNNELED;  Surgeon: Donovan Becerra MD;  Location: [facility] OR;  Service: General;  Laterality: Right;    REMOVAL OF TUNNELED CENTRAL VENOUS CATHETER (CVC) Left 3/10/2025    Procedure: REMOVAL, CATHETER, CENTRAL VENOUS, TUNNELED;  Surgeon: Donovan Becerra MD;  Location: [facility];  Service: General;  Laterality: Left;    REPLACEMENT OF DIALYSIS CATHETER OVER GUIDEWIRE THROUGH EXISTING VENOUS ACCESS N/A 1/13/2025    Procedure: REPLACEMENT, CATHETER, DIALYSIS, OVER GUIDEWIRE, USING EXISTING VENOUS ACCESS;  Surgeon: Nikolai Manzo DO;  Location: [facility] CATH LAB;  Service: Nephrology;  Laterality: N/A;       Social History[1]        Current Outpatient Medications   Medication Instructions    atorvastatin (LIPITOR) 20 mg, Nightly    ergocalciferol, vitamin D2, (VITAMIN D2 ORAL) Take by mouth.    FeroSuL 325 mg, Daily    fluconazole (DIFLUCAN) 150 mg, Once    fluticasone propionate (FLONASE) 50 mcg/actuation nasal spray 2 sprays    folic acid (FOLVITE) 1 mg, Oral, Daily     labetaloL (NORMODYNE) 100 mg, 2 times daily    levocetirizine (XYZAL) 5 mg, Nightly    megestroL (MEGACE) 40 mg, Daily    nitrofurantoin, macrocrystal-monohydrate, (MACROBID) 100 MG capsule 100 mg, Every 12 hours    ondansetron (ZOFRAN) 8 MG tablet TAKE 1 TABLET BY MOUTH EVERY 8 HOURS AS NEEDED FOR NAUSEA/ VOMITING    oxyCODONE-acetaminophen (PERCOCET) 5-325 mg per tablet 1 tablet, Oral, On Call Procedure    pantoprazole (PROTONIX) 40 mg, Oral, Daily    sevelamer carbonate (RENVELA) 800 mg, 3 times daily with meals    sodium bicarbonate 650 mg, 4 times daily       Review of patient's allergies indicates:   Allergen Reactions    Asa [aspirin] Anaphylaxis    Penicillins      Received ceftriaxone from 6/27, no reactions         Current Inpatient Medications   atorvastatin  20 mg Oral QHS    ceFEPime IV (PEDS and ADULTS)  1 g Intravenous Q12H    fentaNYL  25 mcg Intravenous Once    folic acid  1 mg Oral Daily    heparin (porcine)  5,000 Units Subcutaneous Q12H    magnesium sulfate 2 g IVPB  2 g Intravenous Once    miconazole NITRATE 2 %   Topical (Top) BID    mupirocin   Nasal BID    potassium chloride in water  20 mEq Intravenous Once    vancomycin (VANCOCIN) IV (PEDS and ADULTS)  500 mg Intravenous Once       Current Intravenous Infusions   NORepinephrine bitartrate-D5W  0-3 mcg/kg/min Intravenous Continuous 37.6 mL/hr at 03/15/25 1057 0.2 mcg/kg/min at 03/15/25 1057    sodium bicarbonate 100 mEq in 0.45% NaCl 1,000 mL infusion   Intravenous Continuous 100 mL/hr at 03/15/25 1057 Rate Verify at 03/15/25 1057         ROS   As stated above     Objective:       Intake/Output Summary (Last 24 hours) at 3/15/2025 1208  Last data filed at 3/15/2025 1057  Gross per 24 hour   Intake 2341.35 ml   Output 1115 ml   Net 1226.35 ml         Vital Signs (Most Recent):  Temp: 98.1 °F (36.7 °C) (03/15/25 0815)  Pulse: (!) 114 (03/15/25 1030)  Resp: (!) 30 (03/15/25 1030)  BP: (!) 121/49 (03/15/25 1030)  SpO2: 99 % (03/15/25  1030)  Body mass index is 40.39 kg/m².  Weight: 100.2 kg (220 lb 14.4 oz) Vital Signs (24h Range):  Temp:  [98.1 °F (36.7 °C)-98.4 °F (36.9 °C)] 98.1 °F (36.7 °C)  Pulse:  [] 114  Resp:  [8-41] 30  SpO2:  [82 %-100 %] 99 %  BP: ()/() 121/49     Physical Exam  Vitals and nursing note reviewed.   Constitutional:       General: She is awake. She is not in acute distress.     Appearance: She is obese. She is ill-appearing. She is not diaphoretic.      Comments: On room air    HENT:      Head: Normocephalic and atraumatic.      Nose: Nose normal. No congestion or rhinorrhea.      Mouth/Throat:      Mouth: Mucous membranes are moist.      Pharynx: Oropharynx is clear. No oropharyngeal exudate or posterior oropharyngeal erythema.   Eyes:      Pupils: Pupils are equal, round, and reactive to light.   Cardiovascular:      Rate and Rhythm: Normal rate and regular rhythm.      Pulses: Normal pulses.      Heart sounds: No murmur heard.  Pulmonary:      Effort: Pulmonary effort is normal. No respiratory distress.      Breath sounds: No wheezing, rhonchi or rales.      Comments: Decreased breath sounds bilaterally 2/2 body habitus   Abdominal:      General: Abdomen is flat. Bowel sounds are normal. There is no distension.      Palpations: Abdomen is soft.      Tenderness: There is no abdominal tenderness. There is no right CVA tenderness or left CVA tenderness.      Comments: Nephrostomy tubes in place, cloudy output    Musculoskeletal:      Cervical back: Normal range of motion.      Right lower leg: Edema present.      Left lower leg: Edema present.   Skin:     General: Skin is warm and dry.      Findings: Erythema and rash present.      Comments: Erythematous and weeping skin folds   Warmth to palpation an erythema of lower back and buttock region bilaterally   No open wounds, active drainage, or bleeding.    Neurological:      General: No focal deficit present.      Mental Status: She is alert and oriented  to person, place, and time. Mental status is at baseline.      GCS: GCS eye subscore is 4. GCS verbal subscore is 5. GCS motor subscore is 6.   Psychiatric:         Mood and Affect: Mood normal.         Behavior: Behavior normal. Behavior is cooperative.         Thought Content: Thought content normal.         Judgment: Judgment normal.           Lines/Drains/Airways       Central Venous Catheter Line  Duration                  Hemodialysis Catheter 03/10/25 1217 right internal jugular 4 days    Percutaneous Central Line - Triple Lumen  03/14/25 1405 Femoral Vein Right;Femoral Right <1 day              Drain  Duration                  Nephrostomy 10/04/24 1506 Right 8 Fr. 161 days         Nephrostomy 10/16/24 1150 Right 8 Fr. 150 days         Nephrostomy 10/16/24 1201 Left 8 Fr. 150 days         NG/OG Tube 03/14/25 0800 nasogastric 18 Fr. Right nostril 1 day              Peripheral Intravenous Line  Duration                  Peripheral IV - Single Lumen 03/14/25 0800 18 G Anterior;Distal;Right Forearm 1 day         Peripheral IV - Single Lumen 03/14/25 0800 18 G Anterior;Distal;Right Upper Arm 1 day                    Significant Labs:    Lab Results   Component Value Date    WBC 10.22 03/15/2025    HGB 10.7 (L) 03/15/2025    HCT 32.1 (L) 03/15/2025    MCV 97.6 (H) 03/15/2025     03/15/2025           BMP  Lab Results   Component Value Date     03/15/2025    K 3.3 (L) 03/15/2025    CO2 15 (L) 03/15/2025    BUN 22.1 (H) 03/15/2025    CREATININE 5.46 (H) 03/15/2025    CALCIUM 6.2 (LL) 03/15/2025    AGAP 19.0 03/15/2025         ABG  Recent Labs   Lab 03/15/25  0726   PH 7.440   PO2 88.0   PCO2 23.0*   HCO3 15.6*   POCBASEDEF -6.80       Mechanical Ventilation Support:         Significant Imaging:  I have reviewed the pertinent imaging within the past 24 hours.    Imaging Results              CT Abdomen Pelvis  Without Contrast (Final result)  Result time 03/13/25 16:59:02      Final result by Pastor Gordon  MD CARMEN (03/13/25 16:59:02)                   Impression:      No acute process identified in the abdomen or pelvis.      Electronically signed by: Pastor Gordon  Date:    03/13/2025  Time:    16:59               Narrative:    EXAMINATION:  CT ABDOMEN PELVIS WITHOUT CONTRAST    CLINICAL HISTORY:  Abdominal pain, acute, nonlocalized;Nephrostomy tubes;    TECHNIQUE:  CT imaging of the abdomen and pelvis without intravenous contrast. Axial, coronal and sagittal reformatted images reviewed. Dose length product is 1110 mGycm. Automatic exposure control, adjustment of mA/kV or iterative reconstruction technique used to limit radiation dose.    COMPARISON:  CT 10/15/2024    PET-CT 01/15/2025    FINDINGS:  Assessment of the visceral organs and vasculature is limited by the lack of IV contrast.    Liver/biliary: Small portion of the hepatic dome excluded from the field of view.  Mild generalized hepatic steatosis.  No radiodense gallstone or biliary dilatation appreciated.    Pancreas: Normal.    Spleen: Normal.    Adrenals: Normal.    Genitourinary: Bilateral nephrostomy tubes and ureteral stents.  No significant collecting system dilatation.  Bladder relatively decompressed and not well evaluated.    Stomach/bowel: No bowel obstruction. Appendix not confidently seen.  No discernible bowel inflammation.    Lymph nodes and peritoneum: 2 stable enlarged iliac lymph nodes.  No ascites or free air.    Vasculature: Aortoiliac atherosclerosis.    Abdominal wall: Normal.    Lung bases: No consolidation or pleural effusion.    Bones: Similar right sacroiliitis.  Small lytic area in the left iliac wing corresponds with hypermetabolic metastasis on previous PET-CT.                                       X-Ray Chest AP Portable (Final result)  Result time 03/13/25 14:52:21      Final result by Deidra Dobson MD (03/13/25 14:52:21)                   Impression:      Enlarged cardiac silhouette without edema      Electronically  signed by: Deidra Dobson  Date:    03/13/2025  Time:    14:52               Narrative:    EXAMINATION:  XR CHEST AP PORTABLE    CLINICAL HISTORY:  Other fatigue    TECHNIQUE:  Single frontal view of the chest was performed.    COMPARISON:  03/10/2025    FINDINGS:  LINES AND TUBES: Right IJ CVC tip projects over the SVC.    MEDIASTINUM AND JUSTYN: Cardiac silhouette is enlarged. Aortic atherosclerosis.    LUNGS: No lobar consolidation. No edema.    PLEURA:No pleural effusion. No pneumothorax.    BONES: No acute osseous abnormality.                                     Results for orders placed or performed during the hospital encounter of 03/13/25   EKG 12-lead    Collection Time: 03/14/25 10:41 AM   Result Value Ref Range    QRS Duration 66 ms    OHS QTC Calculation 467 ms    Narrative    Test Reason : R00.0,    Vent. Rate :  93 BPM     Atrial Rate :  93 BPM     P-R Int : 168 ms          QRS Dur :  66 ms      QT Int : 376 ms       P-R-T Axes :  24 -20  37 degrees    QTcB Int : 467 ms    Sinus rhythm with marked sinus arrythmia  Otherwise normal ECG  When compared with ECG of 13-Mar-2025 14:55,  No significant change was found  Confirmed by Jayro Meza ([id number]) on 3/14/2025 1:01:25 PM    Referred By: LIA BERNARDO           Confirmed By: Jayro Meza        Assessment/Plan:     Assessment  Septic shock requiring vasopressors, culture positive for Gram-negative fuad.  Hx of ESRD on HD TTS, recent removal of L tunneled cath and R tunneled calth placement on 3/10/25, MRSA bacteremia in 10/2024, right nephrostomy tube x 2, left nephrostomy tube x 1, bladder ca s/p bilateral ureteral stent placement and iron deficiency anemia       Plan  Admit to ICU for close monitoring   SpO2 goal 94-96%, does not require supplemental O2 at this time    Maintain MAP >65, wean vasopressors as tolerated   Initiate broad spectrum abx Vancomycin and cefepime.  Adjust according to final culture  Nephrology following for ESRD on HD TTS schedule    Daily AM labs  Continuous cardiac monitoring, daily weights and strict I&Os     Code Status: FULL   Abx: Vancomycin and Rocephin   IVF: None   DVT Prophylaxis: Heparin   GI Prophylaxis: None      32 minutes of critical care was time spent personally by me on the following activities: development of treatment plan with patient or surrogate and bedside caregivers, discussions with consultants, evaluation of patient's response to treatment, examination of patient, ordering and performing treatments and interventions, ordering and review of laboratory studies, ordering and review of radiographic studies, pulse oximetry, re-evaluation of patient's condition.  This critical care time did not overlap with that of any other provider or involve time for any procedures.     Regis Levy MD  Pulmonary Critical Care Medicine  [facility]   DOS: 03/15/2025           [1]   Social History  Socioeconomic History    Marital status:    Tobacco Use    Smoking status: Never    Smokeless tobacco: Never   Substance and Sexual Activity    Alcohol use: Never    Drug use: Never    Sexual activity: Not Currently     Social Drivers of Health     Financial Resource Strain: Low Risk  (3/14/2025)    Overall Financial Resource Strain (CARDIA)     Difficulty of Paying Living Expenses: Not hard at all   Food Insecurity: No Food Insecurity (3/14/2025)    Hunger Vital Sign     Worried About Running Out of Food in the Last Year: Never true     Ran Out of Food in the Last Year: Never true   Transportation Needs: No Transportation Needs (10/4/2024)    TRANSPORTATION NEEDS     Transportation : No   Physical Activity: Unknown (3/14/2025)    Exercise Vital Sign     Days of Exercise per Week: 0 days   Stress: Patient Declined (3/13/2025)    [employer] - Occupational Stress Questionnaire     Feeling of Stress : Patient declined   Housing Stability: Low Risk  (3/14/2025)    Housing Stability Vital Sign      Unable to Pay for Housing in the Last Year: No     Homeless in the Last Year: No

## 2025-03-15 NOTE — PROCEDURES
"Fior Joya is a 69 y.o. female patient.    Temp: 98 °F (36.7 °C) (03/15/25 1200)  Pulse: (!) 114 (03/15/25 1200)  Resp: 20 (03/15/25 1256)  BP: 105/80 (03/15/25 1200)  SpO2: 99 % (03/15/25 1200)  Weight: 100.2 kg (220 lb 14.4 oz) (03/14/25 1242)  Height: 5' 2.01" (157.5 cm) (03/14/25 1242)       Arterial Line    Date/Time: 3/15/2025 2:12 PM  Location procedure was performed: [facility]    Performed by: Tye Hayes MD  Authorized by: Tye Hayes MD  Consent Done: Emergent Situation  Preparation: Patient was prepped and draped in the usual sterile fashion.  Indications: multiple ABGs, respiratory failure and hemodynamic monitoring  Location: left radial  Anesthesia: see MAR for details  Jim's test normal: yes  Needle gauge: 20  Seldinger technique: Seldinger technique used  Number of attempts: 2  Complications: No  Specimens: No  Implants: No  Post-procedure: dressing applied  Post-procedure CMS: normal  Patient tolerance: Patient tolerated the procedure well with no immediate complications  Comments: Successful placement, good waveform, pullback of arterial blood acheived      Tye Hayes MD  PGY-3, ICU 7th    3/15/2025    "

## 2025-03-15 NOTE — PROCEDURES
"Fior Joya is a 69 y.o. female patient.    Temp: 98 °F (36.7 °C) (03/15/25 1200)  Pulse: (!) 114 (03/15/25 1200)  Resp: 20 (03/15/25 1256)  BP: 105/80 (03/15/25 1200)  SpO2: 99 % (03/15/25 1200)  Weight: 100.2 kg (220 lb 14.4 oz) (03/14/25 1242)  Height: 5' 2.01" (157.5 cm) (03/14/25 1242)       Central Line    Date/Time: 3/15/2025 2:17 PM    Performed by: Yadiel Grijalva MD  Authorized by: Yadiel Grijalva MD    Location procedure was performed:  [facility]  Assisting Provider:  Tye Hayes MD  Consent Done ?:  Emergent Situation  Time out complete?: Verified correct patient, procedure, equipment, staff, and site/side    Indications:  Vascular access  Anesthesia:  See MAR for details  Preparation:  Skin prepped with ChloraPrep  Skin prep agent dried: Skin prep agent completely dried prior to procedure    Sterile barriers: All five maximal sterile barriers used - gloves, gown, cap, mask and large sterile sheet    Hand hygiene: Hand hygiene performed immediately prior to central venous catheter insertion    Location:  Right femoral  Site selection rationale:  Attempted IJ without success  Catheter type:  Triple lumen  Catheter size:  7 Fr  Manometry: No    Number of attempts:  1  Securement:  Line sutured, sterile dressing applied, blood return through all ports and chlorhexidine patch  Complications: No    Estimated blood loss (mL):  1  Specimens: No    Implants: No    Guidewire: guidewire removed intact, verified with nurse    XRay:  Successful placement  Adverse Events:  None    Tye Hayes MD   3/15/2025    "

## 2025-03-15 NOTE — NURSING
03/15/25 1739        Hemodialysis Catheter 03/10/25 1217 right internal jugular   Placement Date/Time: 03/10/25 1217   Hand Hygiene: Performed  Barrier Precautions: Performed  Skin Antisepsis: ChloraPrep  Hemodialysis Catheter Type: Tunneled catheter  Location: right internal jugular  Catheter Size (Fr): 14.5 Fr  : [facility]...   Dressing Intervention Sterile dressing change   Post-Hemodialysis Assessment   Duration of Treatment 180 minutes   Total UF (mL) 0 mL   Post-Hemodialysis Comments 3 hr of dialysis.  no fluid removal as ordered.  vss.  hd cvc dressing changed.

## 2025-03-15 NOTE — PROGRESS NOTES
"[facility]    Nephrology Progress Note    Patient Name: Fior Joya  Age: 69 y.o.  : 1955  MRN: [id number]  Admission Date: 3/13/2025    Chief complaint: Fatigue (Patient reports fatigue. Sent from dialysis due to weakness, shivering, and lethargy. Family also reports intermittent AMS. Last dialysis run on Tuesday. Patient with nephrostomy tubes in place. )      Hospital course  Fior Joya is a 69 y.o. White female with past medical history of end-stage renal disease (on dialysis per Tuesday, Thursday, Saturday schedule at [facility] in [place]), bladder cancer, and anemia of chronic disease.  Patient was admitted for altered mental status and possible sepsis.  Nephrology was consulted for assistance with management of ESRD.    Subjective  Patient is resting in bed, obtunded.  Patient is on norepinephrine at 0.16 micrograms/kilogram per minute.    Review of Systems  Unable to obtain secondary to patient's condition    Objective  BP (!) 81/60 (BP Location: Left arm, Patient Position: Lying)   Pulse (!) 114   Temp 98.3 °F (36.8 °C) (Oral)   Resp (!) 25   Ht 5' 2.01" (1.575 m)   Wt 100.2 kg (220 lb 14.4 oz)   LMP  (LMP Unknown)   SpO2 99%   BMI 40.39 kg/m²     Intake/Output Summary (Last 24 hours) at 3/15/2025 0506  Last data filed at 3/14/2025 1711  Gross per 24 hour   Intake 2287.32 ml   Output --   Net 2287.32 ml       Physical Exam  General appearance:  Chronically ill-appearing female obtunded  HEENT: PERRLA, EOMI, no scleral icterus, no JVD. Neck is supple.  Chest: Respirations are unlabored. Lungs sounds are clear.  Right chest wall tunneled dialysis catheter is in place  Heart: S1, S2.   Abdomen:  Obese, soft, bowel sounds audible.  :  Nephrostomy tubes in place.  Extremities:  No edema, pulses are palpable.  + fasciculations  Neuro:  Lethargic, opens eyes briefly to painful stimuli.    Medications  Scheduled Meds:   atorvastatin  20 mg Oral QHS    calcium gluconate IVPB  1 g " Intravenous Q1H    ceFEPime IV (PEDS and ADULTS)  1 g Intravenous Q12H    fentaNYL  25 mcg Intravenous Once    folic acid  1 mg Oral Daily    heparin (porcine)  5,000 Units Subcutaneous Q12H    magnesium sulfate 2 g IVPB  2 g Intravenous Once    miconazole NITRATE 2 %   Topical (Top) BID    mupirocin   Nasal BID    potassium chloride in water  20 mEq Intravenous Once    potassium chloride in water  20 mEq Intravenous Q2H     Continuous Infusions:   NORepinephrine bitartrate-D5W  0-3 mcg/kg/min Intravenous Continuous 22.5 mL/hr at 03/14/25 2126 0.12 mcg/kg/min at 03/14/25 2126    sodium bicarbonate 100 mEq in 0.45% NaCl 1,000 mL infusion   Intravenous Continuous         PRN Meds:.  Current Facility-Administered Medications:     acetaminophen, 650 mg, Oral, Q4H PRN    lorazepam, 1 mg, Intravenous, Q4H PRN    ondansetron, 4 mg, Intravenous, Q8H PRN    sodium chloride 0.9%, 10 mL, Intravenous, PRN    Pharmacy to dose Vancomycin consult, , , Once **AND** vancomycin - pharmacy to dose, , Intravenous, pharmacy to manage frequency     Imaging:    Reviewed    Laboratory Data:    Chemistry  Recent Labs     03/13/25  1425 03/14/25  0208 03/15/25  0251    136 138   K 3.8 3.2* 3.3*   CO2 23 20* 15*   BUN 20.8* 22.1* 22.1*   CREATININE 5.00* 5.38* 5.46*   EGFRNORACEVR 9 8 8   GLUCOSE 46* 97 86   CALCIUM 7.3* 6.9* 6.2*   MG  --  1.40* 2.00   PHOS  --  1.9* 3.2      LFT  Lab Results   Component Value Date    ALKPHOS 97 03/15/2025    AST 41 (H) 03/15/2025    ALT 35 03/15/2025    BILITOT 0.4 03/15/2025    ALBUMIN 1.5 (L) 03/15/2025      CKD-MBD  Lab Results   Component Value Date    .7 (H) 06/23/2024    [id number] 15 (L) 06/23/2024      Hematology  Recent Labs     03/13/25  1425 03/14/25  0208 03/15/25  0250   WBC 8.37 9.96 10.22   HGB 11.6* 10.5* 10.7*   HCT 34.6* 31.6* 32.1*    195 232      Lab Results   Component Value Date    IRON 20 (L) 10/05/2024    TIBC 91 (L) 10/05/2024    FERRITIN 4,645.60 (H)  10/05/2024    FOLATE 6.1 (L) 06/22/2024    [id number] 681 06/22/2024      ID  Lab Results   Component Value Date    HIV Nonreactive 07/08/2024    HEPCAB Nonreactive 07/07/2024      Additional serology  Lab Results   Component Value Date    MSPIKEPCT  07/07/2024      Comment:      Not observed    ANAHS <1:80 (Negative) 06/24/2024    CANCA Negative 06/24/2024    PANCA Negative 06/24/2024    HGBA1C 4.7 06/22/2024       Urine studies  Lab Results   Component Value Date    APPEARANCEUA Turbid (A) 10/03/2024    SGUA 1.017 10/03/2024    PROTEINUA 2+ (A) 10/03/2024    KETONESUA Negative 10/03/2024    LEUKOCYTESUR 500 (A) 10/03/2024    RBCUA 50-99 (A) 10/03/2024    WBCUA >100 (A) 10/03/2024    BACTERIA Many (A) 10/03/2024    SQEPUA None Seen 10/03/2024    CREATRANDUR 29.5 (L) 06/21/2024    PROTEINURINE 241.3 06/21/2024        Impression  End-stage renal disease   Septic shock   Bladder cancer with obstructive uropathy, status post nephrostomy tube placement   Anemia of chronic disease   Metabolic acidosis   Hypokalemia   Hypocalcemia     Plan  Patient remains hypotensive and acidotic.  Will give 1 L half-normal saline with 100 mEq of sodium bicarbonate.  Will additionally replace potassium (40 mEq IV over 4 hours) and calcium (2 g IV over 2 hours).  Will plan for hemodialysis treatment today, mainly for purposes of correction of electrolyte/acid-base abnormalities and uremic toxin clearance.  Will not ultrafiltrate due to hypotension.      Julisa Reese, NP  [facility]  3/15/2025

## 2025-03-15 NOTE — PLAN OF CARE
Problem: Adult Inpatient Plan of Care  Goal: Plan of Care Review  Outcome: Progressing  Goal: Patient-Specific Goal (Individualized)  Outcome: Progressing  Goal: Absence of Hospital-Acquired Illness or Injury  Outcome: Progressing  Goal: Optimal Comfort and Wellbeing  Outcome: Progressing  Goal: Readiness for Transition of Care  Outcome: Progressing     Problem: Bariatric Environmental Safety  Goal: Safety Maintained with Care  Outcome: Progressing     Problem: Skin Injury Risk Increased  Goal: Skin Health and Integrity  Outcome: Progressing     Problem: Infection  Goal: Absence of Infection Signs and Symptoms  Outcome: Progressing     Problem: Wound  Goal: Optimal Coping  Outcome: Progressing  Goal: Optimal Functional Ability  Outcome: Progressing  Goal: Absence of Infection Signs and Symptoms  Outcome: Progressing  Goal: Improved Oral Intake  Outcome: Progressing  Goal: Optimal Pain Control and Function  Outcome: Progressing  Goal: Skin Health and Integrity  Outcome: Progressing  Goal: Optimal Wound Healing  Outcome: Progressing     Problem: Hemodialysis  Goal: Safe, Effective Therapy Delivery  Outcome: Progressing  Goal: Effective Tissue Perfusion  Outcome: Progressing  Goal: Absence of Infection Signs and Symptoms  Outcome: Progressing     Problem: Coping Ineffective  Goal: Effective Coping  Outcome: Progressing     Problem: Fall Injury Risk  Goal: Absence of Fall and Fall-Related Injury  Outcome: Progressing

## 2025-03-15 NOTE — PROGRESS NOTES
Pharmacokinetic Assessment Follow Up: IV Vancomycin    Vancomycin serum concentration assessment(s):    The random level was drawn correctly and can be used to guide therapy at this time. The measurement is above the desired definitive target range of 15 to 20 mcg/mL.    Vancomycin Regimen Plan:  500 mg x 1 tonight after HD  Re-dose when the random level is less than 20 mcg/mL, next level to be drawn at 0430 on 3/18 before next HD    Scheduled Administration Times        Drug levels (last 3 results):  Recent Labs   Lab Result Units 03/15/25  0251   Vancomycin Random ug/ml 22.3*       Vancomycin Administrations:  vancomycin given in the last 96 hours                     vancomycin (VANCOCIN) 1,000 mg in D5W 250 mL IVPB (admixture device) (mg) 1,000 mg New Bag 03/13/25 1951    vancomycin (VANCOCIN) 1,000 mg in D5W 250 mL IVPB (admixture device) (mg) 1,000 mg New Bag 03/13/25 1820                    Pharmacy will continue to follow and monitor vancomycin.    Please contact pharmacy at extension [phone] for questions regarding this assessment.    Thank you for the consult,   Yayo Sweet       Patient brief summary:  Fior Joya is a 69 y.o. female initiated on antimicrobial therapy with IV Vancomycin for treatment of sepsis    The patient's current regimen is dosing with HD TTS    Drug Allergies:   Review of patient's allergies indicates:   Allergen Reactions    Asa [aspirin] Anaphylaxis    Penicillins      Received ceftriaxone from 6/27, no reactions        Actual Body Weight:  Wt Readings from Last 1 Encounters:   03/14/25 100.2 kg (220 lb 14.4 oz)       Renal Function:   Estimated Creatinine Clearance: 10.9 mL/min (A) (based on SCr of 5.46 mg/dL (H)).,     Dialysis Method (if applicable):  intermittent HD TTS    CBC (last 72 hours):  Recent Labs   Lab Result Units 03/13/25  1425 03/14/25  0208 03/15/25  0250   WBC x10(3)/mcL 8.37 9.96 10.22   Hgb g/dL 11.6* 10.5* 10.7*   Hct % 34.6* 31.6* 32.1*   Platelet x10(3)/mcL  248 195 232   Mono % % 7.3 8.0 9.3   Eos % % 0.4 1.7 0.3   Basophil % % 0.4 0.3 0.4       Metabolic Panel (last 72 hours):  Recent Labs   Lab Result Units 03/13/25  1425 03/14/25  0208 03/15/25  0251   Sodium mmol/L 138 136 138   Potassium mmol/L 3.8 3.2* 3.3*   Chloride mmol/L 98 100 104   CO2 mmol/L 23 20* 15*   Glucose mg/dL 46* 97 86   Blood Urea Nitrogen mg/dL 20.8* 22.1* 22.1*   Creatinine mg/dL 5.00* 5.38* 5.46*   Albumin g/dL 1.6* 1.5* 1.5*   Bilirubin Total mg/dL 0.4 0.4 0.4   ALP unit/L 98 89 97   AST unit/L 82* 72* 41*   ALT unit/L 30 33 35   Magnesium Level mg/dL  --  1.40* 2.00   Phosphorus Level mg/dL  --  1.9* 3.2       Microbiologic Results:  Microbiology Results (last 7 days)       Procedure Component Value Units Date/Time    Blood culture #1 **CANNOT BE ORDERED STAT** [[id number]]  (Normal) Collected: 03/13/25 1429    Order Status: Completed Specimen: Blood Updated: 03/14/25 1601     Blood Culture No Growth At 24 Hours    Blood culture #2 **CANNOT BE ORDERED STAT** [[id number]]  (Normal) Collected: 03/13/25 1429    Order Status: Completed Specimen: Blood Updated: 03/14/25 1601     Blood Culture No Growth At 24 Hours    Body Fluid Culture [[id number]] Collected: 03/14/25 0829    Order Status: Sent Specimen: Body Fluid from Kidney, Right Updated: 03/14/25 0842    Body Fluid Culture [[id number]] Collected: 03/14/25 0829    Order Status: Sent Specimen: Body Fluid from Kidney, Left Updated: 03/14/25 0842

## 2025-03-16 NOTE — PROGRESS NOTES
"[facility]    Nephrology Progress Note    Patient Name: Fior Joya  Age: 69 y.o.  : 1955  MRN: [id number]  Admission Date: 3/13/2025    Chief complaint: Fatigue (Patient reports fatigue. Sent from dialysis due to weakness, shivering, and lethargy. Family also reports intermittent AMS. Last dialysis run on Tuesday. Patient with nephrostomy tubes in place. )      Hospital course  Fior Joya is a 69 y.o. White female with past medical history of end-stage renal disease (on dialysis per Tuesday, Thursday, Saturday schedule at [facility] in [place]), bladder cancer, and anemia of chronic disease.  Patient was admitted for altered mental status and possible sepsis.  Nephrology was consulted for assistance with management of ESRD.    Subjective  Patient remains obtunded.  Her vasopressor requirements have increased, she is currently on vasopressin and high-dose norepinephrine drips.    Review of Systems  Unable to obtain secondary to patient's condition    Objective  BP (!) 126/59   Pulse (!) 127   Temp 98 °F (36.7 °C) (Oral)   Resp (!) 32   Ht 5' 2.01" (1.575 m)   Wt 100.2 kg (220 lb 14.4 oz)   LMP  (LMP Unknown)   SpO2 100%   BMI 40.39 kg/m²     Intake/Output Summary (Last 24 hours) at 3/16/2025 0556  Last data filed at 3/16/2025 0553  Gross per 24 hour   Intake 4042.21 ml   Output 840 ml   Net 3202.21 ml       Physical Exam  General appearance:  Chronically ill-appearing female obtunded  HEENT: PERRLA, EOMI, no scleral icterus, no JVD. Neck is supple.  Chest: Respirations are unlabored. Lungs sounds are clear.  Right chest wall tunneled dialysis catheter is in place  Heart: S1, S2.   Abdomen:  Obese, soft, bowel sounds audible.  :  Nephrostomy tubes in place.  Extremities:  No edema, pulses are palpable.  + fasciculations  Neuro:  Obtunded    Medications  Scheduled Meds:   atorvastatin  20 mg Oral QHS    ceFEPime IV (PEDS and ADULTS)  1 g Intravenous Q12H    fentaNYL  25 mcg " Intravenous Once    folic acid  1 mg Oral Daily    heparin (porcine)  5,000 Units Subcutaneous Q12H    lactated ringers  500 mL Intravenous Once    lorazepam  1 mg Intravenous Once    magnesium sulfate 2 g IVPB  2 g Intravenous Once    miconazole NITRATE 2 %   Topical (Top) BID    mupirocin   Nasal BID     Continuous Infusions:   NORepinephrine bitartrate-D5W  0-3 mcg/kg/min Intravenous Continuous 71.4 mL/hr at 03/16/25 0553 0.38 mcg/kg/min at 03/16/25 0553    sodium bicarbonate 100 mEq in 0.45% NaCl 1,000 mL infusion   Intravenous Continuous 100 mL/hr at 03/16/25 0553 Rate Verify at 03/16/25 0553    vasopressin  0.04 Units/min Intravenous Continuous 12 mL/hr at 03/16/25 0553 0.04 Units/min at 03/16/25 0553     PRN Meds:.  Current Facility-Administered Medications:     acetaminophen, 650 mg, Oral, Q4H PRN    HYDROmorphone, 0.2 mg, Intravenous, Q6H PRN    ondansetron, 4 mg, Intravenous, Q8H PRN    sodium chloride 0.9%, 10 mL, Intravenous, PRN    Pharmacy to dose Vancomycin consult, , , Once **AND** vancomycin - pharmacy to dose, , Intravenous, pharmacy to manage frequency     Imaging:    Reviewed    Laboratory Data:    Chemistry  Recent Labs     03/13/25  1425 03/14/25  0208 03/15/25  0251 03/16/25  0227    136 138 133*   K 3.8 3.2* 3.3* 4.1   CO2 23 20* 15* 11*   BUN 20.8* 22.1* 22.1* 11.8   CREATININE 5.00* 5.38* 5.46* 3.41*   EGFRNORACEVR 9 8 8 14   GLUCOSE 46* 97 86 94   CALCIUM 7.3* 6.9* 6.2* 5.8*   MG  --  1.40* 2.00 1.70   PHOS  --  1.9* 3.2 2.0*      LFT  Lab Results   Component Value Date    ALKPHOS 110 03/16/2025    AST 42 (H) 03/16/2025    ALT 48 03/16/2025    BILITOT 0.5 03/16/2025    ALBUMIN 1.4 (L) 03/16/2025      CKD-MBD  Lab Results   Component Value Date    .7 (H) 06/23/2024    [id number] 15 (L) 06/23/2024      Hematology  Recent Labs     03/13/25  1425 03/14/25  0208 03/15/25  0250   WBC 8.37 9.96 10.22   HGB 11.6* 10.5* 10.7*   HCT 34.6* 31.6* 32.1*    195 232      Lab Results    Component Value Date    IRON 20 (L) 10/05/2024    TIBC 91 (L) 10/05/2024    FERRITIN 4,645.60 (H) 10/05/2024    FOLATE 6.1 (L) 06/22/2024    [id number] 681 06/22/2024      ID  Lab Results   Component Value Date    HIV Nonreactive 07/08/2024    HEPCAB Nonreactive 07/07/2024      Additional serology  Lab Results   Component Value Date    MSPIKEPCT  07/07/2024      Comment:      Not observed    ANAHS <1:80 (Negative) 06/24/2024    CANCA Negative 06/24/2024    PANCA Negative 06/24/2024    HGBA1C 4.7 06/22/2024       Urine studies  Lab Results   Component Value Date    APPEARANCEUA Turbid (A) 10/03/2024    SGUA 1.017 10/03/2024    PROTEINUA 2+ (A) 10/03/2024    KETONESUA Negative 10/03/2024    LEUKOCYTESUR 500 (A) 10/03/2024    RBCUA 50-99 (A) 10/03/2024    WBCUA >100 (A) 10/03/2024    BACTERIA Many (A) 10/03/2024    SQEPUA None Seen 10/03/2024    CREATRANDUR 29.5 (L) 06/21/2024    PROTEINURINE 241.3 06/21/2024        Impression  End-stage renal disease   Septic shock   Bladder cancer with obstructive uropathy, status post nephrostomy tube placement   Anemia of chronic disease   Metabolic acidosis   Hypocalcemia   Hypophosphatemia     Plan  Patient's condition has worsened overnight.  Patient remains encephalopathic, lactic acidosis has worsened, vasopressor requirements have increased.  Blood and bloody fluid culture results are pending.  Consider broadening coverage and possibly discontinuing cefepime as it could contribute to development of encephalopathy.   Will give 2 g of calcium gluconate and 20 millimoles of potassium phosphate to correct hypocalcemia and hypophosphatemia respectively.  Continue bicarbonate drip at current rate.  Will also plan for dialysis this morning to correct metabolic acidosis.   Prognosis appears guarded.    Julisa Reese, DERICK  [facility]  3/16/2025

## 2025-03-16 NOTE — NURSING
03/16/25 1325   Post-Hemodialysis Assessment   Duration of Treatment 180 minutes   Total UF (mL) 0 mL   Patient Response to Treatment tolerated hd fair. did require extra presor support even with no UF ordered. cvc fx well and able to maintain ordered flows.

## 2025-03-16 NOTE — PROGRESS NOTES
[facility] - Emergency Dept  Pulmonary Critical Care Note    Patient Name: Fior Joya  MRN: [id number]  Admission Date: 3/13/2025  Hospital Length of Stay: 3 days  Code Status: Full Code  Attending Provider: Casper Hayward Jr., MD,*  Resident: Nilson  Primary Care Provider: Chriss Mckeon NP     Subjective:     HPI:   Fior Joya is a 70 yo F w PMHx of ESRD on HD TTS, recent removal of L tunneled cath and R tunneled calth placement on 3/10/25, MRSA bacteremia in 10/2024, right nephrostomy tube x 2, left nephrostomy tube x 1, and bladder ca s/p bilateral ureteral stent placement who presented to [facility] ED on 3/13/25 with 1-day history of generalized weakness. Patient reports generalized weakness, increased diffuse back pain from baseline and bilateral lower extremity pain when undergoing dialysis last on Tuesday, 3/12/25. Denies fevers, sick contacts, open wounds, and drainage from her recent tunneled cath site. Denies falls and LOC. Patient's son is her caretaker and says nephrostomy tubes intermittently put out cloudy output every 4-5 days. Patient reports tunneled cath was replaced recently due to blockage. Upon ED arrival, systolic BP in 70s and tachypneic RR 25, however all other VS stable. Found to be hypoglycemic 46 with elevated troponin 0.187 and lactic acid 3.2 on admission. EKG negative for acute ischemic change from previous. +MRSA PCR. Covid/flu negative. Patient was placed on Levophed and admitted to ICU.     Hospital Course/Significant events:  3/13.  Admission  3/16.  Intubated and placed on mechanical ventilation    24 Hour Interval History:  Patient decompensated overnight and required intubation.  Respiratory rate was in the high 40s.  ABG showed a severe metabolic acidosis and lactic acid continues to climb with the most recent this morning being above 11.  Nephrology is on board, appreciate assistance.  Patient dialyzed this morning to help resolve acidosis, will reassess  acid-base status shortly after dialysis.  Due to respiratory rate, and tachycardia there was worry for acute pulmonary embolus, CT PE was done this morning which did not show any acute embolus or dissection.  CT head without contrast and CT chest/abdomen/pelvis performed yesterday due to persistent lactic acidosis, all imaging has been negative thus far.  Echocardiogram performed showed ejection fraction of 60-65% with grade 1 diastolic dysfunction, no valvular abnormality noted.  Negative shunt.  Renal ultrasound ordered and pending due to nephrostomy tube placement, body fluid culture from nephrostomy tube did reveal Gram-negative rods.  Cefepime was discontinued due to possible cefepime neurotoxicity, meropenem started in its place.  Continuing vancomycin.  Continues to need low-dose Levophed, on sedation with fentanyl and propofol.    Past Medical History:   Diagnosis Date    Bladder cancer     ESRD (end stage renal disease)        Past Surgical History:   Procedure Laterality Date    CYSTOSCOPY W/ URETERAL STENT PLACEMENT Right 6/27/2024    Procedure: CYSTOSCOPY, WITH URETERAL STENT INSERTION;  Surgeon: Otis Person MD;  Location: [facility];  Service: Urology;  Laterality: Right;  CYSTOSCOPY, BILATERAL RETROGRADE PYELOGRAMS, POSSIBLE STENT PLACEMENT.    CYSTOSCOPY W/ URETERAL STENT PLACEMENT Bilateral 10/4/2024    Procedure: CYSTOSCOPY, WITH URETERAL STENT INSERTION;  Surgeon: Tawana Loaiza MD;  Location: [facility] OR;  Service: Urology;  Laterality: Bilateral;  CYSTO WITH BILAT URETERAL STENT EXCHANGE    INSERTION OF TUNNELED CENTRAL VENOUS HEMODIALYSIS CATHETER N/A 7/3/2024    Procedure: Insertion, Catheter, Central Venous, Hemodialysis;  Surgeon: Uche Barcenas MD;  Location: [facility] CATH LAB;  Service: Peripheral Vascular;  Laterality: N/A;    INSERTION OF TUNNELED CENTRAL VENOUS HEMODIALYSIS CATHETER N/A 10/24/2024    Procedure: Insertion, Catheter, Central Venous, Hemodialysis;  Surgeon:  Nikolai Manzo DO;  Location: [facility] CATH LAB;  Service: Nephrology;  Laterality: N/A;    INSERTION, CATHETER, TUNNELED Right 3/10/2025    Procedure: INSERTION, CATHETER, TUNNELED;  Surgeon: Donovan Becerra MD;  Location: [facility] OR;  Service: General;  Laterality: Right;    REMOVAL OF TUNNELED CENTRAL VENOUS CATHETER (CVC) Left 3/10/2025    Procedure: REMOVAL, CATHETER, CENTRAL VENOUS, TUNNELED;  Surgeon: Donovan Becerra MD;  Location: [facility] OR;  Service: General;  Laterality: Left;    REPLACEMENT OF DIALYSIS CATHETER OVER GUIDEWIRE THROUGH EXISTING VENOUS ACCESS N/A 1/13/2025    Procedure: REPLACEMENT, CATHETER, DIALYSIS, OVER GUIDEWIRE, USING EXISTING VENOUS ACCESS;  Surgeon: Nikolai Manzo DO;  Location: [facility] CATH LAB;  Service: Nephrology;  Laterality: N/A;       Social History[1]        Current Outpatient Medications   Medication Instructions    atorvastatin (LIPITOR) 20 mg, Nightly    ergocalciferol, vitamin D2, (VITAMIN D2 ORAL) Take by mouth.    FeroSuL 325 mg, Daily    fluconazole (DIFLUCAN) 150 mg, Once    fluticasone propionate (FLONASE) 50 mcg/actuation nasal spray 2 sprays    folic acid (FOLVITE) 1 mg, Oral, Daily    labetaloL (NORMODYNE) 100 mg, 2 times daily    levocetirizine (XYZAL) 5 mg, Nightly    megestroL (MEGACE) 40 mg, Daily    midodrine (PROAMATINE) 5 mg, 3 times daily    nitrofurantoin, macrocrystal-monohydrate, (MACROBID) 100 MG capsule 100 mg, Every 12 hours    ondansetron (ZOFRAN) 8 MG tablet TAKE 1 TABLET BY MOUTH EVERY 8 HOURS AS NEEDED FOR NAUSEA/ VOMITING    oxyCODONE-acetaminophen (PERCOCET) 5-325 mg per tablet 1 tablet, Oral, On Call Procedure    pantoprazole (PROTONIX) 40 mg, Oral, Daily    sevelamer carbonate (RENVELA) 800 mg, 3 times daily with meals    sodium bicarbonate 650 mg, 4 times daily       Review of patient's allergies indicates:   Allergen Reactions    Asa [aspirin] Anaphylaxis    Penicillins      Received ceftriaxone from 6/27, no reactions         Current Inpatient  Medications   atorvastatin  20 mg Oral QHS    calcium gluconate 1 g  1 g Intravenous Once    fentaNYL        folic acid  1 mg Oral Daily    heparin (porcine)  5,000 Units Subcutaneous Q12H    lactated ringers  500 mL Intravenous Once    lorazepam  1 mg Intravenous Once    magnesium sulfate 2 g IVPB  2 g Intravenous Once    meropenem IV (PEDS and ADULTS)  1 g Intravenous Q24H    miconazole NITRATE 2 %   Topical (Top) BID    mupirocin   Nasal BID       Current Intravenous Infusions   fentanyl  0-250 mcg/hr Intravenous Continuous        NORepinephrine bitartrate-D5W  0-3 mcg/kg/min Intravenous Continuous 12.2 mL/hr at 03/16/25 0856 0.26 mcg/kg/min at 03/16/25 0856    propofoL  0-50 mcg/kg/min Intravenous Continuous        vasopressin  0.04 Units/min Intravenous Continuous 12 mL/hr at 03/16/25 0553 0.04 Units/min at 03/16/25 0553         Review of Systems   Unable to perform ROS: Intubated      As stated above     Objective:       Intake/Output Summary (Last 24 hours) at 3/16/2025 1245  Last data filed at 3/16/2025 0553  Gross per 24 hour   Intake 2508.11 ml   Output 175 ml   Net 2333.11 ml         Vital Signs (Most Recent):  Temp: 98 °F (36.7 °C) (03/16/25 0400)  Pulse: 108 (03/16/25 1230)  Resp: (!) 55 (03/16/25 1230)  BP: (!) 100/49 (03/16/25 1230)  SpO2: 97 % (03/16/25 1230)  Body mass index is 40.39 kg/m².  Weight: 100.2 kg (220 lb 14.4 oz) Vital Signs (24h Range):  Temp:  [98 °F (36.7 °C)-98.7 °F (37.1 °C)] 98 °F (36.7 °C)  Pulse:  [] 108  Resp:  [13-58] 55  SpO2:  [10 %-100 %] 97 %  BP: ()/() 100/49  Arterial Line BP: (0-149)/(0-59) 103/50     Physical Exam  Vitals and nursing note reviewed.   Constitutional:       General: She is not in acute distress.     Appearance: She is obese. She is ill-appearing. She is not diaphoretic.      Interventions: She is sedated and intubated.      Comments: Intubated and sedated   HENT:      Head: Normocephalic and atraumatic.      Nose: Nose normal. No  congestion or rhinorrhea.      Mouth/Throat:      Mouth: Mucous membranes are moist.      Pharynx: Oropharynx is clear. No oropharyngeal exudate or posterior oropharyngeal erythema.   Eyes:      Pupils: Pupils are equal, round, and reactive to light.   Cardiovascular:      Rate and Rhythm: Normal rate and regular rhythm.      Pulses: Normal pulses.      Heart sounds: No murmur heard.  Pulmonary:      Effort: Pulmonary effort is normal. No respiratory distress. She is intubated.      Breath sounds: No wheezing, rhonchi or rales.      Comments: Decreased breath sounds bilaterally 2/2 body habitus   Abdominal:      General: Abdomen is flat. Bowel sounds are normal. There is no distension.      Palpations: Abdomen is soft.      Tenderness: There is no abdominal tenderness. There is no right CVA tenderness or left CVA tenderness.      Comments: Nephrostomy tubes in place, cloudy output    Musculoskeletal:      Cervical back: Normal range of motion.      Right lower leg: Edema present.      Left lower leg: Edema present.   Skin:     General: Skin is warm and dry.      Findings: Erythema and rash present.      Comments: Erythematous and weeping skin folds   Warmth to palpation an erythema of lower back and buttock region bilaterally   No open wounds, active drainage, or bleeding.    Neurological:      General: No focal deficit present.      Mental Status: She is oriented to person, place, and time. Mental status is at baseline.      GCS: GCS eye subscore is 4. GCS verbal subscore is 5. GCS motor subscore is 6.   Psychiatric:         Mood and Affect: Mood normal.         Behavior: Behavior normal.         Thought Content: Thought content normal.         Judgment: Judgment normal.           Lines/Drains/Airways       Central Venous Catheter Line  Duration                  Hemodialysis Catheter 03/10/25 1217 right internal jugular 6 days    Percutaneous Central Line - Triple Lumen  03/14/25 1405 Femoral Vein Right;Femoral  Right 1 day              Drain  Duration                  Nephrostomy 10/04/24 1506 Right 8 Fr. 162 days         Nephrostomy 10/16/24 1150 Right 8 Fr. 151 days         Nephrostomy 10/16/24 1201 Left 8 Fr. 151 days         NG/OG Tube 03/14/25 0800 nasogastric 18 Fr. Right nostril 2 days              Airway  Duration                  Airway - Non-Surgical 03/16/25 0807 Endotracheal Tube <1 day              Arterial Line  Duration             Arterial Line 03/15/25 1422 Left Radial <1 day              Peripheral Intravenous Line  Duration                  Peripheral IV - Single Lumen 03/14/25 0800 18 G Anterior;Distal;Right Forearm 2 days                    Significant Labs:    Lab Results   Component Value Date    WBC 10.22 03/15/2025    HGB 10.7 (L) 03/15/2025    HCT 32.1 (L) 03/15/2025    MCV 97.6 (H) 03/15/2025     03/15/2025           BMP  Lab Results   Component Value Date     (L) 03/16/2025    K 4.8 03/16/2025    CO2 11 (L) 03/16/2025    BUN 12.1 03/16/2025    CREATININE 3.49 (H) 03/16/2025    CALCIUM 5.7 (LL) 03/16/2025    AGAP 24.0 03/16/2025         ABG  Recent Labs   Lab 03/16/25  0913   PH 7.250*   PO2 220.0*   PCO2 28.0*   HCO3 12.3*   POCBASEDEF -13.50       Mechanical Ventilation Support:  Vent Mode: A/C (03/16/25 1029)  Ventilator Initiated: Yes (03/16/25 0812)  Set Rate: 22 BPM (03/16/25 1029)  Vt Set: 470 mL (03/16/25 1029)  PEEP/CPAP: 5 cmH20 (03/16/25 1029)  Oxygen Concentration (%): 30 (03/16/25 1200)  Peak Airway Pressure: 15 cmH20 (03/16/25 1029)  Total Ve: 15.5 L/m (03/16/25 1029)  F/VT Ratio<105 (RSBI): (!) 62.25 (03/16/25 1029)      Significant Imaging:  I have reviewed the pertinent imaging within the past 24 hours.    Imaging Results              CT Abdomen Pelvis  Without Contrast (Final result)  Result time 03/13/25 16:59:02      Final result by Pastor Gordon MD (03/13/25 16:59:02)                   Impression:      No acute process identified in the abdomen or  pelvis.      Electronically signed by: Pastor Gordon  Date:    03/13/2025  Time:    16:59               Narrative:    EXAMINATION:  CT ABDOMEN PELVIS WITHOUT CONTRAST    CLINICAL HISTORY:  Abdominal pain, acute, nonlocalized;Nephrostomy tubes;    TECHNIQUE:  CT imaging of the abdomen and pelvis without intravenous contrast. Axial, coronal and sagittal reformatted images reviewed. Dose length product is 1110 mGycm. Automatic exposure control, adjustment of mA/kV or iterative reconstruction technique used to limit radiation dose.    COMPARISON:  CT 10/15/2024    PET-CT 01/15/2025    FINDINGS:  Assessment of the visceral organs and vasculature is limited by the lack of IV contrast.    Liver/biliary: Small portion of the hepatic dome excluded from the field of view.  Mild generalized hepatic steatosis.  No radiodense gallstone or biliary dilatation appreciated.    Pancreas: Normal.    Spleen: Normal.    Adrenals: Normal.    Genitourinary: Bilateral nephrostomy tubes and ureteral stents.  No significant collecting system dilatation.  Bladder relatively decompressed and not well evaluated.    Stomach/bowel: No bowel obstruction. Appendix not confidently seen.  No discernible bowel inflammation.    Lymph nodes and peritoneum: 2 stable enlarged iliac lymph nodes.  No ascites or free air.    Vasculature: Aortoiliac atherosclerosis.    Abdominal wall: Normal.    Lung bases: No consolidation or pleural effusion.    Bones: Similar right sacroiliitis.  Small lytic area in the left iliac wing corresponds with hypermetabolic metastasis on previous PET-CT.                                       X-Ray Chest AP Portable (Final result)  Result time 03/13/25 14:52:21      Final result by Deidra Dobson MD (03/13/25 14:52:21)                   Impression:      Enlarged cardiac silhouette without edema      Electronically signed by: Deidra Dobson  Date:    03/13/2025  Time:    14:52               Narrative:    EXAMINATION:  XR  CHEST AP PORTABLE    CLINICAL HISTORY:  Other fatigue    TECHNIQUE:  Single frontal view of the chest was performed.    COMPARISON:  03/10/2025    FINDINGS:  LINES AND TUBES: Right IJ CVC tip projects over the SVC.    MEDIASTINUM AND JUSTYN: Cardiac silhouette is enlarged. Aortic atherosclerosis.    LUNGS: No lobar consolidation. No edema.    PLEURA:No pleural effusion. No pneumothorax.    BONES: No acute osseous abnormality.                                     Results for orders placed or performed during the hospital encounter of 03/13/25   EKG 12-lead    Collection Time: 03/15/25 10:39 PM   Result Value Ref Range    QRS Duration 66 ms    [facility] QTC Calculation 430 ms    Narrative    Test Reason : R79.89,    Vent. Rate : 109 BPM     Atrial Rate : 109 BPM     P-R Int : 156 ms          QRS Dur :  66 ms      QT Int : 320 ms       P-R-T Axes :  39 -19  52 degrees    QTcB Int : 430 ms    Sinus tachycardia  Otherwise normal ECG  When compared with ECG of 14-Mar-2025 10:41,  Nonspecific T wave abnormality now evident in Lateral leads  Confirmed by Augustin Nolasco ([id number]) on 3/16/2025 7:39:06 AM    Referred By: LIA BERNARDO           Confirmed By: Augustin Nolasco        Assessment/Plan:     Assessment  Septic shock requiring vasopressors, culture positive for Gram-negative fuad.  Hx of ESRD on HD TTS, recent removal of L tunneled cath and R tunneled calth placement on 3/10/25, MRSA bacteremia in 10/2024, right nephrostomy tube x 2, left nephrostomy tube x 1, bladder ca s/p bilateral ureteral stent placement and iron deficiency anemia   Intubated and sedated on mechanical ventilation, 3/16/2025  Severe metabolic acidosis secondary to lactic acidosis likely from septic shock      Plan  Admit to ICU for close monitoring   Intubated and sedated on mechanical ventilation, wean per ARDS net protocol keep SpO2 above 92%    Maintain MAP >65, wean vasopressors as tolerated   Cefepime switch to meropenem.  Remaining on vancomycin.  Blood  cultures redrawn and pending.  Awaiting final speciation and sensitivities  Nephrology following for ESRD on HD TTS schedule   Renal ultrasound ordered and pending  Daily AM labs  Continuous cardiac monitoring, daily weights and strict I&Os     Code Status: FULL   Abx:  Meropenem  IVF: None   DVT Prophylaxis: Heparin   GI Prophylaxis: None      32 minutes of critical care was time spent personally by me on the following activities: development of treatment plan with patient or surrogate and bedside caregivers, discussions with consultants, evaluation of patient's response to treatment, examination of patient, ordering and performing treatments and interventions, ordering and review of laboratory studies, ordering and review of radiographic studies, pulse oximetry, re-evaluation of patient's condition.  This critical care time did not overlap with that of any other provider or involve time for any procedures.     Tye Hayes MD  Pulmonary Critical Care Medicine  [facility]   DOS: 03/16/2025           [1]   Social History  Socioeconomic History    Marital status:    Tobacco Use    Smoking status: Never    Smokeless tobacco: Never   Substance and Sexual Activity    Alcohol use: Never    Drug use: Never    Sexual activity: Not Currently     Social Drivers of Health     Financial Resource Strain: Low Risk  (3/14/2025)    Overall Financial Resource Strain (CARDIA)     Difficulty of Paying Living Expenses: Not hard at all   Food Insecurity: No Food Insecurity (3/14/2025)    Hunger Vital Sign     Worried About Running Out of Food in the Last Year: Never true     Ran Out of Food in the Last Year: Never true   Transportation Needs: No Transportation Needs (10/4/2024)    TRANSPORTATION NEEDS     Transportation : No   Physical Activity: Unknown (3/14/2025)    Exercise Vital Sign     Days of Exercise per Week: 0 days   Stress: Patient Declined (3/13/2025)    [employer] [place] [employer] -  Occupational Stress Questionnaire     Feeling of Stress : Patient declined   Housing Stability: Low Risk  (3/14/2025)    Housing Stability Vital Sign     Unable to Pay for Housing in the Last Year: No     Homeless in the Last Year: No

## 2025-03-16 NOTE — CARE UPDATE
The patient's lactic acid continues to trend upwards despite all efforts to treat.  The patient has been resuscitated twice, received a total of 2 L.  Dialysis was done earlier today without much improvement in the lactic acid or metabolic acidosis.  Nephrology was contacted due to refractory acidosis and increasing pressor requirements, the decision was made to pursue CRRT. Discussion had with the family about the patient's poor prognosis requiring multiple pressors to maintain perfusion.  At this time, family members have requested that the patient be DNR in the instance her heart were to stop, not wanting compressions, defibrillation, or chemicals to restart the heart.  They state that their mother's wish would be to pass in peace without pursuing advanced life saving measures. This was clarified in the presence of another physician and a nurse.  Advanced directives were changed from full code to DNR. Will continue to pursue aggressive measures in the instance the patient improves.  She is DNR.    Tye Hayes MD   PGY-3, Pulmonary Critical Care ICU 7th floor

## 2025-03-17 PROBLEM — Z51.5 COMFORT MEASURES ONLY STATUS: Status: ACTIVE | Noted: 2025-01-01

## 2025-03-17 PROBLEM — R65.21 SEPTIC SHOCK: Status: ACTIVE | Noted: 2025-01-01

## 2025-03-17 PROBLEM — A41.9 SEPTIC SHOCK: Status: ACTIVE | Noted: 2025-01-01

## 2025-03-17 NOTE — PROGRESS NOTES
[facility] [address] [facility]  Pulmonary/Critical Care  Progress Note  3/17/2025    Patient Name: Fior Joya  MRN: [id number]  Admission Date: 3/13/2025  Code Status: DNR      Subjective:     HPI:  The patient is a 70 yo F w PMHx of ESRD on HD TTS, recent removal of L tunneled cath and R tunneled calth placement on 3/10/25, MRSA bacteremia in 10/2024, right nephrostomy tube x 2, left nephrostomy tube x 1, and bladder ca s/p bilateral ureteral stent placement who presented to [facility] ED on 3/13/25 with 1-day history of generalized weakness. Patient reports generalized weakness, increased diffuse back pain from baseline and bilateral lower extremity pain when undergoing dialysis last on Tuesday, 3/12/25. Denies fevers, sick contacts, open wounds, and drainage from her recent tunneled cath site. Denies falls and LOC. Patient's son is her caretaker and says nephrostomy tubes intermittently put out cloudy output every 4-5 days. Patient reports tunneled cath was replaced recently due to blockage. Upon ED arrival, systolic BP in 70s and tachypneic RR 25, however all other VS stable. Found to be hypoglycemic 46 with elevated troponin 0.187 and lactic acid 3.2 on admission. EKG negative for acute ischemic change from previous. +MRSA PCR. Covid/flu negative. Patient was placed on Levophed and admitted to ICU.     Hospital Course:      24hr Interval History:  Events of weekend reviewed.  Patient has continued to decline, currently sinus tachycardia on multiple vasopressors with Levophed 3 microgram/kilogram per minute, phenylephrine 5 microgram/kilogram per minute and vasopressin 0.1 units/minute infusions.  She was initiated on CVVHD yesterday due to persistent hypotension and need for volume removal.  She is receiving fentanyl 25 microgram/hour infusion.  Sodium bicarbonate infusion in process.  She has remained markedly hypotensive, with current systolic blood pressures in the mid 50s.  She is a DNR status with no  plans for CPR in the event of cardiac arrest.    AC 20/450 cc/peep +8/50%      Scheduled Medications:   atorvastatin  20 mg Oral QHS    calcium gluconate 1 g  1 g Intravenous Once    folic acid  1 mg Oral Daily    heparin (porcine)  5,000 Units Subcutaneous Q12H    hydrocortisone sodium succinate  100 mg Intravenous Q8H    lactated ringers  500 mL Intravenous Once    lorazepam  1 mg Intravenous Once    magnesium sulfate 2 g IVPB  2 g Intravenous Once    meropenem IV (PEDS and ADULTS)  1 g Intravenous Q24H    miconazole NITRATE 2 %   Topical (Top) BID    mupirocin   Nasal BID     PRN Medications:    Current Facility-Administered Medications:     acetaminophen, 650 mg, Oral, Q4H PRN    etomidate, , Intravenous, Code/trauma/sedation Med    [] fentaNYL, 50 mcg, Intravenous, Q15 Min PRN **FOLLOWED BY** fentaNYL, 50 mcg, Intravenous, Q1H PRN    HYDROmorphone, 0.2 mg, Intravenous, Q6H PRN    magnesium sulfate 2 g IVPB, 2 g, Intravenous, PRN    ondansetron, 4 mg, Intravenous, Q8H PRN    rocuronium, , Intravenous, Code/trauma/sedation Med    sodium chloride 0.9%, 10 mL, Intravenous, PRN    sodium phosphate 20.1 mmol in D5W 250 mL IVPB, 20.1 mmol, Intravenous, PRN    sodium phosphate 30 mmol in D5W 250 mL IVPB, 30 mmol, Intravenous, PRN    sodium phosphate 39.9 mmol in D5W 250 mL IVPB, 39.9 mmol, Intravenous, PRN    Pharmacy to dose Vancomycin consult, , , Once **AND** vancomycin - pharmacy to dose, , Intravenous, pharmacy to manage frequency  Continuous Infusions:   dexmedeTOMIDine (Precedex) infusion (titrating)  0-1.4 mcg/kg/hr Intravenous Continuous 10.02 mL/hr at 25 0600 0.4 mcg/kg/hr at 25 0600    fentanyl  0-250 mcg/hr Intravenous Continuous 2.5 mL/hr at 25 0600 25 mcg/hr at 25 06    NORepinephrine bitartrate-D5W  0-3 mcg/kg/min Intravenous Continuous 140.9 mL/hr at 25 0600 3 mcg/kg/min at 25 0600    phenylephrine  0-5 mcg/kg/min Intravenous Continuous 75.2 mL/hr at 25  0600 5 mcg/kg/min at 03/17/25 0600    sodium bicarbonate 150 mEq in 0.45% NaCl 1,000 mL infusion   Intravenous Continuous 100 mL/hr at 03/17/25 0600 Rate Verify at 03/17/25 0600    vasopressin  0.1 Units/min Intravenous Continuous 30 mL/hr at 03/17/25 0602 0.1 Units/min at 03/17/25 0602       Past Medical History:   Diagnosis Date    Bladder cancer     ESRD (end stage renal disease)        Past Surgical History:   Procedure Laterality Date    CYSTOSCOPY W/ URETERAL STENT PLACEMENT Right 6/27/2024    Procedure: CYSTOSCOPY, WITH URETERAL STENT INSERTION;  Surgeon: Otis Person MD;  Location: [facility];  Service: Urology;  Laterality: Right;  CYSTOSCOPY, BILATERAL RETROGRADE PYELOGRAMS, POSSIBLE STENT PLACEMENT.    CYSTOSCOPY W/ URETERAL STENT PLACEMENT Bilateral 10/4/2024    Procedure: CYSTOSCOPY, WITH URETERAL STENT INSERTION;  Surgeon: Tawana Loaiza MD;  Location: [facility];  Service: Urology;  Laterality: Bilateral;  CYSTO WITH BILAT URETERAL STENT EXCHANGE    INSERTION OF TUNNELED CENTRAL VENOUS HEMODIALYSIS CATHETER N/A 7/3/2024    Procedure: Insertion, Catheter, Central Venous, Hemodialysis;  Surgeon: Uche Barcenas MD;  Location: [facility] CATH LAB;  Service: Peripheral Vascular;  Laterality: N/A;    INSERTION OF TUNNELED CENTRAL VENOUS HEMODIALYSIS CATHETER N/A 10/24/2024    Procedure: Insertion, Catheter, Central Venous, Hemodialysis;  Surgeon: Nikolai Manzo DO;  Location: [facility] CATH LAB;  Service: Nephrology;  Laterality: N/A;    INSERTION, CATHETER, TUNNELED Right 3/10/2025    Procedure: INSERTION, CATHETER, TUNNELED;  Surgeon: Donovan Becerra MD;  Location: [facility] OR;  Service: General;  Laterality: Right;    REMOVAL OF TUNNELED CENTRAL VENOUS CATHETER (CVC) Left 3/10/2025    Procedure: REMOVAL, CATHETER, CENTRAL VENOUS, TUNNELED;  Surgeon: Donovan Becerra MD;  Location: [facility] OR;  Service: General;  Laterality: Left;    REPLACEMENT OF DIALYSIS CATHETER OVER GUIDEWIRE THROUGH EXISTING  VENOUS ACCESS N/A 1/13/2025    Procedure: REPLACEMENT, CATHETER, DIALYSIS, OVER GUIDEWIRE, USING EXISTING VENOUS ACCESS;  Surgeon: Nikolai Manzo DO;  Location: [facility] CATH LAB;  Service: Nephrology;  Laterality: N/A;       Objective:     Input/output:    Intake/Output Summary (Last 24 hours) at 3/17/2025 0707  Last data filed at 3/17/2025 0600  Gross per 24 hour   Intake 4941.16 ml   Output 700 ml   Net 4241.16 ml       Vital Signs (Most Recent):  Temp: (!) 95.3 °F (35.2 °C) (03/17/25 0400)  Pulse: 93 (03/17/25 0600)  Resp: 20 (03/17/25 0600)  BP: 130/60 (03/16/25 1845)  SpO2: (!) 82 % (03/17/25 0600)  Body mass index is 40.39 kg/m².  Weight: 100.2 kg (220 lb 14.4 oz) Vital Signs (24h Range):  Temp:  [95.3 °F (35.2 °C)-98.9 °F (37.2 °C)] 95.3 °F (35.2 °C)  Pulse:  [] 93  Resp:  [16-58] 20  SpO2:  [10 %-100 %] 82 %  BP: ()/(44-94) 130/60  Arterial Line BP: ()/() 73/55     Physical Exam  Constitutional:       Comments: Deeply obtunded, intubated on mechanical ventilation   HENT:      Mouth/Throat:      Comments: Endotracheal tube secured to external os  Pulmonary:      Breath sounds: Rhonchi present. No wheezing or rales.   Abdominal:      Comments: Obese, soft, very decreased bowel sounds   Skin:     Comments: Livedo reticularis over abdomen, chest, upper, and lower extremities   Neurological:      Comments: Deeply obtunded         Lines/Drains/Airways       Central Venous Catheter Line  Duration                  Hemodialysis Catheter 03/10/25 1217 right internal jugular 6 days    Percutaneous Central Line - Triple Lumen  03/14/25 1405 Femoral Vein Right;Femoral Right 2 days              Drain  Duration                  Nephrostomy 10/04/24 1506 Right 8 Fr. 163 days         Nephrostomy 10/16/24 1150 Right 8 Fr. 151 days         Nephrostomy 10/16/24 1201 Left 8 Fr. 151 days         NG/OG Tube 03/14/25 0800 nasogastric 18 Fr. Right nostril 2 days              Airway  Duration                   Airway - Non-Surgical 03/16/25 0807 Endotracheal Tube <1 day              Arterial Line  Duration             Arterial Line 03/16/25 1600 Left Radial <1 day              Peripheral Intravenous Line  Duration                  Peripheral IV - Single Lumen 03/14/25 0800 18 G Anterior;Distal;Right Forearm 2 days                    Vent:  Vent Mode: A/C (03/17/25 0516)  Ventilator Initiated: Yes (03/16/25 0812)  Set Rate: 20 BPM (03/17/25 0516)  Vt Set: 450 mL (03/17/25 0516)  PEEP/CPAP: 8 cmH20 (03/17/25 0516)  Oxygen Concentration (%): 100 (03/17/25 0516)  Peak Airway Pressure: 25 cmH20 (03/17/25 0516)  Total Ve: 9.3 L/m (03/17/25 0516)  F/VT Ratio<105 (RSBI): (!) 55.84 (03/17/25 0516)    ABGs:  Lab Results   Component Value Date    PH 7.020 (LL) 03/17/2025    PO2 321.0 (H) 03/17/2025    PCO2 31.0 (L) 03/17/2025         Significant Labs:    Lab Results   Component Value Date    WBC 17.57 (H) 03/17/2025    WBC 17.57 03/17/2025    HGB 12.1 03/17/2025    HCT 41.3 03/17/2025    .0 (H) 03/17/2025     03/17/2025         Recent Labs   Lab 03/16/25  1509 03/16/25  1509 03/16/25  1813 03/16/25  2129 03/17/25  0422   *  --   --  133* 132*   K 4.0  --   --  5.3* 6.1*     --   --  101 104   CO2 12*  --   --  15* 8*   BUN 6.0*  --   --  6.0* 5.6*   CREATININE 1.76*  --   --  1.91* 1.55*   CALCIUM 5.6*  --   --  6.9* 6.4*   MG  --    < >  --  1.80 2.20   PHOS  --   --   --  3.2  --    TRIG 767*  --   --   --   --    AST 51*  --   --   --  165*   ALT 62*  --   --   --  76*   ALKPHOS 112  --   --   --  181*   ALBUMIN 1.1*  --   --  1.4* 1.3*   TROPONINI  --   --  0.386*  --   --    INR 2.2*  --   --   --   --     < > = values in this interval not displayed.     Imaging:       Assessment:     End-stage kidney disease on hemodialysis TTS, currently on CVVHD  Bladder cancer with hypermetabolic intra-abdominal/pelvic metastasis, diagnosed 10/04/2024.  No current treatment.  Bilateral renal  obstruction/hydronephrosis, post bilateral nephrostomy tubes placed 10/2024.  She currently demonstrates Gram-negative rods bilateral nephrostomies, consistent with UTI  Persistent and severe refractory hypotensive shock secondary to septic shock etiology, likely due to Gram-negative UTI  Severe wide anion gap metabolic acidosis of lactic acid etiology, worsening daily, secondary to septic shock.  History of MRSA bacteremia 10/2024.  She is post previous tunneled cath removal with new right IJ tunneled cath placement 03/10/2025.  Chronic home bedridden state  Hypotensive shock requiring vasopressor, suspicious for septic shock etiology at this point.  Acute encephalopathy, likely metabolic related to above    Plan:     I have extensively discussed the above continued clinical decline with patient's son.  I have explained that she will not likely survive over the next hours.  I have offered palliative withdrawal option versus continued supportive measures.  Continue IV meropenem and IV vancomycin.  Continue to follow cultures.       35 minutes of critical care was time spent personally by me on the following activities: development of treatment plan with patient or surrogate and bedside caregivers, discussions with consultants, evaluation of patient's response to treatment, examination of patient, ordering and performing treatments and interventions, ordering and review of laboratory studies, ordering and review of radiographic studies, pulse oximetry, re-evaluation of patient's condition.  This patient demonstrates a high probability for further clinical decompensation due to ongoing critical illness.  Critical care time did not overlap with that of any other provider or involve time for any procedures.       Hanna Hayward MD, [facility]  Pulmonary/Critical Care

## 2025-03-17 NOTE — DISCHARGE SUMMARY
[facility]  Pulmonology  Discharge Summary      Patient Name: Fior Joya  MRN: [id number]  Admission Date: 3/13/2025  Hospital Length of Stay: 4 days  Discharge Date and Time: Patient death pronounced at 09:40  by Dr. Telma Thompson    Reason for admission:  Septic shock    Primary (Principal), Secondary, Final Diagnoses:  1. Weakness    2. Fatigue    3. Shock    4. Tachycardia    5. Edema    6. Elevated troponin    7. Hypoglycemia    8. Sepsis, due to unspecified organism, unspecified whether acute organ dysfunction present      Procedures performed: * No surgery found *    Care, treatment & services provided:    Orders Placed This Encounter   Procedures    CENTRAL LINE    Critical Care    HUMIDIFIER 340ML W/ADAPTER    Blood culture #1 **CANNOT BE ORDERED STAT**    Blood culture #2 **CANNOT BE ORDERED STAT**    Body Fluid Culture    Body Fluid Culture    Blood Culture    Blood Culture    X-Ray Chest AP Portable    CT Abdomen Pelvis  Without Contrast    US Retroperitoneal Limited    XR Gastric tube check, non-radiologist performed    CT Head Without Contrast    CT Chest Abdomen Pelvis With IV Contrast (XPD) NO Oral Contrast    CTA Chest Non-Coronary (PE Studies)    X-Ray Chest 1 View for Line/Tube Placement    US Retroperitoneal Limited    Comprehensive metabolic panel    CBC auto differential    Troponin I    COVID/FLU A&B PCR    APTT    Protime-INR    Lipase    Lactic acid, plasma    CBC with Differential    Lactic Acid, Plasma    MRSA PCR    Lactic Acid, Plasma    Lactic Acid, Plasma    CBC with Differential    Troponin I    Vancomycin, Random    CBC with Differential    Blood Gas    Hepatitis B Surface Antibody, Qual/Quant    Hepatitis B Surface Antigen    Troponin I    Troponin I    RT Blood Gas    Basic Metabolic Panel    Comprehensive Metabolic Panel    Phosphorus    RT Blood Gas    Hepatitis B Surface Antigen    CBC Auto Differential    RT Blood Gas    CBC with Differential    Triglycerides     Manual Differential    Protime-INR    Fibrinogen    Ferritin    Occult Blood, Stool, Diagnostic (1-3)    Occult Blood, Stool 1st Specimen    CK    Lactate Dehydrogenase    Occult Blood, Stool 2nd Specimen    GI Panel    CBC Auto Differential    Comprehensive Metabolic Panel    CBC with Differential    Occult Blood, Stool 3rd Specimen    Triglycerides    Lactate Dehydrogenase    Lactic Acid, Plasma    Lipase    Amylase    RT Blood Gas    Blood Gas    CBC with Differential    Blood Gas    Manual Differential    Tele: Maintain IV access while on telemetry - Adult    Cardiac Monitoring - Adult    Notify Physician - Potential Need of Opioid Reversal    Place sequential compression device    Vital signs    Strict intake and output    Wound care routine (specify)    Wound care routine (specify)    Wound care routine (specify)    Nasogastric/Orogastric tube insertion    Upon patient transfer, may continue infusion at the currently infusing rate    Chlorohexidine Gluconate Bath    Target arousal level - RASS: RASS 0 to -1    Misc nursing order (specify)    Assess for signs of distress Moderate to severe use of accessory muscles, increased respiratory rate exceeding 25 breaths per minute, gasping, coughing, choking, increased agitation, pain, nausea, or other distressing symptoms    At the request of patient, a comfort focused treatment plan has been initiated.  The patient is dying and wishes to allow natural death.  Symptomatic management may include medication or other orders to ease pain, difficulty breathing, and anxiety.    Vital signs    Oral care    Oral suction    Discontinue blood pressure cuff    Discontinue blood draws/lab work    Discontinue dialysis    Discontinue chest x-rays    Discontinue nasogastric tube    Discontinue nutritional support (tube fedding, TPN, PPN)    DNR (Do Not Resuscitate)    Pharmacy to dose Vancomycin consult    IP Wound Care consult Nurse    Inpatient consult to Social Work/Case  "Management    Inpatient consult to Nephrology    Inpatient consult to Palliative Care    Inpatient consult to Palliative Care    Mechanical ventilation Continuous    Pulse Oximetry Q4H    EKG 12-lead    EKG 12-lead    EKG 12-lead    EKG 12-lead    Echo Saline Bubble? Yes    Insert arterial line    Prepare patient for dialysis    Hemodialysis inpatient If "per protocol" is selected for one or more ingredients (K+, Ca++, Na+, Bicarb) for the dialysate bath solution, select the hyperlink for the protocol instructions.    Admit to Inpatient     Death note: Came to bedside to evaluate patient.  On exam patient has no pupillary response, oculocephalic reflex is not present.  No response to pain in all 4 extremities.  No heart sounds on auscultation.  No peripheral pulses felt.    Cause of Death:  Cardiopulmonary arrest secondary multiorgan failure in the setting of septic shock, ESRD, extensive bladder cancer  Discharged Condition:     Disposition:  in medical facility      Telma Thompson MD  Pulmonology    "

## 2025-03-17 NOTE — NURSING
03/16/25 1900   Treatment   Treatment Type CVVHD   Treatment Status New start   Solutions Labeled and Current  Yes   Access Tunneled Cath;Right;IJ   Catheter Dressing Intact  Yes   Alarms Engaged Yes   CRRT Comments Treatment initiated. 4 K 2.5 Ca 35 HCO3 concentration hung and extra bags left at bedside. Nurse in room for start. Questions and concerns addressed. No issues.

## 2025-03-17 NOTE — CONSULTS
Inpatient consult to Palliative Care  Consult performed by: Nichelle Garcia FNP  Consult ordered by: Tye Hayes MD    Patient Name: Fior Joya   MRN: [id number]   Admission Date: 3/13/2025   Hospital Length of Stay: 4   Attending Provider: Casper Hayward Jr., MD,*   Consulting Provider: Nichelle NOBLE  Reason for Consult: Goals of Care  Primary Care Physician: Chriss Mckeon NP     Principal Problem: <principal problem not specified>     Patient information was obtained from relative(s) and ER records.      Final diagnoses:  [R53.1] Weakness (Primary)  [R53.83] Fatigue  [R57.9] Shock  [R79.89] Elevated troponin  [E16.2] Hypoglycemia  [A41.9] Sepsis, due to unspecified organism, unspecified whether acute organ dysfunction present     Assessment/Plan:     I reviewed the patient and family's understanding of the seriousness of the illness and its expected prognosis. We discussed the patient's goals of care and treatment preferences.  I clarified current code status. I identified the surrogate decision maker or health care POA.  I answered all questions and we formulated a plan including recommendations for symptom management and how to best achieve goals of care.  Advance Care Planning     Date: 03/17/2025    [facility]  I engaged the family in a voluntary conversation about advance care planning and we specifically addressed what the goals of care would be moving forward, in light of the patient's change in clinical status, specifically current condition.  We did specifically address the patient's likely prognosis, which is poor.  We explored the patient's values and preferences for future care.  The family endorses that what is most important right now is to focus on comfort and QOL     Accordingly, we have decided that the best plan to meet the patient's goals includes pivot to comfort-focused care  This discussion occurred on a fully voluntary basis with the verbal consent of the patient and/or family.           Spoke to son and his wife at bedside who understand the severity of patient's condition and are waiting for family members to arrive.  Family informed that they understand that patient may die on life support.  Discussed that we could discuss comfort measures if patient condition should remain stable.  Offered support and encouraged to call for any questions/concerns.      Recommendations:     Comfort measures in place.      History of Present Illness:     Patient experienced significant decline over the weekend and was emergently intubated yesterday with ABG's revealing severe metabolic acidosis.  Patient receiving 3 pressors and was initiated on CVVHD on 3/16.  Intensivist spoke to family who agreed upon DNR order.  Palliative Medicine continuing to follow.       Active Ambulatory Problems     Diagnosis Date Noted    Volume depletion 06/21/2024    Anemia requiring transfusions 06/21/2024    Hyperkalemia 06/21/2024    Metabolic acidosis 06/21/2024    Other hydronephrosis 10/21/2024    Bacteremia 10/21/2024    ESRD (end stage renal disease) 10/21/2024    Mechanical breakdown of vascular dialysis catheter 01/13/2025     Resolved Ambulatory Problems     Diagnosis Date Noted    ESTRELLITA (acute kidney injury) 06/21/2024    UTI (urinary tract infection) 06/21/2024     Past Medical History:   Diagnosis Date    Bladder cancer         Past Surgical History:   Procedure Laterality Date    CYSTOSCOPY W/ URETERAL STENT PLACEMENT Right 6/27/2024    Procedure: CYSTOSCOPY, WITH URETERAL STENT INSERTION;  Surgeon: Otis Person MD;  [place] [facility];  Service: Urology;  Laterality: Right;  CYSTOSCOPY, BILATERAL RETROGRADE PYELOGRAMS, POSSIBLE STENT PLACEMENT.    CYSTOSCOPY W/ URETERAL STENT PLACEMENT Bilateral 10/4/2024    Procedure: CYSTOSCOPY, WITH URETERAL STENT INSERTION;  Surgeon: Tawana Loaiza MD;  [place] [facility];  Service: Urology;  Laterality: Bilateral;  CYSTO WITH BILAT URETERAL STENT EXCHANGE     INSERTION OF TUNNELED CENTRAL VENOUS HEMODIALYSIS CATHETER N/A 7/3/2024    Procedure: Insertion, Catheter, Central Venous, Hemodialysis;  Surgeon: Uche Barcenas MD;  Location: [facility] CATH LAB;  Service: Peripheral Vascular;  Laterality: N/A;    INSERTION OF TUNNELED CENTRAL VENOUS HEMODIALYSIS CATHETER N/A 10/24/2024    Procedure: Insertion, Catheter, Central Venous, Hemodialysis;  Surgeon: Nikolai Manzo DO;  Location: [facility] CATH LAB;  Service: Nephrology;  Laterality: N/A;    INSERTION, CATHETER, TUNNELED Right 3/10/2025    Procedure: INSERTION, CATHETER, TUNNELED;  Surgeon: Donovan Becerra MD;  Location: [facility] OR;  Service: General;  Laterality: Right;    REMOVAL OF TUNNELED CENTRAL VENOUS CATHETER (CVC) Left 3/10/2025    Procedure: REMOVAL, CATHETER, CENTRAL VENOUS, TUNNELED;  Surgeon: Donovan Becerra MD;  Location: [facility] OR;  Service: General;  Laterality: Left;    REPLACEMENT OF DIALYSIS CATHETER OVER GUIDEWIRE THROUGH EXISTING VENOUS ACCESS N/A 2025    Procedure: REPLACEMENT, CATHETER, DIALYSIS, OVER GUIDEWIRE, USING EXISTING VENOUS ACCESS;  Surgeon: Nikolai Manzo DO;  Location: [facility] CATH LAB;  Service: Nephrology;  Laterality: N/A;        Review of patient's allergies indicates:   Allergen Reactions    Asa [aspirin] Anaphylaxis    Penicillins      Received ceftriaxone from , no reactions         Current Medications[1]       Current Facility-Administered Medications:     acetaminophen, 650 mg, Oral, Q4H PRN    etomidate, , Intravenous, Code/trauma/sedation Med    [] fentaNYL, 50 mcg, Intravenous, Q15 Min PRN **FOLLOWED BY** fentaNYL, 50 mcg, Intravenous, Q1H PRN    glycopyrrolate (PF), 0.2 mg, Intravenous, Q6H PRN    HYDROmorphone, 0.2 mg, Intravenous, Q6H PRN    lorazepam, 1 mg, Intravenous, Q4H PRN    ondansetron, 4 mg, Intravenous, Q8H PRN    rocuronium, , Intravenous, Code/trauma/sedation Med    sodium chloride 0.9%, 10 mL, Intravenous, PRN    Pharmacy to dose Vancomycin consult,  ", , Once **AND** vancomycin - pharmacy to dose, , Intravenous, pharmacy to manage frequency     No family history on file.     Review of Systems   Unable to perform ROS: Intubated            Objective:   /60   Pulse 93   Temp (!) 95.3 °F (35.2 °C) (Axillary)   Resp 20   Ht 5' 2.01" (1.575 m)   Wt 100.2 kg (220 lb 14.4 oz)   LMP  (LMP Unknown)   SpO2 (!) 82%   BMI 40.39 kg/m²      Physical Exam  Constitutional:       Appearance: She is toxic-appearing.   HENT:      Head: Normocephalic.   Cardiovascular:      Rate and Rhythm: Tachycardia present. Rhythm irregular.   Skin:     Coloration: Skin is pale.             Review of Symptoms      Symptom Assessment (ESAS 0-10 Scale)  Pain:  0  Dyspnea:  0  Anxiety:  0  Nausea:  0  Depression:  0  Anorexia:  0  Fatigue:  0  Insomnia:  0  Restlessness:  0  Agitation:  0         Bowel Management Plan (BMP):  Yes      Psychosocial/Cultural:   See Palliative Psychosocial Note: Yes  **Primary  to Follow**  Palliative Care  Consult: No      Advance Care Planning   Advance Care Planning        PAINAD: NA    Caregiver burden formerly assessed: Yes        > 50% of 25 min of encounter was spent in chart review, face to face discussion of goals of care, symptom assessment, coordination of care and emotional support.         Nichelle Garcia FNP, [facility]  Palliative Medicine  [facility]           [1]   Current Facility-Administered Medications:     acetaminophen tablet 650 mg, 650 mg, Oral, Q4H PRN, Juni Fletcher MD    atorvastatin tablet 20 mg, 20 mg, Oral, QHS, Edin Devine MD, 20 mg at 03/16/25 2112    dexmedetomidine (PRECEDEX) 400mcg/100mL 0.9% NaCL infusion, 0-1.4 mcg/kg/hr, Intravenous, Continuous, Tye Hayes MD, Last Rate: 10.02 mL/hr at 03/17/25 0600, 0.4 mcg/kg/hr at 03/17/25 0600    etomidate injection, , Intravenous, Code/trauma/sedation Med, Yadiel Grijalva MD, 20 mg at 03/16/25 0805    fentaNYL 2500 mcg in 0.9% " sodium chloride 250 mL infusion premix, 0-250 mcg/hr, Intravenous, Continuous, Yadiel Grijalva MD, Last Rate: 2.5 mL/hr at 25 0600, 25 mcg/hr at 25 0600    [] fentaNYL injection 50 mcg, 50 mcg, Intravenous, Q15 Min PRN, 50 mcg at 25 0834 **FOLLOWED BY** fentaNYL injection 50 mcg, 50 mcg, Intravenous, Q1H PRN, Travis Thakur MD    folic acid tablet 1 mg, 1 mg, Oral, Daily, Edin Devine MD, 1 mg at 25 1409    glycopyrrolate (PF) injection 0.2 mg, 0.2 mg, Intravenous, Q6H PRN, Nichelle Garcia FNP    heparin (porcine) injection 5,000 Units, 5,000 Units, Subcutaneous, Q12H, Edin Devine MD, 5,000 Units at 25 2112    hydrocortisone sodium succinate injection 100 mg, 100 mg, Intravenous, Q8H, Tye Hayes MD, 100 mg at 25 0519    HYDROmorphone (PF) injection 0.2 mg, 0.2 mg, Intravenous, Q6H PRN, Tye Hayes MD, 0.2 mg at 25 0310    lactated ringers bolus 500 mL, 500 mL, Intravenous, Once, Casper Hayward Jr., MD, FCCP    LORazepam (ATIVAN) injection 1 mg, 1 mg, Intravenous, Once, Telma Thompson MD    LORazepam (ATIVAN) injection 1 mg, 1 mg, Intravenous, Q4H PRN, Nichelle Garcia FNP    [COMPLETED] magnesium sulfate 2g in water 50mL IVPB (premix), 2 g, Intravenous, Once, Stopped at 25 0655 **FOLLOWED BY** magnesium sulfate 2g in water 50mL IVPB (premix), 2 g, Intravenous, Once, Edin Devine MD, Stopped at 25 1059    meropenem injection 1 g, 1 g, Intravenous, Q24H, Tye Hayes MD, 1 g at 25 1338    miconazole NITRATE 2 % top powder, , Topical (Top), BID, Edin Devine MD, Given at 25    mupirocin 2 % ointment, , Nasal, BID, Juni Fletcher MD, Given at 25    NORepinephrine 32 mg in 0.9% NaCl 250 mL infusion, 0-3 mcg/kg/min, Intravenous, Continuous, Yadiel Grijalva MD, Last Rate: 140.9 mL/hr at 25 0600, 3 mcg/kg/min at 25 0600    ondansetron injection 4 mg, 4 mg, Intravenous, Q8H PRN,  Juni Fletcher MD    PHENYLephrine 100 mg in 0.9% NaCl 250 mL infusion, 0-5 mcg/kg/min, Intravenous, Continuous, Yadiel Grijalva MD, Last Rate: 75.2 mL/hr at 03/17/25 0600, 5 mcg/kg/min at 03/17/25 0600    rocuronium injection, , Intravenous, Code/trauma/sedation Med, Yadiel Grijalva MD, 100 mg at 03/16/25 0806    sodium bicarbonate 150 mEq in 0.45% NaCl 1,000 mL infusion, , Intravenous, Continuous, Ines Henry, FNP, Last Rate: 100 mL/hr at 03/17/25 0600, Rate Verify at 03/17/25 0600    sodium chloride 0.9% flush 10 mL, 10 mL, Intravenous, PRN, Juni Fletcher MD    Pharmacy to dose Vancomycin consult, , , Once **AND** vancomycin - pharmacy to dose, , Intravenous, pharmacy to manage frequency, Edin Devine MD    vasopressin (PITRESSIN) 0.2 Units/mL in 0.9% NaCl 100 mL infusion, 0.1 Units/min, Intravenous, Continuous, Yadiel Grijalva MD, Last Rate: 30 mL/hr at 03/17/25 0602, 0.1 Units/min at 03/17/25 0602

## 2025-03-17 NOTE — PROGRESS NOTES
Nephrology  Note    Patient Name: Fior Joya  Age: 69 y.o.  : 1955  MRN: [id number]  Admission Date: 3/13/2025      Fior Joya is a 69 y.o. female is known to me for her ESRD and being on dialysis on TTS schedule at [facility] in [place].  I saw her this Tuesday and she was doing very well.  She had dialysis done yesterday.  I was notified that her nephrostomy tubes have not been draining and was sent to the hospital for evaluation now patient is admitted to the hospital for altered mental status and possible sepsis.  Patient is very restless and agitated.  She has NG tube.  Gagging.  Nauseous.  No vomiting.  NG is to the suction and there is nothing coming out.  Patient also has mittens on to prevent her from pulling the NG tube.  Patient's main problem has been nausea.  Nurses reported that she is confused but I found patient only agitated but not confused.  She was hypoglycemic yesterday.  She also has elevated lactic acids.  Patient is on Levophed for low blood pressure.  Her other significant past medical history is positive for that she has nephrostomy tubes because of her bladder cancer.  She has 2 nephrostomy is in the right side and both are not draining much.  Her only other complaint is weakness.  Also complains of back pain.    2025   Events of the weekend reviewed.  Patient required CVVHD over the weekend and it was discontinued early this morning as her blood pressure was very low.  She is on 3 pressors in the form of Levophed, phenylephrine and vasopressin.  Still maintains very low blood pressure.  She is also requiring some fentanyl for comfort and sedation.  Bicarb drip is also in progress.  Her 2 sons present at the bedside.    Current Medications[1]    Chriss Mckeon NP    Past Medical History:   Diagnosis Date    Bladder cancer     ESRD (end stage renal disease)       Past Surgical History:   Procedure Laterality Date    CYSTOSCOPY W/ URETERAL STENT PLACEMENT Right 2024     Procedure: CYSTOSCOPY, WITH URETERAL STENT INSERTION;  Surgeon: Otis Person MD;  Location: [facility];  Service: Urology;  Laterality: Right;  CYSTOSCOPY, BILATERAL RETROGRADE PYELOGRAMS, POSSIBLE STENT PLACEMENT.    CYSTOSCOPY W/ URETERAL STENT PLACEMENT Bilateral 10/4/2024    Procedure: CYSTOSCOPY, WITH URETERAL STENT INSERTION;  Surgeon: Tawana Loaiza MD;  Location: [facility] OR;  Service: Urology;  Laterality: Bilateral;  CYSTO WITH BILAT URETERAL STENT EXCHANGE    INSERTION OF TUNNELED CENTRAL VENOUS HEMODIALYSIS CATHETER N/A 7/3/2024    Procedure: Insertion, Catheter, Central Venous, Hemodialysis;  Surgeon: Uche Barcenas MD;  Location: [facility] CATH LAB;  Service: Peripheral Vascular;  Laterality: N/A;    INSERTION OF TUNNELED CENTRAL VENOUS HEMODIALYSIS CATHETER N/A 10/24/2024    Procedure: Insertion, Catheter, Central Venous, Hemodialysis;  Surgeon: Nikolai Manzo DO;  Location: [facility] CATH LAB;  Service: Nephrology;  Laterality: N/A;    INSERTION, CATHETER, TUNNELED Right 3/10/2025    Procedure: INSERTION, CATHETER, TUNNELED;  Surgeon: Donovan Becerra MD;  Location: [facility];  Service: General;  Laterality: Right;    REMOVAL OF TUNNELED CENTRAL VENOUS CATHETER (CVC) Left 3/10/2025    Procedure: REMOVAL, CATHETER, CENTRAL VENOUS, TUNNELED;  Surgeon: Donovan Becerra MD;  Location: [facility];  Service: General;  Laterality: Left;    REPLACEMENT OF DIALYSIS CATHETER OVER GUIDEWIRE THROUGH EXISTING VENOUS ACCESS N/A 1/13/2025    Procedure: REPLACEMENT, CATHETER, DIALYSIS, OVER GUIDEWIRE, USING EXISTING VENOUS ACCESS;  Surgeon: Nikolai Manzo DO;  Location: [facility] CATH LAB;  Service: Nephrology;  Laterality: N/A;      No family history on file.  Social History     Tobacco Use    Smoking status: Never    Smokeless tobacco: Never   Substance Use Topics    Alcohol use: Never     Prescriptions Prior to Admission[2]  Review of patient's allergies indicates:   Allergen Reactions    Asa [aspirin]  Anaphylaxis    Penicillins      Received ceftriaxone from 6/27, no reactions             Review of Systems:     All 12 point review of system done negative except 1 history of present illness.    Objective:       VITAL SIGNS: 24 HR MIN & MAX LAST    Temp  Min: 95.3 °F (35.2 °C)  Max: 98.9 °F (37.2 °C)  (!) 95.3 °F (35.2 °C)        BP  Min: 89/47  Max: 177/84  130/60     Pulse  Min: 92  Max: 152  (!) 152     Resp  Min: 17  Max: 58  20    SpO2  Min: 10 %  Max: 100 %  (!) 84 %      GEN:  Well developed and nourished.  Comfortable on the ventilator.  HEENT: Conjunctiva anicteric.  CV:  Sinus tach noted.  PULM: CTAB, unlabored  ABD: Soft, nondistended.  Decreased breath sound noted.  EXT: No cyanosis or edema  SKIN: Warm and dry  PSYCH:  Obtunded.  Vascular access:  Right IJ tunneled dialysis catheter.          Component Value Date/Time     (L) 03/17/2025 0422     (L) 03/17/2025 0422    K 6.1 (H) 03/17/2025 0422    K 6.2 (H) 03/17/2025 0422    CO2 8 (LL) 03/17/2025 0422    CO2 8 (LL) 03/17/2025 0422    BUN 5.6 (L) 03/17/2025 0422    BUN 6.1 (L) 03/17/2025 0422    CREATININE 1.55 (H) 03/17/2025 0422    CREATININE 1.53 (H) 03/17/2025 0422    CALCIUM 6.4 (LL) 03/17/2025 0422    CALCIUM 6.5 (LL) 03/17/2025 0422    PHOS 4.7 03/17/2025 0422            Component Value Date/Time    WBC 17.57 (H) 03/17/2025 0314    WBC 17.57 03/17/2025 0314    WBC 14.97 (H) 03/16/2025 1509    WBC 8.95 03/16/2025 1319    HGB 12.1 03/17/2025 0314    HGB 11.1 (L) 03/16/2025 1509    HCT 41.3 03/17/2025 0314    HCT 33.9 (L) 03/16/2025 1509     03/17/2025 0314     03/16/2025 1509         US Retroperitoneal Limited   Final Result      No hydronephrosis.         Electronically signed by: Sierra Mcmahon   Date:    03/16/2025   Time:    14:19      CTA Chest Non-Coronary (PE Studies)   Final Result      1. Negative for pulmonary thromboembolic disease and thoracic aortic dissection.   2. Interval intubation.   3. Mild scattered  atelectasis.         Electronically signed by: Alexx Patricia   Date:    03/16/2025   Time:    11:11      X-Ray Chest 1 View for Line/Tube Placement   Final Result      Endotracheal tube with tip 1.4 cm above the alfred.         Electronically signed by: Sierra Mcmahon   Date:    03/16/2025   Time:    09:06      CT Head Without Contrast   Final Result      No acute intracranial findings.         Electronically signed by: Alexx Patricia   Date:    03/15/2025   Time:    08:28      CT Chest Abdomen Pelvis With IV Contrast (XPD) NO Oral Contrast   Final Result      XR Gastric tube check, non-radiologist performed   Final Result      Optimal placement nasogastric tube.         Electronically signed by: Alvaro Watson   Date:    03/14/2025   Time:    11:13      US Retroperitoneal Limited   Final Result      No abnormalities of the right kidney         Electronically signed by: Jim Valadez MD   Date:    03/14/2025   Time:    09:25      CT Abdomen Pelvis  Without Contrast   Final Result      No acute process identified in the abdomen or pelvis.         Electronically signed by: Pastor Gordon   Date:    03/13/2025   Time:    16:59      X-Ray Chest AP Portable   Final Result      Enlarged cardiac silhouette without edema         Electronically signed by: Deidra Dobson   Date:    03/13/2025   Time:    14:52          Assessment / Plan:   ESRD  Possible septic shock and patient is on antibiotics  Bladder cancer obstructive uropathy for which patient had nephrostomy tubes on the right side x2 and 1 tube on the left side.  Metabolic acidosis.  Anemia    Recommendations  I reviewed the report from Dr. Hayward.  Discussed with the patient's 2 sons and their spouses at the bedside.  Extremely poor overall condition noted and detailed to both of them and they expressed understanding.  At present she is not can do to resume CVVHD, unless overall condition changes.         [1]   Current Facility-Administered Medications   Medication  Dose Route Frequency Provider Last Rate Last Admin    acetaminophen tablet 650 mg  650 mg Oral Q4H PRN Juni Fletcher MD        atorvastatin tablet 20 mg  20 mg Oral QHS Edin Devine MD   20 mg at 03/16/25 2112    dexmedetomidine (PRECEDEX) 400mcg/100mL 0.9% NaCL infusion  0-1.4 mcg/kg/hr Intravenous Continuous Tye Hayes MD 10.02 mL/hr at 03/17/25 0600 0.4 mcg/kg/hr at 03/17/25 0600    etomidate injection   Intravenous Code/trauma/sedation Med Yadiel Grijalva MD   20 mg at 03/16/25 0805    fentaNYL 2500 mcg in 0.9% sodium chloride 250 mL infusion premix  0-250 mcg/hr Intravenous Continuous Yadiel Grijalva MD 2.5 mL/hr at 03/17/25 0600 25 mcg/hr at 03/17/25 0600    fentaNYL injection 50 mcg  50 mcg Intravenous Q1H PRN Travis Thakur MD        folic acid tablet 1 mg  1 mg Oral Daily Edin Devine MD   1 mg at 03/16/25 1409    glycopyrrolate injection 0.2 mg  0.2 mg Intravenous Q6H PRN Nichelle Garcia FNP        heparin (porcine) injection 5,000 Units  5,000 Units Subcutaneous Q12H Edin Devine MD   5,000 Units at 03/16/25 2112    hydrocortisone sodium succinate injection 100 mg  100 mg Intravenous Q8H Tye Hayes MD   100 mg at 03/17/25 0519    HYDROmorphone (PF) injection 0.2 mg  0.2 mg Intravenous Q6H PRN Tye Hayes MD   0.2 mg at 03/16/25 0310    lactated ringers bolus 500 mL  500 mL Intravenous Once Casper Haywrad Jr., MD, FCCP        LORazepam (ATIVAN) injection 1 mg  1 mg Intravenous Once Telma Thompson MD        LORazepam injection 1 mg  1 mg Intravenous Q4H PRN Nichelle Garcia FNP        magnesium sulfate 2g in water 50mL IVPB (premix)  2 g Intravenous Once Edin Devine MD   Stopped at 03/14/25 1059    meropenem injection 1 g  1 g Intravenous Q24H Tye Hayes MD   1 g at 03/16/25 1338    miconazole NITRATE 2 % top powder   Topical (Top) BID Edin Devine MD   Given at 03/16/25 2115    mupirocin 2 % ointment   Nasal BID Juni Fletcher MD   Given at  03/16/25 2112    NORepinephrine 32 mg in 0.9% NaCl 250 mL infusion  0-3 mcg/kg/min Intravenous Continuous Yadiel Grijalva .9 mL/hr at 03/17/25 0600 3 mcg/kg/min at 03/17/25 0600    ondansetron injection 4 mg  4 mg Intravenous Q8H PRN Juni Fletcher MD        PHENYLephrine 100 mg in 0.9% NaCl 250 mL infusion  0-5 mcg/kg/min Intravenous Continuous Yadiel Grijalva MD 75.2 mL/hr at 03/17/25 0600 5 mcg/kg/min at 03/17/25 0600    rocuronium injection   Intravenous Code/trauma/sedation Med Yadiel Grijalva MD   100 mg at 03/16/25 0806    sodium bicarbonate 150 mEq in 0.45% NaCl 1,000 mL infusion   Intravenous Continuous Ines Henry  mL/hr at 03/17/25 0600 Rate Verify at 03/17/25 0600    sodium chloride 0.9% flush 10 mL  10 mL Intravenous PRN Juni Fletcher MD        vancomycin - pharmacy to dose   Intravenous pharmacy to manage frequency Edin Devine MD        vasopressin (PITRESSIN) 0.2 Units/mL in 0.9% NaCl 100 mL infusion  0.1 Units/min Intravenous Continuous Yadiel Grijalva MD 30 mL/hr at 03/17/25 0602 0.1 Units/min at 03/17/25 0602   [2]   Medications Prior to Admission   Medication Sig Dispense Refill Last Dose/Taking    atorvastatin (LIPITOR) 20 MG tablet Take 20 mg by mouth every evening.   Taking    ergocalciferol, vitamin D2, (VITAMIN D2 ORAL) Take by mouth.   Taking    FEROSUL 325 mg (65 mg iron) Tab tablet Take 325 mg by mouth once daily.   Taking    fluticasone propionate (FLONASE) 50 mcg/actuation nasal spray 2 sprays by Each Nostril route.   Taking    folic acid (FOLVITE) 1 MG tablet Take 1 tablet (1 mg total) by mouth once daily. 30 tablet 0 Taking    labetaloL (NORMODYNE) 100 MG tablet Take 100 mg by mouth 2 (two) times daily.   Taking    levocetirizine (XYZAL) 5 MG tablet Take 5 mg by mouth every evening.   Taking    megestroL (MEGACE) 40 MG Tab Take 40 mg by mouth once daily.   Taking    midodrine (PROAMATINE) 5 MG Tab Take 5 mg by mouth 3 (three) times daily.   Taking     oxyCODONE-acetaminophen (PERCOCET) 5-325 mg per tablet Take 1 tablet by mouth On call Procedure for Pain. 1 tablet 0 3/12/2025    pantoprazole (PROTONIX) 40 MG tablet Take 1 tablet (40 mg total) by mouth once daily. 90 tablet 3 Taking    sevelamer carbonate (RENVELA) 800 mg Tab Take 800 mg by mouth 3 (three) times daily with meals.   Taking    sodium bicarbonate 650 MG tablet Take 650 mg by mouth 4 (four) times daily.   Taking    fluconazole (DIFLUCAN) 150 MG Tab Take 150 mg by mouth once. (Patient not taking: Reported on 3/12/2025)       nitrofurantoin, macrocrystal-monohydrate, (MACROBID) 100 MG capsule Take 100 mg by mouth every 12 (twelve) hours.       ondansetron (ZOFRAN) 8 MG tablet TAKE 1 TABLET BY MOUTH EVERY 8 HOURS AS NEEDED FOR NAUSEA/ VOMITING

## 2025-03-18 LAB
BACTERIA BLD CULT: NORMAL
BACTERIA BLD CULT: NORMAL
BACTERIA FLD CULT: ABNORMAL

## 2025-03-21 LAB
BACTERIA BLD CULT: NORMAL
BACTERIA BLD CULT: NORMAL

## 2025-03-24 NOTE — PHYSICIAN QUERY
Please clarify if there is any clinical correlation between UTI and Nephrostomy tubes.   Clinically undetermined

## (undated) DEVICE — SET UROLOGY TBNG UP TO 300MMHG

## (undated) DEVICE — DRAPE MOBILE C-ARM

## (undated) DEVICE — SYR 30CC LUER LOCK

## (undated) DEVICE — FORCEP HEMOSTAT MOSQUITO STR

## (undated) DEVICE — POSITIONER HEAD ADULT

## (undated) DEVICE — DRESSING TEGADERM CHG 3.5X4.5

## (undated) DEVICE — GLOVE SIGNATURE ESSNTL LTX 6.5

## (undated) DEVICE — SUPPORT ULNA NERVE PROTECTOR

## (undated) DEVICE — SOL NACL 0.9% IV INJ 500ML

## (undated) DEVICE — GLOVE SENSICARE PI GRN 7

## (undated) DEVICE — Device

## (undated) DEVICE — SUT SILK 2.0 BLK 18

## (undated) DEVICE — BOWL STERILE LARGE 32OZ

## (undated) DEVICE — DRESSING TRANS 4X4 TEGADERM

## (undated) DEVICE — ADHESIVE DERMABOND ADVANCED

## (undated) DEVICE — SUT 3-0 MONOCRYL PLUS PS-2

## (undated) DEVICE — KIT MINI STK MAX COAX 5FR 10CM

## (undated) DEVICE — NDL MAGELLAN SAFETY 18G 1.5IN

## (undated) DEVICE — TRAY CENT PREM SUT REM PK SGL

## (undated) DEVICE — GLOVE PROTEXIS HYDROGEL SZ7.5

## (undated) DEVICE — CATH ULTRAVERSE 035 12X20X75

## (undated) DEVICE — GUIDEWIRE STF .035X180CM ANG

## (undated) DEVICE — TRAY SKIN SCRUB WET PREMIUM

## (undated) DEVICE — GLOVE PROTEXIS HYDROGEL SZ6.5

## (undated) DEVICE — DRAPE RADIAL BRACHIAL 29X42IN

## (undated) DEVICE — SOL IRRIGATION WATER 3000ML

## (undated) DEVICE — SYR DISP LL 5CC

## (undated) DEVICE — MARKER WRITESITE SKIN CHLRAPRP

## (undated) DEVICE — CANNULA NASAL ADULT

## (undated) DEVICE — GLOVE SENSICARE NEOPRENE 6.5

## (undated) DEVICE — CONNECTOR NEEDLELESS MAXZERO

## (undated) DEVICE — COVER PROBE US 5.5X58L NON LTX

## (undated) DEVICE — SUT SILK 0 BLK BR CT-1 30IN

## (undated) DEVICE — SUT ETHILON 2-0 FS 18IN BLK

## (undated) DEVICE — CANNULA ADULT NASAL 7FT

## (undated) DEVICE — GLOVE SENSICARE PI GRN 7.5

## (undated) DEVICE — SOL IRRI STRL WATER 1000ML

## (undated) DEVICE — SUT MONOCRYL 3-0 PS-2 UND

## (undated) DEVICE — ADAPTER STOPCOCK FEMALE LL

## (undated) DEVICE — SYR SLIP TIP 10ML SHIELD

## (undated) DEVICE — DRAPE INCISE IOBAN 2 23X17IN

## (undated) DEVICE — DRAPE UTILITY W/ TAPE 20X30IN

## (undated) DEVICE — SYR 10CC LUER LOCK

## (undated) DEVICE — PAD DEFIB RADIOTRANSPARENT

## (undated) DEVICE — CATH POLLACK OPEN-END FLEXI-TI

## (undated) DEVICE — KIT SURGICAL TURNOVER

## (undated) DEVICE — SENSOR DUAL FLEX STR 150CM

## (undated) DEVICE — DRESSING ANTIMICROBIAL 1 INCH

## (undated) DEVICE — JUMPSUIT SURG W/TIE UNIV

## (undated) DEVICE — CLOTH READYPREP 2%CHG 9X10.5IN

## (undated) DEVICE — SUT PLN GUT 2-0 CT-3 1X27

## (undated) DEVICE — NDL ECLIPSE HYPO 22GA 1IN

## (undated) DEVICE — BAG DRAIN UROLOGY AND HOSE

## (undated) DEVICE — SYR LUER LOCK 20ML

## (undated) DEVICE — GLOVE SENSICARE PI SURG 6.5

## (undated) DEVICE — DECANTER BAG FLUID TRANSFER